# Patient Record
Sex: MALE | Race: OTHER
[De-identification: names, ages, dates, MRNs, and addresses within clinical notes are randomized per-mention and may not be internally consistent; named-entity substitution may affect disease eponyms.]

---

## 2020-02-26 PROBLEM — Z00.00 ENCOUNTER FOR PREVENTIVE HEALTH EXAMINATION: Status: ACTIVE | Noted: 2020-02-26

## 2020-02-27 ENCOUNTER — APPOINTMENT (OUTPATIENT)
Dept: NEUROLOGY | Facility: CLINIC | Age: 55
End: 2020-02-27
Payer: COMMERCIAL

## 2020-02-27 ENCOUNTER — LABORATORY RESULT (OUTPATIENT)
Age: 55
End: 2020-02-27

## 2020-02-27 VITALS
TEMPERATURE: 98 F | OXYGEN SATURATION: 99 % | HEART RATE: 80 BPM | SYSTOLIC BLOOD PRESSURE: 171 MMHG | BODY MASS INDEX: 25.01 KG/M2 | DIASTOLIC BLOOD PRESSURE: 98 MMHG | WEIGHT: 165 LBS | HEIGHT: 68 IN

## 2020-02-27 DIAGNOSIS — Z86.39 PERSONAL HISTORY OF OTHER ENDOCRINE, NUTRITIONAL AND METABOLIC DISEASE: ICD-10-CM

## 2020-02-27 DIAGNOSIS — Z86.79 PERSONAL HISTORY OF OTHER DISEASES OF THE CIRCULATORY SYSTEM: ICD-10-CM

## 2020-02-27 DIAGNOSIS — Z78.9 OTHER SPECIFIED HEALTH STATUS: ICD-10-CM

## 2020-02-27 DIAGNOSIS — Z82.49 FAMILY HISTORY OF ISCHEMIC HEART DISEASE AND OTHER DISEASES OF THE CIRCULATORY SYSTEM: ICD-10-CM

## 2020-02-27 PROCEDURE — 99204 OFFICE O/P NEW MOD 45 MIN: CPT

## 2020-02-27 RX ORDER — LISINOPRIL 20 MG/1
20 TABLET ORAL
Refills: 0 | Status: ACTIVE | COMMUNITY

## 2020-02-27 RX ORDER — SITAGLIPTIN 50 MG/1
50 TABLET, FILM COATED ORAL
Refills: 0 | Status: ACTIVE | COMMUNITY

## 2020-02-27 RX ORDER — METFORMIN HYDROCHLORIDE 1000 MG/1
1000 TABLET, COATED ORAL
Refills: 0 | Status: ACTIVE | COMMUNITY

## 2020-02-27 NOTE — PHYSICAL EXAM
[FreeTextEntry1] : Mental status: Awake, alert and oriented x3.  Recent and remote memory intact.  Naming, repetition and comprehension intact.  Attention/concentration intact.  No dysarthria, no aphasia.  Fund of knowledge appropriate.  \par Cranial nerves: Pupils equally round and reactive to light, visual fields full, no nystagmus, extraocular muscles intact, V1 through V3 intact bilaterally and symmetric, face symmetric, hearing intact to finger rub, palate elevation symmetric, tongue was midline.\par Motor:  MRC grading 5/5 b/l UE/LE.   strength 5/5 except left ankle dorsi flexion : 4/5   Normal tone and bulk.  No abnormal movements.  \par Sensation:Hyperesthesia in the feet. Reduced vibration in the toes to 5 sec and absent position. \par Coordination: No dysmetria on finger-to-nose and heel-to-shin.  No dysdiadokinesia.\par Reflexes: 2+ in bilateral UE/LE, downgoing toes bilaterally. (-) Helms.\par Gait: unsteady tandem and wide based . No ataxia.  Romberg positive \par \par

## 2020-02-27 NOTE — ASSESSMENT
[FreeTextEntry1] : KECIA MARTIN is a 54 year old M w HTN and DM who is here for paresthesia and numbness in the feet. Exam is significant for positive Romberg, reduced vibration and position in the toes and unsteady tandem. In addition he has mild weakness on left ankle dorsiflexion. I will perform EMG checking for both neuropathy and radiculopathy and will order further lab work up for neuropathy. Will also start him on gabapentin 100 mg TID \par

## 2020-02-27 NOTE — HISTORY OF PRESENT ILLNESS
[FreeTextEntry1] : KECIA MARTIN is a 54 year old M w history of DM and HTN is here to be evaluated for numbness and tingling sensation in the feet. He reports starting of the symptoms probably for few years but they became more significant for the past 6 months. He has pain and paresthesia in his feet. He has history of intermittent low back pain which got better with PT. He denies any weakness or numbness in the hands. He has history of diabetes for more than 10 years and claims better control recently. \par

## 2020-03-02 LAB
ALBUMIN MFR SERPL ELPH: 59.8 %
ALBUMIN SERPL-MCNC: 4.8 G/DL
ALBUMIN/GLOB SERPL: 1.5 RATIO
ALPHA1 GLOB MFR SERPL ELPH: 3.6 %
ALPHA1 GLOB SERPL ELPH-MCNC: 0.3 G/DL
ALPHA2 GLOB MFR SERPL ELPH: 7.5 %
ALPHA2 GLOB SERPL ELPH-MCNC: 0.6 G/DL
B-GLOBULIN MFR SERPL ELPH: 13 %
B-GLOBULIN SERPL ELPH-MCNC: 1.1 G/DL
CERULOPLASMIN SERPL-MCNC: 27 MG/DL
COPPER SERPL-MCNC: 124 UG/DL
DEPRECATED KAPPA LC FREE/LAMBDA SER: 1.27 RATIO
ENA SS-A AB SER IA-ACNC: <0.2 AL
ENA SS-B AB SER IA-ACNC: <0.2 AL
ESTIMATED AVERAGE GLUCOSE: 160 MG/DL
FOLATE SERPL-MCNC: 4.6 NG/ML
GAMMA GLOB FLD ELPH-MCNC: 1.3 G/DL
GAMMA GLOB MFR SERPL ELPH: 16.1 %
GLIADIN IGA SER QL: 7 UNITS
GLIADIN IGG SER QL: <5 UNITS
GLIADIN PEPTIDE IGA SER-ACNC: NEGATIVE
GLIADIN PEPTIDE IGG SER-ACNC: NEGATIVE
HBA1C MFR BLD HPLC: 7.2 %
HCYS SERPL-MCNC: 34.4 UMOL/L
IGA SER QL IEP: 464 MG/DL
IGG SER QL IEP: 1518 MG/DL
IGM SER QL IEP: 82 MG/DL
INTERPRETATION SERPL IEP-IMP: NORMAL
KAPPA LC CSF-MCNC: 3.11 MG/DL
KAPPA LC SERPL-MCNC: 3.96 MG/DL
M PROTEIN SPEC IFE-MCNC: NORMAL
PROT SERPL-MCNC: 8.1 G/DL
PROT SERPL-MCNC: 8.1 G/DL
TSH SERPL-ACNC: 3.5 UIU/ML
VIT B12 SERPL-MCNC: 539 PG/ML

## 2020-03-04 LAB
ACE BLD-CCNC: 11 U/L
IGA 24H UR QL IFE: NORMAL
VIT B2 SERPL-MCNC: 181 UG/L
VIT B6 SERPL-MCNC: 8.9 UG/L
ZINC SERPL-MCNC: 92 UG/DL

## 2020-03-19 LAB
ALBUPE: 27.5 %
ALPHA1UPE: 30.2 %
ALPHA2UPE: 13.9 %
BETAUPE: 13.5 %
CREAT 24H UR-MCNC: NORMAL G/24 H
CREATININE UR (MAYO): 78 MG/DL
GAMMAUPE: 14.9 %
IGA 24H UR QL IFE: NORMAL
KAPPA LC 24H UR QL: NORMAL
PROT PATTERN 24H UR ELPH-IMP: NORMAL
PROT UR-MCNC: 16 MG/DL
PROT UR-MCNC: 18 MG/DL
SPECIMEN VOL 24H UR: NORMAL ML
VIT B1 SERPL-MCNC: 101.9 NMOL/L

## 2020-03-23 ENCOUNTER — OUTPATIENT (OUTPATIENT)
Dept: OUTPATIENT SERVICES | Facility: HOSPITAL | Age: 55
LOS: 1 days | Discharge: HOME | End: 2020-03-23

## 2020-03-23 ENCOUNTER — APPOINTMENT (OUTPATIENT)
Dept: HEMATOLOGY ONCOLOGY | Facility: CLINIC | Age: 55
End: 2020-03-23
Payer: COMMERCIAL

## 2020-03-23 ENCOUNTER — LABORATORY RESULT (OUTPATIENT)
Age: 55
End: 2020-03-23

## 2020-03-23 VITALS
SYSTOLIC BLOOD PRESSURE: 184 MMHG | DIASTOLIC BLOOD PRESSURE: 110 MMHG | TEMPERATURE: 97.3 F | WEIGHT: 154 LBS | HEIGHT: 68 IN | HEART RATE: 95 BPM | BODY MASS INDEX: 23.34 KG/M2

## 2020-03-23 DIAGNOSIS — E53.8 DEFICIENCY OF OTHER SPECIFIED B GROUP VITAMINS: ICD-10-CM

## 2020-03-23 DIAGNOSIS — R76.8 OTHER SPECIFIED ABNORMAL IMMUNOLOGICAL FINDINGS IN SERUM: ICD-10-CM

## 2020-03-23 LAB
HCT VFR BLD CALC: 42.3 %
HGB BLD-MCNC: 14.6 G/DL
MCHC RBC-ENTMCNC: 31.3 PG
MCHC RBC-ENTMCNC: 34.5 G/DL
MCV RBC AUTO: 90.8 FL
PLATELET # BLD AUTO: 192 K/UL
PMV BLD: 9.2 FL
RBC # BLD: 4.66 M/UL
RBC # FLD: 12.2 %
WBC # FLD AUTO: 5.3 K/UL

## 2020-03-23 PROCEDURE — 99205 OFFICE O/P NEW HI 60 MIN: CPT

## 2020-03-24 PROBLEM — E53.8 LOW SERUM VITAMIN B12: Status: ACTIVE | Noted: 2020-03-23

## 2020-03-24 LAB
ALBUMIN SERPL ELPH-MCNC: 5.3 G/DL
ALP BLD-CCNC: 84 U/L
ALT SERPL-CCNC: 32 U/L
ANION GAP SERPL CALC-SCNC: 18 MMOL/L
AST SERPL-CCNC: 34 U/L
BILIRUB SERPL-MCNC: 0.9 MG/DL
BUN SERPL-MCNC: 13 MG/DL
CALCIUM SERPL-MCNC: 10.8 MG/DL
CHLORIDE SERPL-SCNC: 98 MMOL/L
CO2 SERPL-SCNC: 24 MMOL/L
CREAT SERPL-MCNC: 1.6 MG/DL
FERRITIN SERPL-MCNC: 37 NG/ML
FOLATE SERPL-MCNC: 3.3 NG/ML
GLUCOSE SERPL-MCNC: 125 MG/DL
HCYS SERPL-MCNC: 43.8 UMOL/L
PCA AB SER QL IF: NORMAL
POTASSIUM SERPL-SCNC: 4.9 MMOL/L
PROT SERPL-MCNC: 9 G/DL
SODIUM SERPL-SCNC: 140 MMOL/L
VIT B12 SERPL-MCNC: 468 PG/ML

## 2020-03-24 NOTE — HISTORY OF PRESENT ILLNESS
[de-identified] : 54 yom, PMH of long-standing DM II and HTN, established care with neurologist secondary to severe numbness and tingling in B/L feet.  Neurologist performed extensive workup, which demonstrated normal SPEP, normal UPEP, serum immunofixation without monoclonal band, normal IgA, IgM, and IgG, elevated kappa at 3.96, elevated lambda at 3.11, and normal ratio of 1.27.  B12 levels were drawn twice.  One level was 181 and one was 537.  Folate slightly low at 4.6 (normal 4.7).  Homocysteine elevated at 34.4.  Other than numbness and tingling in B/L feet, patient states that he feels well overall.  He eats a regular diet.  Although he does not often consume red meat, he regularly consumes fish and eggs.  Denies GI complaints.  Had colonoscopy one year ago, which was reportedly normal.  Has never had EGD.

## 2020-03-24 NOTE — PHYSICAL EXAM
[Normal] : affect appropriate [de-identified] : well-appearing [de-identified] : +B/L air entry [de-identified] : soft

## 2020-03-24 NOTE — ASSESSMENT
[FreeTextEntry1] : 54 yom with long-standing DM II and HTN, saw neurologist for numbness and tingling in B/L feet.  Neurologist performed extensive workup, which demonstrated elevated serum kappa and lambda with normal ratio, as well as elevated elevated homocysteine and low folate levels.\par Impression : likely diabetic neuropathy . \par \par 1. Elevated serum kappa and lambda levels with normal ratio-May be secondary to DM +/- CKD.  SPEP, UPEP, serum immunofixation, IgA, IgM, and IgG are all normal.  No evidence that patient has plasma cell dyscrasia.\par 2. Elevated homocysteine-May be secondary to vitamin deficiency (ex: B12, folate).  B12 levels   Folate level was borderline low at 4.6 (normal 4.7).  Will recheck homocysteine, B12, and folate levels today.  Will also check MMMA levels.  Rule out pernicious anemia with intrinsic factor, antiparietal antibodies, serum gastrin.  Other possible causes of elevated homocysteine include CKD and metformin use.  Will check CBC, CMP, ferritin, and MTHFR for completion.\par \par Will notify patient with results and determine optimal interval for follow up.  If folate deficiency confirmed, will start oral supplementation.  If vitamin B12 deficiency confirmed, will start B12 injections.\par \par Patient was seen and examined with Dr. Armenta, who agreed with the above plan of care.\par

## 2020-03-24 NOTE — REASON FOR VISIT
[Initial Consultation] : an initial consultation for [FreeTextEntry2] : elevated kappa, lambda, and homocysteine

## 2020-03-26 LAB
GASTRIN SERPL-MCNC: 38 PG/ML
IF BLOCK AB SER QL: NORMAL
METHYLMALONATE SERPL-SCNC: 315 NMOL/L

## 2020-03-27 DIAGNOSIS — R76.8 OTHER SPECIFIED ABNORMAL IMMUNOLOGICAL FINDINGS IN SERUM: ICD-10-CM

## 2020-03-27 DIAGNOSIS — E53.8 DEFICIENCY OF OTHER SPECIFIED B GROUP VITAMINS: ICD-10-CM

## 2020-05-29 ENCOUNTER — APPOINTMENT (OUTPATIENT)
Dept: NEUROLOGY | Facility: CLINIC | Age: 55
End: 2020-05-29

## 2020-07-01 ENCOUNTER — RX RENEWAL (OUTPATIENT)
Age: 55
End: 2020-07-01

## 2020-10-06 ENCOUNTER — RX RENEWAL (OUTPATIENT)
Age: 55
End: 2020-10-06

## 2021-02-08 ENCOUNTER — TRANSCRIPTION ENCOUNTER (OUTPATIENT)
Age: 56
End: 2021-02-08

## 2021-05-24 ENCOUNTER — APPOINTMENT (OUTPATIENT)
Dept: NEUROLOGY | Facility: CLINIC | Age: 56
End: 2021-05-24
Payer: COMMERCIAL

## 2021-05-24 VITALS
TEMPERATURE: 97.9 F | WEIGHT: 160 LBS | OXYGEN SATURATION: 98 % | SYSTOLIC BLOOD PRESSURE: 189 MMHG | HEIGHT: 67 IN | HEART RATE: 108 BPM | DIASTOLIC BLOOD PRESSURE: 109 MMHG | BODY MASS INDEX: 25.11 KG/M2

## 2021-05-24 PROCEDURE — 99213 OFFICE O/P EST LOW 20 MIN: CPT

## 2021-05-24 RX ORDER — SITAGLIPTIN 100 MG/1
100 TABLET, FILM COATED ORAL
Qty: 90 | Refills: 0 | Status: ACTIVE | COMMUNITY
Start: 2021-01-11

## 2021-05-24 RX ORDER — LANCETS 28 GAUGE
EACH MISCELLANEOUS
Qty: 300 | Refills: 0 | Status: ACTIVE | COMMUNITY
Start: 2020-03-09

## 2021-05-24 RX ORDER — BLOOD SUGAR DIAGNOSTIC
STRIP MISCELLANEOUS
Qty: 300 | Refills: 0 | Status: ACTIVE | COMMUNITY
Start: 2021-05-12

## 2021-05-24 RX ORDER — HYDROCORTISONE 25 MG/G
2.5 CREAM TOPICAL
Qty: 30 | Refills: 0 | Status: ACTIVE | COMMUNITY
Start: 2021-03-08

## 2021-05-24 RX ORDER — FLUTICASONE PROPIONATE AND SALMETEROL 50; 250 UG/1; UG/1
250-50 POWDER RESPIRATORY (INHALATION)
Qty: 60 | Refills: 0 | Status: ACTIVE | COMMUNITY
Start: 2021-04-15

## 2021-05-24 RX ORDER — LATANOPROST/PF 0.005 %
0.01 DROPS OPHTHALMIC (EYE)
Qty: 8 | Refills: 0 | Status: ACTIVE | COMMUNITY
Start: 2021-03-29

## 2021-05-24 NOTE — PHYSICAL EXAM
[FreeTextEntry1] : Mental status: Awake, alert and oriented x3. Recent and remote memory intact. Naming, repetition and comprehension intact. Attention/concentration intact. No dysarthria, no aphasia. Fund of knowledge appropriate. \par Cranial nerves: Pupils equally round and reactive to light, visual fields full, no nystagmus, extraocular muscles intact, V1 through V3 intact bilaterally and symmetric, face symmetric, hearing intact to finger rub, palate elevation symmetric, tongue was midline.\par Motor: MRC grading 5/5 b/l UE/LE.  strength 5/5\par Sensation:Hyperesthesia in the feet. Reduced vibration in the toes to 5 sec and absent position. \par Coordination: No dysmetria on finger-to-nose and heel-to-shin. No dysdiadokinesia.\par Reflexes: 2+ in bilateral UE 1+ in the knees and absent in the ankles.  downgoing toes bilaterally. (-) Helms.\par Gait: unsteady tandem and wide based. . Romberg positive \par

## 2021-05-24 NOTE — ASSESSMENT
[FreeTextEntry1] : 55 year old M w HTN and DM who is here for paresthesia and numbness in the feet. Exam is significant for positive Romberg, reduced vibration and position in the toes and unsteady tandem. Still has pain and burning sensation in dorsum of the feet. \par \par - Start Cymbalta 30 mg BID \par - Schedule for EMG test

## 2021-05-24 NOTE — HISTORY OF PRESENT ILLNESS
[FreeTextEntry1] : KECIA MARTIN is a 55 year old man is here as a follow up visit for neuropathy. He was seen in the office on 2/2020 and started on gabapentin. Scheduled for EMG but it was cancelled due to Covid. He was noted to have eevated serum kappa and lambda levels and followed with hematology. \par Today reports pain and burning sensation in the dorsum of the feet after few blocks of walking.

## 2021-05-24 NOTE — REVIEW OF SYSTEMS
[Numbness] : numbness [Tingling] : tingling [Abnormal Sensation] : an abnormal sensation [Hypersensitivity] : hypersensitivity [Negative] : Heme/Lymph

## 2021-07-25 ENCOUNTER — INPATIENT (INPATIENT)
Facility: HOSPITAL | Age: 56
LOS: 0 days | Discharge: HOME | End: 2021-07-26
Attending: SURGERY | Admitting: SURGERY
Payer: COMMERCIAL

## 2021-07-25 ENCOUNTER — TRANSCRIPTION ENCOUNTER (OUTPATIENT)
Age: 56
End: 2021-07-25

## 2021-07-25 VITALS
HEART RATE: 107 BPM | DIASTOLIC BLOOD PRESSURE: 105 MMHG | RESPIRATION RATE: 18 BRPM | TEMPERATURE: 98 F | WEIGHT: 164.91 LBS | SYSTOLIC BLOOD PRESSURE: 212 MMHG | OXYGEN SATURATION: 97 % | HEIGHT: 68 IN

## 2021-07-25 LAB
ALBUMIN SERPL ELPH-MCNC: 4.5 G/DL — SIGNIFICANT CHANGE UP (ref 3.5–5.2)
ALBUMIN SERPL ELPH-MCNC: 5 G/DL — SIGNIFICANT CHANGE UP (ref 3.5–5.2)
ALP SERPL-CCNC: 103 U/L — SIGNIFICANT CHANGE UP (ref 30–115)
ALP SERPL-CCNC: 96 U/L — SIGNIFICANT CHANGE UP (ref 30–115)
ALT FLD-CCNC: 59 U/L — HIGH (ref 0–41)
ALT FLD-CCNC: 69 U/L — HIGH (ref 0–41)
ANION GAP SERPL CALC-SCNC: 13 MMOL/L — SIGNIFICANT CHANGE UP (ref 7–14)
ANION GAP SERPL CALC-SCNC: 15 MMOL/L — HIGH (ref 7–14)
APPEARANCE UR: CLEAR — SIGNIFICANT CHANGE UP
APTT BLD: 28.6 SEC — SIGNIFICANT CHANGE UP (ref 27–39.2)
AST SERPL-CCNC: 54 U/L — HIGH (ref 0–41)
AST SERPL-CCNC: 78 U/L — HIGH (ref 0–41)
BASE EXCESS BLDV CALC-SCNC: 2 MMOL/L — SIGNIFICANT CHANGE UP (ref -2–2)
BASOPHILS # BLD AUTO: 0.03 K/UL — SIGNIFICANT CHANGE UP (ref 0–0.2)
BASOPHILS NFR BLD AUTO: 0.6 % — SIGNIFICANT CHANGE UP (ref 0–1)
BILIRUB SERPL-MCNC: 0.9 MG/DL — SIGNIFICANT CHANGE UP (ref 0.2–1.2)
BILIRUB SERPL-MCNC: 1 MG/DL — SIGNIFICANT CHANGE UP (ref 0.2–1.2)
BILIRUB UR-MCNC: NEGATIVE — SIGNIFICANT CHANGE UP
BUN SERPL-MCNC: 14 MG/DL — SIGNIFICANT CHANGE UP (ref 10–20)
BUN SERPL-MCNC: 16 MG/DL — SIGNIFICANT CHANGE UP (ref 10–20)
CALCIUM SERPL-MCNC: 10.4 MG/DL — HIGH (ref 8.5–10.1)
CALCIUM SERPL-MCNC: 9.7 MG/DL — SIGNIFICANT CHANGE UP (ref 8.5–10.1)
CHLORIDE SERPL-SCNC: 93 MMOL/L — LOW (ref 98–110)
CHLORIDE SERPL-SCNC: 95 MMOL/L — LOW (ref 98–110)
CO2 SERPL-SCNC: 23 MMOL/L — SIGNIFICANT CHANGE UP (ref 17–32)
CO2 SERPL-SCNC: 24 MMOL/L — SIGNIFICANT CHANGE UP (ref 17–32)
COLOR SPEC: COLORLESS — SIGNIFICANT CHANGE UP
CREAT SERPL-MCNC: 1.5 MG/DL — SIGNIFICANT CHANGE UP (ref 0.7–1.5)
CREAT SERPL-MCNC: 1.5 MG/DL — SIGNIFICANT CHANGE UP (ref 0.7–1.5)
DIFF PNL FLD: NEGATIVE — SIGNIFICANT CHANGE UP
EOSINOPHIL # BLD AUTO: 0.19 K/UL — SIGNIFICANT CHANGE UP (ref 0–0.7)
EOSINOPHIL NFR BLD AUTO: 3.8 % — SIGNIFICANT CHANGE UP (ref 0–8)
ETHANOL SERPL-MCNC: <10 MG/DL — SIGNIFICANT CHANGE UP
GAS PNL BLDV: SIGNIFICANT CHANGE UP
GLUCOSE SERPL-MCNC: 152 MG/DL — HIGH (ref 70–99)
GLUCOSE SERPL-MCNC: 255 MG/DL — HIGH (ref 70–99)
GLUCOSE UR QL: ABNORMAL
HCO3 BLDV-SCNC: 28 MMOL/L — SIGNIFICANT CHANGE UP (ref 22–29)
HCT VFR BLD CALC: 28.8 % — LOW (ref 42–52)
HGB BLD-MCNC: 10.1 G/DL — LOW (ref 14–18)
IMM GRANULOCYTES NFR BLD AUTO: 0.4 % — HIGH (ref 0.1–0.3)
INR BLD: 0.97 RATIO — SIGNIFICANT CHANGE UP (ref 0.65–1.3)
KETONES UR-MCNC: NEGATIVE — SIGNIFICANT CHANGE UP
LACTATE BLDV-MCNC: 1.6 MMOL/L — SIGNIFICANT CHANGE UP (ref 0.5–1.6)
LEUKOCYTE ESTERASE UR-ACNC: NEGATIVE — SIGNIFICANT CHANGE UP
LIDOCAIN IGE QN: 108 U/L — HIGH (ref 7–60)
LYMPHOCYTES # BLD AUTO: 0.62 K/UL — LOW (ref 1.2–3.4)
LYMPHOCYTES # BLD AUTO: 12.4 % — LOW (ref 20.5–51.1)
MCHC RBC-ENTMCNC: 32.1 PG — HIGH (ref 27–31)
MCHC RBC-ENTMCNC: 35.1 G/DL — SIGNIFICANT CHANGE UP (ref 32–37)
MCV RBC AUTO: 91.4 FL — SIGNIFICANT CHANGE UP (ref 80–94)
MONOCYTES # BLD AUTO: 0.42 K/UL — SIGNIFICANT CHANGE UP (ref 0.1–0.6)
MONOCYTES NFR BLD AUTO: 8.4 % — SIGNIFICANT CHANGE UP (ref 1.7–9.3)
NEUTROPHILS # BLD AUTO: 3.71 K/UL — SIGNIFICANT CHANGE UP (ref 1.4–6.5)
NEUTROPHILS NFR BLD AUTO: 74.4 % — SIGNIFICANT CHANGE UP (ref 42.2–75.2)
NITRITE UR-MCNC: NEGATIVE — SIGNIFICANT CHANGE UP
NRBC # BLD: 0 /100 WBCS — SIGNIFICANT CHANGE UP (ref 0–0)
PCO2 BLDV: 50 MMHG — SIGNIFICANT CHANGE UP (ref 41–51)
PH BLDV: 7.36 — SIGNIFICANT CHANGE UP (ref 7.26–7.43)
PH UR: 6.5 — SIGNIFICANT CHANGE UP (ref 5–8)
PLATELET # BLD AUTO: 194 K/UL — SIGNIFICANT CHANGE UP (ref 130–400)
PO2 BLDV: 30 MMHG — SIGNIFICANT CHANGE UP (ref 20–40)
POTASSIUM SERPL-MCNC: 4.6 MMOL/L — SIGNIFICANT CHANGE UP (ref 3.5–5)
POTASSIUM SERPL-MCNC: 6 MMOL/L — CRITICAL HIGH (ref 3.5–5)
POTASSIUM SERPL-SCNC: 4.6 MMOL/L — SIGNIFICANT CHANGE UP (ref 3.5–5)
POTASSIUM SERPL-SCNC: 6 MMOL/L — CRITICAL HIGH (ref 3.5–5)
PROT SERPL-MCNC: 7.1 G/DL — SIGNIFICANT CHANGE UP (ref 6–8)
PROT SERPL-MCNC: 8.1 G/DL — HIGH (ref 6–8)
PROT UR-MCNC: SIGNIFICANT CHANGE UP
PROTHROM AB SERPL-ACNC: 11.2 SEC — SIGNIFICANT CHANGE UP (ref 9.95–12.87)
RBC # BLD: 3.15 M/UL — LOW (ref 4.7–6.1)
RBC # FLD: 13.8 % — SIGNIFICANT CHANGE UP (ref 11.5–14.5)
SAO2 % BLDV: 50 % — SIGNIFICANT CHANGE UP
SARS-COV-2 RNA SPEC QL NAA+PROBE: SIGNIFICANT CHANGE UP
SODIUM SERPL-SCNC: 131 MMOL/L — LOW (ref 135–146)
SODIUM SERPL-SCNC: 132 MMOL/L — LOW (ref 135–146)
SP GR SPEC: 1.02 — SIGNIFICANT CHANGE UP (ref 1.01–1.03)
TROPONIN T SERPL-MCNC: <0.01 NG/ML — SIGNIFICANT CHANGE UP
UROBILINOGEN FLD QL: SIGNIFICANT CHANGE UP
WBC # BLD: 4.99 K/UL — SIGNIFICANT CHANGE UP (ref 4.8–10.8)
WBC # FLD AUTO: 4.99 K/UL — SIGNIFICANT CHANGE UP (ref 4.8–10.8)

## 2021-07-25 PROCEDURE — 72125 CT NECK SPINE W/O DYE: CPT | Mod: 26,MA

## 2021-07-25 PROCEDURE — 71275 CT ANGIOGRAPHY CHEST: CPT | Mod: 26,MA

## 2021-07-25 PROCEDURE — 70450 CT HEAD/BRAIN W/O DYE: CPT | Mod: 26,MA

## 2021-07-25 PROCEDURE — 71045 X-RAY EXAM CHEST 1 VIEW: CPT | Mod: 26

## 2021-07-25 PROCEDURE — 99223 1ST HOSP IP/OBS HIGH 75: CPT

## 2021-07-25 PROCEDURE — 74177 CT ABD & PELVIS W/CONTRAST: CPT | Mod: 26,MA

## 2021-07-25 PROCEDURE — 93010 ELECTROCARDIOGRAM REPORT: CPT

## 2021-07-25 PROCEDURE — 72170 X-RAY EXAM OF PELVIS: CPT | Mod: 26

## 2021-07-25 PROCEDURE — 99285 EMERGENCY DEPT VISIT HI MDM: CPT

## 2021-07-25 RX ORDER — HEPARIN SODIUM 5000 [USP'U]/ML
5000 INJECTION INTRAVENOUS; SUBCUTANEOUS EVERY 8 HOURS
Refills: 0 | Status: DISCONTINUED | OUTPATIENT
Start: 2021-07-25 | End: 2021-07-26

## 2021-07-25 RX ORDER — PANTOPRAZOLE SODIUM 20 MG/1
40 TABLET, DELAYED RELEASE ORAL
Refills: 0 | Status: DISCONTINUED | OUTPATIENT
Start: 2021-07-25 | End: 2021-07-26

## 2021-07-25 RX ORDER — SODIUM CHLORIDE 9 MG/ML
1000 INJECTION, SOLUTION INTRAVENOUS ONCE
Refills: 0 | Status: COMPLETED | OUTPATIENT
Start: 2021-07-25 | End: 2021-07-25

## 2021-07-25 RX ORDER — ACETAMINOPHEN 500 MG
650 TABLET ORAL EVERY 6 HOURS
Refills: 0 | Status: DISCONTINUED | OUTPATIENT
Start: 2021-07-25 | End: 2021-07-26

## 2021-07-25 RX ORDER — LISINOPRIL 2.5 MG/1
20 TABLET ORAL DAILY
Refills: 0 | Status: DISCONTINUED | OUTPATIENT
Start: 2021-07-25 | End: 2021-07-26

## 2021-07-25 RX ORDER — OXYCODONE HYDROCHLORIDE 5 MG/1
5 TABLET ORAL EVERY 6 HOURS
Refills: 0 | Status: DISCONTINUED | OUTPATIENT
Start: 2021-07-25 | End: 2021-07-26

## 2021-07-25 RX ORDER — MORPHINE SULFATE 50 MG/1
4 CAPSULE, EXTENDED RELEASE ORAL ONCE
Refills: 0 | Status: DISCONTINUED | OUTPATIENT
Start: 2021-07-25 | End: 2021-07-25

## 2021-07-25 RX ORDER — DULOXETINE HYDROCHLORIDE 30 MG/1
30 CAPSULE, DELAYED RELEASE ORAL DAILY
Refills: 0 | Status: DISCONTINUED | OUTPATIENT
Start: 2021-07-25 | End: 2021-07-26

## 2021-07-25 RX ADMIN — MORPHINE SULFATE 4 MILLIGRAM(S): 50 CAPSULE, EXTENDED RELEASE ORAL at 13:05

## 2021-07-25 RX ADMIN — Medication 1.25 MILLIGRAM(S): at 20:07

## 2021-07-25 RX ADMIN — Medication 650 MILLIGRAM(S): at 19:14

## 2021-07-25 RX ADMIN — HEPARIN SODIUM 5000 UNIT(S): 5000 INJECTION INTRAVENOUS; SUBCUTANEOUS at 21:40

## 2021-07-25 RX ADMIN — SODIUM CHLORIDE 1000 MILLILITER(S): 9 INJECTION, SOLUTION INTRAVENOUS at 13:06

## 2021-07-25 RX ADMIN — OXYCODONE HYDROCHLORIDE 5 MILLIGRAM(S): 5 TABLET ORAL at 17:48

## 2021-07-25 NOTE — H&P ADULT - ATTENDING COMMENTS
_ 56 year old male with a PMH/PSH of DM, HTN, neuropathy, bleeding hemorrhoids, glaucoma who was seen as a Trauma Consult s/p syncopal fall, +HT, +LOC, -AC with complaint of right anterior chest wall pain, no external signs of trauma. Trauma assessment in ED: ABCs intact , GCS 15 , AAOx3,  GONZALES.     Injuries identified:   - Tiny right apical pneumothorax  - Acute mildly displaced fracture of right fourth rib    PLAN:   - Admit  under trauma service  - 24h telemetry monitoring  - NC supplemental 02  - Orthostatic vitals  - Echo  - AM CXR  - Incentive spirometry, pulling 1250  - Pain control  - DVT/GI ppx

## 2021-07-25 NOTE — ED PROVIDER NOTE - ATTENDING CONTRIBUTION TO CARE
57 year old male, pmhx as documented presenting s/p episode of syncope. Per report patient had sudden LOC causing him to fall on his chest and face. Daughter witnessed event - no seizure activity, no incontinence or tongue biting. Patient has limited memory of the event and denied pre-syncopal symptoms. States currently he is having right rib pain described as achy, non-radiating, worse with inspiration, no palliative factors, moderate severity. Has been able to ambulate since the fall. Per daughter, patient has been having frequent falls over the past few months but has not yet sought medical attention.    Vital Signs: I have reviewed the initial vital signs.  Constitutional: NAD, well-nourished, appears stated age, no acute distress.  HEENT: Airway patent, moist MM, no erythema/swelling/deformity of oral structures. EOMI, PERRLA.  CV: regular rate, regular rhythm, well-perfused extremities, 2+ b/l DP and radial pulses equal.  Lungs: BCTA, no increased WOB.  ABD: NTND, no guarding or rebound, no pulsatile mass, no hernias.   MSK: Neck supple, nontender, nl ROM, no stepoff. (+) right rib tenderness. Back nontender in TLS spine or to b/l bony structures or flanks. Ext nontender, nl rom, no deformity.   INTEG: Skin warm, dry, no rash.  NEURO: A&Ox3, normal strength, nl sensation throughout, normal speech.   PSYCH: Calm, cooperative, normal affect and interaction.    Will pan scan given frequent falls, obtain EKG, labs, CXR, re-eval.

## 2021-07-25 NOTE — ED PROVIDER NOTE - PHYSICAL EXAMINATION
Vital Signs: I have reviewed the initial vital signs.  Constitutional: well-nourished, appears stated age, no acute distress.  HEENT: Airway patent, moist MM, no erythema/swelling/deformity of oral structures. EOMI, PERRLA.  +right infraorbital ecchymosis and tenderness  no obvious scalp trauma, no tongue lacerations  CV: regular rate, regular rhythm, well-perfused extremities, 2+ b/l DP and radial pulses equal.   Lungs: BCTA, no increased WOB.  ABD: NTND, no guarding or rebound, no pulsatile mass, no hernias, no flank pain.   MSK: +right mid chest and posterior thoracic tenderness, no obvious deformity/echymosis/lacteration to chest  Neck supple, nontender, nl ROM, no stepoff. Back nontender in TLS spine or to b/l bony structures. Ext nontender, nl rom, no deformity, strength/sensation U/LL wnl   INTEG: Skin warm, dry, no rash.  NEURO: A&Ox3, moving all extremities,- mild tremor to right hands/ b/l LL normal speech  PSYCH: Calm, cooperative, normal affect and interaction. Vital Signs: I have reviewed the initial vital signs.  Constitutional: well-nourished, appears stated age, no acute distress.  HEENT: Airway patent, moist MM, no erythema/swelling/deformity of oral structures. EOMI, PERRLA.  +right infraorbital ecchymosis and tenderness  no obvious scalp trauma, no tongue lacerations  CV: regular rate, regular rhythm, well-perfused extremities, 2+ b/l DP and radial pulses equal.   Lungs: BCTA, no increased WOB.  ABD: NTND, no guarding or rebound, no pulsatile mass, no hernias, no flank pain.   MSK: +right mid chest and posterior thoracic tenderness, no obvious deformity/echymosis/laceration to chest  Neck supple, nontender, nl ROM, no stepoff. Back nontender in TLS spine or to b/l bony structures. Ext nontender, nl rom, no deformity, strength/sensation U/LL wnl   INTEG: Skin warm, dry, no rash.  NEURO: A&Ox3, moving all extremities, +mild tremor to right hands/ b/l LL normal speech. CN2-12 grossly intact.  PSYCH: Calm, cooperative, normal affect and interaction.

## 2021-07-25 NOTE — H&P ADULT - ASSESSMENT
ASSESSMENT:  This is a 56 year old male with a PMH/PSH of DM, HTN, neuropathy, bleeding hemorrhoids, glaucoma who was seen as a Trauma Consult s/p syncopal fall, +HT, +LOC, -AC with complaint of right anterior chest wall pain, no external signs of trauma. Trauma assessment in ED: ABCs intact , GCS 15 , AAOx3,  GONZALES.     Injuries identified:   - Tiny right apical pneumothorax  - Acute mildly displaced fracture of right fourth rib    PLAN:   - Admit to  telemetry under trauma service  - 24h telemetry monitoring  - Orthostatic vitals  - Echo  - AM CXR  - Incentive spirometry, pulling 1250  - Pain control  - DVT/GI ppx    Disposition pending results of above labs and imaging  Above plan discussed with Trauma attending, Dr. Ware, patient, patient family, and ED team  --------------------------------------------------------------------------------------  07-25-21 @ 16:45 ASSESSMENT:  This is a 56 year old male with a PMH/PSH of DM, HTN, neuropathy, bleeding hemorrhoids, glaucoma who was seen as a Trauma Consult s/p syncopal fall, +HT, +LOC, -AC with complaint of right anterior chest wall pain, no external signs of trauma. Trauma assessment in ED: ABCs intact , GCS 15 , AAOx3,  GONZALES.     Injuries identified:   - Tiny right apical pneumothorax  - Acute mildly displaced fracture of right fourth rib    PLAN:   - Admit to 3C telemetry under trauma service  - 24h telemetry monitoring  - Orthostatic vitals  - Echo  - AM CXR  - Incentive spirometry, pulling 1250  - Pain control  - DVT/GI ppx    Disposition pending results of above labs and imaging  Above plan discussed with Trauma attending, Dr. Ware, patient, patient family, and ED team  --------------------------------------------------------------------------------------  07-25-21 @ 16:45    Senior Note  I have personally examined and evaluated the patient  I agree with the above plan and note, and I have edited where appropriate  In addition to the above, HTN mgmt, home lisinopril, adding enalapril, will f/u. If remains >200, SICU for cleviprex gtt  Surgical Attending aware and agrees with plan

## 2021-07-25 NOTE — ED PROVIDER NOTE - CLINICAL SUMMARY MEDICAL DECISION MAKING FREE TEXT BOX
Patient presented s/p witnessed episode of syncope, complaining of right rib pain. (+) hx frequent falls over the past few months. Otherwise afebrile, HD stable, neurovascularly intact. Obtained EKG which was non-ischemic. Labs remarkable for (+) hyperkalemia which was tx in ED. Otherwise labs were grossly unremarkable including no significant leukocytosis, anemia, signs of dehydration/VAMSI, transaminitis or other significant electrolyte abnormalities. Trop negative. Pan scan showed (+) right 4th rib fx with possible right apical PTX. Consulted trauma who evaluated patient in ED and recommended admission to their service for further management. Patient agreeable with plan. HD stable at time of admission.

## 2021-07-25 NOTE — CHART NOTE - NSCHARTNOTEFT_GEN_A_CORE
Patient had a BP of 211/112 that came down to 182/104. Patient was given 1.25mg Enalapril IV push @20:16 which brought blood pressure down to 171/96 @20.26. on repeat blood pressure 20 minutes later BP was 165/96. Patient is being transferred to [facility] for monitoring.

## 2021-07-25 NOTE — H&P ADULT - NSHPPHYSICALEXAM_GEN_ALL_CORE
Primary Survey:    A - airway intact  B - bilateral breath sounds and good chest rise  C - palpable pulses in all extremities  D - GCS 15 on arrival, GONZALES  Exposure obtained    Vital Signs Last 24 Hrs  T(C): 37.2 (25 Jul 2021 12:41), Max: 37.2 (25 Jul 2021 12:41)  T(F): 99 (25 Jul 2021 12:41), Max: 99 (25 Jul 2021 12:41)  HR: 102 (25 Jul 2021 12:41) (102 - 107)  BP: 176/91 (25 Jul 2021 12:41) (176/91 - 212/105)  RR: 18 (25 Jul 2021 12:41) (18 - 18)  SpO2: 97% (25 Jul 2021 12:41) (97% - 97%)    Secondary Survey:   General: NAD  HEENT: Normocephalic, atraumatic, EOMI, PEERLA. No scalp lacerations.   Neck: Soft, midline trachea. No c-spine tenderness.  Chest: Right anterior chest wall tenderness to palpation. No subcutaneous emphysema.  Cardiac: S1, S2, RRR.  Respiratory: Bilateral breath sounds, clear and equal bilaterally.  Abdomen: Soft, non-distended, non-tender, no rebound, no guarding.  Groin: Normal appearing, pelvis stable.  Ext:  Moving b/l upper and lower extremities. Palpable Radial b/l UE, b/l DP palpable in LE.   Back: No T/L/S spine tenderness, No palpable runoff/stepoff/deformity.  Rectal: No laura blood, YVROSE with good tone.    ACCESS / DEVICES:  [ X ] Peripheral IV  [ ] Central Venous Line	[ ] R	[ ] L	[ ] IJ	[ ] Fem	[ ] SC	Placed:   [ ] Arterial Line		[ ] R	[ ] L	[ ] Fem	[ ] Rad	[ ] Ax	Placed:   [ ] PICC:					[ ] Mediport  [ ] Urinary Catheter,  Date Placed:   [ ] Chest tube: [ ] Right, [ ] Left  [ ] HERMELINDA/Sanjeev Drains

## 2021-07-25 NOTE — ED PROVIDER NOTE - OBJECTIVE STATEMENT
58 y/o with pmh non-insulin dm, htn, neuropathy, chronic rectal bleeding 4-5 mos, not on ac pw syncope. Pt had witnessed syncopal fall and fell forward on to his face/chest on marble floor- denies prodromal sensation prior to fall. Pt immediately was able to get up and was oriented according to daughter. In the last 2 mos pt has had laura rectal bleeding- pcp dx hemorrhoids and pt using hemorrhoid cream. Pt has had 3-4 falls in last 2 mos- has never been fully worked up for falls.   Last week Hb 9.9, has never had a transfusion.   Stopped taking aspirin last week.     Currently admits to rt chest/mid back pain. Denies shortness of breath, numbness/tingling, urinary sxs, n/v/d. Pt is at mental status baseline as per daughter. Pt has had b/l U/L tremors that pt/daughter said has been increasing within last week    Accompanied by daughter (nurse at [facility])

## 2021-07-25 NOTE — ED PROVIDER NOTE - NS ED ROS FT
Constitutional: (-) fever  Eyes/ENT: (-) blurry vision, (-) epistaxis  Cardiovascular: (+) right sided chest pain, (+) syncope  Respiratory: (-) cough, (-) shortness of breath  Gastrointestinal: (-) vomiting, (-) diarrhea  Musculoskeletal: (-) neck pain, (-) back pain, (-) joint pain  Integumentary: (-) rash, (-) edema  Neurological: (-) headache, (-) altered mental status  Psychiatric: (-) hallucinations  Allergic/Immunologic: (-) pruritus

## 2021-07-25 NOTE — ED PROVIDER NOTE - CARE PLAN
Principal Discharge DX:	Fall  Secondary Diagnosis:	Closed fracture of one rib of right side, initial encounter  Secondary Diagnosis:	Pneumothorax on right   Principal Discharge DX:	Fall  Secondary Diagnosis:	Closed fracture of one rib of right side, initial encounter  Secondary Diagnosis:	Pneumothorax on right  Secondary Diagnosis:	Hyperkalemia
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
## 2021-07-25 NOTE — ED ADULT NURSE NOTE - OBJECTIVE STATEMENT
Pt presented ED C/C of falls at home. Pt fell yesterday and complaining of Rt shoulder and rib pain. No LOC. Pt has history of multiple falls x 2 months.

## 2021-07-25 NOTE — ED PROVIDER NOTE - PROGRESS NOTE DETAILS
SS: Spoke with Dr. Medel radiology- CT Chest showed RT 4th rib fx, likely displaced; small RT pneumothorax SS: Trauma consult placed KOLBY: Per trauma, admit to Dr. Ware

## 2021-07-25 NOTE — ED ADULT TRIAGE NOTE - CHIEF COMPLAINT QUOTE
pt came to ED for multiple falls. as per daughter, pt has been "passing out". most recent syncopal episode was last night, fell onto a concrete floor. pt c/o right rib pain and right back pain. denies A/C use

## 2021-07-25 NOTE — H&P ADULT - NSHPLABSRESULTS_GEN_ALL_CORE
Labs:  CAPILLARY BLOOD GLUCOSE  POCT Blood Glucose.: 193 mg/dL (2021 13:09)                        10.1   4.99  )-----------( 194      ( 2021 12:30 )             28.8       Auto Neutrophil %: 74.4 % (21 @ 12:30)  Auto Immature Granulocyte %: 0.4 % (21 @ 12:30)      131<L>  |  95<L>  |  16  ----------------------------<  255<H>  6.0<HH>   |  23  |  1.5    Calcium, Total Serum: 10.4 mg/dL (21 @ 12:30)    LFTs:           8.1  | 1.0  | 78       ------------------[103     ( 2021 12:30 )  5.0  | x    | 69          Lipase:108        Blood Gas Venous - Lactate: 1.6 mmoL/L (21 @ 15:23)    Coags:  11.20  ----< 0.97    ( 2021 12:30 )     28.6      CARDIAC MARKERS ( 2021 12:30 )  x     / <0.01 ng/mL / x     / x     / x        Alcohol, Blood: <10 mg/dL (21 @ 12:30)    Urinalysis Basic - ( 2021 14:08 )  Color: Colorless / Appearance: Clear / S.022 / pH: x  Gluc: x / Ketone: Negative  / Bili: Negative / Urobili: <2 mg/dL   Blood: x / Protein: Trace / Nitrite: Negative   Leuk Esterase: Negative / RBC: x / WBC x   Sq Epi: x / Non Sq Epi: x / Bacteria: x    Alcohol, Blood: <10 mg/dL (21 @ 12:30)    IMAGING:  < from: CT Head No Cont (21 @ 13:39) >    IMPRESSION:  CT head:  1.) No evidence of acute transcortical infarct, acute intracranial hemorrhage or mass effect.    CT cervical spine:  1.) No acute fracture or traumatic subluxation.  2.) Tiny right apical pneumothorax.  < end of copied text >    < from: CT Abdomen and Pelvis w/ IV Cont (21 @ 13:47) >  IMPRESSION:    1.) Tiny right pneumothorax.  2.) Acute mildly displaced fracture of the right fourth rib.  3.) No evidence of pulmonary embolism.  4.) No evidence of acute intra-abdominal traumatic injury.  5.) Chronic and incidental findings as above.  < end of copied text >    < from: Xray Pelvis AP only (21 @ 16:10) >  IMPRESSION:    No acute displaced fracture.  < end of copied text >

## 2021-07-26 ENCOUNTER — TRANSCRIPTION ENCOUNTER (OUTPATIENT)
Age: 56
End: 2021-07-26

## 2021-07-26 VITALS — OXYGEN SATURATION: 99 %

## 2021-07-26 LAB
ANION GAP SERPL CALC-SCNC: 14 MMOL/L — SIGNIFICANT CHANGE UP (ref 7–14)
BASOPHILS # BLD AUTO: 0.03 K/UL — SIGNIFICANT CHANGE UP (ref 0–0.2)
BASOPHILS NFR BLD AUTO: 1 % — SIGNIFICANT CHANGE UP (ref 0–1)
BUN SERPL-MCNC: 13 MG/DL — SIGNIFICANT CHANGE UP (ref 10–20)
CALCIUM SERPL-MCNC: 10.2 MG/DL — HIGH (ref 8.5–10.1)
CHLORIDE SERPL-SCNC: 98 MMOL/L — SIGNIFICANT CHANGE UP (ref 98–110)
CO2 SERPL-SCNC: 25 MMOL/L — SIGNIFICANT CHANGE UP (ref 17–32)
COVID-19 SPIKE DOMAIN AB INTERP: POSITIVE
COVID-19 SPIKE DOMAIN ANTIBODY RESULT: >250 U/ML — HIGH
CREAT SERPL-MCNC: 1.6 MG/DL — HIGH (ref 0.7–1.5)
EOSINOPHIL # BLD AUTO: 0.3 K/UL — SIGNIFICANT CHANGE UP (ref 0–0.7)
EOSINOPHIL NFR BLD AUTO: 9.9 % — HIGH (ref 0–8)
GLUCOSE BLDC GLUCOMTR-MCNC: 153 MG/DL — HIGH (ref 70–99)
GLUCOSE BLDC GLUCOMTR-MCNC: 180 MG/DL — HIGH (ref 70–99)
GLUCOSE BLDC GLUCOMTR-MCNC: 203 MG/DL — HIGH (ref 70–99)
GLUCOSE BLDC GLUCOMTR-MCNC: 221 MG/DL — HIGH (ref 70–99)
GLUCOSE SERPL-MCNC: 192 MG/DL — HIGH (ref 70–99)
HCT VFR BLD CALC: 30.4 % — LOW (ref 42–52)
HCV AB S/CO SERPL IA: 0.04 COI — SIGNIFICANT CHANGE UP
HCV AB SERPL-IMP: SIGNIFICANT CHANGE UP
HGB BLD-MCNC: 10 G/DL — LOW (ref 14–18)
IMM GRANULOCYTES NFR BLD AUTO: 0.3 % — SIGNIFICANT CHANGE UP (ref 0.1–0.3)
LYMPHOCYTES # BLD AUTO: 0.65 K/UL — LOW (ref 1.2–3.4)
LYMPHOCYTES # BLD AUTO: 21.5 % — SIGNIFICANT CHANGE UP (ref 20.5–51.1)
MAGNESIUM SERPL-MCNC: 1.4 MG/DL — LOW (ref 1.8–2.4)
MCHC RBC-ENTMCNC: 31 PG — SIGNIFICANT CHANGE UP (ref 27–31)
MCHC RBC-ENTMCNC: 32.9 G/DL — SIGNIFICANT CHANGE UP (ref 32–37)
MCV RBC AUTO: 94.1 FL — HIGH (ref 80–94)
MONOCYTES # BLD AUTO: 0.31 K/UL — SIGNIFICANT CHANGE UP (ref 0.1–0.6)
MONOCYTES NFR BLD AUTO: 10.2 % — HIGH (ref 1.7–9.3)
NEUTROPHILS # BLD AUTO: 1.73 K/UL — SIGNIFICANT CHANGE UP (ref 1.4–6.5)
NEUTROPHILS NFR BLD AUTO: 57.1 % — SIGNIFICANT CHANGE UP (ref 42.2–75.2)
NRBC # BLD: 0 /100 WBCS — SIGNIFICANT CHANGE UP (ref 0–0)
PHOSPHATE SERPL-MCNC: 4.4 MG/DL — SIGNIFICANT CHANGE UP (ref 2.1–4.9)
PLATELET # BLD AUTO: 154 K/UL — SIGNIFICANT CHANGE UP (ref 130–400)
POTASSIUM SERPL-MCNC: 5.1 MMOL/L — HIGH (ref 3.5–5)
POTASSIUM SERPL-SCNC: 5.1 MMOL/L — HIGH (ref 3.5–5)
RBC # BLD: 3.23 M/UL — LOW (ref 4.7–6.1)
RBC # FLD: 13.7 % — SIGNIFICANT CHANGE UP (ref 11.5–14.5)
SARS-COV-2 IGG+IGM SERPL QL IA: >250 U/ML — HIGH
SARS-COV-2 IGG+IGM SERPL QL IA: POSITIVE
SODIUM SERPL-SCNC: 137 MMOL/L — SIGNIFICANT CHANGE UP (ref 135–146)
WBC # BLD: 3.03 K/UL — LOW (ref 4.8–10.8)
WBC # FLD AUTO: 3.03 K/UL — LOW (ref 4.8–10.8)

## 2021-07-26 PROCEDURE — 71045 X-RAY EXAM CHEST 1 VIEW: CPT | Mod: 26

## 2021-07-26 PROCEDURE — 99222 1ST HOSP IP/OBS MODERATE 55: CPT

## 2021-07-26 PROCEDURE — 99232 SBSQ HOSP IP/OBS MODERATE 35: CPT

## 2021-07-26 PROCEDURE — 93306 TTE W/DOPPLER COMPLETE: CPT | Mod: 26

## 2021-07-26 RX ORDER — INSULIN LISPRO 100/ML
VIAL (ML) SUBCUTANEOUS
Refills: 0 | Status: DISCONTINUED | OUTPATIENT
Start: 2021-07-26 | End: 2021-07-26

## 2021-07-26 RX ORDER — DEXTROSE 50 % IN WATER 50 %
12.5 SYRINGE (ML) INTRAVENOUS ONCE
Refills: 0 | Status: DISCONTINUED | OUTPATIENT
Start: 2021-07-26 | End: 2021-07-26

## 2021-07-26 RX ORDER — GLUCAGON INJECTION, SOLUTION 0.5 MG/.1ML
1 INJECTION, SOLUTION SUBCUTANEOUS ONCE
Refills: 0 | Status: DISCONTINUED | OUTPATIENT
Start: 2021-07-26 | End: 2021-07-26

## 2021-07-26 RX ORDER — DEXTROSE 50 % IN WATER 50 %
25 SYRINGE (ML) INTRAVENOUS ONCE
Refills: 0 | Status: DISCONTINUED | OUTPATIENT
Start: 2021-07-26 | End: 2021-07-26

## 2021-07-26 RX ORDER — SODIUM CHLORIDE 9 MG/ML
1000 INJECTION, SOLUTION INTRAVENOUS
Refills: 0 | Status: DISCONTINUED | OUTPATIENT
Start: 2021-07-26 | End: 2021-07-26

## 2021-07-26 RX ORDER — DEXTROSE 50 % IN WATER 50 %
15 SYRINGE (ML) INTRAVENOUS ONCE
Refills: 0 | Status: DISCONTINUED | OUTPATIENT
Start: 2021-07-26 | End: 2021-07-26

## 2021-07-26 RX ORDER — ACETAMINOPHEN 500 MG
2 TABLET ORAL
Qty: 0 | Refills: 0 | DISCHARGE
Start: 2021-07-26

## 2021-07-26 RX ADMIN — Medication 650 MILLIGRAM(S): at 12:20

## 2021-07-26 RX ADMIN — Medication 650 MILLIGRAM(S): at 17:55

## 2021-07-26 RX ADMIN — PANTOPRAZOLE SODIUM 40 MILLIGRAM(S): 20 TABLET, DELAYED RELEASE ORAL at 05:57

## 2021-07-26 RX ADMIN — Medication 650 MILLIGRAM(S): at 12:50

## 2021-07-26 RX ADMIN — Medication 2: at 17:57

## 2021-07-26 RX ADMIN — Medication 650 MILLIGRAM(S): at 18:25

## 2021-07-26 RX ADMIN — Medication 650 MILLIGRAM(S): at 01:00

## 2021-07-26 RX ADMIN — DULOXETINE HYDROCHLORIDE 30 MILLIGRAM(S): 30 CAPSULE, DELAYED RELEASE ORAL at 12:19

## 2021-07-26 RX ADMIN — LISINOPRIL 20 MILLIGRAM(S): 2.5 TABLET ORAL at 05:57

## 2021-07-26 RX ADMIN — Medication 650 MILLIGRAM(S): at 06:27

## 2021-07-26 RX ADMIN — Medication 650 MILLIGRAM(S): at 05:57

## 2021-07-26 RX ADMIN — Medication 4: at 12:29

## 2021-07-26 RX ADMIN — Medication 650 MILLIGRAM(S): at 00:31

## 2021-07-26 NOTE — DISCHARGE NOTE PROVIDER - NSFOLLOWUPCLINICS_GEN_ALL_ED_FT
[facility]  Trauma Surgery  [address] [place] [address]  Phone: [phone]  Fax:

## 2021-07-26 NOTE — DISCHARGE NOTE PROVIDER - HOSPITAL COURSE
07/25/21This is a 56 year old male with a PMH/PSH of DM, HTN, neuropathy, bleeding hemorrhoids, glaucoma who was seen as a Trauma Consult s/p syncopal fall, +HT, +LOC, -AC. Patient states that he was walking on his marble floor when he fell forward, hitting his face and chest on the floor. Patient does not recall event, states that he only remembers waking up face down on the floor. Reports being able to ambulate following event. Per daughter at bedside, patient with a history of multiple recent falls, approximately 4-5 in the last 2 months with the most recent one being approximately 2 weeks prior. Denies any workup for this. Patient reports right anterior chest pain, without other complaints. Trauma assessment in ED: ABCs intact , GCS 15 , AAOx3.  Dx/Injuries: Trace apical pneumothorax, mildly displaced right 4th rib fx  Events of past 24 hours: Patient was hypertensive to 211/112, then went down to 182/104. At this time patient was given 1.25mg Enalapril IV push and BP was repeated to be 171/96. 30 mins after enalapril push, BP was 165/96. Patient was admitted to [facility] for monitoring.   Extensive workout was done.  CT Head and spine  w/o contrast:  IMPRESSION:  CT head:  No evidence of acute transcortical infarct, acute intracranial hemorrhage or mass effect.  CT cervical spine:  No acute fracture or traumatic subluxation.  Tiny right apical pneumothorax.  Ct abdomen and pelvis w/IV cont. :  IMPRESSION:  Tiny right pneumothorax.  Acute mildly displaced fracture of the right fourth rib.  No evidence of pulmonary embolism.  No evidenceof acute intra-abdominal traumatic injury.  Chronic and incidental findings as above.  EKG: Diagnosis Line Sinus tachycardia  Otherwise normal ECG  Echo: No acute abnormalities.  07/26/21 Neurology consult and plan:  #Syncope  -f/u Echo results  -Orthostatic vitals  Advised patient to drink fluids 2-3 liters daily if no significant hear failure to manage BP and orthostatic symptoms.  Symptom management by standing up slowly and holding on to something when going from sitting to standing. Advised patient to eat small meals and wear compression stockings.  He should f/u with PCP for optimal treatment of anemia (presumably from rectal bleeding due to hemorrhoids).  Consider adjusting BP meds to minimize orthostasis.  F/u with his neurologist outpatient for ongoing treatment of neuropathy.    -F/u with  Dr. Romero in outpatient

## 2021-07-26 NOTE — DISCHARGE NOTE NURSING/CASE MANAGEMENT/SOCIAL WORK - PATIENT PORTAL LINK FT
You can access the FollowMyHealth Patient Portal offered by [facility] by registering at the following website: [email or web address]. By joining [employer]’s FollowMyHealth portal, you will also be able to view your health information using other applications (apps) compatible with our system.

## 2021-07-26 NOTE — PROGRESS NOTE ADULT - ASSESSMENT
ASSESSMENT:  56y Male  w/ PMHx of DM2, Essential HTN, B/L LE Neuropathy, and bleeding hemorrhoids seen as Trauma Consult s/p syncope and mechanical fall with +HT, +LOC, -AC with no complaints. Pt has trace right apical pneumothorax and mildly displaced right 4th rib fracture. NO acute events overnight.     PLAN:   - 24hr tele monitoring   - NC supplemental O2.   - Orthostatics   - ECHO  - F/U AM Cxr  - IC (pulling 1250 in ED)  - Pain control   - DVT/ GI PPX    Lines/Tubes: PIV    TRAUMA SPECTRA: [id number]

## 2021-07-26 NOTE — DISCHARGE NOTE PROVIDER - CARE PROVIDER_API CALL
Eliceo Romero)  Neurology  [address] [place] [address]  Phone: [phone]  Fax: [phone]  Follow Up Time: 2 weeks
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
## 2021-07-26 NOTE — DISCHARGE NOTE PROVIDER - NSDCMRMEDTOKEN_GEN_ALL_CORE_FT
acetaminophen 325 mg oral tablet: 2 tab(s) orally every 6 hours  DULoxetine 30 mg oral delayed release capsule: 1 cap(s) orally 2 times a day  Januvia 100 mg oral tablet: 1 tab(s) orally once a day  lisinopril 20 mg oral tablet: 1 tab(s) orally once a day  metFORMIN 1000 mg oral tablet: 1 tab(s) orally 2 times a day

## 2021-07-26 NOTE — DISCHARGE NOTE PROVIDER - NSDCCPCAREPLAN_GEN_ALL_CORE_FT
PRINCIPAL DISCHARGE DIAGNOSIS  Diagnosis: Fall  Assessment and Plan of Treatment: PT should ambulate as tolerated. Use Tylenol for pain as needed. Follow up  Trauma clinic 2 weeks and neurology  in 2 weeks (call to set up appointment). Per neurology: drink fluids 2-3 liters daily.  Symptom management by standing up slowly and holding on to something when going from sitting to standing. Advised patient to eat small meals and wear compression stockings.  He should f/u with PCP for optimal treatment of anemia (presumably from rectal bleeding due to hemorrhoids) and optimal BP control  Consider adjusting BP meds to minimize orthostasis.      SECONDARY DISCHARGE DIAGNOSES  Diagnosis: Closed fracture of one rib of right side, initial encounter  Assessment and Plan of Treatment:     Diagnosis: Pneumothorax on right  Assessment and Plan of Treatment:

## 2021-07-26 NOTE — DISCHARGE NOTE PROVIDER - NSDCQMCOGNITION_NEU_ALL_CORE
Patient brought in by ambulance A/Ox3, sent from [facility], c/o SOB x 2 days.  Patient sat 90% RA, labored breathing, tachypneic. No difficulties

## 2021-07-26 NOTE — CONSULT NOTE ADULT - ASSESSMENT
IMPRESSION: Rehab of gait dysfunction / right rib fx    PRECAUTIONS: [   ] Cardiac  [   ] Respiratory  [   ] Seizures [   ] Contact Isolation  [   ] Droplet Isolation  [   ] Other    Weight Bearing Status:     RECOMMENDATION:    Out of Bed to Chair     DVT/Decubiti Prophylaxis    REHAB PLAN:     [   x ] Bedside P/T 3-5 times a week   [    ]   Bedside O/T  2-3 times a week             [    ] Speech Therapy               [    ]  No Rehab Therapy Indicated   Conditioning/ROM                                    ADL  Bed Mobility                                               Conditioning/ROM  Transfers                                                     Bed Mobility  Sitting /Standing Balance                         Transfers                                        Gait Training                                               Sitting/Standing Balance  Stair Training [   ]Applicable                    Home equipment Eval                                                                        Splinting  [   ] Only      GOALS:   ADL   [    ]   Independent                    Transfers  [x    ] Independent                          Ambulation  [ x   ] Independent     [    x ] With device                            [    ]  CG                                                         [    ]  CG                                                                  [    ] CG                            [    ] Min A                                                   [    ] Min A                                                              [    ] Min  A          DISCHARGE PLAN:   [    ]  Good candidate for Intensive Rehabilitation/Hospital based                                             Will tolerate 3hrs Intensive Rehab Daily                                       [     ]  Short Term Rehab in Skilled Nursing Facility                                       [ x    ]  Home with Outpatient or  services                                         [     ]  Possible Candidate for Intensive Hospital based Rehab

## 2021-07-26 NOTE — PROGRESS NOTE ADULT - SUBJECTIVE AND OBJECTIVE BOX
TRAUMA SURGERY PROGRESS NOTE    Patient: KECIA MARTIN , 56y (61)Male   MRN: [id number]  Location: [facility] [id number] 010 B  Visit: 21 Inpatient  Date: 21 @ 00:29    Hospital Day #: 2    Procedure/Dx/Injuries: Trace apical pneumothorax, mildly displaced right 4th rib fx    Events of past 24 hours: Patient was hypertensive to 211/112, then went down to 182/104. At this time patient was given 1.25mg Enalapril IV push and BP was repeated to be 171/96. 30 mins after enalapril push, BP was 165/96. Patient was admitted to [facility] for monitoring.     PAST MEDICAL & SURGICAL HISTORY:  Diabetes    Hypertension    No significant past surgical history        Vitals:   T(F): 98 (21 @ 22:51), Max: 99 (21 @ 12:41)  HR: 89 (21 @ 22:51)  BP: 167/93 (21 @ 22:51)  RR: 18 (21 @ 22:51)  SpO2: 100% (21 @ 20:39)      Diet, Regular      PHYSICAL EXAM:  General: NAD  HEENT: Normocephalic, atraumatic, EOMI, PEERLA. no scalp lacerations   Neck: Soft, midline trachea. no c-spine tenderness  Chest: No chest wall tenderness, no subcutaneous emphysema   Cardiac: S1, S2, RRR  Respiratory: Bilateral breath sounds, clear and equal bilaterally  Abdomen: Soft, non-distended, non-tender, no rebound, no guarding.  Ext:  Moving b/l upper and lower extremities. Palpable Radial b/l UE, b/l DP palpable in LE.       MEDICATIONS  (STANDING):  acetaminophen   Tablet .. 650 milliGRAM(s) Oral every 6 hours  DULoxetine 30 milliGRAM(s) Oral daily  heparin   Injectable 5000 Unit(s) SubCutaneous every 8 hours  lisinopril 20 milliGRAM(s) Oral daily  pantoprazole    Tablet 40 milliGRAM(s) Oral before breakfast    MEDICATIONS  (PRN):  oxyCODONE    IR 5 milliGRAM(s) Oral every 6 hours PRN Severe Pain (7 - 10)      DVT PROPHYLAXIS: SCDs, heparin   Injectable 5000 Unit(s) SubCutaneous every 8 hours    GI PROPHYLAXIS: pantoprazole    Tablet 40 milliGRAM(s) Oral before breakfast    ANTICOAGULATION:   ANTIBIOTICS:           LAB/STUDIES:  Labs:  CAPILLARY BLOOD GLUCOSE      POCT Blood Glucose.: 193 mg/dL (2021 13:09)                          10.1   4.99  )-----------( 194      ( 2021 12:30 )             28.8       Auto Neutrophil %: 74.4 % (21 @ 12:30)  Auto Immature Granulocyte %: 0.4 % (21 @ 12:30)        132<L>  |  93<L>  |  14  ----------------------------<  152<H>  4.6   |  24  |  1.5      Calcium, Total Serum: 9.7 mg/dL (21 @ 16:42)      LFTs:             7.1  | 0.9  | 54       ------------------[96      ( 2021 16:42 )  4.5  | x    | 59          Lipase:x      Amylase:x         Blood Gas Venous - Lactate: 1.6 mmoL/L (21 @ 15:23)      Coags:     11.20  ----< 0.97    ( 2021 12:30 )     28.6        CARDIAC MARKERS ( 2021 12:30 )  x     / <0.01 ng/mL / x     / x     / x            Alcohol, Blood: <10 mg/dL (21 @ 12:30)    Urinalysis Basic - ( 2021 14:08 )    Color: Colorless / Appearance: Clear / S.022 / pH: x  Gluc: x / Ketone: Negative  / Bili: Negative / Urobili: <2 mg/dL   Blood: x / Protein: Trace / Nitrite: Negative   Leuk Esterase: Negative / RBC: x / WBC x   Sq Epi: x / Non Sq Epi: x / Bacteria: x            Alcohol, Blood: <10 mg/dL (21 @ 12:30)      IMAGING: < from: Xray Pelvis AP only (21 @ 16:10) >  IMPRESSION:    No acute displaced fracture.    < end of copied text >    < from: CT Angio Chest PE Protocol w/ IV Cont (21 @ 13:47) >  IMPRESSION:    Tiny right pneumothorax.    Acute mildly displaced fracture of the right fourth rib.    No evidence of pulmonary embolism.    No evidenceof acute intra-abdominal traumatic injury.    Chronic and incidental findings as above.    < end of copied text >    < from: CT Cervical Spine No Cont (21 @ 13:40) >  MPRESSION:  CT head:  No evidence of acute transcortical infarct, acute intracranial hemorrhage or mass effect.    CT cervical spine:  No acute fracture or traumatic subluxation.  Tiny right apical pneumothorax.    < end of copied text >        ACCESS DEVICES:  [x ] Peripheral IV

## 2021-07-29 PROBLEM — I10 ESSENTIAL (PRIMARY) HYPERTENSION: Chronic | Status: ACTIVE | Noted: 2021-07-25

## 2021-07-29 PROBLEM — E11.9 TYPE 2 DIABETES MELLITUS WITHOUT COMPLICATIONS: Chronic | Status: ACTIVE | Noted: 2021-07-25

## 2021-08-01 DIAGNOSIS — R55 SYNCOPE AND COLLAPSE: ICD-10-CM

## 2021-08-01 DIAGNOSIS — E11.40 TYPE 2 DIABETES MELLITUS WITH DIABETIC NEUROPATHY, UNSPECIFIED: ICD-10-CM

## 2021-08-01 DIAGNOSIS — H40.9 UNSPECIFIED GLAUCOMA: ICD-10-CM

## 2021-08-01 DIAGNOSIS — R00.0 TACHYCARDIA, UNSPECIFIED: ICD-10-CM

## 2021-08-01 DIAGNOSIS — K64.9 UNSPECIFIED HEMORRHOIDS: ICD-10-CM

## 2021-08-01 DIAGNOSIS — D64.9 ANEMIA, UNSPECIFIED: ICD-10-CM

## 2021-08-01 DIAGNOSIS — Z79.84 LONG TERM (CURRENT) USE OF ORAL HYPOGLYCEMIC DRUGS: ICD-10-CM

## 2021-08-01 DIAGNOSIS — S22.31XA FRACTURE OF ONE RIB, RIGHT SIDE, INITIAL ENCOUNTER FOR CLOSED FRACTURE: ICD-10-CM

## 2021-08-01 DIAGNOSIS — S27.0XXA TRAUMATIC PNEUMOTHORAX, INITIAL ENCOUNTER: ICD-10-CM
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
## 2021-08-01 DIAGNOSIS — E87.5 HYPERKALEMIA: ICD-10-CM
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
## 2021-08-01 DIAGNOSIS — I10 ESSENTIAL (PRIMARY) HYPERTENSION: ICD-10-CM
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
## 2021-08-20 ENCOUNTER — NON-APPOINTMENT (OUTPATIENT)
Age: 56
End: 2021-08-20

## 2021-08-20 ENCOUNTER — APPOINTMENT (OUTPATIENT)
Dept: NEUROLOGY | Facility: CLINIC | Age: 56
End: 2021-08-20
Payer: COMMERCIAL

## 2021-08-20 VITALS
SYSTOLIC BLOOD PRESSURE: 144 MMHG | HEIGHT: 67 IN | HEART RATE: 111 BPM | OXYGEN SATURATION: 96 % | BODY MASS INDEX: 22.76 KG/M2 | TEMPERATURE: 97.8 F | WEIGHT: 145 LBS | DIASTOLIC BLOOD PRESSURE: 80 MMHG

## 2021-08-20 PROCEDURE — 99211 OFF/OP EST MAY X REQ PHY/QHP: CPT

## 2021-08-20 RX ORDER — GABAPENTIN 100 MG/1
100 CAPSULE ORAL 3 TIMES DAILY
Qty: 270 | Refills: 3 | Status: DISCONTINUED | COMMUNITY
Start: 2020-04-08 | End: 2021-08-20

## 2021-08-20 RX ORDER — DULOXETINE HYDROCHLORIDE 30 MG/1
30 CAPSULE, DELAYED RELEASE PELLETS ORAL
Qty: 60 | Refills: 3 | Status: DISCONTINUED | COMMUNITY
Start: 2021-05-24 | End: 2021-08-20

## 2021-08-20 RX ORDER — GABAPENTIN 100 MG/1
100 CAPSULE ORAL 3 TIMES DAILY
Qty: 90 | Refills: 5 | Status: DISCONTINUED | COMMUNITY
Start: 2020-02-27 | End: 2021-08-20

## 2021-08-20 NOTE — HISTORY OF PRESENT ILLNESS
[FreeTextEntry1] : KECIA MARTIN is a 56 year old man is being seen as a follow up visit for neuropathy. He reports numbness, burning sensation and paresthesia in the feet worse in the right side. He is now on gabapentin 100 mg TID and Cymbalta 30 mg BID but recently ran out if it. Stated that his blood sugar is better controlled. In addition reports occasional back pain with no sciatica.

## 2021-08-20 NOTE — ASSESSMENT
[FreeTextEntry1] : 56 year old M w HTN and DM who is here for paresthesia and numbness in the feet due to peripheral neuropathy 2/2 to diabetes. Cymbalta was not effective. \par \par - Increase gabapentin to 300 mg TID\par - Consider further increase in 2 months and to add Lyrical \par - Schedule for EMG\par - Follow up with PCP for B12 \par \par

## 2021-08-20 NOTE — PHYSICAL EXAM
[FreeTextEntry1] : Mental status: Awake, alert and oriented x3. Recent and remote memory intact. Naming, repetition and comprehension intact. Attention/concentration intact. No dysarthria, no aphasia. Fund of knowledge appropriate. \par Cranial nerves: Pupils equally round and reactive to light, visual fields full, no nystagmus, extraocular muscles intact, V1 through V3 intact bilaterally and symmetric, face symmetric, hearing intact to finger rub, palate elevation symmetric, tongue was midline.\par Motor: MRC grading 5/5 b/l UE/LE.  strength 5/5\par Sensation:Hyperesthesia in the feet. Reduced vibration in the toes to 5 sec and absent position. \par Coordination: No dysmetria on finger-to-nose and heel-to-shin. No dysdiadokinesia.\par Reflexes: 2+ in bilateral UE 1+ in the knees and absent in the ankles. downgoing toes bilaterally. (-) Helms.\par Gait: unsteady tandem and wide based.. Romberg positive

## 2021-09-01 ENCOUNTER — TRANSCRIPTION ENCOUNTER (OUTPATIENT)
Age: 56
End: 2021-09-01

## 2021-10-21 ENCOUNTER — APPOINTMENT (OUTPATIENT)
Dept: NEUROLOGY | Facility: CLINIC | Age: 56
End: 2021-10-21

## 2021-11-11 ENCOUNTER — APPOINTMENT (OUTPATIENT)
Dept: NEUROLOGY | Facility: CLINIC | Age: 56
End: 2021-11-11
Payer: COMMERCIAL

## 2021-11-11 PROCEDURE — 95886 MUSC TEST DONE W/N TEST COMP: CPT

## 2021-11-11 PROCEDURE — 95912 NRV CNDJ TEST 11-12 STUDIES: CPT

## 2021-11-11 RX ORDER — GABAPENTIN 300 MG/1
300 CAPSULE ORAL 3 TIMES DAILY
Qty: 90 | Refills: 5 | Status: DISCONTINUED | COMMUNITY
Start: 2021-08-20 | End: 2021-11-11

## 2021-11-15 ENCOUNTER — TRANSCRIPTION ENCOUNTER (OUTPATIENT)
Age: 56
End: 2021-11-15

## 2021-11-16 ENCOUNTER — APPOINTMENT (OUTPATIENT)
Age: 56
End: 2021-11-16

## 2021-11-16 ENCOUNTER — OUTPATIENT (OUTPATIENT)
Dept: INPATIENT UNIT | Facility: HOSPITAL | Age: 56
LOS: 1 days | Discharge: HOME | End: 2021-11-16

## 2021-11-16 VITALS
OXYGEN SATURATION: 100 % | DIASTOLIC BLOOD PRESSURE: 75 MMHG | HEART RATE: 98 BPM | SYSTOLIC BLOOD PRESSURE: 126 MMHG | TEMPERATURE: 98 F | RESPIRATION RATE: 18 BRPM

## 2021-11-16 VITALS
SYSTOLIC BLOOD PRESSURE: 104 MMHG | RESPIRATION RATE: 18 BRPM | DIASTOLIC BLOOD PRESSURE: 68 MMHG | OXYGEN SATURATION: 99 % | TEMPERATURE: 99 F | HEART RATE: 95 BPM

## 2021-11-16 DIAGNOSIS — U07.1 COVID-19: ICD-10-CM

## 2021-11-16 RX ORDER — SODIUM CHLORIDE 9 MG/ML
250 INJECTION INTRAMUSCULAR; INTRAVENOUS; SUBCUTANEOUS
Refills: 0 | Status: COMPLETED | OUTPATIENT
Start: 2021-11-16 | End: 2021-11-16

## 2021-11-16 RX ADMIN — SODIUM CHLORIDE 310 MILLILITER(S): 9 INJECTION INTRAMUSCULAR; INTRAVENOUS; SUBCUTANEOUS at 15:20

## 2021-11-16 NOTE — CHART NOTE - NSCHARTNOTEFT_GEN_A_CORE
Medicine Progress Note    Patient is a 56y old  Male who presents with a chief complaint of Covid+.  Pt offer no complaints at this time.      PHYSICAL EXAM:  Vital Signs Last 24 Hrs  T(C): 36.8 (16 Nov 2021 14:20), Max: 36.9 (16 Nov 2021 14:10)  T(F): 98.2 (16 Nov 2021 14:20), Max: 98.5 (16 Nov 2021 14:10)  HR: 97 (16 Nov 2021 14:20) (97 - 98)  BP: 102/67 (16 Nov 2021 14:20) (102/65 - 126/75)  BP(mean): --  RR: 18 (16 Nov 2021 14:20) (18 - 18)  SpO2: 99% (16 Nov 2021 14:20) (99% - 100%)    CONSTITUTIONAL: NAD, well-developed, well-groomed  ENMT: Moist oral mucosa, no pharyngeal injection or exudates; normal dentition  RESPIRATORY: Normal respiratory effort; lungs are clear to auscultation bilaterally  CARDIOVASCULAR: Regular rate and rhythm, normal S1 and S2, no murmur/rub/gallop; No lower extremity edema; Peripheral pulses are 2+ bilaterally  ABDOMEN: Nontender to palpation, normoactive bowel sounds, no rebound/guarding; No hepatosplenomegaly  PSYCH: A+O to person, place, and time; affect appropriate  NEUROLOGY: CN 2-12 are intact and symmetric; no gross sensory deficits   SKIN: No rashes; no palpable lesions    Pt s/p Ab infusion without complications.    -Tyl prn temp>100.4  -If worsening of condition present to ED

## 2021-11-17 ENCOUNTER — TRANSCRIPTION ENCOUNTER (OUTPATIENT)
Age: 56
End: 2021-11-17

## 2021-11-25 ENCOUNTER — TRANSCRIPTION ENCOUNTER (OUTPATIENT)
Age: 56
End: 2021-11-25

## 2022-02-16 ENCOUNTER — TRANSCRIPTION ENCOUNTER (OUTPATIENT)
Age: 57
End: 2022-02-16

## 2022-03-15 ENCOUNTER — INPATIENT (INPATIENT)
Facility: HOSPITAL | Age: 57
LOS: 9 days | Discharge: HOME HEALTH | End: 2022-03-25
Attending: INTERNAL MEDICINE | Admitting: INTERNAL MEDICINE
Payer: COMMERCIAL

## 2022-03-15 VITALS
SYSTOLIC BLOOD PRESSURE: 94 MMHG | OXYGEN SATURATION: 100 % | HEART RATE: 79 BPM | TEMPERATURE: 93 F | HEIGHT: 68 IN | DIASTOLIC BLOOD PRESSURE: 51 MMHG | RESPIRATION RATE: 18 BRPM | WEIGHT: 174.17 LBS

## 2022-03-15 DIAGNOSIS — G92.8 OTHER TOXIC ENCEPHALOPATHY: ICD-10-CM

## 2022-03-15 DIAGNOSIS — F10.231 ALCOHOL DEPENDENCE WITH WITHDRAWAL DELIRIUM: ICD-10-CM

## 2022-03-15 DIAGNOSIS — N20.0 CALCULUS OF KIDNEY: ICD-10-CM

## 2022-03-15 DIAGNOSIS — E87.6 HYPOKALEMIA: ICD-10-CM

## 2022-03-15 DIAGNOSIS — N18.4 CHRONIC KIDNEY DISEASE, STAGE 4 (SEVERE): ICD-10-CM

## 2022-03-15 DIAGNOSIS — E78.5 HYPERLIPIDEMIA, UNSPECIFIED: ICD-10-CM

## 2022-03-15 DIAGNOSIS — D61.818 OTHER PANCYTOPENIA: ICD-10-CM

## 2022-03-15 DIAGNOSIS — E11.42 TYPE 2 DIABETES MELLITUS WITH DIABETIC POLYNEUROPATHY: ICD-10-CM

## 2022-03-15 DIAGNOSIS — E87.5 HYPERKALEMIA: ICD-10-CM

## 2022-03-15 DIAGNOSIS — I12.9 HYPERTENSIVE CHRONIC KIDNEY DISEASE WITH STAGE 1 THROUGH STAGE 4 CHRONIC KIDNEY DISEASE, OR UNSPECIFIED CHRONIC KIDNEY DISEASE: ICD-10-CM

## 2022-03-15 DIAGNOSIS — E11.22 TYPE 2 DIABETES MELLITUS WITH DIABETIC CHRONIC KIDNEY DISEASE: ICD-10-CM

## 2022-03-15 DIAGNOSIS — E51.9 THIAMINE DEFICIENCY, UNSPECIFIED: ICD-10-CM

## 2022-03-15 DIAGNOSIS — R79.89 OTHER SPECIFIED ABNORMAL FINDINGS OF BLOOD CHEMISTRY: ICD-10-CM
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
## 2022-03-15 DIAGNOSIS — E87.1 HYPO-OSMOLALITY AND HYPONATREMIA: ICD-10-CM

## 2022-03-15 DIAGNOSIS — E11.65 TYPE 2 DIABETES MELLITUS WITH HYPERGLYCEMIA: ICD-10-CM

## 2022-03-15 DIAGNOSIS — D63.1 ANEMIA IN CHRONIC KIDNEY DISEASE: ICD-10-CM

## 2022-03-15 DIAGNOSIS — Z79.84 LONG TERM (CURRENT) USE OF ORAL HYPOGLYCEMIC DRUGS: ICD-10-CM

## 2022-03-15 DIAGNOSIS — N17.9 ACUTE KIDNEY FAILURE, UNSPECIFIED: ICD-10-CM

## 2022-03-15 DIAGNOSIS — H40.9 UNSPECIFIED GLAUCOMA: ICD-10-CM

## 2022-03-15 DIAGNOSIS — R68.0 HYPOTHERMIA, NOT ASSOCIATED WITH LOW ENVIRONMENTAL TEMPERATURE: ICD-10-CM

## 2022-03-15 DIAGNOSIS — E53.8 DEFICIENCY OF OTHER SPECIFIED B GROUP VITAMINS: ICD-10-CM

## 2022-03-15 DIAGNOSIS — F10.229 ALCOHOL DEPENDENCE WITH INTOXICATION, UNSPECIFIED: ICD-10-CM

## 2022-03-15 DIAGNOSIS — I95.9 HYPOTENSION, UNSPECIFIED: ICD-10-CM

## 2022-03-15 DIAGNOSIS — E11.10 TYPE 2 DIABETES MELLITUS WITH KETOACIDOSIS WITHOUT COMA: ICD-10-CM

## 2022-03-15 DIAGNOSIS — E83.42 HYPOMAGNESEMIA: ICD-10-CM

## 2022-03-15 DIAGNOSIS — E87.70 FLUID OVERLOAD, UNSPECIFIED: ICD-10-CM

## 2022-03-15 LAB
ABO RH CONFIRMATION: SIGNIFICANT CHANGE UP
ALBUMIN SERPL ELPH-MCNC: 3.6 G/DL — SIGNIFICANT CHANGE UP (ref 3.5–5.2)
ALBUMIN SERPL ELPH-MCNC: 4.4 G/DL — SIGNIFICANT CHANGE UP (ref 3.5–5.2)
ALP SERPL-CCNC: 124 U/L — HIGH (ref 30–115)
ALP SERPL-CCNC: 150 U/L — HIGH (ref 30–115)
ALT FLD-CCNC: 34 U/L — SIGNIFICANT CHANGE UP (ref 0–41)
ALT FLD-CCNC: 40 U/L — SIGNIFICANT CHANGE UP (ref 0–41)
ANION GAP SERPL CALC-SCNC: 19 MMOL/L — HIGH (ref 7–14)
ANION GAP SERPL CALC-SCNC: 23 MMOL/L — HIGH (ref 7–14)
ANION GAP SERPL CALC-SCNC: 27 MMOL/L — HIGH (ref 7–14)
APPEARANCE UR: CLEAR — SIGNIFICANT CHANGE UP
AST SERPL-CCNC: 49 U/L — HIGH (ref 0–41)
AST SERPL-CCNC: 68 U/L — HIGH (ref 0–41)
B-OH-BUTYR SERPL-SCNC: 2.6 MMOL/L — HIGH
BASE EXCESS BLDV CALC-SCNC: -22.1 MMOL/L — LOW (ref -2–3)
BASOPHILS # BLD AUTO: 0.01 K/UL — SIGNIFICANT CHANGE UP (ref 0–0.2)
BASOPHILS NFR BLD AUTO: 0.3 % — SIGNIFICANT CHANGE UP (ref 0–1)
BILIRUB SERPL-MCNC: 0.4 MG/DL — SIGNIFICANT CHANGE UP (ref 0.2–1.2)
BILIRUB SERPL-MCNC: 0.5 MG/DL — SIGNIFICANT CHANGE UP (ref 0.2–1.2)
BILIRUB UR-MCNC: NEGATIVE — SIGNIFICANT CHANGE UP
BLD GP AB SCN SERPL QL: SIGNIFICANT CHANGE UP
BUN SERPL-MCNC: 140 MG/DL — SIGNIFICANT CHANGE UP (ref 10–20)
BUN SERPL-MCNC: 145 MG/DL — CRITICAL HIGH (ref 10–20)
BUN SERPL-MCNC: 97 MG/DL — CRITICAL HIGH (ref 10–20)
BURR CELLS BLD QL SMEAR: PRESENT — SIGNIFICANT CHANGE UP
BURR CELLS BLD QL SMEAR: SLIGHT — SIGNIFICANT CHANGE UP
CA-I SERPL-SCNC: 1.23 MMOL/L — SIGNIFICANT CHANGE UP (ref 1.15–1.33)
CALCIUM SERPL-MCNC: 8.4 MG/DL — LOW (ref 8.5–10.1)
CALCIUM SERPL-MCNC: 8.5 MG/DL — SIGNIFICANT CHANGE UP (ref 8.5–10.1)
CALCIUM SERPL-MCNC: 8.8 MG/DL — SIGNIFICANT CHANGE UP (ref 8.5–10.1)
CHLORIDE SERPL-SCNC: 102 MMOL/L — SIGNIFICANT CHANGE UP (ref 98–110)
CHLORIDE SERPL-SCNC: 94 MMOL/L — LOW (ref 98–110)
CHLORIDE SERPL-SCNC: 98 MMOL/L — SIGNIFICANT CHANGE UP (ref 98–110)
CO2 SERPL-SCNC: 11 MMOL/L — LOW (ref 17–32)
CO2 SERPL-SCNC: 17 MMOL/L — SIGNIFICANT CHANGE UP (ref 17–32)
CO2 SERPL-SCNC: 6 MMOL/L — CRITICAL LOW (ref 17–32)
COLOR SPEC: YELLOW — SIGNIFICANT CHANGE UP
COMMENT - URINE: SIGNIFICANT CHANGE UP
CREAT SERPL-MCNC: 14.8 MG/DL — CRITICAL HIGH (ref 0.7–1.5)
CREAT SERPL-MCNC: 15.3 MG/DL — CRITICAL HIGH (ref 0.7–1.5)
CREAT SERPL-MCNC: 9.5 MG/DL — CRITICAL HIGH (ref 0.7–1.5)
DIFF PNL FLD: ABNORMAL
EGFR: 3 ML/MIN/1.73M2 — LOW
EGFR: 3 ML/MIN/1.73M2 — LOW
EGFR: 6 ML/MIN/1.73M2 — LOW
EOSINOPHIL # BLD AUTO: 0.01 K/UL — SIGNIFICANT CHANGE UP (ref 0–0.7)
EOSINOPHIL NFR BLD AUTO: 0.3 % — SIGNIFICANT CHANGE UP (ref 0–8)
EPI CELLS # UR: ABNORMAL /HPF
ETHANOL SERPL-MCNC: <10 MG/DL — SIGNIFICANT CHANGE UP
GAS PNL BLDV: 126 MMOL/L — LOW (ref 136–145)
GAS PNL BLDV: SIGNIFICANT CHANGE UP
GLUCOSE BLDC GLUCOMTR-MCNC: 204 MG/DL — HIGH (ref 70–99)
GLUCOSE BLDC GLUCOMTR-MCNC: 227 MG/DL — HIGH (ref 70–99)
GLUCOSE SERPL-MCNC: 190 MG/DL — HIGH (ref 70–99)
GLUCOSE SERPL-MCNC: 203 MG/DL — HIGH (ref 70–99)
GLUCOSE SERPL-MCNC: 226 MG/DL — HIGH (ref 70–99)
GLUCOSE UR QL: NEGATIVE MG/DL — SIGNIFICANT CHANGE UP
GRAN CASTS # UR COMP ASSIST: ABNORMAL /LPF
HCO3 BLDV-SCNC: 7 MMOL/L — CRITICAL LOW (ref 22–29)
HCT VFR BLD CALC: 18 % — LOW (ref 42–52)
HCT VFR BLD CALC: 18.2 % — LOW (ref 42–52)
HCT VFR BLD CALC: 23.5 % — LOW (ref 42–52)
HCT VFR BLDA CALC: 22 % — LOW (ref 39–51)
HGB BLD CALC-MCNC: 7.2 G/DL — LOW (ref 12.6–17.4)
HGB BLD-MCNC: 6.2 G/DL — CRITICAL LOW (ref 14–18)
HGB BLD-MCNC: 6.3 G/DL — CRITICAL LOW (ref 14–18)
HGB BLD-MCNC: 7.5 G/DL — LOW (ref 14–18)
HYPOCHROMIA BLD QL: SLIGHT — SIGNIFICANT CHANGE UP
IMM GRANULOCYTES NFR BLD AUTO: 1.1 % — HIGH (ref 0.1–0.3)
KETONES UR-MCNC: ABNORMAL
LACTATE BLDV-MCNC: 1.1 MMOL/L — SIGNIFICANT CHANGE UP (ref 0.5–2)
LACTATE SERPL-SCNC: 1.1 MMOL/L — SIGNIFICANT CHANGE UP (ref 0.7–2)
LEUKOCYTE ESTERASE UR-ACNC: NEGATIVE — SIGNIFICANT CHANGE UP
LYMPHOCYTES # BLD AUTO: 0.32 K/UL — LOW (ref 1.2–3.4)
LYMPHOCYTES # BLD AUTO: 8.5 % — LOW (ref 20.5–51.1)
MACROCYTES BLD QL: SLIGHT — SIGNIFICANT CHANGE UP
MAGNESIUM SERPL-MCNC: 2.4 MG/DL — SIGNIFICANT CHANGE UP (ref 1.8–2.4)
MCHC RBC-ENTMCNC: 31.9 G/DL — LOW (ref 32–37)
MCHC RBC-ENTMCNC: 31.9 PG — HIGH (ref 27–31)
MCHC RBC-ENTMCNC: 32 PG — HIGH (ref 27–31)
MCHC RBC-ENTMCNC: 32.1 PG — HIGH (ref 27–31)
MCHC RBC-ENTMCNC: 34.4 G/DL — SIGNIFICANT CHANGE UP (ref 32–37)
MCHC RBC-ENTMCNC: 34.6 G/DL — SIGNIFICANT CHANGE UP (ref 32–37)
MCV RBC AUTO: 100 FL — HIGH (ref 80–94)
MCV RBC AUTO: 92.8 FL — SIGNIFICANT CHANGE UP (ref 80–94)
MCV RBC AUTO: 92.9 FL — SIGNIFICANT CHANGE UP (ref 80–94)
MICROCYTES BLD QL: SLIGHT — SIGNIFICANT CHANGE UP
MONOCYTES # BLD AUTO: 0.22 K/UL — SIGNIFICANT CHANGE UP (ref 0.1–0.6)
MONOCYTES NFR BLD AUTO: 5.8 % — SIGNIFICANT CHANGE UP (ref 1.7–9.3)
NEUTROPHILS # BLD AUTO: 3.17 K/UL — SIGNIFICANT CHANGE UP (ref 1.4–6.5)
NEUTROPHILS NFR BLD AUTO: 84 % — HIGH (ref 42.2–75.2)
NITRITE UR-MCNC: NEGATIVE — SIGNIFICANT CHANGE UP
NRBC # BLD: 0 /100 WBCS — SIGNIFICANT CHANGE UP (ref 0–0)
NT-PROBNP SERPL-SCNC: 3398 PG/ML — HIGH (ref 0–300)
OVALOCYTES BLD QL SMEAR: SLIGHT — SIGNIFICANT CHANGE UP
PCO2 BLDV: 26 MMHG — LOW (ref 42–55)
PH BLDV: 7.03 — LOW (ref 7.32–7.43)
PH UR: 5.5 — SIGNIFICANT CHANGE UP (ref 5–8)
PLAT MORPH BLD: NORMAL — SIGNIFICANT CHANGE UP
PLATELET # BLD AUTO: 108 K/UL — LOW (ref 130–400)
PLATELET # BLD AUTO: 115 K/UL — LOW (ref 130–400)
PLATELET # BLD AUTO: 117 K/UL — LOW (ref 130–400)
PO2 BLDV: 56 MMHG — SIGNIFICANT CHANGE UP
POTASSIUM BLDV-SCNC: 8.1 MMOL/L — CRITICAL HIGH (ref 3.5–5.1)
POTASSIUM SERPL-MCNC: 4.2 MMOL/L — SIGNIFICANT CHANGE UP (ref 3.5–5)
POTASSIUM SERPL-MCNC: 6.1 MMOL/L — CRITICAL HIGH (ref 3.5–5)
POTASSIUM SERPL-MCNC: 7.7 MMOL/L — CRITICAL HIGH (ref 3.5–5)
POTASSIUM SERPL-SCNC: 4.2 MMOL/L — SIGNIFICANT CHANGE UP (ref 3.5–5)
POTASSIUM SERPL-SCNC: 6.1 MMOL/L — CRITICAL HIGH (ref 3.5–5)
POTASSIUM SERPL-SCNC: 7.7 MMOL/L — CRITICAL HIGH (ref 3.5–5)
PROT SERPL-MCNC: 6.4 G/DL — SIGNIFICANT CHANGE UP (ref 6–8)
PROT SERPL-MCNC: 7.3 G/DL — SIGNIFICANT CHANGE UP (ref 6–8)
PROT UR-MCNC: >=300 MG/DL
RBC # BLD: 1.94 M/UL — LOW (ref 4.7–6.1)
RBC # BLD: 1.96 M/UL — LOW (ref 4.7–6.1)
RBC # BLD: 2.35 M/UL — LOW (ref 4.7–6.1)
RBC # FLD: 12.7 % — SIGNIFICANT CHANGE UP (ref 11.5–14.5)
RBC # FLD: 12.7 % — SIGNIFICANT CHANGE UP (ref 11.5–14.5)
RBC # FLD: 13.2 % — SIGNIFICANT CHANGE UP (ref 11.5–14.5)
RBC BLD AUTO: NORMAL — SIGNIFICANT CHANGE UP
RBC CASTS # UR COMP ASSIST: ABNORMAL /HPF
SAO2 % BLDV: 86 % — SIGNIFICANT CHANGE UP
SARS-COV-2 RNA SPEC QL NAA+PROBE: SIGNIFICANT CHANGE UP
SODIUM SERPL-SCNC: 127 MMOL/L — LOW (ref 135–146)
SODIUM SERPL-SCNC: 132 MMOL/L — LOW (ref 135–146)
SODIUM SERPL-SCNC: 138 MMOL/L — SIGNIFICANT CHANGE UP (ref 135–146)
SP GR SPEC: >=1.03 (ref 1.01–1.03)
T3 SERPL-MCNC: 47 NG/DL — LOW (ref 80–200)
T4 AB SER-ACNC: 2.3 UG/DL — LOW (ref 4.6–12)
TROPONIN T SERPL-MCNC: 0.04 NG/ML — CRITICAL HIGH
TSH SERPL-MCNC: 3.51 UIU/ML — SIGNIFICANT CHANGE UP (ref 0.27–4.2)
UROBILINOGEN FLD QL: 0.2 MG/DL — SIGNIFICANT CHANGE UP
WBC # BLD: 3.71 K/UL — LOW (ref 4.8–10.8)
WBC # BLD: 3.77 K/UL — LOW (ref 4.8–10.8)
WBC # BLD: 3.87 K/UL — LOW (ref 4.8–10.8)
WBC # FLD AUTO: 3.71 K/UL — LOW (ref 4.8–10.8)
WBC # FLD AUTO: 3.77 K/UL — LOW (ref 4.8–10.8)
WBC # FLD AUTO: 3.87 K/UL — LOW (ref 4.8–10.8)

## 2022-03-15 PROCEDURE — 71045 X-RAY EXAM CHEST 1 VIEW: CPT | Mod: 26,77

## 2022-03-15 PROCEDURE — 70450 CT HEAD/BRAIN W/O DYE: CPT | Mod: 26,MA

## 2022-03-15 PROCEDURE — 99291 CRITICAL CARE FIRST HOUR: CPT | Mod: 25

## 2022-03-15 PROCEDURE — 76705 ECHO EXAM OF ABDOMEN: CPT | Mod: 26

## 2022-03-15 PROCEDURE — 36556 INSERT NON-TUNNEL CV CATH: CPT

## 2022-03-15 PROCEDURE — 99291 CRITICAL CARE FIRST HOUR: CPT

## 2022-03-15 PROCEDURE — 93308 TTE F-UP OR LMTD: CPT | Mod: 26

## 2022-03-15 PROCEDURE — 74176 CT ABD & PELVIS W/O CONTRAST: CPT | Mod: 26,MA

## 2022-03-15 PROCEDURE — 76775 US EXAM ABDO BACK WALL LIM: CPT | Mod: 26

## 2022-03-15 PROCEDURE — 93010 ELECTROCARDIOGRAM REPORT: CPT

## 2022-03-15 PROCEDURE — 71045 X-RAY EXAM CHEST 1 VIEW: CPT | Mod: 26

## 2022-03-15 PROCEDURE — 99223 1ST HOSP IP/OBS HIGH 75: CPT | Mod: 25

## 2022-03-15 RX ORDER — CHLORHEXIDINE GLUCONATE 213 G/1000ML
1 SOLUTION TOPICAL
Refills: 0 | Status: DISCONTINUED | OUTPATIENT
Start: 2022-03-15 | End: 2022-03-25

## 2022-03-15 RX ORDER — INSULIN LISPRO 100/ML
VIAL (ML) SUBCUTANEOUS AT BEDTIME
Refills: 0 | Status: DISCONTINUED | OUTPATIENT
Start: 2022-03-15 | End: 2022-03-25

## 2022-03-15 RX ORDER — SODIUM BICARBONATE 1 MEQ/ML
150 SYRINGE (ML) INTRAVENOUS ONCE
Refills: 0 | Status: COMPLETED | OUTPATIENT
Start: 2022-03-15 | End: 2022-03-15

## 2022-03-15 RX ORDER — SODIUM CHLORIDE 9 MG/ML
1000 INJECTION INTRAMUSCULAR; INTRAVENOUS; SUBCUTANEOUS ONCE
Refills: 0 | Status: DISCONTINUED | OUTPATIENT
Start: 2022-03-15 | End: 2022-03-15

## 2022-03-15 RX ORDER — DEXTROSE 50 % IN WATER 50 %
15 SYRINGE (ML) INTRAVENOUS ONCE
Refills: 0 | Status: DISCONTINUED | OUTPATIENT
Start: 2022-03-15 | End: 2022-03-25

## 2022-03-15 RX ORDER — SODIUM CHLORIDE 9 MG/ML
1000 INJECTION, SOLUTION INTRAVENOUS
Refills: 0 | Status: DISCONTINUED | OUTPATIENT
Start: 2022-03-15 | End: 2022-03-25

## 2022-03-15 RX ORDER — CEFEPIME 1 G/1
2000 INJECTION, POWDER, FOR SOLUTION INTRAMUSCULAR; INTRAVENOUS ONCE
Refills: 0 | Status: COMPLETED | OUTPATIENT
Start: 2022-03-15 | End: 2022-03-15

## 2022-03-15 RX ORDER — DEXTROSE 50 % IN WATER 50 %
12.5 SYRINGE (ML) INTRAVENOUS ONCE
Refills: 0 | Status: DISCONTINUED | OUTPATIENT
Start: 2022-03-15 | End: 2022-03-25

## 2022-03-15 RX ORDER — SODIUM CHLORIDE 9 MG/ML
500 INJECTION INTRAMUSCULAR; INTRAVENOUS; SUBCUTANEOUS ONCE
Refills: 0 | Status: COMPLETED | OUTPATIENT
Start: 2022-03-15 | End: 2022-03-15

## 2022-03-15 RX ORDER — DEXTROSE 50 % IN WATER 50 %
25 SYRINGE (ML) INTRAVENOUS ONCE
Refills: 0 | Status: DISCONTINUED | OUTPATIENT
Start: 2022-03-15 | End: 2022-03-25

## 2022-03-15 RX ORDER — ATORVASTATIN CALCIUM 80 MG/1
40 TABLET, FILM COATED ORAL AT BEDTIME
Refills: 0 | Status: DISCONTINUED | OUTPATIENT
Start: 2022-03-15 | End: 2022-03-25

## 2022-03-15 RX ORDER — THIAMINE MONONITRATE (VIT B1) 100 MG
100 TABLET ORAL DAILY
Refills: 0 | Status: DISCONTINUED | OUTPATIENT
Start: 2022-03-15 | End: 2022-03-25

## 2022-03-15 RX ORDER — INSULIN LISPRO 100/ML
VIAL (ML) SUBCUTANEOUS
Refills: 0 | Status: DISCONTINUED | OUTPATIENT
Start: 2022-03-15 | End: 2022-03-25

## 2022-03-15 RX ORDER — GLUCAGON INJECTION, SOLUTION 0.5 MG/.1ML
1 INJECTION, SOLUTION SUBCUTANEOUS ONCE
Refills: 0 | Status: DISCONTINUED | OUTPATIENT
Start: 2022-03-15 | End: 2022-03-25

## 2022-03-15 RX ORDER — ALBUTEROL 90 UG/1
2.5 AEROSOL, METERED ORAL ONCE
Refills: 0 | Status: COMPLETED | OUTPATIENT
Start: 2022-03-15 | End: 2022-03-15

## 2022-03-15 RX ORDER — DEXTROSE 50 % IN WATER 50 %
50 SYRINGE (ML) INTRAVENOUS ONCE
Refills: 0 | Status: COMPLETED | OUTPATIENT
Start: 2022-03-15 | End: 2022-03-15

## 2022-03-15 RX ORDER — VANCOMYCIN HCL 1 G
1000 VIAL (EA) INTRAVENOUS ONCE
Refills: 0 | Status: COMPLETED | OUTPATIENT
Start: 2022-03-15 | End: 2022-03-15

## 2022-03-15 RX ORDER — SODIUM BICARBONATE 1 MEQ/ML
0.28 SYRINGE (ML) INTRAVENOUS
Qty: 150 | Refills: 0 | Status: DISCONTINUED | OUTPATIENT
Start: 2022-03-15 | End: 2022-03-16

## 2022-03-15 RX ORDER — CALCIUM GLUCONATE 100 MG/ML
2 VIAL (ML) INTRAVENOUS ONCE
Refills: 0 | Status: COMPLETED | OUTPATIENT
Start: 2022-03-15 | End: 2022-03-15

## 2022-03-15 RX ORDER — HEPARIN SODIUM 5000 [USP'U]/ML
5000 INJECTION INTRAVENOUS; SUBCUTANEOUS EVERY 12 HOURS
Refills: 0 | Status: DISCONTINUED | OUTPATIENT
Start: 2022-03-15 | End: 2022-03-15

## 2022-03-15 RX ORDER — INSULIN HUMAN 100 [IU]/ML
10 INJECTION, SOLUTION SUBCUTANEOUS ONCE
Refills: 0 | Status: COMPLETED | OUTPATIENT
Start: 2022-03-15 | End: 2022-03-15

## 2022-03-15 RX ORDER — LISINOPRIL 2.5 MG/1
20 TABLET ORAL DAILY
Refills: 0 | Status: DISCONTINUED | OUTPATIENT
Start: 2022-03-15 | End: 2022-03-15

## 2022-03-15 RX ORDER — FOLIC ACID 0.8 MG
1 TABLET ORAL DAILY
Refills: 0 | Status: DISCONTINUED | OUTPATIENT
Start: 2022-03-15 | End: 2022-03-25

## 2022-03-15 RX ORDER — SODIUM CHLORIDE 9 MG/ML
1000 INJECTION INTRAMUSCULAR; INTRAVENOUS; SUBCUTANEOUS ONCE
Refills: 0 | Status: COMPLETED | OUTPATIENT
Start: 2022-03-15 | End: 2022-03-15

## 2022-03-15 RX ADMIN — SODIUM CHLORIDE 500 MILLILITER(S): 9 INJECTION INTRAMUSCULAR; INTRAVENOUS; SUBCUTANEOUS at 15:04

## 2022-03-15 RX ADMIN — Medication 150 MILLIEQUIVALENT(S): at 13:46

## 2022-03-15 RX ADMIN — Medication 200 GRAM(S): at 14:00

## 2022-03-15 RX ADMIN — HEPARIN SODIUM 5000 UNIT(S): 5000 INJECTION INTRAVENOUS; SUBCUTANEOUS at 17:13

## 2022-03-15 RX ADMIN — CEFEPIME 100 MILLIGRAM(S): 1 INJECTION, POWDER, FOR SOLUTION INTRAMUSCULAR; INTRAVENOUS at 14:55

## 2022-03-15 RX ADMIN — ALBUTEROL 2.5 MILLIGRAM(S): 90 AEROSOL, METERED ORAL at 14:16

## 2022-03-15 RX ADMIN — SODIUM CHLORIDE 1000 MILLILITER(S): 9 INJECTION INTRAMUSCULAR; INTRAVENOUS; SUBCUTANEOUS at 13:25

## 2022-03-15 RX ADMIN — SODIUM CHLORIDE 1000 MILLILITER(S): 9 INJECTION INTRAMUSCULAR; INTRAVENOUS; SUBCUTANEOUS at 12:21

## 2022-03-15 RX ADMIN — SODIUM CHLORIDE 500 MILLILITER(S): 9 INJECTION INTRAMUSCULAR; INTRAVENOUS; SUBCUTANEOUS at 14:44

## 2022-03-15 RX ADMIN — Medication 2 GRAM(S): at 15:04

## 2022-03-15 RX ADMIN — ATORVASTATIN CALCIUM 40 MILLIGRAM(S): 80 TABLET, FILM COATED ORAL at 21:45

## 2022-03-15 RX ADMIN — INSULIN HUMAN 10 UNIT(S): 100 INJECTION, SOLUTION SUBCUTANEOUS at 13:39

## 2022-03-15 RX ADMIN — Medication 250 MILLIGRAM(S): at 15:04

## 2022-03-15 RX ADMIN — SODIUM CHLORIDE 1000 MILLILITER(S): 9 INJECTION INTRAMUSCULAR; INTRAVENOUS; SUBCUTANEOUS at 12:50

## 2022-03-15 RX ADMIN — SODIUM CHLORIDE 1000 MILLILITER(S): 9 INJECTION INTRAMUSCULAR; INTRAVENOUS; SUBCUTANEOUS at 14:44

## 2022-03-15 RX ADMIN — Medication 4: at 17:24

## 2022-03-15 RX ADMIN — Medication 50 MILLILITER(S): at 13:39

## 2022-03-15 RX ADMIN — Medication 150 MEQ/KG/HR: at 14:52

## 2022-03-15 NOTE — ED ADULT NURSE NOTE - OBJECTIVE STATEMENT
Pt a&ox3, in ED s/p PMD visit, pt was sent to ED by PMD secondary to blurry vision, delayed speech and unable to walk, pt c/o neck pain x 2 weeks, abdomen noted to be distended, no N/V/D, no SOB, unlabored breathing, equal rise & fall, pt noted to be hypothermic and hypotensive. Denies headache, dizziness and lightheadedness.

## 2022-03-15 NOTE — ED PROVIDER NOTE - PHYSICAL EXAMINATION
Physical Exam    Vital Signs: I have reviewed the initial vital signs.  Constitutional: well-nourished, appears stated age, increased resp rate.   Eyes: Conjunctiva pink, Sclera clear, PERRLA, EOMI.  Cardiovascular: S1 and S2, regular rate, regular rhythm, well-perfused extremities, radial pulses equal and 2+, pedal pulses 2+ and equal   Respiratory: unlabored respiratory effort, clear to auscultation bilaterally no wheezing, rales and rhonchi  Gastrointestinal: soft, non-tender abdomen, no pulsatile mass, normal bowl sounds  Musculoskeletal: supple neck, no lower extremity edema, no midline tenderness  Integumentary: warm, dry, no rash  Neurologic: awake, alert, cranial nerves II-XII grossly intact, extremities’ motor and sensory functions grossly intact, no pronator drift, decreased fluency with speech.

## 2022-03-15 NOTE — CONSULT NOTE ADULT - ASSESSMENT
IMPRESSION:  toxic metabolic encephalopathy  acute on chronic renal failure  uremia  acidosis  hypothermia  anemia macrocytic  leukopenia  possible sepsis  hyperkalemia  nephrolithiasis  ETOH addiction  possible starvation ketosis    SUGGEST:  f/u CT head  neuro eval  ED spoke to renal wants to hold on RRT for now  IV fluids with HCO3  way to OSD   renal US  given Ca ++, glucose/ insulin in ED  FS Q1h  NPO  serial lytes  CXR in AM  full cultures  empiric abx  admit to ICU  check B12 folate levels  give folate /Thiamine   IMPRESSION:  toxic metabolic encephalopathy  acute on chronic renal failure  uremia  acidosis  hypothermia  anemia macrocytic  leukopenia  possible sepsis  hyperkalemia  nephrolithiasis  ETOH addiction  possible starvation ketosis    SUGGEST:  f/u CT head  neuro eval  ED spoke to renal will start RRT tonight  IV fluids with HCO3 untill RRT starts  Udoll placement  way to OSD  renal US  given Ca ++, glucose/ insulin in ED  FS Q1h  NPO  serial lytes  CXR in AM  full cultures  empiric abx  admit to ICU  check B12 folate levels  give folate /Thiamine

## 2022-03-15 NOTE — CONSULT NOTE ADULT - SUBJECTIVE AND OBJECTIVE BOX
NEPHROLOGY CONSULTATION NOTE    KECIA MARTIN  56y  Male  MRN-[id number]    CC:   Patient is a 56y old  Male who presents with a chief complaint of Acute Renal Failure (15 Mar 2022 16:24)      HPI:  55 yo M with PMH DM on Metformin and Januvia, HTN, HLD, Glaucoma, b/l LE peripheral neuropathy who presents in acute renal failure.  Pt has been feeling progressively weak for the past week.  Also reports increasing numbness to b/l UE, difficulty walking, cognitive slowing, nausea, and neck pain.  Pt was sent in by Ophthalmologist during office visit today.  Pt states he has not changed his metformin dose recently nor has he taken more than prescribed.  Pt denies fevers, chills, chest pain, SOB, abdominal pain, dysuria. (15 Mar 2022 16:24)      PAST MEDICAL & SURGICAL HISTORY:  Diabetes    Hypertension    HLD (hyperlipidemia)    Neuropathy    Glaucoma    No significant past surgical history      Allergies:  No Known Allergies    Home Medications Reviewed  Hospital Medications:   MEDICATIONS  (STANDING):  atorvastatin 40 milliGRAM(s) Oral at bedtime  chlorhexidine 4% Liquid 1 Application(s) Topical <User Schedule>  folic acid 1 milliGRAM(s) Oral daily  heparin   Injectable 5000 Unit(s) SubCutaneous every 12 hours  lisinopril 20 milliGRAM(s) Oral daily  sodium bicarbonate  Infusion 0.285 mEq/kG/Hr (150 mL/Hr) IV Continuous <Continuous>  thiamine 100 milliGRAM(s) Oral daily    MEDICATIONS  (PRN):    Home Medications:  acetaminophen 325 mg oral tablet: 2 tab(s) orally every 6 hours (26 Jul 2021 18:52)  DULoxetine 30 mg oral delayed release capsule: 1 cap(s) orally 2 times a day (25 Jul 2021 16:56)  Januvia 100 mg oral tablet: 1 tab(s) orally once a day (25 Jul 2021 16:56)  lisinopril 20 mg oral tablet: 1 tab(s) orally once a day (25 Jul 2021 16:56)  metFORMIN 1000 mg oral tablet: 1 tab(s) orally 2 times a day (25 Jul 2021 16:56)      SOCIAL HISTORY:  Social History:  Hx of alcohol use (15 Mar 2022 16:24)      FAMILY HISTORY:      REVIEW OF SYSTEMS:  All other review of systems is negative unless indicated above.    VITALS:  T(F): 92.8 (03-15-22 @ 15:22), Max: 94.2 (03-15-22 @ 14:29)  HR: 84 (03-15-22 @ 15:22)  BP: 117/63 (03-15-22 @ 15:22)  RR: 16 (03-15-22 @ 15:22)  SpO2: 99% (03-15-22 @ 15:22)    Height (cm): 172.7 (03-15 @ 15:22)  Weight (kg): 73.9 (03-15 @ 15:22)  BMI (kg/m2): 24.8 (03-15 @ 15:22)  BSA (m2): 1.87 (03-15 @ 15:22)  I&O's Detail    15 Mar 2022 07:01  -  15 Mar 2022 17:09  --------------------------------------------------------  IN:    Sodium Bicarbonate: 450 mL  Total IN: 450 mL    OUT:  Total OUT: 0 mL    Total NET: 450 mL            I&O's Summary    15 Mar 2022 07:01  -  15 Mar 2022 17:09  --------------------------------------------------------  IN: 450 mL / OUT: 0 mL / NET: 450 mL        PHYSICAL EXAM:  Gen: NAD  resp: CTAB  card: S1/S2  abd: soft  ext: no edema  vascular access:     LABS:  Daily Height in cm: 172.72 (15 Mar 2022 15:22)    Daily     Blood Gas Profile w/Lytes - Venous: Performed in Lab (03-15-22 @ 13:05)  Blood Gas Venous - Lactate: 1.10 mmol/L (03-15-22 @ 13:05)  Blood Gas Venous - Potassium: 8.1 mmol/L (03-15-22 @ 13:05)    03-15    127<L>  |  94<L>  |  145<HH>  ----------------------------<  190<H>  7.7<HH>   |  6<LL>  |  15.3<HH>    Ca    8.8      15 Mar 2022 12:11  Mg     2.4     03-15    TPro  7.3  /  Alb  4.4  /  TBili  0.4  /  DBili      /  AST  49<H>  /  ALT  34  /  AlkPhos  150<H>  03-15    eGFR if Non African American: 47 mL/min/1.73M2 (07-26-21 @ 06:05)  eGFR if African American: 54 mL/min/1.73M2 (07-26-21 @ 06:05)  eGFR if Non African American: 51 mL/min/1.73M2 (07-25-21 @ 16:42)  eGFR if : 59 mL/min/1.73M2 (07-25-21 @ 16:42)    Creatinine Trend:   Creatinine, Serum: 15.3 mg/dL (03-15-22 @ 12:11)  Creatinine, Serum: 1.6 mg/dL (07-26-21 @ 06:05)  Creatinine, Serum: 1.5 mg/dL (07-25-21 @ 16:42)  Creatinine, Serum: 1.5 mg/dL (07-25-21 @ 12:30)                            7.5    3.77  )-----------( 117      ( 15 Mar 2022 12:11 )             23.5     Mean Cell Volume: 100.0 fL (03-15-22 @ 12:11)    Urine Studies:  Urinalysis Basic - ( 15 Mar 2022 15:41 )    Color: Yellow / Appearance: Clear / SG: >=1.030 / pH:   Gluc:  / Ketone: Trace  / Bili: Negative / Urobili: 0.2 mg/dL   Blood:  / Protein: >=300 mg/dL / Nitrite: Negative   Leuk Esterase: Negative / RBC: 11-25 /HPF / WBC    Sq Epi:  / Non Sq Epi: Few /HPF / Bacteria:                 RADIOLOGY & ADDITIONAL STUDIES:        CT Abdomen and Pelvis No Cont:   ACC: [id number] EXAM:  CT ABDOMEN AND PELVIS                          PROCEDURE DATE:  03/15/2022          INTERPRETATION:  CLINICAL STATEMENT: Abdominal pain    TECHNIQUE: Contiguous axial CT images were obtained from the lower chest   to the pubic symphysis without intravenous contrast.  Oral contrast was   not administered.  Reformatted images in the coronal and sagittal planes   were acquired.    COMPARISON CT: July 25, 2021    OTHER STUDIES USED FOR CORRELATION: None.      FINDINGS:    LOWERCHEST: Coronary calcifications. Bibasilar atelectatic changes..    HEPATOBILIARY: Diffuse hepatic steatosis. No biliary ductal dilatation..    SPLEEN: Unremarkable.    PANCREAS: Pancreatic head calcifications..    ADRENAL GLANDS: Unremarkable.    KIDNEYS: Bilateral intrarenal calculi measuring up to 0.8 cm right lower   pole. No hydronephrosis bilaterally..    ABDOMINOPELVIC NODES: Unremarkable.    PELVIC ORGANS: Blanco catheter in urinary bladder..    PERITONEUM/MESENTERY/BOWEL: No bowel obstruction, pneumoperitoneum or   ascites. Normal caliber appendix.    BONES/SOFT TISSUES: Degenerative changes of the spine. No acute osseous   abnormality..    OTHER: Atherosclerosis abdominal aorta and branches.      IMPRESSION:    No acute intra-abdominal pathology.    Bilateral nephrolithiasis, as above.    --- End of Report ---            ELLIOT LANDAU MD; Attending Radiologist  This document has been electronically signed. Mar 15 2022  2:27PM (03-15-22 @ 14:13)                     NEPHROLOGY CONSULTATION NOTE    KECIA MARTIN  56y  Male  MRN-[id number]    CC:   Patient is a 56y old  Male who presents with a chief complaint of Acute Renal Failure (15 Mar 2022 16:24)      HPI:  55 yo M with PMH DM on Metformin and Januvia, HTN, HLD, Glaucoma, b/l LE peripheral neuropathy who presents in acute renal failure.  Pt has been feeling progressively weak for the past week.  Also reports increasing numbness to b/l UE, difficulty walking, cognitive slowing, nausea, and neck pain.  Pt was sent in by Ophthalmologist during office visit today.  Pt states he has not changed his metformin dose recently nor has he taken more than prescribed.  Pt denies fevers, chills, chest pain, SOB, abdominal pain, dysuria. (15 Mar 2022 16:24)      PAST MEDICAL & SURGICAL HISTORY:  Diabetes    Hypertension    HLD (hyperlipidemia)    Neuropathy    Glaucoma    No significant past surgical history      Allergies:  No Known Allergies    Home Medications Reviewed  Hospital Medications:   MEDICATIONS  (STANDING):  atorvastatin 40 milliGRAM(s) Oral at bedtime  chlorhexidine 4% Liquid 1 Application(s) Topical <User Schedule>  folic acid 1 milliGRAM(s) Oral daily  heparin   Injectable 5000 Unit(s) SubCutaneous every 12 hours  lisinopril 20 milliGRAM(s) Oral daily  sodium bicarbonate  Infusion 0.285 mEq/kG/Hr (150 mL/Hr) IV Continuous <Continuous>  thiamine 100 milliGRAM(s) Oral daily    MEDICATIONS  (PRN):    Home Medications:  acetaminophen 325 mg oral tablet: 2 tab(s) orally every 6 hours (26 Jul 2021 18:52)  DULoxetine 30 mg oral delayed release capsule: 1 cap(s) orally 2 times a day (25 Jul 2021 16:56)  Januvia 100 mg oral tablet: 1 tab(s) orally once a day (25 Jul 2021 16:56)  lisinopril 20 mg oral tablet: 1 tab(s) orally once a day (25 Jul 2021 16:56)  metFORMIN 1000 mg oral tablet: 1 tab(s) orally 2 times a day (25 Jul 2021 16:56)      SOCIAL HISTORY:  Social History:  Hx of alcohol use (15 Mar 2022 16:24)      FAMILY HISTORY:      REVIEW OF SYSTEMS:  +fatigue ~ 1wk  denies poor appetite  denies increased or decreased urination recently  denies dysuria, hesitancy  denies dark/bloody stool  +chest discomfort ~1wk  denies n/v/d  All other review of systems is negative unless indicated above.    VITALS:  T(F): 92.8 (03-15-22 @ 15:22), Max: 94.2 (03-15-22 @ 14:29)  HR: 84 (03-15-22 @ 15:22)  BP: 117/63 (03-15-22 @ 15:22)  RR: 16 (03-15-22 @ 15:22)  SpO2: 99% (03-15-22 @ 15:22)    Height (cm): 172.7 (03-15 @ 15:22)  Weight (kg): 73.9 (03-15 @ 15:22)  BMI (kg/m2): 24.8 (03-15 @ 15:22)  BSA (m2): 1.87 (03-15 @ 15:22)  I&O's Detail    15 Mar 2022 07:01  -  15 Mar 2022 17:09  --------------------------------------------------------  IN:    Sodium Bicarbonate: 450 mL  Total IN: 450 mL    OUT:  Total OUT: 0 mL    Total NET: 450 mL            I&O's Summary    15 Mar 2022 07:01  -  15 Mar 2022 17:09  --------------------------------------------------------  IN: 450 mL / OUT: 0 mL / NET: 450 mL        PHYSICAL EXAM:  Gen: lethargic, in NAD  resp: CTAB  card: S1/S2  abd: soft, NT, mildly distended  ext: no edema  vascular access: KARI santos    LABS:  Daily Height in cm: 172.72 (15 Mar 2022 15:22)      Blood Gas Profile - Venous (03.15.22 @ 13:05)    pH, Venous: 7.03    pCO2, Venous: 26 mmHg    pO2, Venous: 56 mmHg    HCO3, Venous: 7 mmol/L    Base Excess, Venous: -22.1 mmol/L    Oxygen Saturation, Venous: 86.0 %  Blood Gas Venous - Lactate: 1.10 mmol/L (03-15-22 @ 13:05)  Blood Gas Venous - Potassium: 8.1 mmol/L (03-15-22 @ 13:05)    03-15    127<L>  |  94<L>  |  145<HH>  ----------------------------<  190<H>  7.7<HH>   |  6<LL>  |  15.3<HH>    Ca    8.8      15 Mar 2022 12:11  Mg     2.4     03-15    TPro  7.3  /  Alb  4.4  /  TBili  0.4  /  DBili      /  AST  49<H>  /  ALT  34  /  AlkPhos  150<H>  03-15    Creatinine Trend:   Creatinine, Serum: 15.3 mg/dL (03-15-22 @ 12:11)  Creatinine, Serum: 1.6 mg/dL (07-26-21 @ 06:05)  Creatinine, Serum: 1.5 mg/dL (07-25-21 @ 16:42)  Creatinine, Serum: 1.5 mg/dL (07-25-21 @ 12:30)                            7.5    3.77  )-----------( 117      ( 15 Mar 2022 12:11 )             23.5     Mean Cell Volume: 100.0 fL (03-15-22 @ 12:11)    Urine Studies:  Urinalysis Basic - ( 15 Mar 2022 15:41 )    Color: Yellow / Appearance: Clear / SG: >=1.030 / pH:   Gluc:  / Ketone: Trace  / Bili: Negative / Urobili: 0.2 mg/dL   Blood:  / Protein: >=300 mg/dL / Nitrite: Negative   Leuk Esterase: Negative / RBC: 11-25 /HPF / WBC    Sq Epi:  / Non Sq Epi: Few /HPF / Bacteria:           RADIOLOGY & ADDITIONAL STUDIES:        CT Abdomen and Pelvis No Cont:   ACC: [id number] EXAM:  CT ABDOMEN AND PELVIS                          PROCEDURE DATE:  03/15/2022          INTERPRETATION:  CLINICAL STATEMENT: Abdominal pain    TECHNIQUE: Contiguous axial CT images were obtained from the lower chest   to the pubic symphysis without intravenous contrast.  Oral contrast was   not administered.  Reformatted images in the coronal and sagittal planes   were acquired.    COMPARISON CT: July 25, 2021    OTHER STUDIES USED FOR CORRELATION: None.      FINDINGS:    LOWERCHEST: Coronary calcifications. Bibasilar atelectatic changes..    HEPATOBILIARY: Diffuse hepatic steatosis. No biliary ductal dilatation..    SPLEEN: Unremarkable.    PANCREAS: Pancreatic head calcifications..    ADRENAL GLANDS: Unremarkable.    KIDNEYS: Bilateral intrarenal calculi measuring up to 0.8 cm right lower   pole. No hydronephrosis bilaterally..    ABDOMINOPELVIC NODES: Unremarkable.    PELVIC ORGANS: Blanco catheter in urinary bladder..    PERITONEUM/MESENTERY/BOWEL: No bowel obstruction, pneumoperitoneum or   ascites. Normal caliber appendix.    BONES/SOFT TISSUES: Degenerative changes of the spine. No acute osseous   abnormality..    OTHER: Atherosclerosis abdominal aorta and branches.      IMPRESSION:    No acute intra-abdominal pathology.    Bilateral nephrolithiasis, as above.    --- End of Report ---            ELLIOT LANDAU MD; Attending Radiologist  This document has been electronically signed. Mar 15 2022  2:27PM (03-15-22 @ 14:13)    < from: TTE Echo Complete w/o Contrast w/ Doppler (07.26.21 @ 12:45) >  Summary:   1. Normal global left ventricular systolic function.   2. LV Ejection Fraction by Trinidad's Method with a biplane EF of 67 %.   3. Normal left ventricular internal cavity size.   4. The mean global longitudinal peak strain by speckle tracking is -18.1% which is normal.   5. Normal left atrial size.   6. Normal right atrial size.   7. Mild mitral valve regurgitation.   8. Mild tricuspid regurgitation.   9. LA volume Index is 24.0 ml/m² ml/m2.  10. Peak transaortic gradient equals 7.8 mmHg, mean transaortic gradient equals 4.4 mmHg, the calculated aortic valve area equals 2.19 cm² by the continuity equationconsistent with mild aortic stenosis.    < end of copied text >

## 2022-03-15 NOTE — H&P ADULT - ATTENDING COMMENTS
57 yo male, pmh of htn, hld, dm, glaucoma, b/l le neuropathy, presents to ed for multiple complaints. Per pt and family, for over a week pt has had increased weakness, a/w neck pain, numbness to b/l upper extremities, slowed speech, and nausea. No specific pain or radiation. Pt was at ophthalmologist today and was sent in for weakness.     acute renal failure / severe metabolic acidosis / hyperkalemia / mild fluid overload / uremic - toxic encephalopathy     -  negligible urine output s/p 2L NS given in ED   - initiation of emergent HD - nephrology consult   - no hydronephrosis seen on CT abdomen   - hold metformin and lisinopril   - hospital sq insulin protocol   - DC IV fluids   - DVT prophylaxis

## 2022-03-15 NOTE — H&P ADULT - NSICDXPASTMEDICALHX_GEN_ALL_CORE_FT
PAST MEDICAL HISTORY:  Diabetes     Glaucoma     HLD (hyperlipidemia)     Hypertension     Neuropathy

## 2022-03-15 NOTE — H&P ADULT - NSHPLABSRESULTS_GEN_ALL_CORE
Labs:                  7.5    3.77  )-----------( 117      ( 15 Mar 2022 12:11 )             23.5   03-15    127<L>  |  94<L>  |  145<HH>  ----------------------------<  190<H>  7.7<HH>   |  6<LL>  |  15.3<HH>    Ca    8.8      15 Mar 2022 12:11  Mg     2.4     03-15    TPro  7.3  /  Alb  4.4  /  TBili  0.4  /  DBili  x   /  AST  49<H>  /  ALT  34  /  AlkPhos  150<H>  03-15        Radiology:    < from: CT Head No Cont (03.15.22 @ 14:13) >    ACC: [id number] EXAM:  CT BRAIN                          PROCEDURE DATE:  03/15/2022      IMPRESSION:    Mild atrophic changes.    --- End of Report---    < end of copied text >          < from: CT Abdomen and Pelvis No Cont (03.15.22 @ 14:13) >    ACC: [id number] EXAM:  CT ABDOMEN AND PELVIS                          PROCEDURE DATE:  03/15/2022          INTERPRETATION:  CLINICAL STATEMENT: Abdominal pain    TECHNIQUE: Contiguous axial CT images were obtained from the lower chest   to the pubic symphysis without intravenous contrast.  Oral contrast was   not administered.  Reformatted images in the coronal and sagittal planes   were acquired.    COMPARISON CT: July 25, 2021    OTHER STUDIES USED FOR CORRELATION: None.      FINDINGS:    LOWERCHEST: Coronary calcifications. Bibasilar atelectatic changes..    HEPATOBILIARY: Diffuse hepatic steatosis. No biliary ductal dilatation..    SPLEEN: Unremarkable.    PANCREAS: Pancreatic head calcifications..    ADRENAL GLANDS: Unremarkable.    KIDNEYS: Bilateral intrarenal calculi measuring up to 0.8 cm right lower   pole. No hydronephrosis bilaterally..    ABDOMINOPELVIC NODES: Unremarkable.    PELVIC ORGANS: Blanco catheter in urinary bladder..    PERITONEUM/MESENTERY/BOWEL: No bowel obstruction, pneumoperitoneum or   ascites. Normal caliber appendix.    BONES/SOFT TISSUES: Degenerative changes of the spine. No acute osseous   abnormality..    OTHER: Atherosclerosis abdominal aorta and branches.      IMPRESSION:    No acute intra-abdominal pathology.    Bilateral nephrolithiasis, as above.    --- End of Report ---    < end of copied text >

## 2022-03-15 NOTE — CONSULT NOTE ADULT - ASSESSMENT
# VAMSI on CKD 3 / last known creat 1.6 in 7/2021 (left VM at pt's PMD Dr. Aime Jones for more recent lab results)  # Severe hyperkalemia / d/t renal failure, ACE inhibitor  # Hyponatremia / d/t renal failure / DKA  # Acidemia / HAGMA / renal failure/DKA      Recommendations:  - cont iv hydration with bicarb drip at 150cc/hr  - repeat chemistry or K level on gas  - pt producing minimal urine (10cc / after 2L iv hydration) / CTAP reviewed, no hydronephrosis, bladder not distended, b/l intra-renal calculi  - d/w pt and daughters at bedside, gave consent for RRT / consent in chart  - udall placed by ICU team   - HD today, 2 hrs, opti 160, , 2K, 0 UF  - will assess tomorrow if pt needs HD  - please document strict i/o  - UA noted with proteinuria/hematuria, will need repeat UA and urine prot/creat ratio- can wait to obtain sample tomorrow if urine output hopefully increasing  - cont holding metformin, lisinopril (I d/c'd lisinopril from patient's orders)  - sepsis w/u, pan cultures  - serial EKGs/ trend troponin / cardio eval  - insulin per DKA protocol  - per family, pt has high etoh intake / cont thiamine, folate, CIWA protocol  - f/u renal/bladder ultrasound  - check iron stores  - check serum free light chain ratio, spep/faustino  - check phos level   # VAMSI on CKD 3 / last known creat 1.6 in 7/2021 (left VM at pt's PMD Dr. Aime Jones for more recent lab results)  # Severe hyperkalemia / d/t renal failure, ACE inhibitor  # Hyponatremia / d/t renal failure / DKA  # Acidemia / HAGMA / renal failure/DKA  # Normo/macrocytic anemia       Recommendations:  - cont iv hydration with bicarb drip at 150cc/hr  - repeat chemistry or K level on gas  - pt producing minimal urine (10cc / after 2L iv hydration) / CTAP reviewed, no hydronephrosis, bladder not distended, b/l intra-renal calculi  - d/w pt and daughters at bedside, gave consent for RRT / consent in chart  - udall placed by ICU team   - HD today, 2 hrs, opti 160, , 2K, 0 UF  - will assess tomorrow if pt needs HD  - please document strict i/o  - UA noted with proteinuria/hematuria, will need repeat UA and urine prot/creat ratio- can wait to obtain sample tomorrow if urine output hopefully increasing  - cont holding metformin, lisinopril (I d/c'd lisinopril from patient's orders)  - sepsis w/u, pan cultures  - serial EKGs/ trend troponin / cardio eval  - insulin per DKA protocol  - per family, pt has high etoh intake / cont thiamine, folate, CIWA protocol  - f/u renal/bladder ultrasound  - check iron stores / r/o GI bleed  - check serum free light chain ratio, spep/faustino  - check phos level

## 2022-03-15 NOTE — H&P ADULT - NSHPPHYSICALEXAM_GEN_ALL_CORE
CONSTITUTIONAL: in no acute distress, afebrile  SKIN: Warm, dry  HEAD: Normocephalic; atraumatic  EYES: No conjunctival injection. EOMI. PERRLA  ENT: No nasal discharge; oropharynx nonerythematous; airway clear  NECK: Supple; non tender, No JVD  CARD:  Regular rate and rhythm  RESP: CTAB; No wheezes, crackles, rales or rhonchi  ABD: Soft NTND; No guarding or rebound tenderness  EXT: Normal ROM.  No clubbing or cyanosis.  No edema, peripheral pulses 2+  NEURO: A&O x3, grossly unremarkable, no focal deficits  PSYCH: Cooperative, appropriate

## 2022-03-15 NOTE — ED PROVIDER NOTE - NS ED ROS FT
Constitutional: (-) fever, (-) chills  Eyes: (-) visual changes  ENT: (-) nasal congestions  Cardiovascular: (-) chest pain, (-) syncope  Respiratory: (-) cough, (-) shortness of breath, (-) dyspnea,   Gastrointestinal: (-) vomiting, (-) diarrhea, (-)nausea,  Musculoskeletal: (+) neck pain, (-) back pain, (-) joint pain,  Integumentary: (-) rash, (-) edema, (-) bruises  Neurological: (-) headache, (-) loc, (-) dizziness, (-) tingling, (+)numbness,  Peripheral Vascular: (-) leg swelling  :  (-)dysuria,  (-) hematuria  Allergic/Immunologic: (-) pruritus

## 2022-03-15 NOTE — ED PROVIDER NOTE - CARE PLAN
1 Principal Discharge DX:	Acute renal failure (ARF)  Secondary Diagnosis:	Hypotension  Secondary Diagnosis:	Hypothermia due to non-environmental cause  Secondary Diagnosis:	Hyperkalemia  Secondary Diagnosis:	Hyponatremia  Secondary Diagnosis:	Metabolic acidosis  Secondary Diagnosis:	Confusion

## 2022-03-15 NOTE — ED PROVIDER NOTE - OBJECTIVE STATEMENT
57 yo male, pmh of htn, hld, dm, glaucoma, b/l le neuropathy, presents to ed for multiple complaints. Per pt and family, for over a week pt has had increased weakness, a/w neck pain, numbness to b/l upper extremities, slowed speech, and nausea. No specific pain or radiation. Pt was at ophthalmologist today and was sent in for weakness. Denies fever, chills, cp, sob, abd pain, v/d.

## 2022-03-15 NOTE — H&P ADULT - HISTORY OF PRESENT ILLNESS
57 yo M with PMH DM on Metformin and Januvia, HTN, HLD, Glaucoma, b/l LE peripheral neuropathy who presents in acute renal failure.  Pt has been feeling progressively weak for the past week.  Also reports increasing numbness to b/l UE, difficulty walking, cognitive slowing, nausea, and neck pain.  Pt was sent in by Ophthalmologist during office visit today.  Pt states he has not changed his metformin dose recently nor has he taken more than prescribed.  Pt denies fevers, chills, chest pain, SOB, abdominal pain, dysuria.

## 2022-03-15 NOTE — ED ADULT NURSE NOTE - NSIMPLEMENTINTERV_GEN_ALL_ED
Implemented All Universal Safety Interventions:  [place] to call system. Call bell, personal items and telephone within reach. Instruct patient to call for assistance. Room bathroom lighting operational. Non-slip footwear when patient is off stretcher. Physically safe environment: no spills, clutter or unnecessary equipment. Stretcher in lowest position, wheels locked, appropriate side rails in place.

## 2022-03-15 NOTE — CONSULT NOTE ADULT - SUBJECTIVE AND OBJECTIVE BOX
HPI:57 yo male, pmh of htn, hld, dm, glaucoma, b/l le neuropathy,   presents to ed for multiple complaints. Per pt and family, for over a week pt has had increased weakness, a/w neck pain, numbness to b/l upper extremities, slowed speech, and nausea.   No specific pain or radiation.   Pt was at ophthalmologist today and was sent in for weakness.   Denies fever, chills, cp, sob, abd pain, v/d      I discussed the case  with the ED physician.     I reviewed the radiology tests and hospital record including the ED chart.    I reviewed the other consultants comments that are available in the chart.    CC/ HPI Patient is a 56y old  Male who presents with a chief complaint of     ^POSS STROKE      No pertinent past medical history    Diabetes    Hypertension    Acute renal failure (ARF)    No significant past surgical history    Hypotension    Hypothermia due to non-environmental cause    Hyperkalemia    Hyponatremia    Metabolic acidosis    Confusion    Elevated troponin    High anion gap metabolic acidosis    Pancytopenia    Hyperglycemia    SysAdmin_VisitLink        FAMILY HISTORY:      No Known Allergies      Soc:  see Hpi.    Home prescriptions  acetaminophen 325 mg oral tablet: 2 tab(s) orally every 6 hours  DULoxetine 30 mg oral delayed release capsule: 1 cap(s) orally 2 times a day  Januvia 100 mg oral tablet: 1 tab(s) orally once a day  lisinopril 20 mg oral tablet: 1 tab(s) orally once a day  metFORMIN 1000 mg oral tablet: 1 tab(s) orally 2 times a day      MEDICATIONS  (STANDING):  cefepime   IVPB 2000 milliGRAM(s) IV Intermittent once  sodium bicarbonate  Infusion 0.285 mEq/kG/Hr (150 mL/Hr) IV Continuous <Continuous>  vancomycin  IVPB. 1000 milliGRAM(s) IV Intermittent once    MEDICATIONS  (PRN):      sodium chloride 0.9% Bolus:   1000 milliLiter(s), IV Bolus, once, infuse over 60 Minute(s), Stop After 1 Doses  Provider's Contact #: [phone]  sodium chloride 0.9% Bolus:   1000 milliLiter(s), IV Bolus, once, infuse over 60 Minute(s), Stop After 1 Doses  Provider's Contact #: [phone]  sodium chloride 0.9% Bolus:   500 milliLiter(s), IV Bolus, once, infuse over 30 Minute(s), Stop After 1 Doses  Provider's Contact #: [phone]  sodium chloride 0.9% Bolus:   500 milliLiter(s), IV Bolus, once, infuse over 30 Minute(s), Stop After 1 Doses  Provider's Contact #: [phone]      T(C): 34.6 (03-15-22 @ 14:29), Max: 34.6 (03-15-22 @ 14:29)  HR: 80 (03-15-22 @ 14:29) (64 - 80)  BP: 113/60 (03-15-22 @ 14:29) (77/48 - 113/60)  RR: 18 (03-15-22 @ 14:29) (17 - 18)  SpO2: 99% (03-15-22 @ 14:29) (99% - 100%)  ICU Vital Signs Last 24 Hrs  T(C): 34.6 (15 Mar 2022 14:29), Max: 34.6 (15 Mar 2022 14:29)  T(F): 94.2 (15 Mar 2022 14:29), Max: 94.2 (15 Mar 2022 14:29)  HR: 80 (15 Mar 2022 14:29) (64 - 80)  BP: 113/60 (15 Mar 2022 14:29) (77/48 - 113/60)  RR: 18 (15 Mar 2022 14:29) (17 - 18)  SpO2: 99% (15 Mar 2022 14:29) (99% - 100%)      I&O's Summary      I&O's Detail      Drug Dosing Weight  Height (cm): 172.7 (15 Mar 2022 11:48)  Weight (kg): 79 (15 Mar 2022 11:48)  BMI (kg/m2): 26.5 (15 Mar 2022 11:48)  BSA (m2): 1.93 (15 Mar 2022 11:48)    LABS:                          7.5    3.77  )-----------( 117      ( 15 Mar 2022 12:11 )             23.5       03-15    127<L>  |  94<L>  |  145  ----------------------------<  190  7.7<HH>   |  6<LL>  |  15.3        Ca    8.8      15 Mar 2022 12:11  Mg     2.4     03-15    TPro  7.3  /  Alb  4.4  /  TBili  0.4  /  DBili  x   /  AST  49<H>  /  ALT  34  /  AlkPhos  150<H>  03-15      LIVER FUNCTIONS - ( 15 Mar 2022 12:11 )  Alb: 4.4 g/dL / Pro: 7.3 g/dL / ALK PHOS: 150 U/L / ALT: 34 U/L / AST: 49 U/L / GGT: x             CARDIAC MARKERS ( 15 Mar 2022 12:11 )  x     / 0.04 ng/mL / x     / x     / x                Serum Pro-Brain Natriuretic Peptide: 3398 pg/mL (03-15-22 @ 12:11)        COVID-19 PCR: NotDetec (15 Mar 2022 12:11)          cefepime   IVPB 2000 milliGRAM(s) IV Intermittent once  vancomycin  IVPB. 1000 milliGRAM(s) IV Intermittent once        RADIOLOGY:  I have personally reviewed all chest and other pertinent radiology films.        REVIEW OF SYSTEMS:   see Hpi.    PHYSICAL EXAM:       · ENMT:   Airway patent,   Nasal mucosa clear.  Mouth with normal mucosa.   No thrush    · EYES:   Clear bilaterally,   pupils equal,   round and reactive to light.    · CARDIAC:   Normal rate,   regular rhythm.    Heart sounds S1, S2.   no thrills or bruits on palpitation  normal  cardiac impulse  No murmurs, no rubs or gallops on auscultation  no edema        CAROTID:   normal systolic impulse  no bruits    · RESPIRATORY:   rales   normal chest expansion  tachypneic,  no retractions or use of accessory muscles  palpation of chest is normal with no fremitus  percussion of chest demonstrates no hyperresonance or dullness    · GASTROINTESTINAL:  Abdomen soft,   non-tender,   no guarding,   + BS  liver spleen not palpable      · MUSCULOSKELETAL:   range of motion is not limited,  no clubbing, cyanosis  no petechiae      · SKIN:   Skin normal color for race,   warm,   dry and intact.       · HEME LYMPH:   no splenomegaly.  No cervical  lymphadenopathy.  no inguinal lymphadenopathy

## 2022-03-15 NOTE — ED PROVIDER NOTE - SECONDARY DIAGNOSIS.
Hypotension Hypothermia due to non-environmental cause Hyperkalemia Confusion Elevated troponin High anion gap metabolic acidosis Pancytopenia Hyperglycemia Hyponatremia Metabolic acidosis

## 2022-03-15 NOTE — ED PROVIDER NOTE - CRITICAL CARE ATTENDING CONTRIBUTION TO CARE
56y male PMH HTN, DM with peripheral neuropathy BIBEMS to r/o stroke, noted to be speaking abnormally at ophtho's office, pt himself c/o "neck pain when I concentrate" (leaning over), per daughter has been weak and falling asleep throughout the day, has had some vomiting, no fever, on exam vital signs appreciated, appears weak with poor speech fluency though AAO x 3, dim sensation light touch to legs, subtle dysmetria on FNF on left, gait testing deferred, rest of neuro nonfocal, conj pale dry mm neck uspple cor rrr lungs cta abd +bs, snt/nd, no c/c/e calves nontender, labs and studies reviewed and d/w renal (possible emergent HD) and intensivist, will admit ICU

## 2022-03-15 NOTE — ED PROVIDER NOTE - PROGRESS NOTE DETAILS
I intend to use pocus for eval of hypotension. pt BP improving with IVF, labs appreciated, way with no u/o, d/w renal who recommends continued hydration and bicarb drip, HD only if persistently anuric; stable for CT

## 2022-03-15 NOTE — H&P ADULT - ASSESSMENT
57 yo M with PMH DM on Metformin and Januvia, HTN, HLD, Glaucoma, b/l LE peripheral neuropathy who presents in acute renal failure.    #acute on chronic renal failure  #toxic metabolic encephalopathy    - f/u CT head  - Emergency HD today, per renal Dr Lee      #DM  - hold Metformin  - insulin sliding scale    #HTN  - Lisinopril 20 mg qd    #HLD  - Atorvastatin 40 mg qd    #EtOH abuse  - thiamine  - folic acid    Code status: Full Code  Dispo: CCU   55 yo M with PMH DM on Metformin and Januvia, HTN, HLD, Glaucoma, b/l LE peripheral neuropathy who presents in acute renal failure.    #acute on chronic renal failure  #toxic metabolic encephalopathy  - IVF with HCO3  - f/u CT head  - Emergency HD today, per renal Dr Lee  - serial missy  - seamus  - renal US  - Neuro eval      #DM  - hold Metformin  - insulin sliding scale  - FS q1    #HTN  - Lisinopril 20 mg qd    #HLD  - Atorvastatin 40 mg qd    #EtOH abuse  - thiamine  - folic acid  - f/u Vit B12, folate    Code status: Full Code  DVT ppx: heparin  Dispo: CCU   55 yo M with PMH DM on Metformin and Januvia, HTN, HLD, Glaucoma, b/l LE peripheral neuropathy who presents in acute renal failure.    #acute on chronic renal failure  #toxic metabolic encephalopathy  - IVF with HCO3  - f/u CT head  - Emergency HD today, per renal Dr Lee  - serial lytes  - f/u ECHO  - way  - renal US  - Neuro eval      #DM  - hold Metformin  - insulin sliding scale  - FS q1    #HTN  - Lisinopril 20 mg qd    #HLD  - Atorvastatin 40 mg qd    #EtOH abuse  - thiamine  - folic acid  - f/u Vit B12, folate    Code status: Full Code  DVT ppx: heparin  Dispo: CCU   55 yo M with PMH DM on Metformin and Januvia, HTN, HLD, Glaucoma, b/l LE peripheral neuropathy who presents in acute renal failure.    #acute on chronic renal failure  #toxic metabolic encephalopathy  - DC  IVF with HCO3 once HD initiated  - Emergency HD today, per renal Dr Lee  - serial lytes  - f/u ECHO  - way      #DM  - hold Metformin  - insulin sliding scale  - FS q1    #HTN  - hold Lisinopril 20 mg qd    #HLD  - Atorvastatin 40 mg qd    #EtOH abuse  - thiamine  - folic acid  - f/u Vit B12, folate    Code status: Full Code  DVT ppx: heparin  Dispo: CCU

## 2022-03-15 NOTE — ED PROCEDURE NOTE - NS ED ATTENDING STATEMENT MOD
This was a shared visit with the LILI. I reviewed and verified the documentation and independently performed the documented:

## 2022-03-15 NOTE — H&P ADULT - NSHPREVIEWOFSYSTEMS_GEN_ALL_CORE
Review of Systems:  CONSTITUTIONAL: No fever, No diaphoresis, + generalized weakness  SKIN: No rash  HEMATOLOGIC: No abnormal bleeding or bruising  EYES: No eye pain, No blurred vision  ENT: No change in hearing, No sore throat, No neck pain, No rhinorrhea  RESPIRATORY: No shortness of breath, No cough  CARDIAC: No chest pain, No palpitations  GI: No abdominal pain, + nausea, No vomiting, No diarrhea, No constipation, No bright red blood per rectum or melena. No flank pain  : No dysuria, frequency, hematuria  MUSCULOSKELETAL: No joint paint, No swelling, No back pain  NEUROLOGIC: + numbness, No focal weakness, + headache, + dizziness  All other systems negative, unless specified in HPI

## 2022-03-16 LAB
A1C WITH ESTIMATED AVERAGE GLUCOSE RESULT: 6.1 % — HIGH (ref 4–5.6)
ALBUMIN SERPL ELPH-MCNC: 3.8 G/DL — SIGNIFICANT CHANGE UP (ref 3.5–5.2)
ALBUMIN SERPL ELPH-MCNC: 3.9 G/DL — SIGNIFICANT CHANGE UP (ref 3.5–5.2)
ALP SERPL-CCNC: 106 U/L — SIGNIFICANT CHANGE UP (ref 30–115)
ALP SERPL-CCNC: 118 U/L — HIGH (ref 30–115)
ALT FLD-CCNC: 32 U/L — SIGNIFICANT CHANGE UP (ref 0–41)
ALT FLD-CCNC: 37 U/L — SIGNIFICANT CHANGE UP (ref 0–41)
ANION GAP SERPL CALC-SCNC: 16 MMOL/L — HIGH (ref 7–14)
ANION GAP SERPL CALC-SCNC: 17 MMOL/L — HIGH (ref 7–14)
ANION GAP SERPL CALC-SCNC: 21 MMOL/L — HIGH (ref 7–14)
APTT BLD: 34.5 SEC — SIGNIFICANT CHANGE UP (ref 27–39.2)
AST SERPL-CCNC: 43 U/L — HIGH (ref 0–41)
AST SERPL-CCNC: 52 U/L — HIGH (ref 0–41)
BASE EXCESS BLDA CALC-SCNC: -1.9 MMOL/L — SIGNIFICANT CHANGE UP (ref -2–3)
BASOPHILS # BLD AUTO: 0.01 K/UL — SIGNIFICANT CHANGE UP (ref 0–0.2)
BASOPHILS # BLD AUTO: 0.01 K/UL — SIGNIFICANT CHANGE UP (ref 0–0.2)
BASOPHILS NFR BLD AUTO: 0.3 % — SIGNIFICANT CHANGE UP (ref 0–1)
BASOPHILS NFR BLD AUTO: 0.3 % — SIGNIFICANT CHANGE UP (ref 0–1)
BILIRUB DIRECT SERPL-MCNC: 0.3 MG/DL — SIGNIFICANT CHANGE UP (ref 0–0.3)
BILIRUB INDIRECT FLD-MCNC: 0.6 MG/DL — SIGNIFICANT CHANGE UP (ref 0.2–1.2)
BILIRUB SERPL-MCNC: 0.9 MG/DL — SIGNIFICANT CHANGE UP (ref 0.2–1.2)
BILIRUB SERPL-MCNC: 0.9 MG/DL — SIGNIFICANT CHANGE UP (ref 0.2–1.2)
BUN SERPL-MCNC: 54 MG/DL — HIGH (ref 10–20)
BUN SERPL-MCNC: 98 MG/DL — CRITICAL HIGH (ref 10–20)
BUN SERPL-MCNC: 98 MG/DL — CRITICAL HIGH (ref 10–20)
CALCIUM SERPL-MCNC: 8.3 MG/DL — LOW (ref 8.4–10.5)
CALCIUM SERPL-MCNC: 8.3 MG/DL — LOW (ref 8.5–10.1)
CALCIUM SERPL-MCNC: 8.4 MG/DL — LOW (ref 8.5–10.1)
CALCIUM SERPL-MCNC: 8.5 MG/DL — SIGNIFICANT CHANGE UP (ref 8.5–10.1)
CHLORIDE SERPL-SCNC: 102 MMOL/L — SIGNIFICANT CHANGE UP (ref 98–110)
CHLORIDE SERPL-SCNC: 102 MMOL/L — SIGNIFICANT CHANGE UP (ref 98–110)
CHLORIDE SERPL-SCNC: 99 MMOL/L — SIGNIFICANT CHANGE UP (ref 98–110)
CK MB CFR SERPL CALC: 7.7 NG/ML — HIGH (ref 0.6–6.3)
CO2 SERPL-SCNC: 19 MMOL/L — SIGNIFICANT CHANGE UP (ref 17–32)
CO2 SERPL-SCNC: 23 MMOL/L — SIGNIFICANT CHANGE UP (ref 17–32)
CO2 SERPL-SCNC: 24 MMOL/L — SIGNIFICANT CHANGE UP (ref 17–32)
CREAT SERPL-MCNC: 10 MG/DL — CRITICAL HIGH (ref 0.7–1.5)
CREAT SERPL-MCNC: 5.7 MG/DL — CRITICAL HIGH (ref 0.7–1.5)
CREAT SERPL-MCNC: 9.9 MG/DL — CRITICAL HIGH (ref 0.7–1.5)
D DIMER BLD IA.RAPID-MCNC: 606 NG/ML DDU — HIGH (ref 0–230)
EGFR: 11 ML/MIN/1.73M2 — LOW
EGFR: 6 ML/MIN/1.73M2 — LOW
EGFR: 6 ML/MIN/1.73M2 — LOW
EOSINOPHIL # BLD AUTO: 0.07 K/UL — SIGNIFICANT CHANGE UP (ref 0–0.7)
EOSINOPHIL # BLD AUTO: 0.1 K/UL — SIGNIFICANT CHANGE UP (ref 0–0.7)
EOSINOPHIL NFR BLD AUTO: 1.8 % — SIGNIFICANT CHANGE UP (ref 0–8)
EOSINOPHIL NFR BLD AUTO: 2.6 % — SIGNIFICANT CHANGE UP (ref 0–8)
ESTIMATED AVERAGE GLUCOSE: 128 MG/DL — HIGH (ref 68–114)
FIBRINOGEN PPP-MCNC: 488 MG/DL — SIGNIFICANT CHANGE UP (ref 204.4–570.6)
FOLATE SERPL-MCNC: 3.1 NG/ML — LOW
GLUCOSE BLDC GLUCOMTR-MCNC: 125 MG/DL — HIGH (ref 70–99)
GLUCOSE BLDC GLUCOMTR-MCNC: 134 MG/DL — HIGH (ref 70–99)
GLUCOSE BLDC GLUCOMTR-MCNC: 151 MG/DL — HIGH (ref 70–99)
GLUCOSE BLDC GLUCOMTR-MCNC: 155 MG/DL — HIGH (ref 70–99)
GLUCOSE SERPL-MCNC: 130 MG/DL — HIGH (ref 70–99)
GLUCOSE SERPL-MCNC: 134 MG/DL — HIGH (ref 70–99)
GLUCOSE SERPL-MCNC: 160 MG/DL — HIGH (ref 70–99)
HCO3 BLDA-SCNC: 22 MMOL/L — SIGNIFICANT CHANGE UP (ref 21–28)
HCT VFR BLD CALC: 25.8 % — LOW (ref 42–52)
HCT VFR BLD CALC: 26.1 % — LOW (ref 42–52)
HGB BLD-MCNC: 8.9 G/DL — LOW (ref 14–18)
HGB BLD-MCNC: 9.1 G/DL — LOW (ref 14–18)
HOROWITZ INDEX BLDA+IHG-RTO: 21 — SIGNIFICANT CHANGE UP
IMM GRANULOCYTES NFR BLD AUTO: 0.8 % — HIGH (ref 0.1–0.3)
IMM GRANULOCYTES NFR BLD AUTO: 0.8 % — HIGH (ref 0.1–0.3)
INR BLD: 1.05 RATIO — SIGNIFICANT CHANGE UP (ref 0.65–1.3)
LYMPHOCYTES # BLD AUTO: 0.37 K/UL — LOW (ref 1.2–3.4)
LYMPHOCYTES # BLD AUTO: 0.47 K/UL — LOW (ref 1.2–3.4)
LYMPHOCYTES # BLD AUTO: 12.3 % — LOW (ref 20.5–51.1)
LYMPHOCYTES # BLD AUTO: 9.5 % — LOW (ref 20.5–51.1)
MAGNESIUM SERPL-MCNC: 1.6 MG/DL — LOW (ref 1.8–2.4)
MCHC RBC-ENTMCNC: 31 PG — SIGNIFICANT CHANGE UP (ref 27–31)
MCHC RBC-ENTMCNC: 31.2 PG — HIGH (ref 27–31)
MCHC RBC-ENTMCNC: 34.5 G/DL — SIGNIFICANT CHANGE UP (ref 32–37)
MCHC RBC-ENTMCNC: 34.9 G/DL — SIGNIFICANT CHANGE UP (ref 32–37)
MCV RBC AUTO: 89.4 FL — SIGNIFICANT CHANGE UP (ref 80–94)
MCV RBC AUTO: 89.9 FL — SIGNIFICANT CHANGE UP (ref 80–94)
MONOCYTES # BLD AUTO: 0.47 K/UL — SIGNIFICANT CHANGE UP (ref 0.1–0.6)
MONOCYTES # BLD AUTO: 0.51 K/UL — SIGNIFICANT CHANGE UP (ref 0.1–0.6)
MONOCYTES NFR BLD AUTO: 12 % — HIGH (ref 1.7–9.3)
MONOCYTES NFR BLD AUTO: 13.4 % — HIGH (ref 1.7–9.3)
NEUTROPHILS # BLD AUTO: 2.7 K/UL — SIGNIFICANT CHANGE UP (ref 1.4–6.5)
NEUTROPHILS # BLD AUTO: 2.96 K/UL — SIGNIFICANT CHANGE UP (ref 1.4–6.5)
NEUTROPHILS NFR BLD AUTO: 70.6 % — SIGNIFICANT CHANGE UP (ref 42.2–75.2)
NEUTROPHILS NFR BLD AUTO: 75.6 % — HIGH (ref 42.2–75.2)
NRBC # BLD: 0 /100 WBCS — SIGNIFICANT CHANGE UP (ref 0–0)
NRBC # BLD: 0 /100 WBCS — SIGNIFICANT CHANGE UP (ref 0–0)
PCO2 BLDA: 35 MMHG — SIGNIFICANT CHANGE UP (ref 35–48)
PH BLDA: 7.41 — SIGNIFICANT CHANGE UP (ref 7.35–7.45)
PHOSPHATE SERPL-MCNC: 4.1 MG/DL — SIGNIFICANT CHANGE UP (ref 2.1–4.9)
PHOSPHATE SERPL-MCNC: 7.2 MG/DL — HIGH (ref 2.1–4.9)
PLATELET # BLD AUTO: 106 K/UL — LOW (ref 130–400)
PLATELET # BLD AUTO: 113 K/UL — LOW (ref 130–400)
PO2 BLDA: 70 MMHG — LOW (ref 83–108)
POTASSIUM SERPL-MCNC: 3.7 MMOL/L — SIGNIFICANT CHANGE UP (ref 3.5–5)
POTASSIUM SERPL-MCNC: 4.4 MMOL/L — SIGNIFICANT CHANGE UP (ref 3.5–5)
POTASSIUM SERPL-MCNC: 4.8 MMOL/L — SIGNIFICANT CHANGE UP (ref 3.5–5)
POTASSIUM SERPL-SCNC: 3.7 MMOL/L — SIGNIFICANT CHANGE UP (ref 3.5–5)
POTASSIUM SERPL-SCNC: 4.4 MMOL/L — SIGNIFICANT CHANGE UP (ref 3.5–5)
POTASSIUM SERPL-SCNC: 4.8 MMOL/L — SIGNIFICANT CHANGE UP (ref 3.5–5)
PROCALCITONIN SERPL-MCNC: 1.09 NG/ML — HIGH (ref 0.02–0.1)
PROT SERPL-MCNC: 6.2 G/DL — SIGNIFICANT CHANGE UP (ref 6–8)
PROT SERPL-MCNC: 6.4 G/DL — SIGNIFICANT CHANGE UP (ref 6–8.3)
PROT SERPL-MCNC: 6.4 G/DL — SIGNIFICANT CHANGE UP (ref 6–8.3)
PROT SERPL-MCNC: 6.5 G/DL — SIGNIFICANT CHANGE UP (ref 6–8)
PROTHROM AB SERPL-ACNC: 12.1 SEC — SIGNIFICANT CHANGE UP (ref 9.95–12.87)
PTH-INTACT FLD-MCNC: 311 PG/ML — HIGH (ref 15–65)
RBC # BLD: 2.87 M/UL — LOW (ref 4.7–6.1)
RBC # BLD: 2.92 M/UL — LOW (ref 4.7–6.1)
RBC # FLD: 13 % — SIGNIFICANT CHANGE UP (ref 11.5–14.5)
RBC # FLD: 14 % — SIGNIFICANT CHANGE UP (ref 11.5–14.5)
SAO2 % BLDA: 95.6 % — SIGNIFICANT CHANGE UP (ref 94–98)
SODIUM SERPL-SCNC: 140 MMOL/L — SIGNIFICANT CHANGE UP (ref 135–146)
SODIUM SERPL-SCNC: 141 MMOL/L — SIGNIFICANT CHANGE UP (ref 135–146)
SODIUM SERPL-SCNC: 142 MMOL/L — SIGNIFICANT CHANGE UP (ref 135–146)
TROPONIN T SERPL-MCNC: 0.08 NG/ML — CRITICAL HIGH
VIT B12 SERPL-MCNC: 497 PG/ML — SIGNIFICANT CHANGE UP (ref 232–1245)
WBC # BLD: 3.82 K/UL — LOW (ref 4.8–10.8)
WBC # BLD: 3.91 K/UL — LOW (ref 4.8–10.8)
WBC # FLD AUTO: 3.82 K/UL — LOW (ref 4.8–10.8)
WBC # FLD AUTO: 3.91 K/UL — LOW (ref 4.8–10.8)

## 2022-03-16 PROCEDURE — 99233 SBSQ HOSP IP/OBS HIGH 50: CPT

## 2022-03-16 PROCEDURE — 93306 TTE W/DOPPLER COMPLETE: CPT | Mod: 26

## 2022-03-16 PROCEDURE — 99291 CRITICAL CARE FIRST HOUR: CPT

## 2022-03-16 PROCEDURE — 71045 X-RAY EXAM CHEST 1 VIEW: CPT | Mod: 26

## 2022-03-16 PROCEDURE — 93010 ELECTROCARDIOGRAM REPORT: CPT

## 2022-03-16 RX ORDER — SODIUM CHLORIDE 0.65 %
1 AEROSOL, SPRAY (ML) NASAL THREE TIMES A DAY
Refills: 0 | Status: DISCONTINUED | OUTPATIENT
Start: 2022-03-16 | End: 2022-03-25

## 2022-03-16 RX ORDER — MAGNESIUM SULFATE 500 MG/ML
2 VIAL (ML) INJECTION ONCE
Refills: 0 | Status: COMPLETED | OUTPATIENT
Start: 2022-03-16 | End: 2022-03-16

## 2022-03-16 RX ORDER — DEXMEDETOMIDINE HYDROCHLORIDE IN 0.9% SODIUM CHLORIDE 4 UG/ML
0 INJECTION INTRAVENOUS
Qty: 400 | Refills: 0 | Status: DISCONTINUED | OUTPATIENT
Start: 2022-03-16 | End: 2022-03-17

## 2022-03-16 RX ORDER — SODIUM CHLORIDE 9 MG/ML
1000 INJECTION, SOLUTION INTRAVENOUS
Refills: 0 | Status: DISCONTINUED | OUTPATIENT
Start: 2022-03-16 | End: 2022-03-17

## 2022-03-16 RX ORDER — SODIUM BICARBONATE 1 MEQ/ML
0.08 SYRINGE (ML) INTRAVENOUS
Qty: 75 | Refills: 0 | Status: DISCONTINUED | OUTPATIENT
Start: 2022-03-16 | End: 2022-03-16

## 2022-03-16 RX ORDER — SODIUM BICARBONATE 1 MEQ/ML
650 SYRINGE (ML) INTRAVENOUS THREE TIMES A DAY
Refills: 0 | Status: DISCONTINUED | OUTPATIENT
Start: 2022-03-16 | End: 2022-03-17

## 2022-03-16 RX ORDER — ACETAMINOPHEN 500 MG
650 TABLET ORAL ONCE
Refills: 0 | Status: COMPLETED | OUTPATIENT
Start: 2022-03-16 | End: 2022-03-16

## 2022-03-16 RX ADMIN — Medication 25 GRAM(S): at 22:08

## 2022-03-16 RX ADMIN — Medication 650 MILLIGRAM(S): at 22:46

## 2022-03-16 RX ADMIN — ATORVASTATIN CALCIUM 40 MILLIGRAM(S): 80 TABLET, FILM COATED ORAL at 22:08

## 2022-03-16 RX ADMIN — SODIUM CHLORIDE 75 MILLILITER(S): 9 INJECTION, SOLUTION INTRAVENOUS at 10:25

## 2022-03-16 RX ADMIN — Medication 100 MILLIGRAM(S): at 11:03

## 2022-03-16 RX ADMIN — Medication 650 MILLIGRAM(S): at 22:08

## 2022-03-16 RX ADMIN — CHLORHEXIDINE GLUCONATE 1 APPLICATION(S): 213 SOLUTION TOPICAL at 05:35

## 2022-03-16 RX ADMIN — Medication 650 MILLIGRAM(S): at 11:04

## 2022-03-16 RX ADMIN — Medication 75 MEQ/KG/HR: at 06:44

## 2022-03-16 RX ADMIN — Medication 1 MILLIGRAM(S): at 11:04

## 2022-03-16 RX ADMIN — SODIUM CHLORIDE 75 MILLILITER(S): 9 INJECTION, SOLUTION INTRAVENOUS at 22:46

## 2022-03-16 NOTE — PROGRESS NOTE ADULT - ASSESSMENT
# VAMSI on CKD 3 / last known creat 1.65(45) K 5.3 Ca 10.3 on 10/23/21  creat 1.5 on 7/10/21/2021   - DM for 20 years, no known retiopathy  # Severe hyperkalemia / d/t renal failure, ACE inhibitor  # Hyponatremia / d/t renal failure / DKA  # Acidemia / HAGMA / renal failure/DKA  # Normo/macrocytic anemia     Recommendations:  - cont iv hydration with bicarb drip at 150cc/hr   CTAP reviewed, no hydronephrosis, bladder not distended, b/l intra-renal calculi  - d/w pt and daughters at bedside, gave consent for RRT / consent in chart  - etiology of VAMSI unclear - needs full w/u for vasculitis myeloma  LYNN DNA, c3c4 ANCA Anti GBM, SPEP UPEP SIF UIR  may need a kidney bx  - HD today #2, 2 hrs, opti 160, , 2K, 0 UF  - please document strict i/o  - UA noted with proteinuria/hematuria, will need repeat UA and urine prot/creat ratio- can wait to obtain sample tomorrow if urine output hopefully increasing  - off  metformin, lisinopril (I d/c'd lisinopril from patient's orders)  - sepsis w/u, pan cultures  - insulin per DKA protocol  - per family, pt has high etoh intake / cont thiamine, folate, CIWA protocol  - f/u renal/bladder ultrasound  - check iron stores / r/o GI bleed  - check phos level, iPTH

## 2022-03-16 NOTE — PROGRESS NOTE ADULT - SUBJECTIVE AND OBJECTIVE BOX
Patient is a 56y old  Male who presents with a chief complaint of Acute Renal Failure (15 Mar 2022 16:56)        Over Night Events: s/p HD overnight; remains stable. More anxious and tremulous this am        ROS:     All ROS are negative except HPI         PHYSICAL EXAM    ICU Vital Signs Last 24 Hrs  T(C): 36.6 (16 Mar 2022 07:00), Max: 37.4 (16 Mar 2022 03:00)  T(F): 97.9 (16 Mar 2022 07:00), Max: 99.3 (16 Mar 2022 03:00)  HR: 85 (16 Mar 2022 07:22) (64 - 97)  BP: 109/60 (16 Mar 2022 07:22) (77/48 - 149/90)  BP(mean): 78 (16 Mar 2022 07:22) (77 - 114)  ABP: --  ABP(mean): --  RR: 17 (16 Mar 2022 07:22) (11 - 28)  SpO2: 96% (16 Mar 2022 07:22) (95% - 100%)      CONSTITUTIONAL:  Mild distress.     ENT:   Airway patent,   Mouth with normal mucosa.   No thrush    EYES:   Pupils equal,   Round and reactive to light.    CARDIAC:   Normal rate,   Regular rhythm.    No edema      Vascular:  Normal systolic impulse  No Carotid bruits    RESPIRATORY:   No wheezing  Bilateral BS  Normal chest expansion  Not tachypneic,  No use of accessory muscles    GASTROINTESTINAL:  Abdomen soft,   Non-tender,   No guarding,   + BS    MUSCULOSKELETAL:   Range of motion is not limited,  No clubbing, cyanosis    NEUROLOGICAL:   Alert and awake  b/l UE Fine tremores    SKIN:   Skin normal color for race,   Warm and dry     03-15-22 @ 07:01  -  03-16-22 @ 07:00  --------------------------------------------------------  IN:    PRBCs (Packed Red Blood Cells): 640 mL    Sodium Bicarbonate: 75 mL    Sodium Bicarbonate: 1275 mL  Total IN: 1990 mL    OUT:    Indwelling Catheter - Urethral (mL): 570 mL  Total OUT: 570 mL    Total NET: 1420 mL      03-16-22 @ 07:01  -  03-16-22 @ 08:55  --------------------------------------------------------  IN:  Total IN: 0 mL    OUT:    Indwelling Catheter - Urethral (mL): 75 mL  Total OUT: 75 mL    Total NET: -75 mL          LABS:                            8.9    3.91  )-----------( 113      ( 16 Mar 2022 06:09 )             25.8                                               03-16    142  |  102  |  98<HH>  ----------------------------<  134<H>  4.4   |  19  |  10.0<HH>    16 Mar 2022 06:09    142    |  102    |  98<HH>  ----------------------------<  134<H>  4.4     |  19     |  10.0<HH>  15 Mar 2022 19:34    138    |  102    |  97<HH>  ----------------------------<  203<H>  4.2     |  17     |  9.5<HH>    Ca    8.4<L>      16 Mar 2022 06:09  Ca    8.4<L>      15 Mar 2022 19:34  Mg     2.4       15 Mar 2022 12:11    TPro  6.5    /  Alb  3.9    /  TBili  0.9    /  DBili  x      /  AST  52<H>  /  ALT  37     /  AlkPhos  118<H>  16 Mar 2022 06:09  TPro  6.4    /  Alb  3.6    /  TBili  0.5    /  DBili  x      /  AST  68<H>  /  ALT  40     /  AlkPhos  124<H>  15 Mar 2022 19:34    Ca    8.4<L>      16 Mar 2022 06:09  Mg     2.4     03-15    TPro  6.5  /  Alb  3.9  /  TBili  0.9  /  DBili  x   /  AST  52<H>  /  ALT  37  /  AlkPhos  118<H>  03-16                                             Urinalysis Basic - ( 15 Mar 2022 15:41 )    Color: Yellow / Appearance: Clear / SG: >=1.030 / pH: x  Gluc: x / Ketone: Trace  / Bili: Negative / Urobili: 0.2 mg/dL   Blood: x / Protein: >=300 mg/dL / Nitrite: Negative   Leuk Esterase: Negative / RBC: 11-25 /HPF / WBC x   Sq Epi: x / Non Sq Epi: Few /HPF / Bacteria: x        CARDIAC MARKERS ( 15 Mar 2022 12:11 )  x     / 0.04 ng/mL / x     / x     / x                                                LIVER FUNCTIONS - ( 16 Mar 2022 06:09 )  Alb: 3.9 g/dL / Pro: 6.5 g/dL / ALK PHOS: 118 U/L / ALT: 37 U/L / AST: 52 U/L / GGT: x                                                                                                                                       MEDICATIONS  (STANDING):  atorvastatin 40 milliGRAM(s) Oral at bedtime  chlorhexidine 4% Liquid 1 Application(s) Topical <User Schedule>  dextrose 40% Gel 15 Gram(s) Oral once  dextrose 5%. 1000 milliLiter(s) (50 mL/Hr) IV Continuous <Continuous>  dextrose 5%. 1000 milliLiter(s) (100 mL/Hr) IV Continuous <Continuous>  dextrose 50% Injectable 25 Gram(s) IV Push once  dextrose 50% Injectable 12.5 Gram(s) IV Push once  dextrose 50% Injectable 25 Gram(s) IV Push once  folic acid 1 milliGRAM(s) Oral daily  glucagon  Injectable 1 milliGRAM(s) IntraMuscular once  insulin lispro (ADMELOG) corrective regimen sliding scale   SubCutaneous three times a day before meals  insulin lispro (ADMELOG) corrective regimen sliding scale   SubCutaneous at bedtime  sodium bicarbonate  Infusion 0.076 mEq/kG/Hr (75 mL/Hr) IV Continuous <Continuous>  thiamine 100 milliGRAM(s) Oral daily    MEDICATIONS  (PRN):      New X-rays reviewed:    +chris santos                                                                                  < from: CT Head No Cont (03.15.22 @ 14:13) >    Mild atrophic changes.    < end of copied text >    < from: CT Abdomen and Pelvis No Cont (03.15.22 @ 14:13) >    No acute intra-abdominal pathology.    Bilateral nephrolithiasis, as above.    < end of copied text >

## 2022-03-16 NOTE — PROGRESS NOTE ADULT - ASSESSMENT
IMPRESSION:  AMS 2/2 toxic metabolic encephalopathy  acute on chronic renal failure s/p HD  Hypothermia- resolved  possible sepsis  H/O ETOH addiction  Possible AKA vs starvation ketosis    SUGGEST:      CNS: CIWA protocol. MV, Thiamine and folic acid. May use Precedex.  CTH reviewed. check UDS and SDS. Addiction medicine consult    HEENT: Oral care    PULMONARY:  HOB @ 45 degrees. Aspiration Precautions . Incentive spirometry    CARDIOVASCULAR: D5 + 0.45 NS @ 75. ECHO. Trend CE. CK level    GI: GI prophylaxis.  Feeding as tolerated    RENAL:  Follow up lytes q 12.  Correct as needed. f/u HD with nephrology.     INFECTIOUS DISEASE: Follow up cultures. Monitor off ABX. F/U Procalcitonin    HEMATOLOGICAL:  DVT prophylaxis- SCDs. Monitor cbc and coags. Send DIC panel. D-dimer    ENDOCRINE:  Follow up FS.  Insulin protocol if needed. send hba1c    MUSCULOSKELETAL: PT/REHAB , OOB to chair    Way: Yes  Lines: KARI Gautam  Code status: Full code  Disposition: CCU            f/u CT head  neuro eval  ED spoke to renal will start RRT tonight  IV fluids with HCO3 untill RRT starts  Udoll placement  way to OSD  renal US  given Ca ++, glucose/ insulin in ED  FS Q1h  NPO  serial lytes  CXR in AM  full cultures  empiric abx  admit to ICU  check B12 folate levels  give folate /Thiamine   IMPRESSION:  AMS 2/2 toxic metabolic encephalopathy  acute on chronic renal failure s/p HD  Hypothermia- resolved  possible sepsis  H/O ETOH addiction  Possible AKA vs starvation ketosis    SUGGEST:      CNS: CIWA protocol. MV, Thiamine and folic acid. May use Precedex.  CTH reviewed. check UDS and SDS. Addiction medicine consult    HEENT: Oral care    PULMONARY:  HOB @ 45 degrees. Aspiration Precautions . Incentive spirometry    CARDIOVASCULAR: D5 + 0.45 NS @ 75. ECHO. Trend CE. CK level    GI: GI prophylaxis.  Feeding as tolerated    RENAL:  Follow up lytes q 12.  Correct as needed. f/u HD with nephrology.     INFECTIOUS DISEASE: Follow up cultures. Monitor off ABX. F/U Procalcitonin    HEMATOLOGICAL:  DVT prophylaxis- SCDs. Monitor cbc and coags. Send DIC panel. D-dimer    ENDOCRINE:  Follow up FS.  Insulin protocol if needed. send hba1c    MUSCULOSKELETAL: PT/REHAB , OOB to chair    Francis: Yes  Lines: KARI Gautam  Code status: Full code  Disposition: CCU             IMPRESSION:  AMS 2/2 toxic metabolic encephalopathy  acute on chronic renal failure s/p HD  Hypothermia- resolved  possible sepsis  H/O ETOH addiction  Possible AKA vs starvation ketosis    SUGGEST:      CNS: CIWA protocol. MV, Thiamine and folic acid. May use Precedex.  CTH reviewed. check UDS and SDS. Addiction medicine consult    HEENT: Oral care    PULMONARY:  HOB @ 45 degrees. Aspiration Precautions . Incentive spirometry    CARDIOVASCULAR: D5 + 0.45 NS @ 75. ECHO. Trend CE. CK level    GI: GI prophylaxis.  Feeding as tolerated    RENAL:  Follow up lytes q 12.  Correct as needed. f/u repeat HD with nephrology. ABG reviewed, d/c bicarb drip    INFECTIOUS DISEASE: Follow up cultures. Monitor off ABX. F/U Procalcitonin    HEMATOLOGICAL:  DVT prophylaxis- SCDs. Monitor cbc and coags. Send DIC panel. D-dimer    ENDOCRINE:  Follow up FS.  Insulin protocol if needed. send hba1c    MUSCULOSKELETAL: PT/REHAB , OOB to chair    Francis: Yes  Lines: KARI Gautam  Code status: Full code  Disposition: CCU

## 2022-03-16 NOTE — PROGRESS NOTE ADULT - SUBJECTIVE AND OBJECTIVE BOX
Nephrology progress note    Patient is seen and examined, events over the last 24 h noted .  S/p HD x1 yesterday tolerated well  , on bicarb drip    Allergies:  No Known Allergies    Hospital Medications:   MEDICATIONS  (STANDING):  atorvastatin 40 milliGRAM(s) Oral at bedtime  chlorhexidine 4% Liquid 1 Application(s) Topical <User Schedule>  dexMEDEtomidine Infusion 0.002 MICROgram(s)/kG/Hr (0.03 mL/Hr) IV Continuous <Continuous>  dextrose 40% Gel 15 Gram(s) Oral once  dextrose 5% + sodium chloride 0.45%. 1000 milliLiter(s) (50 mL/Hr) IV Continuous <Continuous>  dextrose 5%. 1000 milliLiter(s) (100 mL/Hr) IV Continuous <Continuous>  dextrose 5%. 1000 milliLiter(s) (50 mL/Hr) IV Continuous <Continuous>  dextrose 50% Injectable 25 Gram(s) IV Push once  dextrose 50% Injectable 12.5 Gram(s) IV Push once  dextrose 50% Injectable 25 Gram(s) IV Push once  folic acid 1 milliGRAM(s) Oral daily  glucagon  Injectable 1 milliGRAM(s) IntraMuscular once  insulin lispro (ADMELOG) corrective regimen sliding scale   SubCutaneous three times a day before meals  insulin lispro (ADMELOG) corrective regimen sliding scale   SubCutaneous at bedtime  sodium bicarbonate  Infusion 0.076 mEq/kG/Hr (75 mL/Hr) IV Continuous <Continuous>  thiamine 100 milliGRAM(s) Oral daily        VITALS:  T(F): 97.9 (03-16-22 @ 07:00), Max: 99.3 (03-16-22 @ 03:00)  HR: 84 (03-16-22 @ 09:00)  BP: 112/64 (03-16-22 @ 09:00)  RR: 18 (03-16-22 @ 09:00)  SpO2: 96% (03-16-22 @ 07:22)  Wt(kg): --    03-15 @ 07:01  -  03-16 @ 07:00  --------------------------------------------------------  IN: 1990 mL / OUT: 570 mL / NET: 1420 mL    03-16 @ 07:01  -  03-16 @ 09:43  --------------------------------------------------------  IN: 0 mL / OUT: 175 mL / NET: -175 mL      Height (cm): 172.7 (03-15 @ 15:22)  Weight (kg): 73.9 (03-15 @ 15:22)  BMI (kg/m2): 24.8 (03-15 @ 15:22)  BSA (m2): 1.87 (03-15 @ 15:22)    PHYSICAL EXAM:  Constitutional: NAD  HEENT: anicteric sclera,   Neck: No JVD  Respiratory: CTA  Cardiovascular: S1, S2, RRR  Gastrointestinal: BS+, soft, NT/ND  Extremities:No peripheral edema  Neurological: Awake alert severe tremor  : Has way.   Skin: No rashes  Vascular Access: Rt Cana IJ    LABS:  03-16    142  |  102  |  98<HH>  ----------------------------<  134<H>  4.4   |  19  |  10.0<HH>    Ca    8.4<L>      16 Mar 2022 06:09  Mg     2.4     03-15    TPro  6.5  /  Alb  3.9  /  TBili  0.9  /  DBili      /  AST  52<H>  /  ALT  37  /  AlkPhos  118<H>  03-16                          8.9    3.91  )-----------( 113      ( 16 Mar 2022 06:09 )             25.8       Urine Studies:  Urinalysis Basic - ( 15 Mar 2022 15:41 )    Color: Yellow / Appearance: Clear / SG: >=1.030 / pH:   Gluc:  / Ketone: Trace  / Bili: Negative / Urobili: 0.2 mg/dL   Blood:  / Protein: >=300 mg/dL / Nitrite: Negative   Leuk Esterase: Negative / RBC: 11-25 /HPF / WBC    Sq Epi:  / Non Sq Epi: Few /HPF / Bacteria:         RADIOLOGY & ADDITIONAL STUDIES:  < from: CT Abdomen and Pelvis No Cont (03.15.22 @ 14:13) >  KIDNEYS: Bilateral intrarenal calculi measuring up to 0.8 cm right lower   pole. No hydronephrosis bilaterally..    ABDOMINOPELVIC NODES: Unremarkable.    PELVIC ORGANS: Way catheter in urinary bladder..    PERITONEUM/MESENTERY/BOWEL: No bowel obstruction, pneumoperitoneum or   ascites. Normal caliber appendix.    BONES/SOFT TISSUES: Degenerative changes of the spine. No acute osseous   abnormality..    OTHER: Atherosclerosis abdominal aorta and branches.      IMPRESSION:    No acute intra-abdominal pathology.    Bilateral nephrolithiasis, as above.    < end of copied text >  < from: Xray Chest 1 View- PORTABLE-Urgent (Xray Chest 1 View- PORTABLE-Urgent .) (03.15.22 @ 16:12) >  Impression:    Interval placement of right central venous catheter with tip overlying   SVC; no pneumothorax.    < end of copied text >

## 2022-03-16 NOTE — PROGRESS NOTE ADULT - SUBJECTIVE AND OBJECTIVE BOX
Patient is a 56y old  Male who presents with a chief complaint of Acute Renal Failure (16 Mar 2022 09:43)      T(F): 97.9 (03-16-22 @ 07:00), Max: 99.3 (03-16-22 @ 03:00)  HR: 84 (03-16-22 @ 09:00)  BP: 112/64 (03-16-22 @ 09:00)  RR: 18 (03-16-22 @ 09:00)  SpO2: 96% (03-16-22 @ 07:22) (95% - 100%)    PHYSICAL EXAM:  GENERAL: NAD  HEAD:  Atraumatic, Normocephalic  EYES: EOMI, PERRLA, conjunctiva and sclera clear  NERVOUS SYSTEM:  no focal deficits   CHEST/LUNG: Clear to percussion bilaterally; No rales, rhonchi, wheezing, or rubs  HEART: Regular rate and rhythm; No murmurs, rubs, or gallops  ABDOMEN: Soft, Nontender, Nondistended; Bowel sounds present  EXTREMITIES:  2+ Peripheral Pulses, No clubbing, cyanosis, or edema    LABS  03-16    142  |  102  |  98<HH>  ----------------------------<  134<H>  4.4   |  19  |  10.0<HH>    Ca    8.4<L>      16 Mar 2022 06:09  Mg     2.4     03-15    TPro  6.5  /  Alb  3.9  /  TBili  0.9  /  DBili  x   /  AST  52<H>  /  ALT  37  /  AlkPhos  118<H>  03-16                          8.9    3.91  )-----------( 113      ( 16 Mar 2022 06:09 )             25.8       CARDIAC ENZYMES    Troponin T, Serum: 0.04 ng/mL (03-15-22 @ 12:11)    RADIOLOGY  < from: Xray Chest 1 View- PORTABLE-Routine (Xray Chest 1 View- PORTABLE-Routine in AM.) (03.16.22 @ 06:09) >  Impression:    No radiographic evidence of acute cardiopulmonary disease.      < end of copied text >  < from: CT Abdomen and Pelvis No Cont (03.15.22 @ 14:13) >  KIDNEYS: Bilateral intrarenal calculi measuring up to 0.8 cm right lower   pole. No hydronephrosis bilaterally..    < end of copied text >    MEDICATIONS  (STANDING):  atorvastatin 40 milliGRAM(s) Oral at bedtime  chlorhexidine 4% Liquid 1 Application(s) Topical <User Schedule>  dexMEDEtomidine Infusion 0.002 MICROgram(s)/kG/Hr (0.03 mL/Hr) IV Continuous <Continuous>  folic acid 1 milliGRAM(s) Oral daily  insulin lispro (ADMELOG) corrective regimen sliding scale   SubCutaneous three times a day before meals  insulin lispro (ADMELOG) corrective regimen sliding scale   SubCutaneous at bedtime  sodium bicarbonate 650 milliGRAM(s) Oral three times a day  thiamine 100 milliGRAM(s) Oral daily    MEDICATIONS  (PRN):  LORazepam   Injectable 1 milliGRAM(s) IV Push every 4 hours PRN Agitation

## 2022-03-16 NOTE — PROGRESS NOTE ADULT - ASSESSMENT
55 yo M with PMH DM on Metformin and Januvia, HTN, HLD, Glaucoma, b/l LE peripheral neuropathy who presents in acute renal failure.    #acute on chronic renal failure  #toxic metabolic encephalopathy  - DC bicarb drip  - D5 1/2 NS @75  - repeat HD today   - serial lytes  - f/u ECHO  - way  - Trend Trop, CK level  - f/u DIC panel    #DM  - hold Metformin  - insulin sliding scale  - FS q1    #HTN  - hold Lisinopril 20 mg qd    #HLD  - Atorvastatin 40 mg qd    #EtOH abuse  - thiamine  - folic acid  - f/u Vit B12, folate  - CIWA Protocol  - May use precedex  - Addiction medicine consult    Code status: Full Code  DVT ppx: SCD  Dispo: CCU

## 2022-03-16 NOTE — PROGRESS NOTE ADULT - SUBJECTIVE AND OBJECTIVE BOX
24H events:    Patient is a 56y old Male who presents with a chief complaint of Acute Renal Failure (16 Mar 2022 11:27)    Primary diagnosis of Acute renal failure (ARF)    Today is hospital day 1d. NAOE, s/p dialysis, VSS, afebrile.  Slightly more anxious today, ciwa initiated    PAST MEDICAL & SURGICAL HISTORY  Diabetes    Hypertension    HLD (hyperlipidemia)    Neuropathy    Glaucoma    No significant past surgical history      SOCIAL HISTORY:  Negative for smoking/alcohol/drug use.     ALLERGIES:  No Known Allergies    MEDICATIONS:  STANDING MEDICATIONS  atorvastatin 40 milliGRAM(s) Oral at bedtime  chlorhexidine 4% Liquid 1 Application(s) Topical <User Schedule>  dexMEDEtomidine Infusion 0.002 MICROgram(s)/kG/Hr IV Continuous <Continuous>  dextrose 40% Gel 15 Gram(s) Oral once  dextrose 5% + sodium chloride 0.45%. 1000 milliLiter(s) IV Continuous <Continuous>  dextrose 5%. 1000 milliLiter(s) IV Continuous <Continuous>  dextrose 5%. 1000 milliLiter(s) IV Continuous <Continuous>  dextrose 50% Injectable 25 Gram(s) IV Push once  dextrose 50% Injectable 12.5 Gram(s) IV Push once  dextrose 50% Injectable 25 Gram(s) IV Push once  folic acid 1 milliGRAM(s) Oral daily  glucagon  Injectable 1 milliGRAM(s) IntraMuscular once  insulin lispro (ADMELOG) corrective regimen sliding scale   SubCutaneous three times a day before meals  insulin lispro (ADMELOG) corrective regimen sliding scale   SubCutaneous at bedtime  sodium bicarbonate 650 milliGRAM(s) Oral three times a day  thiamine 100 milliGRAM(s) Oral daily    PRN MEDICATIONS  LORazepam   Injectable 2 milliGRAM(s) IV Push every 1 hour PRN  sodium chloride 0.65% Nasal 1 Spray(s) Both Nostrils three times a day PRN    VITALS:   T(F): 97.8  HR: 82  BP: 156/78  RR: 16  SpO2: 95%    LABS:                        9.1    3.82  )-----------( 106      ( 16 Mar 2022 11:28 )             26.1     03-16    141  |  102  |  98<HH>  ----------------------------<  130<H>  4.8   |  23  |  9.9<HH>    Ca    8.5      16 Mar 2022 11:28  Phos  7.2     03-16  Mg     2.4     03-15    TPro  6.5  /  Alb  3.9  /  TBili  0.9  /  DBili  x   /  AST  52<H>  /  ALT  37  /  AlkPhos  118<H>  03-16    PT/INR - ( 16 Mar 2022 11:28 )   PT: 12.10 sec;   INR: 1.05 ratio         PTT - ( 16 Mar 2022 11:28 )  PTT:34.5 sec  Urinalysis Basic - ( 15 Mar 2022 15:41 )    Color: Yellow / Appearance: Clear / SG: >=1.030 / pH: x  Gluc: x / Ketone: Trace  / Bili: Negative / Urobili: 0.2 mg/dL   Blood: x / Protein: >=300 mg/dL / Nitrite: Negative   Leuk Esterase: Negative / RBC: 11-25 /HPF / WBC x   Sq Epi: x / Non Sq Epi: Few /HPF / Bacteria: x      ABG - ( 16 Mar 2022 09:35 )  pH, Arterial: 7.41  pH, Blood: x     /  pCO2: 35    /  pO2: 70    / HCO3: 22    / Base Excess: -1.9  /  SaO2: 95.6              Troponin T, Serum: 0.08 ng/mL *HH* (03-16-22 @ 11:28)  Lactate, Blood: 1.1 mmol/L (03-15-22 @ 19:10)      CARDIAC MARKERS ( 16 Mar 2022 11:28 )  x     / 0.08 ng/mL / x     / x     / 7.7 ng/mL  CARDIAC MARKERS ( 15 Mar 2022 12:11 )  x     / 0.04 ng/mL / x     / x     / x          RADIOLOGY:    PHYSICAL EXAM:  CONSTITUTIONAL: in no acute distress, afebrile  SKIN: Warm, dry  HEAD: Normocephalic; atraumatic  EYES: No conjunctival injection. EOMI. PERRLA  ENT: No nasal discharge; oropharynx nonerythematous; airway clear  NECK: Supple; non tender, No JVD  CARD:  Regular rate and rhythm  RESP: CTAB; No wheezes, crackles, rales or rhonchi  ABD: Soft NTND; No guarding or rebound tenderness  EXT: Normal ROM.  No clubbing or cyanosis.  No edema, peripheral pulses 2+  NEURO: A&O x3, grossly unremarkable, no focal deficits  PSYCH: Cooperative, appropriate

## 2022-03-16 NOTE — PROGRESS NOTE ADULT - ASSESSMENT
55 yo male, pmh of htn, hld, dm, glaucoma, b/l le neuropathy, presents to ed for multiple complaints. Per pt and family, for over a week pt has had increased weakness, a/w neck pain, numbness to b/l upper extremities, slowed speech, and nausea. No specific pain or radiation. Pt was at ophthalmologist today and was sent in for weakness.     acute renal failure / severe metabolic acidosis / hyperkalemia / mild fluid overload / uremic - toxic encephalopathy        -  HD again today   - work up as per  nephrology consult   - ativan prn for agitation - Precedex   - no hydronephrosis seen on CT abdomen   - hold metformin and lisinopril   - hospital sq insulin protocol   - DVT prophylaxis .     57 yo male, pmh of htn, hld, dm, glaucoma, b/l le neuropathy, presents to ed for multiple complaints. Per pt and family, for over a week pt has had increased weakness, a/w neck pain, numbness to b/l upper extremities, slowed speech, and nausea. No specific pain or radiation. Pt was at ophthalmologist today and was sent in for weakness.     acute renal failure / severe metabolic acidosis / hyperkalemia / mild fluid overload / uremic - toxic encephalopathy / anemia        -  HD again today   - work up as per  nephrology consult   - ativan prn for agitation - Precedex   - received 2 units PRBC overnight   - follow H/H q12h    - no hydronephrosis seen on CT abdomen   - hold metformin and lisinopril   - hospital sq insulin protocol   - DVT prophylaxis .

## 2022-03-17 LAB
ALBUMIN SERPL ELPH-MCNC: 3.6 G/DL — SIGNIFICANT CHANGE UP (ref 3.5–5.2)
ALP SERPL-CCNC: 106 U/L — SIGNIFICANT CHANGE UP (ref 30–115)
ALT FLD-CCNC: 29 U/L — SIGNIFICANT CHANGE UP (ref 0–41)
AMPHET UR-MCNC: NEGATIVE — SIGNIFICANT CHANGE UP
ANION GAP SERPL CALC-SCNC: 13 MMOL/L — SIGNIFICANT CHANGE UP (ref 7–14)
APPEARANCE UR: CLEAR — SIGNIFICANT CHANGE UP
AST SERPL-CCNC: 42 U/L — HIGH (ref 0–41)
BACTERIA # UR AUTO: ABNORMAL
BARBITURATES UR SCN-MCNC: NEGATIVE — SIGNIFICANT CHANGE UP
BASOPHILS # BLD AUTO: 0.01 K/UL — SIGNIFICANT CHANGE UP (ref 0–0.2)
BASOPHILS NFR BLD AUTO: 0.3 % — SIGNIFICANT CHANGE UP (ref 0–1)
BENZODIAZ UR-MCNC: NEGATIVE — SIGNIFICANT CHANGE UP
BILIRUB SERPL-MCNC: 0.8 MG/DL — SIGNIFICANT CHANGE UP (ref 0.2–1.2)
BILIRUB UR-MCNC: NEGATIVE — SIGNIFICANT CHANGE UP
BUN SERPL-MCNC: 54 MG/DL — HIGH (ref 10–20)
C3 SERPL-MCNC: 95 MG/DL — SIGNIFICANT CHANGE UP (ref 81–157)
C4 SERPL-MCNC: 30 MG/DL — SIGNIFICANT CHANGE UP (ref 13–39)
CALCIUM SERPL-MCNC: 8.4 MG/DL — LOW (ref 8.5–10.1)
CHLORIDE SERPL-SCNC: 100 MMOL/L — SIGNIFICANT CHANGE UP (ref 98–110)
CO2 SERPL-SCNC: 28 MMOL/L — SIGNIFICANT CHANGE UP (ref 17–32)
COCAINE METAB.OTHER UR-MCNC: NEGATIVE — SIGNIFICANT CHANGE UP
COLOR SPEC: YELLOW — SIGNIFICANT CHANGE UP
CREAT ?TM UR-MCNC: 55 MG/DL — SIGNIFICANT CHANGE UP
CREAT SERPL-MCNC: 6.2 MG/DL — CRITICAL HIGH (ref 0.7–1.5)
CREATININE, URINE RESULT: 72 MG/DL — SIGNIFICANT CHANGE UP
DIFF PNL FLD: ABNORMAL
DRUG SCREEN 1, URINE RESULT: SIGNIFICANT CHANGE UP
EGFR: 10 ML/MIN/1.73M2 — LOW
EOSINOPHIL # BLD AUTO: 0.11 K/UL — SIGNIFICANT CHANGE UP (ref 0–0.7)
EOSINOPHIL NFR BLD AUTO: 3.2 % — SIGNIFICANT CHANGE UP (ref 0–8)
EPI CELLS # UR: ABNORMAL /HPF
FENTANYL UR QL: NEGATIVE — SIGNIFICANT CHANGE UP
GLUCOSE BLDC GLUCOMTR-MCNC: 113 MG/DL — HIGH (ref 70–99)
GLUCOSE BLDC GLUCOMTR-MCNC: 120 MG/DL — HIGH (ref 70–99)
GLUCOSE BLDC GLUCOMTR-MCNC: 137 MG/DL — HIGH (ref 70–99)
GLUCOSE BLDC GLUCOMTR-MCNC: 151 MG/DL — HIGH (ref 70–99)
GLUCOSE SERPL-MCNC: 143 MG/DL — HIGH (ref 70–99)
GLUCOSE UR QL: NEGATIVE MG/DL — SIGNIFICANT CHANGE UP
HBV CORE AB SER-ACNC: SIGNIFICANT CHANGE UP
HBV CORE IGM SER-ACNC: SIGNIFICANT CHANGE UP
HBV SURFACE AB SER-ACNC: SIGNIFICANT CHANGE UP
HBV SURFACE AG SER-ACNC: SIGNIFICANT CHANGE UP
HCT VFR BLD CALC: 26.8 % — LOW (ref 42–52)
HGB BLD-MCNC: 9.2 G/DL — LOW (ref 14–18)
IMM GRANULOCYTES NFR BLD AUTO: 1.4 % — HIGH (ref 0.1–0.3)
KETONES UR-MCNC: NEGATIVE — SIGNIFICANT CHANGE UP
LEUKOCYTE ESTERASE UR-ACNC: ABNORMAL
LYMPHOCYTES # BLD AUTO: 0.6 K/UL — LOW (ref 1.2–3.4)
LYMPHOCYTES # BLD AUTO: 17.4 % — LOW (ref 20.5–51.1)
MAGNESIUM SERPL-MCNC: 1.9 MG/DL — SIGNIFICANT CHANGE UP (ref 1.8–2.4)
MCHC RBC-ENTMCNC: 31.6 PG — HIGH (ref 27–31)
MCHC RBC-ENTMCNC: 34.3 G/DL — SIGNIFICANT CHANGE UP (ref 32–37)
MCV RBC AUTO: 92.1 FL — SIGNIFICANT CHANGE UP (ref 80–94)
METHADONE UR-MCNC: NEGATIVE — SIGNIFICANT CHANGE UP
MONOCYTES # BLD AUTO: 0.42 K/UL — SIGNIFICANT CHANGE UP (ref 0.1–0.6)
MONOCYTES NFR BLD AUTO: 12.2 % — HIGH (ref 1.7–9.3)
NEUTROPHILS # BLD AUTO: 2.26 K/UL — SIGNIFICANT CHANGE UP (ref 1.4–6.5)
NEUTROPHILS NFR BLD AUTO: 65.5 % — SIGNIFICANT CHANGE UP (ref 42.2–75.2)
NITRITE UR-MCNC: NEGATIVE — SIGNIFICANT CHANGE UP
NRBC # BLD: 0 /100 WBCS — SIGNIFICANT CHANGE UP (ref 0–0)
OPIATES UR-MCNC: NEGATIVE — SIGNIFICANT CHANGE UP
OXYCODONE UR-MCNC: NEGATIVE — SIGNIFICANT CHANGE UP
PCP UR-MCNC: NEGATIVE — SIGNIFICANT CHANGE UP
PH UR: 6.5 — SIGNIFICANT CHANGE UP (ref 5–8)
PHOSPHATE SERPL-MCNC: 5.3 MG/DL — HIGH (ref 2.1–4.9)
PLATELET # BLD AUTO: 100 K/UL — LOW (ref 130–400)
POTASSIUM SERPL-MCNC: 3.7 MMOL/L — SIGNIFICANT CHANGE UP (ref 3.5–5)
POTASSIUM SERPL-SCNC: 3.7 MMOL/L — SIGNIFICANT CHANGE UP (ref 3.5–5)
PROPOXYPHENE QUALITATIVE URINE RESULT: NEGATIVE — SIGNIFICANT CHANGE UP
PROT ?TM UR-MCNC: 30 MG/DLG/24H — SIGNIFICANT CHANGE UP
PROT ?TM UR-MCNC: 85 MG/DL — HIGH (ref 0–12)
PROT ?TM UR-MCNC: 85 MG/DL — HIGH (ref 0–12)
PROT SERPL-MCNC: 6.4 G/DL — SIGNIFICANT CHANGE UP (ref 6–8)
PROT UR-MCNC: 30 MG/DL
PROT/CREAT UR-RTO: 0.5 RATIO — HIGH (ref 0–0.2)
RBC # BLD: 2.91 M/UL — LOW (ref 4.7–6.1)
RBC # FLD: 14.4 % — SIGNIFICANT CHANGE UP (ref 11.5–14.5)
RBC CASTS # UR COMP ASSIST: ABNORMAL /HPF
SODIUM SERPL-SCNC: 141 MMOL/L — SIGNIFICANT CHANGE UP (ref 135–146)
SP GR SPEC: 1.02 — SIGNIFICANT CHANGE UP (ref 1.01–1.03)
THC UR QL: NEGATIVE — SIGNIFICANT CHANGE UP
UROBILINOGEN FLD QL: 0.2 MG/DL — SIGNIFICANT CHANGE UP
WBC # BLD: 3.45 K/UL — LOW (ref 4.8–10.8)
WBC # FLD AUTO: 3.45 K/UL — LOW (ref 4.8–10.8)
WBC UR QL: SIGNIFICANT CHANGE UP /HPF

## 2022-03-17 PROCEDURE — 99233 SBSQ HOSP IP/OBS HIGH 50: CPT

## 2022-03-17 PROCEDURE — 71045 X-RAY EXAM CHEST 1 VIEW: CPT | Mod: 26

## 2022-03-17 RX ORDER — SODIUM CHLORIDE 9 MG/ML
1000 INJECTION, SOLUTION INTRAVENOUS
Refills: 0 | Status: DISCONTINUED | OUTPATIENT
Start: 2022-03-17 | End: 2022-03-17

## 2022-03-17 RX ORDER — AMLODIPINE BESYLATE 2.5 MG/1
5 TABLET ORAL DAILY
Refills: 0 | Status: DISCONTINUED | OUTPATIENT
Start: 2022-03-17 | End: 2022-03-18

## 2022-03-17 RX ORDER — PANTOPRAZOLE SODIUM 20 MG/1
40 TABLET, DELAYED RELEASE ORAL
Refills: 0 | Status: DISCONTINUED | OUTPATIENT
Start: 2022-03-17 | End: 2022-03-25

## 2022-03-17 RX ORDER — ACETAMINOPHEN 500 MG
650 TABLET ORAL EVERY 6 HOURS
Refills: 0 | Status: DISCONTINUED | OUTPATIENT
Start: 2022-03-17 | End: 2022-03-25

## 2022-03-17 RX ORDER — HEPARIN SODIUM 5000 [USP'U]/ML
5000 INJECTION INTRAVENOUS; SUBCUTANEOUS EVERY 12 HOURS
Refills: 0 | Status: DISCONTINUED | OUTPATIENT
Start: 2022-03-17 | End: 2022-03-24

## 2022-03-17 RX ADMIN — Medication 650 MILLIGRAM(S): at 00:50

## 2022-03-17 RX ADMIN — Medication 650 MILLIGRAM(S): at 22:54

## 2022-03-17 RX ADMIN — SODIUM CHLORIDE 75 MILLILITER(S): 9 INJECTION, SOLUTION INTRAVENOUS at 14:29

## 2022-03-17 RX ADMIN — Medication 1 MILLIGRAM(S): at 12:06

## 2022-03-17 RX ADMIN — AMLODIPINE BESYLATE 5 MILLIGRAM(S): 2.5 TABLET ORAL at 12:06

## 2022-03-17 RX ADMIN — Medication 650 MILLIGRAM(S): at 21:40

## 2022-03-17 RX ADMIN — Medication 100 MILLIGRAM(S): at 12:06

## 2022-03-17 RX ADMIN — PANTOPRAZOLE SODIUM 40 MILLIGRAM(S): 20 TABLET, DELAYED RELEASE ORAL at 07:49

## 2022-03-17 RX ADMIN — ATORVASTATIN CALCIUM 40 MILLIGRAM(S): 80 TABLET, FILM COATED ORAL at 21:40

## 2022-03-17 RX ADMIN — HEPARIN SODIUM 5000 UNIT(S): 5000 INJECTION INTRAVENOUS; SUBCUTANEOUS at 17:51

## 2022-03-17 RX ADMIN — CHLORHEXIDINE GLUCONATE 1 APPLICATION(S): 213 SOLUTION TOPICAL at 06:02

## 2022-03-17 RX ADMIN — Medication 650 MILLIGRAM(S): at 06:08

## 2022-03-17 RX ADMIN — Medication 2: at 07:49

## 2022-03-17 NOTE — PROGRESS NOTE ADULT - SUBJECTIVE AND OBJECTIVE BOX
24H events:    Patient is a 56y old Male who presents with a chief complaint of Acute Renal Failure (16 Mar 2022 16:06)    Primary diagnosis of Acute renal failure (ARF)       Today is hospital day 2d. NAOE, s/p dialysis x2, VSS, afebrile.    PAST MEDICAL & SURGICAL HISTORY  Diabetes    Hypertension    HLD (hyperlipidemia)    Neuropathy    Glaucoma    No significant past surgical history      SOCIAL HISTORY:  Negative for smoking/alcohol/drug use.     ALLERGIES:  No Known Allergies    MEDICATIONS:  STANDING MEDICATIONS  atorvastatin 40 milliGRAM(s) Oral at bedtime  chlorhexidine 4% Liquid 1 Application(s) Topical <User Schedule>  dexMEDEtomidine Infusion 0.002 MICROgram(s)/kG/Hr IV Continuous <Continuous>  dextrose 40% Gel 15 Gram(s) Oral once  dextrose 5% + sodium chloride 0.45%. 1000 milliLiter(s) IV Continuous <Continuous>  dextrose 5%. 1000 milliLiter(s) IV Continuous <Continuous>  dextrose 5%. 1000 milliLiter(s) IV Continuous <Continuous>  dextrose 50% Injectable 25 Gram(s) IV Push once  dextrose 50% Injectable 12.5 Gram(s) IV Push once  dextrose 50% Injectable 25 Gram(s) IV Push once  folic acid 1 milliGRAM(s) Oral daily  glucagon  Injectable 1 milliGRAM(s) IntraMuscular once  insulin lispro (ADMELOG) corrective regimen sliding scale   SubCutaneous three times a day before meals  insulin lispro (ADMELOG) corrective regimen sliding scale   SubCutaneous at bedtime  pantoprazole    Tablet 40 milliGRAM(s) Oral before breakfast  sodium bicarbonate 650 milliGRAM(s) Oral three times a day  thiamine 100 milliGRAM(s) Oral daily    PRN MEDICATIONS  LORazepam   Injectable 2 milliGRAM(s) IV Push every 1 hour PRN  sodium chloride 0.65% Nasal 1 Spray(s) Both Nostrils three times a day PRN    VITALS:   T(F): 97.5  HR: 75  BP: 160/81  RR: 17  SpO2: 97%    LABS:                        9.1    3.82  )-----------( 106      ( 16 Mar 2022 11:28 )             26.1     03-17    141  |  100  |  54<H>  ----------------------------<  143<H>  3.7   |  28  |  x     Ca    8.4<L>      17 Mar 2022 06:00  Phos  4.1     03-16  Mg     1.6     03-16    TPro  6.4  /  Alb  3.6  /  TBili  0.8  /  DBili  x   /  AST  42<H>  /  ALT  29  /  AlkPhos  106  03-17    PT/INR - ( 16 Mar 2022 11:28 )   PT: 12.10 sec;   INR: 1.05 ratio         PTT - ( 16 Mar 2022 11:28 )  PTT:34.5 sec  Urinalysis Basic - ( 15 Mar 2022 15:41 )    Color: Yellow / Appearance: Clear / SG: >=1.030 / pH: x  Gluc: x / Ketone: Trace  / Bili: Negative / Urobili: 0.2 mg/dL   Blood: x / Protein: >=300 mg/dL / Nitrite: Negative   Leuk Esterase: Negative / RBC: 11-25 /HPF / WBC x   Sq Epi: x / Non Sq Epi: Few /HPF / Bacteria: x      ABG - ( 16 Mar 2022 09:35 )  pH, Arterial: 7.41  pH, Blood: x     /  pCO2: 35    /  pO2: 70    / HCO3: 22    / Base Excess: -1.9  /  SaO2: 95.6              Troponin T, Serum: 0.08 ng/mL *HH* (03-16-22 @ 11:28)      Culture - Blood (collected 15 Mar 2022 12:55)  Source: .Blood Blood-Peripheral  Preliminary Report (16 Mar 2022 23:01):    No growth to date.    Culture - Blood (collected 15 Mar 2022 12:55)  Source: .Blood Blood-Peripheral  Preliminary Report (16 Mar 2022 23:01):    No growth to date.      CARDIAC MARKERS ( 16 Mar 2022 11:28 )  x     / 0.08 ng/mL / x     / x     / 7.7 ng/mL  CARDIAC MARKERS ( 15 Mar 2022 12:11 )  x     / 0.04 ng/mL / x     / x     / x          RADIOLOGY:    PHYSICAL EXAM:  CONSTITUTIONAL: in no acute distress, afebrile  SKIN: Warm, dry  HEAD: Normocephalic; atraumatic  EYES: No conjunctival injection. EOMI. PERRLA  ENT: No nasal discharge; oropharynx nonerythematous; airway clear  NECK: Supple; non tender, No JVD  CARD:  Regular rate and rhythm  RESP: CTAB; No wheezes, crackles, rales or rhonchi  ABD: Soft NTND; No guarding or rebound tenderness  EXT: Normal ROM.  No clubbing or cyanosis.  No edema, peripheral pulses 2+  NEURO: A&O x3, grossly unremarkable, no focal deficits  PSYCH: Cooperative, appropriate 24H events:    Patient is a 56y old Male who presents with a chief complaint of Acute Renal Failure (16 Mar 2022 16:06)    Primary diagnosis of Acute renal failure (ARF)       Today is hospital day 2d. NAOE, s/p dialysis x2, VSS, afebrile. No ativan needed overnight    PAST MEDICAL & SURGICAL HISTORY  Diabetes    Hypertension    HLD (hyperlipidemia)    Neuropathy    Glaucoma    No significant past surgical history      SOCIAL HISTORY:  Negative for smoking/alcohol/drug use.     ALLERGIES:  No Known Allergies    MEDICATIONS:  STANDING MEDICATIONS  atorvastatin 40 milliGRAM(s) Oral at bedtime  chlorhexidine 4% Liquid 1 Application(s) Topical <User Schedule>  dexMEDEtomidine Infusion 0.002 MICROgram(s)/kG/Hr IV Continuous <Continuous>  dextrose 40% Gel 15 Gram(s) Oral once  dextrose 5% + sodium chloride 0.45%. 1000 milliLiter(s) IV Continuous <Continuous>  dextrose 5%. 1000 milliLiter(s) IV Continuous <Continuous>  dextrose 5%. 1000 milliLiter(s) IV Continuous <Continuous>  dextrose 50% Injectable 25 Gram(s) IV Push once  dextrose 50% Injectable 12.5 Gram(s) IV Push once  dextrose 50% Injectable 25 Gram(s) IV Push once  folic acid 1 milliGRAM(s) Oral daily  glucagon  Injectable 1 milliGRAM(s) IntraMuscular once  insulin lispro (ADMELOG) corrective regimen sliding scale   SubCutaneous three times a day before meals  insulin lispro (ADMELOG) corrective regimen sliding scale   SubCutaneous at bedtime  pantoprazole    Tablet 40 milliGRAM(s) Oral before breakfast  sodium bicarbonate 650 milliGRAM(s) Oral three times a day  thiamine 100 milliGRAM(s) Oral daily    PRN MEDICATIONS  LORazepam   Injectable 2 milliGRAM(s) IV Push every 1 hour PRN  sodium chloride 0.65% Nasal 1 Spray(s) Both Nostrils three times a day PRN    VITALS:   T(F): 97.5  HR: 75  BP: 160/81  RR: 17  SpO2: 97%    LABS:                        9.1    3.82  )-----------( 106      ( 16 Mar 2022 11:28 )             26.1     03-17    141  |  100  |  54<H>  ----------------------------<  143<H>  3.7   |  28  |  x     Ca    8.4<L>      17 Mar 2022 06:00  Phos  4.1     03-16  Mg     1.6     03-16    TPro  6.4  /  Alb  3.6  /  TBili  0.8  /  DBili  x   /  AST  42<H>  /  ALT  29  /  AlkPhos  106  03-17    PT/INR - ( 16 Mar 2022 11:28 )   PT: 12.10 sec;   INR: 1.05 ratio         PTT - ( 16 Mar 2022 11:28 )  PTT:34.5 sec  Urinalysis Basic - ( 15 Mar 2022 15:41 )    Color: Yellow / Appearance: Clear / SG: >=1.030 / pH: x  Gluc: x / Ketone: Trace  / Bili: Negative / Urobili: 0.2 mg/dL   Blood: x / Protein: >=300 mg/dL / Nitrite: Negative   Leuk Esterase: Negative / RBC: 11-25 /HPF / WBC x   Sq Epi: x / Non Sq Epi: Few /HPF / Bacteria: x      ABG - ( 16 Mar 2022 09:35 )  pH, Arterial: 7.41  pH, Blood: x     /  pCO2: 35    /  pO2: 70    / HCO3: 22    / Base Excess: -1.9  /  SaO2: 95.6              Troponin T, Serum: 0.08 ng/mL *HH* (03-16-22 @ 11:28)      Culture - Blood (collected 15 Mar 2022 12:55)  Source: .Blood Blood-Peripheral  Preliminary Report (16 Mar 2022 23:01):    No growth to date.    Culture - Blood (collected 15 Mar 2022 12:55)  Source: .Blood Blood-Peripheral  Preliminary Report (16 Mar 2022 23:01):    No growth to date.      CARDIAC MARKERS ( 16 Mar 2022 11:28 )  x     / 0.08 ng/mL / x     / x     / 7.7 ng/mL  CARDIAC MARKERS ( 15 Mar 2022 12:11 )  x     / 0.04 ng/mL / x     / x     / x          RADIOLOGY:    PHYSICAL EXAM:  CONSTITUTIONAL: in no acute distress, afebrile  SKIN: Warm, dry  HEAD: Normocephalic; atraumatic  EYES: No conjunctival injection. EOMI. PERRLA  ENT: No nasal discharge; oropharynx nonerythematous; airway clear  NECK: Supple; non tender, No JVD  CARD:  Regular rate and rhythm  RESP: CTAB; No wheezes, crackles, rales or rhonchi  ABD: Soft NTND; No guarding or rebound tenderness  EXT: Normal ROM.  No clubbing or cyanosis.  No edema, peripheral pulses 2+  NEURO: A&O x3, grossly unremarkable, no focal deficits  PSYCH: Cooperative, appropriate

## 2022-03-17 NOTE — PROGRESS NOTE ADULT - SUBJECTIVE AND OBJECTIVE BOX
Patient is a 56y old  Male who presents with a chief complaint of Acute Renal Failure (17 Mar 2022 08:03)        Over Night Events: No acute events overnight; more awake        ROS:     All ROS are negative except HPI         PHYSICAL EXAM    ICU Vital Signs Last 24 Hrs  T(C): 36.4 (17 Mar 2022 07:10), Max: 38 (16 Mar 2022 22:10)  T(F): 97.5 (17 Mar 2022 07:10), Max: 100.4 (16 Mar 2022 22:10)  HR: 75 (17 Mar 2022 07:08) (75 - 92)  BP: 160/81 (17 Mar 2022 07:08) (122/69 - 165/80)  BP(mean): 114 (17 Mar 2022 07:08) (90 - 115)  ABP: --  ABP(mean): --  RR: 17 (17 Mar 2022 07:10) (12 - 25)  SpO2: 97% (17 Mar 2022 07:08) (89% - 97%)      CONSTITUTIONAL:  Well nourished.  NAD    ENT:   Airway patent,   Mouth with normal mucosa.   No thrush    EYES:   Pupils equal,   Round and reactive to light.    CARDIAC:   Normal rate,   Regular rhythm.    No edema      Vascular:  Normal systolic impulse  No Carotid bruits    RESPIRATORY:   No wheezing  Bilateral BS  Normal chest expansion  Not tachypneic,  No use of accessory muscles    GASTROINTESTINAL:  Abdomen soft,   Non-tender,   No guarding,   + BS    MUSCULOSKELETAL:   Range of motion is not limited,  No clubbing, cyanosis    NEUROLOGICAL:   Alert and awake  No motor  deficits.    SKIN:   Skin normal color for race,   Warm and dry and intact.   No evidence of rash.      03-16-22 @ 07:01  -  03-17-22 @ 07:00  --------------------------------------------------------  IN:    dextrose 5% + sodium chloride 0.45%: 1650 mL    Sodium Bicarbonate: 150 mL  Total IN: 1800 mL    OUT:    Indwelling Catheter - Urethral (mL): 1535 mL  Total OUT: 1535 mL    Total NET: 265 mL      03-17-22 @ 07:01  -  03-17-22 @ 09:38  --------------------------------------------------------  IN:  Total IN: 0 mL    OUT:    Indwelling Catheter - Urethral (mL): 75 mL  Total OUT: 75 mL    Total NET: -75 mL          LABS:                            9.2    3.45  )-----------( 100      ( 17 Mar 2022 06:00 )             26.8                                               03-17    141  |  100  |  54<H>  ----------------------------<  143<H>  3.7   |  28  |  6.2<HH>    17 Mar 2022 06:00    141    |  100    |  54<H>  ----------------------------<  143<H>  3.7     |  28     |  6.2<HH>  16 Mar 2022 15:07    140    |  99     |  54<H>  ----------------------------<  160<H>  3.7     |  24     |  5.7<HH>    Ca    8.4<L>      17 Mar 2022 06:00  Ca    8.3<L>      16 Mar 2022 15:07  Phos  4.1       16 Mar 2022 15:07  Phos  7.2<H>     16 Mar 2022 11:45  Mg     1.6<L>     16 Mar 2022 15:07  Mg     2.4       15 Mar 2022 12:11    TPro  6.4    /  Alb  3.6    /  TBili  0.8    /  DBili  x      /  AST  42<H>  /  ALT  29     /  AlkPhos  106    17 Mar 2022 06:00  TPro  6.2    /  Alb  3.8    /  TBili  0.9    /  DBili  0.3    /  AST  43<H>  /  ALT  32     /  AlkPhos  106    16 Mar 2022 15:07    Ca    8.4<L>      17 Mar 2022 06:00  Phos  4.1     03-16  Mg     1.6     03-16    TPro  6.4  /  Alb  3.6  /  TBili  0.8  /  DBili  x   /  AST  42<H>  /  ALT  29  /  AlkPhos  106  03-17      PT/INR - ( 16 Mar 2022 11:28 )   PT: 12.10 sec;   INR: 1.05 ratio         PTT - ( 16 Mar 2022 11:28 )  PTT:34.5 sec                                       Urinalysis Basic - ( 15 Mar 2022 15:41 )    Color: Yellow / Appearance: Clear / SG: >=1.030 / pH: x  Gluc: x / Ketone: Trace  / Bili: Negative / Urobili: 0.2 mg/dL   Blood: x / Protein: >=300 mg/dL / Nitrite: Negative   Leuk Esterase: Negative / RBC: 11-25 /HPF / WBC x   Sq Epi: x / Non Sq Epi: Few /HPF / Bacteria: x        CARDIAC MARKERS ( 16 Mar 2022 11:28 )  x     / 0.08 ng/mL / x     / x     / 7.7 ng/mL  CARDIAC MARKERS ( 15 Mar 2022 12:11 )  x     / 0.04 ng/mL / x     / x     / x                                                LIVER FUNCTIONS - ( 17 Mar 2022 06:00 )  Alb: 3.6 g/dL / Pro: 6.4 g/dL / ALK PHOS: 106 U/L / ALT: 29 U/L / AST: 42 U/L / GGT: x                                                  Culture - Blood (collected 15 Mar 2022 12:55)  Source: .Blood Blood-Peripheral  Preliminary Report (16 Mar 2022 23:01):    No growth to date.    Culture - Blood (collected 15 Mar 2022 12:55)  Source: .Blood Blood-Peripheral  Preliminary Report (16 Mar 2022 23:01):    No growth to date.                                                                                       ABG - ( 16 Mar 2022 09:35 )  pH, Arterial: 7.41  pH, Blood: x     /  pCO2: 35    /  pO2: 70    / HCO3: 22    / Base Excess: -1.9  /  SaO2: 95.6                MEDICATIONS  (STANDING):  amLODIPine   Tablet 5 milliGRAM(s) Oral daily  atorvastatin 40 milliGRAM(s) Oral at bedtime  chlorhexidine 4% Liquid 1 Application(s) Topical <User Schedule>  dexMEDEtomidine Infusion 0.002 MICROgram(s)/kG/Hr (0.03 mL/Hr) IV Continuous <Continuous>  dextrose 40% Gel 15 Gram(s) Oral once  dextrose 5%. 1000 milliLiter(s) (100 mL/Hr) IV Continuous <Continuous>  dextrose 5%. 1000 milliLiter(s) (50 mL/Hr) IV Continuous <Continuous>  dextrose 50% Injectable 25 Gram(s) IV Push once  dextrose 50% Injectable 12.5 Gram(s) IV Push once  dextrose 50% Injectable 25 Gram(s) IV Push once  folic acid 1 milliGRAM(s) Oral daily  glucagon  Injectable 1 milliGRAM(s) IntraMuscular once  insulin lispro (ADMELOG) corrective regimen sliding scale   SubCutaneous three times a day before meals  insulin lispro (ADMELOG) corrective regimen sliding scale   SubCutaneous at bedtime  pantoprazole    Tablet 40 milliGRAM(s) Oral before breakfast  sodium bicarbonate 650 milliGRAM(s) Oral three times a day  thiamine 100 milliGRAM(s) Oral daily    MEDICATIONS  (PRN):  LORazepam   Injectable 2 milliGRAM(s) IV Push every 1 hour PRN Symptom-triggered: each CIWA -Ar score 8 or GREATER  sodium chloride 0.65% Nasal 1 Spray(s) Both Nostrils three times a day PRN Nasal Congestion    < from: TTE Echo Complete w/o Contrast w/ Doppler (03.16.22 @ 08:36) >   1. Left ventricular ejection fraction, by visual estimation, is 55 to   60%.   2. Normal left atrial size.   3. Moderate mitral valve regurgitation.   4. Moderate tricuspid regurgitation.   5. Mild aortic regurgitation.   6. Estimated pulmonary artery systolic pressure is 57.0 mmHg assuming a   right atrial pressure of 3 mmHg, which is consistent with moderate   pulmonary hypertension.    < end of copied text >

## 2022-03-17 NOTE — PROGRESS NOTE ADULT - ASSESSMENT
55 yo male, pmh of htn, hld, dm, glaucoma, b/l le neuropathy, presents to ed for multiple complaints. Per pt and family, for over a week pt has had increased weakness, neck pain, numbness to b/l upper extremities, slowed speech, and nausea. No specific pain or radiation. Pt was at ophthalmologist on day of admission and was sent in for weakness.     found to have ; acute renal failure / severe metabolic acidosis / hyperkalemia / mild fluid overload / uremic - toxic encephalopathy / anemia    Acute renal failure on ckd iiia  -started on HD    -work up underway - defer to nephrology   -hold lisinopril and metformin  -avoid nephrotoxics    Metabolic encephalopathy  -improved    Alcoholism  -recommend cessation  -monitor for DTs  -s/p precedex  -thiamine and folate  -monitor for DTs  -ativan prn     Pancytopenia   -improving  -s/p 2 units prbc    Type ii dm with hyperglycemia and neuropathy  -sliding scale  -avoid metformin

## 2022-03-17 NOTE — PROGRESS NOTE ADULT - ASSESSMENT
# VAMSI on CKD 3 / last known creat 1.65(45) K 5.3 Ca 10.3 on 10/23/21  creat 1.5 on 7/10/21/2021   - DM for 20 years, no known retiopathy  # Severe hyperkalemia / d/t renal failure, ACE inhibitor  # Hyponatremia / d/t renal failure / DKA  # Acidemia / HAGMA / renal failure/DKA  # Normo/macrocytic anemia     Recommendations:  - cont iv hydration with 1/2 at 75 cc/hr   CTAP reviewed, no hydronephrosis, bladder not distended, b/l intra-renal calculi  -  renal/bladder ultrasound R/L 11/10 cm nonobstructive calculi, no hydro  - etiology of VAMSI unclear - needs full w/u for vasculitis myeloma  C3C4 normal  LYNN DNA, ANCA Anti GBM, SPEP UPEP SIF UIF - not avl yet  may need a kidney bx  - HD  #2 so far, creat rising post HD off IVF  - UA noted with proteinuria/hematuria, will need repeat UA and urine prot/creat ratio- can wait to obtain sample tomorrow if urine output hopefully increasing  - off  metformin, lisinopril (I d/c'd lisinopril from patient's orders)  - per family, pt has high etoh intake / cont thiamine, folate, CIWA protocol  - check iron stores / r/o GI bleed  - phos level 5.3 , iPTH 311

## 2022-03-17 NOTE — PROGRESS NOTE ADULT - ASSESSMENT
IMPRESSION:  AMS 2/2 toxic metabolic encephalopathy- resolved  acute on chronic renal failure s/p HD  Hypothermia- resolved  Possible sepsis  H/O ETOH addiction  Possible AKA vs starvation ketosis  Mod MR    SUGGEST:      CNS: CIWA protocol. MV, Thiamine and folic acid.  CTH reviewed. f/u UDS and SDS. detox consult    HEENT: Oral care    PULMONARY:  HOB @ 45 degrees. Aspiration Precautions . Incentive spirometry    CARDIOVASCULAR: D/C IVF . ECHO results reviewed. Trend CE.     GI: GI prophylaxis.  Feeding as tolerated    RENAL:  Follow up lytes q 12.  Correct as needed. f/u repeat HD with nephrology. d/c bicarb     INFECTIOUS DISEASE: Follow up cultures. Monitor off ABX.  repeat Procalcitonin    HEMATOLOGICAL:  DVT prophylaxis- HSQ. Monitor cbc and coags.  D-dimer    ENDOCRINE:  Follow up FS.  Insulin protocol if needed. send hba1c    MUSCULOSKELETAL: PT/REHAB , OOB to chair    Seamus: d/c seamus TOV  Lines: KARI Gautam  Code status: Full code  Disposition: telemonitoring in ccu

## 2022-03-17 NOTE — PROGRESS NOTE ADULT - SUBJECTIVE AND OBJECTIVE BOX
KECIA MARTIN  56y, Male  Allergy: No Known Allergies    Hospital Day: 2d    Patient seen and examined earlier today.     PMH/PSH:  PAST MEDICAL & SURGICAL HISTORY:  Diabetes    Hypertension    HLD (hyperlipidemia)    Neuropathy    Glaucoma    No significant past surgical history    LAST 24-Hr EVENTS:    VITALS:  T(F): 97.5 (03-17-22 @ 07:10), Max: 100.4 (03-16-22 @ 22:10)  HR: 75 (03-17-22 @ 07:08)  BP: 160/81 (03-17-22 @ 07:08) (122/69 - 165/80)  RR: 15 (03-17-22 @ 09:00)  SpO2: 97% (03-17-22 @ 07:08)    Oxygen Delivery Therapy:   Oxygen Method: Nasal Cannula   LPM: 2   Targeted SpO2 Range (%): 88-97   O2 Titration Guideline: Device O2 Percent Range (%): 24-44%, Device O2 Flow Rate Range (LPM): 1-6LPM, O2 Titration Guideline: Increase/Decrease by 1LPM or 4%. Notify provider for any increase in oxygen therapy or inability to achieve targeted SpO2. (03-17-22 @ 04:55)      TESTS & MEASUREMENTS:  Weight/BMI  73.9 (03-15-22 @ 15:22)  24.8 (03-15-22 @ 15:22)    03-15-22 @ 07:01  -  03-16-22 @ 07:00  --------------------------------------------------------  IN: 1990 mL / OUT: 570 mL / NET: 1420 mL    03-16-22 @ 07:01  -  03-17-22 @ 07:00  --------------------------------------------------------  IN: 1800 mL / OUT: 1535 mL / NET: 265 mL    03-17-22 @ 07:01  -  03-17-22 @ 11:06  --------------------------------------------------------  IN: 0 mL / OUT: 425 mL / NET: -425 mL                 9.2    3.45  )-----------( 100      ( 17 Mar 2022 06:00 )             26.8     PT/INR - ( 16 Mar 2022 11:28 )   PT: 12.10 sec;   INR: 1.05 ratio         PTT - ( 16 Mar 2022 11:28 )  PTT:34.5 sec  INR: 1.05 ratio (03-16-22 @ 11:28)    03-17    141  |  100  |  54<H>  ----------------------------<  143<H>  3.7   |  28  |  6.2<HH>    Ca    8.4<L>      17 Mar 2022 06:00  Phos  4.1     03-16  Mg     1.6     03-16    TPro  6.4  /  Alb  3.6  /  TBili  0.8  /  DBili  x   /  AST  42<H>  /  ALT  29  /  AlkPhos  106  03-17    LIVER FUNCTIONS - ( 17 Mar 2022 06:00 )  Alb: 3.6 g/dL / Pro: 6.4 g/dL / ALK PHOS: 106 U/L / ALT: 29 U/L / AST: 42 U/L / GGT: x           CARDIAC MARKERS ( 16 Mar 2022 11:28 )  x     / 0.08 ng/mL / x     / x     / 7.7 ng/mL  CARDIAC MARKERS ( 15 Mar 2022 12:11 )  x     / 0.04 ng/mL / x     / x     / x          Culture - Blood (collected 03-15-22 @ 12:55)  Source: .Blood Blood-Peripheral  Preliminary Report (03-16-22 @ 23:01):    No growth to date.    Culture - Blood (collected 03-15-22 @ 12:55)  Source: .Blood Blood-Peripheral  Preliminary Report (03-16-22 @ 23:01):    No growth to date.    Urinalysis Basic - ( 15 Mar 2022 15:41 )    Color: Yellow / Appearance: Clear / SG: >=1.030 / pH: x  Gluc: x / Ketone: Trace  / Bili: Negative / Urobili: 0.2 mg/dL   Blood: x / Protein: >=300 mg/dL / Nitrite: Negative   Leuk Esterase: Negative / RBC: 11-25 /HPF / WBC x   Sq Epi: x / Non Sq Epi: Few /HPF / Bacteria: x    Procalcitonin, Serum: 1.09 ng/mL (03-16-22 @ 06:09)    D-Dimer Assay, Quantitative: 606 ng/mL DDU (03-16-22 @ 11:28)      Serum Pro-Brain Natriuretic Peptide: 3398 pg/mL (03-15-22 @ 12:11)    COVID-19 PCR: NotDetec (03-15-22 @ 12:11)    A1C with Estimated Average Glucose Result: 6.1 % (03-16-22 @ 06:09)    Indwelling Urethral Catheter:     Connect To:  Straight Drainage/Gravity    Indication:  Urine Output Monitoring in Critically Ill (03-15-22 @ 13:07) (not performed)      RADIOLOGY, ECG, & ADDITIONAL TESTS:  12 Lead ECG:   Ventricular Rate 91 BPM    Atrial Rate 91 BPM    P-R Interval 152 ms    QRS Duration 76 ms    Q-T Interval 382 ms    QTC Calculation(Bazett) 469 ms    P Axis 63 degrees    R Axis 63 degrees    T Axis 63 degrees    Diagnosis Line Normal sinus rhythm  Normal ECG    Confirmed by DAVID MI MD ([id number]) on 3/16/2022 12:27:18 PM (03-16-22 @ 08:17)    CT Head No Cont:   ACC: [id number] EXAM:  CT BRAIN                          PROCEDURE DATE:  03/15/2022        INTERPRETATION:  Clinical History / Reason for exam: Bilateral arm   numbness, weakness and neck pain.    CT SCAN OF THE BRAIN WITHOUT CONTRAST    TECHNIQUE:    Multiple transaxial noncontrast CT images of the brain were obtained from   base to vertex. Sagittal and coronal reformatted images were obtained.    COMPARISON:    Noncontrast CT scan of the brain dated July 25, 2021.    FINDINGS:    The third and lateral ventricles are mildly enlarged as are the cortical   sulci consistent with a mild degree of cortical atrophy.  The fourth   ventricle is normal in size and position.    There is no shift of the midline structures, evidence of acute   intracranial hemorrhage, or depressed skull fracture.    Mucosal thickening in the left maxillary sinus.    Sclerosis of the left mastoid air cells consistent with chronic   inflammatory changes.    IMPRESSION:    Mild atrophic changes.    --- End of Report---      EUGENE SEGUNDO MD; Attending Radiologist  This document has been electronically signed. Mar 15 2022  2:23PM (03-15-22 @ 14:13)  CT Abdomen and Pelvis No Cont:   ACC: [id number] EXAM:  CT ABDOMEN AND PELVIS                          PROCEDURE DATE:  03/15/2022        INTERPRETATION:  CLINICAL STATEMENT: Abdominal pain    TECHNIQUE: Contiguous axial CT images were obtained from the lower chest   to the pubic symphysis without intravenous contrast.  Oral contrast was   not administered.  Reformatted images in the coronal and sagittal planes   were acquired.    COMPARISON CT: July 25, 2021    OTHER STUDIES USED FOR CORRELATION: None.      FINDINGS:    LOWERCHEST: Coronary calcifications. Bibasilar atelectatic changes..    HEPATOBILIARY: Diffuse hepatic steatosis. No biliary ductal dilatation..    SPLEEN: Unremarkable.    PANCREAS: Pancreatic head calcifications..    ADRENAL GLANDS: Unremarkable.    KIDNEYS: Bilateral intrarenal calculi measuring up to 0.8 cm right lower   pole. No hydronephrosis bilaterally..    ABDOMINOPELVIC NODES: Unremarkable.    PELVIC ORGANS: Blanco catheter in urinary bladder..    PERITONEUM/MESENTERY/BOWEL: No bowel obstruction, pneumoperitoneum or   ascites. Normal caliber appendix.    BONES/SOFT TISSUES: Degenerative changes of the spine. No acute osseous   abnormality..    OTHER: Atherosclerosis abdominal aorta and branches.      IMPRESSION:    No acute intra-abdominal pathology.    Bilateral nephrolithiasis, as above.    --- End of Report ---        ELLIOT LANDAU MD; Attending Radiologist  This document has been electronically signed. Mar 15 2022  2:27PM (03-15-22 @ 14:13)    RECENT DIAGNOSTIC ORDERS:  Xray Chest 1 View- PORTABLE-Routine: AM   Indication: sob  Transport: Portable,  w/ Monitor  Provider's Contact #: [phone] (03-17-22 @ 08:07)  Phosphorus Level, Serum: Routine (03-17-22 @ 00:01)  Magnesium, Serum: Routine (03-17-22 @ 00:01)  Basic Metabolic Panel: Routine (03-17-22 @ 00:01)  Diet, Consistent Carbohydrate Renal/No Snacks (03-16-22 @ 20:07)  Phosphorus Level, Serum: Repeat From: 16-Mar-2022 13:35 To: 17-Mar-2022 00:00, Every 1 day(s) (03-16-22 @ 13:36)  Magnesium, Serum: Repeat From: 16-Mar-2022 13:35 To: 17-Mar-2022 00:00, Every 1 day(s) (03-16-22 @ 13:36)  Basic Metabolic Panel: Repeat From: 16-Mar-2022 13:35 To: 17-Mar-2022 00:00, Every 1 day(s) (03-16-22 @ 13:36)      MEDICATIONS:  MEDICATIONS  (STANDING):  amLODIPine   Tablet 5 milliGRAM(s) Oral daily  atorvastatin 40 milliGRAM(s) Oral at bedtime  chlorhexidine 4% Liquid 1 Application(s) Topical <User Schedule>  dextrose 40% Gel 15 Gram(s) Oral once  dextrose 5%. 1000 milliLiter(s) (100 mL/Hr) IV Continuous <Continuous>  dextrose 5%. 1000 milliLiter(s) (50 mL/Hr) IV Continuous <Continuous>  dextrose 50% Injectable 25 Gram(s) IV Push once  dextrose 50% Injectable 12.5 Gram(s) IV Push once  dextrose 50% Injectable 25 Gram(s) IV Push once  folic acid 1 milliGRAM(s) Oral daily  glucagon  Injectable 1 milliGRAM(s) IntraMuscular once  insulin lispro (ADMELOG) corrective regimen sliding scale   SubCutaneous three times a day before meals  insulin lispro (ADMELOG) corrective regimen sliding scale   SubCutaneous at bedtime  pantoprazole    Tablet 40 milliGRAM(s) Oral before breakfast  thiamine 100 milliGRAM(s) Oral daily    MEDICATIONS  (PRN):  LORazepam   Injectable 2 milliGRAM(s) IV Push every 1 hour PRN Symptom-triggered: each CIWA -Ar score 8 or GREATER  sodium chloride 0.65% Nasal 1 Spray(s) Both Nostrils three times a day PRN Nasal Congestion      HOME MEDICATIONS:  acetaminophen 325 mg oral tablet (07-26)  DULoxetine 30 mg oral delayed release capsule (07-25)  Januvia 100 mg oral tablet (07-25)  lisinopril 20 mg oral tablet (07-25)  metFORMIN 1000 mg oral tablet (07-25)      PHYSICAL EXAM:  GENERAL: NAD  HEAD:  Atraumatic, Normocephalic  EYES:  conjunctiva and sclera clear  NERVOUS SYSTEM:  no focal deficits   CHEST/LUNG: Clear to percussion bilaterally;    HEART: Regular rate and rhythm   ABDOMEN: Soft, Nontender, Nondistended   EXTREMITIES:  no cyanosis, or edema

## 2022-03-17 NOTE — PROGRESS NOTE ADULT - SUBJECTIVE AND OBJECTIVE BOX
Nephrology progress note    Patient is seen and examined, events over the last 24 h noted .  Pt had HD yesterday  UO 1.5  Allergies:  No Known Allergies    Hospital Medications:   MEDICATIONS  (STANDING):  amLODIPine   Tablet 5 milliGRAM(s) Oral daily  atorvastatin 40 milliGRAM(s) Oral at bedtime  chlorhexidine 4% Liquid 1 Application(s) Topical <User Schedule>  dextrose 40% Gel 15 Gram(s) Oral once  dextrose 5%. 1000 milliLiter(s) (100 mL/Hr) IV Continuous <Continuous>  dextrose 5%. 1000 milliLiter(s) (50 mL/Hr) IV Continuous <Continuous>  dextrose 50% Injectable 25 Gram(s) IV Push once  dextrose 50% Injectable 12.5 Gram(s) IV Push once  dextrose 50% Injectable 25 Gram(s) IV Push once  folic acid 1 milliGRAM(s) Oral daily  glucagon  Injectable 1 milliGRAM(s) IntraMuscular once  insulin lispro (ADMELOG) corrective regimen sliding scale   SubCutaneous three times a day before meals  insulin lispro (ADMELOG) corrective regimen sliding scale   SubCutaneous at bedtime  pantoprazole    Tablet 40 milliGRAM(s) Oral before breakfast  thiamine 100 milliGRAM(s) Oral daily        VITALS:  T(F): 97.5 (03-17-22 @ 07:10), Max: 100.4 (03-16-22 @ 22:10)  HR: 76 (03-17-22 @ 11:10)  BP: 162/78 (03-17-22 @ 11:10)  RR: 18 (03-17-22 @ 11:10)  SpO2: 95% (03-17-22 @ 11:10)  Wt(kg): --    03-15 @ 07:01  -  03-16 @ 07:00  --------------------------------------------------------  IN: 1990 mL / OUT: 570 mL / NET: 1420 mL    03-16 @ 07:01  -  03-17 @ 07:00  --------------------------------------------------------  IN: 1800 mL / OUT: 1535 mL / NET: 265 mL    03-17 @ 07:01  -  03-17 @ 13:11  --------------------------------------------------------  IN: 0 mL / OUT: 425 mL / NET: -425 mL          PHYSICAL EXAM:  Constitutional: NAD  HEENT: anicteric sclera, oropharynx clear, MMM  Neck: No JVD  Respiratory: CTAB, no wheezes, rales or rhonchi  Cardiovascular: S1, S2, RRR  Gastrointestinal: BS+, soft, NT/ND  Extremities: No cyanosis or clubbing. No peripheral edema  Neurological: Awake alert minimal tremors  : No CVA tenderness. Has  way.   Skin: No rashes  Vascular Access:    LABS:  03-17    141  |  100  |  54<H>  ----------------------------<  143<H>  3.7   |  28  |  6.2<HH>  Creatinine Trend: 6.2<--, 5.7<--, 9.9<--, 10.0<--, 9.5<--, 14.8<--    Ca    8.4<L>      17 Mar 2022 06:00  Phos  5.3     03-17  Mg     1.9     03-17    TPro  6.4  /  Alb  3.6  /  TBili  0.8  /  DBili      /  AST  42<H>  /  ALT  29  /  AlkPhos  106  03-17                          9.2    3.45  )-----------( 100      ( 17 Mar 2022 06:00 )             26.8       Urine Studies:  Urinalysis Basic - ( 15 Mar 2022 15:41 )    Color: Yellow / Appearance: Clear / SG: >=1.030 / pH:   Gluc:  / Ketone: Trace  / Bili: Negative / Urobili: 0.2 mg/dL   Blood:  / Protein: >=300 mg/dL / Nitrite: Negative   Leuk Esterase: Negative / RBC: 11-25 /HPF / WBC    Sq Epi:  / Non Sq Epi: Few /HPF / Bacteria:         RADIOLOGY & ADDITIONAL STUDIES:  < from: Xray Chest 1 View- PORTABLE-Routine (Xray Chest 1 View- PORTABLE-Routine in AM.) (03.17.22 @ 06:32) >    Interstitial prominence bilaterally.    Right IJ line.    < end of copied text >  < from: US Renal (03.15.22 @ 17:33) >  Right kidney: 11.2 x 4.8 x 4.7 cm. 8 mm nonobstructing calculus within   the lower pole of the right kidney.    Left kidney:  10.1 x 6.8 x 4.7 cm. 5 mm x 4 mm lower pole nonobstructing   calculus.    IMPRESSION:    Nonobstructing lower pole calculi. Unchanged from recent CT scan.    < end of copied text >

## 2022-03-17 NOTE — PROGRESS NOTE ADULT - ASSESSMENT
55 yo M with PMH DM on Metformin and Januvia, HTN, HLD, Glaucoma, b/l LE peripheral neuropathy who presents in acute renal failure.    #acute on chronic renal failure  #toxic metabolic encephalopathy  - DC bicarb drip  - 1/2 NS @75  - HD  - serial lytes  - f/u ECHO  - way  - Trend Trop, CK level  - f/u DIC panel    #DM  - hold Metformin  - insulin sliding scale  - FS q1    #HTN  - hold Lisinopril 20 mg qd  - Amlodipine 5 mg qd    #HLD  - Atorvastatin 40 mg qd    #EtOH abuse  - thiamine  - folic acid  - f/u Vit B12, folate  - CIWA Protocol  - May use precedex  - Addiction medicine consult    Code status: Full Code  DVT ppx: subq heparin  Dispo: CCU Tele

## 2022-03-18 DIAGNOSIS — F10.10 ALCOHOL ABUSE, UNCOMPLICATED: ICD-10-CM

## 2022-03-18 LAB
ALBUMIN SERPL ELPH-MCNC: 3.6 G/DL — SIGNIFICANT CHANGE UP (ref 3.5–5.2)
ALBUMIN SERPL ELPH-MCNC: 3.9 G/DL — SIGNIFICANT CHANGE UP (ref 3.5–5.2)
ALP SERPL-CCNC: 107 U/L — SIGNIFICANT CHANGE UP (ref 30–115)
ALP SERPL-CCNC: 114 U/L — SIGNIFICANT CHANGE UP (ref 30–115)
ALT FLD-CCNC: 26 U/L — SIGNIFICANT CHANGE UP (ref 0–41)
ALT FLD-CCNC: 29 U/L — SIGNIFICANT CHANGE UP (ref 0–41)
ANA TITR SER: NEGATIVE — SIGNIFICANT CHANGE UP
ANION GAP SERPL CALC-SCNC: 13 MMOL/L — SIGNIFICANT CHANGE UP (ref 7–14)
ANION GAP SERPL CALC-SCNC: 14 MMOL/L — SIGNIFICANT CHANGE UP (ref 7–14)
AST SERPL-CCNC: 42 U/L — HIGH (ref 0–41)
AST SERPL-CCNC: 45 U/L — HIGH (ref 0–41)
AUTO DIFF PNL BLD: ABNORMAL
BASOPHILS # BLD AUTO: 0.02 K/UL — SIGNIFICANT CHANGE UP (ref 0–0.2)
BASOPHILS NFR BLD AUTO: 0.5 % — SIGNIFICANT CHANGE UP (ref 0–1)
BILIRUB SERPL-MCNC: 1.1 MG/DL — SIGNIFICANT CHANGE UP (ref 0.2–1.2)
BILIRUB SERPL-MCNC: 1.4 MG/DL — HIGH (ref 0.2–1.2)
BUN SERPL-MCNC: 29 MG/DL — HIGH (ref 10–20)
BUN SERPL-MCNC: 52 MG/DL — HIGH (ref 10–20)
C-ANCA SER-ACNC: NEGATIVE — SIGNIFICANT CHANGE UP
CALCIUM SERPL-MCNC: 8.8 MG/DL — SIGNIFICANT CHANGE UP (ref 8.5–10.1)
CALCIUM SERPL-MCNC: 8.9 MG/DL — SIGNIFICANT CHANGE UP (ref 8.5–10.1)
CHLORIDE SERPL-SCNC: 100 MMOL/L — SIGNIFICANT CHANGE UP (ref 98–110)
CHLORIDE SERPL-SCNC: 101 MMOL/L — SIGNIFICANT CHANGE UP (ref 98–110)
CO2 SERPL-SCNC: 27 MMOL/L — SIGNIFICANT CHANGE UP (ref 17–32)
CO2 SERPL-SCNC: 28 MMOL/L — SIGNIFICANT CHANGE UP (ref 17–32)
CREAT SERPL-MCNC: 3.2 MG/DL — HIGH (ref 0.7–1.5)
CREAT SERPL-MCNC: 5.2 MG/DL — CRITICAL HIGH (ref 0.7–1.5)
EGFR: 12 ML/MIN/1.73M2 — LOW
EGFR: 22 ML/MIN/1.73M2 — LOW
EOSINOPHIL # BLD AUTO: 0.17 K/UL — SIGNIFICANT CHANGE UP (ref 0–0.7)
EOSINOPHIL NFR BLD AUTO: 4 % — SIGNIFICANT CHANGE UP (ref 0–8)
GBM IGG SER-ACNC: 3 — SIGNIFICANT CHANGE UP (ref 0–20)
GLUCOSE BLDC GLUCOMTR-MCNC: 104 MG/DL — HIGH (ref 70–99)
GLUCOSE BLDC GLUCOMTR-MCNC: 106 MG/DL — HIGH (ref 70–99)
GLUCOSE BLDC GLUCOMTR-MCNC: 133 MG/DL — HIGH (ref 70–99)
GLUCOSE BLDC GLUCOMTR-MCNC: 147 MG/DL — HIGH (ref 70–99)
GLUCOSE SERPL-MCNC: 107 MG/DL — HIGH (ref 70–99)
GLUCOSE SERPL-MCNC: 117 MG/DL — HIGH (ref 70–99)
HCT VFR BLD CALC: 27.5 % — LOW (ref 42–52)
HGB BLD-MCNC: 9.2 G/DL — LOW (ref 14–18)
IMM GRANULOCYTES NFR BLD AUTO: 0.9 % — HIGH (ref 0.1–0.3)
LYMPHOCYTES # BLD AUTO: 0.53 K/UL — LOW (ref 1.2–3.4)
LYMPHOCYTES # BLD AUTO: 12.4 % — LOW (ref 20.5–51.1)
MCHC RBC-ENTMCNC: 31.5 PG — HIGH (ref 27–31)
MCHC RBC-ENTMCNC: 33.5 G/DL — SIGNIFICANT CHANGE UP (ref 32–37)
MCV RBC AUTO: 94.2 FL — HIGH (ref 80–94)
MONOCYTES # BLD AUTO: 0.48 K/UL — SIGNIFICANT CHANGE UP (ref 0.1–0.6)
MONOCYTES NFR BLD AUTO: 11.3 % — HIGH (ref 1.7–9.3)
NEUTROPHILS # BLD AUTO: 3.02 K/UL — SIGNIFICANT CHANGE UP (ref 1.4–6.5)
NEUTROPHILS NFR BLD AUTO: 70.9 % — SIGNIFICANT CHANGE UP (ref 42.2–75.2)
NRBC # BLD: 0 /100 WBCS — SIGNIFICANT CHANGE UP (ref 0–0)
P-ANCA SER-ACNC: NEGATIVE — SIGNIFICANT CHANGE UP
PLATELET # BLD AUTO: 85 K/UL — LOW (ref 130–400)
POTASSIUM SERPL-MCNC: 3.5 MMOL/L — SIGNIFICANT CHANGE UP (ref 3.5–5)
POTASSIUM SERPL-MCNC: 3.6 MMOL/L — SIGNIFICANT CHANGE UP (ref 3.5–5)
POTASSIUM SERPL-SCNC: 3.5 MMOL/L — SIGNIFICANT CHANGE UP (ref 3.5–5)
POTASSIUM SERPL-SCNC: 3.6 MMOL/L — SIGNIFICANT CHANGE UP (ref 3.5–5)
PROT SERPL-MCNC: 6.4 G/DL — SIGNIFICANT CHANGE UP (ref 6–8)
PROT SERPL-MCNC: 7 G/DL — SIGNIFICANT CHANGE UP (ref 6–8)
RBC # BLD: 2.92 M/UL — LOW (ref 4.7–6.1)
RBC # FLD: 14.2 % — SIGNIFICANT CHANGE UP (ref 11.5–14.5)
SODIUM SERPL-SCNC: 141 MMOL/L — SIGNIFICANT CHANGE UP (ref 135–146)
SODIUM SERPL-SCNC: 142 MMOL/L — SIGNIFICANT CHANGE UP (ref 135–146)
WBC # BLD: 4.26 K/UL — LOW (ref 4.8–10.8)
WBC # FLD AUTO: 4.26 K/UL — LOW (ref 4.8–10.8)

## 2022-03-18 PROCEDURE — 71045 X-RAY EXAM CHEST 1 VIEW: CPT | Mod: 26

## 2022-03-18 PROCEDURE — 99221 1ST HOSP IP/OBS SF/LOW 40: CPT

## 2022-03-18 PROCEDURE — 99233 SBSQ HOSP IP/OBS HIGH 50: CPT

## 2022-03-18 PROCEDURE — 99232 SBSQ HOSP IP/OBS MODERATE 35: CPT

## 2022-03-18 RX ORDER — AMLODIPINE BESYLATE 2.5 MG/1
10 TABLET ORAL DAILY
Refills: 0 | Status: DISCONTINUED | OUTPATIENT
Start: 2022-03-18 | End: 2022-03-21

## 2022-03-18 RX ORDER — NIFEDIPINE 30 MG
30 TABLET, EXTENDED RELEASE 24 HR ORAL DAILY
Refills: 0 | Status: DISCONTINUED | OUTPATIENT
Start: 2022-03-18 | End: 2022-03-18

## 2022-03-18 RX ADMIN — AMLODIPINE BESYLATE 5 MILLIGRAM(S): 2.5 TABLET ORAL at 05:27

## 2022-03-18 RX ADMIN — Medication 25 MILLIGRAM(S): at 21:26

## 2022-03-18 RX ADMIN — Medication 2 MILLIGRAM(S): at 19:46

## 2022-03-18 RX ADMIN — ATORVASTATIN CALCIUM 40 MILLIGRAM(S): 80 TABLET, FILM COATED ORAL at 21:26

## 2022-03-18 RX ADMIN — Medication 650 MILLIGRAM(S): at 16:45

## 2022-03-18 RX ADMIN — HEPARIN SODIUM 5000 UNIT(S): 5000 INJECTION INTRAVENOUS; SUBCUTANEOUS at 17:09

## 2022-03-18 RX ADMIN — Medication 25 MILLIGRAM(S): at 12:42

## 2022-03-18 RX ADMIN — HEPARIN SODIUM 5000 UNIT(S): 5000 INJECTION INTRAVENOUS; SUBCUTANEOUS at 05:29

## 2022-03-18 RX ADMIN — Medication 650 MILLIGRAM(S): at 15:55

## 2022-03-18 RX ADMIN — Medication 1 MILLIGRAM(S): at 11:24

## 2022-03-18 RX ADMIN — CHLORHEXIDINE GLUCONATE 1 APPLICATION(S): 213 SOLUTION TOPICAL at 05:29

## 2022-03-18 RX ADMIN — Medication 2 MILLIGRAM(S): at 15:10

## 2022-03-18 RX ADMIN — Medication 100 MILLIGRAM(S): at 11:24

## 2022-03-18 RX ADMIN — PANTOPRAZOLE SODIUM 40 MILLIGRAM(S): 20 TABLET, DELAYED RELEASE ORAL at 08:14

## 2022-03-18 RX ADMIN — Medication 1 SPRAY(S): at 14:39

## 2022-03-18 RX ADMIN — Medication 2 MILLIGRAM(S): at 09:48

## 2022-03-18 RX ADMIN — AMLODIPINE BESYLATE 10 MILLIGRAM(S): 2.5 TABLET ORAL at 11:28

## 2022-03-18 NOTE — PROGRESS NOTE ADULT - ASSESSMENT
# VAMSI on CKD 3 / last known creat 1.65 on 10/23/21  # DM for 20 years, no known retinopathy  # Severe hyperkalemia / d/t renal failure, ACE inhibitor  # Hyponatremia / d/t renal failure / DKA  # Acidemia / HAGMA / renal failure/DKA  # Normo/macrocytic anemia     Recommendations:  - d/c iv fluids if tolerating po intake  - febrile, repeat cultures / r/o aspiration pna d/t pts mental statsus  - CTAP reviewed, no hydronephrosis, bladder not distended, b/l intra-renal calculi  - renal/bladder ultrasound R/L 11/10 cm nonobstructive calculi, no hydro  - etiology of VAMSI unclear, needs serologic w/u;  C3, C4 wnl, LYNN neg;  f/u serum flc ratio/spep/faustino  - creat level down-trending today w/o HD, has very good urine output  - monitor renal function off HD, strict i/o / had HD today 2hrs, no UF  - repeated UA noted, urine pr/cr ratio 0.5g/g  - BP elevated- pt very agitated, etoh withdrawal- appreciate addiction medicine eval  - monitor hgb, f/u iron stores  - phos level 5.3 , iPTH 311

## 2022-03-18 NOTE — PROGRESS NOTE ADULT - SUBJECTIVE AND OBJECTIVE BOX
KECIA MARTIN  56y, Male  Allergy: No Known Allergies    Hospital Day: 3d    Patient seen and examined earlier today.     PMH/PSH:  PAST MEDICAL & SURGICAL HISTORY:  Diabetes    Hypertension    HLD (hyperlipidemia)    Neuropathy    Glaucoma    No significant past surgical history      LAST 24-Hr EVENTS:    VITALS:  T(F): 97.9 (22 @ 10:20), Max: 100.2 (22 @ 15:05)  HR: 99 (22 @ 10:20)  BP: 166/80 (22 @ 10:20) (166/80 - 194/89)  RR: 18 (22 @ 10:20)  SpO2: 93% (22 @ 10:20)    TESTS & MEASUREMENTS:  Weight/BMI  73.9 (03-15-22 @ 15:22)  24.8 (03-15-22 @ 15:22)    22 @ 07:01  -  22 @ 07:00  --------------------------------------------------------  IN: 1800 mL / OUT: 1535 mL / NET: 265 mL    22 @ 07:01  -  22 @ 07:00  --------------------------------------------------------  IN: 225 mL / OUT: 3600 mL / NET: -3375 mL    22 @ 07:01  -  22 @ 12:06  --------------------------------------------------------  IN: 0 mL / OUT: 700 mL / NET: -700 mL                            9.2    4.26  )-----------( 85       ( 18 Mar 2022 05:59 )             27.5       INR: 1.05 ratio (22 @ 11:28)        141  |  100  |  52<H>  ----------------------------<  107<H>  3.5   |  27  |  5.2<HH>    Ca    8.8      18 Mar 2022 05:59  Phos  5.3       Mg     1.9         TPro  6.4  /  Alb  3.6  /  TBili  1.1  /  DBili  x   /  AST  45<H>  /  ALT  29  /  AlkPhos  107      LIVER FUNCTIONS - ( 18 Mar 2022 05:59 )  Alb: 3.6 g/dL / Pro: 6.4 g/dL / ALK PHOS: 107 U/L / ALT: 29 U/L / AST: 45 U/L / GGT: x                 Culture - Blood (collected 03-15-22 @ 12:55)  Source: .Blood Blood-Peripheral  Preliminary Report (22 @ 23:01):    No growth to date.    Culture - Blood (collected 03-15-22 @ 12:55)  Source: .Blood Blood-Peripheral  Preliminary Report (22 @ 23:01):    No growth to date.      Urinalysis Basic - ( 17 Mar 2022 18:30 )    Color: Yellow / Appearance: Clear / S.020 / pH: x  Gluc: x / Ketone: Negative  / Bili: Negative / Urobili: 0.2 mg/dL   Blood: x / Protein: 30 mg/dL / Nitrite: Negative   Leuk Esterase: Small / RBC: 6-10 /HPF / WBC 3-5 /HPF   Sq Epi: x / Non Sq Epi: Few /HPF / Bacteria: Few      Procalcitonin, Serum: 1.09 ng/mL (22 @ 06:09)    D-Dimer Assay, Quantitative: 606 ng/mL DDU (22 @ 11:28)      Serum Pro-Brain Natriuretic Peptide: 3398 pg/mL (03-15-22 @ 12:11)    COVID-19 PCR: NotDetec (03-15-22 @ 12:11)        A1C with Estimated Average Glucose Result: 6.1 % (22 @ 06:09)      Indwelling Urethral Catheter:     Connect To:  Straight Drainage/Gravity    Indication:  Urine Output Monitoring in Critically Ill (03-15-22 @ 13:07) (not performed)      RADIOLOGY, ECG, & ADDITIONAL TESTS:  12 Lead ECG:   Ventricular Rate 91 BPM    Atrial Rate 91 BPM    P-R Interval 152 ms    QRS Duration 76 ms    Q-T Interval 382 ms    QTC Calculation(Bazett) 469 ms    P Axis 63 degrees    R Axis 63 degrees    T Axis 63 degrees    Diagnosis Line Normal sinus rhythm  Normal ECG    Confirmed by DAVID MI MD ([id number]) on 3/16/2022 12:27:18 PM (22 @ 08:17)    CT Head No Cont:   ACC: [id number] EXAM:  CT BRAIN                          PROCEDURE DATE:  03/15/2022          INTERPRETATION:  Clinical History / Reason for exam: Bilateral arm   numbness, weakness and neck pain.    CT SCAN OF THE BRAIN WITHOUT CONTRAST    TECHNIQUE:    Multiple transaxial noncontrast CT images of the brain were obtained from   base to vertex. Sagittal and coronal reformatted images were obtained.    COMPARISON:    Noncontrast CT scan of the brain dated 2021.    FINDINGS:    The third and lateral ventricles are mildly enlarged as are the cortical   sulci consistent with a mild degree of cortical atrophy.  The fourth   ventricle is normal in size and position.    There is no shift of the midline structures, evidence of acute   intracranial hemorrhage, or depressed skull fracture.    Mucosal thickening in the left maxillary sinus.    Sclerosis of the left mastoid air cells consistent with chronic   inflammatory changes.    IMPRESSION:    Mild atrophic changes.    --- End of Report---            EUGENE SEGUNDO MD; Attending Radiologist  This document has been electronically signed. Mar 15 2022  2:23PM (03-15-22 @ 14:13)  CT Abdomen and Pelvis No Cont:   ACC: [id number] EXAM:  CT ABDOMEN AND PELVIS                          PROCEDURE DATE:  03/15/2022          INTERPRETATION:  CLINICAL STATEMENT: Abdominal pain    TECHNIQUE: Contiguous axial CT images were obtained from the lower chest   to the pubic symphysis without intravenous contrast.  Oral contrast was   not administered.  Reformatted images in the coronal and sagittal planes   were acquired.    COMPARISON CT: 2021    OTHER STUDIES USED FOR CORRELATION: None.      FINDINGS:    LOWERCHEST: Coronary calcifications. Bibasilar atelectatic changes..    HEPATOBILIARY: Diffuse hepatic steatosis. No biliary ductal dilatation..    SPLEEN: Unremarkable.    PANCREAS: Pancreatic head calcifications..    ADRENAL GLANDS: Unremarkable.    KIDNEYS: Bilateral intrarenal calculi measuring up to 0.8 cm right lower   pole. No hydronephrosis bilaterally..    ABDOMINOPELVIC NODES: Unremarkable.    PELVIC ORGANS: Blanco catheter in urinary bladder..    PERITONEUM/MESENTERY/BOWEL: No bowel obstruction, pneumoperitoneum or   ascites. Normal caliber appendix.    BONES/SOFT TISSUES: Degenerative changes of the spine. No acute osseous   abnormality..    OTHER: Atherosclerosis abdominal aorta and branches.      IMPRESSION:    No acute intra-abdominal pathology.    Bilateral nephrolithiasis, as above.    --- End of Report ---            ELLIOT LANDAU MD; Attending Radiologist  This document has been electronically signed. Mar 15 2022  2:27PM (03-15-22 @ 14:13)    RECENT DIAGNOSTIC ORDERS:  Comprehensive Metabolic Panel: 19:00 (22 @ 10:26)  Xray Chest 1 View- PORTABLE-Routine: AM   Indication: sob  Transport: Portable,  w/ Monitor  Provider's Contact #: [phone] (22 @ 08:13)      MEDICATIONS:  MEDICATIONS  (STANDING):  amLODIPine   Tablet 10 milliGRAM(s) Oral daily  atorvastatin 40 milliGRAM(s) Oral at bedtime  chlordiazePOXIDE 25 milliGRAM(s) Oral three times a day  chlorhexidine 4% Liquid 1 Application(s) Topical <User Schedule>  dextrose 40% Gel 15 Gram(s) Oral once  dextrose 5%. 1000 milliLiter(s) (100 mL/Hr) IV Continuous <Continuous>  dextrose 5%. 1000 milliLiter(s) (50 mL/Hr) IV Continuous <Continuous>  dextrose 50% Injectable 25 Gram(s) IV Push once  dextrose 50% Injectable 12.5 Gram(s) IV Push once  dextrose 50% Injectable 25 Gram(s) IV Push once  folic acid 1 milliGRAM(s) Oral daily  glucagon  Injectable 1 milliGRAM(s) IntraMuscular once  heparin   Injectable 5000 Unit(s) SubCutaneous every 12 hours  insulin lispro (ADMELOG) corrective regimen sliding scale   SubCutaneous three times a day before meals  insulin lispro (ADMELOG) corrective regimen sliding scale   SubCutaneous at bedtime  LORazepam   Injectable 2 milliGRAM(s) IV Push every 4 hours  pantoprazole    Tablet 40 milliGRAM(s) Oral before breakfast  thiamine 100 milliGRAM(s) Oral daily    MEDICATIONS  (PRN):  acetaminophen     Tablet .. 650 milliGRAM(s) Oral every 6 hours PRN Temp greater or equal to 38C (100.4F), Mild Pain (1 - 3)  LORazepam   Injectable 1 milliGRAM(s) IV Push every 2 hours PRN Agitation  sodium chloride 0.65% Nasal 1 Spray(s) Both Nostrils three times a day PRN Nasal Congestion      HOME MEDICATIONS:  acetaminophen 325 mg oral tablet ()  DULoxetine 30 mg oral delayed release capsule ()  Januvia 100 mg oral tablet ()  lisinopril 20 mg oral tablet ()  metFORMIN 1000 mg oral tablet ()      PHYSICAL EXAM:  GENERAL: NAD  HEAD:  Atraumatic, Normocephalic  EYES:  conjunctiva and sclera clear  CHEST/LUNG: Clear to percussion bilaterally;    HEART: Regular rate and rhythm   ABDOMEN: Soft, Nontender, Nondistended   EXTREMITIES:  no cyanosis, or edema

## 2022-03-18 NOTE — PROGRESS NOTE ADULT - ASSESSMENT
IMPRESSION:  AMS 2/2 toxic metabolic encephalopathy- resolved  acute on chronic renal failure s/p HD  Hypothermia- resolved  Possible sepsis  H/O ETOH addiction  Possible AKA vs starvation ketosis  Mod MR    SUGGEST:      CNS: CIWA protocol. MV, Thiamine and folic acid.  may use precedex if needed. CTH reviewed. f/u addiction medicine recs    HEENT: Oral care    PULMONARY:  HOB @ 45 degrees. Aspiration Precautions . Incentive spirometry    CARDIOVASCULAR: ECHO results reviewed. avoid volume overload. BP control    GI: GI prophylaxis.  Feeding as tolerated    RENAL:  Follow up lytes q 12.  Correct as needed. f/u repeat HD with nephrology.     INFECTIOUS DISEASE: Follow up cultures. Monitor off ABX.  repeat Procalcitonin    HEMATOLOGICAL:  DVT prophylaxis- HSQ. Monitor cbc and coags    ENDOCRINE:  Follow up FS.  Insulin protocol if needed. send hba1c    MUSCULOSKELETAL: PT/REHAB , OOB to chair    Seamus: d/c seamus TOTY  Lines: KARI Gautam  Code status: Full code  Disposition: telemonitoring in ccu

## 2022-03-18 NOTE — CONSULT NOTE ADULT - PROBLEM SELECTOR RECOMMENDATION 9
After evaluation at this time continue on PRN  Ativan and hasn't received  Continue on thiamine and folic acid. Pt will be monitored and supportive care provided  CATCH team involved for aftercare and pt will follow up with aftercare

## 2022-03-18 NOTE — PROGRESS NOTE ADULT - SUBJECTIVE AND OBJECTIVE BOX
Nephrology Progress Note    KECIA MARTIN  MRN-[id number]  56y  Male    S:  Patient is seen and examined, events over the last 24h noted.    O:  Allergies:  No Known Allergies    Hospital Medications:   MEDICATIONS  (STANDING):  amLODIPine   Tablet 10 milliGRAM(s) Oral daily  atorvastatin 40 milliGRAM(s) Oral at bedtime  chlordiazePOXIDE 25 milliGRAM(s) Oral three times a day  chlorhexidine 4% Liquid 1 Application(s) Topical <User Schedule>  dextrose 40% Gel 15 Gram(s) Oral once  dextrose 5%. 1000 milliLiter(s) (100 mL/Hr) IV Continuous <Continuous>  dextrose 5%. 1000 milliLiter(s) (50 mL/Hr) IV Continuous <Continuous>  dextrose 50% Injectable 25 Gram(s) IV Push once  dextrose 50% Injectable 12.5 Gram(s) IV Push once  dextrose 50% Injectable 25 Gram(s) IV Push once  folic acid 1 milliGRAM(s) Oral daily  glucagon  Injectable 1 milliGRAM(s) IntraMuscular once  heparin   Injectable 5000 Unit(s) SubCutaneous every 12 hours  insulin lispro (ADMELOG) corrective regimen sliding scale   SubCutaneous three times a day before meals  insulin lispro (ADMELOG) corrective regimen sliding scale   SubCutaneous at bedtime  pantoprazole    Tablet 40 milliGRAM(s) Oral before breakfast  thiamine 100 milliGRAM(s) Oral daily    MEDICATIONS  (PRN):  acetaminophen     Tablet .. 650 milliGRAM(s) Oral every 6 hours PRN Temp greater or equal to 38C (100.4F), Mild Pain (1 - 3)  LORazepam   Injectable 2 milliGRAM(s) IV Push every 4 hours PRN Agitation  sodium chloride 0.65% Nasal 1 Spray(s) Both Nostrils three times a day PRN Nasal Congestion    Home Medications:  acetaminophen 325 mg oral tablet: 2 tab(s) orally every 6 hours (2021 18:52)  DULoxetine 30 mg oral delayed release capsule: 1 cap(s) orally 2 times a day (2021 16:56)  Januvia 100 mg oral tablet: 1 tab(s) orally once a day (2021 16:56)  lisinopril 20 mg oral tablet: 1 tab(s) orally once a day (2021 16:56)  metFORMIN 1000 mg oral tablet: 1 tab(s) orally 2 times a day (2021 16:56)      VITALS:  Daily     Daily Weight in k.3 (18 Mar 2022 07:10)  T(F): 101.2 (22 @ 16:00), Max: 101.2 (22 @ 15:30)  HR: 85 (22 @ 17:00)  BP: 172/86 (22 @ 17:00)  RR: 20 (22 @ 17:00)  SpO2: 94% (22 @ 15:40)  Wt(kg): --  I&O's Detail    17 Mar 2022 07:01  -  18 Mar 2022 07:00  --------------------------------------------------------  IN:    sodium chloride 0.45%: 225 mL  Total IN: 225 mL    OUT:    Indwelling Catheter - Urethral (mL): 3600 mL  Total OUT: 3600 mL    Total NET: -3375 mL      18 Mar 2022 07:01  -  18 Mar 2022 17:25  --------------------------------------------------------  IN:  Total IN: 0 mL    OUT:    Indwelling Catheter - Urethral (mL): 700 mL    Voided (mL): 715 mL  Total OUT: 1415 mL    Total NET: -1415 mL        I&O's Summary    17 Mar 2022 07:01  -  18 Mar 2022 07:00  --------------------------------------------------------  IN: 225 mL / OUT: 3600 mL / NET: -3375 mL    18 Mar 2022 07:01  -  18 Mar 2022 17:25  --------------------------------------------------------  IN: 0 mL / OUT: 1415 mL / NET: -1415 mL          PHYSICAL EXAM:  Gen: NAD  Resp: CTAB  Card: S1/S2  Abd: soft  Extremities:   Vascular access:       LABS:    Blood Gas Profile w/Lytes - Venous: Performed in Lab (03-15-22 @ 13:05)  Blood Gas Venous - Sodium: 126 mmol/L (03-15-22 @ 13:05)  Blood Gas Venous - Potassium: 8.1 mmol/L (03-15-22 @ 13:05)        141  |  100  |  52<H>  ----------------------------<  107<H>  3.5   |  27  |  5.2<HH>    Ca    8.8      18 Mar 2022 05:59  Phos  5.3       Mg     1.9         TPro  6.4  /  Alb  3.6  /  TBili  1.1  /  DBili      /  AST  45<H>  /  ALT  29  /  AlkPhos  107      eGFR if Non African American: 47 mL/min/1.73M2 (21 @ 06:05)  eGFR if African American: 54 mL/min/1.73M2 (21 @ 06:05)    Phosphorus Level, Serum: 5.3 mg/dL (22 @ 12:06)  Phosphorus Level, Serum: 4.1 mg/dL (22 @ 15:07)    Intact PTH: 311 pg/mL (22 @ 11:45)                          9.2    4.26  )-----------( 85       ( 18 Mar 2022 05:59 )             27.5     Mean Cell Volume: 94.2 fL (22 @ 05:59)        Urine Studies:  Urinalysis Basic - ( 17 Mar 2022 18:30 )    Color: Yellow / Appearance: Clear / S.020 / pH:   Gluc:  / Ketone: Negative  / Bili: Negative / Urobili: 0.2 mg/dL   Blood:  / Protein: 30 mg/dL / Nitrite: Negative   Leuk Esterase: Small / RBC: 6-10 /HPF / WBC 3-5 /HPF   Sq Epi:  / Non Sq Epi: Few /HPF / Bacteria: Few          Creatinine, Random Urine: 55 mg/dL ( @ 18:30)  Protein/Creatinine Ratio Calculation: 0.5 Ratio ( 18:30)    Creatinine trend:  Creatinine, Serum: 5.2 mg/dL (22 @ 05:59)  Creatinine, Serum: 6.2 mg/dL (22 @ 06:00)  Creatinine, Serum: 5.7 mg/dL (22 @ 15:07)  Creatinine, Serum: 9.9 mg/dL (22 @ 11:28)  Creatinine, Serum: 10.0 mg/dL (22 @ 06:09)  Creatinine, Serum: 9.5 mg/dL (03-15-22 @ 19:34)  Creatinine, Serum: 14.8 mg/dL (03-15-22 @ 19:10)  Creatinine, Serum: 15.3 mg/dL (03-15-22 @ 12:11)  Creatinine, Serum: 1.6 mg/dL (21 @ 06:05)    Glomerular Basement Membrane Ab IgG: 3 (22 @ 12:01)  Cytoplasmic (c-ANCA) Antibody: Negative (22 @ 11:45)  Perinuclear (p-ANCA) Antibody: Negative (22 @ 11:45)    US Renal:   ACC: [id number] EXAM:  US KIDNEY(S)                          PROCEDURE DATE:  03/15/2022          INTERPRETATION:  CLINICAL INFORMATION: Worsening renal function.    COMPARISON: March 15, 2022    TECHNIQUE: Sonography of the kidneys and bladder.    FINDINGS:    Right kidney: 11.2 x 4.8 x 4.7 cm. 8 mm nonobstructing calculus within   the lower pole of the right kidney.    Left kidney:  10.1 x 6.8 x 4.7 cm. 5 mm x 4 mm lower pole nonobstructing   calculus.    IMPRESSION:    Nonobstructing lower pole calculi. Unchanged from recent CT scan.        --- End of Report ---            ANABELLE ALLEN MD; Attending Interventional Radiologist  This document has been electronically signed. Mar 16 2022 10:22AM (03-15-22 @ 17:33)       Nephrology Progress Note    KECIA MARTIN  [id number]  56y  Male    S:  Patient is seen and examined, events over the last 24h noted.    O:  Allergies:  No Known Allergies    Hospital Medications:   MEDICATIONS  (STANDING):  amLODIPine   Tablet 10 milliGRAM(s) Oral daily  atorvastatin 40 milliGRAM(s) Oral at bedtime  chlordiazePOXIDE 25 milliGRAM(s) Oral three times a dayfolic acid 1 milliGRAM(s) Oral daily  heparin   Injectable 5000 Unit(s) SubCutaneous every 12 hours  insulin lispro (ADMELOG) corrective regimen sliding scale   SubCutaneous three times a day before meals  insulin lispro (ADMELOG) corrective regimen sliding scale   SubCutaneous at bedtime  pantoprazole    Tablet 40 milliGRAM(s) Oral before breakfast  thiamine 100 milliGRAM(s) Oral daily    MEDICATIONS  (PRN):  acetaminophen     Tablet .. 650 milliGRAM(s) Oral every 6 hours PRN Temp greater or equal to 38C (100.4F), Mild Pain (1 - 3)  LORazepam   Injectable 2 milliGRAM(s) IV Push every 4 hours PRN Agitation  sodium chloride 0.65% Nasal 1 Spray(s) Both Nostrils three times a day PRN Nasal Congestion    Home Medications:  acetaminophen 325 mg oral tablet: 2 tab(s) orally every 6 hours (2021 18:52)  DULoxetine 30 mg oral delayed release capsule: 1 cap(s) orally 2 times a day (2021 16:56)  Januvia 100 mg oral tablet: 1 tab(s) orally once a day (2021 16:56)  lisinopril 20 mg oral tablet: 1 tab(s) orally once a day (2021 16:56)  metFORMIN 1000 mg oral tablet: 1 tab(s) orally 2 times a day (2021 16:56)      VITALS:  Daily Weight in k.3 (18 Mar 2022 07:10)  T(F): 101.2 (22 @ 16:00), Max: 101.2 (22 @ 15:30)  HR: 85 (22 @ 17:00)  BP: 172/86 (22 @ 17:00)  RR: 20 (22 @ 17:00)  SpO2: 94% (03-18-22 @ 15:40)  Wt(kg): --  I&O's Detail    17 Mar 2022 07:01  -  18 Mar 2022 07:00  --------------------------------------------------------  IN:    sodium chloride 0.45%: 225 mL  Total IN: 225 mL    OUT:    Indwelling Catheter - Urethral (mL): 3600 mL  Total OUT: 3600 mL    Total NET: -3375 mL      18 Mar 2022 07:01  -  18 Mar 2022 17:25  --------------------------------------------------------  IN:  Total IN: 0 mL    OUT:    Indwelling Catheter - Urethral (mL): 700 mL    Voided (mL): 715 mL  Total OUT: 1415 mL    Total NET: -1415 mL        I&O's Summary    17 Mar 2022 07:01  -  18 Mar 2022 07:00  --------------------------------------------------------  IN: 225 mL / OUT: 3600 mL / NET: -3375 mL    18 Mar 2022 07:01  -  18 Mar 2022 17:25  --------------------------------------------------------  IN: 0 mL / OUT: 1415 mL / NET: -1415 mL          PHYSICAL EXAM:  Gen: NAD, intermittently agitated  Resp: CTAB  Card: S1/S2  Abd: soft  Extremities: no edema  Vascular access: KARI santos with dressing c/d/i      LABS:        141  |  100  |  52<H>  ----------------------------<  107<H>  3.5   |  27  |  5.2<HH>    Ca    8.8      18 Mar 2022 05:59  Phos  5.3       Mg     1.9         TPro  6.4  /  Alb  3.6  /  TBili  1.1  /  DBili      /  AST  45<H>  /  ALT  29  /  AlkPhos  107      Phosphorus Level, Serum: 5.3 mg/dL (22 @ 12:06)  Phosphorus Level, Serum: 4.1 mg/dL (22 @ 15:07)    Intact PTH: 311 pg/mL (22 @ 11:45)                          9.2    4.26  )-----------( 85       ( 18 Mar 2022 05:59 )             27.5     Mean Cell Volume: 94.2 fL (22 @ 05:59)        Urine Studies:  Urinalysis Basic - ( 17 Mar 2022 18:30 )    Color: Yellow / Appearance: Clear / S.020 / pH:   Gluc:  / Ketone: Negative  / Bili: Negative / Urobili: 0.2 mg/dL   Blood:  / Protein: 30 mg/dL / Nitrite: Negative   Leuk Esterase: Small / RBC: 6-10 /HPF / WBC 3-5 /HPF   Sq Epi:  / Non Sq Epi: Few /HPF / Bacteria: Few    Creatinine, Random Urine: 55 mg/dL ( @ 18:30)  Protein/Creatinine Ratio Calculation: 0.5 Ratio ( @ 18:30)    Creatinine trend:  Creatinine, Serum: 5.2 mg/dL (22 @ 05:59)  Creatinine, Serum: 6.2 mg/dL (22 @ 06:00)  Creatinine, Serum: 5.7 mg/dL (22 @ 15:07)  Creatinine, Serum: 9.9 mg/dL (22 @ 11:28)  Creatinine, Serum: 10.0 mg/dL (22 @ 06:09)    Glomerular Basement Membrane Ab IgG: 3 (22 @ 12:01)  Cytoplasmic (c-ANCA) Antibody: Negative (22 @ 11:45)  Perinuclear (p-ANCA) Antibody: Negative (22 @ 11:45)    Anti Nuclear Factor Titer: Negative (22 @ 11:45)

## 2022-03-18 NOTE — PHARMACOTHERAPY INTERVENTION NOTE - COMMENTS
Spoke with Dr. Owens, regarding to orders for amlodipine and nifedipine for hypertension. Both are calcium channel blockers are not typically given concomitantly as benefits together do not outweigh the risk of adverse reactions. Recommended a different class of medications to manage hypertension. Discussed with provider to continue with one of the calcium channel blockers, in this case amlodipine and add on a beta blocker if needed, as patient's renal function is impaired, ruling out ARBs and ACEi, and patient is slightly tachy, ruling out hydralazine.
Not on dvt ppx; rec to consider adding on. No contraindication noted
Spoke with Dr. Hamlin regarding lorazepam 4mg IVP every 4 hours standing. After patient was seen by addiction consulting team for alcoholism, was hallucinating and experiencing tremor-like behavior. Recommend reduce dose to 2mg IVP every 4 hours in addition to PRN order to cover for s/s of withdrawals. Will monitor patient for s/s of adverse reactions.

## 2022-03-18 NOTE — PROGRESS NOTE ADULT - ASSESSMENT
55 yo male, pmh of htn, hld, dm, glaucoma, b/l le neuropathy, presents to ed for multiple complaints. Per pt and family, for over a week pt has had increased weakness, neck pain, numbness to b/l upper extremities, slowed speech, and nausea. No specific pain or radiation. Pt was at ophthalmologist on day of admission and was sent in for weakness.     found to have ; acute renal failure / severe metabolic acidosis / hyperkalemia / mild fluid overload / uremic - toxic encephalopathy / anemia    Acute renal failure on ckd iiia  -started on HD    -work up underway - defer to nephrology   -hold lisinopril and metformin  -avoid nephrotoxics    Metabolic encephalopathy  -improved    Alcoholism  -now showing signs of DTs  -start librium 25 mg tid x 3 doses and reassess  -s/p precedex  -thiamine and folate  -ativan prn     Pancytopenia   -improving  -s/p 2 units prbc    Type ii dm with hyperglycemia and neuropathy  -sliding scale  -avoid metformin

## 2022-03-18 NOTE — CONSULT NOTE ADULT - SUBJECTIVE AND OBJECTIVE BOX
Pt interviewed, examined and EMR chart reviewed.  Pt admits to drinking 2 x per week 1/2 pint whiskey at a time for most of his adult life. Last Drink 2 days prior to admission  Hx of withdrawal   variable periods of sobriety in the past.  Has been in detox before _____yes,   ___X__No    SOCIAL HISTORY:    REVIEW OF SYSTEMS:    Constitutional: No fever, weight loss or fatigue  ENT:  No difficulty hearing, tinnitus, vertigo; No sinus or throat pain  Neck: No pain or stiffness  Respiratory: No cough, wheezing, chills or hemoptysis  Cardiovascular: No chest pain, palpitations, shortness of breath, dizziness or leg swelling  Gastrointestinal: No abdominal or epigastric pain. No nausea, vomiting or hematemesis; No diarrhea or constipation. No melena or hematochezia.  Neurological: No headaches, memory loss, loss of strength, numbness or tremors  Musculoskeletal: No joint pain or swelling; No muscle, back or extremity pain  Psychiatric: No depression, anxiety, mood swings or difficulty sleeping    MEDICATIONS  (STANDING):  amLODIPine   Tablet 5 milliGRAM(s) Oral daily  atorvastatin 40 milliGRAM(s) Oral at bedtime  chlorhexidine 4% Liquid 1 Application(s) Topical <User Schedule>  dextrose 40% Gel 15 Gram(s) Oral once  dextrose 5%. 1000 milliLiter(s) (100 mL/Hr) IV Continuous <Continuous>  dextrose 5%. 1000 milliLiter(s) (50 mL/Hr) IV Continuous <Continuous>  dextrose 50% Injectable 25 Gram(s) IV Push once  dextrose 50% Injectable 12.5 Gram(s) IV Push once  dextrose 50% Injectable 25 Gram(s) IV Push once  folic acid 1 milliGRAM(s) Oral daily  glucagon  Injectable 1 milliGRAM(s) IntraMuscular once  heparin   Injectable 5000 Unit(s) SubCutaneous every 12 hours  insulin lispro (ADMELOG) corrective regimen sliding scale   SubCutaneous three times a day before meals  insulin lispro (ADMELOG) corrective regimen sliding scale   SubCutaneous at bedtime  pantoprazole    Tablet 40 milliGRAM(s) Oral before breakfast  thiamine 100 milliGRAM(s) Oral daily    MEDICATIONS  (PRN):  acetaminophen     Tablet .. 650 milliGRAM(s) Oral every 6 hours PRN Temp greater or equal to 38C (100.4F), Mild Pain (1 - 3)  LORazepam   Injectable 2 milliGRAM(s) IV Push every 1 hour PRN Symptom-triggered: each CIWA -Ar score 8 or GREATER  sodium chloride 0.65% Nasal 1 Spray(s) Both Nostrils three times a day PRN Nasal Congestion      Vital Signs Last 24 Hrs  T(C): 36.4 (18 Mar 2022 07:10), Max: 37.9 (17 Mar 2022 15:05)  T(F): 97.5 (18 Mar 2022 07:10), Max: 100.2 (17 Mar 2022 15:05)  HR: 72 (18 Mar 2022 06:42) (72 - 94)  BP: 170/90 (18 Mar 2022 06:42) (162/78 - 194/89)  BP(mean): 123 (18 Mar 2022 06:42) (112 - 132)  RR: 22 (18 Mar 2022 07:10) (14 - 32)  SpO2: 93% (18 Mar 2022 06:42) (91% - 97%)    PHYSICAL EXAM:    Constitutional: NAD, well-groomed, well-developed  HEENT: PERRLA, EOMI, Normal Hearing, MMM  Neck: No LAD, No JVD  Back: Normal spine flexure, No CVA tenderness  Respiratory: CTAB/L  Cardiovascular: S1 and S2, RRR, no M/G/R  Gastrointestinal: BS+, soft, NT/ND  Extremities: No peripheral edema  Vascular: 2+ peripheral pulses  Neurological: A/O x 3, no focal deficits    LABS:                        9.2    4.26  )-----------( 85       ( 18 Mar 2022 05:59 )             27.5     03-18    141  |  100  |  52<H>  ----------------------------<  107<H>  3.5   |  27  |  5.2<HH>    Ca    8.8      18 Mar 2022 05:59  Phos  5.3     03-17  Mg     1.9     -17    TPro  6.4  /  Alb  3.6  /  TBili  1.1  /  DBili  x   /  AST  45<H>  /  ALT  29  /  AlkPhos  107  03-18    PT/INR - ( 16 Mar 2022 11:28 )   PT: 12.10 sec;   INR: 1.05 ratio         PTT - ( 16 Mar 2022 11:28 )  PTT:34.5 sec  Urinalysis Basic - ( 17 Mar 2022 18:30 )    Color: Yellow / Appearance: Clear / S.020 / pH: x  Gluc: x / Ketone: Negative  / Bili: Negative / Urobili: 0.2 mg/dL   Blood: x / Protein: 30 mg/dL / Nitrite: Negative   Leuk Esterase: Small / RBC: 6-10 /HPF / WBC 3-5 /HPF   Sq Epi: x / Non Sq Epi: Few /HPF / Bacteria: Few      Drug Screen Urine:  Alcohol Level  Alcohol, Blood: <10 mg/dL (03-15-22 @ 12:11)        RADIOLOGY & ADDITIONAL STUDIES:

## 2022-03-18 NOTE — PHARMACOTHERAPY INTERVENTION NOTE - INTERVENTION TYPE RECOOMEND
Therapy duplication
Dose Optimization/Non-renal Dose Adjustment
Therapy Recommended - Drug indicated but not ordered
Crescentic Advancement Flap Text: The defect edges were debeveled with a #15 scalpel blade.  Given the location of the defect and the proximity to free margins a crescentic advancement flap was deemed most appropriate.  Using a sterile surgical marker, the appropriate advancement flap was drawn incorporating the defect and placing the expected incisions within the relaxed skin tension lines where possible.    The area thus outlined was incised deep to adipose tissue with a #15 scalpel blade.  The skin margins were undermined to an appropriate distance in all directions utilizing iris scissors.

## 2022-03-18 NOTE — PROGRESS NOTE ADULT - ASSESSMENT
55 yo M with PMH DM on Metformin and Januvia, HTN, HLD, Glaucoma, b/l LE peripheral neuropathy who presents in acute renal failure.    #acute on chronic renal failure  #toxic metabolic encephalopathy  - DC bicarb drip  - dc 1/2 NS @75  - HD today?  - serial lytes  - f/u ECHO  - way  - Trend Trop, CK level  - f/u DIC panel    #DM  - hold Metformin  - insulin sliding scale  - FS q1    #HTN  - hold Lisinopril 20 mg qd  - Amlodipine 10 mg qd  - Procardia qd    #HLD  - Atorvastatin 40 mg qd    #EtOH abuse  - thiamine  - folic acid  - Vit B12, folate  - CIWA Protocol  - May use precedex  - Addiction medicine consult complete    Code status: Full Code  DVT ppx: subq heparin  Dispo: CCU Tele

## 2022-03-18 NOTE — PROGRESS NOTE ADULT - SUBJECTIVE AND OBJECTIVE BOX
24H events:    Patient is a 56y old Male who presents with a chief complaint of Acute Renal Failure (17 Mar 2022 13:11)    Primary diagnosis of Acute renal failure (ARF)       Today is hospital day 3d. NAOE, afebrile, no ativan given, more alert and awake    PAST MEDICAL & SURGICAL HISTORY  Diabetes    Hypertension    HLD (hyperlipidemia)    Neuropathy    Glaucoma    No significant past surgical history      SOCIAL HISTORY:  Negative for smoking/alcohol/drug use.     ALLERGIES:  No Known Allergies    MEDICATIONS:  STANDING MEDICATIONS  amLODIPine   Tablet 5 milliGRAM(s) Oral daily  atorvastatin 40 milliGRAM(s) Oral at bedtime  chlorhexidine 4% Liquid 1 Application(s) Topical <User Schedule>  dextrose 40% Gel 15 Gram(s) Oral once  dextrose 5%. 1000 milliLiter(s) IV Continuous <Continuous>  dextrose 5%. 1000 milliLiter(s) IV Continuous <Continuous>  dextrose 50% Injectable 25 Gram(s) IV Push once  dextrose 50% Injectable 12.5 Gram(s) IV Push once  dextrose 50% Injectable 25 Gram(s) IV Push once  folic acid 1 milliGRAM(s) Oral daily  glucagon  Injectable 1 milliGRAM(s) IntraMuscular once  heparin   Injectable 5000 Unit(s) SubCutaneous every 12 hours  insulin lispro (ADMELOG) corrective regimen sliding scale   SubCutaneous three times a day before meals  insulin lispro (ADMELOG) corrective regimen sliding scale   SubCutaneous at bedtime  pantoprazole    Tablet 40 milliGRAM(s) Oral before breakfast  thiamine 100 milliGRAM(s) Oral daily    PRN MEDICATIONS  acetaminophen     Tablet .. 650 milliGRAM(s) Oral every 6 hours PRN  LORazepam   Injectable 2 milliGRAM(s) IV Push every 1 hour PRN  sodium chloride 0.65% Nasal 1 Spray(s) Both Nostrils three times a day PRN    VITALS:   T(F): 97.5  HR: 72  BP: 170/90  RR: 22  SpO2: 93%    LABS:                        9.2    4.26  )-----------( 85       ( 18 Mar 2022 05:59 )             27.5     03-18    141  |  100  |  52<H>  ----------------------------<  107<H>  3.5   |  27  |  5.2<HH>    Ca    8.8      18 Mar 2022 05:59  Phos  5.3     03-17  Mg     1.9     03-17    TPro  6.4  /  Alb  3.6  /  TBili  1.1  /  DBili  x   /  AST  45<H>  /  ALT  29  /  AlkPhos  107  03-18    PT/INR - ( 16 Mar 2022 11:28 )   PT: 12.10 sec;   INR: 1.05 ratio         PTT - ( 16 Mar 2022 11:28 )  PTT:34.5 sec  Urinalysis Basic - ( 17 Mar 2022 18:30 )    Color: Yellow / Appearance: Clear / S.020 / pH: x  Gluc: x / Ketone: Negative  / Bili: Negative / Urobili: 0.2 mg/dL   Blood: x / Protein: 30 mg/dL / Nitrite: Negative   Leuk Esterase: Small / RBC: 6-10 /HPF / WBC 3-5 /HPF   Sq Epi: x / Non Sq Epi: Few /HPF / Bacteria: Few      ABG - ( 16 Mar 2022 09:35 )  pH, Arterial: 7.41  pH, Blood: x     /  pCO2: 35    /  pO2: 70    / HCO3: 22    / Base Excess: -1.9  /  SaO2: 95.6                  Culture - Blood (collected 15 Mar 2022 12:55)  Source: .Blood Blood-Peripheral  Preliminary Report (16 Mar 2022 23:01):    No growth to date.    Culture - Blood (collected 15 Mar 2022 12:55)  Source: .Blood Blood-Peripheral  Preliminary Report (16 Mar 2022 23:01):    No growth to date.      CARDIAC MARKERS ( 16 Mar 2022 11:28 )  x     / 0.08 ng/mL / x     / x     / 7.7 ng/mL      RADIOLOGY:    PHYSICAL EXAM:  CONSTITUTIONAL: in no acute distress, afebrile  SKIN: Warm, dry  HEAD: Normocephalic; atraumatic  EYES: No conjunctival injection. EOMI. PERRLA  ENT: No nasal discharge; oropharynx nonerythematous; airway clear  NECK: Supple; non tender, No JVD  CARD:  Regular rate and rhythm  RESP: CTAB; No wheezes, crackles, rales or rhonchi  ABD: Soft NTND; No guarding or rebound tenderness  EXT: Normal ROM.  No clubbing or cyanosis.  No edema, peripheral pulses 2+  NEURO: A&O x3, grossly unremarkable, no focal deficits  PSYCH: Cooperative, appropriate

## 2022-03-18 NOTE — PROGRESS NOTE ADULT - SUBJECTIVE AND OBJECTIVE BOX
Patient is a 56y old  Male who presents with a chief complaint of Acute Renal Failure (18 Mar 2022 09:06)        Over Night Events: No acute events overnight    ROS:     All ROS are negative except HPI       PHYSICAL EXAM    ICU Vital Signs Last 24 Hrs  T(C): 36.4 (18 Mar 2022 07:10), Max: 37.9 (17 Mar 2022 15:05)  T(F): 97.5 (18 Mar 2022 07:10), Max: 100.2 (17 Mar 2022 15:05)  HR: 72 (18 Mar 2022 06:42) (72 - 94)  BP: 170/90 (18 Mar 2022 06:42) (162/78 - 194/89)  BP(mean): 123 (18 Mar 2022 06:42) (112 - 132)  ABP: --  ABP(mean): --  RR: 22 (18 Mar 2022 07:10) (14 - 32)  SpO2: 93% (18 Mar 2022 06:42) (91% - 97%)      CONSTITUTIONAL:  NAD    ENT:   Airway patent,   Mouth with normal mucosa.   No thrush    EYES:   Pupils equal,   Round and reactive to light.    CARDIAC:   Normal rate,   Regular rhythm.    No edema      Vascular:  Normal systolic impulse  No Carotid bruits    RESPIRATORY:   No wheezing  Bilateral BS  Normal chest expansion  Not tachypneic,  No use of accessory muscles    GASTROINTESTINAL:  Abdomen soft,   Non-tender,   No guarding,   + BS    MUSCULOSKELETAL:   Range of motion is not limited,  No clubbing, cyanosis    NEUROLOGICAL:   awake but agitated      SKIN:   Skin normal color for race,   Warm and dry and intact.   No evidence of rash.          22 @ 07:01  -  22 @ 07:00  --------------------------------------------------------  IN:    sodium chloride 0.45%: 225 mL  Total IN: 225 mL    OUT:    Indwelling Catheter - Urethral (mL): 3600 mL  Total OUT: 3600 mL    Total NET: -3375 mL          LABS:                            9.2    4.26  )-----------( 85       ( 18 Mar 2022 05:59 )             27.5                                                   141  |  100  |  52<H>  ----------------------------<  107<H>  3.5   |  27  |  5.2<HH>    18 Mar 2022 05:59    141    |  100    |  52<H>  ----------------------------<  107<H>  3.5     |  27     |  5.2<HH>  17 Mar 2022 06:00    141    |  100    |  54<H>  ----------------------------<  143<H>  3.7     |  28     |  6.2<HH>    Ca    8.8        18 Mar 2022 05:59  Ca    8.4<L>      17 Mar 2022 06:00  Phos  5.3<H>     17 Mar 2022 12:06  Phos  4.1       16 Mar 2022 15:07  Mg     1.9       17 Mar 2022 12:06  Mg     1.6<L>     16 Mar 2022 15:07    TPro  6.4    /  Alb  3.6    /  TBili  1.1    /  DBili  x      /  AST  45<H>  /  ALT  29     /  AlkPhos  107    18 Mar 2022 05:59  TPro  6.4    /  Alb  3.6    /  TBili  0.8    /  DBili  x      /  AST  42<H>  /  ALT  29     /  AlkPhos  106    17 Mar 2022 06:00    Ca    8.8      18 Mar 2022 05:59  Phos  5.3     03-17  Mg     1.9     03-17    TPro  6.4  /  Alb  3.6  /  TBili  1.1  /  DBili  x   /  AST  45<H>  /  ALT  29  /  AlkPhos  107  03-18      PT/INR - ( 16 Mar 2022 11:28 )   PT: 12.10 sec;   INR: 1.05 ratio         PTT - ( 16 Mar 2022 11:28 )  PTT:34.5 sec                                       Urinalysis Basic - ( 17 Mar 2022 18:30 )    Color: Yellow / Appearance: Clear / S.020 / pH: x  Gluc: x / Ketone: Negative  / Bili: Negative / Urobili: 0.2 mg/dL   Blood: x / Protein: 30 mg/dL / Nitrite: Negative   Leuk Esterase: Small / RBC: 6-10 /HPF / WBC 3-5 /HPF   Sq Epi: x / Non Sq Epi: Few /HPF / Bacteria: Few        CARDIAC MARKERS ( 16 Mar 2022 11:28 )  x     / 0.08 ng/mL / x     / x     / 7.7 ng/mL                                            LIVER FUNCTIONS - ( 18 Mar 2022 05:59 )  Alb: 3.6 g/dL / Pro: 6.4 g/dL / ALK PHOS: 107 U/L / ALT: 29 U/L / AST: 45 U/L / GGT: x                                                  Culture - Blood (collected 15 Mar 2022 12:55)  Source: .Blood Blood-Peripheral  Preliminary Report (16 Mar 2022 23:01):    No growth to date.    Culture - Blood (collected 15 Mar 2022 12:55)  Source: .Blood Blood-Peripheral  Preliminary Report (16 Mar 2022 23:01):    No growth to date.                                                                                           MEDICATIONS  (STANDING):  amLODIPine   Tablet 10 milliGRAM(s) Oral daily  atorvastatin 40 milliGRAM(s) Oral at bedtime  chlorhexidine 4% Liquid 1 Application(s) Topical <User Schedule>  dextrose 40% Gel 15 Gram(s) Oral once  dextrose 5%. 1000 milliLiter(s) (100 mL/Hr) IV Continuous <Continuous>  dextrose 5%. 1000 milliLiter(s) (50 mL/Hr) IV Continuous <Continuous>  dextrose 50% Injectable 25 Gram(s) IV Push once  dextrose 50% Injectable 12.5 Gram(s) IV Push once  dextrose 50% Injectable 25 Gram(s) IV Push once  folic acid 1 milliGRAM(s) Oral daily  glucagon  Injectable 1 milliGRAM(s) IntraMuscular once  heparin   Injectable 5000 Unit(s) SubCutaneous every 12 hours  insulin lispro (ADMELOG) corrective regimen sliding scale   SubCutaneous three times a day before meals  insulin lispro (ADMELOG) corrective regimen sliding scale   SubCutaneous at bedtime  NIFEdipine XL 30 milliGRAM(s) Oral daily  pantoprazole    Tablet 40 milliGRAM(s) Oral before breakfast  thiamine 100 milliGRAM(s) Oral daily    MEDICATIONS  (PRN):  acetaminophen     Tablet .. 650 milliGRAM(s) Oral every 6 hours PRN Temp greater or equal to 38C (100.4F), Mild Pain (1 - 3)  LORazepam   Injectable 2 milliGRAM(s) IV Push every 1 hour PRN Symptom-triggered: each CIWA -Ar score 8 or GREATER  sodium chloride 0.65% Nasal 1 Spray(s) Both Nostrils three times a day PRN Nasal Congestion

## 2022-03-19 LAB
ALBUMIN SERPL ELPH-MCNC: 3.8 G/DL — SIGNIFICANT CHANGE UP (ref 3.5–5.2)
ALP SERPL-CCNC: 111 U/L — SIGNIFICANT CHANGE UP (ref 30–115)
ALT FLD-CCNC: 25 U/L — SIGNIFICANT CHANGE UP (ref 0–41)
ANION GAP SERPL CALC-SCNC: 14 MMOL/L — SIGNIFICANT CHANGE UP (ref 7–14)
AST SERPL-CCNC: 40 U/L — SIGNIFICANT CHANGE UP (ref 0–41)
BASOPHILS # BLD AUTO: 0.02 K/UL — SIGNIFICANT CHANGE UP (ref 0–0.2)
BASOPHILS NFR BLD AUTO: 0.4 % — SIGNIFICANT CHANGE UP (ref 0–1)
BILIRUB SERPL-MCNC: 1.5 MG/DL — HIGH (ref 0.2–1.2)
BUN SERPL-MCNC: 32 MG/DL — HIGH (ref 10–20)
CALCIUM SERPL-MCNC: 9.1 MG/DL — SIGNIFICANT CHANGE UP (ref 8.5–10.1)
CHLORIDE SERPL-SCNC: 100 MMOL/L — SIGNIFICANT CHANGE UP (ref 98–110)
CO2 SERPL-SCNC: 29 MMOL/L — SIGNIFICANT CHANGE UP (ref 17–32)
CREAT SERPL-MCNC: 3.5 MG/DL — HIGH (ref 0.7–1.5)
DRUG SCREEN, SERUM: SIGNIFICANT CHANGE UP
EGFR: 19 ML/MIN/1.73M2 — LOW
EOSINOPHIL # BLD AUTO: 0.17 K/UL — SIGNIFICANT CHANGE UP (ref 0–0.7)
EOSINOPHIL NFR BLD AUTO: 3.4 % — SIGNIFICANT CHANGE UP (ref 0–8)
GLUCOSE BLDC GLUCOMTR-MCNC: 104 MG/DL — HIGH (ref 70–99)
GLUCOSE BLDC GLUCOMTR-MCNC: 126 MG/DL — HIGH (ref 70–99)
GLUCOSE BLDC GLUCOMTR-MCNC: 138 MG/DL — HIGH (ref 70–99)
GLUCOSE SERPL-MCNC: 104 MG/DL — HIGH (ref 70–99)
HCT VFR BLD CALC: 31.4 % — LOW (ref 42–52)
HGB BLD-MCNC: 10.5 G/DL — LOW (ref 14–18)
IMM GRANULOCYTES NFR BLD AUTO: 0.6 % — HIGH (ref 0.1–0.3)
LYMPHOCYTES # BLD AUTO: 0.47 K/UL — LOW (ref 1.2–3.4)
LYMPHOCYTES # BLD AUTO: 9.4 % — LOW (ref 20.5–51.1)
MCHC RBC-ENTMCNC: 31.4 PG — HIGH (ref 27–31)
MCHC RBC-ENTMCNC: 33.4 G/DL — SIGNIFICANT CHANGE UP (ref 32–37)
MCV RBC AUTO: 94 FL — SIGNIFICANT CHANGE UP (ref 80–94)
MONOCYTES # BLD AUTO: 0.5 K/UL — SIGNIFICANT CHANGE UP (ref 0.1–0.6)
MONOCYTES NFR BLD AUTO: 10 % — HIGH (ref 1.7–9.3)
NEUTROPHILS # BLD AUTO: 3.81 K/UL — SIGNIFICANT CHANGE UP (ref 1.4–6.5)
NEUTROPHILS NFR BLD AUTO: 76.2 % — HIGH (ref 42.2–75.2)
NRBC # BLD: 0 /100 WBCS — SIGNIFICANT CHANGE UP (ref 0–0)
PLATELET # BLD AUTO: 85 K/UL — LOW (ref 130–400)
POTASSIUM SERPL-MCNC: 3.6 MMOL/L — SIGNIFICANT CHANGE UP (ref 3.5–5)
POTASSIUM SERPL-SCNC: 3.6 MMOL/L — SIGNIFICANT CHANGE UP (ref 3.5–5)
PROT SERPL-MCNC: 6.8 G/DL — SIGNIFICANT CHANGE UP (ref 6–8)
RBC # BLD: 3.34 M/UL — LOW (ref 4.7–6.1)
RBC # FLD: 13.4 % — SIGNIFICANT CHANGE UP (ref 11.5–14.5)
SODIUM SERPL-SCNC: 143 MMOL/L — SIGNIFICANT CHANGE UP (ref 135–146)
WBC # BLD: 5 K/UL — SIGNIFICANT CHANGE UP (ref 4.8–10.8)
WBC # FLD AUTO: 5 K/UL — SIGNIFICANT CHANGE UP (ref 4.8–10.8)

## 2022-03-19 PROCEDURE — 99291 CRITICAL CARE FIRST HOUR: CPT

## 2022-03-19 PROCEDURE — 99233 SBSQ HOSP IP/OBS HIGH 50: CPT

## 2022-03-19 PROCEDURE — 71045 X-RAY EXAM CHEST 1 VIEW: CPT | Mod: 26

## 2022-03-19 RX ORDER — DEXMEDETOMIDINE HYDROCHLORIDE IN 0.9% SODIUM CHLORIDE 4 UG/ML
0.2 INJECTION INTRAVENOUS
Qty: 400 | Refills: 0 | Status: DISCONTINUED | OUTPATIENT
Start: 2022-03-19 | End: 2022-03-22

## 2022-03-19 RX ADMIN — Medication 25 MILLIGRAM(S): at 05:34

## 2022-03-19 RX ADMIN — DEXMEDETOMIDINE HYDROCHLORIDE IN 0.9% SODIUM CHLORIDE 3.7 MICROGRAM(S)/KG/HR: 4 INJECTION INTRAVENOUS at 21:49

## 2022-03-19 RX ADMIN — AMLODIPINE BESYLATE 10 MILLIGRAM(S): 2.5 TABLET ORAL at 05:34

## 2022-03-19 RX ADMIN — Medication 2 MILLIGRAM(S): at 02:02

## 2022-03-19 RX ADMIN — HEPARIN SODIUM 5000 UNIT(S): 5000 INJECTION INTRAVENOUS; SUBCUTANEOUS at 05:39

## 2022-03-19 RX ADMIN — DEXMEDETOMIDINE HYDROCHLORIDE IN 0.9% SODIUM CHLORIDE 3.7 MICROGRAM(S)/KG/HR: 4 INJECTION INTRAVENOUS at 07:58

## 2022-03-19 RX ADMIN — Medication 2 MILLIGRAM(S): at 06:41

## 2022-03-19 RX ADMIN — CHLORHEXIDINE GLUCONATE 1 APPLICATION(S): 213 SOLUTION TOPICAL at 05:40

## 2022-03-19 RX ADMIN — DEXMEDETOMIDINE HYDROCHLORIDE IN 0.9% SODIUM CHLORIDE 3.7 MICROGRAM(S)/KG/HR: 4 INJECTION INTRAVENOUS at 18:08

## 2022-03-19 RX ADMIN — ATORVASTATIN CALCIUM 40 MILLIGRAM(S): 80 TABLET, FILM COATED ORAL at 21:50

## 2022-03-19 RX ADMIN — HEPARIN SODIUM 5000 UNIT(S): 5000 INJECTION INTRAVENOUS; SUBCUTANEOUS at 17:32

## 2022-03-19 RX ADMIN — PANTOPRAZOLE SODIUM 40 MILLIGRAM(S): 20 TABLET, DELAYED RELEASE ORAL at 05:39

## 2022-03-19 NOTE — PROGRESS NOTE ADULT - SUBJECTIVE AND OBJECTIVE BOX
Patient is a 56y old  Male who presents with a chief complaint of Acute Renal Failure (18 Mar 2022 16:53)        HPI:  55 yo M with PMH DM on Metformin and Januvia, HTN, HLD, Glaucoma, b/l LE peripheral neuropathy who presents in acute renal failure.  Pt has been feeling progressively weak for the past week.  Also reports increasing numbness to b/l UE, difficulty walking, cognitive slowing, nausea, and neck pain.  Pt was sent in by Ophthalmologist during office visit today.  Pt states he has not changed his metformin dose recently nor has he taken more than prescribed.  Pt denies fevers, chills, chest pain, SOB, abdominal pain, dysuria. (15 Mar 2022 16:24)      Pt evaluated on rounds.  I reviewed the radiology tests and hospital record.    I reviewed previous notes on this patient.    Interval Events: No overnight events.      REVIEW OF SYSTEMS:   see HPI      OBJECTIVE:  ICU Vital Signs Last 24 Hrs  T(C): 36.8 (19 Mar 2022 03:00), Max: 38.4 (18 Mar 2022 15:30)  T(F): 98.2 (19 Mar 2022 03:00), Max: 101.2 (18 Mar 2022 15:30)  HR: 91 (19 Mar 2022 04:00) (72 - 102)  BP: 184/89 (19 Mar 2022 04:00) (160/84 - 211/101)  BP(mean): 128 (19 Mar 2022 04:00) (115 - 135)  RR: 20 (19 Mar 2022 05:00) (14 - 31)  SpO2: 97% (19 Mar 2022 04:00) (93% - 100%)        17 @ 07:  -  -18 @ 07:00  --------------------------------------------------------  IN: 225 mL / OUT: 3600 mL / NET: -3375 mL     @ 07:01  -  -19 @ 06:18  --------------------------------------------------------  IN: 0 mL / OUT: 2700 mL / NET: -2700 mL      CAPILLARY BLOOD GLUCOSE      POCT Blood Glucose.: 104 mg/dL (19 Mar 2022 05:47)        PHYSICAL EXAM:       · ENMT:   Airway patent,   Nasal mucosa clear.  Mouth with normal mucosa.   No thrush    · EYES:   Clear bilaterally,   pupils equal,   round and reactive to light.    · CARDIAC:   Normal rate,   regular rhythm.    Heart sounds S1, S2.   No murmurs, no rubs or gallops on auscultation  no edema        CAROTID:   normal systolic impulse  no bruits    · RESPIRATORY:   rales  normal chest expansion  no retractions or use of accessory muscles  palpation of chest is normal with no fremitus  percussion of chest demonstrates no hyperresonance or dullness    · GASTROINTESTINAL:  Abdomen soft,   non-tender,   + BS  liver/spleen not palpable    · MUSCULOSKELETAL:   no clubbing, cyanosis      · SKIN:   Skin normal color for race,   warm, dry   No evidence of rash.        · HEME LYMPH:   no splenomegaly.  No cervical  lymphadenopathy.  no inguinal lymphadenopathy    HOSPITAL MEDICATIONS:  MEDICATIONS  (STANDING):  amLODIPine   Tablet 10 milliGRAM(s) Oral daily  atorvastatin 40 milliGRAM(s) Oral at bedtime  chlorhexidine 4% Liquid 1 Application(s) Topical <User Schedule>  dextrose 40% Gel 15 Gram(s) Oral once  dextrose 5%. 1000 milliLiter(s) (100 mL/Hr) IV Continuous <Continuous>  dextrose 5%. 1000 milliLiter(s) (50 mL/Hr) IV Continuous <Continuous>  dextrose 50% Injectable 25 Gram(s) IV Push once  dextrose 50% Injectable 12.5 Gram(s) IV Push once  dextrose 50% Injectable 25 Gram(s) IV Push once  folic acid 1 milliGRAM(s) Oral daily  glucagon  Injectable 1 milliGRAM(s) IntraMuscular once  heparin   Injectable 5000 Unit(s) SubCutaneous every 12 hours  insulin lispro (ADMELOG) corrective regimen sliding scale   SubCutaneous three times a day before meals  insulin lispro (ADMELOG) corrective regimen sliding scale   SubCutaneous at bedtime  pantoprazole    Tablet 40 milliGRAM(s) Oral before breakfast  thiamine 100 milliGRAM(s) Oral daily    MEDICATIONS  (PRN):  acetaminophen     Tablet .. 650 milliGRAM(s) Oral every 6 hours PRN Temp greater or equal to 38C (100.4F), Mild Pain (1 - 3)  LORazepam   Injectable 2 milliGRAM(s) IV Push every 4 hours PRN Agitation  sodium chloride 0.65% Nasal 1 Spray(s) Both Nostrils three times a day PRN Nasal Congestion    sodium chloride 0.45%.: Solution, 1000 milliLiter(s) infuse at 75 mL/Hr  Provider's Contact #: [phone]  dextrose 5% + sodium chloride 0.45%.: Solution, 1000 milliLiter(s) infuse at 75 mL/Hr  sodium chloride 0.9% Bolus:   1000 milliLiter(s), IV Bolus, once, infuse over 60 Minute(s), Stop After 1 Doses  Provider's Contact #: [phone]  sodium chloride 0.9% Bolus:   1000 milliLiter(s), IV Bolus, once, infuse over 60 Minute(s), Stop After 1 Doses  Provider's Contact #: [phone]  sodium chloride 0.9% Bolus:   500 milliLiter(s), IV Bolus, once, infuse over 30 Minute(s), Stop After 1 Doses  Provider's Contact #: [phone]  sodium chloride 0.9% Bolus:   500 milliLiter(s), IV Bolus, once, infuse over 30 Minute(s), Stop After 1 Doses  Provider's Contact #: [phone]      LABS:                        9.2    4.26  )-----------( 85       ( 18 Mar 2022 05:59 )             27.5     03-18    142  |  101  |  29<H>  ----------------------------<  117<H>  3.6   |  28  |  3.2<H>    Ca    8.9      18 Mar 2022 19:00  Phos  5.3     03-17  Mg     1.9     -17    TPro  7.0  /  Alb  3.9  /  TBili  1.4<H>  /  DBili  x   /  AST  42<H>  /  ALT  26  /  AlkPhos  114  -18      Urinalysis Basic - ( 17 Mar 2022 18:30 )    Color: Yellow / Appearance: Clear / S.020 / pH: x  Gluc: x / Ketone: Negative  / Bili: Negative / Urobili: 0.2 mg/dL   Blood: x / Protein: 30 mg/dL / Nitrite: Negative   Leuk Esterase: Small / RBC: 6-10 /HPF / WBC 3-5 /HPF   Sq Epi: x / Non Sq Epi: Few /HPF / Bacteria: Few        COVID-19 PCR: NotDetec (15 Mar 2022 12:11)    RADIOLOGY: Today I personally interpreted the latest CXR and other pertinent films.

## 2022-03-19 NOTE — PROGRESS NOTE ADULT - SUBJECTIVE AND OBJECTIVE BOX
[facility] FOLLOW UP NOTE  --------------------------------------------------------------------------------  Chief Complaint:    24 hour events/subjective:  Events over last 24hrs noted.  Received 3rd yesterday.  Urinating quite well.  Very agitated overnight into AM, now sedated        PAST HISTORY  --------------------------------------------------------------------------------  No significant changes to PMH, PSH, FHx, SHx, unless otherwise noted    ALLERGIES & MEDICATIONS  --------------------------------------------------------------------------------  Allergies    No Known Allergies    Intolerances      Standing Inpatient Medications  amLODIPine   Tablet 10 milliGRAM(s) Oral daily  atorvastatin 40 milliGRAM(s) Oral at bedtime  chlorhexidine 4% Liquid 1 Application(s) Topical <User Schedule>  dexMEDEtomidine Infusion 0.2 MICROgram(s)/kG/Hr IV Continuous <Continuous>  dextrose 40% Gel 15 Gram(s) Oral once  dextrose 5%. 1000 milliLiter(s) IV Continuous <Continuous>  dextrose 5%. 1000 milliLiter(s) IV Continuous <Continuous>  dextrose 50% Injectable 25 Gram(s) IV Push once  dextrose 50% Injectable 12.5 Gram(s) IV Push once  dextrose 50% Injectable 25 Gram(s) IV Push once  folic acid 1 milliGRAM(s) Oral daily  glucagon  Injectable 1 milliGRAM(s) IntraMuscular once  heparin   Injectable 5000 Unit(s) SubCutaneous every 12 hours  insulin lispro (ADMELOG) corrective regimen sliding scale   SubCutaneous three times a day before meals  insulin lispro (ADMELOG) corrective regimen sliding scale   SubCutaneous at bedtime  pantoprazole    Tablet 40 milliGRAM(s) Oral before breakfast  thiamine 100 milliGRAM(s) Oral daily    PRN Inpatient Medications  acetaminophen     Tablet .. 650 milliGRAM(s) Oral every 6 hours PRN  LORazepam   Injectable 2 milliGRAM(s) IV Push every 2 hours PRN  LORazepam   Injectable 4 milliGRAM(s) IV Push every 1 hour PRN  sodium chloride 0.65% Nasal 1 Spray(s) Both Nostrils three times a day PRN      REVIEW OF SYSTEMS  --------------------------------------------------------------------------------  Gen: No weight changes, fatigue, fevers/chills, weakness  Skin: No rashes  Head/Eyes/Ears/Mouth: No headache; Normal hearing; Normal vision w/o blurriness; No sinus pain/discomfort, sore throat  Respiratory: No dyspnea, cough, wheezing, hemoptysis  CV: No chest pain, PND, orthopnea  GI: No abdominal pain, diarrhea, constipation, nausea, vomiting, melena, hematochezia  : No increased frequency, dysuria, hematuria, nocturia  MSK: No joint pain/swelling; no back pain; no edema  Neuro: No dizziness/lightheadedness, weakness, seizures, numbness, tingling  Heme: No easy bruising or bleeding  Endo: No heat/cold intolerance  Psych: No significant nervousness, anxiety, stress, depression    All other systems were reviewed and are negative, except as noted.    VITALS/PHYSICAL EXAM  --------------------------------------------------------------------------------  T(C): 36.8 (03-19-22 @ 03:00), Max: 38.4 (03-18-22 @ 15:30)  HR: 72 (03-19-22 @ 10:54) (72 - 102)  BP: 159/84 (03-19-22 @ 10:54) (148/79 - 211/101)  RR: 25 (03-19-22 @ 10:54) (15 - 31)  SpO2: 92% (03-19-22 @ 10:54) (90% - 100%)  Wt(kg): --        03-18-22 @ 07:01  -  03-19-22 @ 07:00  --------------------------------------------------------  IN: 120 mL / OUT: 2700 mL / NET: -2580 mL    03-19-22 @ 07:01  -  03-19-22 @ 13:21  --------------------------------------------------------  IN: 138.5 mL / OUT: 0 mL / NET: 138.5 mL      Physical Exam:  	Gen: NAD  	UE: Warm,  no edema  	LE: Warm; no edema  	Neuro: No focal deficits  	Psych: Sedated  	Skin: Warm, without rashes  	Vascular access:    LABS/STUDIES  --------------------------------------------------------------------------------              10.5   5.00  >-----------<  85       [03-19-22 @ 06:01]              31.4     143  |  100  |  32  ----------------------------<  104      [03-19-22 @ 06:01]  3.6   |  29  |  3.5        Ca     9.1     [03-19-22 @ 06:01]    TPro  6.8  /  Alb  3.8  /  TBili  1.5  /  DBili  x   /  AST  40  /  ALT  25  /  AlkPhos  111  [03-19-22 @ 06:01]          Creatinine Trend:  SCr 3.5 [03-19 @ 06:01]  SCr 3.2 [03-18 @ 19:00]  SCr 5.2 [03-18 @ 05:59]  SCr 6.2 [03-17 @ 06:00]  SCr 5.7 [03-16 @ 15:07]    Urinalysis - [03-17-22 @ 18:30]      Color Yellow / Appearance Clear / SG 1.020 / pH 6.5      Gluc Negative / Ketone Negative  / Bili Negative / Urobili 0.2       Blood Large / Protein 30 / Leuk Est Small / Nitrite Negative      RBC 6-10 / WBC 3-5 / Hyaline  / Gran  / Sq Epi  / Non Sq Epi Few / Bacteria Few    Urine Creatinine 55      [03-17-22 @ 18:30]  Urine Protein 30      [03-17-22 @ 18:30]    PTH -- (Ca 8.3)      [03-16-22 @ 11:45]   311  TSH 3.51      [03-15-22 @ 12:11]    HBsAb Nonreact      [03-15-22 @ 19:10]  HBsAg Nonreact      [03-15-22 @ 19:10]  HBcAb Nonreact      [03-15-22 @ 19:10]    LYNN: titer Negative, pattern --      [03-16-22 @ 11:45]  C3 Complement 95      [03-16-22 @ 11:45]  C4 Complement 30      [03-16-22 @ 11:45]  ANCA: cANCA Negative, pANCA Negative, atypical ANCA Indeterminate Method interference due to LYNN Fluorescence      [03-16-22 @ 11:45]  anti-GBM 3      [03-16-22 @ 12:01]

## 2022-03-19 NOTE — PROGRESS NOTE ADULT - ASSESSMENT
IMPRESSION:  AMS 2/2 toxic metabolic encephalopathy- resolved  acute on chronic renal failure s/p HD  Hypothermia- resolved  Possible sepsis  H/O ETOH addiction  Possible AKA vs starvation ketosis  Mod MR    SUGGEST:      CNS: CIWA protocol.   MV, Thiamine and folic acid.   avoid precedex   f/u addiction medicine recs    HEENT: Oral care    PULMONARY:  HOB @ 45 degrees. Aspiration Precautions . Incentive spirometry    CARDIOVASCULAR: ECHO results reviewed.   avoid volume overload. BP control    GI: GI prophylaxis.  Feeding as tolerated    RENAL:  Follow up lytes q 12.  Correct as needed.    HD per nephrology.     INFECTIOUS DISEASE:   ID eval   Follow up cultures.    repeat Procalcitonin  restart abx if respikes temps    HEMATOLOGICAL:  DVT prophylaxis- HSQ. Monitor cbc and coags    ENDOCRINE:  Follow up FS.  Insulin protocol if needed. send hba1c    MUSCULOSKELETAL: PT/REHAB , OOB to chair    Seamus: d/c seamus TOV  Lines: KARI Gautam  Code status: Full code  Disposition: telemonitoring in ccu

## 2022-03-19 NOTE — PROGRESS NOTE ADULT - ASSESSMENT
57 yo male, pmh of htn, hld, dm, glaucoma, b/l le neuropathy, presents to ed for multiple complaints. Per pt and family, for over a week pt has had increased weakness, neck pain, numbness to b/l upper extremities, slowed speech, and nausea. No specific pain or radiation. Pt was at ophthalmologist on day of admission and was sent in for weakness.     found to have ; acute renal failure / severe metabolic acidosis / hyperkalemia / mild fluid overload / uremic - toxic encephalopathy / anemia    Acute renal failure on ckd iiia  -started on HD , had 3 sessions   -work up underway - defer to nephrology   -continue to hold lisinopril and metformin  -avoid nephrotoxics  -nephrology assessing dialysis needs on daily basis.     Metabolic encephalopathy  -now sedated on precedex drip     Alcoholism / delirum tremens  -requiring precedex drip   -on ativan prn  -thiamine and folate    Pancytopenia   -improving  -s/p 2 units prbc    Type ii dm with hyperglycemia and neuropathy  -sliding scale  -avoid metformin     Disp  on precedex drip, ativan prn

## 2022-03-19 NOTE — PROGRESS NOTE ADULT - SUBJECTIVE AND OBJECTIVE BOX
KECIA MARTIN  56y, Male  Allergy: No Known Allergies    Hospital Day: 4d    Patient seen and examined earlier today.     PMH/PSH:  PAST MEDICAL & SURGICAL HISTORY:  Diabetes    Hypertension    HLD (hyperlipidemia)    Neuropathy    Glaucoma    No significant past surgical history        LAST 24-Hr EVENTS:    VITALS:  T(F): 98.2 (22 @ 03:00), Max: 101.2 (22 @ 15:30)  HR: 70 (22 @ 12:27)  BP: 159/83 (22 @ 12:27) (148/79 - 211/101)  RR: 31 (22 @ 12:27)  SpO2: 90% (22 @ 12:27)      TESTS & MEASUREMENTS:  Weight/BMI  73.9 (03-15-22 @ 15:22)  24.8 (03-15-22 @ 15:22)    22 @ 07:01  -  22 @ 07:00  --------------------------------------------------------  IN: 225 mL / OUT: 3600 mL / NET: -3375 mL    22 @ 07:01  -  22 @ 07:00  --------------------------------------------------------  IN: 120 mL / OUT: 2700 mL / NET: -2580 mL    22 @ 07:01  -  22 @ 13:39  --------------------------------------------------------  IN: 138.5 mL / OUT: 0 mL / NET: 138.5 mL               10.5   5.00  )-----------( 85       ( 19 Mar 2022 06:01 )             31.4       INR: 1.05 ratio (22 @ 11:28)        143  |  100  |  32<H>  ----------------------------<  104<H>  3.6   |  29  |  3.5<H>    Ca    9.1      19 Mar 2022 06:01    TPro  6.8  /  Alb  3.8  /  TBili  1.5<H>  /  DBili  x   /  AST  40  /  ALT  25  /  AlkPhos  111      LIVER FUNCTIONS - ( 19 Mar 2022 06:01 )  Alb: 3.8 g/dL / Pro: 6.8 g/dL / ALK PHOS: 111 U/L / ALT: 25 U/L / AST: 40 U/L / GGT: x                 Culture - Blood (collected 03-15-22 @ 12:55)  Source: .Blood Blood-Peripheral  Preliminary Report (22 @ 23:01):    No growth to date.    Culture - Blood (collected 03-15-22 @ 12:55)  Source: .Blood Blood-Peripheral  Preliminary Report (22 @ 23:01):    No growth to date.      Urinalysis Basic - ( 17 Mar 2022 18:30 )    Color: Yellow / Appearance: Clear / S.020 / pH: x  Gluc: x / Ketone: Negative  / Bili: Negative / Urobili: 0.2 mg/dL   Blood: x / Protein: 30 mg/dL / Nitrite: Negative   Leuk Esterase: Small / RBC: 6-10 /HPF / WBC 3-5 /HPF   Sq Epi: x / Non Sq Epi: Few /HPF / Bacteria: Few      Procalcitonin, Serum: 1.09 ng/mL (22 @ 06:09)    D-Dimer Assay, Quantitative: 606 ng/mL DDU (22 @ 11:28)      Serum Pro-Brain Natriuretic Peptide: 3398 pg/mL (03-15-22 @ 12:11)    COVID-19 PCR: NotDetec (03-15-22 @ 12:11)        A1C with Estimated Average Glucose Result: 6.1 % (22 @ 06:09)      Indwelling Urethral Catheter:     Connect To:  Straight Drainage/Gravity    Indication:  Urine Output Monitoring in Critically Ill (03-15-22 @ 13:07) (not performed)      RADIOLOGY, ECG, & ADDITIONAL TESTS:  12 Lead ECG:   Ventricular Rate 91 BPM    Atrial Rate 91 BPM    P-R Interval 152 ms    QRS Duration 76 ms    Q-T Interval 382 ms    QTC Calculation(Bazett) 469 ms    P Axis 63 degrees    R Axis 63 degrees    T Axis 63 degrees    Diagnosis Line Normal sinus rhythm  Normal ECG    Confirmed by DAVID MI MD ([id number]) on 3/16/2022 12:27:18 PM (22 @ 08:17)    CT Head No Cont:   ACC: [id number] EXAM:  CT BRAIN                          PROCEDURE DATE:  03/15/2022          INTERPRETATION:  Clinical History / Reason for exam: Bilateral arm   numbness, weakness and neck pain.    CT SCAN OF THE BRAIN WITHOUT CONTRAST    TECHNIQUE:    Multiple transaxial noncontrast CT images of the brain were obtained from   base to vertex. Sagittal and coronal reformatted images were obtained.    COMPARISON:    Noncontrast CT scan of the brain dated 2021.    FINDINGS:    The third and lateral ventricles are mildly enlarged as are the cortical   sulci consistent with a mild degree of cortical atrophy.  The fourth   ventricle is normal in size and position.    There is no shift of the midline structures, evidence of acute   intracranial hemorrhage, or depressed skull fracture.    Mucosal thickening in the left maxillary sinus.    Sclerosis of the left mastoid air cells consistent with chronic   inflammatory changes.    IMPRESSION:    Mild atrophic changes.    --- End of Report---            EUGENE SEGUNDO MD; Attending Radiologist  This document has been electronically signed. Mar 15 2022  2:23PM (03-15-22 @ 14:13)  CT Abdomen and Pelvis No Cont:   ACC: [id number] EXAM:  CT ABDOMEN AND PELVIS                          PROCEDURE DATE:  03/15/2022          INTERPRETATION:  CLINICAL STATEMENT: Abdominal pain    TECHNIQUE: Contiguous axial CT images were obtained from the lower chest   to the pubic symphysis without intravenous contrast.  Oral contrast was   not administered.  Reformatted images in the coronal and sagittal planes   were acquired.    COMPARISON CT: 2021    OTHER STUDIES USED FOR CORRELATION: None.      FINDINGS:    LOWERCHEST: Coronary calcifications. Bibasilar atelectatic changes..    HEPATOBILIARY: Diffuse hepatic steatosis. No biliary ductal dilatation..    SPLEEN: Unremarkable.    PANCREAS: Pancreatic head calcifications..    ADRENAL GLANDS: Unremarkable.    KIDNEYS: Bilateral intrarenal calculi measuring up to 0.8 cm right lower   pole. No hydronephrosis bilaterally..    ABDOMINOPELVIC NODES: Unremarkable.    PELVIC ORGANS: Blanco catheter in urinary bladder..    PERITONEUM/MESENTERY/BOWEL: No bowel obstruction, pneumoperitoneum or   ascites. Normal caliber appendix.    BONES/SOFT TISSUES: Degenerative changes of the spine. No acute osseous   abnormality..    OTHER: Atherosclerosis abdominal aorta and branches.      IMPRESSION:    No acute intra-abdominal pathology.    Bilateral nephrolithiasis, as above.    --- End of Report ---            ELLIOT LANDAU MD; Attending Radiologist  This document has been electronically signed. Mar 15 2022  2:27PM (03-15-22 @ 14:13)    RECENT DIAGNOSTIC ORDERS:  Magnesium, Serum: AM Sched. Collection: 21-Mar-2022 04:30 (22 @ 13:20)  Magnesium, Serum: AM Sched. Collection: 20-Mar-2022 04:30 (22 @ 13:20)  Comprehensive Metabolic Panel: AM Sched. Collection: 21-Mar-2022 04:30 (22 @ 13:20)  Comprehensive Metabolic Panel: AM Sched. Collection: 20-Mar-2022 04:30 (22 @ 13:20)  Complete Blood Count + Automated Diff: AM Sched. Collection: 21-Mar-2022 04:30 (22 @ 13:20)  Complete Blood Count + Automated Diff: AM Sched. Collection: 20-Mar-2022 04:30 (22 @ 13:20)      MEDICATIONS:  MEDICATIONS  (STANDING):  amLODIPine   Tablet 10 milliGRAM(s) Oral daily  atorvastatin 40 milliGRAM(s) Oral at bedtime  chlorhexidine 4% Liquid 1 Application(s) Topical <User Schedule>  dexMEDEtomidine Infusion 0.2 MICROgram(s)/kG/Hr (3.7 mL/Hr) IV Continuous <Continuous>  dextrose 40% Gel 15 Gram(s) Oral once  dextrose 5%. 1000 milliLiter(s) (100 mL/Hr) IV Continuous <Continuous>  dextrose 5%. 1000 milliLiter(s) (50 mL/Hr) IV Continuous <Continuous>  dextrose 50% Injectable 25 Gram(s) IV Push once  dextrose 50% Injectable 12.5 Gram(s) IV Push once  dextrose 50% Injectable 25 Gram(s) IV Push once  folic acid 1 milliGRAM(s) Oral daily  glucagon  Injectable 1 milliGRAM(s) IntraMuscular once  heparin   Injectable 5000 Unit(s) SubCutaneous every 12 hours  insulin lispro (ADMELOG) corrective regimen sliding scale   SubCutaneous three times a day before meals  insulin lispro (ADMELOG) corrective regimen sliding scale   SubCutaneous at bedtime  pantoprazole    Tablet 40 milliGRAM(s) Oral before breakfast  thiamine 100 milliGRAM(s) Oral daily    MEDICATIONS  (PRN):  acetaminophen     Tablet .. 650 milliGRAM(s) Oral every 6 hours PRN Temp greater or equal to 38C (100.4F), Mild Pain (1 - 3)  LORazepam   Injectable 2 milliGRAM(s) IV Push every 2 hours PRN CIWA score between 8-15  LORazepam   Injectable 4 milliGRAM(s) IV Push every 1 hour PRN CIWA Score >15  sodium chloride 0.65% Nasal 1 Spray(s) Both Nostrils three times a day PRN Nasal Congestion      HOME MEDICATIONS:  acetaminophen 325 mg oral tablet ()  DULoxetine 30 mg oral delayed release capsule ()  Januvia 100 mg oral tablet ()  lisinopril 20 mg oral tablet ()  metFORMIN 1000 mg oral tablet ()      PHYSICAL EXAM:  GENERAL:pt is sleeping, on precedex drip   HEAD:  Atraumatic, Normocephalic  EYES:  conjunctiva and sclera clear  CHEST/LUNG: Clear to percussion bilaterally;  no labored breathing, no use  of accessory muscles  HEART: Regular rate and rhythm   ABDOMEN: Soft, Nontender, Nondistended   EXTREMITIES:  no cyanosis, or edema

## 2022-03-19 NOTE — PROGRESS NOTE ADULT - ASSESSMENT
1)  VAMSI on CKD of ? etiology, complicated by hyperkalemia, acidosis.  S/P HD x 3 via right IJ [place].  Pt now urinating well and therefore perhaps recovering    2)  Underlying Stage 3 CKD w/ known h/o severe HTN, DM    3)  Etoh abuse w/ withdrawal, now sedated w/ Presadex    Recommendations:    1)  No indications for HD today    2)  Continue to monitor U.O., renal function, Iytes    3)  No change in anti-HTN regimen for now

## 2022-03-20 LAB
ALBUMIN SERPL ELPH-MCNC: 3.8 G/DL — SIGNIFICANT CHANGE UP (ref 3.5–5.2)
ALP SERPL-CCNC: 111 U/L — SIGNIFICANT CHANGE UP (ref 30–115)
ALT FLD-CCNC: 22 U/L — SIGNIFICANT CHANGE UP (ref 0–41)
ANION GAP SERPL CALC-SCNC: 21 MMOL/L — HIGH (ref 7–14)
AST SERPL-CCNC: 33 U/L — SIGNIFICANT CHANGE UP (ref 0–41)
BASOPHILS # BLD AUTO: 0.03 K/UL — SIGNIFICANT CHANGE UP (ref 0–0.2)
BASOPHILS NFR BLD AUTO: 0.5 % — SIGNIFICANT CHANGE UP (ref 0–1)
BILIRUB SERPL-MCNC: 1.4 MG/DL — HIGH (ref 0.2–1.2)
BUN SERPL-MCNC: 43 MG/DL — HIGH (ref 10–20)
CALCIUM SERPL-MCNC: 9.3 MG/DL — SIGNIFICANT CHANGE UP (ref 8.5–10.1)
CHLORIDE SERPL-SCNC: 99 MMOL/L — SIGNIFICANT CHANGE UP (ref 98–110)
CO2 SERPL-SCNC: 25 MMOL/L — SIGNIFICANT CHANGE UP (ref 17–32)
CREAT SERPL-MCNC: 3.2 MG/DL — HIGH (ref 0.7–1.5)
CULTURE RESULTS: SIGNIFICANT CHANGE UP
CULTURE RESULTS: SIGNIFICANT CHANGE UP
EGFR: 22 ML/MIN/1.73M2 — LOW
EOSINOPHIL # BLD AUTO: 0.19 K/UL — SIGNIFICANT CHANGE UP (ref 0–0.7)
EOSINOPHIL NFR BLD AUTO: 3.2 % — SIGNIFICANT CHANGE UP (ref 0–8)
GLUCOSE BLDC GLUCOMTR-MCNC: 147 MG/DL — HIGH (ref 70–99)
GLUCOSE BLDC GLUCOMTR-MCNC: 156 MG/DL — HIGH (ref 70–99)
GLUCOSE BLDC GLUCOMTR-MCNC: 168 MG/DL — HIGH (ref 70–99)
GLUCOSE BLDC GLUCOMTR-MCNC: 171 MG/DL — HIGH (ref 70–99)
GLUCOSE SERPL-MCNC: 179 MG/DL — HIGH (ref 70–99)
HCT VFR BLD CALC: 33.1 % — LOW (ref 42–52)
HGB BLD-MCNC: 11.3 G/DL — LOW (ref 14–18)
IMM GRANULOCYTES NFR BLD AUTO: 0.3 % — SIGNIFICANT CHANGE UP (ref 0.1–0.3)
LYMPHOCYTES # BLD AUTO: 0.45 K/UL — LOW (ref 1.2–3.4)
LYMPHOCYTES # BLD AUTO: 7.5 % — LOW (ref 20.5–51.1)
MAGNESIUM SERPL-MCNC: 1.3 MG/DL — LOW (ref 1.8–2.4)
MCHC RBC-ENTMCNC: 31.2 PG — HIGH (ref 27–31)
MCHC RBC-ENTMCNC: 34.1 G/DL — SIGNIFICANT CHANGE UP (ref 32–37)
MCV RBC AUTO: 91.4 FL — SIGNIFICANT CHANGE UP (ref 80–94)
MONOCYTES # BLD AUTO: 0.48 K/UL — SIGNIFICANT CHANGE UP (ref 0.1–0.6)
MONOCYTES NFR BLD AUTO: 8 % — SIGNIFICANT CHANGE UP (ref 1.7–9.3)
NEUTROPHILS # BLD AUTO: 4.81 K/UL — SIGNIFICANT CHANGE UP (ref 1.4–6.5)
NEUTROPHILS NFR BLD AUTO: 80.5 % — HIGH (ref 42.2–75.2)
NRBC # BLD: 0 /100 WBCS — SIGNIFICANT CHANGE UP (ref 0–0)
PLATELET # BLD AUTO: 102 K/UL — LOW (ref 130–400)
POTASSIUM SERPL-MCNC: 3.5 MMOL/L — SIGNIFICANT CHANGE UP (ref 3.5–5)
POTASSIUM SERPL-SCNC: 3.5 MMOL/L — SIGNIFICANT CHANGE UP (ref 3.5–5)
PROT SERPL-MCNC: 7 G/DL — SIGNIFICANT CHANGE UP (ref 6–8)
RBC # BLD: 3.62 M/UL — LOW (ref 4.7–6.1)
RBC # FLD: 13.3 % — SIGNIFICANT CHANGE UP (ref 11.5–14.5)
SODIUM SERPL-SCNC: 145 MMOL/L — SIGNIFICANT CHANGE UP (ref 135–146)
SPECIMEN SOURCE: SIGNIFICANT CHANGE UP
SPECIMEN SOURCE: SIGNIFICANT CHANGE UP
WBC # BLD: 5.98 K/UL — SIGNIFICANT CHANGE UP (ref 4.8–10.8)
WBC # FLD AUTO: 5.98 K/UL — SIGNIFICANT CHANGE UP (ref 4.8–10.8)

## 2022-03-20 PROCEDURE — 99233 SBSQ HOSP IP/OBS HIGH 50: CPT

## 2022-03-20 RX ORDER — SODIUM CHLORIDE 9 MG/ML
1000 INJECTION, SOLUTION INTRAVENOUS
Refills: 0 | Status: DISCONTINUED | OUTPATIENT
Start: 2022-03-20 | End: 2022-03-20

## 2022-03-20 RX ORDER — MAGNESIUM SULFATE 500 MG/ML
2 VIAL (ML) INJECTION
Refills: 0 | Status: COMPLETED | OUTPATIENT
Start: 2022-03-20 | End: 2022-03-20

## 2022-03-20 RX ADMIN — Medication 25 GRAM(S): at 09:49

## 2022-03-20 RX ADMIN — DEXMEDETOMIDINE HYDROCHLORIDE IN 0.9% SODIUM CHLORIDE 3.7 MICROGRAM(S)/KG/HR: 4 INJECTION INTRAVENOUS at 04:19

## 2022-03-20 RX ADMIN — ATORVASTATIN CALCIUM 40 MILLIGRAM(S): 80 TABLET, FILM COATED ORAL at 21:59

## 2022-03-20 RX ADMIN — AMLODIPINE BESYLATE 10 MILLIGRAM(S): 2.5 TABLET ORAL at 05:54

## 2022-03-20 RX ADMIN — Medication 1 MILLIGRAM(S): at 11:15

## 2022-03-20 RX ADMIN — DEXMEDETOMIDINE HYDROCHLORIDE IN 0.9% SODIUM CHLORIDE 3.7 MICROGRAM(S)/KG/HR: 4 INJECTION INTRAVENOUS at 01:05

## 2022-03-20 RX ADMIN — Medication 25 GRAM(S): at 11:22

## 2022-03-20 RX ADMIN — CHLORHEXIDINE GLUCONATE 1 APPLICATION(S): 213 SOLUTION TOPICAL at 06:51

## 2022-03-20 RX ADMIN — PANTOPRAZOLE SODIUM 40 MILLIGRAM(S): 20 TABLET, DELAYED RELEASE ORAL at 06:54

## 2022-03-20 RX ADMIN — Medication 2: at 16:38

## 2022-03-20 RX ADMIN — Medication 100 MILLIGRAM(S): at 11:15

## 2022-03-20 RX ADMIN — HEPARIN SODIUM 5000 UNIT(S): 5000 INJECTION INTRAVENOUS; SUBCUTANEOUS at 05:54

## 2022-03-20 RX ADMIN — HEPARIN SODIUM 5000 UNIT(S): 5000 INJECTION INTRAVENOUS; SUBCUTANEOUS at 16:38

## 2022-03-20 RX ADMIN — DEXMEDETOMIDINE HYDROCHLORIDE IN 0.9% SODIUM CHLORIDE 3.7 MICROGRAM(S)/KG/HR: 4 INJECTION INTRAVENOUS at 20:12

## 2022-03-20 NOTE — PROGRESS NOTE ADULT - ASSESSMENT
1)  Severe VAMSI on CKD requirind HD x 3 sessions (last 3/18).  Screat actually slightly decreased compared to yesterday, non-oliguric.  Therefore, may be recovering renal function    2)  Severe HTN, w/ Amlodipine 10mg QD added 3/18    3)  Etoh abuse w/ withdrawal    4)  Hypomagnesemia    Recommendations:    1)  No indication for HD today    2)  Hopefully renal function will continue to improve and therefore pt will no longer need HD    3)  Amlodipine just added 2 days ago, so will continue to monitor BP response    4)  Agree w/ Mg++ repletion as ordered

## 2022-03-20 NOTE — PROGRESS NOTE ADULT - SUBJECTIVE AND OBJECTIVE BOX
Patient is comfortably sedated on Precedex, no complaints       T(F): 95.9 (03-20-22 @ 09:18), Max: 97 (03-19-22 @ 15:00)  HR: 53 (03-20-22 @ 09:18)  BP: 158/77 (03-20-22 @ 09:18)  RR: 15 (03-20-22 @ 09:18)  SpO2: 98% (03-20-22 @ 09:18) (90% - 98%)    PHYSICAL EXAM:  GENERAL: NAD  HEAD:  Atraumatic, Normocephalic  EYES: EOMI, PERRLA, conjunctiva and sclera clear  NERVOUS SYSTEM:   no focal deficits   CHEST/LUNG: Clear to percussion bilaterally; No rales, rhonchi, wheezing, or rubs  HEART: Regular rate and rhythm; No murmurs, rubs, or gallops  ABDOMEN: Soft, Nontender, Nondistended; Bowel sounds present  EXTREMITIES:  2+ Peripheral Pulses, No clubbing, cyanosis, or edema    LABS  03-20    145  |  99  |  43<H>  ----------------------------<  179<H>  3.5   |  25  |  3.2<H>    Ca    9.3      20 Mar 2022 06:17  Mg     1.3     03-20    TPro  7.0  /  Alb  3.8  /  TBili  1.4<H>  /  DBili  x   /  AST  33  /  ALT  22  /  AlkPhos  111  03-20                          11.3   5.98  )-----------( 102      ( 20 Mar 2022 06:17 )             33.1       Culture Results:   No growth to date. (03-15-22)  Culture Results:   No growth to date. (03-15-22)    RADIOLOGY  < from: Xray Chest 1 View- PORTABLE-Routine (Xray Chest 1 View- PORTABLE-Routine in AM.) (03.19.22 @ 06:46) >  Impression:    Low lung volume.    No acute infiltrates.      < end of copied text >    MEDICATIONS  (STANDING):  amLODIPine   Tablet 10 milliGRAM(s) Oral daily  atorvastatin 40 milliGRAM(s) Oral at bedtime  chlorhexidine 4% Liquid 1 Application(s) Topical <User Schedule>  dexMEDEtomidine Infusion 0.2 MICROgram(s)/kG/Hr (3.7 mL/Hr) IV Continuous <Continuous>  folic acid 1 milliGRAM(s) Oral daily  heparin   Injectable 5000 Unit(s) SubCutaneous every 12 hours  insulin lispro (ADMELOG) corrective regimen sliding scale   SubCutaneous three times a day before meals  insulin lispro (ADMELOG) corrective regimen sliding scale   SubCutaneous at bedtime  magnesium sulfate  IVPB 2 Gram(s) IV Intermittent every 2 hours  pantoprazole    Tablet 40 milliGRAM(s) Oral before breakfast  thiamine 100 milliGRAM(s) Oral daily    MEDICATIONS  (PRN):  acetaminophen     Tablet .. 650 milliGRAM(s) Oral every 6 hours PRN Temp greater or equal to 38C (100.4F), Mild Pain (1 - 3)  LORazepam   Injectable 2 milliGRAM(s) IV Push every 2 hours PRN CIWA score between 8-15  LORazepam   Injectable 4 milliGRAM(s) IV Push every 1 hour PRN CIWA Score >15  sodium chloride 0.65% Nasal 1 Spray(s) Both Nostrils three times a day PRN Nasal Congestion

## 2022-03-20 NOTE — PROGRESS NOTE ADULT - ASSESSMENT
IMPRESSION:  AMS 2/2 toxic metabolic encephalopathy- resolved  acute on chronic renal failure s/p HD  Hypothermia- resolved  Possible sepsis  H/O ETOH addiction  Possible AKA vs starvation ketosis  Mod MR    SUGGEST:      CNS: CIWA protocol.   MV, Thiamine and folic acid.   taper precedex  f/u addiction medicine recs    HEENT: Oral care    PULMONARY:  HOB @ 45 degrees. Aspiration Precautions . Incentive spirometry    CARDIOVASCULAR: ECHO results reviewed.   avoid volume overload.   BP control    GI: GI prophylaxis.  Feeding as tolerated    RENAL:  Follow up lytes q 12.  Correct as needed.   HD per nephrology.     INFECTIOUS DISEASE:   Follow up cultures.    serial Procalcitonin  restart abx if respikes temps    HEMATOLOGICAL:  DVT prophylaxis- HSQ. Monitor cbc and coags    ENDOCRINE:  Follow up FS.  Insulin protocol if needed. send hba1c    MUSCULOSKELETAL: PT/REHAB , OOB to chair    Seamus: d/c seamus TOTY  Lines: KARI Gautam  Code status: Full code  Disposition: telemonitoring in ccu

## 2022-03-20 NOTE — PROGRESS NOTE ADULT - SUBJECTIVE AND OBJECTIVE BOX
[facility] FOLLOW UP NOTE  --------------------------------------------------------------------------------  Chief Complaint:    24 hour events/subjective:  Events over last 24hrs noted.  Urinating well via way.  Pt w/ much-improved mental status, awake and alert, no agitation        PAST HISTORY  --------------------------------------------------------------------------------  No significant changes to PMH, PSH, FHx, SHx, unless otherwise noted    ALLERGIES & MEDICATIONS  --------------------------------------------------------------------------------  Allergies    No Known Allergies    Intolerances      Standing Inpatient Medications  amLODIPine   Tablet 10 milliGRAM(s) Oral daily  atorvastatin 40 milliGRAM(s) Oral at bedtime  chlorhexidine 4% Liquid 1 Application(s) Topical <User Schedule>  dexMEDEtomidine Infusion 0.2 MICROgram(s)/kG/Hr IV Continuous <Continuous>  dextrose 40% Gel 15 Gram(s) Oral once  dextrose 5%. 1000 milliLiter(s) IV Continuous <Continuous>  dextrose 5%. 1000 milliLiter(s) IV Continuous <Continuous>  dextrose 50% Injectable 25 Gram(s) IV Push once  dextrose 50% Injectable 12.5 Gram(s) IV Push once  dextrose 50% Injectable 25 Gram(s) IV Push once  folic acid 1 milliGRAM(s) Oral daily  glucagon  Injectable 1 milliGRAM(s) IntraMuscular once  heparin   Injectable 5000 Unit(s) SubCutaneous every 12 hours  insulin lispro (ADMELOG) corrective regimen sliding scale   SubCutaneous three times a day before meals  insulin lispro (ADMELOG) corrective regimen sliding scale   SubCutaneous at bedtime  pantoprazole    Tablet 40 milliGRAM(s) Oral before breakfast  thiamine 100 milliGRAM(s) Oral daily    PRN Inpatient Medications  acetaminophen     Tablet .. 650 milliGRAM(s) Oral every 6 hours PRN  LORazepam   Injectable 2 milliGRAM(s) IV Push every 2 hours PRN  LORazepam   Injectable 4 milliGRAM(s) IV Push every 1 hour PRN  sodium chloride 0.65% Nasal 1 Spray(s) Both Nostrils three times a day PRN      REVIEW OF SYSTEMS  --------------------------------------------------------------------------------  Gen: No weight changes, fatigue, fevers/chills, weakness  Skin: No rashes  Head/Eyes/Ears/Mouth: No headache; Normal hearing; Normal vision w/o blurriness; No sinus pain/discomfort, sore throat  Respiratory: No dyspnea, cough, wheezing, hemoptysis  CV: No chest pain, PND, orthopnea  GI: No abdominal pain, diarrhea, constipation, nausea, vomiting, melena, hematochezia  : No increased frequency, dysuria, hematuria, nocturia  MSK: No joint pain/swelling; no back pain; no edema  Neuro: No dizziness/lightheadedness, weakness, seizures, numbness, tingling  Heme: No easy bruising or bleeding  Endo: No heat/cold intolerance  Psych: No significant nervousness, anxiety, stress, depression    All other systems were reviewed and are negative, except as noted.    VITALS/PHYSICAL EXAM  --------------------------------------------------------------------------------  T(C): 35.9 (03-20-22 @ 13:00), Max: 36 (03-19-22 @ 23:00)  HR: 63 (03-20-22 @ 15:00) (53 - 83)  BP: 156/86 (03-20-22 @ 15:00) (156/86 - 188/93)  RR: 18 (03-20-22 @ 15:00) (15 - 30)  SpO2: 95% (03-20-22 @ 15:00) (92% - 98%)  Wt(kg): --        03-19-22 @ 07:01  -  03-20-22 @ 07:00  --------------------------------------------------------  IN: 664.8 mL / OUT: 2510 mL / NET: -1845.2 mL    03-20-22 @ 07:01  -  03-20-22 @ 16:30  --------------------------------------------------------  IN: 293.9 mL / OUT: 980 mL / NET: -686.1 mL      Physical Exam:  	Gen: NAD, well-appearing  	HEENT: PERRL, supple neck, clear oropharynx  	Pulm: CTA B/L  	CV: RRR, S1S2; no rub  	Back: No spinal or CVA tenderness; no sacral edema  	Abd: +BS, soft, nontender/nondistended  	: No suprapubic tenderness  	UE: Warm, FROM, no clubbing, intact strength; no edema; no asterixis  	LE: Warm, FROM, no clubbing, intact strength; no edema  	Neuro: No focal deficits, intact gait  	Psych: Normal affect and mood  	Skin: Warm, without rashes  	Vascular access:    LABS/STUDIES  --------------------------------------------------------------------------------              11.3   5.98  >-----------<  102      [03-20-22 @ 06:17]              33.1     145  |  99  |  43  ----------------------------<  179      [03-20-22 @ 06:17]  3.5   |  25  |  3.2        Ca     9.3     [03-20-22 @ 06:17]      Mg     1.3     [03-20-22 @ 06:17]    TPro  7.0  /  Alb  3.8  /  TBili  1.4  /  DBili  x   /  AST  33  /  ALT  22  /  AlkPhos  111  [03-20-22 @ 06:17]          Creatinine Trend:  SCr 3.2 [03-20 @ 06:17]  SCr 3.5 [03-19 @ 06:01]  SCr 3.2 [03-18 @ 19:00]  SCr 5.2 [03-18 @ 05:59]  SCr 6.2 [03-17 @ 06:00]    Urinalysis - [03-17-22 @ 18:30]      Color Yellow / Appearance Clear / SG 1.020 / pH 6.5      Gluc Negative / Ketone Negative  / Bili Negative / Urobili 0.2       Blood Large / Protein 30 / Leuk Est Small / Nitrite Negative      RBC 6-10 / WBC 3-5 / Hyaline  / Gran  / Sq Epi  / Non Sq Epi Few / Bacteria Few    Urine Creatinine 55      [03-17-22 @ 18:30]  Urine Protein 30      [03-17-22 @ 18:30]    PTH -- (Ca 8.3)      [03-16-22 @ 11:45]   311  TSH 3.51      [03-15-22 @ 12:11]    HBsAb Nonreact      [03-15-22 @ 19:10]  HBsAg Nonreact      [03-15-22 @ 19:10]  HBcAb Nonreact      [03-15-22 @ 19:10]    LYNN: titer Negative, pattern --      [03-16-22 @ 11:45]  C3 Complement 95      [03-16-22 @ 11:45]  C4 Complement 30      [03-16-22 @ 11:45]  ANCA: cANCA Negative, pANCA Negative, atypical ANCA Indeterminate Method interference due to LYNN Fluorescence      [03-16-22 @ 11:45]  anti-GBM 3      [03-16-22 @ 12:01]

## 2022-03-20 NOTE — PROGRESS NOTE ADULT - SUBJECTIVE AND OBJECTIVE BOX
Patient is a 56y old  Male who presents with a chief complaint of Acute Renal Failure (19 Mar 2022 13:39)        HPI:  57 yo M with PMH DM on Metformin and Januvia, HTN, HLD, Glaucoma, b/l LE peripheral neuropathy who presents in acute renal failure.  Pt has been feeling progressively weak for the past week.  Also reports increasing numbness to b/l UE, difficulty walking, cognitive slowing, nausea, and neck pain.  Pt was sent in by Ophthalmologist during office visit today.  Pt states he has not changed his metformin dose recently nor has he taken more than prescribed.  Pt denies fevers, chills, chest pain, SOB, abdominal pain, dysuria. (15 Mar 2022 16:24)      Pt evaluated on rounds.  I reviewed the radiology tests and hospital record.    I reviewed previous notes on this patient.    Interval Events: No overnight events.      REVIEW OF SYSTEMS:   see HPI      OBJECTIVE:  ICU Vital Signs Last 24 Hrs  T(C): 35.9 (20 Mar 2022 03:00), Max: 36.1 (19 Mar 2022 15:00)  T(F): 96.6 (20 Mar 2022 03:00), Max: 97 (19 Mar 2022 15:00)  HR: 54 (20 Mar 2022 05:00) (54 - 79)  BP: 188/93 (20 Mar 2022 05:00) (159/75 - 188/93)  BP(mean): 134 (20 Mar 2022 05:00) (108 - 134)  ABP: --  ABP(mean): --  RR: 30 (20 Mar 2022 05:00) (16 - 31)  SpO2: 95% (20 Mar 2022 05:00) (90% - 97%)        03-19 @ 07:01  -  03-20 @ 07:00  --------------------------------------------------------  IN: 664.8 mL / OUT: 2500 mL / NET: -1835.2 mL      CAPILLARY BLOOD GLUCOSE      POCT Blood Glucose.: 171 mg/dL (20 Mar 2022 06:41)        PHYSICAL EXAM:       · ENMT:   Airway patent,   Nasal mucosa clear.  Mouth with normal mucosa.   No thrush    · EYES:   Clear bilaterally,   pupils equal,   round and reactive to light.    · CARDIAC:   Normal rate,   regular rhythm.    Heart sounds S1, S2.   No murmurs, no rubs or gallops on auscultation  no edema        CAROTID:   normal systolic impulse  no bruits    · RESPIRATORY:   rales  normal chest expansion  no retractions or use of accessory muscles  palpation of chest is normal with no fremitus  percussion of chest demonstrates no hyperresonance or dullness    · GASTROINTESTINAL:  Abdomen soft,   non-tender,   + BS  liver/spleen not palpable    · MUSCULOSKELETAL:   no clubbing, cyanosis      · SKIN:   Skin normal color for race,   warm, dry   No evidence of rash.        · HEME LYMPH:   no splenomegaly.  No cervical  lymphadenopathy.  no inguinal lymphadenopathy    HOSPITAL MEDICATIONS:  MEDICATIONS  (STANDING):  amLODIPine   Tablet 10 milliGRAM(s) Oral daily  atorvastatin 40 milliGRAM(s) Oral at bedtime  chlorhexidine 4% Liquid 1 Application(s) Topical <User Schedule>  dexMEDEtomidine Infusion 0.2 MICROgram(s)/kG/Hr (3.7 mL/Hr) IV Continuous <Continuous>  dextrose 40% Gel 15 Gram(s) Oral once  dextrose 5%. 1000 milliLiter(s) (100 mL/Hr) IV Continuous <Continuous>  dextrose 5%. 1000 milliLiter(s) (50 mL/Hr) IV Continuous <Continuous>  dextrose 50% Injectable 25 Gram(s) IV Push once  dextrose 50% Injectable 12.5 Gram(s) IV Push once  dextrose 50% Injectable 25 Gram(s) IV Push once  folic acid 1 milliGRAM(s) Oral daily  glucagon  Injectable 1 milliGRAM(s) IntraMuscular once  heparin   Injectable 5000 Unit(s) SubCutaneous every 12 hours  insulin lispro (ADMELOG) corrective regimen sliding scale   SubCutaneous three times a day before meals  insulin lispro (ADMELOG) corrective regimen sliding scale   SubCutaneous at bedtime  pantoprazole    Tablet 40 milliGRAM(s) Oral before breakfast  thiamine 100 milliGRAM(s) Oral daily    MEDICATIONS  (PRN):  acetaminophen     Tablet .. 650 milliGRAM(s) Oral every 6 hours PRN Temp greater or equal to 38C (100.4F), Mild Pain (1 - 3)  LORazepam   Injectable 2 milliGRAM(s) IV Push every 2 hours PRN CIWA score between 8-15  LORazepam   Injectable 4 milliGRAM(s) IV Push every 1 hour PRN CIWA Score >15  sodium chloride 0.65% Nasal 1 Spray(s) Both Nostrils three times a day PRN Nasal Congestion    sodium chloride 0.45%.: Solution, 1000 milliLiter(s) infuse at 75 mL/Hr  Provider's Contact #: [phone]  dextrose 5% + sodium chloride 0.45%.: Solution, 1000 milliLiter(s) infuse at 75 mL/Hr  sodium chloride 0.9% Bolus:   1000 milliLiter(s), IV Bolus, once, infuse over 60 Minute(s), Stop After 1 Doses  Provider's Contact #: [phone]  sodium chloride 0.9% Bolus:   1000 milliLiter(s), IV Bolus, once, infuse over 60 Minute(s), Stop After 1 Doses  Provider's Contact #: [phone]  sodium chloride 0.9% Bolus:   500 milliLiter(s), IV Bolus, once, infuse over 30 Minute(s), Stop After 1 Doses  Provider's Contact #: [phone]  sodium chloride 0.9% Bolus:   500 milliLiter(s), IV Bolus, once, infuse over 30 Minute(s), Stop After 1 Doses  Provider's Contact #: [phone]      LABS:                        11.3   5.98  )-----------( 102      ( 20 Mar 2022 06:17 )             33.1     03-19    143  |  100  |  32<H>  ----------------------------<  104<H>  3.6   |  29  |  3.5<H>    Ca    9.1      19 Mar 2022 06:01    TPro  6.8  /  Alb  3.8  /  TBili  1.5<H>  /  DBili  x   /  AST  40  /  ALT  25  /  AlkPhos  111  03-19        COVID-19 PCR: NotDetec (15 Mar 2022 12:11)      RADIOLOGY: Today I personally interpreted the latest CXR and other pertinent films.

## 2022-03-20 NOTE — PROGRESS NOTE ADULT - ASSESSMENT
57 yo male, pmh of htn, hld, dm, glaucoma, b/l le neuropathy, presented  to ed for increased weakness, numbness to b/l upper extremities, slowed speech, and nausea.   found to have ;     acute renal failure / severe metabolic acidosis / hyperkalemia / mild fluid overload / uremic - toxic encephalopathy / anemia     - s/p initiation of HD   - supplement hypomagnesemia   - continue Lorazepam prn and IV Precedex for suspected alcohol withdrawal   - thiamine and folic acid for suspected thiamine and folate deficiency    - home meds   - DVT prophylaxis

## 2022-03-21 LAB
% ALBUMIN: 54.6 % — SIGNIFICANT CHANGE UP
% ALPHA 1: 5.4 % — SIGNIFICANT CHANGE UP
% ALPHA 2: 7 % — SIGNIFICANT CHANGE UP
% BETA: 13.6 % — SIGNIFICANT CHANGE UP
% GAMMA: 19.4 % — SIGNIFICANT CHANGE UP
ALBUMIN SERPL ELPH-MCNC: 3.5 G/DL — LOW (ref 3.6–5.5)
ALBUMIN SERPL ELPH-MCNC: 3.9 G/DL — SIGNIFICANT CHANGE UP (ref 3.5–5.2)
ALBUMIN/GLOB SERPL ELPH: 1.2 RATIO — SIGNIFICANT CHANGE UP
ALP SERPL-CCNC: 115 U/L — SIGNIFICANT CHANGE UP (ref 30–115)
ALPHA1 GLOB SERPL ELPH-MCNC: 0.3 G/DL — SIGNIFICANT CHANGE UP (ref 0.1–0.4)
ALPHA2 GLOB SERPL ELPH-MCNC: 0.4 G/DL — LOW (ref 0.5–1)
ALT FLD-CCNC: 19 U/L — SIGNIFICANT CHANGE UP (ref 0–41)
ANION GAP SERPL CALC-SCNC: 20 MMOL/L — HIGH (ref 7–14)
AST SERPL-CCNC: 27 U/L — SIGNIFICANT CHANGE UP (ref 0–41)
B-GLOBULIN SERPL ELPH-MCNC: 0.9 G/DL — SIGNIFICANT CHANGE UP (ref 0.5–1)
BASOPHILS # BLD AUTO: 0.02 K/UL — SIGNIFICANT CHANGE UP (ref 0–0.2)
BASOPHILS NFR BLD AUTO: 0.3 % — SIGNIFICANT CHANGE UP (ref 0–1)
BILIRUB SERPL-MCNC: 1.1 MG/DL — SIGNIFICANT CHANGE UP (ref 0.2–1.2)
BUN SERPL-MCNC: 41 MG/DL — HIGH (ref 10–20)
CALCIUM SERPL-MCNC: 9.1 MG/DL — SIGNIFICANT CHANGE UP (ref 8.5–10.1)
CHLORIDE SERPL-SCNC: 99 MMOL/L — SIGNIFICANT CHANGE UP (ref 98–110)
CO2 SERPL-SCNC: 25 MMOL/L — SIGNIFICANT CHANGE UP (ref 17–32)
CREAT SERPL-MCNC: 2.8 MG/DL — HIGH (ref 0.7–1.5)
EGFR: 25 ML/MIN/1.73M2 — LOW
EOSINOPHIL # BLD AUTO: 0.23 K/UL — SIGNIFICANT CHANGE UP (ref 0–0.7)
EOSINOPHIL NFR BLD AUTO: 3.8 % — SIGNIFICANT CHANGE UP (ref 0–8)
GAMMA GLOBULIN: 1.2 G/DL — SIGNIFICANT CHANGE UP (ref 0.6–1.6)
GLUCOSE BLDC GLUCOMTR-MCNC: 139 MG/DL — HIGH (ref 70–99)
GLUCOSE BLDC GLUCOMTR-MCNC: 147 MG/DL — HIGH (ref 70–99)
GLUCOSE BLDC GLUCOMTR-MCNC: 189 MG/DL — HIGH (ref 70–99)
GLUCOSE BLDC GLUCOMTR-MCNC: 215 MG/DL — HIGH (ref 70–99)
GLUCOSE SERPL-MCNC: 161 MG/DL — HIGH (ref 70–99)
HCT VFR BLD CALC: 32.2 % — LOW (ref 42–52)
HGB BLD-MCNC: 10.9 G/DL — LOW (ref 14–18)
IMM GRANULOCYTES NFR BLD AUTO: 0.5 % — HIGH (ref 0.1–0.3)
INTERPRETATION SERPL IFE-IMP: SIGNIFICANT CHANGE UP
LYMPHOCYTES # BLD AUTO: 0.58 K/UL — LOW (ref 1.2–3.4)
LYMPHOCYTES # BLD AUTO: 9.5 % — LOW (ref 20.5–51.1)
MAGNESIUM SERPL-MCNC: 2 MG/DL — SIGNIFICANT CHANGE UP (ref 1.8–2.4)
MCHC RBC-ENTMCNC: 30.9 PG — SIGNIFICANT CHANGE UP (ref 27–31)
MCHC RBC-ENTMCNC: 33.9 G/DL — SIGNIFICANT CHANGE UP (ref 32–37)
MCV RBC AUTO: 91.2 FL — SIGNIFICANT CHANGE UP (ref 80–94)
MONOCYTES # BLD AUTO: 0.45 K/UL — SIGNIFICANT CHANGE UP (ref 0.1–0.6)
MONOCYTES NFR BLD AUTO: 7.4 % — SIGNIFICANT CHANGE UP (ref 1.7–9.3)
NEUTROPHILS # BLD AUTO: 4.79 K/UL — SIGNIFICANT CHANGE UP (ref 1.4–6.5)
NEUTROPHILS NFR BLD AUTO: 78.5 % — HIGH (ref 42.2–75.2)
NRBC # BLD: 0 /100 WBCS — SIGNIFICANT CHANGE UP (ref 0–0)
PLATELET # BLD AUTO: 107 K/UL — LOW (ref 130–400)
POTASSIUM SERPL-MCNC: 3.1 MMOL/L — LOW (ref 3.5–5)
POTASSIUM SERPL-SCNC: 3.1 MMOL/L — LOW (ref 3.5–5)
PROCALCITONIN SERPL-MCNC: 0.69 NG/ML — HIGH (ref 0.02–0.1)
PROT PATTERN SERPL ELPH-IMP: SIGNIFICANT CHANGE UP
PROT SERPL-MCNC: 6.9 G/DL — SIGNIFICANT CHANGE UP (ref 6–8)
RBC # BLD: 3.53 M/UL — LOW (ref 4.7–6.1)
RBC # FLD: 13.1 % — SIGNIFICANT CHANGE UP (ref 11.5–14.5)
SODIUM SERPL-SCNC: 144 MMOL/L — SIGNIFICANT CHANGE UP (ref 135–146)
WBC # BLD: 6.1 K/UL — SIGNIFICANT CHANGE UP (ref 4.8–10.8)
WBC # FLD AUTO: 6.1 K/UL — SIGNIFICANT CHANGE UP (ref 4.8–10.8)

## 2022-03-21 PROCEDURE — 99233 SBSQ HOSP IP/OBS HIGH 50: CPT

## 2022-03-21 PROCEDURE — 99231 SBSQ HOSP IP/OBS SF/LOW 25: CPT

## 2022-03-21 RX ORDER — NIFEDIPINE 30 MG
60 TABLET, EXTENDED RELEASE 24 HR ORAL DAILY
Refills: 0 | Status: DISCONTINUED | OUTPATIENT
Start: 2022-03-21 | End: 2022-03-25

## 2022-03-21 RX ORDER — POTASSIUM CHLORIDE 20 MEQ
40 PACKET (EA) ORAL ONCE
Refills: 0 | Status: COMPLETED | OUTPATIENT
Start: 2022-03-21 | End: 2022-03-21

## 2022-03-21 RX ADMIN — Medication 4: at 11:30

## 2022-03-21 RX ADMIN — Medication 50 MILLIGRAM(S): at 13:43

## 2022-03-21 RX ADMIN — CHLORHEXIDINE GLUCONATE 1 APPLICATION(S): 213 SOLUTION TOPICAL at 06:09

## 2022-03-21 RX ADMIN — ATORVASTATIN CALCIUM 40 MILLIGRAM(S): 80 TABLET, FILM COATED ORAL at 21:07

## 2022-03-21 RX ADMIN — DEXMEDETOMIDINE HYDROCHLORIDE IN 0.9% SODIUM CHLORIDE 3.7 MICROGRAM(S)/KG/HR: 4 INJECTION INTRAVENOUS at 08:00

## 2022-03-21 RX ADMIN — Medication 50 MILLIGRAM(S): at 17:37

## 2022-03-21 RX ADMIN — PANTOPRAZOLE SODIUM 40 MILLIGRAM(S): 20 TABLET, DELAYED RELEASE ORAL at 06:09

## 2022-03-21 RX ADMIN — DEXMEDETOMIDINE HYDROCHLORIDE IN 0.9% SODIUM CHLORIDE 3.7 MICROGRAM(S)/KG/HR: 4 INJECTION INTRAVENOUS at 11:30

## 2022-03-21 RX ADMIN — Medication 1 MILLIGRAM(S): at 11:30

## 2022-03-21 RX ADMIN — Medication 50 MILLIGRAM(S): at 23:59

## 2022-03-21 RX ADMIN — DEXMEDETOMIDINE HYDROCHLORIDE IN 0.9% SODIUM CHLORIDE 3.7 MICROGRAM(S)/KG/HR: 4 INJECTION INTRAVENOUS at 04:02

## 2022-03-21 RX ADMIN — Medication 40 MILLIEQUIVALENT(S): at 12:09

## 2022-03-21 RX ADMIN — HEPARIN SODIUM 5000 UNIT(S): 5000 INJECTION INTRAVENOUS; SUBCUTANEOUS at 05:49

## 2022-03-21 RX ADMIN — HEPARIN SODIUM 5000 UNIT(S): 5000 INJECTION INTRAVENOUS; SUBCUTANEOUS at 17:37

## 2022-03-21 RX ADMIN — DEXMEDETOMIDINE HYDROCHLORIDE IN 0.9% SODIUM CHLORIDE 3.7 MICROGRAM(S)/KG/HR: 4 INJECTION INTRAVENOUS at 15:54

## 2022-03-21 RX ADMIN — DEXMEDETOMIDINE HYDROCHLORIDE IN 0.9% SODIUM CHLORIDE 3.7 MICROGRAM(S)/KG/HR: 4 INJECTION INTRAVENOUS at 21:07

## 2022-03-21 RX ADMIN — AMLODIPINE BESYLATE 10 MILLIGRAM(S): 2.5 TABLET ORAL at 05:49

## 2022-03-21 RX ADMIN — Medication 100 MILLIGRAM(S): at 11:29

## 2022-03-21 NOTE — PROGRESS NOTE ADULT - ASSESSMENT
55 yo M with PMH DM on Metformin and Januvia, HTN, HLD, Glaucoma, b/l LE peripheral neuropathy who presents in acute renal failure.    #acute on chronic renal failure  #toxic metabolic encephalopathy  - s/p bicarb drip  - f/u Nephro for HD  - serial lytes  - ECHO  - dc way  - Trend Trop, CK level  - DIC panel    #DM  - hold Metformin  - insulin sliding scale  - FS q1    #HTN  - hold Lisinopril 20 mg qd  - Amlodipine 10 mg qd    #HLD  - Atorvastatin 40 mg qd    #EtOH abuse  - thiamine  - folic acid  - Vit B12, folate  - CIWA Protocol  - Precedex  - Addiction medicine consult complete    Code status: Full Code  DVT ppx: subq heparin  Dispo: CCU Tele

## 2022-03-21 NOTE — PROGRESS NOTE ADULT - SUBJECTIVE AND OBJECTIVE BOX
Over Night Events: No acute events overnight. On precedex @ 1.4        ROS:  See HPI    PHYSICAL EXAM    ICU Vital Signs Last 24 Hrs  T(C): 36 (21 Mar 2022 07:10), Max: 36.3 (20 Mar 2022 15:05)  T(F): 96.8 (21 Mar 2022 07:10), Max: 97.3 (20 Mar 2022 15:05)  HR: 75 (21 Mar 2022 08:00) (53 - 83)  BP: 174/78 (21 Mar 2022 08:00) (145/79 - 175/88)  BP(mean): 103 (21 Mar 2022 08:00) (103 - 123)  ABP: --  ABP(mean): --  RR: 20 (21 Mar 2022 08:00) (Abdomen: Soft, Positive BS  Extremities: No clubbing   Skin: Warm  Neurological: Non focal       03-20-22 @ 07:01  -  03-21-22 @ 07:00  --------------------------------------------------------  IN:    Dexmedetomidine: 637.1 mL    IV PiggyBack: 100 mL  Total IN: 737.1 mL    OUT:    Indwelling Catheter - Urethral (mL): 980 mL    Voided (mL): 550 mL  Total OUT: 1530 mL    Total NET: -792.9 mL      03-21-22 @ 07:01  -  03-21-22 @ 08:33  --------------------------------------------------------  IN:    Dexmedetomidine: 55.4 mL  Total IN: 55.4 mL    OUT:  Total OUT: 0 mL    Total NET: 55.4 mL          LABS:                          10.9   6.10  )-----------( 107      ( 21 Mar 2022 06:40 )             32.2                                               03-21    144  |  99  |  41<H>  ----------------------------<  161<H>  3.1<L>   |  25  |  2.8<H>    21 Mar 2022 06:40    144    |  99     |  41<H>  ----------------------------<  161<H>  3.1<L>   |  25     |  2.8<H>  20 Mar 2022 06:17    145    |  99     |  43<H>  ----------------------------<  179<H>  3.5     |  25     |  3.2<H>    Ca    9.1        21 Mar 2022 06:40  Ca    9.3        20 Mar 2022 06:17  Mg     2.0       21 Mar 2022 06:40  Mg     1.3<L>     20 Mar 2022 06:17    TPro  6.9    /  Alb  3.9    /  TBili  1.1    /  DBili  x      /  AST  27     /  ALT  19     /  AlkPhos  115    21 Mar 2022 06:40  TPro  7.0    /  Alb  3.8    /  TBili  1.4<H>  /  DBili  x      /  AST  33     /  ALT  22     /  AlkPhos  111    20 Mar 2022 06:17    Ca    9.1      21 Mar 2022 06:40  Mg     2.0     03-21    TPro  6.9  /  Alb  3.9  /  TBili  1.1  /  DBili  x   /  AST  27  /  ALT  19  /  AlkPhos  115  03-21                                                                                           LIVER FUNCTIONS - ( 21 Mar 2022 06:40 )  Alb: 3.9 g/dL / Pro: 6.9 g/dL / ALK PHOS: 115 U/L / ALT: 19 U/L / AST: 27 U/L / GGT: x                                                                                                                                       MEDICATIONS  (STANDING):  amLODIPine   Tablet 10 milliGRAM(s) Oral daily  atorvastatin 40 milliGRAM(s) Oral at bedtime  chlorhexidine 4% Liquid 1 Application(s) Topical <User Schedule>  dexMEDEtomidine Infusion 0.2 MICROgram(s)/kG/Hr (3.7 mL/Hr) IV Continuous <Continuous>  dextrose 40% Gel 15 Gram(s) Oral once  dextrose 5%. 1000 milliLiter(s) (100 mL/Hr) IV Continuous <Continuous>  dextrose 5%. 1000 milliLiter(s) (50 mL/Hr) IV Continuous <Continuous>  dextrose 50% Injectable 25 Gram(s) IV Push once  dextrose 50% Injectable 12.5 Gram(s) IV Push once  dextrose 50% Injectable 25 Gram(s) IV Push once  folic acid 1 milliGRAM(s) Oral daily  glucagon  Injectable 1 milliGRAM(s) IntraMuscular once  heparin   Injectable 5000 Unit(s) SubCutaneous every 12 hours  insulin lispro (ADMELOG) corrective regimen sliding scale   SubCutaneous three times a day before meals  insulin lispro (ADMELOG) corrective regimen sliding scale   SubCutaneous at bedtime  pantoprazole    Tablet 40 milliGRAM(s) Oral before breakfast  thiamine 100 milliGRAM(s) Oral daily    MEDICATIONS  (PRN):  acetaminophen     Tablet .. 650 milliGRAM(s) Oral every 6 hours PRN Temp greater or equal to 38C (100.4F), Mild Pain (1 - 3)  LORazepam   Injectable 2 milliGRAM(s) IV Push every 2 hours PRN CIWA score between 8-15  LORazepam   Injectable 4 milliGRAM(s) IV Push every 1 hour PRN CIWA Score >15  sodium chloride 0.65% Nasal 1 Spray(s) Both Nostrils three times a day PRN Nasal Congestion

## 2022-03-21 NOTE — PROGRESS NOTE ADULT - SUBJECTIVE AND OBJECTIVE BOX
Patient is lightly sedated on Precedex       T(F): 96.8 (03-21-22 @ 07:10), Max: 97.3 (03-20-22 @ 15:05)  HR: 57 (03-21-22 @ 09:00)  BP: 171/88 (03-21-22 @ 09:00)  RR: 16 (03-21-22 @ 09:00)  SpO2: 100% (03-21-22 @ 09:00) (95% - 100%)    PHYSICAL EXAM:  GENERAL: NAD  HEAD:  Atraumatic, Normocephalic  EYES: EOMI, PERRLA, conjunctiva and sclera clear  NERVOUS SYSTEM:   no focal deficits   CHEST/LUNG: Clear to percussion bilaterally; No rales, rhonchi, wheezing, or rubs  HEART: Regular rate and rhythm; No murmurs, rubs, or gallops  ABDOMEN: Soft, Nontender, Nondistended; Bowel sounds present  EXTREMITIES:  2+ Peripheral Pulses, No clubbing, cyanosis, or edema    LABS  03-21    144  |  99  |  41<H>  ----------------------------<  161<H>  3.1<L>   |  25  |  2.8<H>    Ca    9.1      21 Mar 2022 06:40  Mg     2.0     03-21    TPro  6.9  /  Alb  3.9  /  TBili  1.1  /  DBili  x   /  AST  27  /  ALT  19  /  AlkPhos  115  03-21                          10.9   6.10  )-----------( 107      ( 21 Mar 2022 06:40 )             32.2     Culture Results:   No Growth Final (03-15-22)  Culture Results:   No Growth Final (03-15-22)    RADIOLOGY  < from: Xray Chest 1 View- PORTABLE-Routine (Xray Chest 1 View- PORTABLE-Routine in AM.) (03.19.22 @ 06:46) >  No acute infiltrates.    < end of copied text >    MEDICATIONS  (STANDING):  amLODIPine   Tablet 10 milliGRAM(s) Oral daily  atorvastatin 40 milliGRAM(s) Oral at bedtime  chlorhexidine 4% Liquid 1 Application(s) Topical <User Schedule>  dexMEDEtomidine Infusion 0.2 MICROgram(s)/kG/Hr (3.7 mL/Hr) IV Continuous <Continuous>  folic acid 1 milliGRAM(s) Oral daily  glucagon  Injectable 1 milliGRAM(s) IntraMuscular once  heparin   Injectable 5000 Unit(s) SubCutaneous every 12 hours  insulin lispro (ADMELOG) corrective regimen sliding scale   SubCutaneous three times a day before meals  insulin lispro (ADMELOG) corrective regimen sliding scale   SubCutaneous at bedtime  pantoprazole    Tablet 40 milliGRAM(s) Oral before breakfast  potassium chloride   Powder 40 milliEquivalent(s) Oral once  thiamine 100 milliGRAM(s) Oral daily    MEDICATIONS  (PRN):  acetaminophen     Tablet .. 650 milliGRAM(s) Oral every 6 hours PRN Temp greater or equal to 38C (100.4F), Mild Pain (1 - 3)  LORazepam   Injectable 2 milliGRAM(s) IV Push every 2 hours PRN CIWA score between 8-15  LORazepam   Injectable 4 milliGRAM(s) IV Push every 1 hour PRN CIWA Score >15  sodium chloride 0.65% Nasal 1 Spray(s) Both Nostrils three times a day PRN Nasal Congestion

## 2022-03-21 NOTE — PROGRESS NOTE ADULT - SUBJECTIVE AND OBJECTIVE BOX
Nephrology progress note    Patient is seen and examined, events over the last 24 h noted .  No complaints  Allergies:  No Known Allergies    Hospital Medications:   MEDICATIONS  (STANDING):  amLODIPine   Tablet 10 milliGRAM(s) Oral daily  atorvastatin 40 milliGRAM(s) Oral at bedtime  chlorhexidine 4% Liquid 1 Application(s) Topical <User Schedule>  dexMEDEtomidine Infusion 0.2 MICROgram(s)/kG/Hr (3.7 mL/Hr) IV Continuous <Continuous>  dextrose 40% Gel 15 Gram(s) Oral once  dextrose 5%. 1000 milliLiter(s) (100 mL/Hr) IV Continuous <Continuous>  dextrose 5%. 1000 milliLiter(s) (50 mL/Hr) IV Continuous <Continuous>  dextrose 50% Injectable 25 Gram(s) IV Push once  dextrose 50% Injectable 12.5 Gram(s) IV Push once  dextrose 50% Injectable 25 Gram(s) IV Push once  folic acid 1 milliGRAM(s) Oral daily  glucagon  Injectable 1 milliGRAM(s) IntraMuscular once  heparin   Injectable 5000 Unit(s) SubCutaneous every 12 hours  insulin lispro (ADMELOG) corrective regimen sliding scale   SubCutaneous three times a day before meals  insulin lispro (ADMELOG) corrective regimen sliding scale   SubCutaneous at bedtime  pantoprazole    Tablet 40 milliGRAM(s) Oral before breakfast  potassium chloride   Powder 40 milliEquivalent(s) Oral once  thiamine 100 milliGRAM(s) Oral daily        VITALS:  T(F): 96.8 (22 @ 07:10), Max: 97.3 (22 @ 15:05)  HR: 57 (22 @ 09:00)  BP: 171/88 (22 @ 09:00)  RR: 16 (22 @ 09:00)  SpO2: 100% (22 @ 09:00)  Wt(kg): --     @ 07:01  -   @ 07:00  --------------------------------------------------------  IN: 664.8 mL / OUT: 2510 mL / NET: -1845.2 mL     @ 07: @ 07:00  --------------------------------------------------------  IN: 737.1 mL / OUT: 1530 mL / NET: -792.9 mL     @ 07: @ 11:19  --------------------------------------------------------  IN: 235.4 mL / OUT: 0 mL / NET: 235.4 mL          PHYSICAL EXAM:  Constitutional: NAD  HEENT: anicteric sclera,  Neck: No JVD  Respiratory: CTA  Cardiovascular: S1, S2, RRR  Gastrointestinal: BS+, soft, NT/ND  Extremities:  No peripheral edema  Neurological: A/O x 3  : No CVA tenderness. Has  way.   Skin: No rashes  Vascular Access: [place] +    LABS:      144  |  99  |  41<H>  ----------------------------<  161<H>  3.1<L>   |  25  |  2.8<H>  Creatinine Trend: 2.8<--, 3.2<--, 3.5<--, 3.2<--, 5.2<--, 6.2<--    Ca    9.1      21 Mar 2022 06:40  Mg     2.0         TPro  6.9  /  Alb  3.9  /  TBili  1.1  /  DBili      /  AST  27  /  ALT  19  /  AlkPhos  115                            10.9   6.10  )-----------( 107      ( 21 Mar 2022 06:40 )             32.2       Urine Studies:  Urinalysis Basic - ( 17 Mar 2022 18:30 )    Color: Yellow / Appearance: Clear / S.020 / pH:   Gluc:  / Ketone: Negative  / Bili: Negative / Urobili: 0.2 mg/dL   Blood:  / Protein: 30 mg/dL / Nitrite: Negative   Leuk Esterase: Small / RBC: 6-10 /HPF / WBC 3-5 /HPF   Sq Epi:  / Non Sq Epi: Few /HPF / Bacteria: Few      Creatinine, Random Urine: 55 mg/dL ( @ 18:30)  Protein/Creatinine Ratio Calculation: 0.5 Ratio ( @ 18:30)    RADIOLOGY & ADDITIONAL STUDIES:  < from: Xray Chest 1 View- PORTABLE-Routine (Xray Chest 1 View- PORTABLE-Routine in AM.) (22 @ 06:46) >    Low lung volume.    No acute infiltrates.    < end of copied text >

## 2022-03-21 NOTE — PROGRESS NOTE ADULT - SUBJECTIVE AND OBJECTIVE BOX
24H events:    Patient is a 56y old Male who presents with a chief complaint of Acute Renal Failure (20 Mar 2022 16:30)    Primary diagnosis of Acute renal failure (ARF)       Today is hospital day 6d. VSS, afebrile, NAOE. No ativan needed, Remains on precedex    PAST MEDICAL & SURGICAL HISTORY  Diabetes    Hypertension    HLD (hyperlipidemia)    Neuropathy    Glaucoma    No significant past surgical history      SOCIAL HISTORY:  Negative for smoking/alcohol/drug use.     ALLERGIES:  No Known Allergies    MEDICATIONS:  STANDING MEDICATIONS  amLODIPine   Tablet 10 milliGRAM(s) Oral daily  atorvastatin 40 milliGRAM(s) Oral at bedtime  chlorhexidine 4% Liquid 1 Application(s) Topical <User Schedule>  dexMEDEtomidine Infusion 0.2 MICROgram(s)/kG/Hr IV Continuous <Continuous>  dextrose 40% Gel 15 Gram(s) Oral once  dextrose 5%. 1000 milliLiter(s) IV Continuous <Continuous>  dextrose 5%. 1000 milliLiter(s) IV Continuous <Continuous>  dextrose 50% Injectable 25 Gram(s) IV Push once  dextrose 50% Injectable 12.5 Gram(s) IV Push once  dextrose 50% Injectable 25 Gram(s) IV Push once  folic acid 1 milliGRAM(s) Oral daily  glucagon  Injectable 1 milliGRAM(s) IntraMuscular once  heparin   Injectable 5000 Unit(s) SubCutaneous every 12 hours  insulin lispro (ADMELOG) corrective regimen sliding scale   SubCutaneous three times a day before meals  insulin lispro (ADMELOG) corrective regimen sliding scale   SubCutaneous at bedtime  pantoprazole    Tablet 40 milliGRAM(s) Oral before breakfast  thiamine 100 milliGRAM(s) Oral daily    PRN MEDICATIONS  acetaminophen     Tablet .. 650 milliGRAM(s) Oral every 6 hours PRN  LORazepam   Injectable 2 milliGRAM(s) IV Push every 2 hours PRN  LORazepam   Injectable 4 milliGRAM(s) IV Push every 1 hour PRN  sodium chloride 0.65% Nasal 1 Spray(s) Both Nostrils three times a day PRN    VITALS:   T(F): 96.8  HR: 75  BP: 174/78  RR: 20  SpO2: 100%    LABS:                        10.9   6.10  )-----------( 107      ( 21 Mar 2022 06:40 )             32.2     03-21    144  |  99  |  41<H>  ----------------------------<  161<H>  3.1<L>   |  25  |  2.8<H>    Ca    9.1      21 Mar 2022 06:40  Mg     2.0     03-21    TPro  6.9  /  Alb  3.9  /  TBili  1.1  /  DBili  x   /  AST  27  /  ALT  19  /  AlkPhos  115  03-21                  RADIOLOGY:    PHYSICAL EXAM:  CONSTITUTIONAL: in no acute distress, afebrile  SKIN: Warm, dry  HEAD: Normocephalic; atraumatic  EYES: No conjunctival injection. EOMI. PERRLA  ENT: No nasal discharge; oropharynx nonerythematous; airway clear  NECK: Supple; non tender, No JVD  CARD:  Regular rate and rhythm  RESP: CTAB; No wheezes, crackles, rales or rhonchi  ABD: Soft NTND; No guarding or rebound tenderness  EXT: Normal ROM.  No clubbing or cyanosis.  No edema, peripheral pulses 2+  NEURO: A&O x3, grossly unremarkable, no focal deficits  PSYCH: Cooperative, appropriate

## 2022-03-21 NOTE — PROGRESS NOTE ADULT - ASSESSMENT
IMPRESSION:  AMS 2/2 toxic metabolic encephalopathy- resolved  acute on chronic renal failure s/p HD  Hypothermia- resolved  Possible sepsis s/p ABX therapy  H/O ETOH addiction  Possible AKA vs starvation ketosis  Mod MR    SUGGEST:      CNS: CIWA protocol.   MV, Thiamine and folic acid.   taper precedex  f/u addiction medicine recs    HEENT: Oral care    PULMONARY:  HOB @ 45 degrees. Aspiration Precautions . Incentive spirometry. CXR today    CARDIOVASCULAR: ECHO results reviewed.   avoid volume overload.   BP control    GI: GI prophylaxis.  Feeding as tolerated    RENAL:  Follow up lytes q 12.  Correct as needed.   HD per nephrology.     INFECTIOUS DISEASE:   Follow up cultures.   Repeat Procalcitonin  restart abx if respikes temps    HEMATOLOGICAL:  DVT prophylaxis- HSQ. Monitor cbc and coags    ENDOCRINE:  Follow up FS.  Insulin protocol if needed. send hba1c    MUSCULOSKELETAL: PT/REHAB , OOB to chair    Seamus: d/c seamus TOV  Lines: KARI Gautam  Code status: Full code  Disposition: telemonitoring in ccu             IMPRESSION:  AMS 2/2 toxic metabolic encephalopathy- resolved  acute on chronic renal failure s/p HD  Hypothermia- resolved  Possible sepsis s/p ABX therapy  H/O ETOH addiction  Possible AKA vs starvation ketosis  Mod MR    SUGGEST:      CNS: CIWA protocol.   MV, Thiamine and folic acid.   taper precedex  f/u addiction medicine recs    HEENT: Oral care    PULMONARY:  HOB @ 45 degrees. Aspiration Precautions . Incentive spirometry. CXR today    CARDIOVASCULAR: ECHO results reviewed.   avoid volume overload.   BP control    GI: GI prophylaxis.  Feeding as tolerated    RENAL:  Follow up lytes q 12.  Correct as needed.   HD per nephrology.     INFECTIOUS DISEASE:   Follow up cultures.   Repeat Procalcitonin  restart abx if respikes temps    HEMATOLOGICAL:  DVT prophylaxis- HSQ. Monitor cbc and coags    ENDOCRINE:  Follow up FS.  Insulin protocol if needed. send hba1c    MUSCULOSKELETAL: PT/REHAB , OOB to chair    Seamus: d/c seamus TOV  Lines: KARI Gautam  Code status: Full code

## 2022-03-21 NOTE — PROGRESS NOTE ADULT - SUBJECTIVE AND OBJECTIVE BOX
Pt interviewed, examined and EMR chart reviewed. Pt seen with daughter and wife at bedside.    Pt continues to state his minimal use of 2x weekly. Family states he drinks every day.    Pt is adamant and does not want help. Explained to patient thoroughly how his use history does not match his withdrawal.    Follow up of Alcohol Dependency. Pt is on ativan CIWA protocol and precedex drip . Pt has no complaints ad is FINE.    SOCIAL HISTORY:    REVIEW OF SYSTEMS:    Constitutional: No fever, weight loss or fatigue  ENT:  No difficulty hearing, tinnitus, vertigo; No sinus or throat pain  Neck: No pain or stiffness  Respiratory: No cough, wheezing, chills or hemoptysis  Cardiovascular: No chest pain, palpitations, shortness of breath, dizziness or leg swelling  Gastrointestinal: No abdominal or epigastric pain. No nausea, vomiting or hematemesis; No diarrhea or constipation. No melena or hematochezia.  Neurological: No headaches, memory loss, loss of strength, numbness or tremors  Musculoskeletal: No joint pain or swelling; No muscle, back or extremity pain      MEDICATIONS  (STANDING):  atorvastatin 40 milliGRAM(s) Oral at bedtime  chlordiazePOXIDE   Oral   chlordiazePOXIDE 50 milliGRAM(s) Oral every 6 hours  chlorhexidine 4% Liquid 1 Application(s) Topical <User Schedule>  dexMEDEtomidine Infusion 0.2 MICROgram(s)/kG/Hr (3.7 mL/Hr) IV Continuous <Continuous>  dextrose 40% Gel 15 Gram(s) Oral once  dextrose 5%. 1000 milliLiter(s) (50 mL/Hr) IV Continuous <Continuous>  dextrose 5%. 1000 milliLiter(s) (100 mL/Hr) IV Continuous <Continuous>  dextrose 50% Injectable 25 Gram(s) IV Push once  dextrose 50% Injectable 12.5 Gram(s) IV Push once  dextrose 50% Injectable 25 Gram(s) IV Push once  folic acid 1 milliGRAM(s) Oral daily  glucagon  Injectable 1 milliGRAM(s) IntraMuscular once  heparin   Injectable 5000 Unit(s) SubCutaneous every 12 hours  insulin lispro (ADMELOG) corrective regimen sliding scale   SubCutaneous three times a day before meals  insulin lispro (ADMELOG) corrective regimen sliding scale   SubCutaneous at bedtime  NIFEdipine XL 60 milliGRAM(s) Oral daily  pantoprazole    Tablet 40 milliGRAM(s) Oral before breakfast  thiamine 100 milliGRAM(s) Oral daily    MEDICATIONS  (PRN):  acetaminophen     Tablet .. 650 milliGRAM(s) Oral every 6 hours PRN Temp greater or equal to 38C (100.4F), Mild Pain (1 - 3)  chlordiazePOXIDE 50 milliGRAM(s) Oral every 4 hours PRN Symptom-triggered: each CIWA -Ar score 8 or GREATER  LORazepam   Injectable 2 milliGRAM(s) IV Push every 2 hours PRN CIWA score between 8-15  LORazepam   Injectable 4 milliGRAM(s) IV Push every 1 hour PRN CIWA Score >15  sodium chloride 0.65% Nasal 1 Spray(s) Both Nostrils three times a day PRN Nasal Congestion      Vital Signs Last 24 Hrs  T(C): 36.8 (21 Mar 2022 15:13), Max: 36.8 (21 Mar 2022 15:13)  T(F): 98.3 (21 Mar 2022 15:13), Max: 98.3 (21 Mar 2022 15:13)  HR: 64 (21 Mar 2022 15:13) (57 - 75)  BP: 167/87 (21 Mar 2022 15:13) (145/79 - 177/88)  BP(mean): 119 (21 Mar 2022 15:13) (103 - 126)  RR: 18 (21 Mar 2022 15:13) (13 - 30)  SpO2: 100% (21 Mar 2022 10:00) (97% - 100%)    PHYSICAL EXAM:    Constitutional: NAD, well-groomed, well-developed  HEENT: PERRLA, EOMI, Normal Hearing, MMM  Neck: No LAD, No JVD  Back: Normal spine flexure, No CVA tenderness  Respiratory: CTAB/L  Cardiovascular: S1 and S2, RRR, no M/G/R  Gastrointestinal: BS+, soft, NT/ND  Extremities: No peripheral edema  Vascular: 2+ peripheral pulses  Neurological: A/O x 3, no focal deficits    LABS:                        10.9   6.10  )-----------( 107      ( 21 Mar 2022 06:40 )             32.2     03-21    144  |  99  |  41<H>  ----------------------------<  161<H>  3.1<L>   |  25  |  2.8<H>    Ca    9.1      21 Mar 2022 06:40  Mg     2.0     03-21    TPro  6.9  /  Alb  3.9  /  TBili  1.1  /  DBili  x   /  AST  27  /  ALT  19  /  AlkPhos  115  03-21        Drug Screen Urine:  Alcohol Level        RADIOLOGY & ADDITIONAL STUDIES:

## 2022-03-21 NOTE — PROGRESS NOTE ADULT - ASSESSMENT
1)  Severe VAMSI on CKD requirind HD x 3 sessions (last 3/18).  Screat actually slightly decreased compared to yesterday, non-oliguric.  Therefore, may be recovering renal function  - UO 1.5 L  - no need for HD  creat down to 2.8  - d/c IJ UDALL< no further HD needed  2)  Severe HTN, d/c Amlodipine - start Nifedipine 60 mg qd    3)  Etoh abuse w/ withdrawal    4)  Hypomagnesemia and hypokalemia     Monitor closely electrolytes and replete  strict Is and Os

## 2022-03-21 NOTE — PROGRESS NOTE ADULT - PROBLEM SELECTOR PLAN 1
After evaluation at this time would continue on CIWA protocol. Would taper off precedex and monitor for need for protocol. Can use Librium or ativan taper. Continue with thiamine and folic acid and monitor labs. Pt is admant about not wanting aftercare. Pt will be monitored and supportive care provided  CATCH team involved for aftercare if patient or family need assistance. Contact information given to patient and daughter.

## 2022-03-21 NOTE — PROGRESS NOTE ADULT - ASSESSMENT
57 yo male, pmh of htn, hld, dm, glaucoma, b/l le neuropathy, presented  to ed for increased weakness, numbness to b/l upper extremities, slowed speech, and nausea.   found to have ;     acute renal failure - resolving / / alcohol withdrawal / anemia     - s/p initiation of HD   - supplement hypokalemia    - continue Lorazepam prn and IV Precedex for suspected alcohol withdrawal   - thiamine and folic acid for suspected thiamine and folate deficiency    - home meds   - may DC IJ HD catheter today as per nephrology    - DVT prophylaxis

## 2022-03-22 LAB
ALBUMIN SERPL ELPH-MCNC: 3.9 G/DL — SIGNIFICANT CHANGE UP (ref 3.5–5.2)
ALP SERPL-CCNC: 132 U/L — HIGH (ref 30–115)
ALT FLD-CCNC: 22 U/L — SIGNIFICANT CHANGE UP (ref 0–41)
ANION GAP SERPL CALC-SCNC: 17 MMOL/L — HIGH (ref 7–14)
AST SERPL-CCNC: 32 U/L — SIGNIFICANT CHANGE UP (ref 0–41)
BASOPHILS # BLD AUTO: 0.02 K/UL — SIGNIFICANT CHANGE UP (ref 0–0.2)
BASOPHILS NFR BLD AUTO: 0.4 % — SIGNIFICANT CHANGE UP (ref 0–1)
BILIRUB SERPL-MCNC: 1.1 MG/DL — SIGNIFICANT CHANGE UP (ref 0.2–1.2)
BUN SERPL-MCNC: 39 MG/DL — HIGH (ref 10–20)
CALCIUM SERPL-MCNC: 9.4 MG/DL — SIGNIFICANT CHANGE UP (ref 8.5–10.1)
CHLORIDE SERPL-SCNC: 100 MMOL/L — SIGNIFICANT CHANGE UP (ref 98–110)
CO2 SERPL-SCNC: 26 MMOL/L — SIGNIFICANT CHANGE UP (ref 17–32)
CREAT SERPL-MCNC: 2.6 MG/DL — HIGH (ref 0.7–1.5)
EGFR: 28 ML/MIN/1.73M2 — LOW
EOSINOPHIL # BLD AUTO: 0.24 K/UL — SIGNIFICANT CHANGE UP (ref 0–0.7)
EOSINOPHIL NFR BLD AUTO: 4.6 % — SIGNIFICANT CHANGE UP (ref 0–8)
GLUCOSE BLDC GLUCOMTR-MCNC: 130 MG/DL — HIGH (ref 70–99)
GLUCOSE BLDC GLUCOMTR-MCNC: 134 MG/DL — HIGH (ref 70–99)
GLUCOSE BLDC GLUCOMTR-MCNC: 152 MG/DL — HIGH (ref 70–99)
GLUCOSE BLDC GLUCOMTR-MCNC: 156 MG/DL — HIGH (ref 70–99)
GLUCOSE SERPL-MCNC: 158 MG/DL — HIGH (ref 70–99)
HCT VFR BLD CALC: 31.2 % — LOW (ref 42–52)
HGB BLD-MCNC: 10.7 G/DL — LOW (ref 14–18)
IMM GRANULOCYTES NFR BLD AUTO: 0.4 % — HIGH (ref 0.1–0.3)
LYMPHOCYTES # BLD AUTO: 0.75 K/UL — LOW (ref 1.2–3.4)
LYMPHOCYTES # BLD AUTO: 14.3 % — LOW (ref 20.5–51.1)
MAGNESIUM SERPL-MCNC: 1.6 MG/DL — LOW (ref 1.8–2.4)
MCHC RBC-ENTMCNC: 31.1 PG — HIGH (ref 27–31)
MCHC RBC-ENTMCNC: 34.3 G/DL — SIGNIFICANT CHANGE UP (ref 32–37)
MCV RBC AUTO: 90.7 FL — SIGNIFICANT CHANGE UP (ref 80–94)
MONOCYTES # BLD AUTO: 0.4 K/UL — SIGNIFICANT CHANGE UP (ref 0.1–0.6)
MONOCYTES NFR BLD AUTO: 7.6 % — SIGNIFICANT CHANGE UP (ref 1.7–9.3)
NEUTROPHILS # BLD AUTO: 3.83 K/UL — SIGNIFICANT CHANGE UP (ref 1.4–6.5)
NEUTROPHILS NFR BLD AUTO: 72.7 % — SIGNIFICANT CHANGE UP (ref 42.2–75.2)
NRBC # BLD: 0 /100 WBCS — SIGNIFICANT CHANGE UP (ref 0–0)
PLATELET # BLD AUTO: 132 K/UL — SIGNIFICANT CHANGE UP (ref 130–400)
POTASSIUM SERPL-MCNC: 3.3 MMOL/L — LOW (ref 3.5–5)
POTASSIUM SERPL-SCNC: 3.3 MMOL/L — LOW (ref 3.5–5)
PROT SERPL-MCNC: 6.9 G/DL — SIGNIFICANT CHANGE UP (ref 6–8)
RBC # BLD: 3.44 M/UL — LOW (ref 4.7–6.1)
RBC # FLD: 12.9 % — SIGNIFICANT CHANGE UP (ref 11.5–14.5)
SODIUM SERPL-SCNC: 143 MMOL/L — SIGNIFICANT CHANGE UP (ref 135–146)
WBC # BLD: 5.26 K/UL — SIGNIFICANT CHANGE UP (ref 4.8–10.8)
WBC # FLD AUTO: 5.26 K/UL — SIGNIFICANT CHANGE UP (ref 4.8–10.8)

## 2022-03-22 PROCEDURE — 71045 X-RAY EXAM CHEST 1 VIEW: CPT | Mod: 26

## 2022-03-22 PROCEDURE — 99233 SBSQ HOSP IP/OBS HIGH 50: CPT

## 2022-03-22 RX ORDER — MAGNESIUM SULFATE 500 MG/ML
2 VIAL (ML) INJECTION ONCE
Refills: 0 | Status: COMPLETED | OUTPATIENT
Start: 2022-03-22 | End: 2022-03-22

## 2022-03-22 RX ORDER — POTASSIUM CHLORIDE 20 MEQ
40 PACKET (EA) ORAL ONCE
Refills: 0 | Status: COMPLETED | OUTPATIENT
Start: 2022-03-22 | End: 2022-03-22

## 2022-03-22 RX ADMIN — Medication 50 MILLIGRAM(S): at 05:42

## 2022-03-22 RX ADMIN — Medication 60 MILLIGRAM(S): at 05:40

## 2022-03-22 RX ADMIN — CHLORHEXIDINE GLUCONATE 1 APPLICATION(S): 213 SOLUTION TOPICAL at 06:34

## 2022-03-22 RX ADMIN — Medication 2: at 07:51

## 2022-03-22 RX ADMIN — Medication 25 GRAM(S): at 09:14

## 2022-03-22 RX ADMIN — PANTOPRAZOLE SODIUM 40 MILLIGRAM(S): 20 TABLET, DELAYED RELEASE ORAL at 06:34

## 2022-03-22 RX ADMIN — Medication 50 MILLIGRAM(S): at 14:15

## 2022-03-22 RX ADMIN — DEXMEDETOMIDINE HYDROCHLORIDE IN 0.9% SODIUM CHLORIDE 3.7 MICROGRAM(S)/KG/HR: 4 INJECTION INTRAVENOUS at 01:10

## 2022-03-22 RX ADMIN — Medication 1 MILLIGRAM(S): at 11:28

## 2022-03-22 RX ADMIN — HEPARIN SODIUM 5000 UNIT(S): 5000 INJECTION INTRAVENOUS; SUBCUTANEOUS at 05:40

## 2022-03-22 RX ADMIN — Medication 40 MILLIEQUIVALENT(S): at 09:14

## 2022-03-22 RX ADMIN — Medication 100 MILLIGRAM(S): at 11:28

## 2022-03-22 RX ADMIN — HEPARIN SODIUM 5000 UNIT(S): 5000 INJECTION INTRAVENOUS; SUBCUTANEOUS at 17:07

## 2022-03-22 RX ADMIN — Medication 50 MILLIGRAM(S): at 22:01

## 2022-03-22 RX ADMIN — ATORVASTATIN CALCIUM 40 MILLIGRAM(S): 80 TABLET, FILM COATED ORAL at 22:01

## 2022-03-22 NOTE — PROGRESS NOTE ADULT - ASSESSMENT
57 yo male, pmh of htn, hld, dm, glaucoma, b/l le neuropathy, presented  to ed for increased weakness, numbness to b/l upper extremities, slowed speech, and nausea.   found to have ;     acute renal failure - resolving / / alcohol withdrawal / anemia     - s/p  HD   - supplement hypokalemia and hypomagnesemia   - continue benzo detox protocol   - thiamine and folic acid for suspected thiamine and folate deficiency    - home meds   - DVT prophylaxis

## 2022-03-22 NOTE — PROGRESS NOTE ADULT - SUBJECTIVE AND OBJECTIVE BOX
Patient is comfortable in bed      T(F): 98.7 (03-22-22 @ 07:10), Max: 98.7 (03-22-22 @ 07:10)  HR: 82 (03-22-22 @ 11:00)  BP: 123/60 (03-22-22 @ 11:00)  RR: 20  SpO2: 94% (03-22-22 @ 11:00) (94% - 100%)    PHYSICAL EXAM:  GENERAL: NAD  HEAD:  Atraumatic, Normocephalic  EYES: EOMI, PERRLA, conjunctiva and sclera clear  NERVOUS SYSTEM:   no focal deficits   CHEST/LUNG: Clear to percussion bilaterally; No rales, rhonchi, wheezing, or rubs  HEART: Regular rate and rhythm; No murmurs, rubs, or gallops  ABDOMEN: Soft, Nontender, Nondistended; Bowel sounds present  EXTREMITIES:  2+ Peripheral Pulses, No clubbing, cyanosis, or edema    LABS  03-22    143  |  100  |  39<H>  ----------------------------<  158<H>  3.3<L>   |  26  |  2.6<H>    Ca    9.4      22 Mar 2022 05:54  Mg     1.6     03-22    TPro  6.9  /  Alb  3.9  /  TBili  1.1  /  DBili  x   /  AST  32  /  ALT  22  /  AlkPhos  132<H>  03-22                          10.7   5.26  )-----------( 132      ( 22 Mar 2022 05:54 )             31.2     Culture Results:   No Growth Final (03-15-22)  Culture Results:   No Growth Final (03-15-22)    RADIOLOGY  < from: Xray Chest 1 View- PORTABLE-Routine (Xray Chest 1 View- PORTABLE-Routine in AM.) (03.22.22 @ 06:40) >    Impression:    No radiographic evidence of acute cardiopulmonary disease.      < end of copied text >    MEDICATIONS  (STANDING):  atorvastatin 40 milliGRAM(s) Oral at bedtime  chlordiazePOXIDE   Oral   chlordiazePOXIDE 50 milliGRAM(s) Oral every 8 hours  chlorhexidine 4% Liquid 1 Application(s) Topical <User Schedule>  folic acid 1 milliGRAM(s) Oral daily  heparin   Injectable 5000 Unit(s) SubCutaneous every 12 hours  insulin lispro (ADMELOG) corrective regimen sliding scale   SubCutaneous three times a day before meals  insulin lispro (ADMELOG) corrective regimen sliding scale   SubCutaneous at bedtime  NIFEdipine XL 60 milliGRAM(s) Oral daily  pantoprazole    Tablet 40 milliGRAM(s) Oral before breakfast  thiamine 100 milliGRAM(s) Oral daily    MEDICATIONS  (PRN):  acetaminophen     Tablet .. 650 milliGRAM(s) Oral every 6 hours PRN Temp greater or equal to 38C (100.4F), Mild Pain (1 - 3)  chlordiazePOXIDE 25 milliGRAM(s) Oral every 4 hours PRN Symptom-triggered: each CIWA -Ar score 8 or GREATER  sodium chloride 0.65% Nasal 1 Spray(s) Both Nostrils three times a day PRN Nasal Congestion

## 2022-03-22 NOTE — PROGRESS NOTE ADULT - SUBJECTIVE AND OBJECTIVE BOX
Nephrology Progress Note    KECIA MARTIN  MRN-[id number]  56y  Male    S:  Patient is seen and examined, events over the last 24h noted.    O:  Allergies:  No Known Allergies    Hospital Medications:   MEDICATIONS  (STANDING):  atorvastatin 40 milliGRAM(s) Oral at bedtime  chlordiazePOXIDE 50 milliGRAM(s) Oral every 8 hours  folic acid 1 milliGRAM(s) Oral daily  heparin   Injectable 5000 Unit(s) SubCutaneous every 12 hours  insulin lispro (ADMELOG) corrective regimen sliding scale   SubCutaneous three times a day before meals  insulin lispro (ADMELOG) corrective regimen sliding scale   SubCutaneous at bedtime  NIFEdipine XL 60 milliGRAM(s) Oral daily  pantoprazole    Tablet 40 milliGRAM(s) Oral before breakfast  thiamine 100 milliGRAM(s) Oral daily    MEDICATIONS  (PRN):  acetaminophen     Tablet .. 650 milliGRAM(s) Oral every 6 hours PRN Temp greater or equal to 38C (100.4F), Mild Pain (1 - 3)  chlordiazePOXIDE 25 milliGRAM(s) Oral every 4 hours PRN Symptom-triggered: each CIWA -Ar score 8 or GREATER  sodium chloride 0.65% Nasal 1 Spray(s) Both Nostrils three times a day PRN Nasal Congestion    Home Medications:  acetaminophen 325 mg oral tablet: 2 tab(s) orally every 6 hours (2021 18:52)  DULoxetine 30 mg oral delayed release capsule: 1 cap(s) orally 2 times a day (2021 16:56)  Januvia 100 mg oral tablet: 1 tab(s) orally once a day (2021 16:56)  lisinopril 20 mg oral tablet: 1 tab(s) orally once a day (2021 16:56)  metFORMIN 1000 mg oral tablet: 1 tab(s) orally 2 times a day (2021 16:56)      VITALS:  Daily Weight in k (22 Mar 2022 07:10)  T(F): 99.6 (22 @ 19:00), Max: 99.6 (22 @ 19:00)  HR: 98 (22 @ 20:00)  BP: 138/70 (22 @ 20:00)  RR: 17 (22 @ 20:00)  SpO2: 96% (22 @ 20:00)  Wt(kg): --  I&O's Detail    21 Mar 2022 07:  -  22 Mar 2022 07:00  --------------------------------------------------------  IN:    Dexmedetomidine: 453.9 mL    Oral Fluid: 880 mL  Total IN: 1333.9 mL    OUT:    Voided (mL): 1100 mL  Total OUT: 1100 mL    Total NET: 233.9 mL      22 Mar 2022 07:  -  22 Mar 2022 21:54  --------------------------------------------------------  IN:    Dexmedetomidine: 10 mL  Total IN: 10 mL    OUT:    Voided (mL): 450 mL  Total OUT: 450 mL    Total NET: -440 mL        I&O's Summary    21 Mar 2022 07:01  -  22 Mar 2022 07:00  --------------------------------------------------------  IN: 1333.9 mL / OUT: 1100 mL / NET: 233.9 mL    22 Mar 2022 07:  -  22 Mar 2022 21:54  --------------------------------------------------------  IN: 10 mL / OUT: 450 mL / NET: -440 mL          PHYSICAL EXAM:  Gen: NAD  Resp: ANGIE  Card: S1/S2  Abd: soft  Extremities: no edema      LABS:        143  |  100  |  39<H>  ----------------------------<  158<H>  3.3<L>   |  26  |  2.6<H>    Ca    9.4      22 Mar 2022 05:54  Mg     1.6         TPro  6.9  /  Alb  3.9  /  TBili  1.1  /  DBili      /  AST  32  /  ALT  22  /  AlkPhos  132<H>      eGFR if Non African American: 47 mL/min/1.73M2 (21 @ 06:05)  eGFR if African American: 54 mL/min/1.73M2 (21 @ 06:05)    Phosphorus Level, Serum: 5.3 mg/dL (22 @ 12:06)  Phosphorus Level, Serum: 4.1 mg/dL (22 @ 15:07)    Intact PTH: 311 pg/mL (22 @ 11:45)                          10.7   5.26  )-----------( 132      ( 22 Mar 2022 05:54 )             31.2     Mean Cell Volume: 90.7 fL (22 @ 05:54)        Urine Studies:  Urinalysis Basic - ( 17 Mar 2022 18:30 )    Color: Yellow / Appearance: Clear / S.020 / pH:   Gluc:  / Ketone: Negative  / Bili: Negative / Urobili: 0.2 mg/dL   Blood:  / Protein: 30 mg/dL / Nitrite: Negative   Leuk Esterase: Small / RBC: 6-10 /HPF / WBC 3-5 /HPF   Sq Epi:  / Non Sq Epi: Few /HPF / Bacteria: Few          Creatinine, Random Urine: 55 mg/dL ( @ 18:30)  Protein/Creatinine Ratio Calculation: 0.5 Ratio ( @ 18:30)    Creatinine trend:  Creatinine, Serum: 2.6 mg/dL (22 @ 05:54)  Creatinine, Serum: 2.8 mg/dL (22 @ 06:40)  Creatinine, Serum: 3.2 mg/dL (22 @ 06:17)  Creatinine, Serum: 3.5 mg/dL (22 @ 06:01)  Creatinine, Serum: 3.2 mg/dL (22 @ 19:00)  Creatinine, Serum: 5.2 mg/dL (22 @ 05:59)

## 2022-03-22 NOTE — PROGRESS NOTE ADULT - ASSESSMENT
# Severe VAMSI on CKD requirind HD x 3 sessions (last 3/18)  - non oliguric, creat down-trending  - no need for HD / udall removed  # HTN controlled  # Etoh abuse w/ withdrawal  # Hypomagnesemia and hypokalemia      Recommendations:  - cont strict i/o  - replete K to 4, Mg to 2  - encourage po hydration  - will eventually need to be on ACE-inhibitor or ARB  when creat stabilizes  - pt should have outpatient nephrology follow up.  He can schedule f/u at [address] [phone]

## 2022-03-22 NOTE — PROGRESS NOTE ADULT - ASSESSMENT
55 yo M with PMH DM on Metformin and Januvia, HTN, HLD, Glaucoma, b/l LE peripheral neuropathy who presents in acute renal failure.    #acute on chronic renal failure  #toxic metabolic encephalopathy  - s/p bicarb drip  - no more HD' s/o [place] removal 3/21  - serial lytes  - ECHO  - dc way  - Trend Trop, CK level  - DIC panel    #DM  - hold Metformin  - insulin sliding scale  - FS q1    #HTN  - hold Lisinopril 20 mg qd  - d/c Amlodipine 10 mg qd  - Nifedipine 60 mg qd    #HLD  - Atorvastatin 40 mg qd    #EtOH abuse  - thiamine  - folic acid  - Vit B12, folate  - CIWA Protocol  - wean Precedex  - Addiction medicine consult complete    Code status: Full Code  DVT ppx: subq heparin  Dispo: CCU Tele

## 2022-03-22 NOTE — PROGRESS NOTE ADULT - ASSESSMENT
IMPRESSION:  AMS 2/2 toxic metabolic encephalopathy- resolved  acute on chronic renal failure s/p HD  Hypothermia- resolved  Possible sepsis s/p ABX therapy  H/O ETOH addiction  Possible AKA vs starvation ketosis  Mod MR    SUGGEST:      CNS: CIWA protocol.   MV, Thiamine and folic acid.   taper off  precedex  f/u addiction medicine recs    HEENT: Oral care    PULMONARY:  HOB @ 45 degrees. Aspiration Precautions . Incentive spirometry.     CARDIOVASCULAR: ECHO results reviewed.   avoid volume overload.   BP control    GI: GI prophylaxis.  Feeding as tolerated    RENAL:  Follow up lytes q 12.  Correct as needed.   s/p HD [place] removed      INFECTIOUS DISEASE:   Follow up cultures.   Repeat Procalcitonin  restart abx if respikes temps    HEMATOLOGICAL:  DVT prophylaxis- HSQ. Monitor cbc and coags    ENDOCRINE:  Follow up FS.  Insulin protocol if needed. send hba1c    MUSCULOSKELETAL: PT/REHAB , OOB to chair      Code status: Full code

## 2022-03-22 NOTE — PROGRESS NOTE ADULT - SUBJECTIVE AND OBJECTIVE BOX
24H events:    Patient is a 56y old Male who presents with a chief complaint of Acute Renal Failure (21 Mar 2022 15:40)    Primary diagnosis of Acute renal failure (ARF)       Today is hospital day 7d. NAOE, afebrile, more alert, remains on precedex 0.5    PAST MEDICAL & SURGICAL HISTORY  Diabetes    Hypertension    HLD (hyperlipidemia)    Neuropathy    Glaucoma    No significant past surgical history      SOCIAL HISTORY:  Negative for smoking/alcohol/drug use.     ALLERGIES:  No Known Allergies    MEDICATIONS:  STANDING MEDICATIONS  atorvastatin 40 milliGRAM(s) Oral at bedtime  chlordiazePOXIDE   Oral   chlordiazePOXIDE 50 milliGRAM(s) Oral every 8 hours  chlorhexidine 4% Liquid 1 Application(s) Topical <User Schedule>  dexMEDEtomidine Infusion 0.2 MICROgram(s)/kG/Hr IV Continuous <Continuous>  dextrose 40% Gel 15 Gram(s) Oral once  dextrose 5%. 1000 milliLiter(s) IV Continuous <Continuous>  dextrose 5%. 1000 milliLiter(s) IV Continuous <Continuous>  dextrose 50% Injectable 25 Gram(s) IV Push once  dextrose 50% Injectable 12.5 Gram(s) IV Push once  dextrose 50% Injectable 25 Gram(s) IV Push once  folic acid 1 milliGRAM(s) Oral daily  glucagon  Injectable 1 milliGRAM(s) IntraMuscular once  heparin   Injectable 5000 Unit(s) SubCutaneous every 12 hours  insulin lispro (ADMELOG) corrective regimen sliding scale   SubCutaneous three times a day before meals  insulin lispro (ADMELOG) corrective regimen sliding scale   SubCutaneous at bedtime  NIFEdipine XL 60 milliGRAM(s) Oral daily  pantoprazole    Tablet 40 milliGRAM(s) Oral before breakfast  thiamine 100 milliGRAM(s) Oral daily    PRN MEDICATIONS  acetaminophen     Tablet .. 650 milliGRAM(s) Oral every 6 hours PRN  chlordiazePOXIDE 25 milliGRAM(s) Oral every 4 hours PRN  sodium chloride 0.65% Nasal 1 Spray(s) Both Nostrils three times a day PRN    VITALS:   T(F): 98.7  HR: 67  BP: 157/78  RR: 19  SpO2: 95%    LABS:                        10.7   5.26  )-----------( 132      ( 22 Mar 2022 05:54 )             31.2     03-22    143  |  100  |  39<H>  ----------------------------<  158<H>  3.3<L>   |  26  |  2.6<H>    Ca    9.4      22 Mar 2022 05:54  Mg     1.6     03-22    TPro  6.9  /  Alb  3.9  /  TBili  1.1  /  DBili  x   /  AST  32  /  ALT  22  /  AlkPhos  132<H>  03-22                  RADIOLOGY:    PHYSICAL EXAM:  CONSTITUTIONAL: in no acute distress, afebrile  SKIN: Warm, dry  HEAD: Normocephalic; atraumatic  EYES: No conjunctival injection. EOMI. PERRLA  ENT: No nasal discharge; oropharynx nonerythematous; airway clear  NECK: Supple; non tender, No JVD  CARD:  Regular rate and rhythm  RESP: CTAB; No wheezes, crackles, rales or rhonchi  ABD: Soft NTND; No guarding or rebound tenderness  EXT: Normal ROM.  No clubbing or cyanosis.  No edema, peripheral pulses 2+  NEURO: A&O x3, grossly unremarkable, no focal deficits; improved tremors  PSYCH: Cooperative   24H events:    Patient is a 56y old Male who presents with a chief complaint of Acute Renal Failure (21 Mar 2022 15:40)    Primary diagnosis of Acute renal failure (ARF)       Today is hospital day 7d. NAOE, afebrile, more alert, remains on precedex 0.5; s/p [place] removal 3/21    PAST MEDICAL & SURGICAL HISTORY  Diabetes    Hypertension    HLD (hyperlipidemia)    Neuropathy    Glaucoma    No significant past surgical history      SOCIAL HISTORY:  Negative for smoking/alcohol/drug use.     ALLERGIES:  No Known Allergies    MEDICATIONS:  STANDING MEDICATIONS  atorvastatin 40 milliGRAM(s) Oral at bedtime  chlordiazePOXIDE   Oral   chlordiazePOXIDE 50 milliGRAM(s) Oral every 8 hours  chlorhexidine 4% Liquid 1 Application(s) Topical <User Schedule>  dexMEDEtomidine Infusion 0.2 MICROgram(s)/kG/Hr IV Continuous <Continuous>  dextrose 40% Gel 15 Gram(s) Oral once  dextrose 5%. 1000 milliLiter(s) IV Continuous <Continuous>  dextrose 5%. 1000 milliLiter(s) IV Continuous <Continuous>  dextrose 50% Injectable 25 Gram(s) IV Push once  dextrose 50% Injectable 12.5 Gram(s) IV Push once  dextrose 50% Injectable 25 Gram(s) IV Push once  folic acid 1 milliGRAM(s) Oral daily  glucagon  Injectable 1 milliGRAM(s) IntraMuscular once  heparin   Injectable 5000 Unit(s) SubCutaneous every 12 hours  insulin lispro (ADMELOG) corrective regimen sliding scale   SubCutaneous three times a day before meals  insulin lispro (ADMELOG) corrective regimen sliding scale   SubCutaneous at bedtime  NIFEdipine XL 60 milliGRAM(s) Oral daily  pantoprazole    Tablet 40 milliGRAM(s) Oral before breakfast  thiamine 100 milliGRAM(s) Oral daily    PRN MEDICATIONS  acetaminophen     Tablet .. 650 milliGRAM(s) Oral every 6 hours PRN  chlordiazePOXIDE 25 milliGRAM(s) Oral every 4 hours PRN  sodium chloride 0.65% Nasal 1 Spray(s) Both Nostrils three times a day PRN    VITALS:   T(F): 98.7  HR: 67  BP: 157/78  RR: 19  SpO2: 95%    LABS:                        10.7   5.26  )-----------( 132      ( 22 Mar 2022 05:54 )             31.2     03-22    143  |  100  |  39<H>  ----------------------------<  158<H>  3.3<L>   |  26  |  2.6<H>    Ca    9.4      22 Mar 2022 05:54  Mg     1.6     03-22    TPro  6.9  /  Alb  3.9  /  TBili  1.1  /  DBili  x   /  AST  32  /  ALT  22  /  AlkPhos  132<H>  03-22                  RADIOLOGY:    PHYSICAL EXAM:  CONSTITUTIONAL: in no acute distress, afebrile  SKIN: Warm, dry  HEAD: Normocephalic; atraumatic  EYES: No conjunctival injection. EOMI. PERRLA  ENT: No nasal discharge; oropharynx nonerythematous; airway clear  NECK: Supple; non tender, No JVD  CARD:  Regular rate and rhythm  RESP: CTAB; No wheezes, crackles, rales or rhonchi  ABD: Soft NTND; No guarding or rebound tenderness  EXT: Normal ROM.  No clubbing or cyanosis.  No edema, peripheral pulses 2+  NEURO: A&O x3, grossly unremarkable, no focal deficits; improved tremors  PSYCH: Cooperative

## 2022-03-22 NOTE — PROGRESS NOTE ADULT - SUBJECTIVE AND OBJECTIVE BOX
Patient is a 56y old  Male who presents with a chief complaint of Acute Renal Failure (22 Mar 2022 08:05)      Over Night Events:  Patient seen and examined.   awake follow command   on precedex     ROS:  See HPI    PHYSICAL EXAM    ICU Vital Signs Last 24 Hrs  T(C): 37.1 (22 Mar 2022 07:10), Max: 37.1 (22 Mar 2022 07:10)  T(F): 98.7 (22 Mar 2022 07:10), Max: 98.7 (22 Mar 2022 07:10)  HR: 67 (22 Mar 2022 07:00) (57 - 75)  BP: 157/78 (22 Mar 2022 07:00) (129/75 - 189/64)  BP(mean): 111 (22 Mar 2022 07:00) (96 - 126)  ABP: --  ABP(mean): --  RR: 19 (22 Mar 2022 07:10) (14 - 23)  SpO2: 95% (22 Mar 2022 07:00) (94% - 100%)      General: AOx3  HEENT:     reuben           Lymph Nodes: NO cervical LN   Lungs: Bilateral BS  Cardiovascular: Regular   Abdomen: Soft, Positive BS  Extremities: No clubbing   Skin: warm   Neurological: no focal   Musculoskeletal: move all ext     I&O's Detail    21 Mar 2022 07:01  -  22 Mar 2022 07:00  --------------------------------------------------------  IN:    Dexmedetomidine: 453.9 mL    Oral Fluid: 880 mL  Total IN: 1333.9 mL    OUT:    Voided (mL): 1100 mL  Total OUT: 1100 mL    Total NET: 233.9 mL          LABS:                          10.7   5.26  )-----------( 132      ( 22 Mar 2022 05:54 )             31.2         22 Mar 2022 05:54    143    |  100    |  39     ----------------------------<  158    3.3     |  26     |  2.6      Ca    9.4        22 Mar 2022 05:54  Mg     1.6       22 Mar 2022 05:54    TPro  6.9    /  Alb  3.9    /  TBili  1.1    /  DBili  x      /  AST  32     /  ALT  22     /  AlkPhos  132    22 Mar 2022 05:54  Amylase x     lipase x                                                                                                                                                                                                                                         MEDICATIONS  (STANDING):  atorvastatin 40 milliGRAM(s) Oral at bedtime  chlordiazePOXIDE   Oral   chlordiazePOXIDE 50 milliGRAM(s) Oral every 8 hours  chlorhexidine 4% Liquid 1 Application(s) Topical <User Schedule>  dexMEDEtomidine Infusion 0.2 MICROgram(s)/kG/Hr (3.7 mL/Hr) IV Continuous <Continuous>  dextrose 40% Gel 15 Gram(s) Oral once  dextrose 5%. 1000 milliLiter(s) (100 mL/Hr) IV Continuous <Continuous>  dextrose 5%. 1000 milliLiter(s) (50 mL/Hr) IV Continuous <Continuous>  dextrose 50% Injectable 25 Gram(s) IV Push once  dextrose 50% Injectable 12.5 Gram(s) IV Push once  dextrose 50% Injectable 25 Gram(s) IV Push once  folic acid 1 milliGRAM(s) Oral daily  glucagon  Injectable 1 milliGRAM(s) IntraMuscular once  heparin   Injectable 5000 Unit(s) SubCutaneous every 12 hours  insulin lispro (ADMELOG) corrective regimen sliding scale   SubCutaneous three times a day before meals  insulin lispro (ADMELOG) corrective regimen sliding scale   SubCutaneous at bedtime  magnesium sulfate  IVPB 2 Gram(s) IV Intermittent once  NIFEdipine XL 60 milliGRAM(s) Oral daily  pantoprazole    Tablet 40 milliGRAM(s) Oral before breakfast  potassium chloride   Powder 40 milliEquivalent(s) Oral once  thiamine 100 milliGRAM(s) Oral daily    MEDICATIONS  (PRN):  acetaminophen     Tablet .. 650 milliGRAM(s) Oral every 6 hours PRN Temp greater or equal to 38C (100.4F), Mild Pain (1 - 3)  chlordiazePOXIDE 25 milliGRAM(s) Oral every 4 hours PRN Symptom-triggered: each CIWA -Ar score 8 or GREATER  sodium chloride 0.65% Nasal 1 Spray(s) Both Nostrils three times a day PRN Nasal Congestion          Xrays:  TLC:  OG:  ET tube:                                                                                    no infiltrate    ECHO:  [facility] ICU:

## 2022-03-22 NOTE — PHYSICAL THERAPY INITIAL EVALUATION ADULT - ADDITIONAL COMMENTS
pt lives with his wife in a private house with 1 platform step to enter and 10 steps up to the bedroom and bathroom area with 1 railing on the R

## 2022-03-22 NOTE — PHYSICAL THERAPY INITIAL EVALUATION ADULT - GENERAL OBSERVATIONS, REHAB EVAL
14;55-15;23 pt was seen for PT IE at bed side, pt is agreeable, chart thoroughly reviewed, IE completed, Rn aware. Pt received and left sitting in bed side chair, in no apparent distress, +telemonitoring, +BP cuff and +pulse ox, +call bell within reach, bed side table at reach, +hep lock, family at bed side

## 2022-03-22 NOTE — PHYSICAL THERAPY INITIAL EVALUATION ADULT - GAIT DEVIATIONS NOTED, PT EVAL
shuffling, absent trunk rotation, losing balance/decreased velocity of limb motion/decreased step length/decreased weight-shifting ability

## 2022-03-22 NOTE — PHYSICAL THERAPY INITIAL EVALUATION ADULT - PERTINENT HX OF CURRENT PROBLEM, REHAB EVAL
57 yo M with PMH DM on Metformin and Januvia, HTN, HLD, Glaucoma, b/l LE peripheral neuropathy who presents in acute renal failure., toxic metabolic encephalopathy

## 2022-03-23 LAB
% GAMMA, URINE: 10.3 % — SIGNIFICANT CHANGE UP
ALBUMIN 24H MFR UR ELPH: 14.5 % — SIGNIFICANT CHANGE UP
ALBUMIN SERPL ELPH-MCNC: 4 G/DL — SIGNIFICANT CHANGE UP (ref 3.5–5.2)
ALP SERPL-CCNC: 147 U/L — HIGH (ref 30–115)
ALPHA1 GLOB 24H MFR UR ELPH: 17.6 % — SIGNIFICANT CHANGE UP
ALPHA2 GLOB 24H MFR UR ELPH: 7.6 % — SIGNIFICANT CHANGE UP
ALT FLD-CCNC: 33 U/L — SIGNIFICANT CHANGE UP (ref 0–41)
ANION GAP SERPL CALC-SCNC: 17 MMOL/L — HIGH (ref 7–14)
AST SERPL-CCNC: 55 U/L — HIGH (ref 0–41)
B-GLOBULIN 24H MFR UR ELPH: 50 % — SIGNIFICANT CHANGE UP
BASOPHILS # BLD AUTO: 0.04 K/UL — SIGNIFICANT CHANGE UP (ref 0–0.2)
BASOPHILS NFR BLD AUTO: 0.8 % — SIGNIFICANT CHANGE UP (ref 0–1)
BILIRUB SERPL-MCNC: 1.1 MG/DL — SIGNIFICANT CHANGE UP (ref 0.2–1.2)
BUN SERPL-MCNC: 41 MG/DL — HIGH (ref 10–20)
CALCIUM SERPL-MCNC: 9.5 MG/DL — SIGNIFICANT CHANGE UP (ref 8.5–10.1)
CHLORIDE SERPL-SCNC: 100 MMOL/L — SIGNIFICANT CHANGE UP (ref 98–110)
CO2 SERPL-SCNC: 25 MMOL/L — SIGNIFICANT CHANGE UP (ref 17–32)
CREAT SERPL-MCNC: 2.6 MG/DL — HIGH (ref 0.7–1.5)
EGFR: 28 ML/MIN/1.73M2 — LOW
EOSINOPHIL # BLD AUTO: 0.31 K/UL — SIGNIFICANT CHANGE UP (ref 0–0.7)
EOSINOPHIL NFR BLD AUTO: 5.9 % — SIGNIFICANT CHANGE UP (ref 0–8)
GLUCOSE BLDC GLUCOMTR-MCNC: 128 MG/DL — HIGH (ref 70–99)
GLUCOSE BLDC GLUCOMTR-MCNC: 157 MG/DL — HIGH (ref 70–99)
GLUCOSE BLDC GLUCOMTR-MCNC: 173 MG/DL — HIGH (ref 70–99)
GLUCOSE BLDC GLUCOMTR-MCNC: 247 MG/DL — HIGH (ref 70–99)
GLUCOSE SERPL-MCNC: 131 MG/DL — HIGH (ref 70–99)
HCT VFR BLD CALC: 33.3 % — LOW (ref 42–52)
HGB BLD-MCNC: 11.1 G/DL — LOW (ref 14–18)
IMM GRANULOCYTES NFR BLD AUTO: 0.4 % — HIGH (ref 0.1–0.3)
INTERPRETATION 24H UR IFE-IMP: SIGNIFICANT CHANGE UP
INTERPRETATION 24H UR IFE-IMP: SIGNIFICANT CHANGE UP
LYMPHOCYTES # BLD AUTO: 0.94 K/UL — LOW (ref 1.2–3.4)
LYMPHOCYTES # BLD AUTO: 17.8 % — LOW (ref 20.5–51.1)
M PROTEIN 24H UR ELPH-MRATE: SIGNIFICANT CHANGE UP
MAGNESIUM SERPL-MCNC: 1.8 MG/DL — SIGNIFICANT CHANGE UP (ref 1.8–2.4)
MCHC RBC-ENTMCNC: 30.5 PG — SIGNIFICANT CHANGE UP (ref 27–31)
MCHC RBC-ENTMCNC: 33.3 G/DL — SIGNIFICANT CHANGE UP (ref 32–37)
MCV RBC AUTO: 91.5 FL — SIGNIFICANT CHANGE UP (ref 80–94)
MONOCYTES # BLD AUTO: 0.5 K/UL — SIGNIFICANT CHANGE UP (ref 0.1–0.6)
MONOCYTES NFR BLD AUTO: 9.5 % — HIGH (ref 1.7–9.3)
NEUTROPHILS # BLD AUTO: 3.46 K/UL — SIGNIFICANT CHANGE UP (ref 1.4–6.5)
NEUTROPHILS NFR BLD AUTO: 65.6 % — SIGNIFICANT CHANGE UP (ref 42.2–75.2)
NRBC # BLD: 0 /100 WBCS — SIGNIFICANT CHANGE UP (ref 0–0)
PLATELET # BLD AUTO: 171 K/UL — SIGNIFICANT CHANGE UP (ref 130–400)
POTASSIUM SERPL-MCNC: 3.6 MMOL/L — SIGNIFICANT CHANGE UP (ref 3.5–5)
POTASSIUM SERPL-SCNC: 3.6 MMOL/L — SIGNIFICANT CHANGE UP (ref 3.5–5)
PROT ?TM UR-MCNC: 85 MG/DL — HIGH (ref 0–12)
PROT PATTERN 24H UR ELPH-IMP: SIGNIFICANT CHANGE UP
PROT SERPL-MCNC: 7.5 G/DL — SIGNIFICANT CHANGE UP (ref 6–8)
RBC # BLD: 3.64 M/UL — LOW (ref 4.7–6.1)
RBC # FLD: 12.8 % — SIGNIFICANT CHANGE UP (ref 11.5–14.5)
SODIUM SERPL-SCNC: 142 MMOL/L — SIGNIFICANT CHANGE UP (ref 135–146)
TOTAL VOLUME - 24 HOUR: SIGNIFICANT CHANGE UP ML
URINE CREATININE CALCULATION: SIGNIFICANT CHANGE UP G/24 H (ref 1–2)
WBC # BLD: 5.27 K/UL — SIGNIFICANT CHANGE UP (ref 4.8–10.8)
WBC # FLD AUTO: 5.27 K/UL — SIGNIFICANT CHANGE UP (ref 4.8–10.8)

## 2022-03-23 PROCEDURE — 99232 SBSQ HOSP IP/OBS MODERATE 35: CPT

## 2022-03-23 PROCEDURE — 99233 SBSQ HOSP IP/OBS HIGH 50: CPT

## 2022-03-23 PROCEDURE — 71045 X-RAY EXAM CHEST 1 VIEW: CPT | Mod: 26

## 2022-03-23 RX ORDER — POLYETHYLENE GLYCOL 3350 17 G/17G
17 POWDER, FOR SOLUTION ORAL DAILY
Refills: 0 | Status: DISCONTINUED | OUTPATIENT
Start: 2022-03-23 | End: 2022-03-25

## 2022-03-23 RX ADMIN — HEPARIN SODIUM 5000 UNIT(S): 5000 INJECTION INTRAVENOUS; SUBCUTANEOUS at 17:14

## 2022-03-23 RX ADMIN — Medication 60 MILLIGRAM(S): at 05:53

## 2022-03-23 RX ADMIN — HEPARIN SODIUM 5000 UNIT(S): 5000 INJECTION INTRAVENOUS; SUBCUTANEOUS at 05:53

## 2022-03-23 RX ADMIN — Medication 100 MILLIGRAM(S): at 11:30

## 2022-03-23 RX ADMIN — Medication 4: at 11:42

## 2022-03-23 RX ADMIN — Medication 50 MILLIGRAM(S): at 05:53

## 2022-03-23 RX ADMIN — Medication 2: at 17:11

## 2022-03-23 RX ADMIN — ATORVASTATIN CALCIUM 40 MILLIGRAM(S): 80 TABLET, FILM COATED ORAL at 22:11

## 2022-03-23 RX ADMIN — PANTOPRAZOLE SODIUM 40 MILLIGRAM(S): 20 TABLET, DELAYED RELEASE ORAL at 06:00

## 2022-03-23 RX ADMIN — Medication 50 MILLIGRAM(S): at 17:11

## 2022-03-23 RX ADMIN — Medication 1 MILLIGRAM(S): at 11:29

## 2022-03-23 RX ADMIN — CHLORHEXIDINE GLUCONATE 1 APPLICATION(S): 213 SOLUTION TOPICAL at 06:01

## 2022-03-23 NOTE — PROGRESS NOTE ADULT - ASSESSMENT
IMPRESSION:  AMS 2/2 toxic metabolic encephalopathy- resolved  acute on chronic renal failure s/p HD  Hypothermia- resolved  Possible sepsis s/p ABX therapy  H/O ETOH addiction  Possible AKA vs starvation ketosis  Mod MR    SUGGEST:      CNS: CIWA protocol.   MV, Thiamine and folic acid.   f/u addiction medicine recs    HEENT: Oral care    PULMONARY:  HOB @ 45 degrees. Aspiration Precautions . Incentive spirometry.     CARDIOVASCULAR: ECHO results reviewed.   avoid volume overload.   BP control    GI: GI prophylaxis.  Feeding as tolerated    RENAL:  Follow up lytes q 12.  Correct as needed.   s/p HD [place] removed      INFECTIOUS DISEASE:   Follow up cultures.   restart abx if respikes temps    HEMATOLOGICAL:  DVT prophylaxis- HSQ. Monitor cbc and coags    ENDOCRINE:  Follow up FS.  Insulin protocol if needed. send hba1c    MUSCULOSKELETAL: PT/REHAB , OOB to chair      Code status: Full code

## 2022-03-23 NOTE — PROGRESS NOTE ADULT - ASSESSMENT
55 yo male, pmh of htn, hld, dm, glaucoma, b/l le neuropathy, presented  to ed for increased weakness, numbness to b/l upper extremities, slowed speech, and nausea.   found to have ;     acute renal failure - resolving / / alcohol withdrawal / anemia     - s/p  HD   - supplement hypokalemia and hypomagnesemia   - continue benzo detox protocol   - detox follow up   - thiamine and folic acid for suspected thiamine and folate deficiency    - home meds   - DVT prophylaxis   - may downgrade to floor

## 2022-03-23 NOTE — PROGRESS NOTE ADULT - ATTENDING COMMENTS
Attending Statement: I have personally performed a face to face diagnostic evaluation on this patient. The patient is suffering from:  AMS 2/2 toxic metabolic encephalopathy  acute on chronic renal failure s/p HD  Hypothermia  possible sepsis  I have reviewed the above note and agree with the history, exam and suggestions for care, except as I have noted in the text. I have amended the treatment plans as necessary.
Attending Statement: I have personally performed a face to face diagnostic evaluation on this patient. The patient is suffering from:  AMS 2/2 toxic metabolic encephalopathy  acute on chronic renal failure s/p HD  Hypothermia  possible sepsis  I have reviewed the above note and agree with the history, exam and suggestions for care, except as I have noted in the text. I have amended the treatment plans as necessary.
patient seen and examined agree above note   downgrade to floor   follow neurology
patient seen and examined agree above note   follow renal for HD   ativan protocol and taper precedex off   thiamine folate   oral feed   OOb to chair   monitor is and os
Attending Statement: I have personally performed a face to face diagnostic evaluation on this patient. The patient is suffering from:  AMS 2/2 toxic metabolic encephalopathy  acute on chronic renal failure s/p HD  Hypothermia  possible sepsis  I have reviewed the above note and agree with the history, exam and suggestions for care, except as I have noted in the text. I have amended the treatment plans as necessary.

## 2022-03-23 NOTE — PROGRESS NOTE ADULT - SUBJECTIVE AND OBJECTIVE BOX
Over Night Events: No acute events overnight        ROS:  See HPI    PHYSICAL EXAM    ICU Vital Signs Last 24 Hrs  T(C): 37.1 (23 Mar 2022 07:05), Max: 37.6 (22 Mar 2022 19:00)  T(F): 98.7 (23 Mar 2022 07:05), Max: 99.6 (22 Mar 2022 19:00)  HR: 100 (23 Mar 2022 07:00) (82 - 110)  BP: 159/82 (23 Mar 2022 07:00) (112/69 - 168/86)  BP(mean): 108 (23 Mar 2022 07:00) (85 - 118)  ABP: --  ABP(mean): --  RR: 25 (23 Mar 2022 07:05) (14 - 28)  SpO2: 98% (23 Mar 2022 07:00) (94% - 98%)      General: NAD  HEENT: JONATHAN             Lungs: Bilateral BS  Cardiovascular: Regular   Abdomen: Soft, Positive BS  Extremities: No clubbing   Skin: Warm  Neurological: Non focal       03-22-22 @ 07:01  -  03-23-22 @ 07:00  --------------------------------------------------------  IN:    Dexmedetomidine: 10 mL  Total IN: 10 mL    OUT:    Voided (mL): 1150 mL  Total OUT: 1150 mL    Total NET: -1140 mL      03-23-22 @ 07:01  -  03-23-22 @ 09:36  --------------------------------------------------------  IN:    Oral Fluid: 420 mL  Total IN: 420 mL    OUT:  Total OUT: 0 mL    Total NET: 420 mL          LABS:                          11.1   5.27  )-----------( 171      ( 23 Mar 2022 06:02 )             33.3                                               03-23    142  |  100  |  41<H>  ----------------------------<  131<H>  3.6   |  25  |  2.6<H>    Ca    9.5      23 Mar 2022 06:00  Mg     1.8     03-23    TPro  7.5  /  Alb  4.0  /  TBili  1.1  /  DBili  x   /  AST  55<H>  /  ALT  33  /  AlkPhos  147<H>  03-23                                                                                           LIVER FUNCTIONS - ( 23 Mar 2022 06:00 )  Alb: 4.0 g/dL / Pro: 7.5 g/dL / ALK PHOS: 147 U/L / ALT: 33 U/L / AST: 55 U/L / GGT: x                                                                                                                                       MEDICATIONS  (STANDING):  atorvastatin 40 milliGRAM(s) Oral at bedtime  chlordiazePOXIDE 50 milliGRAM(s) Oral every 12 hours  chlordiazePOXIDE   Oral   chlorhexidine 4% Liquid 1 Application(s) Topical <User Schedule>  dextrose 40% Gel 15 Gram(s) Oral once  dextrose 5%. 1000 milliLiter(s) (100 mL/Hr) IV Continuous <Continuous>  dextrose 5%. 1000 milliLiter(s) (50 mL/Hr) IV Continuous <Continuous>  dextrose 50% Injectable 25 Gram(s) IV Push once  dextrose 50% Injectable 12.5 Gram(s) IV Push once  dextrose 50% Injectable 25 Gram(s) IV Push once  folic acid 1 milliGRAM(s) Oral daily  glucagon  Injectable 1 milliGRAM(s) IntraMuscular once  heparin   Injectable 5000 Unit(s) SubCutaneous every 12 hours  insulin lispro (ADMELOG) corrective regimen sliding scale   SubCutaneous three times a day before meals  insulin lispro (ADMELOG) corrective regimen sliding scale   SubCutaneous at bedtime  NIFEdipine XL 60 milliGRAM(s) Oral daily  pantoprazole    Tablet 40 milliGRAM(s) Oral before breakfast  thiamine 100 milliGRAM(s) Oral daily    MEDICATIONS  (PRN):  acetaminophen     Tablet .. 650 milliGRAM(s) Oral every 6 hours PRN Temp greater or equal to 38C (100.4F), Mild Pain (1 - 3)  chlordiazePOXIDE 25 milliGRAM(s) Oral every 4 hours PRN Symptom-triggered: each CIWA -Ar score 8 or GREATER  sodium chloride 0.65% Nasal 1 Spray(s) Both Nostrils three times a day PRN Nasal Congestion    < from: TTE Echo Complete w/o Contrast w/ Doppler (03.16.22 @ 08:36) >   1. Left ventricular ejection fraction, by visual estimation, is 55 to   60%.   2. Normal left atrial size.   3. Moderate mitral valve regurgitation.   4. Moderate tricuspid regurgitation.   5. Mild aortic regurgitation.   6. Estimated pulmonary artery systolic pressure is 57.0 mmHg assuming a   right atrial pressure of 3 mmHg, which is consistent with moderate   pulmonary hypertension.      < end of copied text >   Over Night Events: No acute events overnight  off precedex       ROS:  See HPI    PHYSICAL EXAM    ICU Vital Signs Last 24 Hrs  T(C): 37.1 (23 Mar 2022 07:05), Max: 37.6 (22 Mar 2022 19:00)  T(F): 98.7 (23 Mar 2022 07:05), Max: 99.6 (22 Mar 2022 19:00)  HR: 100 (23 Mar 2022 07:00) (82 - 110)  BP: 159/82 (23 Mar 2022 07:00) (112/69 - 168/86)  BP(mean): 108 (23 Mar 2022 07:00) (85 - 118)  ABP: --  ABP(mean): --  RR: 25 (23 Mar 2022 07:05) (14 - 28)  SpO2: 98% (23 Mar 2022 07:00) (94% - 98%)      General: NAD  HEENT: JONATHAN             Lungs: Bilateral BS  Cardiovascular: Regular   Abdomen: Soft, Positive BS  Extremities: No clubbing   Skin: Warm  Neurological: Non focal       03-22-22 @ 07:01  -  03-23-22 @ 07:00  --------------------------------------------------------  IN:    Dexmedetomidine: 10 mL  Total IN: 10 mL    OUT:    Voided (mL): 1150 mL  Total OUT: 1150 mL    Total NET: -1140 mL      03-23-22 @ 07:01  -  03-23-22 @ 09:36  --------------------------------------------------------  IN:    Oral Fluid: 420 mL  Total IN: 420 mL    OUT:  Total OUT: 0 mL    Total NET: 420 mL          LABS:                          11.1   5.27  )-----------( 171      ( 23 Mar 2022 06:02 )             33.3                                               03-23    142  |  100  |  41<H>  ----------------------------<  131<H>  3.6   |  25  |  2.6<H>    Ca    9.5      23 Mar 2022 06:00  Mg     1.8     03-23    TPro  7.5  /  Alb  4.0  /  TBili  1.1  /  DBili  x   /  AST  55<H>  /  ALT  33  /  AlkPhos  147<H>  03-23                                                                                           LIVER FUNCTIONS - ( 23 Mar 2022 06:00 )  Alb: 4.0 g/dL / Pro: 7.5 g/dL / ALK PHOS: 147 U/L / ALT: 33 U/L / AST: 55 U/L / GGT: x                                                                                                                                       MEDICATIONS  (STANDING):  atorvastatin 40 milliGRAM(s) Oral at bedtime  chlordiazePOXIDE 50 milliGRAM(s) Oral every 12 hours  chlordiazePOXIDE   Oral   chlorhexidine 4% Liquid 1 Application(s) Topical <User Schedule>  dextrose 40% Gel 15 Gram(s) Oral once  dextrose 5%. 1000 milliLiter(s) (100 mL/Hr) IV Continuous <Continuous>  dextrose 5%. 1000 milliLiter(s) (50 mL/Hr) IV Continuous <Continuous>  dextrose 50% Injectable 25 Gram(s) IV Push once  dextrose 50% Injectable 12.5 Gram(s) IV Push once  dextrose 50% Injectable 25 Gram(s) IV Push once  folic acid 1 milliGRAM(s) Oral daily  glucagon  Injectable 1 milliGRAM(s) IntraMuscular once  heparin   Injectable 5000 Unit(s) SubCutaneous every 12 hours  insulin lispro (ADMELOG) corrective regimen sliding scale   SubCutaneous three times a day before meals  insulin lispro (ADMELOG) corrective regimen sliding scale   SubCutaneous at bedtime  NIFEdipine XL 60 milliGRAM(s) Oral daily  pantoprazole    Tablet 40 milliGRAM(s) Oral before breakfast  thiamine 100 milliGRAM(s) Oral daily    MEDICATIONS  (PRN):  acetaminophen     Tablet .. 650 milliGRAM(s) Oral every 6 hours PRN Temp greater or equal to 38C (100.4F), Mild Pain (1 - 3)  chlordiazePOXIDE 25 milliGRAM(s) Oral every 4 hours PRN Symptom-triggered: each CIWA -Ar score 8 or GREATER  sodium chloride 0.65% Nasal 1 Spray(s) Both Nostrils three times a day PRN Nasal Congestion    < from: TTE Echo Complete w/o Contrast w/ Doppler (03.16.22 @ 08:36) >   1. Left ventricular ejection fraction, by visual estimation, is 55 to   60%.   2. Normal left atrial size.   3. Moderate mitral valve regurgitation.   4. Moderate tricuspid regurgitation.   5. Mild aortic regurgitation.   6. Estimated pulmonary artery systolic pressure is 57.0 mmHg assuming a   right atrial pressure of 3 mmHg, which is consistent with moderate   pulmonary hypertension.      < end of copied text >

## 2022-03-23 NOTE — PROGRESS NOTE ADULT - SUBJECTIVE AND OBJECTIVE BOX
Patient is off Precedex, more awake today      T(F): 97.7 (03-23-22 @ 11:22), Max: 99.6 (03-22-22 @ 19:00)  HR: 108 (03-23-22 @ 11:08)  BP: 143/79 (03-23-22 @ 11:08)  RR: 20  SpO2: 97% (03-23-22 @ 11:08) (96% - 98%)    PHYSICAL EXAM:  GENERAL: NAD  HEAD:  Atraumatic, Normocephalic  EYES: EOMI, PERRLA, conjunctiva and sclera clear  NERVOUS SYSTEM:  no focal deficits   CHEST/LUNG: Clear to percussion bilaterally; No rales, rhonchi, wheezing, or rubs  HEART: Regular rate and rhythm; No murmurs, rubs, or gallops  ABDOMEN: Soft, Nontender, Nondistended; Bowel sounds present  EXTREMITIES:  2+ Peripheral Pulses, No clubbing, cyanosis, or edema    LABS  03-23    142  |  100  |  41<H>  ----------------------------<  131<H>  3.6   |  25  |  2.6<H>    Ca    9.5      23 Mar 2022 06:00  Mg     1.8     03-23    TPro  7.5  /  Alb  4.0  /  TBili  1.1  /  DBili  x   /  AST  55<H>  /  ALT  33  /  AlkPhos  147<H>  03-23                          11.1   5.27  )-----------( 171      ( 23 Mar 2022 06:02 )             33.3       Culture Results:   No Growth Final (03-15-22)  Culture Results:   No Growth Final (03-15-22)      MEDICATIONS  (STANDING):  atorvastatin 40 milliGRAM(s) Oral at bedtime  chlordiazePOXIDE   Oral   chlordiazePOXIDE 50 milliGRAM(s) Oral every 12 hours  chlorhexidine 4% Liquid 1 Application(s) Topical <User Schedule>  folic acid 1 milliGRAM(s) Oral daily  heparin   Injectable 5000 Unit(s) SubCutaneous every 12 hours  insulin lispro (ADMELOG) corrective regimen sliding scale   SubCutaneous three times a day before meals  insulin lispro (ADMELOG) corrective regimen sliding scale   SubCutaneous at bedtime  NIFEdipine XL 60 milliGRAM(s) Oral daily  pantoprazole    Tablet 40 milliGRAM(s) Oral before breakfast  thiamine 100 milliGRAM(s) Oral daily    MEDICATIONS  (PRN):  acetaminophen     Tablet .. 650 milliGRAM(s) Oral every 6 hours PRN Temp greater or equal to 38C (100.4F), Mild Pain (1 - 3)  chlordiazePOXIDE 25 milliGRAM(s) Oral every 4 hours PRN Symptom-triggered: each CIWA -Ar score 8 or GREATER  sodium chloride 0.65% Nasal 1 Spray(s) Both Nostrils three times a day PRN Nasal Congestion

## 2022-03-24 LAB
ALBUMIN SERPL ELPH-MCNC: 3.9 G/DL — SIGNIFICANT CHANGE UP (ref 3.5–5.2)
ALP SERPL-CCNC: 149 U/L — HIGH (ref 30–115)
ALT FLD-CCNC: 44 U/L — HIGH (ref 0–41)
ANION GAP SERPL CALC-SCNC: 16 MMOL/L — HIGH (ref 7–14)
AST SERPL-CCNC: 60 U/L — HIGH (ref 0–41)
BASOPHILS # BLD AUTO: 0.04 K/UL — SIGNIFICANT CHANGE UP (ref 0–0.2)
BASOPHILS NFR BLD AUTO: 0.8 % — SIGNIFICANT CHANGE UP (ref 0–1)
BILIRUB SERPL-MCNC: 0.8 MG/DL — SIGNIFICANT CHANGE UP (ref 0.2–1.2)
BUN SERPL-MCNC: 37 MG/DL — HIGH (ref 10–20)
CALCIUM SERPL-MCNC: 9.3 MG/DL — SIGNIFICANT CHANGE UP (ref 8.5–10.1)
CHLORIDE SERPL-SCNC: 102 MMOL/L — SIGNIFICANT CHANGE UP (ref 98–110)
CO2 SERPL-SCNC: 27 MMOL/L — SIGNIFICANT CHANGE UP (ref 17–32)
CREAT SERPL-MCNC: 2.6 MG/DL — HIGH (ref 0.7–1.5)
EGFR: 28 ML/MIN/1.73M2 — LOW
EOSINOPHIL # BLD AUTO: 0.4 K/UL — SIGNIFICANT CHANGE UP (ref 0–0.7)
EOSINOPHIL NFR BLD AUTO: 7.8 % — SIGNIFICANT CHANGE UP (ref 0–8)
GLUCOSE BLDC GLUCOMTR-MCNC: 143 MG/DL — HIGH (ref 70–99)
GLUCOSE BLDC GLUCOMTR-MCNC: 169 MG/DL — HIGH (ref 70–99)
GLUCOSE BLDC GLUCOMTR-MCNC: 200 MG/DL — HIGH (ref 70–99)
GLUCOSE BLDC GLUCOMTR-MCNC: 221 MG/DL — HIGH (ref 70–99)
GLUCOSE SERPL-MCNC: 179 MG/DL — HIGH (ref 70–99)
HCT VFR BLD CALC: 33.9 % — LOW (ref 42–52)
HGB BLD-MCNC: 11.1 G/DL — LOW (ref 14–18)
IMM GRANULOCYTES NFR BLD AUTO: 1 % — HIGH (ref 0.1–0.3)
LYMPHOCYTES # BLD AUTO: 0.83 K/UL — LOW (ref 1.2–3.4)
LYMPHOCYTES # BLD AUTO: 16.1 % — LOW (ref 20.5–51.1)
MAGNESIUM SERPL-MCNC: 1.6 MG/DL — LOW (ref 1.8–2.4)
MCHC RBC-ENTMCNC: 30.7 PG — SIGNIFICANT CHANGE UP (ref 27–31)
MCHC RBC-ENTMCNC: 32.7 G/DL — SIGNIFICANT CHANGE UP (ref 32–37)
MCV RBC AUTO: 93.6 FL — SIGNIFICANT CHANGE UP (ref 80–94)
MONOCYTES # BLD AUTO: 0.54 K/UL — SIGNIFICANT CHANGE UP (ref 0.1–0.6)
MONOCYTES NFR BLD AUTO: 10.5 % — HIGH (ref 1.7–9.3)
NEUTROPHILS # BLD AUTO: 3.3 K/UL — SIGNIFICANT CHANGE UP (ref 1.4–6.5)
NEUTROPHILS NFR BLD AUTO: 63.8 % — SIGNIFICANT CHANGE UP (ref 42.2–75.2)
NRBC # BLD: 0 /100 WBCS — SIGNIFICANT CHANGE UP (ref 0–0)
PLATELET # BLD AUTO: 208 K/UL — SIGNIFICANT CHANGE UP (ref 130–400)
POTASSIUM SERPL-MCNC: 3.6 MMOL/L — SIGNIFICANT CHANGE UP (ref 3.5–5)
POTASSIUM SERPL-SCNC: 3.6 MMOL/L — SIGNIFICANT CHANGE UP (ref 3.5–5)
PROT SERPL-MCNC: 7.2 G/DL — SIGNIFICANT CHANGE UP (ref 6–8)
RBC # BLD: 3.62 M/UL — LOW (ref 4.7–6.1)
RBC # FLD: 13 % — SIGNIFICANT CHANGE UP (ref 11.5–14.5)
SODIUM SERPL-SCNC: 145 MMOL/L — SIGNIFICANT CHANGE UP (ref 135–146)
WBC # BLD: 5.16 K/UL — SIGNIFICANT CHANGE UP (ref 4.8–10.8)
WBC # FLD AUTO: 5.16 K/UL — SIGNIFICANT CHANGE UP (ref 4.8–10.8)

## 2022-03-24 PROCEDURE — 71045 X-RAY EXAM CHEST 1 VIEW: CPT | Mod: 26

## 2022-03-24 PROCEDURE — 99232 SBSQ HOSP IP/OBS MODERATE 35: CPT

## 2022-03-24 PROCEDURE — 93010 ELECTROCARDIOGRAM REPORT: CPT

## 2022-03-24 RX ORDER — MAGNESIUM SULFATE 500 MG/ML
1 VIAL (ML) INJECTION ONCE
Refills: 0 | Status: COMPLETED | OUTPATIENT
Start: 2022-03-24 | End: 2022-03-24

## 2022-03-24 RX ORDER — NICOTINE POLACRILEX 2 MG
1 GUM BUCCAL DAILY
Refills: 0 | Status: DISCONTINUED | OUTPATIENT
Start: 2022-03-24 | End: 2022-03-25

## 2022-03-24 RX ORDER — HEPARIN SODIUM 5000 [USP'U]/ML
5000 INJECTION INTRAVENOUS; SUBCUTANEOUS EVERY 8 HOURS
Refills: 0 | Status: DISCONTINUED | OUTPATIENT
Start: 2022-03-24 | End: 2022-03-25

## 2022-03-24 RX ADMIN — Medication 2: at 17:37

## 2022-03-24 RX ADMIN — HEPARIN SODIUM 5000 UNIT(S): 5000 INJECTION INTRAVENOUS; SUBCUTANEOUS at 13:07

## 2022-03-24 RX ADMIN — PANTOPRAZOLE SODIUM 40 MILLIGRAM(S): 20 TABLET, DELAYED RELEASE ORAL at 06:29

## 2022-03-24 RX ADMIN — HEPARIN SODIUM 5000 UNIT(S): 5000 INJECTION INTRAVENOUS; SUBCUTANEOUS at 06:30

## 2022-03-24 RX ADMIN — Medication 60 MILLIGRAM(S): at 06:29

## 2022-03-24 RX ADMIN — Medication 4: at 13:08

## 2022-03-24 RX ADMIN — CHLORHEXIDINE GLUCONATE 1 APPLICATION(S): 213 SOLUTION TOPICAL at 06:27

## 2022-03-24 RX ADMIN — HEPARIN SODIUM 5000 UNIT(S): 5000 INJECTION INTRAVENOUS; SUBCUTANEOUS at 21:14

## 2022-03-24 RX ADMIN — Medication 50 MILLIGRAM(S): at 06:32

## 2022-03-24 RX ADMIN — Medication 1 MILLIGRAM(S): at 13:07

## 2022-03-24 RX ADMIN — POLYETHYLENE GLYCOL 3350 17 GRAM(S): 17 POWDER, FOR SOLUTION ORAL at 13:13

## 2022-03-24 RX ADMIN — Medication 100 MILLIGRAM(S): at 13:07

## 2022-03-24 RX ADMIN — Medication 2: at 08:36

## 2022-03-24 RX ADMIN — Medication 100 GRAM(S): at 21:14

## 2022-03-24 RX ADMIN — ATORVASTATIN CALCIUM 40 MILLIGRAM(S): 80 TABLET, FILM COATED ORAL at 21:14

## 2022-03-24 NOTE — PROGRESS NOTE ADULT - ASSESSMENT
# Severe VAMSI on CKD requirind HD x 3 sessions (last 3/18)  - non oliguric, creat down-trending  - no need for HD / udall removed  # HTN controlled  # Etoh abuse w/ withdrawal  # Hypomagnesemia and hypokalemia      Recommendations:  - cont strict i/o  - replete K to 4, Mg to 2  - encourage po hydration  - will eventually need to be on ACE-inhibitor or ARB  when creat stabilizes  - pt should have outpatient nephrology follow up.  He can schedule f/u at [address] [phone]    will sign off

## 2022-03-24 NOTE — PROGRESS NOTE ADULT - SUBJECTIVE AND OBJECTIVE BOX
SUBJECTIVE: Pt has no complaints. CIWA is 0 but VS demonstrated tachy of 100-119 bpm. Pt has difficulty following directions; unclear if due to chronic alcohol use or language barrier? Friend was beside. Pt demonstrated no weakness on neurological exam of UE and LE. CXR did show a rt basilar opacity but pt's lungs are clear, is on RA, afebrile, with no leukoctyosis. Switched librium to ativan given elevated LFTs.       T(F): 97.7 (03-23-22 @ 11:22), Max: 99.6 (03-22-22 @ 19:00)  HR: 108 (03-23-22 @ 11:08)  BP: 143/79 (03-23-22 @ 11:08)  RR: 20  SpO2: 97% (03-23-22 @ 11:08) (96% - 98%)    PHYSICAL EXAM:  GENERAL: NAD  HEAD:  Atraumatic, Normocephalic  EYES: EOMI, PERRLA, conjunctiva and sclera clear  NERVOUS SYSTEM:  no focal deficits   CHEST/LUNG: Clear to percussion bilaterally; No rales, rhonchi, wheezing, or rubs  HEART: Regular rate and rhythm; No murmurs, rubs, or gallops  ABDOMEN: Soft, Nontender, Nondistended; Bowel sounds present  EXTREMITIES:  2+ Peripheral Pulses, No clubbing, cyanosis, or edema; motor strength is 5/5 in b/l UE and LE    LABS  03-23    142  |  100  |  41<H>  ----------------------------<  131<H>  3.6   |  25  |  2.6<H>    Ca    9.5      23 Mar 2022 06:00  Mg     1.8     03-23    TPro  7.5  /  Alb  4.0  /  TBili  1.1  /  DBili  x   /  AST  55<H>  /  ALT  33  /  AlkPhos  147<H>  03-23                          11.1   5.27  )-----------( 171      ( 23 Mar 2022 06:02 )             33.3       Culture Results:   No Growth Final (03-15-22)  Culture Results:   No Growth Final (03-15-22)      MEDICATIONS  (STANDING):  atorvastatin 40 milliGRAM(s) Oral at bedtime  chlordiazePOXIDE   Oral   chlordiazePOXIDE 50 milliGRAM(s) Oral every 12 hours  chlorhexidine 4% Liquid 1 Application(s) Topical <User Schedule>  folic acid 1 milliGRAM(s) Oral daily  heparin   Injectable 5000 Unit(s) SubCutaneous every 12 hours  insulin lispro (ADMELOG) corrective regimen sliding scale   SubCutaneous three times a day before meals  insulin lispro (ADMELOG) corrective regimen sliding scale   SubCutaneous at bedtime  NIFEdipine XL 60 milliGRAM(s) Oral daily  pantoprazole    Tablet 40 milliGRAM(s) Oral before breakfast  thiamine 100 milliGRAM(s) Oral daily    MEDICATIONS  (PRN):  acetaminophen     Tablet .. 650 milliGRAM(s) Oral every 6 hours PRN Temp greater or equal to 38C (100.4F), Mild Pain (1 - 3)  chlordiazePOXIDE 25 milliGRAM(s) Oral every 4 hours PRN Symptom-triggered: each CIWA -Ar score 8 or GREATER  sodium chloride 0.65% Nasal 1 Spray(s) Both Nostrils three times a day PRN Nasal Congestion

## 2022-03-24 NOTE — PROGRESS NOTE ADULT - SUBJECTIVE AND OBJECTIVE BOX
Nephrology progress note    Patient is seen and examined, events over the last 24 h noted .  No complaints  Allergies:  No Known Allergies    Hospital Medications:   MEDICATIONS  (STANDING):  atorvastatin 40 milliGRAM(s) Oral at bedtime  chlorhexidine 4% Liquid 1 Application(s) Topical <User Schedule>  dextrose 40% Gel 15 Gram(s) Oral once  dextrose 5%. 1000 milliLiter(s) (50 mL/Hr) IV Continuous <Continuous>  dextrose 5%. 1000 milliLiter(s) (100 mL/Hr) IV Continuous <Continuous>  dextrose 50% Injectable 25 Gram(s) IV Push once  dextrose 50% Injectable 12.5 Gram(s) IV Push once  dextrose 50% Injectable 25 Gram(s) IV Push once  folic acid 1 milliGRAM(s) Oral daily  glucagon  Injectable 1 milliGRAM(s) IntraMuscular once  heparin   Injectable 5000 Unit(s) SubCutaneous every 8 hours  insulin lispro (ADMELOG) corrective regimen sliding scale   SubCutaneous three times a day before meals  insulin lispro (ADMELOG) corrective regimen sliding scale   SubCutaneous at bedtime  nicotine - 21 mG/24Hr(s) Patch 1 Patch Transdermal daily  NIFEdipine XL 60 milliGRAM(s) Oral daily  pantoprazole    Tablet 40 milliGRAM(s) Oral before breakfast  polyethylene glycol 3350 17 Gram(s) Oral daily  thiamine 100 milliGRAM(s) Oral daily        VITALS:  T(F): 96.8 (22 @ 05:40), Max: 97.7 (22 @ 11:22)  HR: 112 (22 @ 05:40)  BP: 168/100 (22 @ 05:40)  RR: 18 (22 @ 05:40)  SpO2: 98% (22 @ 23:28)  Wt(kg): --     @ 07:01  -   @ 07:00  --------------------------------------------------------  IN: 10 mL / OUT: 1150 mL / NET: -1140 mL     @ 07:01  -   @ 07:00  --------------------------------------------------------  IN: 600 mL / OUT: 400 mL / NET: 200 mL          PHYSICAL EXAM:  Constitutional: NAD  HEENT: anicteric sclera  Neck: No JVD  Respiratory: CTA  Cardiovascular: S1, S2, RRR  Gastrointestinal: BS+, soft, NT/ND  Extremities: No peripheral edema  Neurological: A/O x 3  : No CVA tenderness. No way.   Skin: No rashes  Vascular Access:    LABS:      142  |  100  |  41<H>  ----------------------------<  131<H>  3.6   |  25  |  2.6<H>  Creatinine Trend: 2.6<--, 2.6<--, 2.8<--, 3.2<--, 3.5<--, 3.2<--    Ca    9.5      23 Mar 2022 06:00  Mg     1.8         TPro  7.5  /  Alb  4.0  /  TBili  1.1  /  DBili      /  AST  55<H>  /  ALT  33  /  AlkPhos  147<H>                            11.1   5.27  )-----------( 171      ( 23 Mar 2022 06:02 )             33.3       Urine Studies:  Urinalysis Basic - ( 17 Mar 2022 18:30 )    Color: Yellow / Appearance: Clear / S.020 / pH:   Gluc:  / Ketone: Negative  / Bili: Negative / Urobili: 0.2 mg/dL   Blood:  / Protein: 30 mg/dL / Nitrite: Negative   Leuk Esterase: Small / RBC: 6-10 /HPF / WBC 3-5 /HPF   Sq Epi:  / Non Sq Epi: Few /HPF / Bacteria: Few      Creatinine, Random Urine: 55 mg/dL ( @ 18:30)  Protein/Creatinine Ratio Calculation: 0.5 Ratio ( @ 18:30)    RADIOLOGY & ADDITIONAL STUDIES:  < from: Xray Chest 1 View- PORTABLE-Routine (Xray Chest 1 View- PORTABLE-Routine in AM.) (22 @ 06:41) >    Low lung volumes limit assessment. Possible right basilar small   effusion/opacity, new since one day earlier.    < end of copied text >

## 2022-03-24 NOTE — CHART NOTE - NSCHARTNOTEFT_GEN_A_CORE
CCU Transfer Note    Transfer from: MICU  Transfer to:  (X) Medicine    (  ) Telemetry    (  ) RCU    (  ) Palliative    (  ) Stroke Unit    (  ) _______________  Floor      CCU COURSE:    57 yo M with PMH DM on Metformin and Januvia, HTN, HLD, Glaucoma, b/l LE peripheral neuropathy who presents in acute renal failure.  Pt has been feeling progressively weak for the past week.  Also reports increasing numbness to b/l UE, difficulty walking, cognitive slowing, nausea, and neck pain.  Pt was sent in by Ophthalmologist during office visit today.  Pt states he has not changed his metformin dose recently nor has he taken more than prescribed. Creatinine 15.  Pt admitted to CCU for acute renal failure.    In CCU, [place] R IJ was placed. Pt received 3 days of HD.  Creatinine has improved to 2.6.  R IJ [place] removed 3/21.  Pt is chronic alcoholic and began experiencing alcohol withdrawal on 3/18.  Pt has been on CIWA protocol and Precedex.  Precedex off 3/22.  Pt remains on Librium CIWA protocol.  Addiction medicine Detox following and CATCH team will f/u outpatient.  Pt complains of chronic hemorrhoid pain.  Potassium and Magnesium have been being repleted. Pt stable for downgrade       ASSESSMENT & PLAN:     IMPRESSION:  AMS 2/2 toxic metabolic encephalopathy- resolved  acute on chronic renal failure s/p HD  Hypothermia- resolved  Possible sepsis s/p ABX therapy  H/O ETOH addiction  Possible AKA vs starvation ketosis  Mod MR    SUGGEST:      CNS: CIWA protocol.   MV, Thiamine and folic acid.   f/u addiction medicine recs    HEENT: Oral care    PULMONARY:  HOB @ 45 degrees. Aspiration Precautions . Incentive spirometry.     CARDIOVASCULAR: ECHO results reviewed.   avoid volume overload.   BP control    GI: GI prophylaxis.  Feeding as tolerated    RENAL:  Follow up lytes q 12.  Correct as needed.   s/p HD Dallas removed      INFECTIOUS DISEASE:   Follow up cultures.   restart abx if respikes temps    HEMATOLOGICAL:  DVT prophylaxis- HSQ. Monitor cbc and coags    ENDOCRINE:  Follow up FS.  Insulin protocol if needed. send hba1c    MUSCULOSKELETAL: PT/REHAB , OOB to chair      Code status: Full code          For Follow-Up:    - Addiction Medicine recs  - Mg and K levels      Vital Signs Last 24 Hrs  T(C): 36.5 (23 Mar 2022 11:22), Max: 37.6 (22 Mar 2022 19:00)  T(F): 97.7 (23 Mar 2022 11:22), Max: 99.6 (22 Mar 2022 19:00)  HR: 108 (23 Mar 2022 11:08) (93 - 119)  BP: 143/79 (23 Mar 2022 11:08) (116/78 - 168/86)  BP(mean): 105 (23 Mar 2022 11:08) (91 - 118)  RR: 27 (23 Mar 2022 11:22) (14 - 33)  SpO2: 97% (23 Mar 2022 11:08) (96% - 98%)  I&O's Summary    22 Mar 2022 07:01  -  23 Mar 2022 07:00  --------------------------------------------------------  IN: 10 mL / OUT: 1150 mL / NET: -1140 mL    23 Mar 2022 07:01  -  23 Mar 2022 15:02  --------------------------------------------------------  IN: 600 mL / OUT: 150 mL / NET: 450 mL          MEDICATIONS  (STANDING):  atorvastatin 40 milliGRAM(s) Oral at bedtime  chlordiazePOXIDE   Oral   chlordiazePOXIDE 50 milliGRAM(s) Oral every 12 hours  chlorhexidine 4% Liquid 1 Application(s) Topical <User Schedule>  dextrose 40% Gel 15 Gram(s) Oral once  dextrose 5%. 1000 milliLiter(s) (50 mL/Hr) IV Continuous <Continuous>  dextrose 5%. 1000 milliLiter(s) (100 mL/Hr) IV Continuous <Continuous>  dextrose 50% Injectable 25 Gram(s) IV Push once  dextrose 50% Injectable 12.5 Gram(s) IV Push once  dextrose 50% Injectable 25 Gram(s) IV Push once  folic acid 1 milliGRAM(s) Oral daily  glucagon  Injectable 1 milliGRAM(s) IntraMuscular once  heparin   Injectable 5000 Unit(s) SubCutaneous every 12 hours  insulin lispro (ADMELOG) corrective regimen sliding scale   SubCutaneous three times a day before meals  insulin lispro (ADMELOG) corrective regimen sliding scale   SubCutaneous at bedtime  NIFEdipine XL 60 milliGRAM(s) Oral daily  pantoprazole    Tablet 40 milliGRAM(s) Oral before breakfast  thiamine 100 milliGRAM(s) Oral daily    MEDICATIONS  (PRN):  acetaminophen     Tablet .. 650 milliGRAM(s) Oral every 6 hours PRN Temp greater or equal to 38C (100.4F), Mild Pain (1 - 3)  chlordiazePOXIDE 25 milliGRAM(s) Oral every 4 hours PRN Symptom-triggered: each CIWA -Ar score 8 or GREATER  sodium chloride 0.65% Nasal 1 Spray(s) Both Nostrils three times a day PRN Nasal Congestion        LABS                                            11.1                  Neurophils% (auto):   65.6   (03-23 @ 06:02):    5.27 )-----------(171          Lymphocytes% (auto):  17.8                                          33.3                   Eosinphils% (auto):   5.9      Manual%: Neutrophils x    ; Lymphocytes x    ; Eosinophils x    ; Bands%: x    ; Blasts x                                    142    |  100    |  41                  Calcium: 9.5   / iCa: x      (03-23 @ 06:00)    ----------------------------<  131       Magnesium: 1.8                              3.6     |  25     |  2.6              Phosphorous: x        TPro  7.5    /  Alb  4.0    /  TBili  1.1    /  DBili  x      /  AST  55     /  ALT  33     /  AlkPhos  147    23 Mar 2022 06:00
[facility]  [facility] [id number]      Notified by Lab for patient  KECIA MARTIN  56y Male  with Hg drop of 6.2   Repeat stat CBC completed 6.3 non-hemolyzed.     T(C): 35.7 (03-15-22 @ 19:00), Max: 35.7 (03-15-22 @ 19:00)  HR: 95 (03-15-22 @ 20:00) (64 - 95)  BP: 136/83 (03-15-22 @ 20:00) (77/48 - 136/83)  RR: 21 (03-15-22 @ 20:00) (16 - 28)  SpO2: 99% (03-15-22 @ 20:00) (99% - 100%)    Plan:  - T&S stat  - consent for blood obtained  - transfuse 2 units of prbc  - case d/w Dr Porter ( believes drop is due to amount of volume from fluid but still need for transfusion)  - will hold bicarb drip while transfusing blood  - as per renal - Decrease Bicarb drip to 75cc/hr post transfusion  - monitor iron studies for ? anemia  - stool for occult blood  - possible dialysis in am will await renal    03-15-22 @ 22:47
Pts Mg+ was 1.6. Ordered Mg+ 1 gm IVPB stat and serum Mg+ in am to reassess level.

## 2022-03-24 NOTE — PROGRESS NOTE ADULT - REASON FOR ADMISSION
Acute Renal Failure

## 2022-03-24 NOTE — PROGRESS NOTE ADULT - ASSESSMENT
55 yo male, pmh of htn, hld, dm, glaucoma, b/l le neuropathy, presented  to ed for increased weakness, numbness to b/l upper extremities, slowed speech, and nausea.   found to have ;     acute renal failure - resolving / / alcohol withdrawal / anemia    - switched from librium to ativan given elevated LFTs  - replenish electrolytes  - CIWA 0 but tachycardic from 100-119 bpm   - detox follow up   - thiamine and folic acid for suspected thiamine and folate deficiency    - DVT prophylaxis  - anticipated for d/c tomorrow 3/25/22

## 2022-03-24 NOTE — PROGRESS NOTE ADULT - PROVIDER SPECIALTY LIST ADULT
CCU
Hospitalist
CCU
Nephrology
CCU
Hospitalist
Nephrology
Pulmonology
Hospitalist
Hospitalist
Nephrology
Pulmonology
Addiction Medicine

## 2022-03-25 ENCOUNTER — TRANSCRIPTION ENCOUNTER (OUTPATIENT)
Age: 57
End: 2022-03-25

## 2022-03-25 VITALS — TEMPERATURE: 99 F

## 2022-03-25 LAB
ALBUMIN SERPL ELPH-MCNC: 4 G/DL — SIGNIFICANT CHANGE UP (ref 3.5–5.2)
ALP SERPL-CCNC: 143 U/L — HIGH (ref 30–115)
ALT FLD-CCNC: 60 U/L — HIGH (ref 0–41)
ANION GAP SERPL CALC-SCNC: 17 MMOL/L — HIGH (ref 7–14)
AST SERPL-CCNC: 76 U/L — HIGH (ref 0–41)
BASOPHILS # BLD AUTO: 0.05 K/UL — SIGNIFICANT CHANGE UP (ref 0–0.2)
BASOPHILS NFR BLD AUTO: 0.9 % — SIGNIFICANT CHANGE UP (ref 0–1)
BILIRUB SERPL-MCNC: 0.9 MG/DL — SIGNIFICANT CHANGE UP (ref 0.2–1.2)
BUN SERPL-MCNC: 32 MG/DL — HIGH (ref 10–20)
CALCIUM SERPL-MCNC: 9.8 MG/DL — SIGNIFICANT CHANGE UP (ref 8.5–10.1)
CHLORIDE SERPL-SCNC: 104 MMOL/L — SIGNIFICANT CHANGE UP (ref 98–110)
CO2 SERPL-SCNC: 25 MMOL/L — SIGNIFICANT CHANGE UP (ref 17–32)
CREAT SERPL-MCNC: 2.5 MG/DL — HIGH (ref 0.7–1.5)
EGFR: 29 ML/MIN/1.73M2 — LOW
EOSINOPHIL # BLD AUTO: 0.37 K/UL — SIGNIFICANT CHANGE UP (ref 0–0.7)
EOSINOPHIL NFR BLD AUTO: 6.7 % — SIGNIFICANT CHANGE UP (ref 0–8)
GLUCOSE BLDC GLUCOMTR-MCNC: 140 MG/DL — HIGH (ref 70–99)
GLUCOSE BLDC GLUCOMTR-MCNC: 196 MG/DL — HIGH (ref 70–99)
GLUCOSE SERPL-MCNC: 136 MG/DL — HIGH (ref 70–99)
HCT VFR BLD CALC: 34.3 % — LOW (ref 42–52)
HGB BLD-MCNC: 11.3 G/DL — LOW (ref 14–18)
IMM GRANULOCYTES NFR BLD AUTO: 0.5 % — HIGH (ref 0.1–0.3)
LYMPHOCYTES # BLD AUTO: 0.93 K/UL — LOW (ref 1.2–3.4)
LYMPHOCYTES # BLD AUTO: 16.9 % — LOW (ref 20.5–51.1)
MAGNESIUM SERPL-MCNC: 1.7 MG/DL — LOW (ref 1.8–2.4)
MCHC RBC-ENTMCNC: 31 PG — SIGNIFICANT CHANGE UP (ref 27–31)
MCHC RBC-ENTMCNC: 32.9 G/DL — SIGNIFICANT CHANGE UP (ref 32–37)
MCV RBC AUTO: 94 FL — SIGNIFICANT CHANGE UP (ref 80–94)
MONOCYTES # BLD AUTO: 0.43 K/UL — SIGNIFICANT CHANGE UP (ref 0.1–0.6)
MONOCYTES NFR BLD AUTO: 7.8 % — SIGNIFICANT CHANGE UP (ref 1.7–9.3)
NEUTROPHILS # BLD AUTO: 3.69 K/UL — SIGNIFICANT CHANGE UP (ref 1.4–6.5)
NEUTROPHILS NFR BLD AUTO: 67.2 % — SIGNIFICANT CHANGE UP (ref 42.2–75.2)
NRBC # BLD: 0 /100 WBCS — SIGNIFICANT CHANGE UP (ref 0–0)
PLATELET # BLD AUTO: 233 K/UL — SIGNIFICANT CHANGE UP (ref 130–400)
POTASSIUM SERPL-MCNC: 3.8 MMOL/L — SIGNIFICANT CHANGE UP (ref 3.5–5)
POTASSIUM SERPL-SCNC: 3.8 MMOL/L — SIGNIFICANT CHANGE UP (ref 3.5–5)
PROT SERPL-MCNC: 7.4 G/DL — SIGNIFICANT CHANGE UP (ref 6–8)
RBC # BLD: 3.65 M/UL — LOW (ref 4.7–6.1)
RBC # FLD: 12.9 % — SIGNIFICANT CHANGE UP (ref 11.5–14.5)
SODIUM SERPL-SCNC: 146 MMOL/L — SIGNIFICANT CHANGE UP (ref 135–146)
WBC # BLD: 5.5 K/UL — SIGNIFICANT CHANGE UP (ref 4.8–10.8)
WBC # FLD AUTO: 5.5 K/UL — SIGNIFICANT CHANGE UP (ref 4.8–10.8)

## 2022-03-25 PROCEDURE — 99238 HOSP IP/OBS DSCHRG MGMT 30/<: CPT

## 2022-03-25 RX ORDER — PANTOPRAZOLE SODIUM 20 MG/1
1 TABLET, DELAYED RELEASE ORAL
Qty: 0 | Refills: 0 | DISCHARGE
Start: 2022-03-25

## 2022-03-25 RX ORDER — SITAGLIPTIN 50 MG/1
1 TABLET, FILM COATED ORAL
Qty: 0 | Refills: 0 | DISCHARGE

## 2022-03-25 RX ORDER — METFORMIN HYDROCHLORIDE 850 MG/1
1 TABLET ORAL
Qty: 0 | Refills: 0 | DISCHARGE

## 2022-03-25 RX ORDER — ACETAMINOPHEN 500 MG
2 TABLET ORAL
Qty: 0 | Refills: 0 | DISCHARGE
Start: 2022-03-25

## 2022-03-25 RX ORDER — MAGNESIUM SULFATE 500 MG/ML
1 VIAL (ML) INJECTION ONCE
Refills: 0 | Status: COMPLETED | OUTPATIENT
Start: 2022-03-25 | End: 2022-03-25

## 2022-03-25 RX ORDER — ATORVASTATIN CALCIUM 80 MG/1
1 TABLET, FILM COATED ORAL
Qty: 0 | Refills: 0 | DISCHARGE
Start: 2022-03-25

## 2022-03-25 RX ORDER — NIFEDIPINE 30 MG
1 TABLET, EXTENDED RELEASE 24 HR ORAL
Qty: 0 | Refills: 0 | DISCHARGE
Start: 2022-03-25

## 2022-03-25 RX ADMIN — Medication 100 GRAM(S): at 14:44

## 2022-03-25 RX ADMIN — Medication 60 MILLIGRAM(S): at 05:23

## 2022-03-25 RX ADMIN — Medication 1 MILLIGRAM(S): at 12:13

## 2022-03-25 RX ADMIN — HEPARIN SODIUM 5000 UNIT(S): 5000 INJECTION INTRAVENOUS; SUBCUTANEOUS at 16:01

## 2022-03-25 RX ADMIN — CHLORHEXIDINE GLUCONATE 1 APPLICATION(S): 213 SOLUTION TOPICAL at 05:19

## 2022-03-25 RX ADMIN — Medication 100 MILLIGRAM(S): at 12:13

## 2022-03-25 RX ADMIN — PANTOPRAZOLE SODIUM 40 MILLIGRAM(S): 20 TABLET, DELAYED RELEASE ORAL at 05:22

## 2022-03-25 RX ADMIN — Medication 2: at 12:12

## 2022-03-25 RX ADMIN — HEPARIN SODIUM 5000 UNIT(S): 5000 INJECTION INTRAVENOUS; SUBCUTANEOUS at 05:22

## 2022-03-25 NOTE — DISCHARGE NOTE PROVIDER - NSDCMRMEDTOKEN_GEN_ALL_CORE_FT
acetaminophen 325 mg oral tablet: 2 tab(s) orally every 6 hours, As needed, Temp greater or equal to 38C (100.4F), Mild Pain (1 - 3)  atorvastatin 40 mg oral tablet: 1 tab(s) orally once a day (at bedtime)  DULoxetine 30 mg oral delayed release capsule: 1 cap(s) orally 2 times a day  Januvia 100 mg oral tablet: 1 tab(s) orally once a day  lisinopril 20 mg oral tablet: 1 tab(s) orally once a day  metFORMIN 1000 mg oral tablet: 1 tab(s) orally 2 times a day  NIFEdipine 60 mg oral tablet, extended release: 1 tab(s) orally once a day  pantoprazole 40 mg oral delayed release tablet: 1 tab(s) orally once a day (before a meal)   acetaminophen 325 mg oral tablet: 2 tab(s) orally every 6 hours, As needed, Temp greater or equal to 38C (100.4F), Mild Pain (1 - 3)  atorvastatin 40 mg oral tablet: 1 tab(s) orally once a day (at bedtime)  DULoxetine 30 mg oral delayed release capsule: 1 cap(s) orally 2 times a day  glipiZIDE 2.5 mg oral tablet, extended release: 1 tab(s) orally once a day  lisinopril 20 mg oral tablet: 1 tab(s) orally once a day  NIFEdipine 60 mg oral tablet, extended release: 1 tab(s) orally once a day  pantoprazole 40 mg oral delayed release tablet: 1 tab(s) orally once a day (before a meal)

## 2022-03-25 NOTE — OCCUPATIONAL THERAPY INITIAL EVALUATION ADULT - PERTINENT HX OF CURRENT PROBLEM, REHAB EVAL
55 yo M with PMH DM on Metformin and Januvia, HTN, HLD, Glaucoma, b/l LE peripheral neuropathy who presents in acute renal failure.  Pt has been feeling progressively weak for the past week.  Also reports increasing numbness to b/l UE, difficulty walking, cognitive slowing, nausea, and neck pain.  Pt was sent in by Ophthalmologist during office visit

## 2022-03-25 NOTE — DISCHARGE NOTE PROVIDER - NSDCCPCAREPLAN_GEN_ALL_CORE_FT
PRINCIPAL DISCHARGE DIAGNOSIS  Diagnosis: Alcohol abuse  Assessment and Plan of Treatment: completed alcohol taper. Did want inpatient rehab. Recommended AA, psychologist, and outpatient detox. Home PT      SECONDARY DISCHARGE DIAGNOSES  Diagnosis: Hypotension  Assessment and Plan of Treatment:     Diagnosis: Hypothermia due to non-environmental cause  Assessment and Plan of Treatment:     Diagnosis: Hyperkalemia  Assessment and Plan of Treatment:     Diagnosis: Hyponatremia  Assessment and Plan of Treatment:     Diagnosis: Metabolic acidosis  Assessment and Plan of Treatment:     Diagnosis: Confusion  Assessment and Plan of Treatment:     Diagnosis: Elevated troponin  Assessment and Plan of Treatment:     Diagnosis: High anion gap metabolic acidosis  Assessment and Plan of Treatment:     Diagnosis: Pancytopenia  Assessment and Plan of Treatment:     Diagnosis: Hyperglycemia  Assessment and Plan of Treatment:      PRINCIPAL DISCHARGE DIAGNOSIS  Diagnosis: Alcohol abuse  Assessment and Plan of Treatment: completed alcohol taper. Did want inpatient rehab. Recommended AA, psychologist, and outpatient detox. Home PT      SECONDARY DISCHARGE DIAGNOSES  Diagnosis: Stage 4 chronic kidney disease  Assessment and Plan of Treatment: Stop metformin and januvia. Please glipizide on the lowest dose and f/u with PCP in 1 week to adjust dose. Check fingersticks in the meantime to assess glucose control

## 2022-03-25 NOTE — OCCUPATIONAL THERAPY INITIAL EVALUATION ADULT - LEVEL OF INDEPENDENCE: TUB, REHAB EVAL
Pt advised to sponge bathe at this time and to practice tub transfer with home care therapist and have family member present prior to attempting independently. Pt verbalized good understanding and agreement./minimum assist (75% patients effort)

## 2022-03-25 NOTE — DISCHARGE NOTE PROVIDER - NSDCHHASSISTILLNESS_GEN_ALL_CORE
Pt having AVF created next week. Pharmacist just checking pt was okay to remain on all current IS meds following surgery.    weakness

## 2022-03-25 NOTE — OCCUPATIONAL THERAPY INITIAL EVALUATION ADULT - LIVES WITH, PROFILE
wife and 2 daughters in a private house no steps to enter; 1 flight (~10 steps) to the bedroom and bathtub./children/spouse

## 2022-03-25 NOTE — DISCHARGE NOTE NURSING/CASE MANAGEMENT/SOCIAL WORK - PATIENT PORTAL LINK FT
You can access the FollowMyHealth Patient Portal offered by [facility] by registering at the following website: [email or web address]. By joining [facility]’s FollowMyHealth portal, you will also be able to view your health information using other applications (apps) compatible with our system.

## 2022-03-25 NOTE — DISCHARGE NOTE PROVIDER - HOSPITAL COURSE
Pt admitted for alcohol intoxication and completed alcohol withdrawal taper. Pt declined inpatient rehab. home physical therapy set up. Gave info on AA, psychologist, and outpatient rehab. Pt admitted for alcohol intoxication and completed alcohol withdrawal taper. Pt declined inpatient rehab. home physical therapy set up. Gave info on AA, psychologist, and outpatient rehab. Will stop metformin and januvia in setting of CKD so will start on glipizide. Will need to go to PCP to titrate dose.

## 2022-03-25 NOTE — OCCUPATIONAL THERAPY INITIAL EVALUATION ADULT - NS ASR BATHING EQUIP NEEDS
Electrodesiccation And Curettage Text: The wound bed was treated with electrodesiccation and curettage after the biopsy was performed. shower chair/grab bar

## 2022-03-25 NOTE — CDI QUERY NOTE - NSCDIOTHERTXTBX_GEN_ALL_CORE_HH
CLINICAL INDICATORS  3/15 ED Provider Note: Principal Discharge Dx Acute renal failure (ARF). Secondary Discharge Dx Hypotension. Secondary Discharge Dx Hypothermia due to non-environmental cause.. Secondary Discharge Dx Confusion …  Secondary Discharge Dx Pancytopenia … SEPSIS – was this patient treated for sepsis? No.    3/15 Consult Note Adult-Pulmonology Attending:  possible sepsis … empiric abx    3/16 Progress Note Adult-Pulmonology Fellow/Attending: Attending Statement: AMS 2/2 toxic metabolic encephalopathy. acute on chronic renal failure s/p HD. Hypothermia. possible sepsis    3/16 Progress Note Adult-Hospitalist Attending: acute renal failure / severe metabolic acidosis / hyperkalemia / mild fluid overload / uremic - toxic encephalopathy / anemia    3/16-3/23 Progress Note Adult-Pulmonology Fellow/Attending: AMS 2/2 toxic metabolic encephalopathy- resolved. acute on chronic renal failure s/p HD. Hypothermia- resolved. Possible sepsis s/p ABX therapy    3/24 Progress Note Adult-Hospitalist Attending: acute renal failure - resolving / / alcohol withdrawal / anemia    Laboratory findings: WBC 3/15-3.77, 3/16-3.91    Per MAR: Cefepime IVPB 2000 mg, Indication: hypotension. Administered 3/15                 Vancomycin IVPB 1000mg, Indication: hypotension. Administered 3/15    Nursing VS flowsheet: 3/15 Temp 93.4, 94.2, 92.8    Based on your professional judgement and the clinical indicators, please clarify if the sepsis documented can be further specified as:  • Sepsis was evaluated and confirmed and present on admission  • Sepsis was evaluated and ruled out   • Other (please specify):   • Clinically unable to determine    Thank you,  Laurie John RN [facility]  [phone]

## 2022-03-25 NOTE — OCCUPATIONAL THERAPY INITIAL EVALUATION ADULT - GENERAL OBSERVATIONS, REHAB EVAL
13:40-14:05 Chart reviewed, ok to treat by Occupational Therapist as confirmed by RN Sarah Pt received seated in recliner chair with 2 daughters in NAD. Pt in agreement with OT IE.

## 2022-03-25 NOTE — DISCHARGE NOTE NURSING/CASE MANAGEMENT/SOCIAL WORK - NSDCPEFALRISK_GEN_ALL_CORE
For information on Fall & Injury Prevention, visit: [email or web address] OR  [email or web address] OR  https://www.cdc.gov/steadi/patient.html

## 2022-03-25 NOTE — DISCHARGE NOTE PROVIDER - CARE PROVIDER_API CALL
Rodolfo Heredia)  Med  [facility]  [address] [place] [address]  Phone: [phone]  Fax: [phone]  Follow Up Time:

## 2022-05-25 ENCOUNTER — APPOINTMENT (OUTPATIENT)
Dept: NEUROLOGY | Facility: CLINIC | Age: 57
End: 2022-05-25
Payer: COMMERCIAL

## 2022-05-25 VITALS
BODY MASS INDEX: 22.76 KG/M2 | HEART RATE: 87 BPM | TEMPERATURE: 97 F | DIASTOLIC BLOOD PRESSURE: 80 MMHG | HEIGHT: 67 IN | WEIGHT: 145 LBS | SYSTOLIC BLOOD PRESSURE: 153 MMHG | OXYGEN SATURATION: 98 %

## 2022-05-25 DIAGNOSIS — I73.9 PERIPHERAL VASCULAR DISEASE, UNSPECIFIED: ICD-10-CM

## 2022-05-25 PROBLEM — E78.5 HYPERLIPIDEMIA, UNSPECIFIED: Chronic | Status: ACTIVE | Noted: 2022-03-15

## 2022-05-25 PROBLEM — G62.9 POLYNEUROPATHY, UNSPECIFIED: Chronic | Status: ACTIVE | Noted: 2022-03-15

## 2022-05-25 PROBLEM — H40.9 UNSPECIFIED GLAUCOMA: Chronic | Status: ACTIVE | Noted: 2022-03-15

## 2022-05-25 PROCEDURE — 99212 OFFICE O/P EST SF 10 MIN: CPT

## 2022-05-25 RX ORDER — FUROSEMIDE 20 MG/1
20 TABLET ORAL DAILY
Qty: 90 | Refills: 3 | Status: ACTIVE | COMMUNITY
Start: 2022-05-25 | End: 1900-01-01

## 2022-05-25 NOTE — PHYSICAL EXAM
[FreeTextEntry1] : Mental status: Awake, alert and oriented x3. Recent and remote memory intact. Naming, repetition and comprehension intact. Attention/concentration intact. No dysarthria, no aphasia. Fund of knowledge appropriate. \par Cranial nerves: Pupils equally round and reactive to light, visual fields full, no nystagmus, extraocular muscles intact, V1 through V3 intact bilaterally and symmetric, face symmetric, hearing intact to finger rub, palate elevation symmetric, tongue was midline.\par Motor: MRC grading 5/5 b/l UE/LE.  strength 5/5\par Sensation:Hyperesthesia in the feet. Reduced vibration in the toes to 5 sec and absent position. \par Coordination: No dysmetria on finger-to-nose and heel-to-shin. No dysdiadokinesia.\par Reflexes: 2+ in bilateral UE 1+ in the knees and absent in the ankles. downgoing toes bilaterally. (-) Helms.\par Gait: unsteady tandem and wide based.. Romberg positive. \par

## 2022-05-25 NOTE — ASSESSMENT
[FreeTextEntry1] : 56 year old M w HTN and DM is here as a follow up visit for peripheral neuropathy 2/2 to diabetes. Currently stable  on exam. No weakness. Sensory symptoms are more prominent but denies pain and paresthesia at night. \par \par - cw gabapentin 600 mg TID\par - Continue folic acid \par - Renew prescription for Lasix prescribed by vascular surgeon \par - RTC in 9 months

## 2022-05-25 NOTE — HISTORY OF PRESENT ILLNESS
[FreeTextEntry1] : KECIA MARTIN is a 56 year old man is here as a follow up visit for diabetic peripheral sensorimotor neuropathy confirmed with EMG in the last visit. He is currently on gabapentin 600 mg TID. Denies pain and paresthesia at night. Only reports paresthesia after long time walking. Denies falls or worsening weakness. He was seen by vascular surgeon and recommended to use compression sticking and Lasix PO daily for PVD and leg edema.

## 2022-11-10 ENCOUNTER — NON-APPOINTMENT (OUTPATIENT)
Age: 57
End: 2022-11-10

## 2022-11-11 ENCOUNTER — INPATIENT (INPATIENT)
Facility: HOSPITAL | Age: 57
LOS: 19 days | Discharge: HOME | End: 2022-12-01
Attending: STUDENT IN AN ORGANIZED HEALTH CARE EDUCATION/TRAINING PROGRAM | Admitting: STUDENT IN AN ORGANIZED HEALTH CARE EDUCATION/TRAINING PROGRAM
Payer: COMMERCIAL

## 2022-11-11 VITALS
DIASTOLIC BLOOD PRESSURE: 58 MMHG | SYSTOLIC BLOOD PRESSURE: 125 MMHG | TEMPERATURE: 97 F | HEART RATE: 70 BPM | WEIGHT: 149.91 LBS | OXYGEN SATURATION: 98 % | RESPIRATION RATE: 20 BRPM

## 2022-11-11 LAB
ALBUMIN SERPL ELPH-MCNC: 3.7 G/DL — SIGNIFICANT CHANGE UP (ref 3.5–5.2)
ALBUMIN SERPL ELPH-MCNC: 3.9 G/DL — SIGNIFICANT CHANGE UP (ref 3.5–5.2)
ALP SERPL-CCNC: 158 U/L — HIGH (ref 30–115)
ALP SERPL-CCNC: 177 U/L — HIGH (ref 30–115)
ALT FLD-CCNC: 41 U/L — SIGNIFICANT CHANGE UP (ref 0–41)
ALT FLD-CCNC: 45 U/L — HIGH (ref 0–41)
ANION GAP SERPL CALC-SCNC: 16 MMOL/L — HIGH (ref 7–14)
ANION GAP SERPL CALC-SCNC: 21 MMOL/L — HIGH (ref 7–14)
APPEARANCE UR: CLEAR — SIGNIFICANT CHANGE UP
AST SERPL-CCNC: 66 U/L — HIGH (ref 0–41)
AST SERPL-CCNC: 71 U/L — HIGH (ref 0–41)
B-OH-BUTYR SERPL-SCNC: <0.2 MMOL/L — SIGNIFICANT CHANGE UP
BACTERIA # UR AUTO: ABNORMAL
BASE EXCESS BLDV CALC-SCNC: -13.4 MMOL/L — LOW (ref -2–3)
BASOPHILS # BLD AUTO: 0.01 K/UL — SIGNIFICANT CHANGE UP (ref 0–0.2)
BASOPHILS NFR BLD AUTO: 0.2 % — SIGNIFICANT CHANGE UP (ref 0–1)
BILIRUB DIRECT SERPL-MCNC: 0.2 MG/DL — SIGNIFICANT CHANGE UP (ref 0–0.3)
BILIRUB INDIRECT FLD-MCNC: 0.2 MG/DL — SIGNIFICANT CHANGE UP (ref 0.2–1.2)
BILIRUB SERPL-MCNC: 0.4 MG/DL — SIGNIFICANT CHANGE UP (ref 0.2–1.2)
BILIRUB SERPL-MCNC: 0.4 MG/DL — SIGNIFICANT CHANGE UP (ref 0.2–1.2)
BILIRUB UR-MCNC: NEGATIVE — SIGNIFICANT CHANGE UP
BUN SERPL-MCNC: 105 MG/DL — CRITICAL HIGH (ref 10–20)
BUN SERPL-MCNC: 107 MG/DL — CRITICAL HIGH (ref 10–20)
CA-I SERPL-SCNC: 1.06 MMOL/L — LOW (ref 1.15–1.33)
CALCIUM SERPL-MCNC: 8 MG/DL — LOW (ref 8.4–10.5)
CALCIUM SERPL-MCNC: 8.3 MG/DL — LOW (ref 8.4–10.5)
CHLORIDE SERPL-SCNC: 85 MMOL/L — LOW (ref 98–110)
CHLORIDE SERPL-SCNC: 87 MMOL/L — LOW (ref 98–110)
CO2 SERPL-SCNC: 13 MMOL/L — LOW (ref 17–32)
CO2 SERPL-SCNC: 14 MMOL/L — LOW (ref 17–32)
COLOR SPEC: YELLOW — SIGNIFICANT CHANGE UP
CREAT SERPL-MCNC: 13 MG/DL — CRITICAL HIGH (ref 0.7–1.5)
CREAT SERPL-MCNC: 13.5 MG/DL — CRITICAL HIGH (ref 0.7–1.5)
DIFF PNL FLD: ABNORMAL
EGFR: 4 ML/MIN/1.73M2 — LOW
EGFR: 4 ML/MIN/1.73M2 — LOW
EOSINOPHIL # BLD AUTO: 0.12 K/UL — SIGNIFICANT CHANGE UP (ref 0–0.7)
EOSINOPHIL NFR BLD AUTO: 2.1 % — SIGNIFICANT CHANGE UP (ref 0–8)
EPI CELLS # UR: ABNORMAL /HPF
ETHANOL SERPL-MCNC: <10 MG/DL — SIGNIFICANT CHANGE UP
GAS PNL BLDV: 116 MMOL/L — CRITICAL LOW (ref 136–145)
GAS PNL BLDV: SIGNIFICANT CHANGE UP
GLUCOSE BLDC GLUCOMTR-MCNC: 136 MG/DL — HIGH (ref 70–99)
GLUCOSE BLDC GLUCOMTR-MCNC: 79 MG/DL — SIGNIFICANT CHANGE UP (ref 70–99)
GLUCOSE SERPL-MCNC: 208 MG/DL — HIGH (ref 70–99)
GLUCOSE SERPL-MCNC: 91 MG/DL — SIGNIFICANT CHANGE UP (ref 70–99)
GLUCOSE UR QL: NEGATIVE MG/DL — SIGNIFICANT CHANGE UP
GRAN CASTS # UR COMP ASSIST: ABNORMAL /LPF
HCO3 BLDV-SCNC: 14 MMOL/L — LOW (ref 22–29)
HCT VFR BLD CALC: 27.6 % — LOW (ref 42–52)
HCT VFR BLDA CALC: 36 % — LOW (ref 39–51)
HGB BLD CALC-MCNC: 11.9 G/DL — LOW (ref 12.6–17.4)
HGB BLD-MCNC: 9.6 G/DL — LOW (ref 14–18)
IMM GRANULOCYTES NFR BLD AUTO: 0.3 % — SIGNIFICANT CHANGE UP (ref 0.1–0.3)
KETONES UR-MCNC: NEGATIVE — SIGNIFICANT CHANGE UP
LACTATE BLDV-MCNC: 1.1 MMOL/L — SIGNIFICANT CHANGE UP (ref 0.5–2)
LEUKOCYTE ESTERASE UR-ACNC: NEGATIVE — SIGNIFICANT CHANGE UP
LYMPHOCYTES # BLD AUTO: 0.85 K/UL — LOW (ref 1.2–3.4)
LYMPHOCYTES # BLD AUTO: 14.8 % — LOW (ref 20.5–51.1)
MAGNESIUM SERPL-MCNC: 2.4 MG/DL — SIGNIFICANT CHANGE UP (ref 1.8–2.4)
MCHC RBC-ENTMCNC: 30.1 PG — SIGNIFICANT CHANGE UP (ref 27–31)
MCHC RBC-ENTMCNC: 34.8 G/DL — SIGNIFICANT CHANGE UP (ref 32–37)
MCV RBC AUTO: 86.5 FL — SIGNIFICANT CHANGE UP (ref 80–94)
MONOCYTES # BLD AUTO: 0.47 K/UL — SIGNIFICANT CHANGE UP (ref 0.1–0.6)
MONOCYTES NFR BLD AUTO: 8.2 % — SIGNIFICANT CHANGE UP (ref 1.7–9.3)
NEUTROPHILS # BLD AUTO: 4.29 K/UL — SIGNIFICANT CHANGE UP (ref 1.4–6.5)
NEUTROPHILS NFR BLD AUTO: 74.4 % — SIGNIFICANT CHANGE UP (ref 42.2–75.2)
NITRITE UR-MCNC: NEGATIVE — SIGNIFICANT CHANGE UP
NRBC # BLD: 0 /100 WBCS — SIGNIFICANT CHANGE UP (ref 0–0)
NT-PROBNP SERPL-SCNC: 2427 PG/ML — HIGH (ref 0–300)
PCO2 BLDV: 35 MMHG — LOW (ref 42–55)
PH BLDV: 7.2 — LOW (ref 7.32–7.43)
PH UR: 6 — SIGNIFICANT CHANGE UP (ref 5–8)
PHOSPHATE SERPL-MCNC: 6.7 MG/DL — HIGH (ref 2.1–4.9)
PLATELET # BLD AUTO: 147 K/UL — SIGNIFICANT CHANGE UP (ref 130–400)
PO2 BLDV: 39 MMHG — SIGNIFICANT CHANGE UP
POTASSIUM BLDV-SCNC: 7.2 MMOL/L — CRITICAL HIGH (ref 3.5–5.1)
POTASSIUM SERPL-MCNC: 5.6 MMOL/L — HIGH (ref 3.5–5)
POTASSIUM SERPL-MCNC: 7 MMOL/L — CRITICAL HIGH (ref 3.5–5)
POTASSIUM SERPL-SCNC: 5.6 MMOL/L — HIGH (ref 3.5–5)
POTASSIUM SERPL-SCNC: 7 MMOL/L — CRITICAL HIGH (ref 3.5–5)
PROT SERPL-MCNC: 6.4 G/DL — SIGNIFICANT CHANGE UP (ref 6–8)
PROT SERPL-MCNC: 7 G/DL — SIGNIFICANT CHANGE UP (ref 6–8)
PROT UR-MCNC: >=300 MG/DL
RBC # BLD: 3.19 M/UL — LOW (ref 4.7–6.1)
RBC # FLD: 12.5 % — SIGNIFICANT CHANGE UP (ref 11.5–14.5)
RBC CASTS # UR COMP ASSIST: ABNORMAL /HPF
SAO2 % BLDV: 69.2 % — SIGNIFICANT CHANGE UP
SARS-COV-2 RNA SPEC QL NAA+PROBE: SIGNIFICANT CHANGE UP
SODIUM SERPL-SCNC: 114 MMOL/L — CRITICAL LOW (ref 135–146)
SODIUM SERPL-SCNC: 122 MMOL/L — LOW (ref 135–146)
SP GR SPEC: >=1.03 (ref 1.01–1.03)
TROPONIN T SERPL-MCNC: 0.02 NG/ML — HIGH
UROBILINOGEN FLD QL: 0.2 MG/DL — SIGNIFICANT CHANGE UP
WBC # BLD: 5.76 K/UL — SIGNIFICANT CHANGE UP (ref 4.8–10.8)
WBC # FLD AUTO: 5.76 K/UL — SIGNIFICANT CHANGE UP (ref 4.8–10.8)
WBC UR QL: SIGNIFICANT CHANGE UP /HPF

## 2022-11-11 PROCEDURE — 93010 ELECTROCARDIOGRAM REPORT: CPT

## 2022-11-11 PROCEDURE — 71045 X-RAY EXAM CHEST 1 VIEW: CPT | Mod: 26

## 2022-11-11 PROCEDURE — 99291 CRITICAL CARE FIRST HOUR: CPT

## 2022-11-11 PROCEDURE — 76770 US EXAM ABDO BACK WALL COMP: CPT | Mod: 26

## 2022-11-11 RX ORDER — SODIUM CHLORIDE 9 MG/ML
1000 INJECTION, SOLUTION INTRAVENOUS
Refills: 0 | Status: DISCONTINUED | OUTPATIENT
Start: 2022-11-11 | End: 2022-11-12

## 2022-11-11 RX ORDER — SODIUM CHLORIDE 9 MG/ML
1000 INJECTION, SOLUTION INTRAVENOUS
Refills: 0 | Status: DISCONTINUED | OUTPATIENT
Start: 2022-11-11 | End: 2022-12-01

## 2022-11-11 RX ORDER — ONDANSETRON 8 MG/1
4 TABLET, FILM COATED ORAL
Refills: 0 | Status: DISCONTINUED | OUTPATIENT
Start: 2022-11-11 | End: 2022-11-19

## 2022-11-11 RX ORDER — SODIUM BICARBONATE 1 MEQ/ML
0.33 SYRINGE (ML) INTRAVENOUS
Qty: 150 | Refills: 0 | Status: DISCONTINUED | OUTPATIENT
Start: 2022-11-11 | End: 2022-11-12

## 2022-11-11 RX ORDER — INSULIN HUMAN 100 [IU]/ML
10 INJECTION, SOLUTION SUBCUTANEOUS ONCE
Refills: 0 | Status: COMPLETED | OUTPATIENT
Start: 2022-11-11 | End: 2022-11-11

## 2022-11-11 RX ORDER — HEPARIN SODIUM 5000 [USP'U]/ML
5000 INJECTION INTRAVENOUS; SUBCUTANEOUS EVERY 8 HOURS
Refills: 0 | Status: DISCONTINUED | OUTPATIENT
Start: 2022-11-11 | End: 2022-12-01

## 2022-11-11 RX ORDER — ALBUTEROL 90 UG/1
2.5 AEROSOL, METERED ORAL ONCE
Refills: 0 | Status: DISCONTINUED | OUTPATIENT
Start: 2022-11-11 | End: 2022-11-11

## 2022-11-11 RX ORDER — CALCIUM GLUCONATE 100 MG/ML
2 VIAL (ML) INTRAVENOUS ONCE
Refills: 0 | Status: COMPLETED | OUTPATIENT
Start: 2022-11-11 | End: 2022-11-11

## 2022-11-11 RX ORDER — DEXTROSE 50 % IN WATER 50 %
12.5 SYRINGE (ML) INTRAVENOUS ONCE
Refills: 0 | Status: DISCONTINUED | OUTPATIENT
Start: 2022-11-11 | End: 2022-12-01

## 2022-11-11 RX ORDER — LIDOCAINE HCL 20 MG/ML
20 VIAL (ML) INJECTION ONCE
Refills: 0 | Status: DISCONTINUED | OUTPATIENT
Start: 2022-11-11 | End: 2022-11-11

## 2022-11-11 RX ORDER — GLUCAGON INJECTION, SOLUTION 0.5 MG/.1ML
1 INJECTION, SOLUTION SUBCUTANEOUS ONCE
Refills: 0 | Status: DISCONTINUED | OUTPATIENT
Start: 2022-11-11 | End: 2022-12-01

## 2022-11-11 RX ORDER — DEXTROSE 50 % IN WATER 50 %
15 SYRINGE (ML) INTRAVENOUS ONCE
Refills: 0 | Status: DISCONTINUED | OUTPATIENT
Start: 2022-11-11 | End: 2022-12-01

## 2022-11-11 RX ORDER — INSULIN LISPRO 100/ML
VIAL (ML) SUBCUTANEOUS
Refills: 0 | Status: DISCONTINUED | OUTPATIENT
Start: 2022-11-11 | End: 2022-12-01

## 2022-11-11 RX ORDER — DEXTROSE 50 % IN WATER 50 %
25 SYRINGE (ML) INTRAVENOUS ONCE
Refills: 0 | Status: DISCONTINUED | OUTPATIENT
Start: 2022-11-11 | End: 2022-12-01

## 2022-11-11 RX ORDER — ALBUTEROL 90 UG/1
2.5 AEROSOL, METERED ORAL
Refills: 0 | Status: COMPLETED | OUTPATIENT
Start: 2022-11-11 | End: 2022-11-11

## 2022-11-11 RX ORDER — NIFEDIPINE 30 MG
60 TABLET, EXTENDED RELEASE 24 HR ORAL DAILY
Refills: 0 | Status: DISCONTINUED | OUTPATIENT
Start: 2022-11-11 | End: 2022-12-01

## 2022-11-11 RX ORDER — FOLIC ACID 0.8 MG
1 TABLET ORAL DAILY
Refills: 0 | Status: DISCONTINUED | OUTPATIENT
Start: 2022-11-11 | End: 2022-12-01

## 2022-11-11 RX ORDER — PANTOPRAZOLE SODIUM 20 MG/1
40 TABLET, DELAYED RELEASE ORAL
Refills: 0 | Status: DISCONTINUED | OUTPATIENT
Start: 2022-11-11 | End: 2022-11-12

## 2022-11-11 RX ORDER — CHLORHEXIDINE GLUCONATE 213 G/1000ML
1 SOLUTION TOPICAL
Refills: 0 | Status: DISCONTINUED | OUTPATIENT
Start: 2022-11-11 | End: 2022-12-01

## 2022-11-11 RX ORDER — DEXMEDETOMIDINE HYDROCHLORIDE IN 0.9% SODIUM CHLORIDE 4 UG/ML
0.2 INJECTION INTRAVENOUS
Qty: 400 | Refills: 0 | Status: DISCONTINUED | OUTPATIENT
Start: 2022-11-11 | End: 2022-11-17

## 2022-11-11 RX ORDER — DEXTROSE 50 % IN WATER 50 %
50 SYRINGE (ML) INTRAVENOUS ONCE
Refills: 0 | Status: COMPLETED | OUTPATIENT
Start: 2022-11-11 | End: 2022-11-11

## 2022-11-11 RX ORDER — THIAMINE MONONITRATE (VIT B1) 100 MG
100 TABLET ORAL ONCE
Refills: 0 | Status: DISCONTINUED | OUTPATIENT
Start: 2022-11-11 | End: 2022-11-14

## 2022-11-11 RX ADMIN — Medication 2 MILLIGRAM(S): at 22:10

## 2022-11-11 RX ADMIN — INSULIN HUMAN 10 UNIT(S): 100 INJECTION, SOLUTION SUBCUTANEOUS at 15:06

## 2022-11-11 RX ADMIN — Medication 150 MEQ/KG/HR: at 16:08

## 2022-11-11 RX ADMIN — Medication 2 MILLIGRAM(S): at 19:46

## 2022-11-11 RX ADMIN — ALBUTEROL 2.5 MILLIGRAM(S): 90 AEROSOL, METERED ORAL at 16:08

## 2022-11-11 RX ADMIN — HEPARIN SODIUM 5000 UNIT(S): 5000 INJECTION INTRAVENOUS; SUBCUTANEOUS at 22:00

## 2022-11-11 RX ADMIN — Medication 200 GRAM(S): at 15:13

## 2022-11-11 RX ADMIN — DEXMEDETOMIDINE HYDROCHLORIDE IN 0.9% SODIUM CHLORIDE 3.66 MICROGRAM(S)/KG/HR: 4 INJECTION INTRAVENOUS at 21:38

## 2022-11-11 RX ADMIN — SODIUM CHLORIDE 100 MILLILITER(S): 9 INJECTION, SOLUTION INTRAVENOUS at 21:39

## 2022-11-11 RX ADMIN — ALBUTEROL 2.5 MILLIGRAM(S): 90 AEROSOL, METERED ORAL at 17:10

## 2022-11-11 RX ADMIN — Medication 50 MILLILITER(S): at 15:04

## 2022-11-11 RX ADMIN — ALBUTEROL 2.5 MILLIGRAM(S): 90 AEROSOL, METERED ORAL at 15:09

## 2022-11-11 NOTE — ED PROVIDER NOTE - CLINICAL SUMMARY MEDICAL DECISION MAKING FREE TEXT BOX
57-year-old male presented today with generalized weakness and low blood sugar.  Patient is hemodynamically stable upon my evaluation.  Patient's labs were indicative of severe hyponatremia.  Patient also noted to have hyperkalemia and was treated appropriately.  Patient has renal failure nephrology was also consulted.  Patient at this time will be admitted to the ICU.    Attending Statement: I have personally provided the amount of critical care time documented below excluding time spent on separate procedures.     Critical Care Time Spent (min) Must be 30 or more minutes to qualify: 35.

## 2022-11-11 NOTE — ED PROVIDER NOTE - PROGRESS NOTE DETAILS
I was directly involved in the care of this patient. PA Fellow Sanchez note/plan reviewed and agreed.      s/w nephro start bicsammi orozco aware admit icu.

## 2022-11-11 NOTE — ED PROVIDER NOTE - NS ED ROS FT
As follows:   CONST: No fever, chills or bodyaches  EYES: No pain, redness, drainage or visual changes.  ENT: No ear pain or discharge, nasal discharge or congestion. No sore throat  CARD: No chest pain, palpitations  RESP: No SOB, cough, hemoptysis. No hx of asthma or COPD  GI: No abdominal pain, N/V/D  MS: Neck pain. No joint pain, back pain or extremity pain/injury  SKIN: No rashes  NEURO: Generalized weakness. No headache, dizziness, paresthesias or LOC

## 2022-11-11 NOTE — ED ADULT TRIAGE NOTE - GLASGOW COMA SCALE: BEST MOTOR RESPONSE, MLM
COMPLETE PHYSICAL EXAM      Patient: Aixa Ji   : 1982 MRN: [id number]    SUBJECTIVE:  Chief Complaint   Patient presents with   • Physical       A 40 year old female is present for a complete physical exam.     Patient has given consent to record this visit for documentation in their clinical record.      HISTORY OF PRESENT ILLNESS:  Annual physical exam, Vaccine counseling :  Will be due for Pap smear next year. Will do a pelvic exam today.   Up-to-date with mammograms.   Will be due for next tetanus shot in . Due for pneumonia vaccine and COVID-19 vaccine.      Chronic migraine without aura without status migrainosus, not intractable :  Currently, on Atenolol. Her headaches are better now.      Cigarette smoker , at risk for sleep apnea:  Current smoker.      Moderate mixed hyperlipidemia not requiring statin therapy:   Her previous blood reports reveal blood glucose 83 mg/dL, KFT were normal. WBC was slightly high. Other blood cells were normal. Cholesterol was borderline elevated. LDL was 133 mg/dL last year before that it was 151 mg/dL. Triglycerides were 71 mg/dL, HDL was 41 mg/dL.             PAST MEDICAL HISTORY:  Past Medical History:   Diagnosis Date   • Abnormal Pap smear of cervix     3/15 ASCUS HPV neg, Hx of abnormal pap    • Cervical dysplasia    • Hirsutism    • History of ITP    • Migraine    • PCOS (polycystic ovarian syndrome)    • Pineal gland cyst 2019   • Trigeminal neuralgia      MEDICATIONS:  Current Outpatient Medications   Medication Sig   • atenolol (TENORMIN) 100 MG tablet Take 1 tablet by mouth daily. STOP benicar.   • acetaminophen (TYLENOL) 325 MG tablet Take 650 mg by mouth as needed.      No current facility-administered medications for this visit.     ALLERGIES:  ALLERGIES:  No Known Allergies  PAST SURGICAL HISTORY:  Past Surgical History:   Procedure Laterality Date   • Brain surgery  2019    pineal gland surgery for migraine surgery   •  Colposcopy  06/05/2001   • Fl hysterosalpingogram  06/01/2015    Infertility - Check Tubal Patency    • Orbital fracture surgery  01/01/1996    Right - plate placed     FAMILY HISTORY:  Family History   Problem Relation Age of Onset   • Cancer Maternal Grandfather         leukemia   • High blood pressure Maternal Grandfather    • Diabetes Maternal Grandfather    • Arthritis Mother    • Asthma Mother    • Depression Mother    • Anxiety disorder Mother    • Diabetes Mother    • Myocardial Infarction Mother         @58   • Cancer Father         optical nerve   • Depression Father    • Stroke Father 62   • Arthritis Maternal Grandmother    • High blood pressure Maternal Grandmother    • Osteoporosis Maternal Grandmother    • Arthritis Paternal Grandmother    • High blood pressure Paternal Grandmother    • Osteoporosis Paternal Grandmother    • Heart disease Paternal Grandfather    • High blood pressure Paternal Grandfather    • High cholesterol Paternal Grandfather    • Arthritis Other    • Blood Disorder Other         Factor V   • Cancer Other         MAunt, arthritis     SOCIAL HISTORY:  Social History     Tobacco Use   Smoking Status Current Every Day Smoker   • Packs/day: 0.50   • Years: 14.00   • Pack years: 7.00   • Types: Cigarettes   Smokeless Tobacco Never Used   Tobacco Comment    refused to quit/info 7-10 a day per pt     Social History     Substance and Sexual Activity   Alcohol Use Yes   • Alcohol/week: 2.0 standard drinks   • Types: 2 Standard drinks or equivalent per week       Review of Systems  All other systems reviewed and negative.   Cardiovascular: As Per HPI.  Respiratory: As Per HPI.  Gastrointestinal: As Per HPI.  Endocrine: As Per HPI.  Neurological: As Per HPI.      OBJECTIVE:  Vitals:    07/29/22 1206   BP: 128/86   Pulse: (!) 59   Resp: 18   SpO2: 99%   Weight: 110.7 kg (244 lb)   Height: 5' 3\"   LMP: 06/30/2022     Body mass index is 43.22 kg/m².    Physical Exam  Constitutional: In no acute  distress. Well developed   Eyes: The conjunctiva exhibited no abnormalities. The sclera was normal   ENT: normal appearing outer ear, normal appearing nose. examination of the tympanic membrane showed normal landmarks, normal appearing external canal. nasal mucosa moist and pink, no nasal discharge. normal lips. oral mucosa pink and moist, no oral lesions.   Pulmonary: no respiratory distress, normal respiratory rate and effort and no accessory muscle use. breath sounds clear to auscultation bilaterally.   Cardiovascular: normal rate, no murmurs were heard, regular rhythm, normal S1 and normal S2. edema was not present in the lower extremities.   Abdomen: soft, nontender, nondistended, normal bowel sounds and no abdominal mass. no hepatomegaly and no splenomegaly. no umbilical hernia was discovered.   Skin: Skin moisture and turgor normal.  Psychiatric: Oriented to time, place, and person. The mood was normal. The affect was normal. The memory was unimpaired.   Extremity: No lumps, bumps, masses or arthritic nodules on fingers or toes  Chest: Normal chest appearance. No breast masses. No axillary lymphadenopathy.  Breasts: Negative findings: normal in size and symmetry, normal contour with no evidence of flattening or dimpling, nipples everted without rashes or discharge, palpation negative for masses or nodules or adenopathy.  Pelvic: No inguinal adenopathy. External genitalia normal female, Bartholin's, urethra and Winn's glands are normal. Vagina without lesion, cervix without lesion. Bimanual exam reveals the uterus to be normal size, mobile, nontender. Ovaries palpably normal. No obvious adnexal masses or tenderness.          DIAGNOSTIC STUDIES:  LAB RESULTS:  Lab Services on 07/29/2022   Component Date Value Ref Range Status   • Sodium 07/29/2022 139  135 - 145 mmol/L Final   • Potassium 07/29/2022 4.3  3.4 - 5.1 mmol/L Final   • Chloride 07/29/2022 106  97 - 110 mmol/L Final   • Carbon Dioxide 07/29/2022 25   21 - 32 mmol/L Final   • Anion Gap 07/29/2022 12  7 - 19 mmol/L Final   • Glucose 07/29/2022 78  70 - 99 mg/dL Final   • BUN 07/29/2022 14  6 - 20 mg/dL Final   • Creatinine 07/29/2022 0.73  0.51 - 0.95 mg/dL Final   • Glomerular Filtration Rate 07/29/2022 >90  >=60 Final   • BUN/ Creatinine Ratio 07/29/2022 19  7 - 25 Final   • Calcium 07/29/2022 9.2  8.4 - 10.2 mg/dL Final   • Bilirubin, Total 07/29/2022 0.4  0.2 - 1.0 mg/dL Final   • GOT/AST 07/29/2022 16  <=37 Units/L Final   • GPT/ALT 07/29/2022 27  <64 Units/L Final   • Alkaline Phosphatase 07/29/2022 49  45 - 117 Units/L Final   • Albumin 07/29/2022 3.9  3.6 - 5.1 g/dL Final   • Protein, Total 07/29/2022 7.1  6.4 - 8.2 g/dL Final   • Globulin 07/29/2022 3.2  2.0 - 4.0 g/dL Final   • A/G Ratio 07/29/2022 1.2  1.0 - 2.4 Final   • Cholesterol 07/29/2022 181  <=199 mg/dL Final   • Triglycerides 07/29/2022 90  <=149 mg/dL Final   • HDL 07/29/2022 41 (A) >=50 mg/dL Final   • LDL 07/29/2022 122  <=129 mg/dL Final   • Non-HDL Cholesterol 07/29/2022 140  mg/dL Final   • Cholesterol/ HDL Ratio 07/29/2022 4.4  <=4.4 Final   • WBC 07/29/2022 13.3 (A) 4.2 - 11.0 K/mcL Final   • RBC 07/29/2022 4.46  4.00 - 5.20 mil/mcL Final   • HGB 07/29/2022 13.4  12.0 - 15.5 g/dL Final   • HCT 07/29/2022 41.5  36.0 - 46.5 % Final   • MCV 07/29/2022 93.0  78.0 - 100.0 fl Final   • MCH 07/29/2022 30.0  26.0 - 34.0 pg Final   • MCHC 07/29/2022 32.3  32.0 - 36.5 g/dL Final   • RDW-CV 07/29/2022 13.1  11.0 - 15.0 % Final   • RDW-SD 07/29/2022 44.9  39.0 - 50.0 fL Final   • PLT 07/29/2022 334  140 - 450 K/mcL Final   • NRBC 07/29/2022 0  <=0 /100 WBC Final   • Neutrophil, Percent 07/29/2022 57  % Final   • Lymphocytes, Percent 07/29/2022 35  % Final   • Mono, Percent 07/29/2022 5  % Final   • Eosinophils, Percent 07/29/2022 2  % Final   • Basophils, Percent 07/29/2022 1  % Final   • Immature Granulocytes 07/29/2022 0  % Final   • Absolute Neutrophils 07/29/2022 7.6  1.8 - 7.7 K/mcL Final   •  Absolute Lymphocytes 07/29/2022 4.6  1.0 - 4.8 K/mcL Final   • Absolute Monocytes 07/29/2022 0.7  0.3 - 0.9 K/mcL Final   • Absolute Eosinophils  07/29/2022 0.2  0.0 - 0.5 K/mcL Final   • Absolute Basophils 07/29/2022 0.1  0.0 - 0.3 K/mcL Final   • Absolute Immmature Granulocytes 07/29/2022 0.0  0.0 - 0.2 K/mcL Final       ASSESSMENT AND PLAN:  This is a 40 year old female who presents with :  1. Annual physical exam    2. Chronic migraine without aura without status migrainosus, not intractable    3. Cigarette smoker    4. Moderate mixed hyperlipidemia not requiring statin therapy    5. Vaccine counseling    6. At risk for sleep apnea        Orders Placed This Encounter   • CBC with Automated Differential   • Comprehensive Metabolic Panel   • Lipid Panel With Reflex   • atenolol (TENORMIN) 100 MG tablet       Plan:  Annual physical exam,  Vaccine counseling :  Educated on Pap smear guidelines and HPV infection in detail.   Importance of pelvic exam discussed with the patient, to check uterus and ovaries for lumps, bumps and masses.  Risks of abnormal Pap smear and HPV explained. Recommended undergoing Pap smear after every 5 years.  Informed that Pap smear does not check for ovarian or uterine cancer.  Risks of valvular or vaginal cancer explained. Advised to monitor for lumps, bumps, masses, itching or spots in the vulva or vaginal area.  Will be due for Pap smear after 5 years if Pap smear is normal today.  Advised to take pneumonia vaccine( given once after 65 years of age) from pharmacy and also advised to speak to insurance about it.Patient refused.Counseling given on significance of vaccine.  Encouraged on receiving the COVID-19 vaccine in the fall. Information provided.   Brush and floss teeth twice daily.  Recommended vitamin D 2000 IU daily.  Advised to eat 5 servings of fruits and veggies. Drink 6 glasses of water. Sleep for 8 hours.  Recommended practicing safe sex practices.   Recommended 60 minutes  (M6) obeys commands seven days a week for losing weight and for maintaining weight exercise 30 minutes five days a week.     Chronic migraine without aura without status migrainosus, not intractable :  Recommended continuing Atenolol 100 mg. Refills provided.      Cigarette smoker :  Encouraged to quit smoking.      Moderate mixed hyperlipidemia not requiring statin therapy:   Ordered CBC with automated differential, comprehensive metabolic panel, lipid panel with reflex.   Educated on normal lipid levels to be maintained.  Informed LDL goal is less than 130 mg/dL and it is much better if it is less than 100 mg/dL.     Advised to avoid red meat, fat and fried food stuff.  Triglyceride's goal is less than 150 mg/dL.   Advised to avoid white bread, white pasta, white rice, beer, soda and potatoes because it will increase triglycerides level.  HDL goal is more than 40 mg/dL, and more than 60 mg/dL is excellent.   Advised to include more fruits, vegetables, white meat and fish in diet.  Cholesterol levels goal discussed in detail.      At risk for sleep apnea :  Recommended sleep apnea tests.  Mechanism of sleep apnea in detail.   Discussed CPAP appliance in detail. Continue current medicines.  Explained the guideline and criteria for recommendations for sleep study.  Educated on pathophysiology of sleep apnea.  Offered sleep apnea testing(as BMI is over 30 kg/sq m and has high blood pressure)-Patient refused-     Follow up as needed.       Return in about 1 year (around 7/29/2023) for CPE, labs next.    Medication list reconciled, vital signs, problem list, allergies, medical/surgical/family/social history reviewed from the EHR.    Refer to orders.  Medical compliance with plan discussed and risks of non-compliance reviewed.  Patient education completed on disease process, etiology & prognosis.  Proper usage and side effects of medications reviewed & discussed.  Patient understands and agrees with the plan.  Return to clinic as clinically  indicated as discussed with patient who verbalized understanding of the plan and is in agreement with the plan.    I,  Dr. Tim Power, have created a visit summary document based on the audio recording between Dr. Gloria Reilly MD and this patient for the physician to review, edit as needed, and authenticate.  Creation Date: 7/30/2022 Time: 6:12 AM

## 2022-11-11 NOTE — ED PROVIDER NOTE - CARE PLAN
Principal Discharge DX:	Acute renal failure  Secondary Diagnosis:	Acidosis, metabolic  Secondary Diagnosis:	Hyperkalemia  Secondary Diagnosis:	Hyponatremia   1

## 2022-11-11 NOTE — ED ADULT NURSE NOTE - NSIMPLEMENTINTERV_GEN_ALL_ED
Implemented All Universal Safety Interventions:  [place] to call system. Call bell, personal items and telephone within reach. Instruct patient to call for assistance. Room bathroom lighting operational. Non-slip footwear when patient is off stretcher. Physically safe environment: no spills, clutter or unnecessary equipment. Stretcher in lowest position, wheels locked, appropriate side rails in place.

## 2022-11-11 NOTE — H&P ADULT - ASSESSMENT
1.  Acute on chronic renal failure (creat-13.5) secondary to alcohol abuse w/HAGMA  2.  severe. Hyperkalemia  3.  hyponatremia  4.  Hx- HTN, DM, alcohol dependency      Adm to ICU  cont IV bicarb drip @ 150/h  repeat -BMP, Mg, PO4  IV benzo w/ CIWA protocol  treat hyperkalemia prn  urgent renal consult if K remains elevated  addiction consult

## 2022-11-11 NOTE — ED PROVIDER NOTE - PHYSICAL EXAMINATION
As Follows:  CONST: Well appearing in bed. NAD  EYES: PERRL, EOMI, Sclera and conjunctiva clear. Vision 20/20  ENT: No nasal discharge. Oropharynx missing some teeth but otherwise normal appearing, no erythema or exudates. Uvula midline.  NECK: Non-tender, no meningeal signs  CARD: Normal S1 S2; Normal rate and rhythm  RESP: Equal BS B/L, No wheezes, rhonchi or rales. No distress  GI: Soft, non-tender, non-distended.  MS: Normal ROM in all extremities. No midline spinal tenderness.  SKIN: Warm, dry, no acute rashes. Good turgor  NEURO: A&Ox3, No focal deficits. Strength 5/5 with lower extemity neuropathy. Steady gait

## 2022-11-11 NOTE — PATIENT PROFILE ADULT - FALL HARM RISK - HARM RISK INTERVENTIONS
Assistance with ambulation/Assistance OOB with selected safe patient handling equipment/Communicate Risk of Fall with Harm to all staff/Monitor for mental status changes/Monitor gait and stability/Reinforce activity limits and safety measures with patient and family/Review medications for side effects contributing to fall risk/Sit up slowly, dangle for a short time, stand at bedside before walking/Tailored Fall Risk Interventions/Toileting schedule using arm’s reach rule for commode and bathroom/Use of alarms - bed, chair and/or voice tab/Visual Cue: Yellow wristband and red socks/Bed in lowest position, wheels locked, appropriate side rails in place/Call bell, personal items and telephone in reach/Instruct patient to call for assistance before getting out of bed or chair/Non-slip footwear when patient is out of bed/[place] to call system/Physically safe environment - no spills, clutter or unnecessary equipment/Purposeful Proactive Rounding/Room/bathroom lighting operational, light cord in reach

## 2022-11-11 NOTE — H&P ADULT - NSHPLABSRESULTS_GEN_ALL_CORE
9.6    5.76  )-----------( 147      ( 11 Nov 2022 13:40 )             27.6       11-11    114<LL>  |  85<L>  |  107<HH>  ----------------------------<  208<H>  7.0<HH>   |  13<L>  |  13.5<HH>    Ca    8.0<L>      11 Nov 2022 13:40    TPro  7.0  /  Alb  3.9  /  TBili  0.4  /  DBili  x   /  AST  71<H>  /  ALT  45<H>  /  AlkPhos  177<H>  11-11          ABG - ( 11 Nov 2022 19:18 )  pH, Arterial: 7.30  pH, Blood: x     /  pCO2: 30    /  pO2: 101   / HCO3: 15    / Base Excess: -10.5 /  SaO2: 99.2                Urinalysis Basic - ( 11 Nov 2022 15:40 )    Color: Yellow / Appearance: Clear / SG: >=1.030 / pH: x  Gluc: x / Ketone: Negative  / Bili: Negative / Urobili: 0.2 mg/dL   Blood: x / Protein: >=300 mg/dL / Nitrite: Negative   Leuk Esterase: Negative / RBC: 6-10 /HPF / WBC 3-5 /HPF   Sq Epi: x / Non Sq Epi: Few /HPF / Bacteria: Moderate            Lactate Trend      CARDIAC MARKERS ( 11 Nov 2022 13:40 )  x     / 0.02 ng/mL / x     / x     / x            CAPILLARY BLOOD GLUCOSE      POCT Blood Glucose.: 79 mg/dL (11 Nov 2022 18:12)

## 2022-11-11 NOTE — H&P ADULT - HISTORY OF PRESENT ILLNESS
This is a 58 yo  male w/ PMH as noted below.  Pt comes to hospital w/3 week history progressive weakness.  Pt w. alcohol dependency last drink was this am.  In ER pt was found to have NA-114, creat-13.5, K-7.0 in alcohol withdrawal ( tremulous).  Pt was accepted to ICU by Intensivist.

## 2022-11-11 NOTE — H&P ADULT - NSICDXPASTMEDICALHX_GEN_ALL_CORE_FT
PAST MEDICAL HISTORY:  Chronic alcohol abuse     Chronic kidney disease, unspecified CKD stage requiring HD march 2022    Diabetes     Glaucoma     HLD (hyperlipidemia)     Hypertension     Neuropathy

## 2022-11-11 NOTE — ED ADULT TRIAGE NOTE - CHIEF COMPLAINT QUOTE
patient c/o neck pain and weakness x 3 weeks, patient is diabetic and blood sugars have been running low last night BGL was 30

## 2022-11-11 NOTE — ED ADULT TRIAGE NOTE - CCCP TRG CHIEF CMPLNT
[address] [place]     22  Cassidy Dickens : 1946 Sex: female  Age: 64 y.o. Chief Complaint   Patient presents with    Office Visit for Anticoagulation Management     3 month appt, INR    Hypertension       HPI   Patient presents today for follow-up visit on her medical problems. Also to have her INR checked today. Did come back high at 4. .3. It had been therapeutic for quite some time. Denies any change in diet or dose of medication. She is accompanied by her daughter. Reviewed last notes. Brings in blood pressure numbers today which look too high. Last visit she seemed to have a little bit better control on the 20 mg lisinopril and metoprolol. Follow I could not bump the metoprolol up any further given her pulse rate but I am going to bump her lisinopril up from 20 to 30 mg daily and have her monitor pressures closely. I also asked her to watch her salt intake very closely which just does not appear that she is doing. Last visit we stopped her pravastatin and started Lipitor. She is tolerating this. Labs have not been repeated yet on this change. Also added Singulair for allergy symptoms which she states really did not do much for her. She has tried all kind of over-the-counter medications now the Singulair and has used Flonase. I told her I would start some Astelin to see if this would help her. She is complaining of colored mucus drainage at this time also  She states she is going to be following with cardiology tomorrow. They are discussing transesophageal echo possible watchman procedure. Her depression has been stable and controlled on her SSRI. GERD has been stable and controlled on her PPI. She follows with GI, orthopedics and now cardiology       Review of Systems     Constitutional: Negative for activity change, appetite change, chills, diaphoresis, , fever and unexpected weight change.    HENT: Negative  Respiratory: Negative for cough,  and wheezing.   Positive for dyspnea on exertion-improved  Cardiovascular: Negative for palpitations and leg swelling.  She did have recent positive stress test and recent heart catheterization-see above.-recent PAF on anticoagulation  gastrointestinal: Negative for abdominal pain, blood in stool, constipation, diarrhea, nausea and vomiting.  Does have history of ulcerative colitis. -Quiescent at this point  Endocrine: Negative.    Genitourinary:  Negative for difficulty urinating, dysuria, frequency and hematuria.    No longer seeing urology  Musculoskeletal: Positive for arthralgias and back pain-see above.  Negative for myalgias.       Skin: Negative.    Allergic/Immunologic: Negative for environmental allergies and immunocompromised state. Neurological: Negative for, weakness, light-headedness, numbness and headaches. Hematological: Negative. psychiatric/Behavioral:        Depression has improved  on her SSRI             REST OF PERTINENT ROS GONE OVER AND WAS NEGATIVE.        PMH:  Health Maintenance:  Mammogram - (7/9/2018)  Mammogram Screening - (7/9/2018)  Influenza Vaccination - (9/20/2018)  Couseled on Home Safety - (2/26/2018)  Colonoscopy - (1/16/2018), 8/21-due 26  Colonoscopy Screening - (1/16/2018)  Bone Density Scan - (9/29/2016)  Pneumonia Vaccination - (9/22/2016)  Tetanus Immunization - (9/22/2016)  pt cannot recall if she had this vaccination, no record from pharmacy  Stress Test - 11/11,9/15, 7/21-negative, 4/22-positive  heart cath - 2/12-ok, 4/22-mild to moderate nonobstructive CAD  Colonoscopy - 2/14,8/14,1/18-due 21  EKG - 3/14, 11/17  has Gyn - Dr. Yuriy Sanchez  2D ECHO - 9/15  Hemmocult Cards - 10/16-neg x 3  EGD - 1/18, 10/19,-due 22, 8/21-due 24  Prevnar Vaccine - (11/3/2015) [facility]  Medical Problems:  Hypertension, Restless Leg Syndrome, irritable bladder, Depression  Ulcerative Colitis - follows with dr Rogelio Meyer  Anemia - follows with dr Shawn Hazel Polyps, over active bladder, vit D defic, chronic sinus congestion, Osteoarthritis, Diverticulosis, fx  right wrist, Pfo, Hyperlipidemia, Diastolic Dysfunction, Cholelithiasis, fracture right lateral malleolus,  Valvular Heart Disease, Lumbar DDD, Lumbar Spinal Stenosis, Lumbar herniated disc  Paroxysmal atrial fibrillation--anticoagulated with warfarin  CAD (mild to moderate nonobstructive) per heart cath-     Surgical Hx:  DEON - Non-cancerous  Left breast mass removal - x2-benign  Right carpal tunnel surgery, Rectocele repair, Left knee arthroscopy, Bilateral total hip arthroplasty,  Bladder suspension, Bunion surgery, Bilateral cataract surgery, Repair of torn meniscus left knee,  Posterior-lateral fusion L2-5    Right total knee arthroplasty-  Left total knee arthroplasty-   old records reviewed  FH:  Father:  Heart Disease -  age 80. Mother:  Non Insulin Dependent Diabetes -  age82  Congestive Heart Failure (CHF). Brother 1:  Obesity, Peripheral Vascular Disease (PVD). Maternal Grandmother:  Breast Cancer. SH:  Marital: Legal Status: . retired   Personal Habits: Cigarette Use: Does Not Smoke. Alcohol: Denies alcohol use                  Current Outpatient Medications:     doxycycline hyclate (VIBRA-TABS) 100 MG tablet, Take 1 tablet by mouth 2 times daily for 7 days, Disp: 14 tablet, Rfl: 0    azelastine (ASTELIN) 0.1 % nasal spray, 1 spray by Nasal route 2 times daily Use in each nostril as directed, Disp: 60 mL, Rfl: 1    lisinopril (PRINIVIL;ZESTRIL) 30 MG tablet, Take 1 tablet by mouth daily, Disp: 30 tablet, Rfl: 5    montelukast (SINGULAIR) 10 MG tablet, Take 1 tablet by mouth daily, Disp: 30 tablet, Rfl: 5    atorvastatin (LIPITOR) 40 MG tablet, Take 1 tablet by mouth daily, Disp: 90 tablet, Rfl: 1    torsemide (DEMADEX) 20 MG tablet, Take 0.5 tablets by mouth daily, Disp: 90 tablet, Rfl: 1    metoprolol succinate (TOPROL XL) 50 MG extended release tablet, Take 1 tablet by mouth daily, Disp: 90 tablet, Rfl: 3    escitalopram (LEXAPRO) 10 MG tablet, Take 1 tablet by mouth daily, Disp: 90 tablet, Rfl: 1    pravastatin (PRAVACHOL) 20 MG tablet, Take 1 tablet by mouth daily, Disp: 90 tablet, Rfl: 1    warfarin (COUMADIN) 5 MG tablet, Take 1 tablet by mouth daily, Disp: 90 tablet, Rfl: 1    warfarin (COUMADIN) 1 MG tablet, Take 1 tablet by mouth daily, Disp: 90 tablet, Rfl: 1    nitroGLYCERIN (NITROSTAT) 0.4 MG SL tablet, Place 1 tablet under the tongue every 5 minutes as needed for Chest pain up to max of 3 total doses.  If no relief after 1 dose, call 911., Disp: 25 tablet, Rfl: 0    Cyanocobalamin (B-12) 1000 MCG TABS, Take by mouth daily, Disp: , Rfl:     MELATONIN ER PO, Take by mouth nightly, Disp: , Rfl:     Propylene Glycol-Glycerin (SOOTHE OP), Apply to eye, Disp: , Rfl:     Cholecalciferol 50 MCG (2000 UT) CAPS, Take by mouth daily , Disp: , Rfl:     omeprazole (PRILOSEC) 20 MG delayed release capsule, Take 20 mg by mouth daily , Disp: , Rfl:     Biotin 5000 MCG TABS, Take by mouth daily , Disp: , Rfl:   Allergies   Allergen Reactions    Erythromycin Hives    Penicillin G     Azithromycin     Penicillins Hives    Propoxyphene        Past Medical History:   Diagnosis Date    Anemia     Cholelithiasis     Chronic congestion of paranasal sinus     Colon polyps     Depression     Diastolic dysfunction     Diverticulosis     History of blood transfusion     HTN (hypertension)     Hyperlipidemia 06/20/2019    Irritable bladder     Lumbar degenerative disc disease     Lumbar herniated disc     Lumbar spinal stenosis     Major depressive disorder with single episode, in remission ([facility] [address] 06/20/2019    Osteoarthritis     Overactive bladder     RLS (restless legs syndrome) 06/20/2019    Ulcerative colitis ([facility] [address]     Ulcerative colitis without complications ([facility] [address] 11/49/6791    Valvular heart disease     Vitamin D deficiency     Wrist fracture     Right     Past Surgical History:   Procedure Laterality Date    BLADDER SUSPENSION      BREAST LUMPECTOMY Left     x 2, negative    BUNIONECTOMY      CARDIAC CATHETERIZATION  2012    CARPAL TUNNEL RELEASE Right     CATARACT REMOVAL WITH IMPLANT Right     COLONOSCOPY  2014    HYSTERECTOMY, TOTAL ABDOMINAL      non-cancerous    JOINT REPLACEMENT Bilateral     hip    KNEE ARTHROSCOPY Left     RECTOCELE REPAIR       Family History   Problem Relation Age of Onset    Heart Disease Father          age 80    Stroke Father    [place] Diabetes Mother          age 80    Heart Disease Mother         CHF    Other Brother         PVD, obesity    Heart Disease Brother         CHF    Cancer Paternal Grandmother         breast     Social History     Socioeconomic History    Marital status:      Spouse name: Not on file    Number of children: Not on file    Years of education: Not on file    Highest education level: Not on file   Occupational History    Not on file   Tobacco Use    Smoking status: Never Smoker    Smokeless tobacco: Never Used   Vaping Use    Vaping Use: Never used   Substance and Sexual Activity    Alcohol use: Yes     Comment: occasionally    Drug use: No    Sexual activity: Not on file   Other Topics Concern    Not on file   Social History Narrative    Not on file     Social Determinants of Health     Financial Resource Strain: Low Risk     Difficulty of Paying Living Expenses: Not hard at all   Food Insecurity: No Food Insecurity    Worried About [facility] in the Last Year: Never true    [facility] in the Last Year: Never true   Transportation Needs:     Lack of Transportation (Medical): Not on file    Lack of Transportation (Non-Medical):  Not on file   Physical Activity: Insufficiently Active    Days of Exercise per Week: 3 days    Minutes of Exercise per Session: 30 min   Stress:     Feeling of Stress : Not on file   Social Connections:     Frequency of Communication with Friends and Family: Not on file    Frequency of Social Gatherings with Friends and Family: Not on file    Attends Orthodoxy Services: Not on file    Active Member of Clubs or Organizations: Not on file    Attends Club or Organization Meetings: Not on file    Marital Status: Not on file   Intimate Partner Violence:     Fear of Current or Ex-Partner: Not on file    Emotionally Abused: Not on file    Physically Abused: Not on file    Sexually Abused: Not on file   Housing Stability:     Unable to Pay for Housing in the Last Year: Not on file    Number of [place] in the Last Year: Not on file    Unstable Housing in the Last Year: Not on file       Vitals:    05/23/22 1610 05/23/22 2031 05/23/22 2043   BP: (!) 110/58  (!) 156/78   Pulse: (!) 48 56    Temp: 97.3 °F (36.3 °C)     TempSrc: Temporal     SpO2: 100%     Weight: 155 lb 9.6 oz (70.6 kg)     Height: 5' 5\" (1.651 m)         Physical Exam    Constitutional: She is oriented to person, place, and time. She appears well-developed and well-nourished. No distress.   HEENT: As visualized bilaterally looked okay. Sinuses nontender. Mouth no lesions. Throat erythematous with thick white mucus draining posterior pharynx  Neck: Normal range of motion. Neck supple. No JVD present. No thyromegaly present. Cardiovascular: Normal rate, regular rhythm, normal heart sounds and intact distal pulses. Exam reveals no gallop and no friction rub.   No murmur heard. Pulmonary/Chest: Effort normal and breath sounds normal. No respiratory distress. She has no wheezes. She has no rales. Abdominal: Soft. Bowel sounds are normal. She exhibits no distension and no mass. There is no tenderness. Musculoskeletal: Normal range of motion. She exhibits no edema.  Palpation to her spine/paraspinal region-unable to elicit pain at this time.  Neurological: She is alert and oriented to person, place, and time. She displays normal reflexes.  No sensory deficit. She exhibits normal muscle tone. Coordination normal.   Skin: Skin is warm and dry. No rash noted. No erythema. Psychiatric: She has a normal mood and affect. Her behavior is normal.   Nursing note and vitals reviewed              Assessment and Plan:  Silvestre Flores was seen today for office visit for anticoagulation management and hypertension. Diagnoses and all orders for this visit:    Primary hypertension  -     Basic Metabolic Panel; Future  -     Magnesium; Future  Does not appear to be well controlled at this time we will increase her lisinopril. Anticoagulated  -     POCT INR    PAF (paroxysmal atrial fibrillation) (HCC)  Stable and currently in sinus. She is anticoagulated. Hyperlipidemia, unspecified hyperlipidemia type  -     Lipid Panel; Future  Stable and controlled on statin medication. Coronary artery disease of native artery of native heart with stable angina pectoris ([facility] [address]  Stable and following with cardiology    Major depressive disorder with single episode, in remission ([facility] [address]  Stable and controlled on his current statin medication    Sinusitis  Doxycycline    Other orders  -     doxycycline hyclate (VIBRA-TABS) 100 MG tablet; Take 1 tablet by mouth 2 times daily for 7 days  -     azelastine (ASTELIN) 0.1 % nasal spray; 1 spray by Nasal route 2 times daily Use in each nostril as directed  -     Discontinue: lisinopril (PRINIVIL;ZESTRIL) 30 MG tablet; Take 1 tablet by mouth daily  -     lisinopril (PRINIVIL;ZESTRIL) 30 MG tablet; Take 1 tablet by mouth daily    Plan: We will have her check PT/INR in 10 days along with BMP, magnesium and lipids. The girls will call me with the results of the INR as I will not be there that day. I will make adjustments at that time. Also related to this sinus infection type process I am starting doxycycline for 7 days. Warned of potential side effects. Probiotic. Push fluids. I told her it could raise the INR even further.   I am going to hold her dose of Coumadin today and tomorrow. She states she likes to eat greens and I am going to have her increase her intake and adjust her Coumadin to that. We will check all of this in 10 days and make appropriate adjustments. Or any bleeding. Astelin nasal spray. Increase lisinopril from 20 to 30 mg daily monitor blood pressure very closely. Blood work to monitor disease progression and medication use. Prescription management performed after review of efficacy of the medication on her chronic medical conditions. Patient's. Notify us of problems in the interim. Return in about 2 weeks (around 6/6/2022) for 10 day, 3 month fu. Seen By:  Corie Singh MD      *Document was created using voice recognition software. Note was reviewed however may contain grammatical errors. multiple medical complaints

## 2022-11-11 NOTE — ED PROVIDER NOTE - OBJECTIVE STATEMENT
Pt is a 56 y/o male with PMHx of HTN, DM, and alcohol dependence/withdrawals presenting to ED from urgent care for generalized weakness, neck pain, and low blood sugar. Pt has head general weakness and pain for about 3 weeks and has not been taking his diabetes medications due to repeated low blood sugar readings at home (36 last night). Pt usually drinks alcohol daily, but states last time drinking was Saturday/Sunday. His neck pain is dull and nonradiating, only worsened when standing and physical moving such as getting himself dressed. He has to "take breaks" when exerting himself.     Pt was previously admitted earlier this year for VAMSI where he had dialysis done inpatient.   Nephrology

## 2022-11-11 NOTE — PATIENT PROFILE ADULT - FALL HARM RISK - FACTORS
CIWA protocol initiation less than 96 hours/IV and/or equipment tethered to patient/Medication side effects/Other medical problems/Weakness

## 2022-11-11 NOTE — H&P ADULT - NSHPPHYSICALEXAM_GEN_ALL_CORE
GENERAL:  56y/o   Male. Pt's tremulous but, resting comfortably.  HEAD:  Atraumatic, Normocephalic  EYES: EOMI, PERRLA, conjunctiva and sclera clear  NECK: Supple, No JVD, no cervical lymphadenopathy, non-tender  CHEST/LUNG: Clear to auscultation bilaterally; No wheeze, rhonchi, or rales  HEART: Regular rate and rhythm; S1&S2  ABDOMEN: Soft, Nontender, Nondistended x 4 quadrants; Bowel sounds present  EXTREMITIES:   Peripheral Pulses Present, No clubbing, no cyanosis, or no edema, no calf tenderness  PSYCH: AAOx3, cooperative, appropriate  NEUROLOGY: unable   SKIN: WNL

## 2022-11-12 LAB
A1C WITH ESTIMATED AVERAGE GLUCOSE RESULT: 6.7 % — HIGH (ref 4–5.6)
ALBUMIN SERPL ELPH-MCNC: 3.1 G/DL — LOW (ref 3.5–5.2)
ALBUMIN SERPL ELPH-MCNC: 3.6 G/DL — SIGNIFICANT CHANGE UP (ref 3.5–5.2)
ALBUMIN SERPL ELPH-MCNC: 3.6 G/DL — SIGNIFICANT CHANGE UP (ref 3.5–5.2)
ALP SERPL-CCNC: 122 U/L — HIGH (ref 30–115)
ALP SERPL-CCNC: 131 U/L — HIGH (ref 30–115)
ALP SERPL-CCNC: 137 U/L — HIGH (ref 30–115)
ALT FLD-CCNC: 38 U/L — SIGNIFICANT CHANGE UP (ref 0–41)
ALT FLD-CCNC: 50 U/L — HIGH (ref 0–41)
ALT FLD-CCNC: 56 U/L — HIGH (ref 0–41)
AMMONIA BLD-MCNC: 21 UMOL/L — SIGNIFICANT CHANGE UP (ref 11–55)
ANION GAP SERPL CALC-SCNC: 16 MMOL/L — HIGH (ref 7–14)
ANION GAP SERPL CALC-SCNC: 17 MMOL/L — HIGH (ref 7–14)
ANION GAP SERPL CALC-SCNC: 19 MMOL/L — HIGH (ref 7–14)
ANION GAP SERPL CALC-SCNC: 19 MMOL/L — HIGH (ref 7–14)
ANION GAP SERPL CALC-SCNC: 20 MMOL/L — HIGH (ref 7–14)
APTT BLD: 37.3 SEC — SIGNIFICANT CHANGE UP (ref 27–39.2)
AST SERPL-CCNC: 60 U/L — HIGH (ref 0–41)
AST SERPL-CCNC: 86 U/L — HIGH (ref 0–41)
AST SERPL-CCNC: 93 U/L — HIGH (ref 0–41)
BILIRUB DIRECT SERPL-MCNC: <0.2 MG/DL — SIGNIFICANT CHANGE UP (ref 0–0.3)
BILIRUB INDIRECT FLD-MCNC: >0.1 MG/DL — LOW (ref 0.2–1.2)
BILIRUB SERPL-MCNC: 0.3 MG/DL — SIGNIFICANT CHANGE UP (ref 0.2–1.2)
BILIRUB SERPL-MCNC: 0.4 MG/DL — SIGNIFICANT CHANGE UP (ref 0.2–1.2)
BILIRUB SERPL-MCNC: 0.4 MG/DL — SIGNIFICANT CHANGE UP (ref 0.2–1.2)
BUN SERPL-MCNC: 100 MG/DL — CRITICAL HIGH (ref 10–20)
BUN SERPL-MCNC: 102 MG/DL — CRITICAL HIGH (ref 10–20)
BUN SERPL-MCNC: 103 MG/DL — CRITICAL HIGH (ref 10–20)
CALCIUM SERPL-MCNC: 8.2 MG/DL — LOW (ref 8.4–10.5)
CALCIUM SERPL-MCNC: 8.5 MG/DL — SIGNIFICANT CHANGE UP (ref 8.4–10.5)
CALCIUM SERPL-MCNC: 8.7 MG/DL — SIGNIFICANT CHANGE UP (ref 8.4–10.5)
CALCIUM SERPL-MCNC: 8.7 MG/DL — SIGNIFICANT CHANGE UP (ref 8.4–10.5)
CALCIUM SERPL-MCNC: 8.9 MG/DL — SIGNIFICANT CHANGE UP (ref 8.4–10.5)
CHLORIDE SERPL-SCNC: 87 MMOL/L — LOW (ref 98–110)
CHLORIDE SERPL-SCNC: 89 MMOL/L — LOW (ref 98–110)
CHLORIDE SERPL-SCNC: 90 MMOL/L — LOW (ref 98–110)
CO2 SERPL-SCNC: 17 MMOL/L — SIGNIFICANT CHANGE UP (ref 17–32)
CO2 SERPL-SCNC: 17 MMOL/L — SIGNIFICANT CHANGE UP (ref 17–32)
CO2 SERPL-SCNC: 18 MMOL/L — SIGNIFICANT CHANGE UP (ref 17–32)
CO2 SERPL-SCNC: 19 MMOL/L — SIGNIFICANT CHANGE UP (ref 17–32)
CO2 SERPL-SCNC: 20 MMOL/L — SIGNIFICANT CHANGE UP (ref 17–32)
CREAT SERPL-MCNC: 12.5 MG/DL — CRITICAL HIGH (ref 0.7–1.5)
CREAT SERPL-MCNC: 12.7 MG/DL — CRITICAL HIGH (ref 0.7–1.5)
CREAT SERPL-MCNC: 12.7 MG/DL — CRITICAL HIGH (ref 0.7–1.5)
CREAT SERPL-MCNC: 13.2 MG/DL — CRITICAL HIGH (ref 0.7–1.5)
CREAT SERPL-MCNC: 13.3 MG/DL — CRITICAL HIGH (ref 0.7–1.5)
CULTURE RESULTS: SIGNIFICANT CHANGE UP
EGFR: 4 ML/MIN/1.73M2 — LOW
ESTIMATED AVERAGE GLUCOSE: 146 MG/DL — HIGH (ref 68–114)
GAS PNL BLDA: SIGNIFICANT CHANGE UP
GLUCOSE BLDC GLUCOMTR-MCNC: 126 MG/DL — HIGH (ref 70–99)
GLUCOSE BLDC GLUCOMTR-MCNC: 132 MG/DL — HIGH (ref 70–99)
GLUCOSE BLDC GLUCOMTR-MCNC: 172 MG/DL — HIGH (ref 70–99)
GLUCOSE BLDC GLUCOMTR-MCNC: 78 MG/DL — SIGNIFICANT CHANGE UP (ref 70–99)
GLUCOSE SERPL-MCNC: 114 MG/DL — HIGH (ref 70–99)
GLUCOSE SERPL-MCNC: 139 MG/DL — HIGH (ref 70–99)
GLUCOSE SERPL-MCNC: 153 MG/DL — HIGH (ref 70–99)
GLUCOSE SERPL-MCNC: 164 MG/DL — HIGH (ref 70–99)
GLUCOSE SERPL-MCNC: 70 MG/DL — SIGNIFICANT CHANGE UP (ref 70–99)
INR BLD: 0.91 RATIO — SIGNIFICANT CHANGE UP (ref 0.65–1.3)
MAGNESIUM SERPL-MCNC: 2.3 MG/DL — SIGNIFICANT CHANGE UP (ref 1.8–2.4)
PHOSPHATE SERPL-MCNC: 7.2 MG/DL — HIGH (ref 2.1–4.9)
POTASSIUM SERPL-MCNC: 4.6 MMOL/L — SIGNIFICANT CHANGE UP (ref 3.5–5)
POTASSIUM SERPL-MCNC: 4.9 MMOL/L — SIGNIFICANT CHANGE UP (ref 3.5–5)
POTASSIUM SERPL-MCNC: 5.1 MMOL/L — HIGH (ref 3.5–5)
POTASSIUM SERPL-MCNC: 5.4 MMOL/L — HIGH (ref 3.5–5)
POTASSIUM SERPL-MCNC: 5.5 MMOL/L — HIGH (ref 3.5–5)
POTASSIUM SERPL-SCNC: 4.6 MMOL/L — SIGNIFICANT CHANGE UP (ref 3.5–5)
POTASSIUM SERPL-SCNC: 4.9 MMOL/L — SIGNIFICANT CHANGE UP (ref 3.5–5)
POTASSIUM SERPL-SCNC: 5.1 MMOL/L — HIGH (ref 3.5–5)
POTASSIUM SERPL-SCNC: 5.4 MMOL/L — HIGH (ref 3.5–5)
POTASSIUM SERPL-SCNC: 5.5 MMOL/L — HIGH (ref 3.5–5)
PROT SERPL-MCNC: 5.4 G/DL — LOW (ref 6–8)
PROT SERPL-MCNC: 6.4 G/DL — SIGNIFICANT CHANGE UP (ref 6–8)
PROT SERPL-MCNC: 6.4 G/DL — SIGNIFICANT CHANGE UP (ref 6–8)
PROTHROM AB SERPL-ACNC: 10.4 SEC — SIGNIFICANT CHANGE UP (ref 9.95–12.87)
SODIUM SERPL-SCNC: 123 MMOL/L — LOW (ref 135–146)
SODIUM SERPL-SCNC: 125 MMOL/L — LOW (ref 135–146)
SODIUM SERPL-SCNC: 126 MMOL/L — LOW (ref 135–146)
SODIUM SERPL-SCNC: 127 MMOL/L — LOW (ref 135–146)
SODIUM SERPL-SCNC: 127 MMOL/L — LOW (ref 135–146)
SPECIMEN SOURCE: SIGNIFICANT CHANGE UP

## 2022-11-12 PROCEDURE — 93010 ELECTROCARDIOGRAM REPORT: CPT

## 2022-11-12 PROCEDURE — 71045 X-RAY EXAM CHEST 1 VIEW: CPT | Mod: 26

## 2022-11-12 PROCEDURE — 99291 CRITICAL CARE FIRST HOUR: CPT

## 2022-11-12 PROCEDURE — 99233 SBSQ HOSP IP/OBS HIGH 50: CPT

## 2022-11-12 RX ORDER — SODIUM CHLORIDE 9 MG/ML
1000 INJECTION, SOLUTION INTRAVENOUS
Refills: 0 | Status: DISCONTINUED | OUTPATIENT
Start: 2022-11-12 | End: 2022-11-20

## 2022-11-12 RX ORDER — SODIUM ZIRCONIUM CYCLOSILICATE 10 G/10G
10 POWDER, FOR SUSPENSION ORAL THREE TIMES A DAY
Refills: 0 | Status: DISCONTINUED | OUTPATIENT
Start: 2022-11-12 | End: 2022-11-12

## 2022-11-12 RX ORDER — SODIUM ZIRCONIUM CYCLOSILICATE 10 G/10G
10 POWDER, FOR SUSPENSION ORAL THREE TIMES A DAY
Refills: 0 | Status: COMPLETED | OUTPATIENT
Start: 2022-11-12 | End: 2022-11-14

## 2022-11-12 RX ORDER — THIAMINE MONONITRATE (VIT B1) 100 MG
100 TABLET ORAL ONCE
Refills: 0 | Status: COMPLETED | OUTPATIENT
Start: 2022-11-12 | End: 2022-11-12

## 2022-11-12 RX ORDER — INSULIN HUMAN 100 [IU]/ML
10 INJECTION, SOLUTION SUBCUTANEOUS ONCE
Refills: 0 | Status: COMPLETED | OUTPATIENT
Start: 2022-11-12 | End: 2022-11-12

## 2022-11-12 RX ORDER — DEXTROSE 50 % IN WATER 50 %
50 SYRINGE (ML) INTRAVENOUS ONCE
Refills: 0 | Status: COMPLETED | OUTPATIENT
Start: 2022-11-12 | End: 2022-11-12

## 2022-11-12 RX ORDER — PANTOPRAZOLE SODIUM 20 MG/1
40 TABLET, DELAYED RELEASE ORAL DAILY
Refills: 0 | Status: DISCONTINUED | OUTPATIENT
Start: 2022-11-12 | End: 2022-11-20

## 2022-11-12 RX ORDER — POTASSIUM CHLORIDE 20 MEQ
20 PACKET (EA) ORAL ONCE
Refills: 0 | Status: COMPLETED | OUTPATIENT
Start: 2022-11-12 | End: 2022-11-12

## 2022-11-12 RX ORDER — THIAMINE MONONITRATE (VIT B1) 100 MG
100 TABLET ORAL DAILY
Refills: 0 | Status: DISCONTINUED | OUTPATIENT
Start: 2022-11-12 | End: 2022-11-12

## 2022-11-12 RX ADMIN — ONDANSETRON 4 MILLIGRAM(S): 8 TABLET, FILM COATED ORAL at 05:09

## 2022-11-12 RX ADMIN — Medication 2 MILLIGRAM(S): at 15:27

## 2022-11-12 RX ADMIN — SODIUM ZIRCONIUM CYCLOSILICATE 10 GRAM(S): 10 POWDER, FOR SUSPENSION ORAL at 15:27

## 2022-11-12 RX ADMIN — HEPARIN SODIUM 5000 UNIT(S): 5000 INJECTION INTRAVENOUS; SUBCUTANEOUS at 05:09

## 2022-11-12 RX ADMIN — INSULIN HUMAN 10 UNIT(S): 100 INJECTION, SOLUTION SUBCUTANEOUS at 18:25

## 2022-11-12 RX ADMIN — SODIUM CHLORIDE 100 MILLILITER(S): 9 INJECTION, SOLUTION INTRAVENOUS at 07:55

## 2022-11-12 RX ADMIN — Medication 1.5 MILLIGRAM(S): at 22:18

## 2022-11-12 RX ADMIN — Medication 50 MILLILITER(S): at 18:25

## 2022-11-12 RX ADMIN — Medication 101 MILLIGRAM(S): at 16:21

## 2022-11-12 RX ADMIN — Medication 2 MILLIGRAM(S): at 06:22

## 2022-11-12 RX ADMIN — Medication 2 MILLIGRAM(S): at 11:12

## 2022-11-12 RX ADMIN — DEXMEDETOMIDINE HYDROCHLORIDE IN 0.9% SODIUM CHLORIDE 3.66 MICROGRAM(S)/KG/HR: 4 INJECTION INTRAVENOUS at 06:22

## 2022-11-12 RX ADMIN — CHLORHEXIDINE GLUCONATE 1 APPLICATION(S): 213 SOLUTION TOPICAL at 07:55

## 2022-11-12 RX ADMIN — SODIUM CHLORIDE 50 MILLILITER(S): 9 INJECTION, SOLUTION INTRAVENOUS at 22:49

## 2022-11-12 RX ADMIN — HEPARIN SODIUM 5000 UNIT(S): 5000 INJECTION INTRAVENOUS; SUBCUTANEOUS at 14:28

## 2022-11-12 RX ADMIN — HEPARIN SODIUM 5000 UNIT(S): 5000 INJECTION INTRAVENOUS; SUBCUTANEOUS at 22:17

## 2022-11-12 RX ADMIN — PANTOPRAZOLE SODIUM 40 MILLIGRAM(S): 20 TABLET, DELAYED RELEASE ORAL at 15:27

## 2022-11-12 RX ADMIN — SODIUM ZIRCONIUM CYCLOSILICATE 10 GRAM(S): 10 POWDER, FOR SUSPENSION ORAL at 22:18

## 2022-11-12 RX ADMIN — Medication 1.5 MILLIGRAM(S): at 18:25

## 2022-11-12 RX ADMIN — SODIUM CHLORIDE 75 MILLILITER(S): 9 INJECTION, SOLUTION INTRAVENOUS at 14:28

## 2022-11-12 RX ADMIN — ONDANSETRON 4 MILLIGRAM(S): 8 TABLET, FILM COATED ORAL at 18:24

## 2022-11-12 NOTE — CONSULT NOTE ADULT - ASSESSMENT
1)  Severe non-oliguric VAMSI on CKD.  Likely multifactorial including low BP, perhaps an element of volume depletion.  No obstruction on U/S.  No evidence of HUS w/ normal platelets.  Myeloma kidney always possible, but myeloma ruled out 8 months ago when in hospital    2)  Hyperkalemia due to above, improved    3)  Hyponatremia due to severe renal failure, alcohol abuse, possible volume depletion    Recommendations:    1)  No absolute indication for RRT at present.  Difficult to assess for uremic encephalopathy in pt w/ delirium from alcohol withdrawal plus sedation    2)  No change in medications from Renal standpoint    3)  Will observe pt over next 24 for 48hrs unless urgent need for HD arises.  If renal function fails to improve by then, will need RRT

## 2022-11-12 NOTE — PROGRESS NOTE ADULT - ASSESSMENT
#EtOH withdrawal  Cw precedex  ativan taper  CIWA monitoring  Thiamine, Folic acid  check ammonia level      #VAMSI on CKD/ hyperkalemia  - start lokelma  - renal eval    #Hyponatremia/ hyperphosphatemia/hyperkalemia  start D5W given rapid correction, monitor FS   monitor FS  nephro eval   monitor electrolytes and replete, avoid overcorrection of sodium  lokelma 10mg TID  will likely need ng tube    #probable STACEY  will need OP NSPG    #HO DM  monitor FS  insulin protocol if needed    DVT ppx   #toxic metabolic encephalopathy likely multifactorial, due to Etoh withdrawal, hyponatremia, uremia, ?hepatic ecephalopathy)  #EtOH withdrawal  Cw precedex  ativan taper  CIWA monitoring  Thiamine, Folic acid  check ammonia level      #VAMSI on CKD/ hyperkalemia  - start lokelma  - renal eval    #Hyponatremia/ hyperphosphatemia/hyperkalemia  start D5W given rapid correction, monitor FS   monitor FS  nephro eval   monitor electrolytes and replete, avoid overcorrection of sodium  lokelma 10mg TID  will likely need ng tube    #probable STACEY  will need OP NSPG    #HO DM  monitor FS  insulin protocol if needed    DVT ppx  The patient is critically ill with a high probability of imminent or life threatening deterioration.

## 2022-11-12 NOTE — CONSULT NOTE ADULT - SUBJECTIVE AND OBJECTIVE BOX
Patient is a 57y old  Male who presents with a chief complaint of acute on chronic renal failure w/ severe electrolyte imbalance (11 Nov 2022 19:24)      HPI:  This is a 58 yo [place] male w/ PMH as noted below.  Pt comes to hospital w/3 week history progressive weakness.  Pt w. alcohol dependency last drink was this am.  In ER pt was found to have NA-114, creat-13.5, K-7.0 in alcohol withdrawal ( tremulous).  Pt was accepted to ICU by Intensivist.  (11 Nov 2022 19:24)      PAST MEDICAL & SURGICAL HISTORY:  Diabetes      Hypertension      HLD (hyperlipidemia)      Neuropathy      Glaucoma      Chronic kidney disease, unspecified CKD stage  requiring HD march 2022      Chronic alcohol abuse      No significant past surgical history          SOCIAL HX:   Smoking                         ETOH                            Other    FAMILY HISTORY:  :  No known cardiovacular family hisotry     Review Of Systems:     All ROS are negative except per HPI       Allergies    No Known Allergies    Intolerances          PHYSICAL EXAM    ICU Vital Signs Last 24 Hrs  T(C): 35.3 (12 Nov 2022 03:03), Max: 36.3 (11 Nov 2022 12:55)  T(F): 95.6 (12 Nov 2022 03:03), Max: 97.3 (11 Nov 2022 12:55)  HR: 67 (12 Nov 2022 06:00) (62 - 82)  BP: 103/62 (12 Nov 2022 06:00) (73/52 - 130/69)  BP(mean): 77 (12 Nov 2022 06:00) (59 - 96)  ABP: --  ABP(mean): --  RR: 18 (12 Nov 2022 06:00) (14 - 30)  SpO2: 98% (12 Nov 2022 06:00) (95% - 100%)    O2 Parameters below as of 11 Nov 2022 20:06  Patient On (Oxygen Delivery Method): room air      Constitutional: no acute distress, snoring loudly, well nourished well developed  Neuro: moving all 4 limbs spontaneously, not following commands, difficult to arouse.  Sedated on precedex  HEENT: NCAT, anicteric  Neck: no visible lymphadenopathy or goiter  Pulm: no respiratory distress. clear to auscultation bilaterally  Cardiac: extremities appear pink and well-perfused.  regular rhythm and rate, no murmur detected  Abdomen: non-distended  Extremities: no peripheral edema            11-11-22 @ 07:01  -  11-12-22 @ 07:00  --------------------------------------------------------  IN:    Dexmedetomidine: 60 mL    dextrose 5% + sodium chloride 0.45% w/ Additives: 1000 mL    Sodium Bicarbonate: 450 mL  Total IN: 1510 mL    OUT:    Indwelling Catheter - Urethral (mL): 1145 mL  Total OUT: 1145 mL    Total NET: 365 mL          LABS:                          9.6    5.76  )-----------( 147      ( 11 Nov 2022 13:40 )             27.6                                               11-11    122<L>  |  87<L>  |  105<HH>  ----------------------------<  91  5.6<H>   |  14<L>  |  13.0<HH>    Ca    8.3<L>      11 Nov 2022 19:35  Phos  6.7     11-11  Mg     2.4     11-11    TPro  6.4  /  Alb  3.7  /  TBili  0.4  /  DBili  0.2  /  AST  66<H>  /  ALT  41  /  AlkPhos  158<H>  11-11                                             Urinalysis Basic - ( 11 Nov 2022 15:40 )    Color: Yellow / Appearance: Clear / SG: >=1.030 / pH: x  Gluc: x / Ketone: Negative  / Bili: Negative / Urobili: 0.2 mg/dL   Blood: x / Protein: >=300 mg/dL / Nitrite: Negative   Leuk Esterase: Negative / RBC: 6-10 /HPF / WBC 3-5 /HPF   Sq Epi: x / Non Sq Epi: Few /HPF / Bacteria: Moderate        CARDIAC MARKERS ( 11 Nov 2022 13:40 )  x     / 0.02 ng/mL / x     / x     / x                                                LIVER FUNCTIONS - ( 11 Nov 2022 19:35 )  Alb: 3.7 g/dL / Pro: 6.4 g/dL / ALK PHOS: 158 U/L / ALT: 41 U/L / AST: 66 U/L / GGT: x                                                                                                                                   ABG - ( 11 Nov 2022 19:18 )  pH, Arterial: 7.30  pH, Blood: x     /  pCO2: 30    /  pO2: 101   / HCO3: 15    / Base Excess: -10.5 /  SaO2: 99.2                X-Rays reviewed:                                                                                    ECHO    CXR interpreted by me:    MEDICATIONS  (STANDING):  chlorhexidine 2% Cloths 1 Application(s) Topical <User Schedule>  dexMEDEtomidine Infusion 0.2 MICROgram(s)/kG/Hr (3.66 mL/Hr) IV Continuous <Continuous>  dextrose 5% + sodium chloride 0.45% 1000 milliLiter(s) (100 mL/Hr) IV Continuous <Continuous>  dextrose 5%. 1000 milliLiter(s) (100 mL/Hr) IV Continuous <Continuous>  dextrose 5%. 1000 milliLiter(s) (50 mL/Hr) IV Continuous <Continuous>  dextrose 50% Injectable 25 Gram(s) IV Push once  dextrose 50% Injectable 12.5 Gram(s) IV Push once  dextrose 50% Injectable 25 Gram(s) IV Push once  folic acid 1 milliGRAM(s) Oral daily  glucagon  Injectable 1 milliGRAM(s) IntraMuscular once  heparin   Injectable 5000 Unit(s) SubCutaneous every 8 hours  insulin lispro (ADMELOG) corrective regimen sliding scale   SubCutaneous three times a day before meals  LORazepam   Injectable 2 milliGRAM(s) IV Push once  LORazepam   Injectable   IV Push   LORazepam   Injectable 2 milliGRAM(s) IV Push every 4 hours  LORazepam   Injectable 1.5 milliGRAM(s) IV Push every 4 hours  multivitamin 1 Tablet(s) Oral daily  NIFEdipine XL 60 milliGRAM(s) Oral daily  ondansetron Injectable 4 milliGRAM(s) IV Push two times a day  pantoprazole    Tablet 40 milliGRAM(s) Oral before breakfast  sodium bicarbonate  Infusion 0.331 mEq/kG/Hr (150 mL/Hr) IV Continuous <Continuous>  thiamine Injectable 100 milliGRAM(s) IntraMuscular once    MEDICATIONS  (PRN):  dextrose Oral Gel 15 Gram(s) Oral once PRN Blood Glucose LESS THAN 70 milliGRAM(s)/deciliter

## 2022-11-12 NOTE — CONSULT NOTE ADULT - SUBJECTIVE AND OBJECTIVE BOX
[facility]  INITIAL CONSULT NOTE  --------------------------------------------------------------------------------  HPI:  56 yo male w/ PMHx as below.  Hospitalized in March w/ severe VAMSI on CKD w/ hyperkalemia temporarily requiring HD.  Again admitted w/ severe VAMSI on CKD w/ creat 13, K+ 7.0.  Pt also withdrawing from alcohol, now on Ativan protocol.  Was hypothermic, now normal core body temp.  Good U.O.  Daughter states pt recently had severe "open sores" legs being treated by Derm.  She states he was eating, but developed severe hypoglycemia, and pt stopped oral hypoglycemics        PAST HISTORY  --------------------------------------------------------------------------------  PAST MEDICAL & SURGICAL HISTORY:  Diabetes      Hypertension      HLD (hyperlipidemia)      Neuropathy      Glaucoma      Chronic kidney disease, unspecified CKD stage  requiring HD march 2022      Chronic alcohol abuse      No significant past surgical history        FAMILY HISTORY:    PAST SOCIAL HISTORY:    ALLERGIES & MEDICATIONS  --------------------------------------------------------------------------------  Allergies    No Known Allergies    Intolerances      Standing Inpatient Medications  chlorhexidine 2% Cloths 1 Application(s) Topical <User Schedule>  dexMEDEtomidine Infusion 0.2 MICROgram(s)/kG/Hr IV Continuous <Continuous>  dextrose 5%. 1000 milliLiter(s) IV Continuous <Continuous>  dextrose 5%. 1000 milliLiter(s) IV Continuous <Continuous>  dextrose 5%. 1000 milliLiter(s) IV Continuous <Continuous>  dextrose 50% Injectable 25 Gram(s) IV Push once  dextrose 50% Injectable 12.5 Gram(s) IV Push once  dextrose 50% Injectable 25 Gram(s) IV Push once  folic acid 1 milliGRAM(s) Oral daily  glucagon  Injectable 1 milliGRAM(s) IntraMuscular once  heparin   Injectable 5000 Unit(s) SubCutaneous every 8 hours  insulin lispro (ADMELOG) corrective regimen sliding scale   SubCutaneous three times a day before meals  LORazepam   Injectable 2 milliGRAM(s) IV Push once  LORazepam   Injectable   IV Push   LORazepam   Injectable 1.5 milliGRAM(s) IV Push every 4 hours  multivitamin 1 Tablet(s) Oral daily  NIFEdipine XL 60 milliGRAM(s) Oral daily  ondansetron Injectable 4 milliGRAM(s) IV Push two times a day  pantoprazole  Injectable 40 milliGRAM(s) IV Push daily  sodium zirconium cyclosilicate 10 Gram(s) Oral three times a day  thiamine Injectable 100 milliGRAM(s) IntraMuscular once    PRN Inpatient Medications  dextrose Oral Gel 15 Gram(s) Oral once PRN      REVIEW OF SYSTEMS  --------------------------------------------------------------------------------  Gen: No weight changes, fatigue, fevers/chills, weakness  Skin: No rashes  Head/Eyes/Ears/Mouth: No headache; Normal hearing; Normal vision w/o blurriness; No sinus pain/discomfort, sore throat  Respiratory: No dyspnea, cough, wheezing, hemoptysis  CV: No chest pain, PND, orthopnea  GI: No abdominal pain, diarrhea, constipation, nausea, vomiting, melena, hematochezia  : No increased frequency, dysuria, hematuria, nocturia  MSK: No joint pain/swelling; no back pain; no edema  Neuro: No dizziness/lightheadedness, weakness, seizures, numbness, tingling  Heme: No easy bruising or bleeding  Endo: No heat/cold intolerance  Psych: No significant nervousness, anxiety, stress, depression    All other systems were reviewed and are negative, except as noted.    VITALS/PHYSICAL EXAM  --------------------------------------------------------------------------------  T(C): 38.2 (11-12-22 @ 15:10), Max: 38.2 (11-12-22 @ 15:10)  HR: 101 (11-12-22 @ 16:00) (61 - 102)  BP: 135/96 (11-12-22 @ 16:00) (73/52 - 163/74)  RR: 22 (11-12-22 @ 16:00) (14 - 30)  SpO2: 100% (11-12-22 @ 16:00) (94% - 100%)  Wt(kg): --  Height (cm): 172.7 (11-11-22 @ 18:30)  Weight (kg): 73.1 (11-11-22 @ 18:30)  BMI (kg/m2): 24.5 (11-11-22 @ 18:30)  BSA (m2): 1.86 (11-11-22 @ 18:30)      11-11-22 @ 07:01  -  11-12-22 @ 07:00  --------------------------------------------------------  IN: 1510 mL / OUT: 1145 mL / NET: 365 mL    11-12-22 @ 07:01  -  11-12-22 @ 17:23  --------------------------------------------------------  IN: 419 mL / OUT: 950 mL / NET: -531 mL      Physical Exam:  	Gen: swollen face, delirious  	Pulm: CTA B/L  	CV: RRR, S1S2; no rub  	UE: Warm, full ROM  	LE: Warm; no edema  	Neuro:  obtunded, delirious  	Vascular access:    LABS/STUDIES  --------------------------------------------------------------------------------              9.6    5.76  >-----------<  147      [11-11-22 @ 13:40]              27.6     127  |  90  |  103  ----------------------------<  139      [11-12-22 @ 11:38]  5.4   |  18  |  12.5        Ca     8.7     [11-12-22 @ 11:38]      Mg     2.3     [11-12-22 @ 05:52]      Phos  7.2     [11-12-22 @ 05:52]    TPro  6.4  /  Alb  3.6  /  TBili  0.4  /  DBili  x   /  AST  86  /  ALT  50  /  AlkPhos  137  [11-12-22 @ 11:38]    PT/INR: PT 10.40, INR 0.91       [11-12-22 @ 11:38]  PTT: 37.3       [11-12-22 @ 11:38]    Troponin 0.02      [11-11-22 @ 13:40]    Creatinine Trend:  SCr 12.5 [11-12 @ 11:38]  SCr 13.2 [11-12 @ 05:52]  SCr 13.0 [11-11 @ 19:35]  SCr 13.5 [11-11 @ 13:40]    Urinalysis - [11-11-22 @ 15:40]      Color Yellow / Appearance Clear / SG >=1.030 / pH 6.0      Gluc Negative / Ketone Negative  / Bili Negative / Urobili 0.2       Blood Moderate / Protein >=300 / Leuk Est Negative / Nitrite Negative      RBC 6-10 / WBC 3-5 / Hyaline  / Gran 1-2 / Sq Epi  / Non Sq Epi Few / Bacteria Moderate      PTH -- (Ca 8.3)      [03-16-22 @ 11:45]   311  TSH 3.51      [03-15-22 @ 12:11]    HBsAb Nonreact      [03-15-22 @ 19:10]  HBsAg Nonreact      [03-15-22 @ 19:10]  HBcAb Nonreact      [03-15-22 @ 19:10]    LYNN: titer Negative, pattern --      [03-16-22 @ 11:45]  C3 Complement 95      [03-16-22 @ 11:45]  C4 Complement 30      [03-16-22 @ 11:45]  ANCA: cANCA Negative, pANCA Negative, atypical ANCA Indeterminate Method interference due to LYNN Fluorescence      [03-16-22 @ 11:45]  anti-GBM 3      [03-16-22 @ 12:01]  Immunofixation Serum:   No Monoclonal Band Identified    Reference Range: None Detected      [03-16-22 @ 11:45]  SPEP Interpretation: Normal Electrophoresis Pattern      [03-16-22 @ 11:45]  Immunofixation Urine: Reference Range: None Detected      [03-16-22 @ 19:30]  UPEP Interpretation: Mild Selective (Glomerular) Proteinuria      [03-16-22 @ 19:30]

## 2022-11-12 NOTE — PROGRESS NOTE ADULT - SUBJECTIVE AND OBJECTIVE BOX
Patient is a 57y old  Male who presents with a chief complaint of acute on chronic renal failure w/ severe electrolyte imbalance (12 Nov 2022 07:25)        Over Night Events:        ROS:     All ROS are negative except HPI         PHYSICAL EXAM    ICU Vital Signs Last 24 Hrs  T(C): 36 (12 Nov 2022 11:02), Max: 36 (12 Nov 2022 11:02)  T(F): 96.8 (12 Nov 2022 11:02), Max: 96.8 (12 Nov 2022 11:02)  HR: 84 (12 Nov 2022 11:00) (61 - 84)  BP: 133/94 (12 Nov 2022 11:00) (73/52 - 133/94)  BP(mean): 108 (12 Nov 2022 11:00) (59 - 108)  ABP: --  ABP(mean): --  RR: 20 (12 Nov 2022 11:02) (14 - 30)  SpO2: 100% (12 Nov 2022 11:00) (94% - 100%)    O2 Parameters below as of 12 Nov 2022 08:00  Patient On (Oxygen Delivery Method): mask, Venturi    O2 Concentration (%): 50        CONSTITUTIONAL:  ill appearing  In NAD    ENT:   Airway patent,   Mouth with normal mucosa.   No thrush  on venti mask 50%      EYES:   Pupils equal,   Round and reactive to light.    CARDIAC:   Normal rate,   Regular rhythm.    No edema    RESPIRATORY:   No wheezing  Bilateral BS  Normal chest expansion  Not tachypneic,  No use of accessory muscles    GASTROINTESTINAL:  Abdomen soft,   Non-tender,   No guarding,     MUSCULOSKELETAL:   Range of motion is not limited,  No clubbing, cyanosis    NEUROLOGICAL:   confused  not following commands    SKIN:   LE dermatitis      11-11-22 @ 07:01  -  11-12-22 @ 07:00  --------------------------------------------------------  IN:    Dexmedetomidine: 60 mL    dextrose 5% + sodium chloride 0.45% w/ Additives: 1000 mL    Sodium Bicarbonate: 450 mL  Total IN: 1510 mL    OUT:    Indwelling Catheter - Urethral (mL): 1145 mL  Total OUT: 1145 mL    Total NET: 365 mL      11-12-22 @ 07:01  -  11-12-22 @ 13:19  --------------------------------------------------------  IN:    Dexmedetomidine: 13 mL  Total IN: 13 mL    OUT:    Indwelling Catheter - Urethral (mL): 375 mL  Total OUT: 375 mL    Total NET: -362 mL          LABS:                            9.6    5.76  )-----------( 147      ( 11 Nov 2022 13:40 )             27.6                                               11-12    127<L>  |  90<L>  |  103<HH>  ----------------------------<  139<H>  5.4<H>   |  18  |  12.5<HH>    Ca    8.7      12 Nov 2022 11:38  Phos  7.2     11-12  Mg     2.3     11-12    TPro  6.4  /  Alb  3.6  /  TBili  0.4  /  DBili  x   /  AST  86<H>  /  ALT  50<H>  /  AlkPhos  137<H>  11-12      PT/INR - ( 12 Nov 2022 11:38 )   PT: 10.40 sec;   INR: 0.91 ratio         PTT - ( 12 Nov 2022 11:38 )  PTT:37.3 sec                                       Urinalysis Basic - ( 11 Nov 2022 15:40 )    Color: Yellow / Appearance: Clear / SG: >=1.030 / pH: x  Gluc: x / Ketone: Negative  / Bili: Negative / Urobili: 0.2 mg/dL   Blood: x / Protein: >=300 mg/dL / Nitrite: Negative   Leuk Esterase: Negative / RBC: 6-10 /HPF / WBC 3-5 /HPF   Sq Epi: x / Non Sq Epi: Few /HPF / Bacteria: Moderate        CARDIAC MARKERS ( 11 Nov 2022 13:40 )  x     / 0.02 ng/mL / x     / x     / x                                                LIVER FUNCTIONS - ( 12 Nov 2022 11:38 )  Alb: 3.6 g/dL / Pro: 6.4 g/dL / ALK PHOS: 137 U/L / ALT: 50 U/L / AST: 86 U/L / GGT: x                                                                                                                                   ABG - ( 11 Nov 2022 19:18 )  pH, Arterial: 7.30  pH, Blood: x     /  pCO2: 30    /  pO2: 101   / HCO3: 15    / Base Excess: -10.5 /  SaO2: 99.2                MEDICATIONS  (STANDING):  chlorhexidine 2% Cloths 1 Application(s) Topical <User Schedule>  dexMEDEtomidine Infusion 0.2 MICROgram(s)/kG/Hr (3.66 mL/Hr) IV Continuous <Continuous>  dextrose 5%. 1000 milliLiter(s) (100 mL/Hr) IV Continuous <Continuous>  dextrose 5%. 1000 milliLiter(s) (50 mL/Hr) IV Continuous <Continuous>  dextrose 50% Injectable 25 Gram(s) IV Push once  dextrose 50% Injectable 12.5 Gram(s) IV Push once  dextrose 50% Injectable 25 Gram(s) IV Push once  folic acid 1 milliGRAM(s) Oral daily  glucagon  Injectable 1 milliGRAM(s) IntraMuscular once  heparin   Injectable 5000 Unit(s) SubCutaneous every 8 hours  insulin lispro (ADMELOG) corrective regimen sliding scale   SubCutaneous three times a day before meals  LORazepam   Injectable 2 milliGRAM(s) IV Push once  LORazepam   Injectable   IV Push   LORazepam   Injectable 2 milliGRAM(s) IV Push every 4 hours  LORazepam   Injectable 1.5 milliGRAM(s) IV Push every 4 hours  multivitamin 1 Tablet(s) Oral daily  NIFEdipine XL 60 milliGRAM(s) Oral daily  ondansetron Injectable 4 milliGRAM(s) IV Push two times a day  pantoprazole    Tablet 40 milliGRAM(s) Oral before breakfast  thiamine Injectable 100 milliGRAM(s) IntraMuscular once    MEDICATIONS  (PRN):  dextrose Oral Gel 15 Gram(s) Oral once PRN Blood Glucose LESS THAN 70 milliGRAM(s)/deciliter      New X-rays reviewed:                                                                                  ECHO    CXR interpreted by me:       Patient is a 57y old  Male who presents with a chief complaint of acute on chronic renal failure w/ severe electrolyte imbalance (12 Nov 2022 07:25)        Over Night Events:  aggitated overnight  confused      ROS:     All ROS are negative except HPI         PHYSICAL EXAM    ICU Vital Signs Last 24 Hrs  T(C): 36 (12 Nov 2022 11:02), Max: 36 (12 Nov 2022 11:02)  T(F): 96.8 (12 Nov 2022 11:02), Max: 96.8 (12 Nov 2022 11:02)  HR: 84 (12 Nov 2022 11:00) (61 - 84)  BP: 133/94 (12 Nov 2022 11:00) (73/52 - 133/94)  BP(mean): 108 (12 Nov 2022 11:00) (59 - 108)  ABP: --  ABP(mean): --  RR: 20 (12 Nov 2022 11:02) (14 - 30)  SpO2: 100% (12 Nov 2022 11:00) (94% - 100%)    O2 Parameters below as of 12 Nov 2022 08:00  Patient On (Oxygen Delivery Method): mask, Venturi    O2 Concentration (%): 50        CONSTITUTIONAL:  ill appearing  In NAD    ENT:   Airway patent,   Mouth with normal mucosa.   No thrush  on venti mask 50%      EYES:   Pupils equal,   Round and reactive to light.    CARDIAC:   Normal rate,   Regular rhythm.    No edema    RESPIRATORY:   No wheezing  Bilateral BS  Normal chest expansion  Not tachypneic,  No use of accessory muscles    GASTROINTESTINAL:  Abdomen soft,   Non-tender,   No guarding,     MUSCULOSKELETAL:   Range of motion is not limited,  No clubbing, cyanosis    NEUROLOGICAL:   confused  not following commands    SKIN:   LE dermatitis      11-11-22 @ 07:01  -  11-12-22 @ 07:00  --------------------------------------------------------  IN:    Dexmedetomidine: 60 mL    dextrose 5% + sodium chloride 0.45% w/ Additives: 1000 mL    Sodium Bicarbonate: 450 mL  Total IN: 1510 mL    OUT:    Indwelling Catheter - Urethral (mL): 1145 mL  Total OUT: 1145 mL    Total NET: 365 mL      11-12-22 @ 07:01 - 11-12-22 @ 13:19  --------------------------------------------------------  IN:    Dexmedetomidine: 13 mL  Total IN: 13 mL    OUT:    Indwelling Catheter - Urethral (mL): 375 mL  Total OUT: 375 mL    Total NET: -362 mL          LABS:                            9.6    5.76  )-----------( 147      ( 11 Nov 2022 13:40 )             27.6                                               11-12    127<L>  |  90<L>  |  103<HH>  ----------------------------<  139<H>  5.4<H>   |  18  |  12.5<HH>    Ca    8.7      12 Nov 2022 11:38  Phos  7.2     11-12  Mg     2.3     11-12    TPro  6.4  /  Alb  3.6  /  TBili  0.4  /  DBili  x   /  AST  86<H>  /  ALT  50<H>  /  AlkPhos  137<H>  11-12      PT/INR - ( 12 Nov 2022 11:38 )   PT: 10.40 sec;   INR: 0.91 ratio         PTT - ( 12 Nov 2022 11:38 )  PTT:37.3 sec                                       Urinalysis Basic - ( 11 Nov 2022 15:40 )    Color: Yellow / Appearance: Clear / SG: >=1.030 / pH: x  Gluc: x / Ketone: Negative  / Bili: Negative / Urobili: 0.2 mg/dL   Blood: x / Protein: >=300 mg/dL / Nitrite: Negative   Leuk Esterase: Negative / RBC: 6-10 /HPF / WBC 3-5 /HPF   Sq Epi: x / Non Sq Epi: Few /HPF / Bacteria: Moderate        CARDIAC MARKERS ( 11 Nov 2022 13:40 )  x     / 0.02 ng/mL / x     / x     / x                                                LIVER FUNCTIONS - ( 12 Nov 2022 11:38 )  Alb: 3.6 g/dL / Pro: 6.4 g/dL / ALK PHOS: 137 U/L / ALT: 50 U/L / AST: 86 U/L / GGT: x                                                                                                                                   ABG - ( 11 Nov 2022 19:18 )  pH, Arterial: 7.30  pH, Blood: x     /  pCO2: 30    /  pO2: 101   / HCO3: 15    / Base Excess: -10.5 /  SaO2: 99.2                MEDICATIONS  (STANDING):  chlorhexidine 2% Cloths 1 Application(s) Topical <User Schedule>  dexMEDEtomidine Infusion 0.2 MICROgram(s)/kG/Hr (3.66 mL/Hr) IV Continuous <Continuous>  dextrose 5%. 1000 milliLiter(s) (100 mL/Hr) IV Continuous <Continuous>  dextrose 5%. 1000 milliLiter(s) (50 mL/Hr) IV Continuous <Continuous>  dextrose 50% Injectable 25 Gram(s) IV Push once  dextrose 50% Injectable 12.5 Gram(s) IV Push once  dextrose 50% Injectable 25 Gram(s) IV Push once  folic acid 1 milliGRAM(s) Oral daily  glucagon  Injectable 1 milliGRAM(s) IntraMuscular once  heparin   Injectable 5000 Unit(s) SubCutaneous every 8 hours  insulin lispro (ADMELOG) corrective regimen sliding scale   SubCutaneous three times a day before meals  LORazepam   Injectable 2 milliGRAM(s) IV Push once  LORazepam   Injectable   IV Push   LORazepam   Injectable 2 milliGRAM(s) IV Push every 4 hours  LORazepam   Injectable 1.5 milliGRAM(s) IV Push every 4 hours  multivitamin 1 Tablet(s) Oral daily  NIFEdipine XL 60 milliGRAM(s) Oral daily  ondansetron Injectable 4 milliGRAM(s) IV Push two times a day  pantoprazole    Tablet 40 milliGRAM(s) Oral before breakfast  thiamine Injectable 100 milliGRAM(s) IntraMuscular once    MEDICATIONS  (PRN):  dextrose Oral Gel 15 Gram(s) Oral once PRN Blood Glucose LESS THAN 70 milliGRAM(s)/deciliter      New X-rays reviewed:                                                                                  ECHO    CXR interpreted by me:

## 2022-11-12 NOTE — CONSULT NOTE ADULT - ASSESSMENT
KECIA MARTIN is a 57y man with a medical history significant for CKD and EtOH abuse who presented initially with progressive weakness, and is now in the critical care unit for severe metabolic derangements and acute on chronic renal failure.     Impression  Acute on Chronic Renal Failure  Uremia  Hyperkalemia  Severe Hyponatremia  Diabetes Mellitus  Hypertension      Plan:      CNS:  Start standing Ativan taper w/ PRN CIWA-driven boluses  Precedex gtt to control agitation.  Antipsychotics PRN  Multivitamin, Folate, and Thiamine daily    HEENT:  Oral care    CARDIOVASCULAR  Hold antihypertensives for now, borderline BP  Resume as tolerated    PULMONARY  HOB @ 45 degrees, aspiration precautions  Tolerating room air well    GASTROINTESTINAL  Feeds: NPO for now due to AMS  BM: regimen PRN    GENITOURINARY/RENAL  Monitor I&O: strict  Check chem q4-6 hours for the next 24 hours  Goal Na correction <9 per 24 hours, already at target  stop NaHCO3 gtt now to prevent overcorrection  Patient is high risk for ODS due to EtOH use, hypokalemia, severe hypoNa  Aggressively replete K, Mg  Nephrology consult, patient likely to require HD    INFECTIOUS  no acute concerns. Monitor fever, WBC.    HEMATOLOGIC  DVT ppx: heparin SQ    ENDOCRINE  Follow up FS.  Insulin protocol as needed.  BG goal 140-180    MSK/DERM  PT/OT when cooperative      CODE STATUS: FULL  DISPO: remain in ICU

## 2022-11-13 LAB
ANION GAP SERPL CALC-SCNC: 18 MMOL/L — HIGH (ref 7–14)
ANION GAP SERPL CALC-SCNC: 19 MMOL/L — HIGH (ref 7–14)
ANION GAP SERPL CALC-SCNC: 20 MMOL/L — HIGH (ref 7–14)
BUN SERPL-MCNC: 93 MG/DL — CRITICAL HIGH (ref 10–20)
BUN SERPL-MCNC: 94 MG/DL — CRITICAL HIGH (ref 10–20)
BUN SERPL-MCNC: 97 MG/DL — CRITICAL HIGH (ref 10–20)
CALCIUM SERPL-MCNC: 8.5 MG/DL — SIGNIFICANT CHANGE UP (ref 8.4–10.5)
CALCIUM SERPL-MCNC: 8.7 MG/DL — SIGNIFICANT CHANGE UP (ref 8.4–10.5)
CALCIUM SERPL-MCNC: 8.8 MG/DL — SIGNIFICANT CHANGE UP (ref 8.4–10.5)
CHLORIDE SERPL-SCNC: 86 MMOL/L — LOW (ref 98–110)
CHLORIDE SERPL-SCNC: 89 MMOL/L — LOW (ref 98–110)
CHLORIDE SERPL-SCNC: 89 MMOL/L — LOW (ref 98–110)
CO2 SERPL-SCNC: 17 MMOL/L — SIGNIFICANT CHANGE UP (ref 17–32)
CO2 SERPL-SCNC: 19 MMOL/L — SIGNIFICANT CHANGE UP (ref 17–32)
CO2 SERPL-SCNC: 19 MMOL/L — SIGNIFICANT CHANGE UP (ref 17–32)
CREAT SERPL-MCNC: 13 MG/DL — CRITICAL HIGH (ref 0.7–1.5)
CREAT SERPL-MCNC: 13 MG/DL — CRITICAL HIGH (ref 0.7–1.5)
CREAT SERPL-MCNC: 13.2 MG/DL — CRITICAL HIGH (ref 0.7–1.5)
EGFR: 4 ML/MIN/1.73M2 — LOW
GLUCOSE BLDC GLUCOMTR-MCNC: 123 MG/DL — HIGH (ref 70–99)
GLUCOSE BLDC GLUCOMTR-MCNC: 134 MG/DL — HIGH (ref 70–99)
GLUCOSE BLDC GLUCOMTR-MCNC: 142 MG/DL — HIGH (ref 70–99)
GLUCOSE BLDC GLUCOMTR-MCNC: 182 MG/DL — HIGH (ref 70–99)
GLUCOSE SERPL-MCNC: 100 MG/DL — HIGH (ref 70–99)
GLUCOSE SERPL-MCNC: 136 MG/DL — HIGH (ref 70–99)
GLUCOSE SERPL-MCNC: 158 MG/DL — HIGH (ref 70–99)
HCT VFR BLD CALC: 25.9 % — LOW (ref 42–52)
HGB BLD-MCNC: 8.9 G/DL — LOW (ref 14–18)
MCHC RBC-ENTMCNC: 30.1 PG — SIGNIFICANT CHANGE UP (ref 27–31)
MCHC RBC-ENTMCNC: 34.4 G/DL — SIGNIFICANT CHANGE UP (ref 32–37)
MCV RBC AUTO: 87.5 FL — SIGNIFICANT CHANGE UP (ref 80–94)
NRBC # BLD: 0 /100 WBCS — SIGNIFICANT CHANGE UP (ref 0–0)
PLATELET # BLD AUTO: 112 K/UL — LOW (ref 130–400)
POTASSIUM SERPL-MCNC: 4.6 MMOL/L — SIGNIFICANT CHANGE UP (ref 3.5–5)
POTASSIUM SERPL-MCNC: 4.6 MMOL/L — SIGNIFICANT CHANGE UP (ref 3.5–5)
POTASSIUM SERPL-MCNC: 4.7 MMOL/L — SIGNIFICANT CHANGE UP (ref 3.5–5)
POTASSIUM SERPL-SCNC: 4.6 MMOL/L — SIGNIFICANT CHANGE UP (ref 3.5–5)
POTASSIUM SERPL-SCNC: 4.6 MMOL/L — SIGNIFICANT CHANGE UP (ref 3.5–5)
POTASSIUM SERPL-SCNC: 4.7 MMOL/L — SIGNIFICANT CHANGE UP (ref 3.5–5)
RBC # BLD: 2.96 M/UL — LOW (ref 4.7–6.1)
RBC # FLD: 12.8 % — SIGNIFICANT CHANGE UP (ref 11.5–14.5)
SODIUM SERPL-SCNC: 124 MMOL/L — LOW (ref 135–146)
SODIUM SERPL-SCNC: 126 MMOL/L — LOW (ref 135–146)
SODIUM SERPL-SCNC: 126 MMOL/L — LOW (ref 135–146)
WBC # BLD: 3.75 K/UL — LOW (ref 4.8–10.8)
WBC # FLD AUTO: 3.75 K/UL — LOW (ref 4.8–10.8)

## 2022-11-13 PROCEDURE — 99233 SBSQ HOSP IP/OBS HIGH 50: CPT

## 2022-11-13 PROCEDURE — 99291 CRITICAL CARE FIRST HOUR: CPT

## 2022-11-13 RX ADMIN — Medication 1 MILLIGRAM(S): at 17:41

## 2022-11-13 RX ADMIN — Medication 1 MILLIGRAM(S): at 21:52

## 2022-11-13 RX ADMIN — SODIUM ZIRCONIUM CYCLOSILICATE 10 GRAM(S): 10 POWDER, FOR SUSPENSION ORAL at 21:51

## 2022-11-13 RX ADMIN — Medication 1.5 MILLIGRAM(S): at 10:38

## 2022-11-13 RX ADMIN — SODIUM ZIRCONIUM CYCLOSILICATE 10 GRAM(S): 10 POWDER, FOR SUSPENSION ORAL at 14:23

## 2022-11-13 RX ADMIN — HEPARIN SODIUM 5000 UNIT(S): 5000 INJECTION INTRAVENOUS; SUBCUTANEOUS at 21:52

## 2022-11-13 RX ADMIN — CHLORHEXIDINE GLUCONATE 1 APPLICATION(S): 213 SOLUTION TOPICAL at 07:30

## 2022-11-13 RX ADMIN — ONDANSETRON 4 MILLIGRAM(S): 8 TABLET, FILM COATED ORAL at 06:30

## 2022-11-13 RX ADMIN — HEPARIN SODIUM 5000 UNIT(S): 5000 INJECTION INTRAVENOUS; SUBCUTANEOUS at 14:15

## 2022-11-13 RX ADMIN — PANTOPRAZOLE SODIUM 40 MILLIGRAM(S): 20 TABLET, DELAYED RELEASE ORAL at 11:26

## 2022-11-13 RX ADMIN — DEXMEDETOMIDINE HYDROCHLORIDE IN 0.9% SODIUM CHLORIDE 3.66 MICROGRAM(S)/KG/HR: 4 INJECTION INTRAVENOUS at 19:57

## 2022-11-13 RX ADMIN — Medication 1.5 MILLIGRAM(S): at 06:30

## 2022-11-13 RX ADMIN — SODIUM ZIRCONIUM CYCLOSILICATE 10 GRAM(S): 10 POWDER, FOR SUSPENSION ORAL at 06:34

## 2022-11-13 RX ADMIN — Medication 1 MILLIGRAM(S): at 11:25

## 2022-11-13 RX ADMIN — Medication 1 TABLET(S): at 11:24

## 2022-11-13 RX ADMIN — Medication 1.5 MILLIGRAM(S): at 14:18

## 2022-11-13 RX ADMIN — HEPARIN SODIUM 5000 UNIT(S): 5000 INJECTION INTRAVENOUS; SUBCUTANEOUS at 06:30

## 2022-11-13 RX ADMIN — ONDANSETRON 4 MILLIGRAM(S): 8 TABLET, FILM COATED ORAL at 17:40

## 2022-11-13 NOTE — PROGRESS NOTE ADULT - ASSESSMENT
KECIA MARTIN is a 57y man with a medical history significant for CKD and EtOH abuse who presented initially with progressive weakness, and is now in the critical care unit for severe metabolic derangements and acute on chronic renal failure.     Impression  Acute on Chronic Renal Failure  Uremia  Hyperkalemia  Severe Hyponatremia  Diabetes Mellitus  Hypertension      Plan:      CNS:  Start standing Ativan taper w/ PRN CIWA-driven boluses  Precedex gtt to control agitation.  Antipsychotics PRN  Multivitamin, Folate, and Thiamine daily    HEENT:  Oral care    CARDIOVASCULAR  Hold antihypertensives for now, borderline BP  Resume as tolerated    PULMONARY  HOB @ 45 degrees, aspiration precautions  Tolerating room air well    GASTROINTESTINAL  Feeds: NPO for now due to AMS  BM: regimen PRN    GENITOURINARY/RENAL  Monitor I&O: strict  Check chem q6-8 hours for the next 24 hours  Goal Na correction <9 per 24 hours, overcorrected slightly yesterday  NaHCO3 gtt at low rate  Patient is high risk for ODS due to EtOH use, hypokalemia, severe hypoNa  Lokelma as needed  Nephrology consult, patient likely to require HD    INFECTIOUS  no acute concerns. Monitor fever, WBC.    HEMATOLOGIC  DVT ppx: heparin SQ    ENDOCRINE  Follow up FS.  Insulin protocol as needed.  BG goal 140-180    MSK/DERM  PT/OT when cooperative      CODE STATUS: FULL  DISPO: remain in ICU

## 2022-11-13 NOTE — PROGRESS NOTE ADULT - ASSESSMENT
1)  Severe non-oliguric VAMSI on CKD.  Renal function is stable and U.O. excellent    2)  Hyponatremia much improved    3)  Mild HAGMA, w/ elevated AG likely due to renal failure    4)  Etoh withdrawal sedated on Ativan protocol    Recommendations:    1)  Pt continues today to not have any indication for RRT    2)  No change in treatment from Renal standpoint    3)  If no improvement in renal function in next 24-48hrs will start RRT

## 2022-11-13 NOTE — PROGRESS NOTE ADULT - SUBJECTIVE AND OBJECTIVE BOX
[facility] FOLLOW UP NOTE  --------------------------------------------------------------------------------  Chief Complaint:    24 hour events/subjective:  Events over last 24hrs noted.  U.O. remains excellent.  Remains sedated on Precedex, Ativan (for alcohol withdrawal).  Still intermittently hypothermic        PAST HISTORY  --------------------------------------------------------------------------------  No significant changes to PMH, PSH, FHx, SHx, unless otherwise noted    ALLERGIES & MEDICATIONS  --------------------------------------------------------------------------------  Allergies    No Known Allergies    Intolerances      Standing Inpatient Medications  chlorhexidine 2% Cloths 1 Application(s) Topical <User Schedule>  dexMEDEtomidine Infusion 0.2 MICROgram(s)/kG/Hr IV Continuous <Continuous>  dextrose 5%. 1000 milliLiter(s) IV Continuous <Continuous>  dextrose 5%. 1000 milliLiter(s) IV Continuous <Continuous>  dextrose 5%. 1000 milliLiter(s) IV Continuous <Continuous>  dextrose 50% Injectable 25 Gram(s) IV Push once  dextrose 50% Injectable 12.5 Gram(s) IV Push once  dextrose 50% Injectable 25 Gram(s) IV Push once  folic acid 1 milliGRAM(s) Oral daily  glucagon  Injectable 1 milliGRAM(s) IntraMuscular once  heparin   Injectable 5000 Unit(s) SubCutaneous every 8 hours  insulin lispro (ADMELOG) corrective regimen sliding scale   SubCutaneous three times a day before meals  LORazepam   Injectable 2 milliGRAM(s) IV Push once  LORazepam   Injectable   IV Push   LORazepam   Injectable 1 milliGRAM(s) IV Push every 4 hours  LORazepam   Injectable 1.5 milliGRAM(s) IV Push every 4 hours  multivitamin 1 Tablet(s) Oral daily  NIFEdipine XL 60 milliGRAM(s) Oral daily  ondansetron Injectable 4 milliGRAM(s) IV Push two times a day  pantoprazole  Injectable 40 milliGRAM(s) IV Push daily  sodium zirconium cyclosilicate 10 Gram(s) Oral three times a day  thiamine Injectable 100 milliGRAM(s) IntraMuscular once    PRN Inpatient Medications  dextrose Oral Gel 15 Gram(s) Oral once PRN      REVIEW OF SYSTEMS  --------------------------------------------------------------------------------  Gen: No weight changes, fatigue, fevers/chills, weakness  Skin: No rashes  Head/Eyes/Ears/Mouth: No headache; Normal hearing; Normal vision w/o blurriness; No sinus pain/discomfort, sore throat  Respiratory: No dyspnea, cough, wheezing, hemoptysis  CV: No chest pain, PND, orthopnea  GI: No abdominal pain, diarrhea, constipation, nausea, vomiting, melena, hematochezia  : No increased frequency, dysuria, hematuria, nocturia  MSK: No joint pain/swelling; no back pain; no edema  Neuro: No dizziness/lightheadedness, weakness, seizures, numbness, tingling  Heme: No easy bruising or bleeding  Endo: No heat/cold intolerance  Psych: No significant nervousness, anxiety, stress, depression    All other systems were reviewed and are negative, except as noted.    VITALS/PHYSICAL EXAM  --------------------------------------------------------------------------------  T(C): 35.9 (11-13-22 @ 11:01), Max: 38.2 (11-12-22 @ 15:10)  HR: 78 (11-13-22 @ 11:00) (67 - 101)  BP: 94/54 (11-13-22 @ 11:00) (80/51 - 164/79)  RR: 12 (11-13-22 @ 11:01) (10 - 29)  SpO2: 95% (11-13-22 @ 11:00) (90% - 100%)  Wt(kg): --  Height (cm): 172.7 (11-11-22 @ 18:30)  Weight (kg): 73.1 (11-11-22 @ 18:30)  BMI (kg/m2): 24.5 (11-11-22 @ 18:30)  BSA (m2): 1.86 (11-11-22 @ 18:30)      11-12-22 @ 07:01  -  11-13-22 @ 07:00  --------------------------------------------------------  IN: 1727 mL / OUT: 2350 mL / NET: -623 mL    11-13-22 @ 07:01  -  11-13-22 @ 13:33  --------------------------------------------------------  IN: 174 mL / OUT: 600 mL / NET: -426 mL      Physical Exam:  	Gen: appears comfortable  	Pulm: CTA B/L  	CV: RRR, S1S2; no rub  	Abd: +BS, soft, nontender/nondistended  	UE: Warm, no edema  	LE: Warm; no edema  	Neuro: sedated  	Skin: Warm, without rashes  	Vascular access:    LABS/STUDIES  --------------------------------------------------------------------------------              8.9    3.75  >-----------<  112      [11-13-22 @ 05:37]              25.9     126  |  89  |  97  ----------------------------<  100      [11-13-22 @ 05:37]  4.6   |  17  |  13.2        Ca     8.5     [11-13-22 @ 05:37]      Mg     2.2     [11-13-22 @ 05:37]      Phos  8.8     [11-13-22 @ 05:37]    TPro  6.4  /  Alb  3.6  /  TBili  0.4  /  DBili  x   /  AST  93  /  ALT  56  /  AlkPhos  131  [11-12-22 @ 15:11]    PT/INR: PT 10.40, INR 0.91       [11-12-22 @ 11:38]  PTT: 37.3       [11-12-22 @ 11:38]    Troponin 0.02      [11-11-22 @ 13:40]    Creatinine Trend:  SCr 13.2 [11-13 @ 05:37]  SCr 12.7 [11-12 @ 21:39]  SCr 12.7 [11-12 @ 19:18]  SCr 13.3 [11-12 @ 15:11]  SCr 12.5 [11-12 @ 11:38]    Urinalysis - [11-11-22 @ 15:40]      Color Yellow / Appearance Clear / SG >=1.030 / pH 6.0      Gluc Negative / Ketone Negative  / Bili Negative / Urobili 0.2       Blood Moderate / Protein >=300 / Leuk Est Negative / Nitrite Negative      RBC 6-10 / WBC 3-5 / Hyaline  / Gran 1-2 / Sq Epi  / Non Sq Epi Few / Bacteria Moderate      PTH -- (Ca 8.3)      [03-16-22 @ 11:45]   311  TSH 3.51      [03-15-22 @ 12:11]

## 2022-11-13 NOTE — PROGRESS NOTE ADULT - SUBJECTIVE AND OBJECTIVE BOX
Patient is a 57y old  Male who presents with a chief complaint of acute on chronic renal failure w/ severe electrolyte imbalance (12 Nov 2022 17:22)        Over Night Events:    still severely agitated requiring precedex to keep him calm    ROS:     Unable to assess ROS: patient altered/sedated.        PHYSICAL EXAM    ICU Vital Signs Last 24 Hrs  T(C): 35.5 (13 Nov 2022 04:00), Max: 38.2 (12 Nov 2022 15:10)  T(F): 95.9 (13 Nov 2022 04:00), Max: 100.8 (12 Nov 2022 15:10)  HR: 96 (13 Nov 2022 07:00) (61 - 102)  BP: 142/72 (13 Nov 2022 07:00) (80/51 - 164/79)  BP(mean): 96 (13 Nov 2022 07:00) (61 - 110)  ABP: --  ABP(mean): --  RR: 24 (13 Nov 2022 07:00) (10 - 30)  SpO2: 98% (13 Nov 2022 07:00) (90% - 100%)    O2 Parameters below as of 13 Nov 2022 07:00  Patient On (Oxygen Delivery Method): mask, Venturi    O2 Concentration (%): 40        Constitutional: no acute distress, well nourished well developed  Neuro: moving all 4 limbs spontaneously, localizes to pain and responds to stimuli.  sedated.  HEENT: NCAT, anicteric  Neck: no visible lymphadenopathy or goiter  Pulm: no respiratory distress. clear to auscultation bilaterally  Cardiac: extremities appear pink and well-perfused.  regular rhythm and rate, no murmur detected  Abdomen: non-distended  Extremities: no peripheral edema      11-12-22 @ 07:01  -  11-13-22 @ 07:00  --------------------------------------------------------  IN:    Dexmedetomidine: 87 mL    dextrose 5%: 1150 mL    IV PiggyBack: 250 mL    Oral Fluid: 240 mL  Total IN: 1727 mL    OUT:    Indwelling Catheter - Urethral (mL): 2350 mL  Total OUT: 2350 mL    Total NET: -623 mL          LABS:                            9.6    5.76  )-----------( 147      ( 11 Nov 2022 13:40 )             27.6                                               11-12    125<L>  |  89<L>  |  103<HH>  ----------------------------<  70  5.1<H>   |  20  |  12.7<HH>    Ca    8.7      12 Nov 2022 21:39  Phos  7.2     11-12  Mg     2.3     11-12    TPro  6.4  /  Alb  3.6  /  TBili  0.4  /  DBili  x   /  AST  93<H>  /  ALT  56<H>  /  AlkPhos  131<H>  11-12      PT/INR - ( 12 Nov 2022 11:38 )   PT: 10.40 sec;   INR: 0.91 ratio         PTT - ( 12 Nov 2022 11:38 )  PTT:37.3 sec                                       Urinalysis Basic - ( 11 Nov 2022 15:40 )    Color: Yellow / Appearance: Clear / SG: >=1.030 / pH: x  Gluc: x / Ketone: Negative  / Bili: Negative / Urobili: 0.2 mg/dL   Blood: x / Protein: >=300 mg/dL / Nitrite: Negative   Leuk Esterase: Negative / RBC: 6-10 /HPF / WBC 3-5 /HPF   Sq Epi: x / Non Sq Epi: Few /HPF / Bacteria: Moderate        CARDIAC MARKERS ( 11 Nov 2022 13:40 )  x     / 0.02 ng/mL / x     / x     / x                                                LIVER FUNCTIONS - ( 12 Nov 2022 15:11 )  Alb: 3.6 g/dL / Pro: 6.4 g/dL / ALK PHOS: 131 U/L / ALT: 56 U/L / AST: 93 U/L / GGT: x                                                  Culture - Urine (collected 11 Nov 2022 15:40)  Source: Clean Catch Clean Catch (Midstream)  Final Report (12 Nov 2022 19:12):    <10,000 CFU/mL Normal Urogenital Evelin                                                                                       ABG - ( 12 Nov 2022 15:23 )  pH, Arterial: 7.37  pH, Blood: x     /  pCO2: 34    /  pO2: 77    / HCO3: 20    / Base Excess: -4.9  /  SaO2: 97.2                MEDICATIONS  (STANDING):  chlorhexidine 2% Cloths 1 Application(s) Topical <User Schedule>  dexMEDEtomidine Infusion 0.2 MICROgram(s)/kG/Hr (3.66 mL/Hr) IV Continuous <Continuous>  dextrose 5%. 1000 milliLiter(s) (100 mL/Hr) IV Continuous <Continuous>  dextrose 5%. 1000 milliLiter(s) (50 mL/Hr) IV Continuous <Continuous>  dextrose 5%. 1000 milliLiter(s) (50 mL/Hr) IV Continuous <Continuous>  dextrose 50% Injectable 25 Gram(s) IV Push once  dextrose 50% Injectable 12.5 Gram(s) IV Push once  dextrose 50% Injectable 25 Gram(s) IV Push once  folic acid 1 milliGRAM(s) Oral daily  glucagon  Injectable 1 milliGRAM(s) IntraMuscular once  heparin   Injectable 5000 Unit(s) SubCutaneous every 8 hours  insulin lispro (ADMELOG) corrective regimen sliding scale   SubCutaneous three times a day before meals  LORazepam   Injectable 2 milliGRAM(s) IV Push once  LORazepam   Injectable   IV Push   LORazepam   Injectable 1.5 milliGRAM(s) IV Push every 4 hours  LORazepam   Injectable 1 milliGRAM(s) IV Push every 4 hours  multivitamin 1 Tablet(s) Oral daily  NIFEdipine XL 60 milliGRAM(s) Oral daily  ondansetron Injectable 4 milliGRAM(s) IV Push two times a day  pantoprazole  Injectable 40 milliGRAM(s) IV Push daily  sodium zirconium cyclosilicate 10 Gram(s) Oral three times a day  thiamine Injectable 100 milliGRAM(s) IntraMuscular once    MEDICATIONS  (PRN):  dextrose Oral Gel 15 Gram(s) Oral once PRN Blood Glucose LESS THAN 70 milliGRAM(s)/deciliter      New X-rays reviewed:       ECHO reviewed    CXR interpreted by me:

## 2022-11-13 NOTE — PROGRESS NOTE ADULT - ASSESSMENT
58 y/o male with PMHx of HTN, DM, CKD4  and alcohol dependence/withdrawals presenting to ED 2 days ago with general weakness and pain for about 3 weeks and has not been taking his diabetes medications, Pt usually drinks alcohol daily, but states last time drinking was Saturday/Sunday -> admitted to unit for:    VAMSI on CKD4 and alcohol withdrawal     - continue ativan protocol   - thiamine and folate   - Preceex drip   - nephrology following - may need RRT   - DVT prophylaxis

## 2022-11-14 DIAGNOSIS — F10.239 ALCOHOL DEPENDENCE WITH WITHDRAWAL, UNSPECIFIED: ICD-10-CM

## 2022-11-14 LAB
ALBUMIN SERPL ELPH-MCNC: 3.1 G/DL — LOW (ref 3.5–5.2)
ALP SERPL-CCNC: 103 U/L — SIGNIFICANT CHANGE UP (ref 30–115)
ALT FLD-CCNC: 30 U/L — SIGNIFICANT CHANGE UP (ref 0–41)
ANION GAP SERPL CALC-SCNC: 18 MMOL/L — HIGH (ref 7–14)
AST SERPL-CCNC: 32 U/L — SIGNIFICANT CHANGE UP (ref 0–41)
BILIRUB SERPL-MCNC: 0.4 MG/DL — SIGNIFICANT CHANGE UP (ref 0.2–1.2)
BUN SERPL-MCNC: 92 MG/DL — CRITICAL HIGH (ref 10–20)
CALCIUM SERPL-MCNC: 8.7 MG/DL — SIGNIFICANT CHANGE UP (ref 8.4–10.5)
CHLORIDE SERPL-SCNC: 88 MMOL/L — LOW (ref 98–110)
CO2 SERPL-SCNC: 21 MMOL/L — SIGNIFICANT CHANGE UP (ref 17–32)
CREAT SERPL-MCNC: 12.9 MG/DL — CRITICAL HIGH (ref 0.7–1.5)
EGFR: 4 ML/MIN/1.73M2 — LOW
GLUCOSE BLDC GLUCOMTR-MCNC: 112 MG/DL — HIGH (ref 70–99)
GLUCOSE BLDC GLUCOMTR-MCNC: 127 MG/DL — HIGH (ref 70–99)
GLUCOSE BLDC GLUCOMTR-MCNC: 131 MG/DL — HIGH (ref 70–99)
GLUCOSE BLDC GLUCOMTR-MCNC: 182 MG/DL — HIGH (ref 70–99)
GLUCOSE SERPL-MCNC: 161 MG/DL — HIGH (ref 70–99)
HCT VFR BLD CALC: 27.1 % — LOW (ref 42–52)
HGB BLD-MCNC: 9.3 G/DL — LOW (ref 14–18)
MAGNESIUM SERPL-MCNC: 2.2 MG/DL — SIGNIFICANT CHANGE UP (ref 1.8–2.4)
MCHC RBC-ENTMCNC: 29.4 PG — SIGNIFICANT CHANGE UP (ref 27–31)
MCHC RBC-ENTMCNC: 34.3 G/DL — SIGNIFICANT CHANGE UP (ref 32–37)
MCV RBC AUTO: 85.8 FL — SIGNIFICANT CHANGE UP (ref 80–94)
NRBC # BLD: 0 /100 WBCS — SIGNIFICANT CHANGE UP (ref 0–0)
PLATELET # BLD AUTO: 122 K/UL — LOW (ref 130–400)
POTASSIUM SERPL-MCNC: 4.3 MMOL/L — SIGNIFICANT CHANGE UP (ref 3.5–5)
POTASSIUM SERPL-SCNC: 4.3 MMOL/L — SIGNIFICANT CHANGE UP (ref 3.5–5)
PROT SERPL-MCNC: 5.7 G/DL — LOW (ref 6–8)
RBC # BLD: 3.16 M/UL — LOW (ref 4.7–6.1)
RBC # FLD: 12.7 % — SIGNIFICANT CHANGE UP (ref 11.5–14.5)
SODIUM SERPL-SCNC: 127 MMOL/L — LOW (ref 135–146)
WBC # BLD: 3.55 K/UL — LOW (ref 4.8–10.8)
WBC # FLD AUTO: 3.55 K/UL — LOW (ref 4.8–10.8)

## 2022-11-14 PROCEDURE — 99221 1ST HOSP IP/OBS SF/LOW 40: CPT

## 2022-11-14 PROCEDURE — 99291 CRITICAL CARE FIRST HOUR: CPT

## 2022-11-14 PROCEDURE — 99233 SBSQ HOSP IP/OBS HIGH 50: CPT

## 2022-11-14 PROCEDURE — 71045 X-RAY EXAM CHEST 1 VIEW: CPT | Mod: 26

## 2022-11-14 RX ORDER — SODIUM CHLORIDE 9 MG/ML
1000 INJECTION, SOLUTION INTRAVENOUS
Refills: 0 | Status: DISCONTINUED | OUTPATIENT
Start: 2022-11-14 | End: 2022-11-14

## 2022-11-14 RX ORDER — SODIUM CHLORIDE 9 MG/ML
1000 INJECTION INTRAMUSCULAR; INTRAVENOUS; SUBCUTANEOUS
Refills: 0 | Status: DISCONTINUED | OUTPATIENT
Start: 2022-11-14 | End: 2022-11-18

## 2022-11-14 RX ORDER — THIAMINE MONONITRATE (VIT B1) 100 MG
100 TABLET ORAL DAILY
Refills: 0 | Status: DISCONTINUED | OUTPATIENT
Start: 2022-11-14 | End: 2022-11-20

## 2022-11-14 RX ADMIN — HEPARIN SODIUM 5000 UNIT(S): 5000 INJECTION INTRAVENOUS; SUBCUTANEOUS at 14:25

## 2022-11-14 RX ADMIN — ONDANSETRON 4 MILLIGRAM(S): 8 TABLET, FILM COATED ORAL at 18:15

## 2022-11-14 RX ADMIN — Medication 100 MILLIGRAM(S): at 14:25

## 2022-11-14 RX ADMIN — CHLORHEXIDINE GLUCONATE 1 APPLICATION(S): 213 SOLUTION TOPICAL at 07:48

## 2022-11-14 RX ADMIN — PANTOPRAZOLE SODIUM 40 MILLIGRAM(S): 20 TABLET, DELAYED RELEASE ORAL at 11:49

## 2022-11-14 RX ADMIN — Medication 1 MILLIGRAM(S): at 02:09

## 2022-11-14 RX ADMIN — Medication 1 MILLIGRAM(S): at 06:10

## 2022-11-14 RX ADMIN — SODIUM CHLORIDE 100 MILLILITER(S): 9 INJECTION INTRAMUSCULAR; INTRAVENOUS; SUBCUTANEOUS at 19:18

## 2022-11-14 RX ADMIN — ONDANSETRON 4 MILLIGRAM(S): 8 TABLET, FILM COATED ORAL at 05:13

## 2022-11-14 RX ADMIN — Medication 0.5 MILLIGRAM(S): at 22:20

## 2022-11-14 RX ADMIN — SODIUM ZIRCONIUM CYCLOSILICATE 10 GRAM(S): 10 POWDER, FOR SUSPENSION ORAL at 05:13

## 2022-11-14 RX ADMIN — HEPARIN SODIUM 5000 UNIT(S): 5000 INJECTION INTRAVENOUS; SUBCUTANEOUS at 05:13

## 2022-11-14 RX ADMIN — Medication 1 TABLET(S): at 11:49

## 2022-11-14 RX ADMIN — SODIUM CHLORIDE 50 MILLILITER(S): 9 INJECTION, SOLUTION INTRAVENOUS at 05:12

## 2022-11-14 RX ADMIN — Medication 1 MILLIGRAM(S): at 11:49

## 2022-11-14 RX ADMIN — HEPARIN SODIUM 5000 UNIT(S): 5000 INJECTION INTRAVENOUS; SUBCUTANEOUS at 22:18

## 2022-11-14 RX ADMIN — SODIUM CHLORIDE 100 MILLILITER(S): 9 INJECTION INTRAMUSCULAR; INTRAVENOUS; SUBCUTANEOUS at 11:50

## 2022-11-14 NOTE — CONSULT NOTE ADULT - PROBLEM SELECTOR RECOMMENDATION 9
After evaluation at this time would recommend to continue on Ativan protocol and taper off precedex drip as tolerated.  Will assess when pt is more alert and able to answer questions. Pt should be monitored for continued electrolyte abnormality and corrected as per critical care team. Consider neurology evaluation.  Pt should be on Thiamine and Folic acid. Pt will be monitored and supportive care provided.    Abdominal ultrasound AFP every 6 months for HCC screening  -EGD outpatient for esophageal varices screening   -Follow up with Private GI or our [facility] located at [address] [place] [address]. Phone Number: [phone]  -Alcohol counseling provided   CATCH team involved for aftercare and pt will follow up with aftercare

## 2022-11-14 NOTE — CONSULT NOTE ADULT - SUBJECTIVE AND OBJECTIVE BOX
Was in to see patient last 3 days and unable to assess secondary to sedation.     Pt EMR chart reviewed.  Pt sedated on Precedex IV  and Ativan protocol for alcohol withdrawal.    Pt was seen in March on consultation and minimized use as per family. Pt was using vodka 2-3 x per week at that time.      REVIEW OF SYSTEMS:        MEDICATIONS  (STANDING):  chlorhexidine 2% Cloths 1 Application(s) Topical <User Schedule>  dexMEDEtomidine Infusion 0.2 MICROgram(s)/kG/Hr (3.66 mL/Hr) IV Continuous <Continuous>  dextrose 5%. 1000 milliLiter(s) (50 mL/Hr) IV Continuous <Continuous>  dextrose 5%. 1000 milliLiter(s) (100 mL/Hr) IV Continuous <Continuous>  dextrose 5%. 1000 milliLiter(s) (50 mL/Hr) IV Continuous <Continuous>  dextrose 50% Injectable 25 Gram(s) IV Push once  dextrose 50% Injectable 12.5 Gram(s) IV Push once  dextrose 50% Injectable 25 Gram(s) IV Push once  folic acid 1 milliGRAM(s) Oral daily  glucagon  Injectable 1 milliGRAM(s) IntraMuscular once  heparin   Injectable 5000 Unit(s) SubCutaneous every 8 hours  insulin lispro (ADMELOG) corrective regimen sliding scale   SubCutaneous three times a day before meals  LORazepam   Injectable 2 milliGRAM(s) IV Push once  LORazepam   Injectable   IV Push   LORazepam   Injectable 1 milliGRAM(s) IV Push every 4 hours  LORazepam   Injectable 0.5 milliGRAM(s) IV Push every 4 hours  multivitamin 1 Tablet(s) Oral daily  NIFEdipine XL 60 milliGRAM(s) Oral daily  ondansetron Injectable 4 milliGRAM(s) IV Push two times a day  pantoprazole  Injectable 40 milliGRAM(s) IV Push daily  thiamine Injectable 100 milliGRAM(s) IntraMuscular once    MEDICATIONS  (PRN):  dextrose Oral Gel 15 Gram(s) Oral once PRN Blood Glucose LESS THAN 70 milliGRAM(s)/deciliter      Vital Signs Last 24 Hrs  T(C): 36.1 (14 Nov 2022 07:01), Max: 36.1 (14 Nov 2022 07:00)  T(F): 97 (14 Nov 2022 07:01), Max: 97 (14 Nov 2022 07:00)  HR: 91 (14 Nov 2022 10:00) (69 - 101)  BP: 114/73 (14 Nov 2022 10:00) (87/53 - 145/88)  BP(mean): 90 (14 Nov 2022 10:00) (64 - 109)  RR: 20 (14 Nov 2022 10:00) (10 - 29)  SpO2: 100% (14 Nov 2022 10:00) (95% - 100%)    Parameters below as of 14 Nov 2022 07:00  Patient On (Oxygen Delivery Method): mask, Venturi        PHYSICAL EXAM:        Neck: No LAD, No JVD  Respiratory: clear on o2 face mask  Cardiovascular: S1 and S2, RRR, no M/G/R  Gastrointestinal: BS+, soft, NT/ND  Extremities: No peripheral edema      LABS:                        9.3    3.55  )-----------( 122      ( 14 Nov 2022 06:44 )             27.1     11-14    127<L>  |  88<L>  |  92<HH>  ----------------------------<  161<H>  4.3   |  21  |  12.9<HH>    Ca    8.7      14 Nov 2022 06:44  Phos  8.8     11-13  Mg     2.2     11-14    TPro  5.7<L>  /  Alb  3.1<L>  /  TBili  0.4  /  DBili  x   /  AST  32  /  ALT  30  /  AlkPhos  103  11-14    PT/INR - ( 12 Nov 2022 11:38 )   PT: 10.40 sec;   INR: 0.91 ratio         PTT - ( 12 Nov 2022 11:38 )  PTT:37.3 sec    Drug Screen Urine:  Alcohol Level  Alcohol, Blood: <10 mg/dL (11-11-22 @ 19:35)        RADIOLOGY & ADDITIONAL STUDIES:

## 2022-11-14 NOTE — PROGRESS NOTE ADULT - ASSESSMENT
KECIA MARTIN is a 57y man with a medical history significant for CKD and EtOH abuse who presented initially with progressive weakness, and is now in the critical care unit for severe metabolic derangements and acute on chronic renal failure.     Impression  Acute on Chronic Renal Failure  Uremia  Hyperkalemia  Severe Hyponatremia  Diabetes Mellitus  Hypertension      Plan:      CNS:  standing Ativan taper w/ PRN CIWA-driven boluses  Precedex gtt to control agitation.  Antipsychotics PRN  Multivitamin, Folate, and Thiamine daily    HEENT:  Oral care    CARDIOVASCULAR  Hold antihypertensives for now, borderline BP  Resume as tolerated  keep is = os     PULMONARY  HOB @ 45 degrees, aspiration precautions  Tolerating room air well    GASTROINTESTINAL NG feed   Feeds  BM: regimen PRN    GENITOURINARY/RENAL  Monitor I&O: strict  Check chem q6-8 hours for the next 24 hours  Goal Na correction <9 per 24 hours, overcorrected slightly yesterday  NaHCO3 gtt at low rate  Patient is high risk for ODS due to EtOH use, hypokalemia, severe hypoNa  Lokelma as needed  Nephrology  follow up ,follow bun, cr     INFECTIOUS  no acute concerns. Monitor fever, WBC.    HEMATOLOGIC  DVT ppx: heparin SQ    ENDOCRINE  Follow up FS.  Insulin protocol as needed.  BG goal 140-180    MSK/DERM  PT/OT when cooperative      CODE STATUS: FULL  DISPO: remain in ICU KECIA MARTIN is a 57y man with a medical history significant for CKD and EtOH abuse who presented initially with progressive weakness, and is now in the critical care unit for severe metabolic derangements and acute on chronic renal failure.     Impression  Acute on Chronic Renal Failure  Uremia  Hyperkalemia  Severe Hyponatremia  Diabetes Mellitus  Hypertension      Plan:      CNS:  standing Ativan taper w/ PRN CIWA-driven boluses  Precedex gtt to control agitation.  Antipsychotics PRN  Multivitamin, Folate, and Thiamine daily    HEENT:  Oral care    CARDIOVASCULAR  Hold antihypertensives for now, borderline BP  Resume as tolerated  keep is = os     PULMONARY  HOB @ 45 degrees, aspiration precautions  Tolerating room air well  cxr  GASTROINTESTINAL NG feed   Feeds  BM: regimen PRN    GENITOURINARY/RENAL  Monitor I&O: strict  Check chem q6-8 hours for the next 24 hours  Goal Na correction <9 per 24 hours, overcorrected slightly yesterday  Patient is high risk for ODS due to EtOH use, hypokalemia, severe hypoNa  Lokelma as needed  Nephrology  follow up ,follow bun, cr     INFECTIOUS  no acute concerns. Monitor fever, WBC.    HEMATOLOGIC  DVT ppx: heparin SQ    ENDOCRINE  Follow up FS.  Insulin protocol as needed.  BG goal 140-180    MSK/DERM  PT/OT when cooperative      CODE STATUS: FULL  DISPO: remain in ICU

## 2022-11-14 NOTE — PROGRESS NOTE ADULT - SUBJECTIVE AND OBJECTIVE BOX
Patient is a 57y old  Male who presents with a chief complaint of acute on chronic renal failure w/ severe electrolyte imbalance (14 Nov 2022 07:57)      Over Night Events:  Patient seen and examined.   no fever, no pressors     ROS:  See HPI    PHYSICAL EXAM    ICU Vital Signs Last 24 Hrs  T(C): 36.1 (14 Nov 2022 07:01), Max: 36.1 (14 Nov 2022 07:00)  T(F): 97 (14 Nov 2022 07:01), Max: 97 (14 Nov 2022 07:00)  HR: 75 (14 Nov 2022 07:00) (69 - 101)  BP: 96/58 (14 Nov 2022 07:00) (87/53 - 145/88)  BP(mean): 72 (14 Nov 2022 07:00) (64 - 109)  ABP: --  ABP(mean): --  RR: 22 (14 Nov 2022 07:01) (10 - 29)  SpO2: 97% (14 Nov 2022 07:00) (95% - 100%)    O2 Parameters below as of 14 Nov 2022 07:00  Patient On (Oxygen Delivery Method): mask, Venturi            General:  HEENT:        pale         Lymph Nodes: NO cervical LN   Lungs: Bilateral BS  Cardiovascular: Regular   Abdomen: Soft, ascitis ??  Extremities: No clubbing   Skin: warm   Neurological:   Musculoskeletal: move all ext     I&O's Detail    13 Nov 2022 07:01  -  14 Nov 2022 07:00  --------------------------------------------------------  IN:    Dexmedetomidine: 188 mL    dextrose 5%: 1200 mL    Oral Fluid: 120 mL  Total IN: 1508 mL    OUT:    Indwelling Catheter - Urethral (mL): 3155 mL  Total OUT: 3155 mL    Total NET: -1647 mL      14 Nov 2022 07:01  -  14 Nov 2022 08:11  --------------------------------------------------------  IN:    Dexmedetomidine: 16 mL    dextrose 5%: 100 mL  Total IN: 116 mL    OUT:    Indwelling Catheter - Urethral (mL): 100 mL  Total OUT: 100 mL    Total NET: 16 mL          LABS:                          9.3    3.55  )-----------( 122      ( 14 Nov 2022 06:44 )             27.1         14 Nov 2022 06:44    127    |  88     |  x      ----------------------------<  161    4.3     |  21     |  x        Ca    8.7        14 Nov 2022 06:44  Phos  8.8       13 Nov 2022 05:37  Mg     2.2       14 Nov 2022 06:44    TPro  5.7    /  Alb  3.1    /  TBili  0.4    /  DBili  x      /  AST  32     /  ALT  30     /  AlkPhos  103    14 Nov 2022 06:44  Amylase x     lipase x                                                 PT/INR - ( 12 Nov 2022 11:38 )   PT: 10.40 sec;   INR: 0.91 ratio         PTT - ( 12 Nov 2022 11:38 )  PTT:37.3 sec                                                                                                   Culture - Urine (collected 11 Nov 2022 15:40)  Source: Clean Catch Clean Catch (Midstream)  Final Report (12 Nov 2022 19:12):    <10,000 CFU/mL Normal Urogenital Evelin                                                                                       ABG - ( 12 Nov 2022 15:23 )  pH, Arterial: 7.37  pH, Blood: x     /  pCO2: 34    /  pO2: 77    / HCO3: 20    / Base Excess: -4.9  /  SaO2: 97.2                MEDICATIONS  (STANDING):  chlorhexidine 2% Cloths 1 Application(s) Topical <User Schedule>  dexMEDEtomidine Infusion 0.2 MICROgram(s)/kG/Hr (3.66 mL/Hr) IV Continuous <Continuous>  dextrose 5%. 1000 milliLiter(s) (50 mL/Hr) IV Continuous <Continuous>  dextrose 5%. 1000 milliLiter(s) (100 mL/Hr) IV Continuous <Continuous>  dextrose 5%. 1000 milliLiter(s) (50 mL/Hr) IV Continuous <Continuous>  dextrose 50% Injectable 25 Gram(s) IV Push once  dextrose 50% Injectable 12.5 Gram(s) IV Push once  dextrose 50% Injectable 25 Gram(s) IV Push once  folic acid 1 milliGRAM(s) Oral daily  glucagon  Injectable 1 milliGRAM(s) IntraMuscular once  heparin   Injectable 5000 Unit(s) SubCutaneous every 8 hours  insulin lispro (ADMELOG) corrective regimen sliding scale   SubCutaneous three times a day before meals  LORazepam   Injectable 2 milliGRAM(s) IV Push once  LORazepam   Injectable   IV Push   LORazepam   Injectable 1 milliGRAM(s) IV Push every 4 hours  LORazepam   Injectable 0.5 milliGRAM(s) IV Push every 4 hours  multivitamin 1 Tablet(s) Oral daily  NIFEdipine XL 60 milliGRAM(s) Oral daily  ondansetron Injectable 4 milliGRAM(s) IV Push two times a day  pantoprazole  Injectable 40 milliGRAM(s) IV Push daily  thiamine Injectable 100 milliGRAM(s) IntraMuscular once    MEDICATIONS  (PRN):  dextrose Oral Gel 15 Gram(s) Oral once PRN Blood Glucose LESS THAN 70 milliGRAM(s)/deciliter          Xrays:  TLC:  OG:  ET tube:                                                                                       ECHO:  [facility] ICU:

## 2022-11-14 NOTE — DIETITIAN INITIAL EVALUATION ADULT - ADD RECOMMEND
consider ?phos binders with feeds   recommend thiamine (current active order only ordered 1x) + continue folic acid, MVI without minerals daily  if plan for RRT would consider vitamin b6 100mg x3-5d, 25mg/daily after, vitamin b1 100mg/daily, MVI daily, vitamin B and C complex daily    patient at HIGH nutrition risk will f/u within 2-4d  Amargo Couture   via TEAMS
Negative

## 2022-11-14 NOTE — DIETITIAN INITIAL EVALUATION ADULT - ENTERAL
EN via NGT  Nepro formula (hyperphosphatemia) initiate rate @120mL Q6hr for 4feeds  if tolerated can advance to goal @240mL Q6hr ([id number]/daily)-- 960mL formula, 1728kcal, 78g pro, 700mL free water

## 2022-11-14 NOTE — PROGRESS NOTE ADULT - SUBJECTIVE AND OBJECTIVE BOX
Nephrology progress note    Patient is seen and examined, events over the last 24 h noted .    Allergies:  No Known Allergies    Hospital Medications:   MEDICATIONS  (STANDING):  chlorhexidine 2% Cloths 1 Application(s) Topical <User Schedule>  dexMEDEtomidine Infusion 0.2 MICROgram(s)/kG/Hr (3.66 mL/Hr) IV Continuous <Continuous>  dextrose 5%. 1000 milliLiter(s) (50 mL/Hr) IV Continuous <Continuous>  dextrose 5%. 1000 milliLiter(s) (100 mL/Hr) IV Continuous <Continuous>  dextrose 5%. 1000 milliLiter(s) (50 mL/Hr) IV Continuous <Continuous>  dextrose 50% Injectable 25 Gram(s) IV Push once  dextrose 50% Injectable 12.5 Gram(s) IV Push once  dextrose 50% Injectable 25 Gram(s) IV Push once  folic acid 1 milliGRAM(s) Oral daily  glucagon  Injectable 1 milliGRAM(s) IntraMuscular once  heparin   Injectable 5000 Unit(s) SubCutaneous every 8 hours  insulin lispro (ADMELOG) corrective regimen sliding scale   SubCutaneous three times a day before meals  LORazepam   Injectable 2 milliGRAM(s) IV Push once  LORazepam   Injectable   IV Push   LORazepam   Injectable 1 milliGRAM(s) IV Push every 4 hours  LORazepam   Injectable 0.5 milliGRAM(s) IV Push every 4 hours  multivitamin 1 Tablet(s) Oral daily  NIFEdipine XL 60 milliGRAM(s) Oral daily  ondansetron Injectable 4 milliGRAM(s) IV Push two times a day  pantoprazole  Injectable 40 milliGRAM(s) IV Push daily  sodium chloride 0.9%. 1000 milliLiter(s) (100 mL/Hr) IV Continuous <Continuous>  thiamine Injectable 100 milliGRAM(s) IntraMuscular once        VITALS:  T(F): 99 (11-14-22 @ 11:00), Max: 99 (11-14-22 @ 11:00)  HR: 93 (11-14-22 @ 11:01)  BP: 137/67 (11-14-22 @ 11:01)  RR: 18 (11-14-22 @ 11:01)  SpO2: 100% (11-14-22 @ 11:01)  Wt(kg): --    11-12 @ 07:01  -  11-13 @ 07:00  --------------------------------------------------------  IN: 1727 mL / OUT: 2350 mL / NET: -623 mL    11-13 @ 07:01  -  11-14 @ 07:00  --------------------------------------------------------  IN: 1508 mL / OUT: 3155 mL / NET: -1647 mL    11-14 @ 07:01  -  11-14 @ 12:36  --------------------------------------------------------  IN: 208 mL / OUT: 700 mL / NET: -492 mL          PHYSICAL EXAM:  Constitutional: NAD  Neck: No JVD  Respiratory: CTA  Cardiovascular: S1, S2, RRR  Gastrointestinal: BS+, soft, NT/ND  Extremities: No peripheral edema  Neurological: Sedated  : Has way.   Skin: No rashes  Vascular Access:    LABS:  11-14    127<L>  |  88<L>  |  92<HH>  ----------------------------<  161<H>  4.3   |  21  |  12.9<HH>  SODIUM TREND:  Sodium 127 [11-14 @ 06:44]  Sodium 124 [11-13 @ 21:38]  Sodium 126 [11-13 @ 15:07]  Sodium 126 [11-13 @ 05:37]  Sodium 125 [11-12 @ 21:39]  Sodium 126 [11-12 @ 19:18]  Sodium 127 [11-12 @ 15:11]  Sodium 127 [11-12 @ 11:38]  Sodium 123 [11-12 @ 05:52]  Sodium 122 [11-11 @ 19:35]    Ca    8.7      14 Nov 2022 06:44  Phos  8.8     11-13  Mg     2.2     11-14    TPro  5.7<L>  /  Alb  3.1<L>  /  TBili  0.4  /  DBili      /  AST  32  /  ALT  30  /  AlkPhos  103  11-14                          9.3    3.55  )-----------( 122      ( 14 Nov 2022 06:44 )             27.1       Urine Studies:  Urinalysis Basic - ( 11 Nov 2022 15:40 )    Color: Yellow / Appearance: Clear / SG: >=1.030 / pH:   Gluc:  / Ketone: Negative  / Bili: Negative / Urobili: 0.2 mg/dL   Blood:  / Protein: >=300 mg/dL / Nitrite: Negative   Leuk Esterase: Negative / RBC: 6-10 /HPF / WBC 3-5 /HPF   Sq Epi:  / Non Sq Epi: Few /HPF / Bacteria: Moderate        RADIOLOGY & ADDITIONAL STUDIES:  < from: Xray Chest 1 View- PORTABLE-Urgent (Xray Chest 1 View- PORTABLE-Urgent .) (11.12.22 @ 15:29) >    Impression:    No radiographic evidence of acute cardiopulmonary disease.    < end of copied text >  < from: US Kidney and Bladder (11.11.22 @ 19:42) >  Right kidney: 12 cm. No hydronephrosis. 1.2 cm lower pole calculus.    Left kidney:  11 cm. No hydronephrosis. 4 mm interpolar region calculus.      IMPRESSION:    No hydronephrosis bilaterally. Bilateral nonobstructing calculi measuring   1.2 cm on the right.    < end of copied text >

## 2022-11-14 NOTE — DIETITIAN INITIAL EVALUATION ADULT - OTHER INFO
56 y/o male with PMHx of HTN, DM, CKD4  and alcohol dependence/withdrawals presenting to ED 2 days ago with general weakness and pain for about 3 weeks and has not been taking his diabetes medications, Pt usually drinks alcohol daily, but states last time drinking was Saturday/Sunday -> admitted to unit for:  VAMSI on CKD4 and alcohol withdrawal   - nephrology following - may need RRT

## 2022-11-14 NOTE — DIETITIAN INITIAL EVALUATION ADULT - PERTINENT LABORATORY DATA
11-14    127<L>  |  88<L>  |  92<HH>  ----------------------------<  161<H>  4.3   |  21  |  12.9<HH>    Ca    8.7      14 Nov 2022 06:44  Phos  8.8     11-13  Mg     2.2     11-14    TPro  5.7<L>  /  Alb  3.1<L>  /  TBili  0.4  /  DBili  x   /  AST  32  /  ALT  30  /  AlkPhos  103  11-14  POCT Blood Glucose.: 131 mg/dL (11-14-22 @ 12:21)  A1C with Estimated Average Glucose Result: 6.7 % (11-12-22 @ 05:52)  A1C with Estimated Average Glucose Result: 6.1 % (03-16-22 @ 06:09)

## 2022-11-14 NOTE — PROGRESS NOTE ADULT - ASSESSMENT
57y man with a medical history significant for CKD and EtOH abuse who presented initially with progressive weakness, and is now in the critical care unit for severe metabolic derangements and acute on chronic renal failure.     Impression  Acute on Chronic Renal Failure  Uremia  Hyperkalemia  Severe Hyponatremia  Diabetes Mellitus  Hypertension      Plan:      CNS:  Start standing Ativan taper w/ PRN CIWA-driven boluses  Precedex gtt to control agitation.  Antipsychotics PRN  Multivitamin, Folate, and Thiamine daily    HEENT:  Oral care    CARDIOVASCULAR  Hold antihypertensives for now, borderline BP  Resume as tolerated    PULMONARY  HOB @ 45 degrees, aspiration precautions  Tolerating room air well    GASTROINTESTINAL  Feeds: NPO for now due to AMS  BM: regimen PRN    GENITOURINARY/RENAL  Monitor I&O: strict  Check chem q6-8 hours for the next 24 hours  Goal Na correction <9 per 24 hours, overcorrected slightly yesterday  NaHCO3 gtt at low rate  Patient is high risk for ODS due to EtOH use, hypokalemia, severe hypoNa  Lokelma as needed  Nephrology consult, patient likely to require HD    INFECTIOUS  no acute concerns. Monitor fever, WBC.    HEMATOLOGIC  DVT ppx: heparin SQ    ENDOCRINE  Follow up FS.  Insulin protocol as needed.  BG goal 140-180    MSK/DERM  PT/OT when cooperative      CODE STATUS: FULL  DISPO: remain in ICU

## 2022-11-14 NOTE — PROGRESS NOTE ADULT - ASSESSMENT
1)  Severe non-oliguric VAMSI on CKD.  Renal function is stable and U.O. excellent  Kidney sono no hydro, CXR clear  2)  Hyponatremia much improved    3)  Mild HAGMA, w/ elevated AG likely due to renal failure    4)  Etoh withdrawal sedated on Ativan protocol    Recommendations:    1)  Pt continues today to not have any indication for RRT    2)  Start  cc/hr to achieve positive fluid balance for the next 24 hrs    3)  If no improvement in renal function in next 24-48hrs will start RRT

## 2022-11-14 NOTE — PROGRESS NOTE ADULT - SUBJECTIVE AND OBJECTIVE BOX
Patient seen and evaluated this am, sedated on ativan and Precedex drip    T(F): 97 (11-14-22 @ 07:01), Max: 97 (11-14-22 @ 07:00)  HR: 75 (11-14-22 @ 07:00)  BP: 106/68 (11-14-22 @ 09:00)  RR: 22 (11-14-22 @ 07:01)  SpO2: 97% (11-14-22 @ 07:00) (95% - 100%)    PHYSICAL EXAM:  GENERAL: NAD  HEAD:  Atraumatic, Normocephalic  EYES: EOMI, PERRLA, conjunctiva and sclera clear  NERVOUS SYSTEM:  no focal deficits   CHEST/LUNG: Clear to percussion bilaterally; No rales, rhonchi, wheezing, or rubs  HEART: Regular rate and rhythm; No murmurs, rubs, or gallops  ABDOMEN: Soft, Nontender, Nondistended; Bowel sounds present  EXTREMITIES:  2+ Peripheral Pulses, No clubbing, cyanosis, or edema    LABS  11-14    127<L>  |  88<L>  |  92<HH>  ----------------------------<  161<H>  4.3   |  21  |  12.9<HH>    Ca    8.7      14 Nov 2022 06:44  Phos  8.8     11-13  Mg     2.2     11-14    TPro  5.7<L>  /  Alb  3.1<L>  /  TBili  0.4  /  DBili  x   /  AST  32  /  ALT  30  /  AlkPhos  103  11-14                          9.3    3.55  )-----------( 122      ( 14 Nov 2022 06:44 )             27.1     PT/INR - ( 12 Nov 2022 11:38 )   PT: 10.40 sec;   INR: 0.91 ratio         PTT - ( 12 Nov 2022 11:38 )  PTT:37.3 sec    CARDIAC ENZYMES      Troponin T, Serum: 0.02 ng/mL (11-11-22 @ 13:40)      Culture Results:   <10,000 CFU/mL Normal Urogenital Evelin (11-11-22)    RADIOLOGY  < from: Xray Chest 1 View- PORTABLE-Urgent (Xray Chest 1 View- PORTABLE-Urgent .) (11.12.22 @ 15:29) >    Impression:    No radiographic evidence of acute cardiopulmonary disease.        < end of copied text >    MEDICATIONS  (STANDING):  chlorhexidine 2% Cloths 1 Application(s) Topical <User Schedule>  dexMEDEtomidine Infusion 0.2 MICROgram(s)/kG/Hr (3.66 mL/Hr) IV Continuous <Continuous>  folic acid 1 milliGRAM(s) Oral daily  glucagon  Injectable 1 milliGRAM(s) IntraMuscular once  heparin   Injectable 5000 Unit(s) SubCutaneous every 8 hours  insulin lispro (ADMELOG) corrective regimen sliding scale   SubCutaneous three times a day before meals  LORazepam   Injectable 2 milliGRAM(s) IV Push once  LORazepam   Injectable   IV Push   LORazepam   Injectable 1 milliGRAM(s) IV Push every 4 hours  LORazepam   Injectable 0.5 milliGRAM(s) IV Push every 4 hours  multivitamin 1 Tablet(s) Oral daily  NIFEdipine XL 60 milliGRAM(s) Oral daily  ondansetron Injectable 4 milliGRAM(s) IV Push two times a day  pantoprazole  Injectable 40 milliGRAM(s) IV Push daily  thiamine Injectable 100 milliGRAM(s) IntraMuscular once    MEDICATIONS  (PRN):  dextrose Oral Gel 15 Gram(s) Oral once PRN Blood Glucose LESS THAN 70 milliGRAM(s)/deciliter

## 2022-11-14 NOTE — PROGRESS NOTE ADULT - SUBJECTIVE AND OBJECTIVE BOX
Patient is a 57y old  Male who presents with a chief complaint of acute on chronic renal failure w/ severe electrolyte imbalance (13 Nov 2022 13:32)      INTERVAL HPI/OVERNIGHT EVENTS:   No overnight events   Afebrile, hemodynamically stable     ICU Vital Signs Last 24 Hrs  T(C): 36.1 (14 Nov 2022 07:01), Max: 36.1 (14 Nov 2022 07:00)  T(F): 97 (14 Nov 2022 07:01), Max: 97 (14 Nov 2022 07:00)  HR: 75 (14 Nov 2022 07:00) (69 - 101)  BP: 96/58 (14 Nov 2022 07:00) (87/53 - 145/88)  BP(mean): 72 (14 Nov 2022 07:00) (64 - 109)  ABP: --  ABP(mean): --  RR: 22 (14 Nov 2022 07:01) (10 - 29)  SpO2: 97% (14 Nov 2022 07:00) (95% - 100%)    O2 Parameters below as of 14 Nov 2022 07:00  Patient On (Oxygen Delivery Method): mask, Venturi          I&O's Summary    13 Nov 2022 07:01  -  14 Nov 2022 07:00  --------------------------------------------------------  IN: 1508 mL / OUT: 3155 mL / NET: -1647 mL    14 Nov 2022 07:01  -  14 Nov 2022 07:57  --------------------------------------------------------  IN: 0 mL / OUT: 100 mL / NET: -100 mL          LABS:                        9.3    3.55  )-----------( 122      ( 14 Nov 2022 06:44 )             27.1     11-14    127<L>  |  88<L>  |  x   ----------------------------<  161<H>  4.3   |  21  |  x     Ca    8.7      14 Nov 2022 06:44  Phos  8.8     11-13  Mg     2.2     11-14    TPro  5.7<L>  /  Alb  3.1<L>  /  TBili  0.4  /  DBili  x   /  AST  32  /  ALT  30  /  AlkPhos  103  11-14    PT/INR - ( 12 Nov 2022 11:38 )   PT: 10.40 sec;   INR: 0.91 ratio         PTT - ( 12 Nov 2022 11:38 )  PTT:37.3 sec    CAPILLARY BLOOD GLUCOSE      POCT Blood Glucose.: 182 mg/dL (14 Nov 2022 07:41)  POCT Blood Glucose.: 182 mg/dL (13 Nov 2022 23:12)  POCT Blood Glucose.: 142 mg/dL (13 Nov 2022 18:03)  POCT Blood Glucose.: 134 mg/dL (13 Nov 2022 12:03)  POCT Blood Glucose.: 123 mg/dL (13 Nov 2022 08:05)    ABG - ( 12 Nov 2022 15:23 )  pH, Arterial: 7.37  pH, Blood: x     /  pCO2: 34    /  pO2: 77    / HCO3: 20    / Base Excess: -4.9  /  SaO2: 97.2                RADIOLOGY & ADDITIONAL TESTS:    Consultant(s) Notes Reviewed:  [x ] YES  [ ] NO    MEDICATIONS  (STANDING):  chlorhexidine 2% Cloths 1 Application(s) Topical <User Schedule>  dexMEDEtomidine Infusion 0.2 MICROgram(s)/kG/Hr (3.66 mL/Hr) IV Continuous <Continuous>  dextrose 5%. 1000 milliLiter(s) (50 mL/Hr) IV Continuous <Continuous>  dextrose 5%. 1000 milliLiter(s) (100 mL/Hr) IV Continuous <Continuous>  dextrose 5%. 1000 milliLiter(s) (50 mL/Hr) IV Continuous <Continuous>  dextrose 50% Injectable 25 Gram(s) IV Push once  dextrose 50% Injectable 12.5 Gram(s) IV Push once  dextrose 50% Injectable 25 Gram(s) IV Push once  folic acid 1 milliGRAM(s) Oral daily  glucagon  Injectable 1 milliGRAM(s) IntraMuscular once  heparin   Injectable 5000 Unit(s) SubCutaneous every 8 hours  insulin lispro (ADMELOG) corrective regimen sliding scale   SubCutaneous three times a day before meals  LORazepam   Injectable 2 milliGRAM(s) IV Push once  LORazepam   Injectable   IV Push   LORazepam   Injectable 1 milliGRAM(s) IV Push every 4 hours  LORazepam   Injectable 0.5 milliGRAM(s) IV Push every 4 hours  multivitamin 1 Tablet(s) Oral daily  NIFEdipine XL 60 milliGRAM(s) Oral daily  ondansetron Injectable 4 milliGRAM(s) IV Push two times a day  pantoprazole  Injectable 40 milliGRAM(s) IV Push daily  thiamine Injectable 100 milliGRAM(s) IntraMuscular once    MEDICATIONS  (PRN):  dextrose Oral Gel 15 Gram(s) Oral once PRN Blood Glucose LESS THAN 70 milliGRAM(s)/deciliter    PHYSICAL EXAM:  GENERAL: NAD  HEAD:  Atraumatic, Normocephalic  EYES: EOMI, PERRLA, conjunctiva and sclera clear  NERVOUS SYSTEM:  no focal motor deficits   CHEST/LUNG: Clear to percussion bilaterally; No rales, rhonchi, wheezing, or rubs  HEART: Regular rate and rhythm; No murmurs, rubs, or gallops  ABDOMEN: Soft, Nontender, Nondistended; Bowel sounds present  EXTREMITIES:  2+ Peripheral Pulses, No clubbing, cyanosis, or edema    Care Discussed with Consultants/Other Providers [ x] YES  [ ] NO

## 2022-11-14 NOTE — DIETITIAN INITIAL EVALUATION ADULT - PERTINENT MEDS FT
MEDICATIONS  (STANDING):  chlorhexidine 2% Cloths 1 Application(s) Topical <User Schedule>  dexMEDEtomidine Infusion 0.2 MICROgram(s)/kG/Hr (3.66 mL/Hr) IV Continuous <Continuous>  dextrose 5%. 1000 milliLiter(s) (50 mL/Hr) IV Continuous <Continuous>  dextrose 5%. 1000 milliLiter(s) (100 mL/Hr) IV Continuous <Continuous>  dextrose 5%. 1000 milliLiter(s) (50 mL/Hr) IV Continuous <Continuous>  dextrose 50% Injectable 25 Gram(s) IV Push once  dextrose 50% Injectable 12.5 Gram(s) IV Push once  dextrose 50% Injectable 25 Gram(s) IV Push once  folic acid 1 milliGRAM(s) Oral daily  glucagon  Injectable 1 milliGRAM(s) IntraMuscular once  heparin   Injectable 5000 Unit(s) SubCutaneous every 8 hours  insulin lispro (ADMELOG) corrective regimen sliding scale   SubCutaneous three times a day before meals  LORazepam   Injectable 2 milliGRAM(s) IV Push once  LORazepam   Injectable   IV Push   LORazepam   Injectable 1 milliGRAM(s) IV Push every 4 hours  LORazepam   Injectable 0.5 milliGRAM(s) IV Push every 4 hours  multivitamin 1 Tablet(s) Oral daily  NIFEdipine XL 60 milliGRAM(s) Oral daily  ondansetron Injectable 4 milliGRAM(s) IV Push two times a day  pantoprazole  Injectable 40 milliGRAM(s) IV Push daily  sodium chloride 0.9%. 1000 milliLiter(s) (100 mL/Hr) IV Continuous <Continuous>  thiamine Injectable 100 milliGRAM(s) IntraMuscular once    MEDICATIONS  (PRN):  dextrose Oral Gel 15 Gram(s) Oral once PRN Blood Glucose LESS THAN 70 milliGRAM(s)/deciliter

## 2022-11-14 NOTE — DIETITIAN INITIAL EVALUATION ADULT - CONTINUE CURRENT NUTRITION CARE PLAN
NPO, would consult SLP for evaluation considering lethargy/ pt would benefit from being followed by speech pathology/yes

## 2022-11-14 NOTE — DIETITIAN INITIAL EVALUATION ADULT - OTHER CALCULATIONS
using ABW 73.1kg with consideration for age, wt, BMI  ENERGY: 1514-1817kcal/day (MSJ x1.0-1.2)  PROTEIN: 73-80g/day (1.0-1.1g/kg)  FLUIDS: per team

## 2022-11-15 LAB
ALBUMIN SERPL ELPH-MCNC: 3.1 G/DL — LOW (ref 3.5–5.2)
ALP SERPL-CCNC: 98 U/L — SIGNIFICANT CHANGE UP (ref 30–115)
ALT FLD-CCNC: 24 U/L — SIGNIFICANT CHANGE UP (ref 0–41)
AMMONIA BLD-MCNC: 12 UMOL/L — SIGNIFICANT CHANGE UP (ref 11–55)
ANION GAP SERPL CALC-SCNC: 20 MMOL/L — HIGH (ref 7–14)
AST SERPL-CCNC: 26 U/L — SIGNIFICANT CHANGE UP (ref 0–41)
BILIRUB SERPL-MCNC: 0.4 MG/DL — SIGNIFICANT CHANGE UP (ref 0.2–1.2)
BUN SERPL-MCNC: 90 MG/DL — CRITICAL HIGH (ref 10–20)
CALCIUM SERPL-MCNC: 8.8 MG/DL — SIGNIFICANT CHANGE UP (ref 8.4–10.5)
CHLORIDE SERPL-SCNC: 95 MMOL/L — LOW (ref 98–110)
CO2 SERPL-SCNC: 22 MMOL/L — SIGNIFICANT CHANGE UP (ref 17–32)
CREAT SERPL-MCNC: 11.9 MG/DL — CRITICAL HIGH (ref 0.7–1.5)
EGFR: 4 ML/MIN/1.73M2 — LOW
GLUCOSE BLDC GLUCOMTR-MCNC: 110 MG/DL — HIGH (ref 70–99)
GLUCOSE BLDC GLUCOMTR-MCNC: 117 MG/DL — HIGH (ref 70–99)
GLUCOSE BLDC GLUCOMTR-MCNC: 137 MG/DL — HIGH (ref 70–99)
GLUCOSE BLDC GLUCOMTR-MCNC: 85 MG/DL — SIGNIFICANT CHANGE UP (ref 70–99)
GLUCOSE SERPL-MCNC: 94 MG/DL — SIGNIFICANT CHANGE UP (ref 70–99)
HCT VFR BLD CALC: 26.7 % — LOW (ref 42–52)
HGB BLD-MCNC: 9 G/DL — LOW (ref 14–18)
MAGNESIUM SERPL-MCNC: 2.1 MG/DL — SIGNIFICANT CHANGE UP (ref 1.8–2.4)
MCHC RBC-ENTMCNC: 29.8 PG — SIGNIFICANT CHANGE UP (ref 27–31)
MCHC RBC-ENTMCNC: 33.7 G/DL — SIGNIFICANT CHANGE UP (ref 32–37)
MCV RBC AUTO: 88.4 FL — SIGNIFICANT CHANGE UP (ref 80–94)
NRBC # BLD: 0 /100 WBCS — SIGNIFICANT CHANGE UP (ref 0–0)
PHOSPHATE SERPL-MCNC: 9.8 MG/DL — HIGH (ref 2.1–4.9)
PLATELET # BLD AUTO: 122 K/UL — LOW (ref 130–400)
POTASSIUM SERPL-MCNC: 3.9 MMOL/L — SIGNIFICANT CHANGE UP (ref 3.5–5)
POTASSIUM SERPL-SCNC: 3.9 MMOL/L — SIGNIFICANT CHANGE UP (ref 3.5–5)
PROT SERPL-MCNC: 5.7 G/DL — LOW (ref 6–8)
RBC # BLD: 3.02 M/UL — LOW (ref 4.7–6.1)
RBC # FLD: 12.9 % — SIGNIFICANT CHANGE UP (ref 11.5–14.5)
SODIUM SERPL-SCNC: 137 MMOL/L — SIGNIFICANT CHANGE UP (ref 135–146)
WBC # BLD: 3.37 K/UL — LOW (ref 4.8–10.8)
WBC # FLD AUTO: 3.37 K/UL — LOW (ref 4.8–10.8)

## 2022-11-15 PROCEDURE — 70450 CT HEAD/BRAIN W/O DYE: CPT | Mod: 26

## 2022-11-15 PROCEDURE — 71045 X-RAY EXAM CHEST 1 VIEW: CPT | Mod: 26

## 2022-11-15 PROCEDURE — 99231 SBSQ HOSP IP/OBS SF/LOW 25: CPT

## 2022-11-15 PROCEDURE — 99291 CRITICAL CARE FIRST HOUR: CPT

## 2022-11-15 PROCEDURE — 99233 SBSQ HOSP IP/OBS HIGH 50: CPT

## 2022-11-15 RX ADMIN — PANTOPRAZOLE SODIUM 40 MILLIGRAM(S): 20 TABLET, DELAYED RELEASE ORAL at 12:38

## 2022-11-15 RX ADMIN — DEXMEDETOMIDINE HYDROCHLORIDE IN 0.9% SODIUM CHLORIDE 3.66 MICROGRAM(S)/KG/HR: 4 INJECTION INTRAVENOUS at 12:42

## 2022-11-15 RX ADMIN — HEPARIN SODIUM 5000 UNIT(S): 5000 INJECTION INTRAVENOUS; SUBCUTANEOUS at 21:48

## 2022-11-15 RX ADMIN — DEXMEDETOMIDINE HYDROCHLORIDE IN 0.9% SODIUM CHLORIDE 3.66 MICROGRAM(S)/KG/HR: 4 INJECTION INTRAVENOUS at 19:49

## 2022-11-15 RX ADMIN — ONDANSETRON 4 MILLIGRAM(S): 8 TABLET, FILM COATED ORAL at 17:44

## 2022-11-15 RX ADMIN — Medication 0.5 MILLIGRAM(S): at 06:11

## 2022-11-15 RX ADMIN — HEPARIN SODIUM 5000 UNIT(S): 5000 INJECTION INTRAVENOUS; SUBCUTANEOUS at 14:36

## 2022-11-15 RX ADMIN — SODIUM CHLORIDE 100 MILLILITER(S): 9 INJECTION INTRAMUSCULAR; INTRAVENOUS; SUBCUTANEOUS at 12:42

## 2022-11-15 RX ADMIN — HEPARIN SODIUM 5000 UNIT(S): 5000 INJECTION INTRAVENOUS; SUBCUTANEOUS at 05:59

## 2022-11-15 RX ADMIN — ONDANSETRON 4 MILLIGRAM(S): 8 TABLET, FILM COATED ORAL at 06:00

## 2022-11-15 RX ADMIN — CHLORHEXIDINE GLUCONATE 1 APPLICATION(S): 213 SOLUTION TOPICAL at 06:01

## 2022-11-15 RX ADMIN — Medication 100 MILLIGRAM(S): at 12:41

## 2022-11-15 RX ADMIN — Medication 0.5 MILLIGRAM(S): at 02:05

## 2022-11-15 RX ADMIN — Medication 1 MILLIGRAM(S): at 12:38

## 2022-11-15 RX ADMIN — Medication 0.5 MILLIGRAM(S): at 10:44

## 2022-11-15 RX ADMIN — Medication 0.5 MILLIGRAM(S): at 14:44

## 2022-11-15 RX ADMIN — SODIUM CHLORIDE 100 MILLILITER(S): 9 INJECTION INTRAMUSCULAR; INTRAVENOUS; SUBCUTANEOUS at 19:49

## 2022-11-15 RX ADMIN — Medication 1 TABLET(S): at 12:38

## 2022-11-15 NOTE — PROGRESS NOTE ADULT - ASSESSMENT
KECIA MARTIN is a 57y man with a medical history significant for CKD and EtOH abuse who presented initially with progressive weakness, and is now in the critical care unit for severe metabolic derangements and acute on chronic renal failure.     Impression  metabolic encephalopathy / toxic secondary to alcohol withdrawal and uremia   Acute on Chronic Renal Failure  Uremia  Hyperkalemia  Severe Hyponatremia  Diabetes Mellitus  Hypertension      Plan:    CNS:   do ct brain   EEG  standing Ativan taper w/ PRN CIWA-driven boluses  Precedex gtt to control agitation.  Antipsychotics PRN  Multivitamin, Folate, and Thiamine daily    HEENT:  Oral care    CARDIOVASCULAR    avoid low BP   anitra map > 65  keep is = os     PULMONARY  HOB @ 45 degrees, aspiration precautions      GASTROINTESTINAL NG feed   Feeds  check LFT ammonia level     BM: regimen PRN    GENITOURINARY/RENAL:    Monitor I&O: strict    Nephrology  follow up ,follow bun, cr     INFECTIOUS  no acute concerns. Monitor fever, WBC.    HEMATOLOGIC  DVT ppx: heparin SQ    ENDOCRINE  Follow up FS.  Insulin protocol as needed.  BG goal 140-180    MSK/DERM  PT/OT when cooperative      CODE STATUS: FULL  DISPO: remain in ICU

## 2022-11-15 NOTE — PROGRESS NOTE ADULT - SUBJECTIVE AND OBJECTIVE BOX
Patient is a 57y old  Male who presents with a chief complaint of acute on chronic renal failure w/ severe electrolyte imbalance (14 Nov 2022 12:35)      Over Night Events:  Patient seen and examined.   still lethargic on precedex     ROS:  See HPI    PHYSICAL EXAM    ICU Vital Signs Last 24 Hrs  T(C): 35.6 (15 Nov 2022 07:01), Max: 37.6 (14 Nov 2022 20:00)  T(F): 96.1 (15 Nov 2022 07:01), Max: 99.7 (14 Nov 2022 20:00)  HR: 89 (15 Nov 2022 06:27) (80 - 105)  BP: 144/79 (15 Nov 2022 06:27) (98/63 - 172/89)  BP(mean): 104 (15 Nov 2022 06:27) (73 - 128)  ABP: --  ABP(mean): --  RR: 16 (15 Nov 2022 07:01) (10 - 34)  SpO2: 100% (15 Nov 2022 06:27) (91% - 100%)    O2 Parameters below as of 15 Nov 2022 06:27  Patient On (Oxygen Delivery Method): mask, Venturi    O2 Concentration (%): 40        General: lethargic more awake when precedex lowered but become agitated combative   HEENT:     reuben           Lymph Nodes: NO cervical LN   Lungs: Bilateral BS  Cardiovascular: Regular   Abdomen: Soft, Positive BS  Extremities: No clubbing   Skin: warm   Neurological: no focal   Musculoskeletal: move all ext     I&O's Detail    14 Nov 2022 07:01  -  15 Nov 2022 07:00  --------------------------------------------------------  IN:    Dexmedetomidine: 31 mL    dextrose 5%: 200 mL    Enteral Tube Flush: 300 mL    Nepro with Carb Steady: 360 mL    sodium chloride 0.9%: 1900 mL  Total IN: 2791 mL    OUT:    Indwelling Catheter - Urethral (mL): 2795 mL  Total OUT: 2795 mL    Total NET: -4 mL      15 Nov 2022 07:01  -  15 Nov 2022 08:13  --------------------------------------------------------  IN:  Total IN: 0 mL    OUT:    Indwelling Catheter - Urethral (mL): 200 mL  Total OUT: 200 mL    Total NET: -200 mL          LABS:                          9.0    3.37  )-----------( 122      ( 15 Nov 2022 05:51 )             26.7         15 Nov 2022 05:51    137    |  95     |  90     ----------------------------<  94     3.9     |  22     |  11.9     Ca    8.8        15 Nov 2022 05:51  Phos  9.8       15 Nov 2022 05:51  Mg     2.1       15 Nov 2022 05:51    TPro  5.7    /  Alb  3.1    /  TBili  0.4    /  DBili  x      /  AST  26     /  ALT  24     /  AlkPhos  98     15 Nov 2022 05:51  Amylase x     lipase x                                                                                                                                                                                                                                         MEDICATIONS  (STANDING):  chlorhexidine 2% Cloths 1 Application(s) Topical <User Schedule>  dexMEDEtomidine Infusion 0.2 MICROgram(s)/kG/Hr (3.66 mL/Hr) IV Continuous <Continuous>  dextrose 5%. 1000 milliLiter(s) (100 mL/Hr) IV Continuous <Continuous>  dextrose 5%. 1000 milliLiter(s) (50 mL/Hr) IV Continuous <Continuous>  dextrose 5%. 1000 milliLiter(s) (50 mL/Hr) IV Continuous <Continuous>  dextrose 50% Injectable 25 Gram(s) IV Push once  dextrose 50% Injectable 12.5 Gram(s) IV Push once  dextrose 50% Injectable 25 Gram(s) IV Push once  folic acid 1 milliGRAM(s) Oral daily  glucagon  Injectable 1 milliGRAM(s) IntraMuscular once  heparin   Injectable 5000 Unit(s) SubCutaneous every 8 hours  insulin lispro (ADMELOG) corrective regimen sliding scale   SubCutaneous three times a day before meals  LORazepam   Injectable 2 milliGRAM(s) IV Push once  LORazepam   Injectable   IV Push   LORazepam   Injectable 0.5 milliGRAM(s) IV Push every 4 hours  multivitamin 1 Tablet(s) Oral daily  NIFEdipine XL 60 milliGRAM(s) Oral daily  ondansetron Injectable 4 milliGRAM(s) IV Push two times a day  pantoprazole  Injectable 40 milliGRAM(s) IV Push daily  sodium chloride 0.9%. 1000 milliLiter(s) (100 mL/Hr) IV Continuous <Continuous>  thiamine Injectable 100 milliGRAM(s) IV Push daily    MEDICATIONS  (PRN):  dextrose Oral Gel 15 Gram(s) Oral once PRN Blood Glucose LESS THAN 70 milliGRAM(s)/deciliter          Xrays:  TLC:  OG:  ET tube:                                                                                    increase marking b/l    ECHO:  [facility] ICU:

## 2022-11-15 NOTE — PROGRESS NOTE ADULT - SUBJECTIVE AND OBJECTIVE BOX
Pt interviewed, examined and EMR chart reviewed.    Follow up of Alcohol Dependency. Pt is on ativan protocol and precedex drip. Pt still sedated.  Pt wife at bedside and states that her  began drinking the day he left from his last admission in April 2022. She states he tries ti hide his drinking and sneaks but is drunk everyday.    SOCIAL HISTORY:    REVIEW OF SYSTEMS:          MEDICATIONS  (STANDING):  chlorhexidine 2% Cloths 1 Application(s) Topical <User Schedule>  dexMEDEtomidine Infusion 0.2 MICROgram(s)/kG/Hr (3.66 mL/Hr) IV Continuous <Continuous>  dextrose 5%. 1000 milliLiter(s) (100 mL/Hr) IV Continuous <Continuous>  dextrose 5%. 1000 milliLiter(s) (50 mL/Hr) IV Continuous <Continuous>  dextrose 5%. 1000 milliLiter(s) (50 mL/Hr) IV Continuous <Continuous>  dextrose 50% Injectable 25 Gram(s) IV Push once  dextrose 50% Injectable 12.5 Gram(s) IV Push once  dextrose 50% Injectable 25 Gram(s) IV Push once  folic acid 1 milliGRAM(s) Oral daily  glucagon  Injectable 1 milliGRAM(s) IntraMuscular once  heparin   Injectable 5000 Unit(s) SubCutaneous every 8 hours  insulin lispro (ADMELOG) corrective regimen sliding scale   SubCutaneous three times a day before meals  LORazepam   Injectable 2 milliGRAM(s) IV Push once  LORazepam   Injectable   IV Push   LORazepam   Injectable 0.5 milliGRAM(s) IV Push every 4 hours  multivitamin 1 Tablet(s) Oral daily  NIFEdipine XL 60 milliGRAM(s) Oral daily  ondansetron Injectable 4 milliGRAM(s) IV Push two times a day  pantoprazole  Injectable 40 milliGRAM(s) IV Push daily  sodium chloride 0.9%. 1000 milliLiter(s) (100 mL/Hr) IV Continuous <Continuous>  thiamine Injectable 100 milliGRAM(s) IV Push daily    MEDICATIONS  (PRN):  dextrose Oral Gel 15 Gram(s) Oral once PRN Blood Glucose LESS THAN 70 milliGRAM(s)/deciliter      Vital Signs Last 24 Hrs  T(C): 35.6 (15 Nov 2022 07:01), Max: 37.6 (14 Nov 2022 20:00)  T(F): 96.1 (15 Nov 2022 07:01), Max: 99.7 (14 Nov 2022 20:00)  HR: 97 (15 Nov 2022 09:00) (80 - 105)  BP: 103/70 (15 Nov 2022 09:00) (98/63 - 172/89)  BP(mean): 83 (15 Nov 2022 09:00) (73 - 128)  RR: 15 (15 Nov 2022 09:00) (10 - 34)  SpO2: 100% (15 Nov 2022 09:00) (91% - 100%)    Parameters below as of 15 Nov 2022 07:18      O2 Concentration (%): 40    PHYSICAL EXAM:      LABS:                        9.0    3.37  )-----------( 122      ( 15 Nov 2022 05:51 )             26.7     11-15    137  |  95<L>  |  90<HH>  ----------------------------<  94  3.9   |  22  |  11.9<HH>    Ca    8.8      15 Nov 2022 05:51  Phos  9.8     11-15  Mg     2.1     11-15    TPro  5.7<L>  /  Alb  3.1<L>  /  TBili  0.4  /  DBili  x   /  AST  26  /  ALT  24  /  AlkPhos  98  11-15        Drug Screen Urine:  Alcohol Level        RADIOLOGY & ADDITIONAL STUDIES:

## 2022-11-15 NOTE — PROGRESS NOTE ADULT - SUBJECTIVE AND OBJECTIVE BOX
Patient is a 57y old  Male who presents with a chief complaint of acute on chronic renal failure w/ severe electrolyte imbalance (15 Nov 2022 08:13)      INTERVAL HPI/OVERNIGHT EVENTS:   No overnight events   Afebrile, hemodynamically stable     ICU Vital Signs Last 24 Hrs  T(C): 35.6 (15 Nov 2022 07:01), Max: 37.6 (14 Nov 2022 20:00)  T(F): 96.1 (15 Nov 2022 07:01), Max: 99.7 (14 Nov 2022 20:00)  HR: 97 (15 Nov 2022 09:00) (80 - 105)  BP: 103/70 (15 Nov 2022 09:00) (98/63 - 172/89)  BP(mean): 83 (15 Nov 2022 09:00) (73 - 128)  ABP: --  ABP(mean): --  RR: 15 (15 Nov 2022 09:00) (10 - 34)  SpO2: 100% (15 Nov 2022 09:00) (91% - 100%)    O2 Parameters below as of 15 Nov 2022 07:18      O2 Concentration (%): 40      I&O's Summary    14 Nov 2022 07:01  -  15 Nov 2022 07:00  --------------------------------------------------------  IN: 2791 mL / OUT: 2795 mL / NET: -4 mL    15 Nov 2022 07:01  -  15 Nov 2022 10:16  --------------------------------------------------------  IN: 0 mL / OUT: 250 mL / NET: -250 mL          LABS:                        9.0    3.37  )-----------( 122      ( 15 Nov 2022 05:51 )             26.7     11-15    137  |  95<L>  |  90<HH>  ----------------------------<  94  3.9   |  22  |  11.9<HH>    Ca    8.8      15 Nov 2022 05:51  Phos  9.8     11-15  Mg     2.1     11-15    TPro  5.7<L>  /  Alb  3.1<L>  /  TBili  0.4  /  DBili  x   /  AST  26  /  ALT  24  /  AlkPhos  98  11-15        CAPILLARY BLOOD GLUCOSE      POCT Blood Glucose.: 85 mg/dL (15 Nov 2022 05:56)  POCT Blood Glucose.: 127 mg/dL (14 Nov 2022 23:10)  POCT Blood Glucose.: 112 mg/dL (14 Nov 2022 16:41)  POCT Blood Glucose.: 131 mg/dL (14 Nov 2022 12:21)        RADIOLOGY & ADDITIONAL TESTS:    Consultant(s) Notes Reviewed:  [x ] YES  [ ] NO    MEDICATIONS  (STANDING):  chlorhexidine 2% Cloths 1 Application(s) Topical <User Schedule>  dexMEDEtomidine Infusion 0.2 MICROgram(s)/kG/Hr (3.66 mL/Hr) IV Continuous <Continuous>  dextrose 5%. 1000 milliLiter(s) (100 mL/Hr) IV Continuous <Continuous>  dextrose 5%. 1000 milliLiter(s) (50 mL/Hr) IV Continuous <Continuous>  dextrose 5%. 1000 milliLiter(s) (50 mL/Hr) IV Continuous <Continuous>  dextrose 50% Injectable 25 Gram(s) IV Push once  dextrose 50% Injectable 12.5 Gram(s) IV Push once  dextrose 50% Injectable 25 Gram(s) IV Push once  folic acid 1 milliGRAM(s) Oral daily  glucagon  Injectable 1 milliGRAM(s) IntraMuscular once  heparin   Injectable 5000 Unit(s) SubCutaneous every 8 hours  insulin lispro (ADMELOG) corrective regimen sliding scale   SubCutaneous three times a day before meals  LORazepam   Injectable 2 milliGRAM(s) IV Push once  LORazepam   Injectable   IV Push   LORazepam   Injectable 0.5 milliGRAM(s) IV Push every 4 hours  multivitamin 1 Tablet(s) Oral daily  NIFEdipine XL 60 milliGRAM(s) Oral daily  ondansetron Injectable 4 milliGRAM(s) IV Push two times a day  pantoprazole  Injectable 40 milliGRAM(s) IV Push daily  sodium chloride 0.9%. 1000 milliLiter(s) (100 mL/Hr) IV Continuous <Continuous>  thiamine Injectable 100 milliGRAM(s) IV Push daily    MEDICATIONS  (PRN):  dextrose Oral Gel 15 Gram(s) Oral once PRN Blood Glucose LESS THAN 70 milliGRAM(s)/deciliter      PHYSICAL EXAM:  GENERAL: chronically ill appearing, lethargic; appropriate understanding and speech w/ agitation  HEAD:  Atraumatic, Normocephalic  EYES: EOMI, PERRLA, conjunctiva and sclera clear  NERVOUS SYSTEM:  no focal motor deficits   CHEST/LUNG: Clear to percussion bilaterally; No rales, rhonchi, wheezing, or rubs  HEART: Regular rate and rhythm; No murmurs, rubs, or gallops  ABDOMEN: Soft, Nontender, Nondistended; Bowel sounds present  EXTREMITIES:  2+ Peripheral Pulses, No clubbing, cyanosis, or edema    Care Discussed with Consultants/Other Providers [ x] YES  [ ] NO

## 2022-11-15 NOTE — PROGRESS NOTE ADULT - ASSESSMENT
1)  Severe non-oliguric VAMSI on CKD  Good urine output  Kidney sono no hydro, CXR clear  Creatinine level is down-trending    2)  Hyponatremia improved    3)  Mild HAGMA, w/ elevated AG likely due to renal failure    4)  Etoh withdrawal sedated on Ativan protocol    Recommendations:    1)  serum creat level is downtrending    2)  no acute indication to initiate HD at this point    3)  cont iv hydration to maintain even fluid balance

## 2022-11-15 NOTE — PROGRESS NOTE ADULT - SUBJECTIVE AND OBJECTIVE BOX
Nephrology Progress Note    KECIA MARTIN  [id number]  57y  Male    S:  Patient is seen and examined, events over the last 24h noted.    O:  Allergies:  No Known Allergies    Hospital Medications:   MEDICATIONS  (STANDING):  chlorhexidine 2% Cloths 1 Application(s) Topical <User Schedule>  dexMEDEtomidine Infusion 0.2 MICROgram(s)/kG/Hr (3.66 mL/Hr) IV Continuous <Continuous>  folic acid 1 milliGRAM(s) Oral daily  heparin   Injectable 5000 Unit(s) SubCutaneous every 8 hours  insulin lispro (ADMELOG) corrective regimen sliding scale   SubCutaneous three times a day before meals  LORazepam   Injectable   IV Push   multivitamin 1 Tablet(s) Oral daily  NIFEdipine XL 60 milliGRAM(s) Oral daily  ondansetron Injectable 4 milliGRAM(s) IV Push two times a day  pantoprazole  Injectable 40 milliGRAM(s) IV Push daily  sodium chloride 0.9%. 1000 milliLiter(s) (100 mL/Hr) IV Continuous <Continuous>  thiamine Injectable 100 milliGRAM(s) IV Push daily    MEDICATIONS  (PRN):  dextrose Oral Gel 15 Gram(s) Oral once PRN Blood Glucose LESS THAN 70 milliGRAM(s)/deciliter    Home Medications:  acetaminophen 325 mg oral tablet: 2 tab(s) orally every 6 hours, As needed, Temp greater or equal to 38C (100.4F), Mild Pain (1 - 3) (25 Mar 2022 11:36)  atorvastatin 40 mg oral tablet: 1 tab(s) orally once a day (at bedtime) (25 Mar 2022 11:36)  DULoxetine 30 mg oral delayed release capsule: 1 cap(s) orally 2 times a day (25 Jul 2021 16:56)  lisinopril 20 mg oral tablet: 1 tab(s) orally once a day (25 Jul 2021 16:56)  NIFEdipine 60 mg oral tablet, extended release: 1 tab(s) orally once a day (25 Mar 2022 11:36)  pantoprazole 40 mg oral delayed release tablet: 1 tab(s) orally once a day (before a meal) (25 Mar 2022 11:36)      VITALS:  T(F): 95.4 (11-15-22 @ 11:00), Max: 99.7 (11-14-22 @ 20:00)  HR: 98 (11-15-22 @ 13:00)  BP: 116/82 (11-15-22 @ 13:00)  RR: 26 (11-15-22 @ 13:00)  SpO2: 100% (11-15-22 @ 13:00)  Wt(kg): --  I&O's Detail    14 Nov 2022 07:01  -  15 Nov 2022 07:00  --------------------------------------------------------  IN:    Dexmedetomidine: 31 mL    dextrose 5%: 200 mL    Enteral Tube Flush: 300 mL    Nepro with Carb Steady: 360 mL    sodium chloride 0.9%: 1900 mL  Total IN: 2791 mL    OUT:    Indwelling Catheter - Urethral (mL): 2795 mL  Total OUT: 2795 mL    Total NET: -4 mL      15 Nov 2022 07:01  -  15 Nov 2022 15:08  --------------------------------------------------------  IN:  Total IN: 0 mL    OUT:    Indwelling Catheter - Urethral (mL): 700 mL  Total OUT: 700 mL    Total NET: -700 mL        I&O's Summary    14 Nov 2022 07:01  -  15 Nov 2022 07:00  --------------------------------------------------------  IN: 2791 mL / OUT: 2795 mL / NET: -4 mL    15 Nov 2022 07:01  -  15 Nov 2022 15:08  --------------------------------------------------------  IN: 0 mL / OUT: 700 mL / NET: -700 mL          PHYSICAL EXAM:  Gen: NAD, lethargic, +NGT  Resp: b/l breath sounds  Card: S1/S2  Abd: soft  Extremities: no edema, scaling LEs      LABS:    11-15    137  |  95<L>  |  90<HH>  ----------------------------<  94  3.9   |  22  |  11.9<HH>    Ca    8.8      15 Nov 2022 05:51  Phos  9.8     11-15  Mg     2.1     11-15    TPro  5.7<L>  /  Alb  3.1<L>  /  TBili  0.4  /  DBili      /  AST  26  /  ALT  24  /  AlkPhos  98  11-15    Phosphorus Level, Serum: 9.8 mg/dL (11-15-22 @ 05:51)  Phosphorus Level, Serum: 8.8 mg/dL (11-13-22 @ 05:37)                          9.0    3.37  )-----------( 122      ( 15 Nov 2022 05:51 )             26.7     Mean Cell Volume: 88.4 fL (11-15-22 @ 05:51)        Urine Studies:  Urinalysis Basic - ( 11 Nov 2022 15:40 )    Color: Yellow / Appearance: Clear / SG: >=1.030 / pH:   Gluc:  / Ketone: Negative  / Bili: Negative / Urobili: 0.2 mg/dL   Blood:  / Protein: >=300 mg/dL / Nitrite: Negative   Leuk Esterase: Negative / RBC: 6-10 /HPF / WBC 3-5 /HPF   Sq Epi:  / Non Sq Epi: Few /HPF / Bacteria: Moderate          Culture Results:   <10,000 CFU/mL Normal Urogenital Evelin (11-11 @ 15:40)  Creatinine, Random Urine: 55 mg/dL (03-17 @ 18:30)    Creatinine trend:  Creatinine, Serum: 11.9 mg/dL (11-15-22 @ 05:51)  Creatinine, Serum: 12.9 mg/dL (11-14-22 @ 06:44)  Creatinine, Serum: 13.2 mg/dL (11-13-22 @ 05:37)  Creatinine, Serum: 12.7 mg/dL (11-12-22 @ 21:39)    US Kidney and Bladder:   ACC: [id number] EXAM:  US KIDNEYS AND BLADDER                          PROCEDURE DATE:  11/11/2022          INTERPRETATION:  CLINICAL INFORMATION: Hydronephrosis    COMPARISON: 3/15/2022    TECHNIQUE: Sonography of the kidneys.    FINDINGS:    Right kidney: 12 cm. No hydronephrosis. 1.2 cm lower pole calculus.    Left kidney:  11 cm. No hydronephrosis. 4 mm interpolar region calculus.      IMPRESSION:    No hydronephrosis bilaterally. Bilateral nonobstructing calculi measuring   1.2 cm on the right.    --- End of Report ---            NURYS BROWN MD; Attending Radiologist  This document has been electronically signed. Nov 11 2022  8:04PM (11-11-22 @ 19:42)

## 2022-11-16 LAB
ALBUMIN SERPL ELPH-MCNC: 3.1 G/DL — LOW (ref 3.5–5.2)
ALP SERPL-CCNC: 113 U/L — SIGNIFICANT CHANGE UP (ref 30–115)
ALT FLD-CCNC: 21 U/L — SIGNIFICANT CHANGE UP (ref 0–41)
ANION GAP SERPL CALC-SCNC: 20 MMOL/L — HIGH (ref 7–14)
AST SERPL-CCNC: 23 U/L — SIGNIFICANT CHANGE UP (ref 0–41)
BILIRUB SERPL-MCNC: 0.4 MG/DL — SIGNIFICANT CHANGE UP (ref 0.2–1.2)
BUN SERPL-MCNC: 82 MG/DL — CRITICAL HIGH (ref 10–20)
CALCIUM SERPL-MCNC: 8.9 MG/DL — SIGNIFICANT CHANGE UP (ref 8.4–10.5)
CHLORIDE SERPL-SCNC: 100 MMOL/L — SIGNIFICANT CHANGE UP (ref 98–110)
CO2 SERPL-SCNC: 20 MMOL/L — SIGNIFICANT CHANGE UP (ref 17–32)
CREAT SERPL-MCNC: 10.9 MG/DL — CRITICAL HIGH (ref 0.7–1.5)
EGFR: 5 ML/MIN/1.73M2 — LOW
GLUCOSE BLDC GLUCOMTR-MCNC: 152 MG/DL — HIGH (ref 70–99)
GLUCOSE BLDC GLUCOMTR-MCNC: 157 MG/DL — HIGH (ref 70–99)
GLUCOSE BLDC GLUCOMTR-MCNC: 171 MG/DL — HIGH (ref 70–99)
GLUCOSE BLDC GLUCOMTR-MCNC: 186 MG/DL — HIGH (ref 70–99)
GLUCOSE SERPL-MCNC: 160 MG/DL — HIGH (ref 70–99)
HCT VFR BLD CALC: 27.2 % — LOW (ref 42–52)
HGB BLD-MCNC: 9.3 G/DL — LOW (ref 14–18)
MAGNESIUM SERPL-MCNC: 1.9 MG/DL — SIGNIFICANT CHANGE UP (ref 1.8–2.4)
MCHC RBC-ENTMCNC: 30.1 PG — SIGNIFICANT CHANGE UP (ref 27–31)
MCHC RBC-ENTMCNC: 34.2 G/DL — SIGNIFICANT CHANGE UP (ref 32–37)
MCV RBC AUTO: 88 FL — SIGNIFICANT CHANGE UP (ref 80–94)
NRBC # BLD: 0 /100 WBCS — SIGNIFICANT CHANGE UP (ref 0–0)
PHOSPHATE SERPL-MCNC: 7.4 MG/DL — HIGH (ref 2.1–4.9)
PLATELET # BLD AUTO: 165 K/UL — SIGNIFICANT CHANGE UP (ref 130–400)
POTASSIUM SERPL-MCNC: 3.5 MMOL/L — SIGNIFICANT CHANGE UP (ref 3.5–5)
POTASSIUM SERPL-SCNC: 3.5 MMOL/L — SIGNIFICANT CHANGE UP (ref 3.5–5)
PROT SERPL-MCNC: 5.9 G/DL — LOW (ref 6–8)
RBC # BLD: 3.09 M/UL — LOW (ref 4.7–6.1)
RBC # FLD: 12.8 % — SIGNIFICANT CHANGE UP (ref 11.5–14.5)
SODIUM SERPL-SCNC: 140 MMOL/L — SIGNIFICANT CHANGE UP (ref 135–146)
WBC # BLD: 4.31 K/UL — LOW (ref 4.8–10.8)
WBC # FLD AUTO: 4.31 K/UL — LOW (ref 4.8–10.8)

## 2022-11-16 PROCEDURE — 71045 X-RAY EXAM CHEST 1 VIEW: CPT | Mod: 26

## 2022-11-16 PROCEDURE — 99291 CRITICAL CARE FIRST HOUR: CPT

## 2022-11-16 PROCEDURE — 99233 SBSQ HOSP IP/OBS HIGH 50: CPT

## 2022-11-16 RX ORDER — HYDRALAZINE HCL 50 MG
25 TABLET ORAL THREE TIMES A DAY
Refills: 0 | Status: DISCONTINUED | OUTPATIENT
Start: 2022-11-16 | End: 2022-12-01

## 2022-11-16 RX ADMIN — ONDANSETRON 4 MILLIGRAM(S): 8 TABLET, FILM COATED ORAL at 17:06

## 2022-11-16 RX ADMIN — Medication 2: at 06:19

## 2022-11-16 RX ADMIN — HEPARIN SODIUM 5000 UNIT(S): 5000 INJECTION INTRAVENOUS; SUBCUTANEOUS at 15:07

## 2022-11-16 RX ADMIN — Medication 0.5 MILLIGRAM(S): at 17:07

## 2022-11-16 RX ADMIN — DEXMEDETOMIDINE HYDROCHLORIDE IN 0.9% SODIUM CHLORIDE 3.66 MICROGRAM(S)/KG/HR: 4 INJECTION INTRAVENOUS at 17:20

## 2022-11-16 RX ADMIN — PANTOPRAZOLE SODIUM 40 MILLIGRAM(S): 20 TABLET, DELAYED RELEASE ORAL at 11:50

## 2022-11-16 RX ADMIN — HEPARIN SODIUM 5000 UNIT(S): 5000 INJECTION INTRAVENOUS; SUBCUTANEOUS at 22:01

## 2022-11-16 RX ADMIN — Medication 100 MILLIGRAM(S): at 11:49

## 2022-11-16 RX ADMIN — DEXMEDETOMIDINE HYDROCHLORIDE IN 0.9% SODIUM CHLORIDE 3.66 MICROGRAM(S)/KG/HR: 4 INJECTION INTRAVENOUS at 08:33

## 2022-11-16 RX ADMIN — Medication 1 TABLET(S): at 11:49

## 2022-11-16 RX ADMIN — CHLORHEXIDINE GLUCONATE 1 APPLICATION(S): 213 SOLUTION TOPICAL at 06:19

## 2022-11-16 RX ADMIN — DEXMEDETOMIDINE HYDROCHLORIDE IN 0.9% SODIUM CHLORIDE 3.66 MICROGRAM(S)/KG/HR: 4 INJECTION INTRAVENOUS at 20:00

## 2022-11-16 RX ADMIN — Medication 0.5 MILLIGRAM(S): at 06:17

## 2022-11-16 RX ADMIN — Medication 2: at 17:05

## 2022-11-16 RX ADMIN — HEPARIN SODIUM 5000 UNIT(S): 5000 INJECTION INTRAVENOUS; SUBCUTANEOUS at 06:18

## 2022-11-16 RX ADMIN — Medication 1 MILLIGRAM(S): at 11:49

## 2022-11-16 RX ADMIN — Medication 2: at 11:51

## 2022-11-16 RX ADMIN — Medication 25 MILLIGRAM(S): at 22:01

## 2022-11-16 RX ADMIN — ONDANSETRON 4 MILLIGRAM(S): 8 TABLET, FILM COATED ORAL at 06:20

## 2022-11-16 NOTE — PROGRESS NOTE ADULT - ASSESSMENT
KECIA MARTIN is a 57y man with a medical history significant for CKD and EtOH abuse who presented initially with progressive weakness, and is now in the critical care unit for severe metabolic derangements and acute on chronic renal failure.     Impression  metabolic encephalopathy / toxic secondary to alcohol withdrawal and uremia   Acute on Chronic Renal Failure  Uremia  Hyperkalemia  Severe Hyponatremia  Diabetes Mellitus  Hypertension      Plan:      CNS:  EEG follow result    Ativan taper w/ PRN CIWA-driven boluses  Precedex gtt to control agitation.  Antipsychotics PRN  Multivitamin, Folate, and Thiamine daily    HEENT:  Oral care    CARDIOVASCULAR    avoid low BP   anitra map > 65  keep is = os     PULMONARY  HOB @ 45 degrees, aspiration precautions      GASTROINTESTINAL NG feed   Feeds  check LFT ammonia level     BM: regimen PRN    GENITOURINARY/RENAL:    Monitor I&O: strict    Nephrology  follow up ,follow bun, cr     INFECTIOUS  no acute concerns. Monitor fever, WBC.    HEMATOLOGIC  DVT ppx: heparin SQ    ENDOCRINE  Follow up FS.  Insulin protocol as needed.  BG goal 140-180    MSK/DERM  PT/OT when cooperative      CODE STATUS: FULL  DISPO: remain in ICU

## 2022-11-16 NOTE — PROGRESS NOTE ADULT - ASSESSMENT
1)  Severe VAMSI on CKD.  Renal function improving nicely over last few days, and never required RRT    2)  HTN adequately controlled    3)  Alcohol intoxication in pt w/ alcohol abuse syndrome, remains on Lorazepam for DT treatment/prevention    Recommendations:    1)  Continues to not require HD    2)  No change in treatment from Renal function    3)  Will follow renal function, electrolytes daily

## 2022-11-16 NOTE — PROGRESS NOTE ADULT - SUBJECTIVE AND OBJECTIVE BOX
Patient is a 57y old  Male who presents with a chief complaint of acute on chronic renal failure w/ severe electrolyte imbalance (15 Nov 2022 15:07)      Over Night Events:  Patient seen and examined.   still lethargic on precedex     ROS:  See HPI    PHYSICAL EXAM    ICU Vital Signs Last 24 Hrs  T(C): 37.5 (16 Nov 2022 07:20), Max: 37.6 (16 Nov 2022 06:00)  T(F): 99.5 (16 Nov 2022 07:20), Max: 99.7 (16 Nov 2022 06:00)  HR: 106 (16 Nov 2022 06:00) (79 - 110)  BP: 174/97 (16 Nov 2022 05:00) (93/53 - 174/97)  BP(mean): 128 (16 Nov 2022 05:00) (68 - 128)  ABP: --  ABP(mean): --  RR: 28 (16 Nov 2022 06:00) (15 - 35)  SpO2: 96% (16 Nov 2022 06:00) (94% - 100%)    O2 Parameters below as of 16 Nov 2022 06:00  Patient On (Oxygen Delivery Method): room air            General:lethargic   HEENT:  pale               Lymph Nodes: NO cervical LN   Lungs: Bilateral BS  Cardiovascular: Regular   Abdomen: Soft, Positive BS  Extremities: No clubbing   Skin: warm   Neurological: no focal deficit   Musculoskeletal: move all ext     I&O's Detail    15 Nov 2022 07:01  -  16 Nov 2022 07:00  --------------------------------------------------------  IN:    Dexmedetomidine: 64 mL    Enteral Tube Flush: 100 mL    Nepro with Carb Steady: 240 mL    sodium chloride 0.9%: 2500 mL  Total IN: 2904 mL    OUT:    Indwelling Catheter - Urethral (mL): 2560 mL  Total OUT: 2560 mL    Total NET: 344 mL          LABS:                          9.3    4.31  )-----------( 165      ( 16 Nov 2022 05:24 )             27.2         16 Nov 2022 05:24    140    |  100    |  82     ----------------------------<  160    3.5     |  20     |  10.9     Ca    8.9        16 Nov 2022 05:24  Phos  7.4       16 Nov 2022 05:24  Mg     1.9       16 Nov 2022 05:24    TPro  5.9    /  Alb  3.1    /  TBili  0.4    /  DBili  x      /  AST  23     /  ALT  21     /  AlkPhos  113    16 Nov 2022 05:24  Amylase x     lipase x                                                                                                                                                                                                                                         MEDICATIONS  (STANDING):  chlorhexidine 2% Cloths 1 Application(s) Topical <User Schedule>  dexMEDEtomidine Infusion 0.2 MICROgram(s)/kG/Hr (3.66 mL/Hr) IV Continuous <Continuous>  dextrose 5%. 1000 milliLiter(s) (50 mL/Hr) IV Continuous <Continuous>  dextrose 5%. 1000 milliLiter(s) (100 mL/Hr) IV Continuous <Continuous>  dextrose 5%. 1000 milliLiter(s) (50 mL/Hr) IV Continuous <Continuous>  dextrose 50% Injectable 25 Gram(s) IV Push once  dextrose 50% Injectable 12.5 Gram(s) IV Push once  dextrose 50% Injectable 25 Gram(s) IV Push once  folic acid 1 milliGRAM(s) Oral daily  glucagon  Injectable 1 milliGRAM(s) IntraMuscular once  heparin   Injectable 5000 Unit(s) SubCutaneous every 8 hours  insulin lispro (ADMELOG) corrective regimen sliding scale   SubCutaneous three times a day before meals  LORazepam   Injectable 2 milliGRAM(s) IV Push once  LORazepam   Injectable   IV Push   LORazepam   Injectable 0.5 milliGRAM(s) IV Push every 12 hours  multivitamin 1 Tablet(s) Oral daily  NIFEdipine XL 60 milliGRAM(s) Oral daily  ondansetron Injectable 4 milliGRAM(s) IV Push two times a day  pantoprazole  Injectable 40 milliGRAM(s) IV Push daily  sodium chloride 0.9%. 1000 milliLiter(s) (100 mL/Hr) IV Continuous <Continuous>  thiamine Injectable 100 milliGRAM(s) IV Push daily    MEDICATIONS  (PRN):  dextrose Oral Gel 15 Gram(s) Oral once PRN Blood Glucose LESS THAN 70 milliGRAM(s)/deciliter        ct no acute pathology   Xrays:  TLC:  OG:  ET tube:                                                                                    mild congestion    ECHO:  [facility] ICU:

## 2022-11-16 NOTE — PROGRESS NOTE ADULT - SUBJECTIVE AND OBJECTIVE BOX
SUBJECTIVE:    Patient is a 57y old Male who presents with a chief complaint of acute on chronic renal failure w/ severe electrolyte imbalance (16 Nov 2022 13:01)    Currently admitted to medicine with the primary diagnosis of Acute renal failure       Today is hospital day 5d. This morning he is resting comfortably in bed and reports no new issues or overnight events.     ROS:     unable to obtain patient lethargic on precedex gtt  appears comfortable and not in distress       PAST MEDICAL & SURGICAL HISTORY  Diabetes    Hypertension    HLD (hyperlipidemia)    Neuropathy    Glaucoma    Chronic kidney disease, unspecified CKD stage  requiring HD march 2022    Chronic alcohol abuse    No significant past surgical history      SOCIAL HISTORY:    ALLERGIES:  No Known Allergies    MEDICATIONS:  STANDING MEDICATIONS  chlorhexidine 2% Cloths 1 Application(s) Topical <User Schedule>  dexMEDEtomidine Infusion 0.2 MICROgram(s)/kG/Hr IV Continuous <Continuous>  dextrose 5%. 1000 milliLiter(s) IV Continuous <Continuous>  dextrose 5%. 1000 milliLiter(s) IV Continuous <Continuous>  dextrose 5%. 1000 milliLiter(s) IV Continuous <Continuous>  dextrose 50% Injectable 25 Gram(s) IV Push once  dextrose 50% Injectable 12.5 Gram(s) IV Push once  dextrose 50% Injectable 25 Gram(s) IV Push once  folic acid 1 milliGRAM(s) Oral daily  glucagon  Injectable 1 milliGRAM(s) IntraMuscular once  heparin   Injectable 5000 Unit(s) SubCutaneous every 8 hours  insulin lispro (ADMELOG) corrective regimen sliding scale   SubCutaneous three times a day before meals  LORazepam   Injectable 2 milliGRAM(s) IV Push once  LORazepam   Injectable   IV Push   LORazepam   Injectable 0.5 milliGRAM(s) IV Push every 12 hours  multivitamin 1 Tablet(s) Oral daily  NIFEdipine XL 60 milliGRAM(s) Oral daily  ondansetron Injectable 4 milliGRAM(s) IV Push two times a day  pantoprazole  Injectable 40 milliGRAM(s) IV Push daily  sodium chloride 0.9%. 1000 milliLiter(s) IV Continuous <Continuous>  thiamine Injectable 100 milliGRAM(s) IV Push daily    PRN MEDICATIONS  dextrose Oral Gel 15 Gram(s) Oral once PRN    VITALS:   T(F): 98.8  HR: 79  BP: 157/74  RR: 13  SpO2: 96%    LABS:  Negative for smoking/alcohol/drug use.                         9.3    4.31  )-----------( 165      ( 16 Nov 2022 05:24 )             27.2     11-16    140  |  100  |  82<HH>  ----------------------------<  160<H>  3.5   |  20  |  10.9<HH>    Ca    8.9      16 Nov 2022 05:24  Phos  7.4     11-16  Mg     1.9     11-16    TPro  5.9<L>  /  Alb  3.1<L>  /  TBili  0.4  /  DBili  x   /  AST  23  /  ALT  21  /  AlkPhos  113  11-16                  RADIOLOGY:    PHYSICAL EXAM:  GEN: No acute distress  HEENT: normocephalic, atraumatic, aniceteric  LUNGS: Clear to auscultation bilaterally, no rales/wheezing/ rhonchi  HEART: S1/S2 present. RRR, no murmurs  ABD: Soft, non-tender, non-distended. Bowel sounds present  EXT: no edema   NEURO: somnolent       ASSESSMENT AND PLAN:    # Metabolic encephalopathy / toxic secondary to alcohol withdrawal and uremia   # VAMSI on CKD3 (non oliguric)  # Uremia (non oliguric)   # Hyperkalemia  # Severe Hyponatremia  # H/O Diabetes Mellitus  # H/O Hypertension    - CTH neg/ ammonia level noted unremarkable   - Pending EEG   - Nephrology following, no need for RRT at this time    - monitor and correct electrolytes  - on precedex gtt and ativan  - thiamine/folate  - addiction following  - dvt ppx  - on ngt feeds

## 2022-11-16 NOTE — PROGRESS NOTE ADULT - SUBJECTIVE AND OBJECTIVE BOX
[facility] FOLLOW UP NOTE  --------------------------------------------------------------------------------  Chief Complaint:    24 hour events/subjective:  Events over last 24hrs noted.  Pt still sedated on Lorazepam.  U.O. remain excellent.  No fever or hemodynamic instability        PAST HISTORY  --------------------------------------------------------------------------------  No significant changes to PMH, PSH, FHx, SHx, unless otherwise noted    ALLERGIES & MEDICATIONS  --------------------------------------------------------------------------------  Allergies    No Known Allergies    Intolerances      Standing Inpatient Medications  chlorhexidine 2% Cloths 1 Application(s) Topical <User Schedule>  dexMEDEtomidine Infusion 0.2 MICROgram(s)/kG/Hr IV Continuous <Continuous>  dextrose 5%. 1000 milliLiter(s) IV Continuous <Continuous>  dextrose 5%. 1000 milliLiter(s) IV Continuous <Continuous>  dextrose 5%. 1000 milliLiter(s) IV Continuous <Continuous>  dextrose 50% Injectable 25 Gram(s) IV Push once  dextrose 50% Injectable 12.5 Gram(s) IV Push once  dextrose 50% Injectable 25 Gram(s) IV Push once  folic acid 1 milliGRAM(s) Oral daily  glucagon  Injectable 1 milliGRAM(s) IntraMuscular once  heparin   Injectable 5000 Unit(s) SubCutaneous every 8 hours  insulin lispro (ADMELOG) corrective regimen sliding scale   SubCutaneous three times a day before meals  LORazepam   Injectable 2 milliGRAM(s) IV Push once  LORazepam   Injectable   IV Push   LORazepam   Injectable 0.5 milliGRAM(s) IV Push every 12 hours  multivitamin 1 Tablet(s) Oral daily  NIFEdipine XL 60 milliGRAM(s) Oral daily  ondansetron Injectable 4 milliGRAM(s) IV Push two times a day  pantoprazole  Injectable 40 milliGRAM(s) IV Push daily  sodium chloride 0.9%. 1000 milliLiter(s) IV Continuous <Continuous>  thiamine Injectable 100 milliGRAM(s) IV Push daily    PRN Inpatient Medications  dextrose Oral Gel 15 Gram(s) Oral once PRN      REVIEW OF SYSTEMS  --------------------------------------------------------------------------------  Gen: No weight changes, fatigue, fevers/chills, weakness  Skin: No rashes  Head/Eyes/Ears/Mouth: No headache; Normal hearing; Normal vision w/o blurriness; No sinus pain/discomfort, sore throat  Respiratory: No dyspnea, cough, wheezing, hemoptysis  CV: No chest pain, PND, orthopnea  GI: No abdominal pain, diarrhea, constipation, nausea, vomiting, melena, hematochezia  : No increased frequency, dysuria, hematuria, nocturia  MSK: No joint pain/swelling; no back pain; no edema  Neuro: No dizziness/lightheadedness, weakness, seizures, numbness, tingling  Heme: No easy bruising or bleeding  Endo: No heat/cold intolerance  Psych: No significant nervousness, anxiety, stress, depression    All other systems were reviewed and are negative, except as noted.    VITALS/PHYSICAL EXAM  --------------------------------------------------------------------------------  T(C): 37.1 (11-16-22 @ 11:00), Max: 37.6 (11-16-22 @ 06:00)  HR: 94 (11-16-22 @ 09:00) (94 - 110)  BP: 141/79 (11-16-22 @ 09:21) (124/57 - 174/97)  RR: 18 (11-16-22 @ 11:00) (14 - 35)  SpO2: 94% (11-16-22 @ 09:00) (94% - 100%)  Wt(kg): --        11-15-22 @ 07:01  -  11-16-22 @ 07:00  --------------------------------------------------------  IN: 2904 mL / OUT: 2560 mL / NET: 344 mL    11-16-22 @ 07:01  -  11-16-22 @ 13:01  --------------------------------------------------------  IN: 0 mL / OUT: 350 mL / NET: -350 mL      Physical Exam:  	Gen: unresponsive  	Pulm: CTA B/L laterally  	CV: RRR, S1S2; no rub  	UE: Warm, FROM, no edema  	LE: Warm, no edema  	Psych: sedated  	Skin: Warm, without rashes  	Vascular access:    LABS/STUDIES  --------------------------------------------------------------------------------              9.3    4.31  >-----------<  165      [11-16-22 @ 05:24]              27.2     140  |  100  |  82  ----------------------------<  160      [11-16-22 @ 05:24]  3.5   |  20  |  10.9        Ca     8.9     [11-16-22 @ 05:24]      Mg     1.9     [11-16-22 @ 05:24]      Phos  7.4     [11-16-22 @ 05:24]    TPro  5.9  /  Alb  3.1  /  TBili  0.4  /  DBili  x   /  AST  23  /  ALT  21  /  AlkPhos  113  [11-16-22 @ 05:24]          Creatinine Trend:  SCr 10.9 [11-16 @ 05:24]  SCr 11.9 [11-15 @ 05:51]  SCr 12.9 [11-14 @ 06:44]  SCr 13.0 [11-13 @ 21:38]  SCr 13.0 [11-13 @ 15:07]    Urinalysis - [11-11-22 @ 15:40]      Color Yellow / Appearance Clear / SG >=1.030 / pH 6.0      Gluc Negative / Ketone Negative  / Bili Negative / Urobili 0.2       Blood Moderate / Protein >=300 / Leuk Est Negative / Nitrite Negative      RBC 6-10 / WBC 3-5 / Hyaline  / Gran 1-2 / Sq Epi  / Non Sq Epi Few / Bacteria Moderate      PTH -- (Ca 8.3)      [03-16-22 @ 11:45]   311  TSH 3.51      [03-15-22 @ 12:11]

## 2022-11-17 LAB
ALBUMIN SERPL ELPH-MCNC: 3.1 G/DL — LOW (ref 3.5–5.2)
ALP SERPL-CCNC: 127 U/L — HIGH (ref 30–115)
ALT FLD-CCNC: 18 U/L — SIGNIFICANT CHANGE UP (ref 0–41)
ANION GAP SERPL CALC-SCNC: 15 MMOL/L — HIGH (ref 7–14)
AST SERPL-CCNC: 23 U/L — SIGNIFICANT CHANGE UP (ref 0–41)
BILIRUB SERPL-MCNC: 0.4 MG/DL — SIGNIFICANT CHANGE UP (ref 0.2–1.2)
BUN SERPL-MCNC: 68 MG/DL — CRITICAL HIGH (ref 10–20)
CALCIUM SERPL-MCNC: 8.9 MG/DL — SIGNIFICANT CHANGE UP (ref 8.4–10.5)
CHLORIDE SERPL-SCNC: 107 MMOL/L — SIGNIFICANT CHANGE UP (ref 98–110)
CO2 SERPL-SCNC: 24 MMOL/L — SIGNIFICANT CHANGE UP (ref 17–32)
CREAT SERPL-MCNC: 8.7 MG/DL — CRITICAL HIGH (ref 0.7–1.5)
EGFR: 6 ML/MIN/1.73M2 — LOW
GLUCOSE BLDC GLUCOMTR-MCNC: 151 MG/DL — HIGH (ref 70–99)
GLUCOSE BLDC GLUCOMTR-MCNC: 203 MG/DL — HIGH (ref 70–99)
GLUCOSE BLDC GLUCOMTR-MCNC: 204 MG/DL — HIGH (ref 70–99)
GLUCOSE BLDC GLUCOMTR-MCNC: 215 MG/DL — HIGH (ref 70–99)
GLUCOSE SERPL-MCNC: 232 MG/DL — HIGH (ref 70–99)
HCT VFR BLD CALC: 28.2 % — LOW (ref 42–52)
HGB BLD-MCNC: 9.5 G/DL — LOW (ref 14–18)
MAGNESIUM SERPL-MCNC: 1.5 MG/DL — LOW (ref 1.8–2.4)
MCHC RBC-ENTMCNC: 29.8 PG — SIGNIFICANT CHANGE UP (ref 27–31)
MCHC RBC-ENTMCNC: 33.7 G/DL — SIGNIFICANT CHANGE UP (ref 32–37)
MCV RBC AUTO: 88.4 FL — SIGNIFICANT CHANGE UP (ref 80–94)
NRBC # BLD: 0 /100 WBCS — SIGNIFICANT CHANGE UP (ref 0–0)
PHOSPHATE SERPL-MCNC: 4.5 MG/DL — SIGNIFICANT CHANGE UP (ref 2.1–4.9)
PLATELET # BLD AUTO: 170 K/UL — SIGNIFICANT CHANGE UP (ref 130–400)
POTASSIUM SERPL-MCNC: 3.4 MMOL/L — LOW (ref 3.5–5)
POTASSIUM SERPL-SCNC: 3.4 MMOL/L — LOW (ref 3.5–5)
PROT SERPL-MCNC: 6.2 G/DL — SIGNIFICANT CHANGE UP (ref 6–8)
RBC # BLD: 3.19 M/UL — LOW (ref 4.7–6.1)
RBC # FLD: 12.8 % — SIGNIFICANT CHANGE UP (ref 11.5–14.5)
SODIUM SERPL-SCNC: 146 MMOL/L — SIGNIFICANT CHANGE UP (ref 135–146)
WBC # BLD: 4.56 K/UL — LOW (ref 4.8–10.8)
WBC # FLD AUTO: 4.56 K/UL — LOW (ref 4.8–10.8)

## 2022-11-17 PROCEDURE — 71045 X-RAY EXAM CHEST 1 VIEW: CPT | Mod: 26,77

## 2022-11-17 PROCEDURE — 99222 1ST HOSP IP/OBS MODERATE 55: CPT

## 2022-11-17 PROCEDURE — 99233 SBSQ HOSP IP/OBS HIGH 50: CPT

## 2022-11-17 PROCEDURE — 99291 CRITICAL CARE FIRST HOUR: CPT

## 2022-11-17 PROCEDURE — 71045 X-RAY EXAM CHEST 1 VIEW: CPT | Mod: 26

## 2022-11-17 RX ORDER — LABETALOL HCL 100 MG
100 TABLET ORAL
Refills: 0 | Status: DISCONTINUED | OUTPATIENT
Start: 2022-11-17 | End: 2022-12-01

## 2022-11-17 RX ORDER — MAGNESIUM SULFATE 500 MG/ML
2 VIAL (ML) INJECTION ONCE
Refills: 0 | Status: COMPLETED | OUTPATIENT
Start: 2022-11-17 | End: 2022-11-17

## 2022-11-17 RX ORDER — METOPROLOL TARTRATE 50 MG
5 TABLET ORAL ONCE
Refills: 0 | Status: COMPLETED | OUTPATIENT
Start: 2022-11-17 | End: 2022-11-17

## 2022-11-17 RX ORDER — LABETALOL HCL 100 MG
10 TABLET ORAL ONCE
Refills: 0 | Status: COMPLETED | OUTPATIENT
Start: 2022-11-17 | End: 2022-11-18

## 2022-11-17 RX ORDER — POTASSIUM CHLORIDE 20 MEQ
20 PACKET (EA) ORAL
Refills: 0 | Status: COMPLETED | OUTPATIENT
Start: 2022-11-17 | End: 2022-11-17

## 2022-11-17 RX ADMIN — Medication 4: at 13:34

## 2022-11-17 RX ADMIN — Medication 4: at 08:27

## 2022-11-17 RX ADMIN — ONDANSETRON 4 MILLIGRAM(S): 8 TABLET, FILM COATED ORAL at 17:42

## 2022-11-17 RX ADMIN — Medication 5 MILLIGRAM(S): at 17:41

## 2022-11-17 RX ADMIN — Medication 50 MILLIEQUIVALENT(S): at 15:43

## 2022-11-17 RX ADMIN — Medication 2: at 17:43

## 2022-11-17 RX ADMIN — Medication 1 MILLIGRAM(S): at 18:51

## 2022-11-17 RX ADMIN — Medication 0.5 MILLIGRAM(S): at 17:42

## 2022-11-17 RX ADMIN — DEXMEDETOMIDINE HYDROCHLORIDE IN 0.9% SODIUM CHLORIDE 3.66 MICROGRAM(S)/KG/HR: 4 INJECTION INTRAVENOUS at 02:08

## 2022-11-17 RX ADMIN — Medication 2 MILLIGRAM(S): at 19:44

## 2022-11-17 RX ADMIN — Medication 1 MILLIGRAM(S): at 13:52

## 2022-11-17 RX ADMIN — Medication 0.5 MILLIGRAM(S): at 04:19

## 2022-11-17 RX ADMIN — DEXMEDETOMIDINE HYDROCHLORIDE IN 0.9% SODIUM CHLORIDE 3.66 MICROGRAM(S)/KG/HR: 4 INJECTION INTRAVENOUS at 08:27

## 2022-11-17 RX ADMIN — ONDANSETRON 4 MILLIGRAM(S): 8 TABLET, FILM COATED ORAL at 05:49

## 2022-11-17 RX ADMIN — Medication 25 GRAM(S): at 13:36

## 2022-11-17 RX ADMIN — Medication 25 GRAM(S): at 08:30

## 2022-11-17 RX ADMIN — Medication 100 MILLIGRAM(S): at 13:51

## 2022-11-17 RX ADMIN — SODIUM CHLORIDE 100 MILLILITER(S): 9 INJECTION INTRAMUSCULAR; INTRAVENOUS; SUBCUTANEOUS at 02:08

## 2022-11-17 RX ADMIN — Medication 100 MILLIGRAM(S): at 09:42

## 2022-11-17 RX ADMIN — PANTOPRAZOLE SODIUM 40 MILLIGRAM(S): 20 TABLET, DELAYED RELEASE ORAL at 13:52

## 2022-11-17 RX ADMIN — CHLORHEXIDINE GLUCONATE 1 APPLICATION(S): 213 SOLUTION TOPICAL at 05:48

## 2022-11-17 RX ADMIN — Medication 100 MILLIGRAM(S): at 19:56

## 2022-11-17 RX ADMIN — HEPARIN SODIUM 5000 UNIT(S): 5000 INJECTION INTRAVENOUS; SUBCUTANEOUS at 13:51

## 2022-11-17 RX ADMIN — Medication 25 MILLIGRAM(S): at 21:34

## 2022-11-17 RX ADMIN — Medication 50 MILLIEQUIVALENT(S): at 13:49

## 2022-11-17 RX ADMIN — Medication 50 MILLIEQUIVALENT(S): at 10:55

## 2022-11-17 RX ADMIN — Medication 25 MILLIGRAM(S): at 04:25

## 2022-11-17 RX ADMIN — Medication 25 MILLIGRAM(S): at 13:52

## 2022-11-17 RX ADMIN — SODIUM CHLORIDE 100 MILLILITER(S): 9 INJECTION INTRAMUSCULAR; INTRAVENOUS; SUBCUTANEOUS at 19:56

## 2022-11-17 RX ADMIN — Medication 1 TABLET(S): at 13:52

## 2022-11-17 RX ADMIN — HEPARIN SODIUM 5000 UNIT(S): 5000 INJECTION INTRAVENOUS; SUBCUTANEOUS at 21:34

## 2022-11-17 RX ADMIN — HEPARIN SODIUM 5000 UNIT(S): 5000 INJECTION INTRAVENOUS; SUBCUTANEOUS at 05:49

## 2022-11-17 RX ADMIN — SODIUM CHLORIDE 100 MILLILITER(S): 9 INJECTION INTRAMUSCULAR; INTRAVENOUS; SUBCUTANEOUS at 08:27

## 2022-11-17 NOTE — PROGRESS NOTE ADULT - SUBJECTIVE AND OBJECTIVE BOX
Patient is a 57y old  Male who presents with a chief complaint of acute on chronic renal failure w/ severe electrolyte imbalance (17 Nov 2022 07:42)      Over Night Events:  Patient seen and examined.   combative when precedex lowered     ROS:  See HPI    PHYSICAL EXAM    ICU Vital Signs Last 24 Hrs  T(C): 36.7 (17 Nov 2022 07:01), Max: 37.7 (16 Nov 2022 23:00)  T(F): 98.1 (17 Nov 2022 07:01), Max: 99.9 (16 Nov 2022 23:00)  HR: 89 (17 Nov 2022 06:45) (79 - 99)  BP: 183/90 (17 Nov 2022 06:45) (133/65 - 199/107)  BP(mean): 124 (17 Nov 2022 06:45) (93 - 142)  ABP: --  ABP(mean): --  RR: 24 (17 Nov 2022 07:01) (13 - 39)  SpO2: 97% (17 Nov 2022 06:45) (90% - 99%)    O2 Parameters below as of 17 Nov 2022 04:00  Patient On (Oxygen Delivery Method): room air            General: lethargic   HEENT:      Ng tube           Lymph Nodes: NO cervical LN   Lungs: Bilateral BS  Cardiovascular: Regular   Abdomen: Soft, Positive BS  Extremities: No clubbing   Skin: warm   Neurological: no focal   Musculoskeletal: move all ext     I&O's Detail    16 Nov 2022 07:01  -  17 Nov 2022 07:00  --------------------------------------------------------  IN:    Dexmedetomidine: 186 mL    Enteral Tube Flush: 150 mL    Nepro with Carb Steady: 480 mL    sodium chloride 0.9%: 2300 mL  Total IN: 3116 mL    OUT:    Indwelling Catheter - Urethral (mL): 3160 mL  Total OUT: 3160 mL    Total NET: -44 mL      17 Nov 2022 07:01  -  17 Nov 2022 08:02  --------------------------------------------------------  IN:  Total IN: 0 mL    OUT:    Indwelling Catheter - Urethral (mL): 50 mL  Total OUT: 50 mL    Total NET: -50 mL          LABS:                          9.5    4.56  )-----------( 170      ( 17 Nov 2022 05:23 )             28.2         17 Nov 2022 05:23    146    |  107    |  68     ----------------------------<  232    3.4     |  24     |  8.7      Ca    8.9        17 Nov 2022 05:23  Phos  4.5       17 Nov 2022 05:23  Mg     1.5       17 Nov 2022 05:23    TPro  6.2    /  Alb  3.1    /  TBili  0.4    /  DBili  x      /  AST  23     /  ALT  18     /  AlkPhos  127    17 Nov 2022 05:23  Amylase x     lipase x                                                                                                                                                                                                                                         MEDICATIONS  (STANDING):  chlorhexidine 2% Cloths 1 Application(s) Topical <User Schedule>  dexMEDEtomidine Infusion 0.2 MICROgram(s)/kG/Hr (3.66 mL/Hr) IV Continuous <Continuous>  dextrose 5%. 1000 milliLiter(s) (50 mL/Hr) IV Continuous <Continuous>  dextrose 5%. 1000 milliLiter(s) (50 mL/Hr) IV Continuous <Continuous>  dextrose 5%. 1000 milliLiter(s) (100 mL/Hr) IV Continuous <Continuous>  dextrose 50% Injectable 12.5 Gram(s) IV Push once  dextrose 50% Injectable 25 Gram(s) IV Push once  dextrose 50% Injectable 25 Gram(s) IV Push once  folic acid 1 milliGRAM(s) Oral daily  glucagon  Injectable 1 milliGRAM(s) IntraMuscular once  heparin   Injectable 5000 Unit(s) SubCutaneous every 8 hours  hydrALAZINE 25 milliGRAM(s) Oral three times a day  insulin lispro (ADMELOG) corrective regimen sliding scale   SubCutaneous three times a day before meals  labetalol 100 milliGRAM(s) Oral two times a day  LORazepam   Injectable 2 milliGRAM(s) IV Push once  LORazepam   Injectable   IV Push   LORazepam   Injectable 0.5 milliGRAM(s) IV Push every 12 hours  magnesium sulfate  IVPB 2 Gram(s) IV Intermittent once  magnesium sulfate  IVPB 2 Gram(s) IV Intermittent once  multivitamin 1 Tablet(s) Oral daily  NIFEdipine XL 60 milliGRAM(s) Oral daily  ondansetron Injectable 4 milliGRAM(s) IV Push two times a day  pantoprazole  Injectable 40 milliGRAM(s) IV Push daily  potassium chloride  20 mEq/100 mL IVPB 20 milliEquivalent(s) IV Intermittent every 2 hours  sodium chloride 0.9%. 1000 milliLiter(s) (100 mL/Hr) IV Continuous <Continuous>  thiamine Injectable 100 milliGRAM(s) IV Push daily    MEDICATIONS  (PRN):  dextrose Oral Gel 15 Gram(s) Oral once PRN Blood Glucose LESS THAN 70 milliGRAM(s)/deciliter          Xrays:  TLC:  OG:  ET tube:                                                                                    mild b/l congestion    ECHO:  [facility] ICU:

## 2022-11-17 NOTE — PROGRESS NOTE ADULT - SUBJECTIVE AND OBJECTIVE BOX
SUBJECTIVE:    Patient is a 57y old Male who presents with a chief complaint of acute on chronic renal failure w/ severe electrolyte imbalance (17 Nov 2022 11:36)    Currently admitted to medicine with the primary diagnosis of Acute renal failure       Today is hospital day 6d. This morning he is resting comfortably in bed and reports no new issues or overnight events.     ROS:   CONSTITUTIONAL: No weakness, fevers or chills   EYES/ENT: No visual changes; No vertigo or throat pain   NECK: No pain or stiffness   RESPIRATORY: No cough, wheezing, hemoptysis; No shortness of breath   CARDIOVASCULAR: No chest pain or palpitations   GASTROINTESTINAL: No abdominal or epigastric pain. No nausea, vomiting, or hematemesis; No diarrhea or constipation. No melena or hematochezia.  GENITOURINARY: No dysuria, frequency or hematuria  NEUROLOGICAL: No numbness or weakness        PAST MEDICAL & SURGICAL HISTORY  Diabetes    Hypertension    HLD (hyperlipidemia)    Neuropathy    Glaucoma    Chronic kidney disease, unspecified CKD stage  requiring HD march 2022    Chronic alcohol abuse    No significant past surgical history      ALLERGIES:  No Known Allergies    MEDICATIONS:  STANDING MEDICATIONS  chlorhexidine 2% Cloths 1 Application(s) Topical <User Schedule>  dexMEDEtomidine Infusion 0.2 MICROgram(s)/kG/Hr IV Continuous <Continuous>  dextrose 5%. 1000 milliLiter(s) IV Continuous <Continuous>  dextrose 5%. 1000 milliLiter(s) IV Continuous <Continuous>  dextrose 5%. 1000 milliLiter(s) IV Continuous <Continuous>  dextrose 50% Injectable 25 Gram(s) IV Push once  dextrose 50% Injectable 12.5 Gram(s) IV Push once  dextrose 50% Injectable 25 Gram(s) IV Push once  folic acid 1 milliGRAM(s) Oral daily  glucagon  Injectable 1 milliGRAM(s) IntraMuscular once  heparin   Injectable 5000 Unit(s) SubCutaneous every 8 hours  hydrALAZINE 25 milliGRAM(s) Oral three times a day  insulin lispro (ADMELOG) corrective regimen sliding scale   SubCutaneous three times a day before meals  labetalol 100 milliGRAM(s) Oral two times a day  LORazepam   Injectable 2 milliGRAM(s) IV Push once  LORazepam   Injectable   IV Push   LORazepam   Injectable 0.5 milliGRAM(s) IV Push every 12 hours  magnesium sulfate  IVPB 2 Gram(s) IV Intermittent once  multivitamin 1 Tablet(s) Oral daily  NIFEdipine XL 60 milliGRAM(s) Oral daily  ondansetron Injectable 4 milliGRAM(s) IV Push two times a day  pantoprazole  Injectable 40 milliGRAM(s) IV Push daily  potassium chloride  20 mEq/100 mL IVPB 20 milliEquivalent(s) IV Intermittent every 2 hours  sodium chloride 0.9%. 1000 milliLiter(s) IV Continuous <Continuous>  thiamine Injectable 100 milliGRAM(s) IV Push daily    PRN MEDICATIONS  dextrose Oral Gel 15 Gram(s) Oral once PRN    VITALS:   T(F): 97.5  HR: 82  BP: 182/88  RR: 22  SpO2: 96%    LABS:                          9.5    4.56  )-----------( 170      ( 17 Nov 2022 05:23 )             28.2     11-17    146  |  107  |  68<HH>  ----------------------------<  232<H>  3.4<L>   |  24  |  8.7<HH>    Ca    8.9      17 Nov 2022 05:23  Phos  4.5     11-17  Mg     1.5     11-17    TPro  6.2  /  Alb  3.1<L>  /  TBili  0.4  /  DBili  x   /  AST  23  /  ALT  18  /  AlkPhos  127<H>  11-17      RADIOLOGY:    PHYSICAL EXAM:  GEN: No acute distress  HEENT: normocephalic, atraumatic, aniceteric  LUNGS: Clear to auscultation bilaterally, no rales/wheezing/ rhonchi  HEART: S1/S2 present. RRR, no murmurs  ABD: Soft, non-tender, non-distended. Bowel sounds present  EXT: no edema   NEURO: sedated , follows commands       ASSESSMENT AND PLAN:      # Metabolic encephalopathy / toxic secondary to alcohol withdrawal and uremia   # Suspected thiamine and folate deficiency  # Hypomagnasemia   # VAMSI on CKD3 (non oliguric), improving  # Uremia (non oliguric)   # Hyperkalemia  # Severe Hyponatremia  # H/O Diabetes Mellitus  # H/O Hypertension    - CTH neg/ ammonia level noted unremarkable / EEG negative  - mentating well off precedex gtt , c/w ativan   - neurology eval  - Nephrology following, no need for RRT at this time    - monitor and correct electrolytes  - on precedex gtt and ativan  - thiamine/folate  - addiction following  - tov today, obtain bladder scan   - dvt ppx  - on ngt feeds

## 2022-11-17 NOTE — PROGRESS NOTE ADULT - SUBJECTIVE AND OBJECTIVE BOX
Nephrology Progress Note    KECIA MARTIN  [id number]  57y  Male    S:  Patient is seen and examined, events over the last 24h noted.    O:  Allergies:  No Known Allergies    Hospital Medications:   MEDICATIONS  (STANDING):  chlorhexidine 2% Cloths 1 Application(s) Topical <User Schedule>  dexMEDEtomidine Infusion 0.2 MICROgram(s)/kG/Hr (3.66 mL/Hr) IV Continuous <Continuous>  dextrose 5%. 1000 milliLiter(s) (50 mL/Hr) IV Continuous <Continuous>  dextrose 5%. 1000 milliLiter(s) (100 mL/Hr) IV Continuous <Continuous>  dextrose 5%. 1000 milliLiter(s) (50 mL/Hr) IV Continuous <Continuous>  dextrose 50% Injectable 25 Gram(s) IV Push once  dextrose 50% Injectable 12.5 Gram(s) IV Push once  dextrose 50% Injectable 25 Gram(s) IV Push once  folic acid 1 milliGRAM(s) Oral daily  glucagon  Injectable 1 milliGRAM(s) IntraMuscular once  heparin   Injectable 5000 Unit(s) SubCutaneous every 8 hours  hydrALAZINE 25 milliGRAM(s) Oral three times a day  insulin lispro (ADMELOG) corrective regimen sliding scale   SubCutaneous three times a day before meals  labetalol 100 milliGRAM(s) Oral two times a day  LORazepam   Injectable 2 milliGRAM(s) IV Push once  LORazepam   Injectable   IV Push   LORazepam   Injectable 0.5 milliGRAM(s) IV Push every 12 hours  magnesium sulfate  IVPB 2 Gram(s) IV Intermittent once  multivitamin 1 Tablet(s) Oral daily  NIFEdipine XL 60 milliGRAM(s) Oral daily  ondansetron Injectable 4 milliGRAM(s) IV Push two times a day  pantoprazole  Injectable 40 milliGRAM(s) IV Push daily  potassium chloride  20 mEq/100 mL IVPB 20 milliEquivalent(s) IV Intermittent every 2 hours  sodium chloride 0.9%. 1000 milliLiter(s) (100 mL/Hr) IV Continuous <Continuous>  thiamine Injectable 100 milliGRAM(s) IV Push daily    MEDICATIONS  (PRN):  dextrose Oral Gel 15 Gram(s) Oral once PRN Blood Glucose LESS THAN 70 milliGRAM(s)/deciliter    Home Medications:  acetaminophen 325 mg oral tablet: 2 tab(s) orally every 6 hours, As needed, Temp greater or equal to 38C (100.4F), Mild Pain (1 - 3) (25 Mar 2022 11:36)  atorvastatin 40 mg oral tablet: 1 tab(s) orally once a day (at bedtime) (25 Mar 2022 11:36)  DULoxetine 30 mg oral delayed release capsule: 1 cap(s) orally 2 times a day (2021 16:56)  lisinopril 20 mg oral tablet: 1 tab(s) orally once a day (2021 16:56)  NIFEdipine 60 mg oral tablet, extended release: 1 tab(s) orally once a day (25 Mar 2022 11:36)  pantoprazole 40 mg oral delayed release tablet: 1 tab(s) orally once a day (before a meal) (25 Mar 2022 11:36)      VITALS:  Daily     Daily Weight in k.3 (2022 07:01)  T(F): 98.1 (22 @ 07:01), Max: 99.9 (22 @ 23:00)  HR: 89 (22 @ 06:45)  BP: 183/90 (22 @ 06:45)  RR: 24 (22 @ 07:01)  SpO2: 97% (22 @ 06:45)  Wt(kg): --  I&O's Detail    2022 07:01  -  2022 07:00  --------------------------------------------------------  IN:    Dexmedetomidine: 186 mL    Enteral Tube Flush: 150 mL    Nepro with Carb Steady: 480 mL    sodium chloride 0.9%: 2300 mL  Total IN: 3116 mL    OUT:    Indwelling Catheter - Urethral (mL): 3160 mL  Total OUT: 3160 mL    Total NET: -44 mL      2022 07:01  -  2022 09:41  --------------------------------------------------------  IN:  Total IN: 0 mL    OUT:    Indwelling Catheter - Urethral (mL): 400 mL  Total OUT: 400 mL    Total NET: -400 mL        I&O's Summary    2022 07:01  -  2022 07:00  --------------------------------------------------------  IN: 3116 mL / OUT: 3160 mL / NET: -44 mL    2022 07:01  -  2022 09:41  --------------------------------------------------------  IN: 0 mL / OUT: 400 mL / NET: -400 mL          PHYSICAL EXAM:  Gen: NAD  Resp: CTAB  Card: S1/S2  Abd: soft  Extremities:   Vascular access:       LABS:    Blood Gas Arterial - Sodium: 122 mmol/L (22 @ 15:23)  Blood Gas Arterial - Calcium, Ionized: 1.19 mmol/L (22 @ 15:23)  Blood Gas Arterial - Sodium: 118 mmol/L (22 @ 19:18)        146  |  107  |  68<HH>  ----------------------------<  232<H>  3.4<L>   |  24  |  8.7<HH>    Ca    8.9      2022 05:23  Phos  4.5       Mg     1.5         TPro  6.2  /  Alb  3.1<L>  /  TBili  0.4  /  DBili      /  AST  23  /  ALT  18  /  AlkPhos  127<H>      eGFR: 6 mL/min/1.73m2 (22 @ 05:23)  eGFR: 5 mL/min/1.73m2 (22 @ 05:24)    Phosphorus Level, Serum: 4.5 mg/dL (22 @ 05:23)  Phosphorus Level, Serum: 7.4 mg/dL (22 @ 05:24)    Intact PTH: 311 pg/mL (22 @ 11:45)                          9.5    4.56  )-----------( 170      ( 2022 05:23 )             28.2     Mean Cell Volume: 88.4 fL (22 @ 05:23)        Urine Studies:  Urinalysis Basic - ( 2022 15:40 )    Color: Yellow / Appearance: Clear / SG: >=1.030 / pH:   Gluc:  / Ketone: Negative  / Bili: Negative / Urobili: 0.2 mg/dL   Blood:  / Protein: >=300 mg/dL / Nitrite: Negative   Leuk Esterase: Negative / RBC: 6-10 /HPF / WBC 3-5 /HPF   Sq Epi:  / Non Sq Epi: Few /HPF / Bacteria: Moderate          Culture Results:   <10,000 CFU/mL Normal Urogenital Evelin ( @ 15:40)  Creatinine, Random Urine: 55 mg/dL ( @ 18:30)    Creatinine trend:  Creatinine, Serum: 8.7 mg/dL (22 @ 05:23)  Creatinine, Serum: 10.9 mg/dL (22 @ 05:24)  Creatinine, Serum: 11.9 mg/dL (11-15-22 @ 05:51)  Creatinine, Serum: 12.9 mg/dL (22 @ 06:44)  Creatinine, Serum: 13.0 mg/dL (22 @ 21:38)  Creatinine, Serum: 13.0 mg/dL (22 @ 15:07)  Creatinine, Serum: 13.2 mg/dL (22 @ 05:37)  Creatinine, Serum: 12.7 mg/dL (22 @ 21:39)  Creatinine, Serum: 12.7 mg/dL (22 @ 19:18)  Creatinine, Serum: 13.3 mg/dL (22 @ 15:11)  Creatinine, Serum: 12.5 mg/dL (22 @ 11:38)  Creatinine, Serum: 13.2 mg/dL (22 @ 05:52)  Creatinine, Serum: 13.0 mg/dL (22 @ 19:35)  Creatinine, Serum: 13.5 mg/dL (22 @ 13:40)  Creatinine, Serum: 2.5 mg/dL (22 @ 06:20)  Creatinine, Serum: 2.6 mg/dL (22 @ 05:52)  Creatinine, Serum: 2.6 mg/dL (22 @ 06:00)  Creatinine, Serum: 2.6 mg/dL (22 @ 05:54)  Creatinine, Serum: 2.8 mg/dL (22 @ 06:40)  Creatinine, Serum: 3.2 mg/dL (22 @ 06:17)    Immunofixation, Urine: Reference Range: None Detected (22 @ 19:30)  Urine Electrophoresis Interpretation: Mild Selective (Glomerular) Proteinuria (22 @ 19:30)  Glomerular Basement Membrane Ab IgG: 3 (22 @ 12:01)    US Renal:   ACC: [id number] EXAM:  US KIDNEY(S)                          PROCEDURE DATE:  03/15/2022          INTERPRETATION:  CLINICAL INFORMATION: Worsening renal function.    COMPARISON: March 15, 2022    TECHNIQUE: Sonography of the kidneys and bladder.    FINDINGS:    Right kidney: 11.2 x 4.8 x 4.7 cm. 8 mm nonobstructing calculus within   the lower pole of the right kidney.    Left kidney:  10.1 x 6.8 x 4.7 cm. 5 mm x 4 mm lower pole nonobstructing   calculus.    IMPRESSION:    Nonobstructing lower pole calculi. Unchanged from recent CT scan.        --- End of Report ---            ANABELLE ALLEN MD; Attending Interventional Radiologist  This document has been electronically signed. Mar 16 2022 10:22AM (03-15-22 @ 17:33)    US Kidney and Bladder:   ACC: [id number] EXAM:  US KIDNEYS AND BLADDER                          PROCEDURE DATE:  2022          INTERPRETATION:  CLINICAL INFORMATION: Hydronephrosis    COMPARISON: 3/15/2022    TECHNIQUE: Sonography of the kidneys.    FINDINGS:    Right kidney: 12 cm. No hydronephrosis. 1.2 cm lower pole calculus.    Left kidney:  11 cm. No hydronephrosis. 4 mm interpolar region calculus.      IMPRESSION:    No hydronephrosis bilaterally. Bilateral nonobstructing calculi measuring   1.2 cm on the right.    --- End of Report ---            NURYS BROWN MD; Attending Radiologist  This document has been electronically signed. 2022  8:04PM (22 @ 19:42)     Nephrology Progress Note    KECIA MARTIN  MRN-[id number]  57y  Male    S:  Patient is seen and examined, events over the last 24h noted.    O:  Allergies:  No Known Allergies    Hospital Medications:   MEDICATIONS  (STANDING):  chlorhexidine 2% Cloths 1 Application(s) Topical <User Schedule>  dexMEDEtomidine Infusion 0.2 MICROgram(s)/kG/Hr (3.66 mL/Hr) IV Continuous <Continuous>  folic acid 1 milliGRAM(s) Oral daily  heparin   Injectable 5000 Unit(s) SubCutaneous every 8 hours  hydrALAZINE 25 milliGRAM(s) Oral three times a day  insulin lispro (ADMELOG) corrective regimen sliding scale   SubCutaneous three times a day before meals  labetalol 100 milliGRAM(s) Oral two times a day  LORazepam   Injectable 2 milliGRAM(s) IV Push once  LORazepam   Injectable   IV Push   LORazepam   Injectable 0.5 milliGRAM(s) IV Push every 12 hours  magnesium sulfate  IVPB 2 Gram(s) IV Intermittent once  multivitamin 1 Tablet(s) Oral daily  NIFEdipine XL 60 milliGRAM(s) Oral daily  ondansetron Injectable 4 milliGRAM(s) IV Push two times a day  pantoprazole  Injectable 40 milliGRAM(s) IV Push daily  potassium chloride  20 mEq/100 mL IVPB 20 milliEquivalent(s) IV Intermittent every 2 hours  sodium chloride 0.9%. 1000 milliLiter(s) (100 mL/Hr) IV Continuous <Continuous>  thiamine Injectable 100 milliGRAM(s) IV Push daily    MEDICATIONS  (PRN):  dextrose Oral Gel 15 Gram(s) Oral once PRN Blood Glucose LESS THAN 70 milliGRAM(s)/deciliter    Home Medications:  acetaminophen 325 mg oral tablet: 2 tab(s) orally every 6 hours, As needed, Temp greater or equal to 38C (100.4F), Mild Pain (1 - 3) (25 Mar 2022 11:36)  atorvastatin 40 mg oral tablet: 1 tab(s) orally once a day (at bedtime) (25 Mar 2022 11:36)  DULoxetine 30 mg oral delayed release capsule: 1 cap(s) orally 2 times a day (2021 16:56)  lisinopril 20 mg oral tablet: 1 tab(s) orally once a day (2021 16:56)  NIFEdipine 60 mg oral tablet, extended release: 1 tab(s) orally once a day (25 Mar 2022 11:36)  pantoprazole 40 mg oral delayed release tablet: 1 tab(s) orally once a day (before a meal) (25 Mar 2022 11:36)      VITALS:  Daily Weight in k.3 (2022 07:01)  T(F): 98.1 (22 @ 07:01), Max: 99.9 (22 @ 23:00)  HR: 89 (22 @ 06:45)  BP: 183/90 (22 @ 06:45)  RR: 24 (22 @ 07:01)  SpO2: 97% (22 @ 06:45)  Wt(kg): --  I&O's Detail    2022 07:01  -  2022 07:00  --------------------------------------------------------  IN:    Dexmedetomidine: 186 mL    Enteral Tube Flush: 150 mL    Nepro with Carb Steady: 480 mL    sodium chloride 0.9%: 2300 mL  Total IN: 3116 mL    OUT:    Indwelling Catheter - Urethral (mL): 3160 mL  Total OUT: 3160 mL    Total NET: -44 mL      2022 07:01  -  2022 09:41  --------------------------------------------------------  IN:  Total IN: 0 mL    OUT:    Indwelling Catheter - Urethral (mL): 400 mL  Total OUT: 400 mL    Total NET: -400 mL        I&O's Summary    2022 07:01  -  2022 07:00  --------------------------------------------------------  IN: 3116 mL / OUT: 3160 mL / NET: -44 mL    2022 07:01  -  2022 09:41  --------------------------------------------------------  IN: 0 mL / OUT: 400 mL / NET: -400 mL          PHYSICAL EXAM:  Gen: NAD  Resp: CTAB  Card: S1/S2  Abd: soft  Extremities: no edema, LEs dry/scaly  Blanco    LABS:        146  |  107  |  68<HH>  ----------------------------<  232<H>  3.4<L>   |  24  |  8.7<HH>    Ca    8.9      2022 05:23  Phos  4.5       Mg     1.5         TPro  6.2  /  Alb  3.1<L>  /  TBili  0.4  /  DBili      /  AST  23  /  ALT  18  /  AlkPhos  127<H>      Phosphorus Level, Serum: 4.5 mg/dL (22 @ 05:23)  Phosphorus Level, Serum: 7.4 mg/dL (22 @ 05:24)    Intact PTH: 311 pg/mL (22 @ 11:45)                          9.5    4.56  )-----------( 170      ( 2022 05:23 )             28.2     Mean Cell Volume: 88.4 fL (22 @ 05:23)        Urine Studies:  Urinalysis Basic - ( 2022 15:40 )    Color: Yellow / Appearance: Clear / SG: >=1.030 / pH:   Gluc:  / Ketone: Negative  / Bili: Negative / Urobili: 0.2 mg/dL   Blood:  / Protein: >=300 mg/dL / Nitrite: Negative   Leuk Esterase: Negative / RBC: 6-10 /HPF / WBC 3-5 /HPF   Sq Epi:  / Non Sq Epi: Few /HPF / Bacteria: Moderate      Creatinine trend:  Creatinine, Serum: 8.7 mg/dL (22 @ 05:23)  Creatinine, Serum: 10.9 mg/dL (22 @ 05:24)  Creatinine, Serum: 11.9 mg/dL (11-15-22 @ 05:51)  Creatinine, Serum: 12.9 mg/dL (22 @ 06:44)  Creatinine, Serum: 13.0 mg/dL (22 @ 21:38)  Creatinine, Serum: 13.0 mg/dL (22 @ 15:07)  Creatinine, Serum: 2.5 mg/dL (22 @ 06:20)

## 2022-11-17 NOTE — PROGRESS NOTE ADULT - ASSESSMENT
1)  Severe VAMSI on CKD, ATN in nature  - renal function improving nicely over last few days  - very good urine output, likely have post ATN diuresis now    2)  HTN uncontrolled now, seems to have component of agitation    3)  Alcohol intoxication in pt w/ alcohol abuse syndrome, remains on Lorazepam for DT treatment/prevention    Recommendations:    1)  Change IV fluids to 1/2NS at 60cc/hr    2)  Labetalol was added for better BP control, monitor    3)  Will follow renal function, electrolytes daily

## 2022-11-17 NOTE — PROGRESS NOTE ADULT - ASSESSMENT
KECIA MARTIN is a 57y man with a medical history significant for CKD and EtOH abuse who presented initially with progressive weakness, and is now in the critical care unit for severe metabolic derangements and acute on chronic renal failure.     Impression  metabolic encephalopathy / toxic secondary to alcohol withdrawal and uremia   Acute on Chronic Renal Failure  Uremia  Hyperkalemia  Severe Hyponatremia  Diabetes Mellitus  Hypertension      Plan:    CNS:   do ct brain   EEG  standing Ativan taper w/ PRN CIWA-driven boluses  Precedex gtt to control agitation.  Antipsychotics PRN  Multivitamin, Folate, and Thiamine daily    HEENT:  Oral care    CARDIOVASCULAR    avoid low BP   anitra map > 65  keep is = os     PULMONARY  HOB @ 45 degrees, aspiration precautions      GASTROINTESTINAL NG feed   Feeds  - ammonia lvl unremarkable     BM: regimen PRN    GENITOURINARY/RENAL:    Monitor I&O: strict    Nephrology  follow up ,follow bun, cr   - follow lytes daily  - keep K 4-5; Mg 2; replete as needed    INFECTIOUS  no acute concerns. Monitor fever, WBC.    HEMATOLOGIC  DVT ppx: heparin SQ    ENDOCRINE  Follow up FS.  Insulin protocol as needed.  BG goal 140-180    MSK/DERM  PT/OT when cooperative      CODE STATUS: FULL  DISPO: remain in ICU KECIA MARTIN is a 57y man with a medical history significant for CKD and EtOH abuse who presented initially with progressive weakness, and is now in the critical care unit for severe metabolic derangements and acute on chronic renal failure.     Impression  metabolic encephalopathy / toxic secondary to alcohol withdrawal and uremia   Acute on Chronic Renal Failure stage 4/5  Uremia  Hyperkalemia  Severe Hyponatremia  Diabetes Mellitusgrf  Hypertension  hypomagnesemia  suspected thiamine and folate deficiency      Plan:    CNS:   do ct brain   EEG  standing Ativan taper w/ PRN CIWA-driven boluses  Precedex gtt to control agitation.  Antipsychotics PRN  Multivitamin, Folate, and Thiamine daily    HEENT:  Oral care    CARDIOVASCULAR    avoid low BP   anitra map > 65  keep is = os     PULMONARY  HOB @ 45 degrees, aspiration precautions      GASTROINTESTINAL NG feed   Feeds  - ammonia lvl unremarkable     BM: regimen PRN    GENITOURINARY/RENAL:    Monitor I&O: strict    Nephrology  follow up ,follow bun, cr   - follow lytes daily  - keep K 4-5; Mg 2; replete as needed    INFECTIOUS  no acute concerns. Monitor fever, WBC.    HEMATOLOGIC  DVT ppx: heparin SQ    ENDOCRINE  Follow up FS.  Insulin protocol as needed.  BG goal 140-180    MSK/DERM  PT/OT when cooperative      CODE STATUS: FULL  DISPO: remain in ICU

## 2022-11-17 NOTE — CONSULT NOTE ADULT - ASSESSMENT
ASSESSMENT: Pt is a 57M w/ PMH HTN, DMT2, CKD and ETOH abuse admitted for metabolic encephalopathy 2/2 ETOH WD and uremia. Neurology was consulted for AMS. Pt was on a precedex gtt at time of exam, which per RN had been lowered from this AM. Pt was AOx3 at time of exam and following all commands.         PLAN:   # AMS - now improved, likely 2/2 precedex gtt and metabolic encephalopathy  - Continue to wean precedex when possible  - No further neurological workup, recall PRN      INCOMPLETE NOTE - PT TO BE SEEN BY ATTENDING AND RECOMMENDATIONS TO BE UPDATED.  ASSESSMENT: Pt is a 57M w/ PMH HTN, DMT2, CKD and ETOH abuse admitted for metabolic encephalopathy 2/2 ETOH WD and uremia. Neurology was consulted for AMS. Pt was on a precedex gtt at time of exam, which per RN had been lowered from this AM. Pt was AOx3 at time of exam and following all commands.         PLAN:   # AMS - now improved, likely 2/2 precedex gtt and metabolic encephalopathy  - Continue to wean precedex when possible  - No further neurological workup, recall PRN

## 2022-11-17 NOTE — CHART NOTE - NSCHARTNOTEFT_GEN_A_CORE
Registered Dietitian Follow-Up     Patient Profile Reviewed                           Yes [X]   No []     Nutrition History Previously Obtained        Yes []  No []       Pertinent  Information:  pt is 57 year old male with hx of chronic ETOH abuse, CKD, DM, HTN, presents with progressive weakness, found to be in alcohol withdrawal, chronic renal failure with severe electrolyte imbalances, no need for RRT at this time as per nephrology.      Diet, NPO with Tube Feed:   Tube Feeding Modality: Nasogastric  Nepro with Carb Steady  Total Volume for 24 Hours (mL): 960  Bolus  Total Volume of Bolus (mL):  240  Total # of Feeds: 4  Tube Feed Frequency: Every 6 hours   Tube Feed Start Time: 00:00  Bolus Feed Rate (mL per Hour): 240   Bolus Feed Duration (in Hours): 1  Bolus Feed Instructions:  initiate rate day one @120mL Q6hr for 4feeds, increase if tolerated to goal 240mL Q6hr  Free Water Flush  Free Water Flush Instructions:  50mL pre/post feeds (11-14-22 @ 13:11) [Active]           Anthropometrics:  - Ht. 172.7 cm  - Wt. 73.1 kgs 11/11 vs 69.3 kgs today   - %wt change  - BMI   - IBW      Pertinent Lab Data:  11-17    146  |  107  |  68<HH>  ----------------------------<  232<H>  3.4<L>   |  24  |  8.7<HH>    Ca    8.9      17 Nov 2022 05:23  Phos  4.5     11-17  Mg     1.5     11-17    TPro  6.2  /  Alb  3.1<L>  /  TBili  0.4  /  DBili  x   /  AST  23  /  ALT  18  /  AlkPhos  127<H>  11-17                            9.5    4.56  )-----------( 170      ( 17 Nov 2022 05:23 )             28.2        Pertinent Meds:  MEDICATIONS  (STANDING):  chlorhexidine 2% Cloths 1 Application(s) Topical <User Schedule>  dextrose 5%. 1000 milliLiter(s) (50 mL/Hr) IV Continuous <Continuous>  dextrose 5%. 1000 milliLiter(s) (100 mL/Hr) IV Continuous <Continuous>  dextrose 5%. 1000 milliLiter(s) (50 mL/Hr) IV Continuous <Continuous>  dextrose 50% Injectable 25 Gram(s) IV Push once  dextrose 50% Injectable 12.5 Gram(s) IV Push once  dextrose 50% Injectable 25 Gram(s) IV Push once  folic acid 1 milliGRAM(s) Oral daily  glucagon  Injectable 1 milliGRAM(s) IntraMuscular once  heparin   Injectable 5000 Unit(s) SubCutaneous every 8 hours  hydrALAZINE 25 milliGRAM(s) Oral three times a day  insulin lispro (ADMELOG) corrective regimen sliding scale   SubCutaneous three times a day before meals  labetalol 100 milliGRAM(s) Oral two times a day  LORazepam   Injectable   IV Push   LORazepam   Injectable 0.5 milliGRAM(s) IV Push every 12 hours  multivitamin 1 Tablet(s) Oral daily  NIFEdipine XL 60 milliGRAM(s) Oral daily  ondansetron Injectable 4 milliGRAM(s) IV Push two times a day  pantoprazole  Injectable 40 milliGRAM(s) IV Push daily  sodium chloride 0.9%. 1000 milliLiter(s) (100 mL/Hr) IV Continuous <Continuous>  thiamine Injectable 100 milliGRAM(s) IV Push daily    MEDICATIONS  (PRN):  dextrose Oral Gel 15 Gram(s) Oral once PRN Blood Glucose LESS THAN 70 milliGRAM(s)/deciliter  LORazepam   Injectable 1 milliGRAM(s) IV Push at bedtime PRN Agitation       Physical Findings:  - Appearance: pt sleeping during RD visit    - GI function: +BS, BM x 1 noted today   - Tubes: NGT   - Oral/Mouth cavity:  - Skin: no pressure injuries noted      Nutrition Requirements  Weight Used: 69.3 kgs      Estimated Energy Needs:  9043-5225 kcals (25-30 kcal/kg/BW)  83-97g protein (1.2-1.4g/kg/BW)  1;1 kcal for estimated fluid needs           Nutrient Intake: present EN regimen provides: 1700 kcals, 91g protein, 960+400 ml water, meeting >90% of nutrient needs        [] Previous Nutrition Diagnosis:            [] Ongoing          [X] Resolved       Nutrition Intervention: maintain on present EN regimen     Goal/Expected Outcome: pt to continue to tolerate EN and meet >85% of estimated nutrient needs within 4 days      Indicator/Monitoring: RD to monitor tolerance to EN, labs/meds, NFPF and f/u as needed within 4 days  high risk

## 2022-11-17 NOTE — PROGRESS NOTE ADULT - ASSESSMENT
KECIA MARTIN is a 57y man with a medical history significant for CKD and EtOH abuse who presented initially with progressive weakness, and is now in the critical care unit for severe metabolic derangements and acute on chronic renal failure.     Impression  metabolic encephalopathy / toxic secondary to alcohol withdrawal and uremia   Acute on Chronic Renal Failure  Uremia  Hyperkalemia  Severe Hyponatremia  Diabetes Mellitus  Hypertension      Plan:      CNS:  EEG follow result    Ativan taper w/ PRN CIWA-driven boluses  Precedex gtt to control agitation.  Antipsychotics PRN  Multivitamin, Folate, and Thiamine daily  neurology consult   HEENT:  Oral care    CARDIOVASCULAR    avoid low BP   anitra map > 65  keep is = os     PULMONARY  HOB @ 45 degrees, aspiration precautions      GASTROINTESTINAL NG feed   Feeds  check LFT ammonia level     BM: regimen PRN    GENITOURINARY/RENAL:    Monitor I&O: strict    Nephrology  follow up ,follow bun, cr  improving     INFECTIOUS  no acute concerns. Monitor fever, WBC.  replace K   HEMATOLOGIC  DVT ppx: heparin SQ    ENDOCRINE  Follow up FS.  Insulin protocol as needed.  BG goal 140-180    MSK/DERM  PT/OT when cooperative      CODE STATUS: FULL  DISPO: remain in ICU  d/c way   monitor with bladder scan

## 2022-11-17 NOTE — PROGRESS NOTE ADULT - SUBJECTIVE AND OBJECTIVE BOX
Patient is a 57y old  Male who presents with a chief complaint of acute on chronic renal failure w/ severe electrolyte imbalance (16 Nov 2022 14:18)      INTERVAL HPI/OVERNIGHT EVENTS:   No overnight events   Afebrile, hemodynamically stable     ICU Vital Signs Last 24 Hrs  T(C): 36.7 (17 Nov 2022 07:01), Max: 37.7 (16 Nov 2022 23:00)  T(F): 98.1 (17 Nov 2022 07:01), Max: 99.9 (16 Nov 2022 23:00)  HR: 89 (17 Nov 2022 06:45) (79 - 99)  BP: 183/90 (17 Nov 2022 06:45) (133/65 - 199/107)  BP(mean): 124 (17 Nov 2022 06:45) (93 - 142)  ABP: --  ABP(mean): --  RR: 24 (17 Nov 2022 07:01) (13 - 39)  SpO2: 97% (17 Nov 2022 06:45) (90% - 99%)    O2 Parameters below as of 17 Nov 2022 04:00  Patient On (Oxygen Delivery Method): room air          I&O's Summary    16 Nov 2022 07:01  -  17 Nov 2022 07:00  --------------------------------------------------------  IN: 3116 mL / OUT: 3160 mL / NET: -44 mL    17 Nov 2022 07:01  -  17 Nov 2022 07:42  --------------------------------------------------------  IN: 0 mL / OUT: 50 mL / NET: -50 mL          LABS:                        9.5    4.56  )-----------( 170      ( 17 Nov 2022 05:23 )             28.2     11-17    146  |  107  |  68<HH>  ----------------------------<  232<H>  3.4<L>   |  24  |  8.7<HH>    Ca    8.9      17 Nov 2022 05:23  Phos  4.5     11-17  Mg     1.5     11-17    TPro  6.2  /  Alb  3.1<L>  /  TBili  0.4  /  DBili  x   /  AST  23  /  ALT  18  /  AlkPhos  127<H>  11-17        CAPILLARY BLOOD GLUCOSE      POCT Blood Glucose.: 186 mg/dL (16 Nov 2022 23:56)  POCT Blood Glucose.: 171 mg/dL (16 Nov 2022 16:45)  POCT Blood Glucose.: 157 mg/dL (16 Nov 2022 11:21)        RADIOLOGY & ADDITIONAL TESTS:    Consultant(s) Notes Reviewed:  [x ] YES  [ ] NO    MEDICATIONS  (STANDING):  chlorhexidine 2% Cloths 1 Application(s) Topical <User Schedule>  dexMEDEtomidine Infusion 0.2 MICROgram(s)/kG/Hr (3.66 mL/Hr) IV Continuous <Continuous>  dextrose 5%. 1000 milliLiter(s) (50 mL/Hr) IV Continuous <Continuous>  dextrose 5%. 1000 milliLiter(s) (50 mL/Hr) IV Continuous <Continuous>  dextrose 5%. 1000 milliLiter(s) (100 mL/Hr) IV Continuous <Continuous>  dextrose 50% Injectable 12.5 Gram(s) IV Push once  dextrose 50% Injectable 25 Gram(s) IV Push once  dextrose 50% Injectable 25 Gram(s) IV Push once  folic acid 1 milliGRAM(s) Oral daily  glucagon  Injectable 1 milliGRAM(s) IntraMuscular once  heparin   Injectable 5000 Unit(s) SubCutaneous every 8 hours  hydrALAZINE 25 milliGRAM(s) Oral three times a day  insulin lispro (ADMELOG) corrective regimen sliding scale   SubCutaneous three times a day before meals  labetalol 100 milliGRAM(s) Oral two times a day  LORazepam   Injectable 2 milliGRAM(s) IV Push once  LORazepam   Injectable   IV Push   LORazepam   Injectable 0.5 milliGRAM(s) IV Push every 12 hours  magnesium sulfate  IVPB 2 Gram(s) IV Intermittent once  magnesium sulfate  IVPB 2 Gram(s) IV Intermittent once  multivitamin 1 Tablet(s) Oral daily  NIFEdipine XL 60 milliGRAM(s) Oral daily  ondansetron Injectable 4 milliGRAM(s) IV Push two times a day  pantoprazole  Injectable 40 milliGRAM(s) IV Push daily  potassium chloride  20 mEq/100 mL IVPB 20 milliEquivalent(s) IV Intermittent every 2 hours  sodium chloride 0.9%. 1000 milliLiter(s) (100 mL/Hr) IV Continuous <Continuous>  thiamine Injectable 100 milliGRAM(s) IV Push daily    MEDICATIONS  (PRN):  dextrose Oral Gel 15 Gram(s) Oral once PRN Blood Glucose LESS THAN 70 milliGRAM(s)/deciliter      PHYSICAL EXAM:  GENERAL: sedated on precedex   HEAD:  Atraumatic, Normocephalic  EYES: EOMI, PERRLA, conjunctiva and sclera clear  NECK: Supple, No JVD, Normal thyroid, no enlarged nodes  NERVOUS SYSTEM:  Alert & Awake.   CHEST/LUNG: B/L good air entry; No rales, rhonchi, or wheezing  HEART: regular on monitor  ABDOMEN: Soft, Nontender, Nondistended; Bowel sounds present  EXTREMITIES:  2+ Peripheral Pulses, No clubbing, cyanosis, or edema  LYMPH: No lymphadenopathy noted  SKIN: No rashes or lesions    Care Discussed with Consultants/Other Providers [ x] YES  [ ] NO

## 2022-11-17 NOTE — CONSULT NOTE ADULT - NS ATTEND AMEND GEN_ALL_CORE FT
Patient seen and examined and agree with above except as noted.  Patients history, notes, labs, imaging, vitals and meds reviewed personally.  Patient lethargic however able to give name, , age, Location, Month and year.  Identified daughter and wife  Likely toxic/metabolic encephalopathy; uremia and gabapentin use for severe neuropathy    Plan as above

## 2022-11-17 NOTE — CONSULT NOTE ADULT - SUBJECTIVE AND OBJECTIVE BOX
Neurology Consult  Pt is a 57M w/ PMH HTN, DMT2, CKD and ETOH abuse admitted for metabolic encephalopathy 2/2 ETOH WD and uremia. Neurology was consulted for AMS. Pt was on a precedex gtt at time of exam, which per RN had been lowered from this AM. Pt was AOx3 at time of exam and following all commands. Denies HA, dizziness, fever/chills, cough, rhinorrhea, vision changes, SOB, chest pain, NVD, abdominal pain, change in urination and change in BM.       HPI:  This is a 56 yo [place] male w/ PMH as noted below.  Pt comes to hospital w/3 week history progressive weakness.  Pt w. alcohol dependency last drink was this am.  In ER pt was found to have NA-114, creat-13.5, K-7.0 in alcohol withdrawal ( tremulous).  Pt was accepted to ICU by Intensivist.  (11 Nov 2022 19:24)      PAST MEDICAL & SURGICAL HISTORY:  Diabetes  Hypertension  HLD (hyperlipidemia)  Neuropathy  Glaucoma  Chronic kidney disease, unspecified CKD stage -requiring HD march 2022  Chronic alcohol abuse  No significant past surgical history    Allergies  No Known Allergies      MEDICATIONS  (STANDING):  chlorhexidine 2% Cloths 1 Application(s) Topical <User Schedule>  dexMEDEtomidine Infusion 0.2 MICROgram(s)/kG/Hr (3.66 mL/Hr) IV Continuous <Continuous>  dextrose 5%. 1000 milliLiter(s) (50 mL/Hr) IV Continuous <Continuous>  dextrose 5%. 1000 milliLiter(s) (100 mL/Hr) IV Continuous <Continuous>  dextrose 5%. 1000 milliLiter(s) (50 mL/Hr) IV Continuous <Continuous>  dextrose 50% Injectable 25 Gram(s) IV Push once  dextrose 50% Injectable 12.5 Gram(s) IV Push once  dextrose 50% Injectable 25 Gram(s) IV Push once  folic acid 1 milliGRAM(s) Oral daily  glucagon  Injectable 1 milliGRAM(s) IntraMuscular once  heparin   Injectable 5000 Unit(s) SubCutaneous every 8 hours  hydrALAZINE 25 milliGRAM(s) Oral three times a day  insulin lispro (ADMELOG) corrective regimen sliding scale   SubCutaneous three times a day before meals  labetalol 100 milliGRAM(s) Oral two times a day  LORazepam   Injectable 2 milliGRAM(s) IV Push once  LORazepam   Injectable   IV Push   LORazepam   Injectable 0.5 milliGRAM(s) IV Push every 12 hours  magnesium sulfate  IVPB 2 Gram(s) IV Intermittent once  multivitamin 1 Tablet(s) Oral daily  NIFEdipine XL 60 milliGRAM(s) Oral daily  ondansetron Injectable 4 milliGRAM(s) IV Push two times a day  pantoprazole  Injectable 40 milliGRAM(s) IV Push daily  potassium chloride  20 mEq/100 mL IVPB 20 milliEquivalent(s) IV Intermittent every 2 hours  sodium chloride 0.9%. 1000 milliLiter(s) (100 mL/Hr) IV Continuous <Continuous>  thiamine Injectable 100 milliGRAM(s) IV Push daily    MEDICATIONS  (PRN):  dextrose Oral Gel 15 Gram(s) Oral once PRN Blood Glucose LESS THAN 70 milliGRAM(s)/deciliter        Vital Signs Last 24 Hrs  T(C): 36.4 (17 Nov 2022 11:00), Max: 37.7 (16 Nov 2022 23:00)  T(F): 97.5 (17 Nov 2022 11:00), Max: 99.9 (16 Nov 2022 23:00)  HR: 82 (17 Nov 2022 11:00) (79 - 99)  BP: 182/88 (17 Nov 2022 11:00) (147/72 - 199/107)  BP(mean): 127 (17 Nov 2022 11:00) (103 - 142)  RR: 22 (17 Nov 2022 11:00) (13 - 39)  SpO2: 96% (17 Nov 2022 11:00) (93% - 99%)    Parameters below as of 17 Nov 2022 04:00  Patient On (Oxygen Delivery Method): room air        Examination:  General:  Appearance is consistent with chronologic age.    Cognitive/Language:  The patient is oriented to person, place, time and date.  Recent and remote memory intact.  Fund of knowledge is intact and normal.  Language with normal repetition, comprehension and naming.  Nondysarthric.    Face:  Facial sensation normal V1 - 3, no facial asymmetry.    Motor examination:   Normal tone, bulk and range of motion.  Freely moving all extremities  Sensory examination:   Intact to light touch and pinprick in all extremities.      Labs:                         9.5    4.56  )-----------( 170      ( 17 Nov 2022 05:23 )             28.2       11-17    146  |  107  |  68<HH>  ----------------------------<  232<H>  3.4<L>   |  24  |  8.7<HH>    Ca    8.9      17 Nov 2022 05:23  Phos  4.5     11-17  Mg     1.5     11-17    TPro  6.2  /  Alb  3.1<L>  /  TBili  0.4  /  DBili  x   /  AST  23  /  ALT  18  /  AlkPhos  127<H>  11-17    LIVER FUNCTIONS - ( 17 Nov 2022 05:23 )  Alb: 3.1 g/dL / Pro: 6.2 g/dL / ALK PHOS: 127 U/L / ALT: 18 U/L / AST: 23 U/L / GGT: x               < from: CT Head No Cont (11.15.22 @ 12:16) >  IMPRESSION:  No acute intracranial pathology. No evidence of midline shift, mass   effect or intracranial hemorrhage.    < end of copied text >

## 2022-11-18 LAB
ALBUMIN SERPL ELPH-MCNC: 3.4 G/DL — LOW (ref 3.5–5.2)
ALP SERPL-CCNC: 108 U/L — SIGNIFICANT CHANGE UP (ref 30–115)
ALT FLD-CCNC: 19 U/L — SIGNIFICANT CHANGE UP (ref 0–41)
ANION GAP SERPL CALC-SCNC: 17 MMOL/L — HIGH (ref 7–14)
AST SERPL-CCNC: 28 U/L — SIGNIFICANT CHANGE UP (ref 0–41)
BILIRUB SERPL-MCNC: 0.5 MG/DL — SIGNIFICANT CHANGE UP (ref 0.2–1.2)
BUN SERPL-MCNC: 58 MG/DL — HIGH (ref 10–20)
CALCIUM SERPL-MCNC: 9.3 MG/DL — SIGNIFICANT CHANGE UP (ref 8.4–10.5)
CHLORIDE SERPL-SCNC: 108 MMOL/L — SIGNIFICANT CHANGE UP (ref 98–110)
CO2 SERPL-SCNC: 23 MMOL/L — SIGNIFICANT CHANGE UP (ref 17–32)
CREAT SERPL-MCNC: 7.5 MG/DL — CRITICAL HIGH (ref 0.7–1.5)
EGFR: 8 ML/MIN/1.73M2 — LOW
GLUCOSE BLDC GLUCOMTR-MCNC: 172 MG/DL — HIGH (ref 70–99)
GLUCOSE BLDC GLUCOMTR-MCNC: 208 MG/DL — HIGH (ref 70–99)
GLUCOSE BLDC GLUCOMTR-MCNC: 283 MG/DL — HIGH (ref 70–99)
GLUCOSE SERPL-MCNC: 152 MG/DL — HIGH (ref 70–99)
HCT VFR BLD CALC: 31.6 % — LOW (ref 42–52)
HGB BLD-MCNC: 10.4 G/DL — LOW (ref 14–18)
MAGNESIUM SERPL-MCNC: 2.2 MG/DL — SIGNIFICANT CHANGE UP (ref 1.8–2.4)
MCHC RBC-ENTMCNC: 29.6 PG — SIGNIFICANT CHANGE UP (ref 27–31)
MCHC RBC-ENTMCNC: 32.9 G/DL — SIGNIFICANT CHANGE UP (ref 32–37)
MCV RBC AUTO: 90 FL — SIGNIFICANT CHANGE UP (ref 80–94)
NRBC # BLD: 0 /100 WBCS — SIGNIFICANT CHANGE UP (ref 0–0)
PHOSPHATE SERPL-MCNC: 3.5 MG/DL — SIGNIFICANT CHANGE UP (ref 2.1–4.9)
PLATELET # BLD AUTO: 200 K/UL — SIGNIFICANT CHANGE UP (ref 130–400)
POTASSIUM SERPL-MCNC: 3.9 MMOL/L — SIGNIFICANT CHANGE UP (ref 3.5–5)
POTASSIUM SERPL-SCNC: 3.9 MMOL/L — SIGNIFICANT CHANGE UP (ref 3.5–5)
PROT SERPL-MCNC: 6.6 G/DL — SIGNIFICANT CHANGE UP (ref 6–8)
RBC # BLD: 3.51 M/UL — LOW (ref 4.7–6.1)
RBC # FLD: 13.2 % — SIGNIFICANT CHANGE UP (ref 11.5–14.5)
SODIUM SERPL-SCNC: 148 MMOL/L — HIGH (ref 135–146)
WBC # BLD: 5.45 K/UL — SIGNIFICANT CHANGE UP (ref 4.8–10.8)
WBC # FLD AUTO: 5.45 K/UL — SIGNIFICANT CHANGE UP (ref 4.8–10.8)

## 2022-11-18 PROCEDURE — 71045 X-RAY EXAM CHEST 1 VIEW: CPT | Mod: 26

## 2022-11-18 PROCEDURE — 99233 SBSQ HOSP IP/OBS HIGH 50: CPT

## 2022-11-18 RX ORDER — SODIUM CHLORIDE 9 MG/ML
1000 INJECTION, SOLUTION INTRAVENOUS
Refills: 0 | Status: DISCONTINUED | OUTPATIENT
Start: 2022-11-18 | End: 2022-11-18

## 2022-11-18 RX ORDER — SENNA PLUS 8.6 MG/1
2 TABLET ORAL AT BEDTIME
Refills: 0 | Status: DISCONTINUED | OUTPATIENT
Start: 2022-11-18 | End: 2022-12-01

## 2022-11-18 RX ORDER — HYDRALAZINE HCL 50 MG
10 TABLET ORAL ONCE
Refills: 0 | Status: COMPLETED | OUTPATIENT
Start: 2022-11-18 | End: 2022-11-18

## 2022-11-18 RX ORDER — DEXMEDETOMIDINE HYDROCHLORIDE IN 0.9% SODIUM CHLORIDE 4 UG/ML
0.5 INJECTION INTRAVENOUS
Qty: 400 | Refills: 0 | Status: DISCONTINUED | OUTPATIENT
Start: 2022-11-18 | End: 2022-12-01

## 2022-11-18 RX ORDER — SODIUM CHLORIDE 9 MG/ML
1000 INJECTION, SOLUTION INTRAVENOUS
Refills: 0 | Status: DISCONTINUED | OUTPATIENT
Start: 2022-11-18 | End: 2022-11-21

## 2022-11-18 RX ADMIN — Medication 100 MILLIGRAM(S): at 17:11

## 2022-11-18 RX ADMIN — Medication 2: at 07:56

## 2022-11-18 RX ADMIN — Medication 25 MILLIGRAM(S): at 06:28

## 2022-11-18 RX ADMIN — DEXMEDETOMIDINE HYDROCHLORIDE IN 0.9% SODIUM CHLORIDE 9.14 MICROGRAM(S)/KG/HR: 4 INJECTION INTRAVENOUS at 15:57

## 2022-11-18 RX ADMIN — HEPARIN SODIUM 5000 UNIT(S): 5000 INJECTION INTRAVENOUS; SUBCUTANEOUS at 13:19

## 2022-11-18 RX ADMIN — Medication 1 MILLIGRAM(S): at 22:26

## 2022-11-18 RX ADMIN — DEXMEDETOMIDINE HYDROCHLORIDE IN 0.9% SODIUM CHLORIDE 9.14 MICROGRAM(S)/KG/HR: 4 INJECTION INTRAVENOUS at 20:00

## 2022-11-18 RX ADMIN — CHLORHEXIDINE GLUCONATE 1 APPLICATION(S): 213 SOLUTION TOPICAL at 06:28

## 2022-11-18 RX ADMIN — Medication 100 MILLIGRAM(S): at 17:12

## 2022-11-18 RX ADMIN — DEXMEDETOMIDINE HYDROCHLORIDE IN 0.9% SODIUM CHLORIDE 9.14 MICROGRAM(S)/KG/HR: 4 INJECTION INTRAVENOUS at 03:38

## 2022-11-18 RX ADMIN — Medication 10 MILLIGRAM(S): at 05:05

## 2022-11-18 RX ADMIN — Medication 1 TABLET(S): at 12:44

## 2022-11-18 RX ADMIN — ONDANSETRON 4 MILLIGRAM(S): 8 TABLET, FILM COATED ORAL at 17:12

## 2022-11-18 RX ADMIN — PANTOPRAZOLE SODIUM 40 MILLIGRAM(S): 20 TABLET, DELAYED RELEASE ORAL at 12:44

## 2022-11-18 RX ADMIN — Medication 6: at 12:43

## 2022-11-18 RX ADMIN — HEPARIN SODIUM 5000 UNIT(S): 5000 INJECTION INTRAVENOUS; SUBCUTANEOUS at 21:08

## 2022-11-18 RX ADMIN — HEPARIN SODIUM 5000 UNIT(S): 5000 INJECTION INTRAVENOUS; SUBCUTANEOUS at 06:28

## 2022-11-18 RX ADMIN — Medication 10 MILLIGRAM(S): at 23:08

## 2022-11-18 RX ADMIN — ONDANSETRON 4 MILLIGRAM(S): 8 TABLET, FILM COATED ORAL at 06:30

## 2022-11-18 RX ADMIN — SODIUM CHLORIDE 75 MILLILITER(S): 9 INJECTION, SOLUTION INTRAVENOUS at 17:10

## 2022-11-18 RX ADMIN — Medication 1 MILLIGRAM(S): at 12:44

## 2022-11-18 RX ADMIN — Medication 10 MILLIGRAM(S): at 00:07

## 2022-11-18 RX ADMIN — Medication 100 MILLIGRAM(S): at 06:28

## 2022-11-18 RX ADMIN — SODIUM CHLORIDE 75 MILLILITER(S): 9 INJECTION, SOLUTION INTRAVENOUS at 13:21

## 2022-11-18 RX ADMIN — Medication 25 MILLIGRAM(S): at 21:08

## 2022-11-18 RX ADMIN — Medication 25 MILLIGRAM(S): at 13:19

## 2022-11-18 RX ADMIN — Medication 2 MILLIGRAM(S): at 00:35

## 2022-11-18 RX ADMIN — Medication 4: at 17:10

## 2022-11-18 NOTE — PROGRESS NOTE ADULT - ASSESSMENT
#  Severe VAMSI on CKD, ATN in nature  - renal function improving nicely over last few days  - very good urine output, likely have post ATN diuresis now  #  HTN uncontrolled now, seems to have component of agitation  #  Alcohol intoxication in pt w/ alcohol abuse syndrome, remains on Lorazepam for DT treatment/prevention    Recommendations:  - change IV fluids to 1/2NS at 75cc/hr  - encourage po hydration if tolerating or via NGT   - BP was controlled in AM, elevated this PM may be due to agitation, monitor   - will follow

## 2022-11-18 NOTE — PROGRESS NOTE ADULT - SUBJECTIVE AND OBJECTIVE BOX
SUBJECTIVE:    Patient is a 57y old Male who presents with a chief complaint of acute on chronic renal failure w/ severe electrolyte imbalance (18 Nov 2022 08:10)    Currently admitted to medicine with the primary diagnosis of Acute renal failure       Today is hospital day 7d. This morning he is resting comfortably in bed and reports no new issues or overnight events.     ROS:   CONSTITUTIONAL: No weakness, fevers or chills   EYES/ENT: No visual changes; No vertigo or throat pain   NECK: No pain or stiffness   RESPIRATORY: No cough, wheezing, hemoptysis; No shortness of breath   CARDIOVASCULAR: No chest pain or palpitations   GASTROINTESTINAL: No abdominal or epigastric pain. No nausea, vomiting, or hematemesis; No diarrhea or constipation. No melena or hematochezia.  GENITOURINARY: No dysuria, frequency or hematuria  NEUROLOGICAL: No numbness or weakness        PAST MEDICAL & SURGICAL HISTORY  Diabetes    Hypertension    HLD (hyperlipidemia)    Neuropathy    Glaucoma    Chronic kidney disease, unspecified CKD stage  requiring HD march 2022    Chronic alcohol abuse    No significant past surgical history      SOCIAL HISTORY:    ALLERGIES:  No Known Allergies    MEDICATIONS:  STANDING MEDICATIONS  chlorhexidine 2% Cloths 1 Application(s) Topical <User Schedule>  dexMEDEtomidine Infusion 0.5 MICROgram(s)/kG/Hr IV Continuous <Continuous>  dextrose 5%. 1000 milliLiter(s) IV Continuous <Continuous>  dextrose 5%. 1000 milliLiter(s) IV Continuous <Continuous>  dextrose 5%. 1000 milliLiter(s) IV Continuous <Continuous>  dextrose 50% Injectable 25 Gram(s) IV Push once  dextrose 50% Injectable 12.5 Gram(s) IV Push once  dextrose 50% Injectable 25 Gram(s) IV Push once  folic acid 1 milliGRAM(s) Oral daily  glucagon  Injectable 1 milliGRAM(s) IntraMuscular once  heparin   Injectable 5000 Unit(s) SubCutaneous every 8 hours  hydrALAZINE 25 milliGRAM(s) Oral three times a day  insulin lispro (ADMELOG) corrective regimen sliding scale   SubCutaneous three times a day before meals  labetalol 100 milliGRAM(s) Oral two times a day  multivitamin 1 Tablet(s) Oral daily  NIFEdipine XL 60 milliGRAM(s) Oral daily  ondansetron Injectable 4 milliGRAM(s) IV Push two times a day  pantoprazole  Injectable 40 milliGRAM(s) IV Push daily  thiamine Injectable 100 milliGRAM(s) IV Push daily    PRN MEDICATIONS  dextrose Oral Gel 15 Gram(s) Oral once PRN  LORazepam   Injectable 1 milliGRAM(s) IV Push at bedtime PRN    VITALS:   T(F): 97  HR: 96  BP: 180/97  RR: 20  SpO2: 97%    LABS:                          10.4   5.45  )-----------( 200      ( 18 Nov 2022 05:23 )             31.6     11-18    148<H>  |  108  |  58<H>  ----------------------------<  152<H>  3.9   |  23  |  7.5<HH>    Ca    9.3      18 Nov 2022 05:23  Phos  3.5     11-18  Mg     2.2     11-18    TPro  6.6  /  Alb  3.4<L>  /  TBili  0.5  /  DBili  x   /  AST  28  /  ALT  19  /  AlkPhos  108  11-18    RADIOLOGY:  < from: Xray Chest 1 View- PORTABLE-Routine (Xray Chest 1 View- PORTABLE-Routine in AM.) (11.18.22 @ 05:27) >    Impression:    No radiographic evidence of acute cardiopulmonary disease.    < end of copied text >    PHYSICAL EXAM:  GEN: No acute distress  HEENT: normocephalic, atraumatic, aniceteric  LUNGS: Clear to auscultation bilaterally, no rales/wheezing/ rhonchi  HEART: S1/S2 present. RRR, no murmurs  ABD: Soft, non-tender, non-distended. Bowel sounds present  EXT: no edema   NEURO: AAOX2, normal affect      ASSESSMENT AND PLAN:      # Metabolic encephalopathy / toxic secondary to alcohol withdrawal and uremia   # Suspected thiamine and folate deficiency  # Hypomagnasemia   # VAMSI on CKD3 (non oliguric), improving  # Uremia (non oliguric)   # Hyperkalemia  # Severe Hyponatremia  # H/O Diabetes Mellitus  # H/O Hypertension    - CTH neg/ ammonia level noted unremarkable / EEG negative  - wean precedex gtt , c/w ativan   - neurology eval appreciated  - Nephrology following, no need for RRT at this time    - check bladder scan , passed TOV  - monitor and correct electrolytes  - on precedex gtt and ativan  - thiamine/folate  - addiction following  - dvt ppx  - on ngt feeds

## 2022-11-18 NOTE — PROGRESS NOTE ADULT - SUBJECTIVE AND OBJECTIVE BOX
Patient is a 57y old  Male who presents with a chief complaint of acute on chronic renal failure w/ severe electrolyte imbalance (17 Nov 2022 12:12)      Over Night Events:  Patient seen and examined.   still combative when sedation lower     ROS:  See HPI    PHYSICAL EXAM    ICU Vital Signs Last 24 Hrs  T(C): 36.1 (18 Nov 2022 04:02), Max: 37.7 (17 Nov 2022 19:05)  T(F): 97 (18 Nov 2022 04:02), Max: 99.9 (17 Nov 2022 19:05)  HR: 80 (18 Nov 2022 07:01) (80 - 111)  BP: 126/71 (18 Nov 2022 07:01) (126/71 - 201/98)  BP(mean): 92 (18 Nov 2022 07:01) (92 - 141)  ABP: --  ABP(mean): --  RR: 16 (18 Nov 2022 07:01) (14 - 39)  SpO2: 97% (18 Nov 2022 07:01) (95% - 98%)    O2 Parameters below as of 17 Nov 2022 19:00  Patient On (Oxygen Delivery Method): room air            General:agitation   HEENT:    reuben            Lymph Nodes: NO cervical LN   Lungs: Bilateral BS  Cardiovascular: Regular   Abdomen: Soft, Positive BS  Extremities: No clubbing   Skin: warm   Neurological: no focal   Musculoskeletal: move all ext     I&O's Detail    17 Nov 2022 07:01  -  18 Nov 2022 07:00  --------------------------------------------------------  IN:    Dexmedetomidine: 35 mL    Dexmedetomidine: 43 mL    Enteral Tube Flush: 100 mL    IV PiggyBack: 450 mL    Nepro with Carb Steady: 480 mL    sodium chloride 0.9%: 2300 mL  Total IN: 3408 mL    OUT:    Incontinent per Condom Catheter (mL): 1585 mL    Indwelling Catheter - Urethral (mL): 875 mL    Post-Void Residual per Intermittent Catheterization (mL): 350 mL  Total OUT: 2810 mL    Total NET: 598 mL      18 Nov 2022 07:01  -  18 Nov 2022 08:11  --------------------------------------------------------  IN:    Enteral Tube Flush: 50 mL    Nepro with Carb Steady: 240 mL  Total IN: 290 mL    OUT:    Incontinent per Condom Catheter (mL): 10 mL  Total OUT: 10 mL    Total NET: 280 mL          LABS:                          10.4   5.45  )-----------( 200      ( 18 Nov 2022 05:23 )             31.6         18 Nov 2022 05:23    148    |  108    |  58     ----------------------------<  152    3.9     |  23     |  7.5      Ca    9.3        18 Nov 2022 05:23  Phos  3.5       18 Nov 2022 05:23  Mg     2.2       18 Nov 2022 05:23    TPro  6.6    /  Alb  3.4    /  TBili  0.5    /  DBili  x      /  AST  28     /  ALT  19     /  AlkPhos  108    18 Nov 2022 05:23  Amylase x     lipase x                                                                                                                                                                                                                                         MEDICATIONS  (STANDING):  chlorhexidine 2% Cloths 1 Application(s) Topical <User Schedule>  dexMEDEtomidine Infusion 0.5 MICROgram(s)/kG/Hr (9.14 mL/Hr) IV Continuous <Continuous>  dextrose 5%. 1000 milliLiter(s) (100 mL/Hr) IV Continuous <Continuous>  dextrose 5%. 1000 milliLiter(s) (50 mL/Hr) IV Continuous <Continuous>  dextrose 5%. 1000 milliLiter(s) (50 mL/Hr) IV Continuous <Continuous>  dextrose 50% Injectable 25 Gram(s) IV Push once  dextrose 50% Injectable 12.5 Gram(s) IV Push once  dextrose 50% Injectable 25 Gram(s) IV Push once  folic acid 1 milliGRAM(s) Oral daily  glucagon  Injectable 1 milliGRAM(s) IntraMuscular once  heparin   Injectable 5000 Unit(s) SubCutaneous every 8 hours  hydrALAZINE 25 milliGRAM(s) Oral three times a day  insulin lispro (ADMELOG) corrective regimen sliding scale   SubCutaneous three times a day before meals  labetalol 100 milliGRAM(s) Oral two times a day  lactated ringers. 1000 milliLiter(s) (100 mL/Hr) IV Continuous <Continuous>  multivitamin 1 Tablet(s) Oral daily  NIFEdipine XL 60 milliGRAM(s) Oral daily  ondansetron Injectable 4 milliGRAM(s) IV Push two times a day  pantoprazole  Injectable 40 milliGRAM(s) IV Push daily  thiamine Injectable 100 milliGRAM(s) IV Push daily    MEDICATIONS  (PRN):  dextrose Oral Gel 15 Gram(s) Oral once PRN Blood Glucose LESS THAN 70 milliGRAM(s)/deciliter  LORazepam   Injectable 1 milliGRAM(s) IV Push at bedtime PRN Agitation          Xrays:  TLC:  OG:  ET tube:                                                                                    decrease b/l opacity    ECHO:  [facility] ICU:

## 2022-11-18 NOTE — PROGRESS NOTE ADULT - ASSESSMENT
KECIA MARTIN is a 57y man with a medical history significant for CKD and EtOH abuse who presented initially with progressive weakness, and is now in the critical care unit for severe metabolic derangements and acute on chronic renal failure.     Impression  metabolic encephalopathy / toxic secondary to alcohol withdrawal and uremia   Acute on Chronic Renal Failure stage 4/5  Uremia  Hyperkalemia  Severe Hyponatremia  Diabetes Mellitusgrf  Hypertension  hypomagnesemia  suspected thiamine and folate deficiency      Plan:    CNS:   ct 11/15 neg  EEG - slowing - no change per neuro  standing Ativan taper w/ PRN CIWA-driven boluses  Precedex gtt to control agitation.   Multivitamin, Folate, and Thiamine daily    HEENT:  Oral care    CARDIOVASCULAR    avoid low BP   anitra map > 65  keep is = os     PULMONARY  HOB @ 45 degrees, aspiration precautions    GASTROINTESTINAL NG feed   Feeds  passed S&S - start renal diet   - ammonia lvl unremarkable     BM:   - senna    GENITOURINARY/RENAL:  condom cath -passed trial of void; keep for now     Monitor I&O: strict    Nephrology  follow up ,follow bun, cr   - follow lytes daily  - keep K 4-5; Mg 2; replete as needed    INFECTIOUS  no acute concerns. Monitor fever, WBC.    HEMATOLOGIC  DVT ppx: heparin SQ    ENDOCRINE  Follow up FS.  Insulin protocol as needed.  BG goal 140-180    MSK/DERM  PT/OT     CODE STATUS: FULL  DISPO: remain in ICU KECIA MARTIN is a 57y man with a medical history significant for CKD and EtOH abuse who presented initially with progressive weakness, and is now in the critical care unit for severe metabolic derangements and acute on chronic renal failure.     Impression  metabolic encephalopathy / toxic secondary to alcohol withdrawal and uremia   Acute on Chronic Renal Failure stage 4/5  Uremia  Hyperkalemia  Severe Hyponatremia  Diabetes Mellitusgrf  Hypertension  hypomagnesemia  suspected thiamine and folate deficiency      Plan:    CNS:   ct 11/15 neg  EEG - slowing - no change per neuro  standing Ativan taper w/ PRN CIWA-driven boluses  Precedex gtt to control agitation.   Multivitamin, Folate, and Thiamine daily    HEENT:  Oral care    CARDIOVASCULAR    avoid low BP   anitra map > 65  keep is = os     PULMONARY  HOB @ 45 degrees, aspiration precautions    GASTROINTESTINAL NG feed   Feeds  passed S&S - start renal diet   - ammonia lvl unremarkable     BM:   - senna  - passed s&s for thin liquid, puree, or soft/bite sized    GENITOURINARY/RENAL:  condom cath -passed trial of void; keep for now     Monitor I&O: strict    Nephrology  follow up ,follow bun, cr   - follow lytes daily  - keep K 4-5; Mg 2; replete as needed    INFECTIOUS  no acute concerns. Monitor fever, WBC.    HEMATOLOGIC  DVT ppx: heparin SQ    ENDOCRINE  Follow up FS.  Insulin protocol as needed.  BG goal 140-180    MSK/DERM  PT/OT     CODE STATUS: FULL  DISPO: remain in ICU

## 2022-11-18 NOTE — PROGRESS NOTE ADULT - SUBJECTIVE AND OBJECTIVE BOX
Patient is a 57y old  Male who presents with a chief complaint of acute on chronic renal failure w/ severe electrolyte imbalance (18 Nov 2022 12:05)      INTERVAL HPI/OVERNIGHT EVENTS:   No overnight events   Afebrile, hemodynamically stable     ICU Vital Signs Last 24 Hrs  T(C): 36.1 (18 Nov 2022 04:02), Max: 37.7 (17 Nov 2022 19:05)  T(F): 97 (18 Nov 2022 04:02), Max: 99.9 (17 Nov 2022 19:05)  HR: 100 (18 Nov 2022 13:01) (80 - 111)  BP: 190/90 (18 Nov 2022 13:01) (119/62 - 201/98)  BP(mean): 129 (18 Nov 2022 13:01) (81 - 141)  ABP: --  ABP(mean): --  RR: 23 (18 Nov 2022 13:01) (14 - 39)  SpO2: 98% (18 Nov 2022 13:01) (96% - 98%)    O2 Parameters below as of 17 Nov 2022 19:00  Patient On (Oxygen Delivery Method): room air          I&O's Summary    17 Nov 2022 07:01  -  18 Nov 2022 07:00  --------------------------------------------------------  IN: 3408 mL / OUT: 2810 mL / NET: 598 mL    18 Nov 2022 07:01  -  18 Nov 2022 13:44  --------------------------------------------------------  IN: 290 mL / OUT: 460 mL / NET: -170 mL          LABS:                        10.4   5.45  )-----------( 200      ( 18 Nov 2022 05:23 )             31.6     11-18    148<H>  |  108  |  58<H>  ----------------------------<  152<H>  3.9   |  23  |  7.5<HH>    Ca    9.3      18 Nov 2022 05:23  Phos  3.5     11-18  Mg     2.2     11-18    TPro  6.6  /  Alb  3.4<L>  /  TBili  0.5  /  DBili  x   /  AST  28  /  ALT  19  /  AlkPhos  108  11-18        CAPILLARY BLOOD GLUCOSE      POCT Blood Glucose.: 283 mg/dL (18 Nov 2022 12:39)  POCT Blood Glucose.: 172 mg/dL (18 Nov 2022 07:32)  POCT Blood Glucose.: 203 mg/dL (17 Nov 2022 22:36)  POCT Blood Glucose.: 151 mg/dL (17 Nov 2022 17:38)        RADIOLOGY & ADDITIONAL TESTS:    Consultant(s) Notes Reviewed:  [x ] YES  [ ] NO    MEDICATIONS  (STANDING):  chlorhexidine 2% Cloths 1 Application(s) Topical <User Schedule>  dexMEDEtomidine Infusion 0.5 MICROgram(s)/kG/Hr (9.14 mL/Hr) IV Continuous <Continuous>  dextrose 5% + sodium chloride 0.45%. 1000 milliLiter(s) (75 mL/Hr) IV Continuous <Continuous>  dextrose 5%. 1000 milliLiter(s) (50 mL/Hr) IV Continuous <Continuous>  dextrose 5%. 1000 milliLiter(s) (100 mL/Hr) IV Continuous <Continuous>  dextrose 5%. 1000 milliLiter(s) (50 mL/Hr) IV Continuous <Continuous>  dextrose 50% Injectable 25 Gram(s) IV Push once  dextrose 50% Injectable 12.5 Gram(s) IV Push once  dextrose 50% Injectable 25 Gram(s) IV Push once  folic acid 1 milliGRAM(s) Oral daily  glucagon  Injectable 1 milliGRAM(s) IntraMuscular once  heparin   Injectable 5000 Unit(s) SubCutaneous every 8 hours  hydrALAZINE 25 milliGRAM(s) Oral three times a day  insulin lispro (ADMELOG) corrective regimen sliding scale   SubCutaneous three times a day before meals  labetalol 100 milliGRAM(s) Oral two times a day  multivitamin 1 Tablet(s) Oral daily  NIFEdipine XL 60 milliGRAM(s) Oral daily  ondansetron Injectable 4 milliGRAM(s) IV Push two times a day  pantoprazole  Injectable 40 milliGRAM(s) IV Push daily  thiamine Injectable 100 milliGRAM(s) IV Push daily    MEDICATIONS  (PRN):  dextrose Oral Gel 15 Gram(s) Oral once PRN Blood Glucose LESS THAN 70 milliGRAM(s)/deciliter  LORazepam   Injectable 1 milliGRAM(s) IV Push at bedtime PRN Agitation      PHYSICAL EXAM:  GENERAL: NAD, a&ox4 on low dose precedex  HEAD:  Atraumatic, Normocephalic, NGT in place  EYES: EOMI, PERRLA, conjunctiva and sclera clear  NECK: Supple, No JVD, Normal thyroid, no enlarged nodes  NERVOUS SYSTEM:  Alert & Awake.   CHEST/LUNG: B/L good air entry; No rales, rhonchi, or wheezing  HEART: S1S2 normal, no S3, Regular rate and rhythm; No murmurs  ABDOMEN: Soft, Nontender, Nondistended; Bowel sounds present  EXTREMITIES:  2+ Peripheral Pulses, No clubbing, cyanosis, or edema  LYMPH: No lymphadenopathy noted  SKIN: dry w/ chronic changes;dry skin BLE, no edema or erythema    Care Discussed with Consultants/Other Providers [ x] YES  [ ] NO

## 2022-11-18 NOTE — PROGRESS NOTE ADULT - SUBJECTIVE AND OBJECTIVE BOX
Nephrology Progress Note    KECIA MARTIN  MRN-[id number]  57y  Male    S:  Patient is seen and examined, events over the last 24h noted.    O:  Allergies:  No Known Allergies    Hospital Medications:   MEDICATIONS  (STANDING):  chlorhexidine 2% Cloths 1 Application(s) Topical <User Schedule>  dexMEDEtomidine Infusion 0.5 MICROgram(s)/kG/Hr (9.14 mL/Hr) IV Continuous <Continuous>  dextrose 5%. 1000 milliLiter(s) (50 mL/Hr) IV Continuous <Continuous>  dextrose 5%. 1000 milliLiter(s) (100 mL/Hr) IV Continuous <Continuous>  dextrose 5%. 1000 milliLiter(s) (50 mL/Hr) IV Continuous <Continuous>  dextrose 50% Injectable 25 Gram(s) IV Push once  dextrose 50% Injectable 12.5 Gram(s) IV Push once  dextrose 50% Injectable 25 Gram(s) IV Push once  folic acid 1 milliGRAM(s) Oral daily  glucagon  Injectable 1 milliGRAM(s) IntraMuscular once  heparin   Injectable 5000 Unit(s) SubCutaneous every 8 hours  hydrALAZINE 25 milliGRAM(s) Oral three times a day  insulin lispro (ADMELOG) corrective regimen sliding scale   SubCutaneous three times a day before meals  labetalol 100 milliGRAM(s) Oral two times a day  lactated ringers. 1000 milliLiter(s) (75 mL/Hr) IV Continuous <Continuous>  multivitamin 1 Tablet(s) Oral daily  NIFEdipine XL 60 milliGRAM(s) Oral daily  ondansetron Injectable 4 milliGRAM(s) IV Push two times a day  pantoprazole  Injectable 40 milliGRAM(s) IV Push daily  senna 2 Tablet(s) Oral at bedtime  thiamine Injectable 100 milliGRAM(s) IV Push daily    MEDICATIONS  (PRN):  dextrose Oral Gel 15 Gram(s) Oral once PRN Blood Glucose LESS THAN 70 milliGRAM(s)/deciliter  LORazepam   Injectable 1 milliGRAM(s) IV Push at bedtime PRN Agitation    Home Medications:  acetaminophen 325 mg oral tablet: 2 tab(s) orally every 6 hours, As needed, Temp greater or equal to 38C (100.4F), Mild Pain (1 - 3) (25 Mar 2022 11:36)  atorvastatin 40 mg oral tablet: 1 tab(s) orally once a day (at bedtime) (25 Mar 2022 11:36)  DULoxetine 30 mg oral delayed release capsule: 1 cap(s) orally 2 times a day (25 Jul 2021 16:56)  lisinopril 20 mg oral tablet: 1 tab(s) orally once a day (25 Jul 2021 16:56)  NIFEdipine 60 mg oral tablet, extended release: 1 tab(s) orally once a day (25 Mar 2022 11:36)  pantoprazole 40 mg oral delayed release tablet: 1 tab(s) orally once a day (before a meal) (25 Mar 2022 11:36)      VITALS:  T(F): 98.3 (11-18-22 @ 15:15), Max: 99.9 (11-17-22 @ 19:05)  HR: 108 (11-18-22 @ 17:10)  BP: 191/78 (11-18-22 @ 17:10)  RR: 31 (11-18-22 @ 17:10)  SpO2: 96% (11-18-22 @ 17:10)  Wt(kg): --  I&O's Detail    17 Nov 2022 07:01  -  18 Nov 2022 07:00  --------------------------------------------------------  IN:    Dexmedetomidine: 35 mL    Dexmedetomidine: 43 mL    Enteral Tube Flush: 100 mL    IV PiggyBack: 450 mL    Nepro with Carb Steady: 480 mL    sodium chloride 0.9%: 2300 mL  Total IN: 3408 mL    OUT:    Incontinent per Condom Catheter (mL): 1585 mL    Indwelling Catheter - Urethral (mL): 875 mL    Post-Void Residual per Intermittent Catheterization (mL): 350 mL  Total OUT: 2810 mL    Total NET: 598 mL      18 Nov 2022 07:01  -  18 Nov 2022 18:12  --------------------------------------------------------  IN:    Dexmedetomidine: 55 mL    Enteral Tube Flush: 50 mL    Nepro with Carb Steady: 240 mL  Total IN: 345 mL    OUT:    Incontinent per Condom Catheter (mL): 1440 mL  Total OUT: 1440 mL    Total NET: -1095 mL        I&O's Summary    17 Nov 2022 07:01  -  18 Nov 2022 07:00  --------------------------------------------------------  IN: 3408 mL / OUT: 2810 mL / NET: 598 mL    18 Nov 2022 07:01  -  18 Nov 2022 18:12  --------------------------------------------------------  IN: 345 mL / OUT: 1440 mL / NET: -1095 mL          PHYSICAL EXAM:  Gen: NAD  Resp: b/l breath sounds  Card: S1/S2  Abd: soft  Extremities: no edema, scaly      LABS:    11-18    148<H>  |  108  |  58<H>  ----------------------------<  152<H>  3.9   |  23  |  7.5<HH>    Ca    9.3      18 Nov 2022 05:23  Phos  3.5     11-18  Mg     2.2     11-18    TPro  6.6  /  Alb  3.4<L>  /  TBili  0.5  /  DBili      /  AST  28  /  ALT  19  /  AlkPhos  108  11-18    Phosphorus Level, Serum: 3.5 mg/dL (11-18-22 @ 05:23)  Phosphorus Level, Serum: 4.5 mg/dL (11-17-22 @ 05:23)    Intact PTH: 311 pg/mL (03-16-22 @ 11:45)                          10.4   5.45  )-----------( 200      ( 18 Nov 2022 05:23 )             31.6     Mean Cell Volume: 90.0 fL (11-18-22 @ 05:23)      Creatinine trend:  Creatinine, Serum: 7.5 mg/dL (11-18-22 @ 05:23)  Creatinine, Serum: 8.7 mg/dL (11-17-22 @ 05:23)  Creatinine, Serum: 10.9 mg/dL (11-16-22 @ 05:24)  Creatinine, Serum: 11.9 mg/dL (11-15-22 @ 05:51)  Creatinine, Serum: 12.9 mg/dL (11-14-22 @ 06:44)  Creatinine, Serum: 13.0 mg/dL (11-13-22 @ 21:38)

## 2022-11-18 NOTE — PROGRESS NOTE ADULT - ASSESSMENT
KECIA MARTIN is a 57y man with a medical history significant for CKD and EtOH abuse who presented initially with progressive weakness, and is now in the critical care unit for severe metabolic derangements and acute on chronic renal failure.     Impression  metabolic encephalopathy / toxic secondary to alcohol withdrawal and uremia   Acute on Chronic Renal Failure  Uremia  Hyperkalemia  Severe Hyponatremia resolved   Diabetes Mellitus  Hypertension  hypernatremia now     Plan:      CNS:  EEG  result reviewed    Ativan taper w/ PRN CIWA-driven boluses  Precedex gtt to control agitation.  Antipsychotics PRN  Multivitamin, Folate, and Thiamine daily  neurology consult appreciated   HEENT:  Oral care    CARDIOVASCULAR    avoid low BP   anitra map > 65  keep is = os   on 1/2 ns   PULMONARY  HOB @ 45 degrees, aspiration precautions      GASTROINTESTINAL NG feed   Feeds   start free water 250cc Q4 hrs via NG  BM: regimen PRN    GENITOURINARY/RENAL:    Monitor I&O: strict    Nephrology  follow up ,follow bun, cr  improving     INFECTIOUS  no acute concerns. Monitor fever, WBC.  replace K   HEMATOLOGIC  DVT ppx: heparin SQ    ENDOCRINE  Follow up FS.  Insulin protocol as needed.  BG goal 140-180    MSK/DERM  PT/OT when cooperative      CODE STATUS: FULL  DISPO: remain in ICU  monitor with bladder scan

## 2022-11-18 NOTE — PHYSICAL THERAPY INITIAL EVALUATION ADULT - SPECIFY REASON(S)
Attempted to see patient for bedside PT, pt's BP is 194/93, discussed with MELVI Anderson covering for Shannon OGDEN, to hold PT at this time. Will continue to follow up as appropriate.

## 2022-11-19 LAB
ALBUMIN SERPL ELPH-MCNC: 3.6 G/DL — SIGNIFICANT CHANGE UP (ref 3.5–5.2)
ALP SERPL-CCNC: 92 U/L — SIGNIFICANT CHANGE UP (ref 30–115)
ALT FLD-CCNC: 20 U/L — SIGNIFICANT CHANGE UP (ref 0–41)
ANION GAP SERPL CALC-SCNC: 17 MMOL/L — HIGH (ref 7–14)
AST SERPL-CCNC: 32 U/L — SIGNIFICANT CHANGE UP (ref 0–41)
BILIRUB SERPL-MCNC: 0.4 MG/DL — SIGNIFICANT CHANGE UP (ref 0.2–1.2)
BUN SERPL-MCNC: 51 MG/DL — HIGH (ref 10–20)
CALCIUM SERPL-MCNC: 9.4 MG/DL — SIGNIFICANT CHANGE UP (ref 8.4–10.5)
CHLORIDE SERPL-SCNC: 110 MMOL/L — SIGNIFICANT CHANGE UP (ref 98–110)
CO2 SERPL-SCNC: 23 MMOL/L — SIGNIFICANT CHANGE UP (ref 17–32)
CREAT SERPL-MCNC: 6.3 MG/DL — CRITICAL HIGH (ref 0.7–1.5)
EGFR: 10 ML/MIN/1.73M2 — LOW
GLUCOSE BLDC GLUCOMTR-MCNC: 192 MG/DL — HIGH (ref 70–99)
GLUCOSE BLDC GLUCOMTR-MCNC: 194 MG/DL — HIGH (ref 70–99)
GLUCOSE BLDC GLUCOMTR-MCNC: 207 MG/DL — HIGH (ref 70–99)
GLUCOSE BLDC GLUCOMTR-MCNC: 263 MG/DL — HIGH (ref 70–99)
GLUCOSE SERPL-MCNC: 188 MG/DL — HIGH (ref 70–99)
HCT VFR BLD CALC: 27.8 % — LOW (ref 42–52)
HGB BLD-MCNC: 8.9 G/DL — LOW (ref 14–18)
MAGNESIUM SERPL-MCNC: 2 MG/DL — SIGNIFICANT CHANGE UP (ref 1.8–2.4)
MCHC RBC-ENTMCNC: 29.2 PG — SIGNIFICANT CHANGE UP (ref 27–31)
MCHC RBC-ENTMCNC: 32 G/DL — SIGNIFICANT CHANGE UP (ref 32–37)
MCV RBC AUTO: 91.1 FL — SIGNIFICANT CHANGE UP (ref 80–94)
NRBC # BLD: 0 /100 WBCS — SIGNIFICANT CHANGE UP (ref 0–0)
PHOSPHATE SERPL-MCNC: 4 MG/DL — SIGNIFICANT CHANGE UP (ref 2.1–4.9)
PLATELET # BLD AUTO: 214 K/UL — SIGNIFICANT CHANGE UP (ref 130–400)
POTASSIUM SERPL-MCNC: 4.2 MMOL/L — SIGNIFICANT CHANGE UP (ref 3.5–5)
POTASSIUM SERPL-SCNC: 4.2 MMOL/L — SIGNIFICANT CHANGE UP (ref 3.5–5)
PROT SERPL-MCNC: 6.7 G/DL — SIGNIFICANT CHANGE UP (ref 6–8)
RBC # BLD: 3.05 M/UL — LOW (ref 4.7–6.1)
RBC # FLD: 13.6 % — SIGNIFICANT CHANGE UP (ref 11.5–14.5)
SODIUM SERPL-SCNC: 150 MMOL/L — HIGH (ref 135–146)
WBC # BLD: 7.15 K/UL — SIGNIFICANT CHANGE UP (ref 4.8–10.8)
WBC # FLD AUTO: 7.15 K/UL — SIGNIFICANT CHANGE UP (ref 4.8–10.8)

## 2022-11-19 PROCEDURE — 99232 SBSQ HOSP IP/OBS MODERATE 35: CPT

## 2022-11-19 PROCEDURE — 99233 SBSQ HOSP IP/OBS HIGH 50: CPT

## 2022-11-19 PROCEDURE — 71045 X-RAY EXAM CHEST 1 VIEW: CPT | Mod: 26

## 2022-11-19 RX ORDER — ACETAMINOPHEN 500 MG
650 TABLET ORAL EVERY 6 HOURS
Refills: 0 | Status: DISCONTINUED | OUTPATIENT
Start: 2022-11-19 | End: 2022-12-01

## 2022-11-19 RX ADMIN — Medication 1 MILLIGRAM(S): at 11:41

## 2022-11-19 RX ADMIN — Medication 650 MILLIGRAM(S): at 20:15

## 2022-11-19 RX ADMIN — SODIUM CHLORIDE 75 MILLILITER(S): 9 INJECTION, SOLUTION INTRAVENOUS at 05:30

## 2022-11-19 RX ADMIN — HEPARIN SODIUM 5000 UNIT(S): 5000 INJECTION INTRAVENOUS; SUBCUTANEOUS at 13:18

## 2022-11-19 RX ADMIN — HEPARIN SODIUM 5000 UNIT(S): 5000 INJECTION INTRAVENOUS; SUBCUTANEOUS at 22:01

## 2022-11-19 RX ADMIN — PANTOPRAZOLE SODIUM 40 MILLIGRAM(S): 20 TABLET, DELAYED RELEASE ORAL at 11:41

## 2022-11-19 RX ADMIN — SODIUM CHLORIDE 75 MILLILITER(S): 9 INJECTION, SOLUTION INTRAVENOUS at 20:16

## 2022-11-19 RX ADMIN — DEXMEDETOMIDINE HYDROCHLORIDE IN 0.9% SODIUM CHLORIDE 9.14 MICROGRAM(S)/KG/HR: 4 INJECTION INTRAVENOUS at 20:16

## 2022-11-19 RX ADMIN — Medication 100 MILLIGRAM(S): at 05:32

## 2022-11-19 RX ADMIN — SENNA PLUS 2 TABLET(S): 8.6 TABLET ORAL at 22:01

## 2022-11-19 RX ADMIN — Medication 25 MILLIGRAM(S): at 05:31

## 2022-11-19 RX ADMIN — Medication 25 MILLIGRAM(S): at 13:18

## 2022-11-19 RX ADMIN — Medication 1 TABLET(S): at 11:41

## 2022-11-19 RX ADMIN — Medication 650 MILLIGRAM(S): at 21:15

## 2022-11-19 RX ADMIN — DEXMEDETOMIDINE HYDROCHLORIDE IN 0.9% SODIUM CHLORIDE 9.14 MICROGRAM(S)/KG/HR: 4 INJECTION INTRAVENOUS at 10:09

## 2022-11-19 RX ADMIN — Medication 100 MILLIGRAM(S): at 12:23

## 2022-11-19 RX ADMIN — ONDANSETRON 4 MILLIGRAM(S): 8 TABLET, FILM COATED ORAL at 05:33

## 2022-11-19 RX ADMIN — Medication 6: at 11:40

## 2022-11-19 RX ADMIN — Medication 2 MILLIGRAM(S): at 00:15

## 2022-11-19 RX ADMIN — Medication 25 MILLIGRAM(S): at 22:01

## 2022-11-19 RX ADMIN — HEPARIN SODIUM 5000 UNIT(S): 5000 INJECTION INTRAVENOUS; SUBCUTANEOUS at 05:31

## 2022-11-19 RX ADMIN — CHLORHEXIDINE GLUCONATE 1 APPLICATION(S): 213 SOLUTION TOPICAL at 05:32

## 2022-11-19 RX ADMIN — SODIUM CHLORIDE 75 MILLILITER(S): 9 INJECTION, SOLUTION INTRAVENOUS at 17:57

## 2022-11-19 RX ADMIN — Medication 100 MILLIGRAM(S): at 17:03

## 2022-11-19 RX ADMIN — Medication 4: at 16:45

## 2022-11-19 NOTE — PHYSICAL THERAPY INITIAL EVALUATION ADULT - ACTIVE RANGE OF MOTION EXAMINATION, REHAB EVAL
Both upper extremities/Both lower extremities joints within functional limits and painfree AAROM with high arch noted on (B) ankles

## 2022-11-19 NOTE — PROGRESS NOTE ADULT - SUBJECTIVE AND OBJECTIVE BOX
SUBJECTIVE:    Patient is a 57y old Male who presents with a chief complaint of acute on chronic renal failure w/ severe electrolyte imbalance (19 Nov 2022 06:24)    Currently admitted to medicine with the primary diagnosis of Acute renal failure       Today is hospital day 8d. This morning he is resting comfortably in bed and reports no new issues or overnight events.     PAST MEDICAL & SURGICAL HISTORY  Diabetes    Hypertension    HLD (hyperlipidemia)    Neuropathy    Glaucoma    Chronic kidney disease, unspecified CKD stage  requiring HD march 2022    Chronic alcohol abuse    No significant past surgical history      SOCIAL HISTORY:  Negative for smoking/alcohol/drug use.     ALLERGIES:  No Known Allergies    MEDICATIONS:  STANDING MEDICATIONS  chlorhexidine 2% Cloths 1 Application(s) Topical <User Schedule>  dexMEDEtomidine Infusion 0.5 MICROgram(s)/kG/Hr IV Continuous <Continuous>  dextrose 5%. 1000 milliLiter(s) IV Continuous <Continuous>  dextrose 5%. 1000 milliLiter(s) IV Continuous <Continuous>  dextrose 5%. 1000 milliLiter(s) IV Continuous <Continuous>  dextrose 50% Injectable 25 Gram(s) IV Push once  dextrose 50% Injectable 12.5 Gram(s) IV Push once  dextrose 50% Injectable 25 Gram(s) IV Push once  folic acid 1 milliGRAM(s) Oral daily  glucagon  Injectable 1 milliGRAM(s) IntraMuscular once  heparin   Injectable 5000 Unit(s) SubCutaneous every 8 hours  hydrALAZINE 25 milliGRAM(s) Oral three times a day  insulin lispro (ADMELOG) corrective regimen sliding scale   SubCutaneous three times a day before meals  labetalol 100 milliGRAM(s) Oral two times a day  lactated ringers. 1000 milliLiter(s) IV Continuous <Continuous>  multivitamin 1 Tablet(s) Oral daily  NIFEdipine XL 60 milliGRAM(s) Oral daily  ondansetron Injectable 4 milliGRAM(s) IV Push two times a day  pantoprazole  Injectable 40 milliGRAM(s) IV Push daily  senna 2 Tablet(s) Oral at bedtime  thiamine Injectable 100 milliGRAM(s) IV Push daily    PRN MEDICATIONS  dextrose Oral Gel 15 Gram(s) Oral once PRN  LORazepam   Injectable 1 milliGRAM(s) IV Push at bedtime PRN    VITALS:   T(F): 99.2  HR: 98  BP: 162/94  RR: 22  SpO2: 95%    PHYSICAL EXAM:  GEN: No acute distress  LUNGS: Clear to auscultation bilaterally   HEART: S1/S2 present.   ABD: Soft, non-tender, non-distended. Bowel sounds present         LABS:                        8.9    7.15  )-----------( 214      ( 19 Nov 2022 05:45 )             27.8     11-19    150<H>  |  110  |  51<H>  ----------------------------<  188<H>  4.2   |  23  |  x     Ca    9.4      19 Nov 2022 05:45  Phos  4.0     11-19  Mg     2.0     11-19    TPro  6.7  /  Alb  3.6  /  TBili  0.4  /  DBili  x   /  AST  32  /  ALT  20  /  AlkPhos  92  11-19                      RADIOLOGY:

## 2022-11-19 NOTE — PHYSICAL THERAPY INITIAL EVALUATION ADULT - GAIT DEVIATIONS NOTED, PT EVAL
trunk, hips, and knees flexed; dec KARTHIK incomplete foot clearance,/decreased yumiko/decreased step length/decreased weight-shifting ability

## 2022-11-19 NOTE — PHYSICAL THERAPY INITIAL EVALUATION ADULT - ADDITIONAL COMMENTS
Per patient, they live in private home with no steps outside, 7 steps inside with (R) rail going up; was using a rolling walker for long distances only

## 2022-11-19 NOTE — PROGRESS NOTE ADULT - SUBJECTIVE AND OBJECTIVE BOX
Nephrology progress note    Patient is seen and examined, events over the last 24 h noted .    Allergies:  No Known Allergies    Hospital Medications:   MEDICATIONS  (STANDING):  chlorhexidine 2% Cloths 1 Application(s) Topical <User Schedule>  dexMEDEtomidine Infusion 0.5 MICROgram(s)/kG/Hr (9.14 mL/Hr) IV Continuous <Continuous>  dextrose 5%. 1000 milliLiter(s) (50 mL/Hr) IV Continuous <Continuous>  dextrose 5%. 1000 milliLiter(s) (100 mL/Hr) IV Continuous <Continuous>  dextrose 5%. 1000 milliLiter(s) (50 mL/Hr) IV Continuous <Continuous>  dextrose 50% Injectable 25 Gram(s) IV Push once  dextrose 50% Injectable 12.5 Gram(s) IV Push once  dextrose 50% Injectable 25 Gram(s) IV Push once  folic acid 1 milliGRAM(s) Oral daily  glucagon  Injectable 1 milliGRAM(s) IntraMuscular once  heparin   Injectable 5000 Unit(s) SubCutaneous every 8 hours  hydrALAZINE 25 milliGRAM(s) Oral three times a day  insulin lispro (ADMELOG) corrective regimen sliding scale   SubCutaneous three times a day before meals  labetalol 100 milliGRAM(s) Oral two times a day  lactated ringers. 1000 milliLiter(s) (75 mL/Hr) IV Continuous <Continuous>  multivitamin 1 Tablet(s) Oral daily  NIFEdipine XL 60 milliGRAM(s) Oral daily  ondansetron Injectable 4 milliGRAM(s) IV Push two times a day  pantoprazole  Injectable 40 milliGRAM(s) IV Push daily  senna 2 Tablet(s) Oral at bedtime  thiamine Injectable 100 milliGRAM(s) IV Push daily        VITALS:  T(F): 99.2 (11-19-22 @ 07:11), Max: 100.6 (11-18-22 @ 19:00)  HR: 98 (11-19-22 @ 06:00)  BP: 162/94 (11-19-22 @ 06:00)  RR: 25 (11-19-22 @ 09:00)  SpO2: 95% (11-19-22 @ 06:00)  Wt(kg): --    11-17 @ 07:01  -  11-18 @ 07:00  --------------------------------------------------------  IN: 3408 mL / OUT: 2810 mL / NET: 598 mL    11-18 @ 07:01  -  11-19 @ 07:00  --------------------------------------------------------  IN: 1317 mL / OUT: 2640 mL / NET: -1323 mL          PHYSICAL EXAM:  Constitutional: NAD  HEENT: anicteric sclera,  Neck: No JVD  Respiratory: CTA  Cardiovascular: S1, S2, RRR  Gastrointestinal: BS+, soft, NT/ND  Extremities: No peripheral edema  Neurological: A/O x 3  : + way.   Skin: No rashes  Vascular Access:    LABS:  11-19    150<H>  |  110  |  51<H>  ----------------------------<  188<H>  4.2   |  23  |  6.3<HH>  Creatinine Trend: 6.3<--, 7.5<--, 8.7<--, 10.9<--, 11.9<--, 12.9<--    Ca    9.4      19 Nov 2022 05:45  Phos  4.0     11-19  Mg     2.0     11-19    TPro  6.7  /  Alb  3.6  /  TBili  0.4  /  DBili      /  AST  32  /  ALT  20  /  AlkPhos  92  11-19                          8.9    7.15  )-----------( 214      ( 19 Nov 2022 05:45 )             27.8       Urine Studies:      RADIOLOGY & ADDITIONAL STUDIES:

## 2022-11-19 NOTE — PHYSICAL THERAPY INITIAL EVALUATION ADULT - PERTINENT HX OF CURRENT PROBLEM, REHAB EVAL
56 y/o male admitted with diagnoses of Acute renal failure, metabolic hyperkalemia, hyponatremia; with progressive weakness, alcohol dependency, and severe electrolyte imbalance from home

## 2022-11-19 NOTE — PROGRESS NOTE ADULT - ASSESSMENT
#  Severe VAMSI on CKD, ATN in nature  - renal function improving nicely over last few days  - very good urine output, likely have post ATN diuresis now  #  HTN uncontrolled now, seems to have component of agitation  #  Alcohol intoxication in pt w/ alcohol abuse syndrome, remains on Lorazepam for DT treatment/prevention  #Hypernatremia - needs more free water  Recommendations:  - change IV fluids to 1/2NS at 75cc/hr. encourage po free water intake Na 150  - encourage po hydration if tolerating or via NGT   - BP was controlled in AM, elevated this PM may be due to agitation, monitor   - will follow

## 2022-11-19 NOTE — PROGRESS NOTE ADULT - ASSESSMENT
# Metabolic encephalopathy / toxic secondary to alcohol withdrawal and uremia   # Suspected thiamine and folate deficiency  # Hypomagnasemia   # VAMSI on CKD3 (non oliguric), improving  # Uremia (non oliguric)   # Hyperkalemia  # Severe Hyponatremia improved now hypernatremia?  # H/O Diabetes Mellitus  # H/O Hypertension    - CTH neg/ ammonia level noted unremarkable / EEG negative  - wean precedex gtt , c/w ativan   - neurology eval appreciated  - Nephrology following, no need for RRT at this time    - check bladder scan , passed TOV  - monitor and correct electrolytes  - on precedex gtt and ativan  - thiamine/folate  - addiction following  - dvt ppx  - on ngt feeds   - Monitor BMP    Brock Woods MD  Attending Hospitalist

## 2022-11-19 NOTE — PROGRESS NOTE ADULT - SUBJECTIVE AND OBJECTIVE BOX
Patient is a 57y old  Male who presents with a chief complaint of acute on chronic renal failure w/ severe electrolyte imbalance (18 Nov 2022 18:12)      Over Night Events:  Patient seen and examined.   more awake but still lehtargic     ROS:  See HPI    PHYSICAL EXAM    ICU Vital Signs Last 24 Hrs  T(C): 37.9 (19 Nov 2022 04:30), Max: 38.1 (18 Nov 2022 19:00)  T(F): 100.3 (19 Nov 2022 04:30), Max: 100.6 (18 Nov 2022 19:00)  HR: 103 (19 Nov 2022 05:00) (80 - 127)  BP: 148/79 (19 Nov 2022 05:00) (119/62 - 205/98)  BP(mean): 106 (19 Nov 2022 05:00) (81 - 146)  ABP: --  ABP(mean): --  RR: 22 (19 Nov 2022 05:00) (16 - 41)  SpO2: 95% (19 Nov 2022 05:00) (95% - 98%)    O2 Parameters below as of 19 Nov 2022 04:30  Patient On (Oxygen Delivery Method): nasal IMV            General: lethargic   HEENT:     reuben           Lymph Nodes: NO cervical LN   Lungs: Bilateral BS  Cardiovascular: Regular   Abdomen: Soft, Positive BS  Extremities: No clubbing   Skin: warm   Neurological: mo focal   Musculoskeletal: move all ext     I&O's Detail    17 Nov 2022 07:01  -  18 Nov 2022 07:00  --------------------------------------------------------  IN:    Dexmedetomidine: 35 mL    Dexmedetomidine: 43 mL    Enteral Tube Flush: 100 mL    IV PiggyBack: 450 mL    Nepro with Carb Steady: 480 mL    sodium chloride 0.9%: 2300 mL  Total IN: 3408 mL    OUT:    Incontinent per Condom Catheter (mL): 1585 mL    Indwelling Catheter - Urethral (mL): 875 mL    Post-Void Residual per Intermittent Catheterization (mL): 350 mL  Total OUT: 2810 mL    Total NET: 598 mL      18 Nov 2022 07:01  -  19 Nov 2022 06:24  --------------------------------------------------------  IN:    Dexmedetomidine: 114 mL    Enteral Tube Flush: 50 mL    Lactated Ringers: 750 mL    Nepro with Carb Steady: 240 mL  Total IN: 1154 mL    OUT:    Incontinent per Condom Catheter (mL): 1440 mL  Total OUT: 1440 mL    Total NET: -286 mL          LABS:                          10.4   5.45  )-----------( 200      ( 18 Nov 2022 05:23 )             31.6         18 Nov 2022 05:23    148    |  108    |  58     ----------------------------<  152    3.9     |  23     |  7.5      Ca    9.3        18 Nov 2022 05:23  Phos  3.5       18 Nov 2022 05:23  Mg     2.2       18 Nov 2022 05:23                                                                                                                                                                                                                                       MEDICATIONS  (STANDING):  chlorhexidine 2% Cloths 1 Application(s) Topical <User Schedule>  dexMEDEtomidine Infusion 0.5 MICROgram(s)/kG/Hr (9.14 mL/Hr) IV Continuous <Continuous>  dextrose 5%. 1000 milliLiter(s) (50 mL/Hr) IV Continuous <Continuous>  dextrose 5%. 1000 milliLiter(s) (50 mL/Hr) IV Continuous <Continuous>  dextrose 5%. 1000 milliLiter(s) (100 mL/Hr) IV Continuous <Continuous>  dextrose 50% Injectable 25 Gram(s) IV Push once  dextrose 50% Injectable 12.5 Gram(s) IV Push once  dextrose 50% Injectable 25 Gram(s) IV Push once  folic acid 1 milliGRAM(s) Oral daily  glucagon  Injectable 1 milliGRAM(s) IntraMuscular once  heparin   Injectable 5000 Unit(s) SubCutaneous every 8 hours  hydrALAZINE 25 milliGRAM(s) Oral three times a day  insulin lispro (ADMELOG) corrective regimen sliding scale   SubCutaneous three times a day before meals  labetalol 100 milliGRAM(s) Oral two times a day  lactated ringers. 1000 milliLiter(s) (75 mL/Hr) IV Continuous <Continuous>  multivitamin 1 Tablet(s) Oral daily  NIFEdipine XL 60 milliGRAM(s) Oral daily  ondansetron Injectable 4 milliGRAM(s) IV Push two times a day  pantoprazole  Injectable 40 milliGRAM(s) IV Push daily  senna 2 Tablet(s) Oral at bedtime  thiamine Injectable 100 milliGRAM(s) IV Push daily    MEDICATIONS  (PRN):  dextrose Oral Gel 15 Gram(s) Oral once PRN Blood Glucose LESS THAN 70 milliGRAM(s)/deciliter  LORazepam   Injectable 1 milliGRAM(s) IV Push at bedtime PRN Agitation          Xrays:  TLC:  OG:  ET tube:                                                                                    stable    ECHO:  CAM ICU:

## 2022-11-19 NOTE — PHYSICAL THERAPY INITIAL EVALUATION ADULT - GENERAL OBSERVATIONS, REHAB EVAL
08:30-09:00 Chart reviewed. Pt encountered semireclined in bed, may be seen by Physical Therapist as confirmed with Nurse. Patient denied pain at rest and ready to get up now; +tele; +NGT; +IV LUE; +condom catheter

## 2022-11-20 LAB
ALBUMIN SERPL ELPH-MCNC: 3.5 G/DL — SIGNIFICANT CHANGE UP (ref 3.5–5.2)
ALP SERPL-CCNC: 82 U/L — SIGNIFICANT CHANGE UP (ref 30–115)
ALT FLD-CCNC: 18 U/L — SIGNIFICANT CHANGE UP (ref 0–41)
ANION GAP SERPL CALC-SCNC: 17 MMOL/L — HIGH (ref 7–14)
AST SERPL-CCNC: 31 U/L — SIGNIFICANT CHANGE UP (ref 0–41)
BILIRUB SERPL-MCNC: 0.5 MG/DL — SIGNIFICANT CHANGE UP (ref 0.2–1.2)
BUN SERPL-MCNC: 46 MG/DL — HIGH (ref 10–20)
CALCIUM SERPL-MCNC: 9.3 MG/DL — SIGNIFICANT CHANGE UP (ref 8.4–10.5)
CHLORIDE SERPL-SCNC: 103 MMOL/L — SIGNIFICANT CHANGE UP (ref 98–110)
CO2 SERPL-SCNC: 24 MMOL/L — SIGNIFICANT CHANGE UP (ref 17–32)
CREAT SERPL-MCNC: 5.4 MG/DL — CRITICAL HIGH (ref 0.7–1.5)
EGFR: 11 ML/MIN/1.73M2 — LOW
GLUCOSE BLDC GLUCOMTR-MCNC: 149 MG/DL — HIGH (ref 70–99)
GLUCOSE BLDC GLUCOMTR-MCNC: 176 MG/DL — HIGH (ref 70–99)
GLUCOSE BLDC GLUCOMTR-MCNC: 185 MG/DL — HIGH (ref 70–99)
GLUCOSE BLDC GLUCOMTR-MCNC: 214 MG/DL — HIGH (ref 70–99)
GLUCOSE BLDC GLUCOMTR-MCNC: 215 MG/DL — HIGH (ref 70–99)
GLUCOSE SERPL-MCNC: 162 MG/DL — HIGH (ref 70–99)
HCT VFR BLD CALC: 28.3 % — LOW (ref 42–52)
HGB BLD-MCNC: 9 G/DL — LOW (ref 14–18)
MAGNESIUM SERPL-MCNC: 1.6 MG/DL — LOW (ref 1.8–2.4)
MCHC RBC-ENTMCNC: 29.4 PG — SIGNIFICANT CHANGE UP (ref 27–31)
MCHC RBC-ENTMCNC: 31.8 G/DL — LOW (ref 32–37)
MCV RBC AUTO: 92.5 FL — SIGNIFICANT CHANGE UP (ref 80–94)
NRBC # BLD: 0 /100 WBCS — SIGNIFICANT CHANGE UP (ref 0–0)
PHOSPHATE SERPL-MCNC: 4.1 MG/DL — SIGNIFICANT CHANGE UP (ref 2.1–4.9)
PLATELET # BLD AUTO: 189 K/UL — SIGNIFICANT CHANGE UP (ref 130–400)
POTASSIUM SERPL-MCNC: 3.8 MMOL/L — SIGNIFICANT CHANGE UP (ref 3.5–5)
POTASSIUM SERPL-SCNC: 3.8 MMOL/L — SIGNIFICANT CHANGE UP (ref 3.5–5)
PROT SERPL-MCNC: 6.5 G/DL — SIGNIFICANT CHANGE UP (ref 6–8)
RBC # BLD: 3.06 M/UL — LOW (ref 4.7–6.1)
RBC # FLD: 13.7 % — SIGNIFICANT CHANGE UP (ref 11.5–14.5)
SODIUM SERPL-SCNC: 144 MMOL/L — SIGNIFICANT CHANGE UP (ref 135–146)
WBC # BLD: 8.89 K/UL — SIGNIFICANT CHANGE UP (ref 4.8–10.8)
WBC # FLD AUTO: 8.89 K/UL — SIGNIFICANT CHANGE UP (ref 4.8–10.8)

## 2022-11-20 PROCEDURE — 99233 SBSQ HOSP IP/OBS HIGH 50: CPT

## 2022-11-20 PROCEDURE — 99232 SBSQ HOSP IP/OBS MODERATE 35: CPT

## 2022-11-20 PROCEDURE — 71045 X-RAY EXAM CHEST 1 VIEW: CPT | Mod: 26

## 2022-11-20 RX ORDER — THIAMINE MONONITRATE (VIT B1) 100 MG
100 TABLET ORAL DAILY
Refills: 0 | Status: DISCONTINUED | OUTPATIENT
Start: 2022-11-20 | End: 2022-12-01

## 2022-11-20 RX ORDER — MAGNESIUM SULFATE 500 MG/ML
2 VIAL (ML) INJECTION ONCE
Refills: 0 | Status: COMPLETED | OUTPATIENT
Start: 2022-11-20 | End: 2022-11-20

## 2022-11-20 RX ORDER — PANTOPRAZOLE SODIUM 20 MG/1
40 TABLET, DELAYED RELEASE ORAL
Refills: 0 | Status: DISCONTINUED | OUTPATIENT
Start: 2022-11-20 | End: 2022-12-01

## 2022-11-20 RX ADMIN — Medication 25 MILLIGRAM(S): at 05:40

## 2022-11-20 RX ADMIN — Medication 100 MILLIGRAM(S): at 05:40

## 2022-11-20 RX ADMIN — PANTOPRAZOLE SODIUM 40 MILLIGRAM(S): 20 TABLET, DELAYED RELEASE ORAL at 12:14

## 2022-11-20 RX ADMIN — Medication 100 MILLIGRAM(S): at 17:48

## 2022-11-20 RX ADMIN — Medication 650 MILLIGRAM(S): at 03:15

## 2022-11-20 RX ADMIN — HEPARIN SODIUM 5000 UNIT(S): 5000 INJECTION INTRAVENOUS; SUBCUTANEOUS at 23:39

## 2022-11-20 RX ADMIN — Medication 1 MILLIGRAM(S): at 12:03

## 2022-11-20 RX ADMIN — Medication 1 TABLET(S): at 12:03

## 2022-11-20 RX ADMIN — Medication 4: at 12:00

## 2022-11-20 RX ADMIN — DEXMEDETOMIDINE HYDROCHLORIDE IN 0.9% SODIUM CHLORIDE 9.14 MICROGRAM(S)/KG/HR: 4 INJECTION INTRAVENOUS at 12:00

## 2022-11-20 RX ADMIN — SODIUM CHLORIDE 75 MILLILITER(S): 9 INJECTION, SOLUTION INTRAVENOUS at 05:40

## 2022-11-20 RX ADMIN — Medication 2: at 08:00

## 2022-11-20 RX ADMIN — CHLORHEXIDINE GLUCONATE 1 APPLICATION(S): 213 SOLUTION TOPICAL at 05:41

## 2022-11-20 RX ADMIN — SODIUM CHLORIDE 75 MILLILITER(S): 9 INJECTION, SOLUTION INTRAVENOUS at 18:07

## 2022-11-20 RX ADMIN — SENNA PLUS 2 TABLET(S): 8.6 TABLET ORAL at 23:39

## 2022-11-20 RX ADMIN — Medication 12.5 GRAM(S): at 13:03

## 2022-11-20 RX ADMIN — Medication 650 MILLIGRAM(S): at 02:15

## 2022-11-20 RX ADMIN — HEPARIN SODIUM 5000 UNIT(S): 5000 INJECTION INTRAVENOUS; SUBCUTANEOUS at 14:23

## 2022-11-20 RX ADMIN — Medication 60 MILLIGRAM(S): at 02:15

## 2022-11-20 RX ADMIN — Medication 100 MILLIGRAM(S): at 12:15

## 2022-11-20 RX ADMIN — HEPARIN SODIUM 5000 UNIT(S): 5000 INJECTION INTRAVENOUS; SUBCUTANEOUS at 05:40

## 2022-11-20 RX ADMIN — Medication 2: at 17:46

## 2022-11-20 RX ADMIN — Medication 25 MILLIGRAM(S): at 23:39

## 2022-11-20 NOTE — PROGRESS NOTE ADULT - SUBJECTIVE AND OBJECTIVE BOX
Patient is a 57y old  Male who presents with a chief complaint of acute on chronic renal failure w/ severe electrolyte imbalance (19 Nov 2022 09:52)      Over Night Events:  Patient seen and examined.   today look better more awake   follow command     ROS:  See HPI    PHYSICAL EXAM    ICU Vital Signs Last 24 Hrs  T(C): 37.6 (20 Nov 2022 03:30), Max: 37.9 (19 Nov 2022 19:00)  T(F): 99.6 (20 Nov 2022 03:30), Max: 100.3 (19 Nov 2022 19:00)  HR: 104 (20 Nov 2022 06:00) (87 - 116)  BP: 140/80 (20 Nov 2022 06:00) (132/74 - 202/102)  BP(mean): 105 (20 Nov 2022 06:00) (97 - 144)  ABP: --  ABP(mean): --  RR: 20 (20 Nov 2022 06:00) (14 - 28)  SpO2: 94% (20 Nov 2022 06:00) (94% - 97%)    O2 Parameters below as of 20 Nov 2022 06:00  Patient On (Oxygen Delivery Method): room air            General:awake   HEENT:       reuben         Lymph Nodes: NO cervical LN   Lungs: Bilateral BS  Cardiovascular: Regular   Abdomen: Soft, Positive BS  Extremities: No clubbing   Skin: warm   Neurological: follow comman d  Musculoskeletal: move all ext     I&O's Detail    18 Nov 2022 07:01  -  19 Nov 2022 07:00  --------------------------------------------------------  IN:    Dexmedetomidine: 127 mL    Enteral Tube Flush: 50 mL    Lactated Ringers: 900 mL    Nepro with Carb Steady: 240 mL  Total IN: 1317 mL    OUT:    Incontinent per Condom Catheter (mL): 1440 mL    Voided (mL): 1200 mL  Total OUT: 2640 mL    Total NET: -1323 mL      19 Nov 2022 07:01  -  20 Nov 2022 06:33  --------------------------------------------------------  IN:    Dexmedetomidine: 98 mL    Lactated Ringers: 1725 mL    Oral Fluid: 540 mL  Total IN: 2363 mL    OUT:    Incontinent per Condom Catheter (mL): 700 mL  Total OUT: 700 mL    Total NET: 1663 mL          LABS:                          8.9    7.15  )-----------( 214      ( 19 Nov 2022 05:45 )             27.8         19 Nov 2022 05:45    150    |  110    |  51     ----------------------------<  188    4.2     |  23     |  6.3      Ca    9.4        19 Nov 2022 05:45  Phos  4.0       19 Nov 2022 05:45  Mg     2.0       19 Nov 2022 05:45                                                                                                                                                                                                                                       MEDICATIONS  (STANDING):  chlorhexidine 2% Cloths 1 Application(s) Topical <User Schedule>  dexMEDEtomidine Infusion 0.5 MICROgram(s)/kG/Hr (9.14 mL/Hr) IV Continuous <Continuous>  dextrose 5%. 1000 milliLiter(s) (50 mL/Hr) IV Continuous <Continuous>  dextrose 5%. 1000 milliLiter(s) (50 mL/Hr) IV Continuous <Continuous>  dextrose 5%. 1000 milliLiter(s) (100 mL/Hr) IV Continuous <Continuous>  dextrose 50% Injectable 25 Gram(s) IV Push once  dextrose 50% Injectable 12.5 Gram(s) IV Push once  dextrose 50% Injectable 25 Gram(s) IV Push once  folic acid 1 milliGRAM(s) Oral daily  glucagon  Injectable 1 milliGRAM(s) IntraMuscular once  heparin   Injectable 5000 Unit(s) SubCutaneous every 8 hours  hydrALAZINE 25 milliGRAM(s) Oral three times a day  insulin lispro (ADMELOG) corrective regimen sliding scale   SubCutaneous three times a day before meals  labetalol 100 milliGRAM(s) Oral two times a day  lactated ringers. 1000 milliLiter(s) (75 mL/Hr) IV Continuous <Continuous>  multivitamin 1 Tablet(s) Oral daily  NIFEdipine XL 60 milliGRAM(s) Oral daily  pantoprazole  Injectable 40 milliGRAM(s) IV Push daily  senna 2 Tablet(s) Oral at bedtime  thiamine Injectable 100 milliGRAM(s) IV Push daily    MEDICATIONS  (PRN):  acetaminophen     Tablet .. 650 milliGRAM(s) Oral every 6 hours PRN Temp greater or equal to 38C (100.4F), Mild Pain (1 - 3)  dextrose Oral Gel 15 Gram(s) Oral once PRN Blood Glucose LESS THAN 70 milliGRAM(s)/deciliter  LORazepam   Injectable 1 milliGRAM(s) IV Push at bedtime PRN Agitation          Xrays:  TLC:  OG:  ET tube:                                                                                       ECHO:  CAM ICU:

## 2022-11-20 NOTE — PROGRESS NOTE ADULT - SUBJECTIVE AND OBJECTIVE BOX
Nephrology progress note    Patient was seen and examined, events over the last 24 h noted .    Allergies:  No Known Allergies    Hospital Medications:   MEDICATIONS  (STANDING):  chlorhexidine 2% Cloths 1 Application(s) Topical <User Schedule>  dexMEDEtomidine Infusion 0.5 MICROgram(s)/kG/Hr (9.14 mL/Hr) IV Continuous <Continuous>  dextrose 5%. 1000 milliLiter(s) (50 mL/Hr) IV Continuous <Continuous>  dextrose 5%. 1000 milliLiter(s) (100 mL/Hr) IV Continuous <Continuous>  dextrose 50% Injectable 25 Gram(s) IV Push once  dextrose 50% Injectable 12.5 Gram(s) IV Push once  dextrose 50% Injectable 25 Gram(s) IV Push once  folic acid 1 milliGRAM(s) Oral daily  glucagon  Injectable 1 milliGRAM(s) IntraMuscular once  heparin   Injectable 5000 Unit(s) SubCutaneous every 8 hours  hydrALAZINE 25 milliGRAM(s) Oral three times a day  insulin lispro (ADMELOG) corrective regimen sliding scale   SubCutaneous three times a day before meals  labetalol 100 milliGRAM(s) Oral two times a day  lactated ringers. 1000 milliLiter(s) (75 mL/Hr) IV Continuous <Continuous>  multivitamin 1 Tablet(s) Oral daily  NIFEdipine XL 60 milliGRAM(s) Oral daily  pantoprazole    Tablet 40 milliGRAM(s) Oral before breakfast  senna 2 Tablet(s) Oral at bedtime  thiamine 100 milliGRAM(s) Oral daily        VITALS:  T(F): 98.9 (11-20-22 @ 15:10), Max: 100.3 (11-19-22 @ 19:00)  HR: 94 (11-20-22 @ 14:13)  BP: 126/84 (11-20-22 @ 14:13)  RR: 23 (11-20-22 @ 17:04)  SpO2: 94% (11-20-22 @ 14:13)  Wt(kg): --    11-18 @ 07:01  -  11-19 @ 07:00  --------------------------------------------------------  IN: 1317 mL / OUT: 2640 mL / NET: -1323 mL    11-19 @ 07:01  -  11-20 @ 07:00  --------------------------------------------------------  IN: 2363 mL / OUT: 1700 mL / NET: 663 mL    11-20 @ 07:01  -  11-20 @ 18:30  --------------------------------------------------------  IN: 603 mL / OUT: 300 mL / NET: 303 mL          PHYSICAL EXAM:  Constitutional: NAD  HEENT: anicteric sclera, oropharynx clear, MMM  Neck: No JVD  Respiratory: CTAB, no wheezes, rales or rhonchi  Cardiovascular: S1, S2, RRR  Gastrointestinal: BS+, soft, NT/ND  Extremities: No cyanosis or clubbing. No peripheral edema  :  No way.   Skin: No rashes    LABS:  11-20    144  |  103  |  46<H>  ----------------------------<  162<H>  3.8   |  24  |  5.4<HH>    Ca    9.3      20 Nov 2022 05:42  Phos  4.1     11-20  Mg     1.6     11-20    TPro  6.5  /  Alb  3.5  /  TBili  0.5  /  DBili      /  AST  31  /  ALT  18  /  AlkPhos  82  11-20                          9.0    8.89  )-----------( 189      ( 20 Nov 2022 05:42 )             28.3       Urine Studies:      RADIOLOGY & ADDITIONAL STUDIES:

## 2022-11-20 NOTE — PROGRESS NOTE ADULT - SUBJECTIVE AND OBJECTIVE BOX
SUBJECTIVE:    Patient is a 57y old Male who presents with a chief complaint of acute on chronic renal failure w/ severe electrolyte imbalance (20 Nov 2022 06:33)    Currently admitted to medicine with the primary diagnosis of Acute renal failure       Today is hospital day 9d. This morning he is resting comfortably in bed and reports no new issues or overnight events.     PAST MEDICAL & SURGICAL HISTORY  Diabetes    Hypertension    HLD (hyperlipidemia)    Neuropathy    Glaucoma    Chronic kidney disease, unspecified CKD stage  requiring HD march 2022    Chronic alcohol abuse    No significant past surgical history      SOCIAL HISTORY:  Negative for smoking/alcohol/drug use.     ALLERGIES:  No Known Allergies    MEDICATIONS:  STANDING MEDICATIONS  chlorhexidine 2% Cloths 1 Application(s) Topical <User Schedule>  dexMEDEtomidine Infusion 0.5 MICROgram(s)/kG/Hr IV Continuous <Continuous>  dextrose 5%. 1000 milliLiter(s) IV Continuous <Continuous>  dextrose 5%. 1000 milliLiter(s) IV Continuous <Continuous>  dextrose 5%. 1000 milliLiter(s) IV Continuous <Continuous>  dextrose 50% Injectable 25 Gram(s) IV Push once  dextrose 50% Injectable 12.5 Gram(s) IV Push once  dextrose 50% Injectable 25 Gram(s) IV Push once  folic acid 1 milliGRAM(s) Oral daily  glucagon  Injectable 1 milliGRAM(s) IntraMuscular once  heparin   Injectable 5000 Unit(s) SubCutaneous every 8 hours  hydrALAZINE 25 milliGRAM(s) Oral three times a day  insulin lispro (ADMELOG) corrective regimen sliding scale   SubCutaneous three times a day before meals  labetalol 100 milliGRAM(s) Oral two times a day  lactated ringers. 1000 milliLiter(s) IV Continuous <Continuous>  multivitamin 1 Tablet(s) Oral daily  NIFEdipine XL 60 milliGRAM(s) Oral daily  pantoprazole  Injectable 40 milliGRAM(s) IV Push daily  senna 2 Tablet(s) Oral at bedtime  thiamine Injectable 100 milliGRAM(s) IV Push daily    PRN MEDICATIONS  acetaminophen     Tablet .. 650 milliGRAM(s) Oral every 6 hours PRN  dextrose Oral Gel 15 Gram(s) Oral once PRN  LORazepam   Injectable 1 milliGRAM(s) IV Push at bedtime PRN    VITALS:   T(F): 99.1  HR: 104  BP: 140/80  RR: 20  SpO2: 94%    PHYSICAL EXAM:  GEN: No acute distress  LUNGS: Clear to auscultation bilaterally   HEART: S1/S2 present. RRR.   ABD: Soft, non-tender, non-distended. Bowel sounds present        LABS:                        9.0    8.89  )-----------( 189      ( 20 Nov 2022 05:42 )             28.3     11-20    144  |  103  |  46<H>  ----------------------------<  162<H>  3.8   |  24  |  5.4<HH>    Ca    9.3      20 Nov 2022 05:42  Phos  4.1     11-20  Mg     1.6     11-20    TPro  6.5  /  Alb  3.5  /  TBili  0.5  /  DBili  x   /  AST  31  /  ALT  18  /  AlkPhos  82  11-20                      RADIOLOGY:

## 2022-11-20 NOTE — SWALLOW BEDSIDE ASSESSMENT ADULT - SWALLOW EVAL: ORAL MUSCULATURE
generalized weakness/anomalies present
anomalies present/unable to assess due to poor participation/comprehension

## 2022-11-20 NOTE — SWALLOW BEDSIDE ASSESSMENT ADULT - NS SPL SWALLOW CLINIC TRIAL FT
mild oral impairment + toleration observed without overt symptoms of penetration/aspiration
mild oral impairment + toleration observed without overt symptoms of penetration/aspiration

## 2022-11-20 NOTE — PROGRESS NOTE ADULT - ASSESSMENT
#  Severe VAMSI on CKD, ATN in nature  - renal function improving nicely over last few days  - very good urine output,  #  HTN uncontrolled now, seems to have component of agitation  #  Alcohol intoxication in pt w/ alcohol abuse syndrome was  on Lorazepam for DT treatment/prevention  #Hypernatremia - improved  Recommendations:    - encourage po hydration if tolerating or via NGT   - BP ontrolled  monitor   - will follow

## 2022-11-20 NOTE — PROGRESS NOTE ADULT - ASSESSMENT
KECIA MARTIN is a 57y man with a medical history significant for CKD and EtOH abuse who presented initially with progressive weakness, and is now in the critical care unit for severe metabolic derangements and acute on chronic renal failure.     Impression  metabolic encephalopathy / toxic secondary to alcohol withdrawal and uremia   Acute on Chronic Renal Failure  Uremia  Hyperkalemia  Severe Hyponatremia resolved   Diabetes Mellitus  Hypertension  hypernatremia now     Plan:      CNS:  EEG  result reviewed    Ativan taper w/ PRN CIWA-driven boluses  taper Precedex off   Antipsychotics PRN  Multivitamin, Folate, and Thiamine daily  neurology consult appreciated   HEENT:  Oral care    CARDIOVASCULAR    avoid low BP   anitra map > 65  keep is = os   if mornign NA more then 148 switch fluid to D5w  PULMONARY  HOB @ 45 degrees, aspiration precautions  do cxr     GASTROINTESTINAL NG feed   Feeds    free water 250cc Q4 hrs via NG  BM: regimen PRN    GENITOURINARY/RENAL:    Monitor I&O: strict    Nephrology  follow up ,follow bun, cr  improving   if morning NA still above 148 start D5w  INFECTIOUS  no acute concerns. Monitor fever, WBC.  replace K   HEMATOLOGIC  DVT ppx: heparin SQ    ENDOCRINE  Follow up FS.  Insulin protocol as needed.  BG goal 140-180    MSK/DERM  PT/OT when cooperative      CODE STATUS: FULL  DISPO: remain in ICU  monitor with bladder scan

## 2022-11-20 NOTE — SWALLOW BEDSIDE ASSESSMENT ADULT - SWALLOW EVAL: DIAGNOSIS
+toleration least restrictive diet without overt s/s aspiration. Mild oral dysphagia likely in s/o generalized weakness/lethargy

## 2022-11-20 NOTE — SWALLOW BEDSIDE ASSESSMENT ADULT - SWALLOW EVAL: RECOMMENDED FEEDING/EATING TECHNIQUES
check mouth frequently for oral residue/pocketing/maintain upright posture during/after eating for 30 mins/oral hygiene/position upright (90 degrees)/small sips/bites
check mouth frequently for oral residue/pocketing/maintain upright posture during/after eating for 30 mins/oral hygiene/position upright (90 degrees)/small sips/bites

## 2022-11-20 NOTE — SWALLOW BEDSIDE ASSESSMENT ADULT - SLP PERTINENT HISTORY OF CURRENT PROBLEM
Metabolic encephalopathy / toxic secondary to alcohol withdrawal and uremia, Suspected thiamine and folate deficiency, Hypomagnasemia, VAMSI on CKD3 (non oliguric), improving, Uremia (non oliguric), Hyperkalemia, Severe Hyponatremia, H/O Diabetes Mellitus, H/O Hypertension; patient
Metabolic encephalopathy / toxic secondary to alcohol withdrawal and uremia, Suspected thiamine and folate deficiency, Hypomagnasemia, VAMSI on CKD3 (non oliguric), improving, Uremia (non oliguric), Hyperkalemia, Severe Hyponatremia, H/O Diabetes Mellitus, H/O Hypertension; patient

## 2022-11-20 NOTE — SWALLOW BEDSIDE ASSESSMENT ADULT - SLP GENERAL OBSERVATIONS
in bed, B/L wrist restraints, NGT in place, incoherent at times ox2 (self, place)
Pt upright in chair, alert and cooperative

## 2022-11-20 NOTE — SWALLOW BEDSIDE ASSESSMENT ADULT - ORAL PREPARATORY PHASE
Reduced oral grading/Decreased mastication ability
Reduced oral grading/Decreased mastication ability

## 2022-11-20 NOTE — SWALLOW BEDSIDE ASSESSMENT ADULT - ORAL PHASE
Decreased anterior-posterior movement of the bolus/Delayed oral transit time/Stasis in anterior sulcus/Lingual stasis
[Negative] : Constitutional
[Shortness Of Breath] : no shortness of breath
[Skin Lesions] : no skin lesions
[Skin Wound] : no skin wound
Delayed oral transit time/Lingual stasis

## 2022-11-20 NOTE — SWALLOW BEDSIDE ASSESSMENT ADULT - NS ASR SWALLOW FINDINGS DISCUS
MD at [phone] unreachable, RN Lianet/Physician/Nursing/Patient
MD Harris, RN Lissa/Physician/Nursing/Patient

## 2022-11-20 NOTE — SWALLOW BEDSIDE ASSESSMENT ADULT - COMMENTS
Per RN Lissa pt remains on low dosage of precedex
Patient has been on sedative medications due to agitation patient currently on low dose precedex per RN.

## 2022-11-21 LAB
ALBUMIN SERPL ELPH-MCNC: 3.4 G/DL — LOW (ref 3.5–5.2)
ALP SERPL-CCNC: 79 U/L — SIGNIFICANT CHANGE UP (ref 30–115)
ALT FLD-CCNC: 17 U/L — SIGNIFICANT CHANGE UP (ref 0–41)
ANION GAP SERPL CALC-SCNC: 17 MMOL/L — HIGH (ref 7–14)
AST SERPL-CCNC: 25 U/L — SIGNIFICANT CHANGE UP (ref 0–41)
BILIRUB SERPL-MCNC: 0.5 MG/DL — SIGNIFICANT CHANGE UP (ref 0.2–1.2)
BUN SERPL-MCNC: 41 MG/DL — HIGH (ref 10–20)
CALCIUM SERPL-MCNC: 9.2 MG/DL — SIGNIFICANT CHANGE UP (ref 8.4–10.5)
CHLORIDE SERPL-SCNC: 100 MMOL/L — SIGNIFICANT CHANGE UP (ref 98–110)
CO2 SERPL-SCNC: 25 MMOL/L — SIGNIFICANT CHANGE UP (ref 17–32)
CREAT SERPL-MCNC: 4.9 MG/DL — CRITICAL HIGH (ref 0.7–1.5)
EGFR: 13 ML/MIN/1.73M2 — LOW
GLUCOSE BLDC GLUCOMTR-MCNC: 163 MG/DL — HIGH (ref 70–99)
GLUCOSE BLDC GLUCOMTR-MCNC: 164 MG/DL — HIGH (ref 70–99)
GLUCOSE BLDC GLUCOMTR-MCNC: 206 MG/DL — HIGH (ref 70–99)
GLUCOSE BLDC GLUCOMTR-MCNC: 234 MG/DL — HIGH (ref 70–99)
GLUCOSE SERPL-MCNC: 162 MG/DL — HIGH (ref 70–99)
HCT VFR BLD CALC: 26.7 % — LOW (ref 42–52)
HGB BLD-MCNC: 8.6 G/DL — LOW (ref 14–18)
MAGNESIUM SERPL-MCNC: 1.9 MG/DL — SIGNIFICANT CHANGE UP (ref 1.8–2.4)
MCHC RBC-ENTMCNC: 29.5 PG — SIGNIFICANT CHANGE UP (ref 27–31)
MCHC RBC-ENTMCNC: 32.2 G/DL — SIGNIFICANT CHANGE UP (ref 32–37)
MCV RBC AUTO: 91.4 FL — SIGNIFICANT CHANGE UP (ref 80–94)
NRBC # BLD: 0 /100 WBCS — SIGNIFICANT CHANGE UP (ref 0–0)
PHOSPHATE SERPL-MCNC: 3.7 MG/DL — SIGNIFICANT CHANGE UP (ref 2.1–4.9)
PLATELET # BLD AUTO: 189 K/UL — SIGNIFICANT CHANGE UP (ref 130–400)
POTASSIUM SERPL-MCNC: 3.4 MMOL/L — LOW (ref 3.5–5)
POTASSIUM SERPL-SCNC: 3.4 MMOL/L — LOW (ref 3.5–5)
PROT SERPL-MCNC: 6.6 G/DL — SIGNIFICANT CHANGE UP (ref 6–8)
RBC # BLD: 2.92 M/UL — LOW (ref 4.7–6.1)
RBC # FLD: 13.3 % — SIGNIFICANT CHANGE UP (ref 11.5–14.5)
SODIUM SERPL-SCNC: 142 MMOL/L — SIGNIFICANT CHANGE UP (ref 135–146)
WBC # BLD: 9.28 K/UL — SIGNIFICANT CHANGE UP (ref 4.8–10.8)
WBC # FLD AUTO: 9.28 K/UL — SIGNIFICANT CHANGE UP (ref 4.8–10.8)

## 2022-11-21 PROCEDURE — 99233 SBSQ HOSP IP/OBS HIGH 50: CPT

## 2022-11-21 RX ORDER — DIAZEPAM 5 MG
5 TABLET ORAL ONCE
Refills: 0 | Status: DISCONTINUED | OUTPATIENT
Start: 2022-11-21 | End: 2022-11-21

## 2022-11-21 RX ORDER — POTASSIUM CHLORIDE 20 MEQ
20 PACKET (EA) ORAL ONCE
Refills: 0 | Status: COMPLETED | OUTPATIENT
Start: 2022-11-21 | End: 2022-11-21

## 2022-11-21 RX ADMIN — Medication 2: at 08:17

## 2022-11-21 RX ADMIN — Medication 100 MILLIGRAM(S): at 17:19

## 2022-11-21 RX ADMIN — Medication 650 MILLIGRAM(S): at 12:23

## 2022-11-21 RX ADMIN — Medication 25 MILLIGRAM(S): at 21:12

## 2022-11-21 RX ADMIN — Medication 2 MILLIGRAM(S): at 17:19

## 2022-11-21 RX ADMIN — Medication 60 MILLIGRAM(S): at 05:33

## 2022-11-21 RX ADMIN — Medication 4: at 12:25

## 2022-11-21 RX ADMIN — HEPARIN SODIUM 5000 UNIT(S): 5000 INJECTION INTRAVENOUS; SUBCUTANEOUS at 13:54

## 2022-11-21 RX ADMIN — Medication 2 MILLIGRAM(S): at 21:48

## 2022-11-21 RX ADMIN — HEPARIN SODIUM 5000 UNIT(S): 5000 INJECTION INTRAVENOUS; SUBCUTANEOUS at 05:32

## 2022-11-21 RX ADMIN — Medication 2 MILLIGRAM(S): at 20:40

## 2022-11-21 RX ADMIN — SENNA PLUS 2 TABLET(S): 8.6 TABLET ORAL at 21:12

## 2022-11-21 RX ADMIN — Medication 100 MILLIGRAM(S): at 05:33

## 2022-11-21 RX ADMIN — Medication 100 MILLIGRAM(S): at 12:24

## 2022-11-21 RX ADMIN — HEPARIN SODIUM 5000 UNIT(S): 5000 INJECTION INTRAVENOUS; SUBCUTANEOUS at 21:12

## 2022-11-21 RX ADMIN — CHLORHEXIDINE GLUCONATE 1 APPLICATION(S): 213 SOLUTION TOPICAL at 05:32

## 2022-11-21 RX ADMIN — Medication 1 MILLIGRAM(S): at 12:24

## 2022-11-21 RX ADMIN — Medication 25 MILLIGRAM(S): at 05:33

## 2022-11-21 RX ADMIN — PANTOPRAZOLE SODIUM 40 MILLIGRAM(S): 20 TABLET, DELAYED RELEASE ORAL at 05:35

## 2022-11-21 RX ADMIN — Medication 1 MILLIGRAM(S): at 08:42

## 2022-11-21 RX ADMIN — Medication 5 MILLIGRAM(S): at 23:32

## 2022-11-21 RX ADMIN — Medication 650 MILLIGRAM(S): at 23:37

## 2022-11-21 RX ADMIN — SODIUM CHLORIDE 75 MILLILITER(S): 9 INJECTION, SOLUTION INTRAVENOUS at 05:39

## 2022-11-21 RX ADMIN — Medication 20 MILLIEQUIVALENT(S): at 20:40

## 2022-11-21 RX ADMIN — Medication 650 MILLIGRAM(S): at 10:51

## 2022-11-21 RX ADMIN — Medication 2: at 17:19

## 2022-11-21 RX ADMIN — Medication 50 MILLIGRAM(S): at 23:31

## 2022-11-21 RX ADMIN — Medication 1 TABLET(S): at 12:25

## 2022-11-21 NOTE — PROGRESS NOTE ADULT - SUBJECTIVE AND OBJECTIVE BOX
INTERVAL HPI/OVERNIGHT EVENTS:    SUBJECTIVE: Patient seen and examined at bedside. Intubated, sedated, ROS cannot be obtained.       VITAL SIGNS:  ICU Vital Signs Last 24 Hrs  T(C): 37.3 (21 Nov 2022 11:00), Max: 37.9 (21 Nov 2022 07:10)  T(F): 99.2 (21 Nov 2022 11:00), Max: 100.2 (21 Nov 2022 07:10)  HR: 114 (21 Nov 2022 05:00) (94 - 126)  BP: 169/87 (21 Nov 2022 05:00) (126/84 - 169/87)  BP(mean): 114 (21 Nov 2022 05:00) (99 - 114)  ABP: --  ABP(mean): --  RR: 21 (21 Nov 2022 11:00) (18 - 32)  SpO2: 94% (21 Nov 2022 05:00) (93% - 96%)    O2 Parameters below as of 21 Nov 2022 03:01  Patient On (Oxygen Delivery Method): room air            Plateau pressure:   P/F ratio:     11-20 @ 07:01  -  11-21 @ 07:00  --------------------------------------------------------  IN: 2828 mL / OUT: 1800 mL / NET: 1028 mL    11-21 @ 07:01  -  11-21 @ 14:11  --------------------------------------------------------  IN: 895 mL / OUT: 600 mL / NET: 295 mL      CAPILLARY BLOOD GLUCOSE      POCT Blood Glucose.: 234 mg/dL (21 Nov 2022 11:42)    ECG:    PHYSICAL EXAMINATION:  General: Comfortable, no acute distress, cooperative with exam.  HEENT: PERRLA, EOMI, moist mucous membranes.  Respiratory: CTAB, normal respiratory effort, no coughing, wheezes, crackles, or rales.  CV: RRR, S1S2, no murmurs, rubs or gallops. No JVD. Distal pulses intact.  Abdominal: Soft, nontender, nondistended, no rebound or guarding, normal bowel sounds.  Neurology: AOx3, no focal neuro defects, GONZALES x 4.  Extremities: No pitting edema, + Peripheral pulses.  Incisions:   Tubes:    MEDICATIONS:  MEDICATIONS  (STANDING):  chlorhexidine 2% Cloths 1 Application(s) Topical <User Schedule>  dexMEDEtomidine Infusion 0.5 MICROgram(s)/kG/Hr (9.14 mL/Hr) IV Continuous <Continuous>  dextrose 5%. 1000 milliLiter(s) (100 mL/Hr) IV Continuous <Continuous>  dextrose 5%. 1000 milliLiter(s) (50 mL/Hr) IV Continuous <Continuous>  dextrose 50% Injectable 25 Gram(s) IV Push once  dextrose 50% Injectable 12.5 Gram(s) IV Push once  dextrose 50% Injectable 25 Gram(s) IV Push once  folic acid 1 milliGRAM(s) Oral daily  glucagon  Injectable 1 milliGRAM(s) IntraMuscular once  heparin   Injectable 5000 Unit(s) SubCutaneous every 8 hours  hydrALAZINE 25 milliGRAM(s) Oral three times a day  insulin lispro (ADMELOG) corrective regimen sliding scale   SubCutaneous three times a day before meals  labetalol 100 milliGRAM(s) Oral two times a day  multivitamin 1 Tablet(s) Oral daily  NIFEdipine XL 60 milliGRAM(s) Oral daily  pantoprazole    Tablet 40 milliGRAM(s) Oral before breakfast  senna 2 Tablet(s) Oral at bedtime  thiamine 100 milliGRAM(s) Oral daily    MEDICATIONS  (PRN):  acetaminophen     Tablet .. 650 milliGRAM(s) Oral every 6 hours PRN Temp greater or equal to 38C (100.4F), Mild Pain (1 - 3)  dextrose Oral Gel 15 Gram(s) Oral once PRN Blood Glucose LESS THAN 70 milliGRAM(s)/deciliter  LORazepam     Tablet 2 milliGRAM(s) Oral every 4 hours PRN Symptom-triggered 2 point increase in CIWA-Ar  LORazepam   Injectable 2 milliGRAM(s) IV Push every 4 hours PRN Symptom-triggered: each CIWA -Ar score 8 or GREATER      ALLERGIES:  Allergies    No Known Allergies    Intolerances        LABS:                        8.6    9.28  )-----------( 189      ( 21 Nov 2022 05:22 )             26.7     11-21    142  |  100  |  41<H>  ----------------------------<  162<H>  3.4<L>   |  25  |  4.9<HH>    Ca    9.2      21 Nov 2022 05:22  Phos  3.7     11-21  Mg     1.9     11-21    TPro  6.6  /  Alb  3.4<L>  /  TBili  0.5  /  DBili  x   /  AST  25  /  ALT  17  /  AlkPhos  79  11-21          RADIOLOGY & ADDITIONAL TESTS: Reviewed.

## 2022-11-21 NOTE — PROVIDER CONTACT NOTE (OTHER) - BACKGROUND
Pt ETOH withdrawal, on Precedex and being tapered. (Dose 0.2 mcg)
Patient admitted for ETOH withdrawal. On precedex, titrating as ordered. Ativan 1mg IV prn given around 10PM.
pt received Ativan 2 mg IV one hour ago

## 2022-11-21 NOTE — CHART NOTE - NSCHARTNOTEFT_GEN_A_CORE
Registered Dietitian Follow-Up     Patient Profile Reviewed                           Yes [X   No []     Nutrition History Previously Obtained        Yes []  No []       Pertinent  Information: pt is 57 year old male with hx of chronic ETOH abuse, CKD, DM, HTN, presents with progressive weakness, found to be in alcohol withdrawal, chronic renal failure with severe electrolyte imbalances, no need for RRT at this time as per nephrology. s/p SLP eval on 11/20 pt started on CHO consistent renal easy to chew tolerating well     Diet, Consistent Carbohydrate Renal/No Snacks:   Easy to Chew (EASYTOCHEW) (11-20-22 @ 12:00) [Active]       Anthropometrics:  - Ht. 172.7 cm  - Wt. 73.1 kgs upon admission vs 67.7 kg today   - %wt change  - BMI   - IBW      Pertinent Lab Data:  11-21    142  |  100  |  41<H>  ----------------------------<  162<H>  3.4<L>   |  25  |  4.9<HH>    Ca    9.2      21 Nov 2022 05:22  Phos  3.7     11-21  Mg     1.9     11-21    TPro  6.6  /  Alb  3.4<L>  /  TBili  0.5  /  DBili  x   /  AST  25  /  ALT  17  /  AlkPhos  79  11-21       Pertinent Meds:   MEDICATIONS  (STANDING):  chlorhexidine 2% Cloths 1 Application(s) Topical <User Schedule>  dexMEDEtomidine Infusion 0.5 MICROgram(s)/kG/Hr (9.14 mL/Hr) IV Continuous <Continuous>  dextrose 5%. 1000 milliLiter(s) (100 mL/Hr) IV Continuous <Continuous>  dextrose 5%. 1000 milliLiter(s) (50 mL/Hr) IV Continuous <Continuous>  dextrose 50% Injectable 25 Gram(s) IV Push once  dextrose 50% Injectable 12.5 Gram(s) IV Push once  dextrose 50% Injectable 25 Gram(s) IV Push once  folic acid 1 milliGRAM(s) Oral daily  glucagon  Injectable 1 milliGRAM(s) IntraMuscular once  heparin   Injectable 5000 Unit(s) SubCutaneous every 8 hours  hydrALAZINE 25 milliGRAM(s) Oral three times a day  insulin lispro (ADMELOG) corrective regimen sliding scale   SubCutaneous three times a day before meals  labetalol 100 milliGRAM(s) Oral two times a day  multivitamin 1 Tablet(s) Oral daily  NIFEdipine XL 60 milliGRAM(s) Oral daily  pantoprazole    Tablet 40 milliGRAM(s) Oral before breakfast  senna 2 Tablet(s) Oral at bedtime  thiamine 100 milliGRAM(s) Oral daily    MEDICATIONS  (PRN):  acetaminophen     Tablet .. 650 milliGRAM(s) Oral every 6 hours PRN Temp greater or equal to 38C (100.4F), Mild Pain (1 - 3)  dextrose Oral Gel 15 Gram(s) Oral once PRN Blood Glucose LESS THAN 70 milliGRAM(s)/deciliter  LORazepam     Tablet 2 milliGRAM(s) Oral every 4 hours PRN Symptom-triggered 2 point increase in CIWA-Ar  LORazepam   Injectable 2 milliGRAM(s) IV Push every 4 hours PRN Symptom-triggered: each CIWA -Ar score 8 or GREATER    Physical Findings:  - Appearance: alert, orientated, sitting up in bed  - GI function: +BS   - Tubes: n/a   - Oral/Mouth cavity:  - Skin: no pressure injuries noted      Nutrition Requirements  Weight Used: 67.7 kgs      Estimated Energy Needs:  0612-3935 kcals (25-30 kcal)  81-95g protein (1.2-1.4g/kg/BW)  1;1 kcal for estimated fluid needs            Nutrient Intake: >75% of meals consumed         [] Previous Nutrition Diagnosis:            [] Ongoing          [X] Resolved    Nutrition Intervention: maintain on above diet      Goal/Expected Outcome: pt to continue to tolerate po diet and meet >75% of estimated nutrient needs     Indicator/Monitoring: RD to monitor tolerance to po diet, labs/meds, NFPF and f/u as needed within 4-6 days  moderate risk

## 2022-11-21 NOTE — PROGRESS NOTE ADULT - ASSESSMENT
58 y/o male with PMHx of HTN, DM, CKD4  and alcohol dependence/withdrawals presenting to ED 2 days ago with general weakness and pain for about 3 weeks and has not been taking his diabetes medications, Pt usually drinks alcohol daily, but states last time drinking was Saturday/Sunday -> admitted to unit for:    VAMIS on CKD4 and alcohol withdrawal     - Precedex dc'd   - PT   - thiamine and folate   - nephrology following    - continue home meds   - DVT prophylaxis

## 2022-11-21 NOTE — PROGRESS NOTE ADULT - ASSESSMENT
KECIA MARTIN is a 57y man with a medical history significant for CKD and EtOH abuse who presented initially with progressive weakness, and is now in the critical care unit for severe metabolic derangements and acute on chronic renal failure.     Impression  metabolic encephalopathy / toxic secondary to alcohol withdrawal and uremia   ct 11/15 neg  EEG - slowing - no change per neuro  standing Ativan taper w/ PRN CIWA-driven boluses  Precedex gtt to control agitation.       Acute on Chronic Renal Failure stage 4/5  passed S&S - start renal diet   - ammonia lvl unremarkable   ATN in nature  - renal function improving nicely over last few days  - very good urine output,    Uremia - improved    Hyperkalemia - improved     Severe Hyponatremia - improved   if morning NA more then 148 start D5w    Diabetes Mellitus    Hypertension  uncontrolled now, seems to have component of agitation  avoid low BP   anitra map > 65    hypomagnesemia    suspected thiamine and folate deficiency  Multivitamin, Folate, and Thiamine daily  CARDIOVASCULAR  keep is = os     CODE STATUS: FULL  DISPO: SDU

## 2022-11-21 NOTE — PROGRESS NOTE ADULT - ASSESSMENT
KECIA MARTIN is a 57y man with a medical history significant for CKD and EtOH abuse who presented initially with progressive weakness, and is now in the critical care unit for severe metabolic derangements and acute on chronic renal failure.     Impression  metabolic encephalopathy / toxic secondary to alcohol withdrawal and uremia -improving  Acute on Chronic Renal Failure  Uremia  Hyperkalemia  Severe Hyponatremia resolved   Diabetes Mellitus  Hypertension  hypernatremia now     Plan:      CNS:     Ativan taper w/ PRN CIWA-driven boluses  Multivitamin, Folate, and Thiamine daily  neurology consult appreciated   HEENT:  Oral care    CARDIOVASCULAR  DC IVF  anitra map > 65  keep is = os   if mornign NA more then 148 switch fluid to D5w  PULMONARY  HOB @ 45 degrees, aspiration precautions    GASTROINTESTINAL NG feed   Feeds    free water 250cc Q4 hrs via NG    BM: regimen PRN    GENITOURINARY/RENAL:    Monitor I&O: strict    Nephrology  follow up ,follow bun, cr  improving   if morning NA still above 148 start D5w  INFECTIOUS  no acute concerns. Monitor fever, WBC.  replace K   HEMATOLOGIC  DVT ppx: heparin SQ    ENDOCRINE  Follow up FS.  Insulin protocol as needed.  BG goal 140-180    MSK/DERM  PT/OT when cooperative      CODE STATUS: FULL  DISPO: SDU     Impression  metabolic encephalopathy / toxic secondary to alcohol withdrawal and uremia -  Acute on Chronic Renal Failure  Uremia  Hyperkalemia  Severe Hyponatremia resolved   Diabetes Mellitus  Hypertension  hypernatremia now     Plan:      CNS:     Ativan taper w/ PRN CIWA-driven boluses  Multivitamin, Folate, and Thiamine daily  neurology consult appreciated     HEENT:  Oral care    CARDIOVASCULAR  DC IVF  anitra map > 65  keep is = os   if morning NA more then 148 switch fluid to D5w  PULMONARY  HOB @ 45 degrees, aspiration precautions    GASTROINTESTINAL NG feed   Feeds    free water 250cc Q4 hrs via NG    BM: regimen PRN    GENITOURINARY/RENAL:    Monitor I&O: strict    Nephrology  follow up ,follow bun, cr  improving   if morning NA still above 148 start D5w    INFECTIOUS  no acute concerns. Monitor fever, WBC.  replace K     HEMATOLOGIC  DVT ppx: heparin SQ    ENDOCRINE  Follow up FS.  Insulin protocol as needed.  BG goal 140-180    MSK/DERM  PT/OT when cooperative      CODE STATUS: FULL  DISPO: SDU

## 2022-11-21 NOTE — PROGRESS NOTE ADULT - SUBJECTIVE AND OBJECTIVE BOX
Patient seen and evaluated this am, awake and calm, no agitation       T(F): 99.6 (11-21-22 @ 19:05), Max: 100.2 (11-21-22 @ 07:10)  HR: 111 (11-21-22 @ 20:45)  BP: 166/82 (11-21-22 @ 20:45)  RR: 18  SpO2: 96% (11-21-22 @ 20:45) (93% - 100%)    PHYSICAL EXAM:  GENERAL: NAD  HEAD:  Atraumatic, Normocephalic  EYES: EOMI, PERRLA, conjunctiva and sclera clear  NERVOUS SYSTEM:  Alert & Oriented X3, no focal deficits   CHEST/LUNG:  bilateral rhonchi  HEART: Regular rate and rhythm; No murmurs, rubs, or gallops  ABDOMEN: Soft, Nontender, Nondistended; Bowel sounds present  EXTREMITIES:  2+ Peripheral Pulses, No clubbing, cyanosis, or edema    LABS  11-21    142  |  100  |  41<H>  ----------------------------<  162<H>  3.4<L>   |  25  |  4.9<HH>    Ca    9.2      21 Nov 2022 05:22  Phos  3.7     11-21  Mg     1.9     11-21    TPro  6.6  /  Alb  3.4<L>  /  TBili  0.5  /  DBili  x   /  AST  25  /  ALT  17  /  AlkPhos  79  11-21                          8.6    9.28  )-----------( 189      ( 21 Nov 2022 05:22 )             26.7       Culture Results:   <10,000 CFU/mL Normal Urogenital Evelin (11-11-22)    RADIOLOGY  < from: Xray Chest 1 View- PORTABLE-Routine (Xray Chest 1 View- PORTABLE-Routine in AM.) (11.20.22 @ 07:16) >  Impression:    No radiographic evidence of acute cardiopulmonary disease.      < end of copied text >    MEDICATIONS  (STANDING):  chlordiazePOXIDE   Oral   chlordiazePOXIDE 50 milliGRAM(s) Oral every 4 hours  chlorhexidine 2% Cloths 1 Application(s) Topical <User Schedule>  folic acid 1 milliGRAM(s) Oral daily  heparin   Injectable 5000 Unit(s) SubCutaneous every 8 hours  hydrALAZINE 25 milliGRAM(s) Oral three times a day  insulin lispro (ADMELOG) corrective regimen sliding scale   SubCutaneous three times a day before meals  labetalol 100 milliGRAM(s) Oral two times a day  multivitamin 1 Tablet(s) Oral daily  NIFEdipine XL 60 milliGRAM(s) Oral daily  pantoprazole    Tablet 40 milliGRAM(s) Oral before breakfast  senna 2 Tablet(s) Oral at bedtime  thiamine 100 milliGRAM(s) Oral daily    MEDICATIONS  (PRN):  acetaminophen     Tablet .. 650 milliGRAM(s) Oral every 6 hours PRN Temp greater or equal to 38C (100.4F), Mild Pain (1 - 3)  dextrose Oral Gel 15 Gram(s) Oral once PRN Blood Glucose LESS THAN 70 milliGRAM(s)/deciliter  LORazepam     Tablet 2 milliGRAM(s) Oral every 4 hours PRN Symptom-triggered 2 point increase in CIWA-Ar  LORazepam   Injectable 2 milliGRAM(s) IV Push every 4 hours PRN Symptom-triggered: each CIWA -Ar score 8 or GREATER

## 2022-11-21 NOTE — PROVIDER CONTACT NOTE (OTHER) - ASSESSMENT
Patient intermittently agitated, calm at this time.
Pt in bed speaking with family at bedside, appears to be calm and sleeping intermittently. Labetalol given in AM.
agitated, confused

## 2022-11-21 NOTE — PROGRESS NOTE ADULT - ASSESSMENT
#  Severe VAMSI on CKD, ATN in nature  - renal function improving nicely over last few days  - very good urine output,  #  HTN uncontrolled now, seems to have component of agitation  #  Alcohol intoxication in pt w/ alcohol abuse syndrome was  on Lorazepam for DT treatment/prevention  #Hypernatremia - improved  Recommendations:    - encourage po hydration - may d/c IVF if po intake is good  - BP controlled  monitor   - will follow

## 2022-11-21 NOTE — PROGRESS NOTE ADULT - SUBJECTIVE AND OBJECTIVE BOX
Patient is a 57y old  Male who presents with a chief complaint of acute on chronic renal failure w/ severe electrolyte imbalance (20 Nov 2022 09:55)        Over Night Events:  still with etoh withdrawal      ROS:     All ROS are negative except HPI         PHYSICAL EXAM    ICU Vital Signs Last 24 Hrs  T(C): 37.9 (21 Nov 2022 07:10), Max: 37.9 (21 Nov 2022 07:10)  T(F): 100.2 (21 Nov 2022 07:10), Max: 100.2 (21 Nov 2022 07:10)  HR: 114 (21 Nov 2022 05:00) (94 - 126)  BP: 169/87 (21 Nov 2022 05:00) (120/65 - 169/87)  BP(mean): 114 (21 Nov 2022 05:00) (87 - 114)  ABP: --  ABP(mean): --  RR: 20 (21 Nov 2022 07:10) (18 - 32)  SpO2: 94% (21 Nov 2022 05:00) (93% - 96%)    O2 Parameters below as of 21 Nov 2022 03:01  Patient On (Oxygen Delivery Method): room air            CONSTITUTIONAL:  Well nourished.    In NAD    ENT:   Airway patent,   Mouth with normal mucosa.   No thrush      EYES:   Pupils equal,   Round and reactive to light.    CARDIAC:   Normal rate,   Regular rhythm.    No edema    RESPIRATORY:   No wheezing  Bilateral BS  Normal chest expansion  Not tachypneic,  No use of accessory muscles    GASTROINTESTINAL:  Abdomen soft,   Non-tender,   No guarding,     MUSCULOSKELETAL:   Range of motion is not limited,  No clubbing, cyanosis    NEUROLOGICAL:   Alert and oriented   + tremor    SKIN:   Skin normal color for race,   Warm and dry and intact.   No evidence of rash.    PSYCHIATRIC:   No apparent risk to self or others.      11-20-22 @ 07:01  -  11-21-22 @ 07:00  --------------------------------------------------------  IN:    Dexmedetomidine: 78 mL    IV PiggyBack: 50 mL    Lactated Ringers: 1800 mL    Oral Fluid: 900 mL  Total IN: 2828 mL    OUT:    Incontinent per Condom Catheter (mL): 1800 mL  Total OUT: 1800 mL    Total NET: 1028 mL          LABS:                            8.6    9.28  )-----------( 189      ( 21 Nov 2022 05:22 )             26.7                                               11-21    142  |  100  |  41<H>  ----------------------------<  162<H>  3.4<L>   |  25  |  4.9<HH>    Ca    9.2      21 Nov 2022 05:22  Phos  3.7     11-21  Mg     1.9     11-21    TPro  6.6  /  Alb  3.4<L>  /  TBili  0.5  /  DBili  x   /  AST  25  /  ALT  17  /  AlkPhos  79  11-21                                                                                           LIVER FUNCTIONS - ( 21 Nov 2022 05:22 )  Alb: 3.4 g/dL / Pro: 6.6 g/dL / ALK PHOS: 79 U/L / ALT: 17 U/L / AST: 25 U/L / GGT: x                                                                                                                                       MEDICATIONS  (STANDING):  chlorhexidine 2% Cloths 1 Application(s) Topical <User Schedule>  dexMEDEtomidine Infusion 0.5 MICROgram(s)/kG/Hr (9.14 mL/Hr) IV Continuous <Continuous>  dextrose 5%. 1000 milliLiter(s) (100 mL/Hr) IV Continuous <Continuous>  dextrose 5%. 1000 milliLiter(s) (50 mL/Hr) IV Continuous <Continuous>  dextrose 50% Injectable 25 Gram(s) IV Push once  dextrose 50% Injectable 12.5 Gram(s) IV Push once  dextrose 50% Injectable 25 Gram(s) IV Push once  folic acid 1 milliGRAM(s) Oral daily  glucagon  Injectable 1 milliGRAM(s) IntraMuscular once  heparin   Injectable 5000 Unit(s) SubCutaneous every 8 hours  hydrALAZINE 25 milliGRAM(s) Oral three times a day  insulin lispro (ADMELOG) corrective regimen sliding scale   SubCutaneous three times a day before meals  labetalol 100 milliGRAM(s) Oral two times a day  lactated ringers. 1000 milliLiter(s) (75 mL/Hr) IV Continuous <Continuous>  multivitamin 1 Tablet(s) Oral daily  NIFEdipine XL 60 milliGRAM(s) Oral daily  pantoprazole    Tablet 40 milliGRAM(s) Oral before breakfast  senna 2 Tablet(s) Oral at bedtime  thiamine 100 milliGRAM(s) Oral daily    MEDICATIONS  (PRN):  acetaminophen     Tablet .. 650 milliGRAM(s) Oral every 6 hours PRN Temp greater or equal to 38C (100.4F), Mild Pain (1 - 3)  dextrose Oral Gel 15 Gram(s) Oral once PRN Blood Glucose LESS THAN 70 milliGRAM(s)/deciliter  LORazepam   Injectable 1 milliGRAM(s) IV Push every 6 hours PRN Agitation  LORazepam   Injectable 1 milliGRAM(s) IV Push at bedtime PRN Agitation      New X-rays reviewed:                                                                                  ECHO    CXR interpreted by me:

## 2022-11-21 NOTE — PROGRESS NOTE ADULT - SUBJECTIVE AND OBJECTIVE BOX
Nephrology progress note    Patient is seen and examined, events over the last 24 h noted .    Allergies:  No Known Allergies    Hospital Medications:   MEDICATIONS  (STANDING):  chlorhexidine 2% Cloths 1 Application(s) Topical <User Schedule>  dexMEDEtomidine Infusion 0.5 MICROgram(s)/kG/Hr (9.14 mL/Hr) IV Continuous <Continuous>  dextrose 5%. 1000 milliLiter(s) (100 mL/Hr) IV Continuous <Continuous>  dextrose 5%. 1000 milliLiter(s) (50 mL/Hr) IV Continuous <Continuous>  dextrose 50% Injectable 25 Gram(s) IV Push once  dextrose 50% Injectable 12.5 Gram(s) IV Push once  dextrose 50% Injectable 25 Gram(s) IV Push once  folic acid 1 milliGRAM(s) Oral daily  glucagon  Injectable 1 milliGRAM(s) IntraMuscular once  heparin   Injectable 5000 Unit(s) SubCutaneous every 8 hours  hydrALAZINE 25 milliGRAM(s) Oral three times a day  insulin lispro (ADMELOG) corrective regimen sliding scale   SubCutaneous three times a day before meals  labetalol 100 milliGRAM(s) Oral two times a day  lactated ringers. 1000 milliLiter(s) (75 mL/Hr) IV Continuous <Continuous>  multivitamin 1 Tablet(s) Oral daily  NIFEdipine XL 60 milliGRAM(s) Oral daily  pantoprazole    Tablet 40 milliGRAM(s) Oral before breakfast  senna 2 Tablet(s) Oral at bedtime  thiamine 100 milliGRAM(s) Oral daily        VITALS:  T(F): 100.2 (11-21-22 @ 07:10), Max: 100.2 (11-21-22 @ 07:10)  HR: 114 (11-21-22 @ 05:00)  BP: 169/87 (11-21-22 @ 05:00)  RR: 20 (11-21-22 @ 07:10)  SpO2: 94% (11-21-22 @ 05:00)  Wt(kg): --    11-19 @ 07:01  -  11-20 @ 07:00  --------------------------------------------------------  IN: 2363 mL / OUT: 1700 mL / NET: 663 mL    11-20 @ 07:01  -  11-21 @ 07:00  --------------------------------------------------------  IN: 2828 mL / OUT: 1800 mL / NET: 1028 mL    11-21 @ 07:01  -  11-21 @ 11:42  --------------------------------------------------------  IN: 300 mL / OUT: 600 mL / NET: -300 mL          PHYSICAL EXAM:  Constitutional: NAD  HEENT: anicteric sclera  Neck: No JVD  Respiratory: CTA  Cardiovascular: S1, S2, RRR  Gastrointestinal: BS+, soft, NT/ND  Extremities: No peripheral edema  Neurological: A/O x 3  : + way.   Skin: No rashes  Vascular Access:    LABS:  11-21    142  |  100  |  41<H>  ----------------------------<  162<H>  3.4<L>   |  25  |  4.9<HH>  Creatinine Trend: 4.9<--, 5.4<--, 6.3<--, 7.5<--, 8.7<--, 10.9<--  Ca    9.2      21 Nov 2022 05:22  Phos  3.7     11-21  Mg     1.9     11-21    TPro  6.6  /  Alb  3.4<L>  /  TBili  0.5  /  DBili      /  AST  25  /  ALT  17  /  AlkPhos  79  11-21                          8.6    9.28  )-----------( 189      ( 21 Nov 2022 05:22 )             26.7       Urine Studies:      RADIOLOGY & ADDITIONAL STUDIES:  < from: Xray Chest 1 View- PORTABLE-Routine (Xray Chest 1 View- PORTABLE-Routine in AM.) (11.20.22 @ 07:16) >    Impression:    No radiographic evidence of acute cardiopulmonary disease.    < end of copied text >

## 2022-11-22 LAB
ALBUMIN SERPL ELPH-MCNC: 3.6 G/DL — SIGNIFICANT CHANGE UP (ref 3.5–5.2)
ALP SERPL-CCNC: 84 U/L — SIGNIFICANT CHANGE UP (ref 30–115)
ALT FLD-CCNC: 16 U/L — SIGNIFICANT CHANGE UP (ref 0–41)
ANION GAP SERPL CALC-SCNC: 16 MMOL/L — HIGH (ref 7–14)
APPEARANCE UR: CLEAR — SIGNIFICANT CHANGE UP
AST SERPL-CCNC: 24 U/L — SIGNIFICANT CHANGE UP (ref 0–41)
BILIRUB SERPL-MCNC: 0.7 MG/DL — SIGNIFICANT CHANGE UP (ref 0.2–1.2)
BILIRUB UR-MCNC: NEGATIVE — SIGNIFICANT CHANGE UP
BUN SERPL-MCNC: 38 MG/DL — HIGH (ref 10–20)
CALCIUM SERPL-MCNC: 9.3 MG/DL — SIGNIFICANT CHANGE UP (ref 8.4–10.5)
CHLORIDE SERPL-SCNC: 102 MMOL/L — SIGNIFICANT CHANGE UP (ref 98–110)
CO2 SERPL-SCNC: 25 MMOL/L — SIGNIFICANT CHANGE UP (ref 17–32)
COLOR SPEC: YELLOW — SIGNIFICANT CHANGE UP
CREAT SERPL-MCNC: 4.7 MG/DL — CRITICAL HIGH (ref 0.7–1.5)
DIFF PNL FLD: ABNORMAL
EGFR: 14 ML/MIN/1.73M2 — LOW
EPI CELLS # UR: ABNORMAL /HPF
GLUCOSE BLDC GLUCOMTR-MCNC: 110 MG/DL — HIGH (ref 70–99)
GLUCOSE BLDC GLUCOMTR-MCNC: 171 MG/DL — HIGH (ref 70–99)
GLUCOSE BLDC GLUCOMTR-MCNC: 171 MG/DL — HIGH (ref 70–99)
GLUCOSE BLDC GLUCOMTR-MCNC: 199 MG/DL — HIGH (ref 70–99)
GLUCOSE SERPL-MCNC: 175 MG/DL — HIGH (ref 70–99)
GLUCOSE UR QL: 100 MG/DL
HCT VFR BLD CALC: 26.7 % — LOW (ref 42–52)
HGB BLD-MCNC: 8.4 G/DL — LOW (ref 14–18)
KETONES UR-MCNC: 15
LEUKOCYTE ESTERASE UR-ACNC: NEGATIVE — SIGNIFICANT CHANGE UP
MAGNESIUM SERPL-MCNC: 1.8 MG/DL — SIGNIFICANT CHANGE UP (ref 1.8–2.4)
MCHC RBC-ENTMCNC: 29.1 PG — SIGNIFICANT CHANGE UP (ref 27–31)
MCHC RBC-ENTMCNC: 31.5 G/DL — LOW (ref 32–37)
MCV RBC AUTO: 92.4 FL — SIGNIFICANT CHANGE UP (ref 80–94)
NITRITE UR-MCNC: NEGATIVE — SIGNIFICANT CHANGE UP
NRBC # BLD: 0 /100 WBCS — SIGNIFICANT CHANGE UP (ref 0–0)
PH UR: 7 — SIGNIFICANT CHANGE UP (ref 5–8)
PHOSPHATE SERPL-MCNC: 3.8 MG/DL — SIGNIFICANT CHANGE UP (ref 2.1–4.9)
PLATELET # BLD AUTO: 194 K/UL — SIGNIFICANT CHANGE UP (ref 130–400)
POTASSIUM SERPL-MCNC: 3.6 MMOL/L — SIGNIFICANT CHANGE UP (ref 3.5–5)
POTASSIUM SERPL-SCNC: 3.6 MMOL/L — SIGNIFICANT CHANGE UP (ref 3.5–5)
PROT SERPL-MCNC: 7 G/DL — SIGNIFICANT CHANGE UP (ref 6–8)
PROT UR-MCNC: 100 MG/DL
RBC # BLD: 2.89 M/UL — LOW (ref 4.7–6.1)
RBC # FLD: 13.5 % — SIGNIFICANT CHANGE UP (ref 11.5–14.5)
RBC CASTS # UR COMP ASSIST: >50 /HPF
SODIUM SERPL-SCNC: 143 MMOL/L — SIGNIFICANT CHANGE UP (ref 135–146)
SP GR SPEC: 1.02 — SIGNIFICANT CHANGE UP (ref 1.01–1.03)
UROBILINOGEN FLD QL: 0.2 MG/DL — SIGNIFICANT CHANGE UP
WBC # BLD: 9.83 K/UL — SIGNIFICANT CHANGE UP (ref 4.8–10.8)
WBC # FLD AUTO: 9.83 K/UL — SIGNIFICANT CHANGE UP (ref 4.8–10.8)
WBC UR QL: ABNORMAL /HPF

## 2022-11-22 PROCEDURE — 99231 SBSQ HOSP IP/OBS SF/LOW 25: CPT

## 2022-11-22 PROCEDURE — 71045 X-RAY EXAM CHEST 1 VIEW: CPT | Mod: 26

## 2022-11-22 PROCEDURE — 99233 SBSQ HOSP IP/OBS HIGH 50: CPT

## 2022-11-22 RX ADMIN — Medication 50 MILLIGRAM(S): at 05:55

## 2022-11-22 RX ADMIN — Medication 650 MILLIGRAM(S): at 09:13

## 2022-11-22 RX ADMIN — Medication 2: at 12:25

## 2022-11-22 RX ADMIN — HEPARIN SODIUM 5000 UNIT(S): 5000 INJECTION INTRAVENOUS; SUBCUTANEOUS at 21:36

## 2022-11-22 RX ADMIN — PANTOPRAZOLE SODIUM 40 MILLIGRAM(S): 20 TABLET, DELAYED RELEASE ORAL at 05:55

## 2022-11-22 RX ADMIN — SENNA PLUS 2 TABLET(S): 8.6 TABLET ORAL at 21:36

## 2022-11-22 RX ADMIN — Medication 50 MILLIGRAM(S): at 02:14

## 2022-11-22 RX ADMIN — Medication 2: at 16:41

## 2022-11-22 RX ADMIN — Medication 100 MILLIGRAM(S): at 16:41

## 2022-11-22 RX ADMIN — Medication 650 MILLIGRAM(S): at 00:09

## 2022-11-22 RX ADMIN — Medication 1 MILLIGRAM(S): at 12:24

## 2022-11-22 RX ADMIN — Medication 60 MILLIGRAM(S): at 05:55

## 2022-11-22 RX ADMIN — HEPARIN SODIUM 5000 UNIT(S): 5000 INJECTION INTRAVENOUS; SUBCUTANEOUS at 13:03

## 2022-11-22 RX ADMIN — Medication 25 MILLIGRAM(S): at 21:36

## 2022-11-22 RX ADMIN — CHLORHEXIDINE GLUCONATE 1 APPLICATION(S): 213 SOLUTION TOPICAL at 05:56

## 2022-11-22 RX ADMIN — Medication 1 TABLET(S): at 12:24

## 2022-11-22 RX ADMIN — Medication 650 MILLIGRAM(S): at 08:43

## 2022-11-22 RX ADMIN — HEPARIN SODIUM 5000 UNIT(S): 5000 INJECTION INTRAVENOUS; SUBCUTANEOUS at 05:56

## 2022-11-22 RX ADMIN — Medication 650 MILLIGRAM(S): at 15:51

## 2022-11-22 RX ADMIN — Medication 100 MILLIGRAM(S): at 13:02

## 2022-11-22 RX ADMIN — Medication 650 MILLIGRAM(S): at 16:21

## 2022-11-22 RX ADMIN — Medication 25 MILLIGRAM(S): at 05:55

## 2022-11-22 RX ADMIN — Medication 100 MILLIGRAM(S): at 05:55

## 2022-11-22 RX ADMIN — Medication 2: at 08:19

## 2022-11-22 NOTE — PROVIDER CONTACT NOTE (OTHER) - REASON
Agitation, blood pressure
HTN
agitated, confused
high BP
Increased BP
elevated temp
agitation, CIWA high

## 2022-11-22 NOTE — PROVIDER CONTACT NOTE (OTHER) - DATE AND TIME:
20-Nov-2022 02:07
21-Nov-2022 23:30
21-Nov-2022 23:50
17-Nov-2022 23:34
19-Nov-2022 00:08
17-Nov-2022 12:16
21-Nov-2022 21:30

## 2022-11-22 NOTE — PROGRESS NOTE ADULT - SUBJECTIVE AND OBJECTIVE BOX
Patient is a 57y old  Male who presents with a chief complaint of acute on chronic renal failure w/ severe electrolyte imbalance (2022 23:37)        Over Night Events:        ROS:     All ROS are negative except HPI         PHYSICAL EXAM    ICU Vital Signs Last 24 Hrs  T(C): 37.8 (2022 07:10), Max: 38.7 (2022 23:35)  T(F): 100 (2022 07:10), Max: 101.6 (2022 23:35)  HR: 110 (2022 05:00) (98 - 124)  BP: 160/78 (2022 05:00) (121/69 - 166/82)  BP(mean): 112 (2022 05:00) (90 - 116)  ABP: --  ABP(mean): --  RR: 32 (2022 07:10) (15 - 33)  SpO2: 100% (2022 05:00) (96% - 100%)    O2 Parameters below as of 2022 05:00  Patient On (Oxygen Delivery Method): room air            CONSTITUTIONAL:  Well nourished.    In NAD    ENT:   Airway patent,   Mouth with normal mucosa.   No thrush      EYES:   Pupils equal,   Round and reactive to light.    CARDIAC:   Normal rate,   Regular rhythm.    No edema    RESPIRATORY:   No wheezing  Bilateral BS  Normal chest expansion  Not tachypneic,  No use of accessory muscles    GASTROINTESTINAL:  Abdomen soft,   Non-tender,   No guarding,     MUSCULOSKELETAL:   Range of motion is not limited,  No clubbing, cyanosis    NEUROLOGICAL:   Alert and oriented   No motor  deficits.    SKIN:   Skin normal color for race,   Warm and dry and intact.   No evidence of rash.    PSYCHIATRIC:   No apparent risk to self or others.      22 @ 07:01  -  22 @ 07:00  --------------------------------------------------------  IN:    Lactated Ringers: 375 mL    Oral Fluid: 760 mL  Total IN: 1135 mL    OUT:    Dexmedetomidine: 0 mL    Voided (mL): 2125 mL  Total OUT: 2125 mL    Total NET: -990 mL          LABS:                            8.4    9.83  )-----------( 194      ( 2022 05:45 )             26.7                                                   143  |  102  |  38<H>  ----------------------------<  175<H>  3.6   |  25  |  4.7<HH>    Ca    9.3      2022 05:45  Phos  3.8       Mg     1.8         TPro  7.0  /  Alb  3.6  /  TBili  0.7  /  DBili  x   /  AST  24  /  ALT  16  /  AlkPhos  84                                               Urinalysis Basic - ( 2022 01:30 )    Color: Yellow / Appearance: Clear / S.020 / pH: x  Gluc: x / Ketone: 15  / Bili: Negative / Urobili: 0.2 mg/dL   Blood: x / Protein: 100 mg/dL / Nitrite: Negative   Leuk Esterase: Negative / RBC: >50 /HPF / WBC 6-10 /HPF   Sq Epi: x / Non Sq Epi: Few /HPF / Bacteria: x                                                  LIVER FUNCTIONS - ( 2022 05:45 )  Alb: 3.6 g/dL / Pro: 7.0 g/dL / ALK PHOS: 84 U/L / ALT: 16 U/L / AST: 24 U/L / GGT: x                                                                                                                                       MEDICATIONS  (STANDING):  chlordiazePOXIDE   Oral   chlordiazePOXIDE 50 milliGRAM(s) Oral every 4 hours  chlordiazePOXIDE 25 milliGRAM(s) Oral every 4 hours  chlorhexidine 2% Cloths 1 Application(s) Topical <User Schedule>  dexMEDEtomidine Infusion 0.5 MICROgram(s)/kG/Hr (9.14 mL/Hr) IV Continuous <Continuous>  dextrose 5%. 1000 milliLiter(s) (50 mL/Hr) IV Continuous <Continuous>  dextrose 5%. 1000 milliLiter(s) (100 mL/Hr) IV Continuous <Continuous>  dextrose 50% Injectable 25 Gram(s) IV Push once  dextrose 50% Injectable 12.5 Gram(s) IV Push once  dextrose 50% Injectable 25 Gram(s) IV Push once  folic acid 1 milliGRAM(s) Oral daily  glucagon  Injectable 1 milliGRAM(s) IntraMuscular once  heparin   Injectable 5000 Unit(s) SubCutaneous every 8 hours  hydrALAZINE 25 milliGRAM(s) Oral three times a day  insulin lispro (ADMELOG) corrective regimen sliding scale   SubCutaneous three times a day before meals  labetalol 100 milliGRAM(s) Oral two times a day  multivitamin 1 Tablet(s) Oral daily  NIFEdipine XL 60 milliGRAM(s) Oral daily  pantoprazole    Tablet 40 milliGRAM(s) Oral before breakfast  senna 2 Tablet(s) Oral at bedtime  thiamine 100 milliGRAM(s) Oral daily    MEDICATIONS  (PRN):  acetaminophen     Tablet .. 650 milliGRAM(s) Oral every 6 hours PRN Temp greater or equal to 38C (100.4F), Mild Pain (1 - 3)  dextrose Oral Gel 15 Gram(s) Oral once PRN Blood Glucose LESS THAN 70 milliGRAM(s)/deciliter  LORazepam     Tablet 2 milliGRAM(s) Oral every 4 hours PRN Symptom-triggered 2 point increase in CIWA-Ar  LORazepam   Injectable 2 milliGRAM(s) IV Push every 4 hours PRN Symptom-triggered: each CIWA -Ar score 8 or GREATER      New X-rays reviewed:                                                                                  ECHO    CXR interpreted by me:       Patient is a 57y old  Male who presents with a chief complaint of acute on chronic renal failure w/ severe electrolyte imbalance (2022 23:37)        Over Night Events:  febrile overnight  aggitated overnight    ROS:     All ROS are negative except HPI         PHYSICAL EXAM    ICU Vital Signs Last 24 Hrs  T(C): 37.8 (2022 07:10), Max: 38.7 (2022 23:35)  T(F): 100 (2022 07:10), Max: 101.6 (2022 23:35)  HR: 110 (2022 05:00) (98 - 124)  BP: 160/78 (2022 05:00) (121/69 - 166/82)  BP(mean): 112 (2022 05:00) (90 - 116)  ABP: --  ABP(mean): --  RR: 32 (2022 07:10) (15 - 33)  SpO2: 100% (2022 05:00) (96% - 100%)    O2 Parameters below as of 2022 05:00  Patient On (Oxygen Delivery Method): room air            CONSTITUTIONAL:  Well nourished.    In NAD    ENT:   Airway patent,   Mouth with normal mucosa.   No thrush      EYES:   Pupils equal,   Round and reactive to light.    CARDIAC:   Normal rate,   Regular rhythm.    No edema    RESPIRATORY:   No wheezing  Bilateral BS  Normal chest expansion  Not tachypneic,  No use of accessory muscles    GASTROINTESTINAL:  Abdomen soft,   Non-tender,   No guarding,     MUSCULOSKELETAL:   Range of motion is not limited,  No clubbing, cyanosis    NEUROLOGICAL:   Alert and oriented   No motor  deficits.    SKIN:   Skin normal color for race,   Warm and dry and intact.   No evidence of rash.    PSYCHIATRIC:   No apparent risk to self or others.      22 @ 07:01  -  22 @ 07:00  --------------------------------------------------------  IN:    Lactated Ringers: 375 mL    Oral Fluid: 760 mL  Total IN: 1135 mL    OUT:    Dexmedetomidine: 0 mL    Voided (mL): 2125 mL  Total OUT: 2125 mL    Total NET: -990 mL          LABS:                            8.4    9.83  )-----------( 194      ( 2022 05:45 )             26.7                                                   143  |  102  |  38<H>  ----------------------------<  175<H>  3.6   |  25  |  4.7<HH>    Ca    9.3      2022 05:45  Phos  3.8       Mg     1.8         TPro  7.0  /  Alb  3.6  /  TBili  0.7  /  DBili  x   /  AST  24  /  ALT  16  /  AlkPhos  84                                               Urinalysis Basic - ( 2022 01:30 )    Color: Yellow / Appearance: Clear / S.020 / pH: x  Gluc: x / Ketone: 15  / Bili: Negative / Urobili: 0.2 mg/dL   Blood: x / Protein: 100 mg/dL / Nitrite: Negative   Leuk Esterase: Negative / RBC: >50 /HPF / WBC 6-10 /HPF   Sq Epi: x / Non Sq Epi: Few /HPF / Bacteria: x                                                  LIVER FUNCTIONS - ( 2022 05:45 )  Alb: 3.6 g/dL / Pro: 7.0 g/dL / ALK PHOS: 84 U/L / ALT: 16 U/L / AST: 24 U/L / GGT: x                                                                                                                                       MEDICATIONS  (STANDING):  chlordiazePOXIDE   Oral   chlordiazePOXIDE 50 milliGRAM(s) Oral every 4 hours  chlordiazePOXIDE 25 milliGRAM(s) Oral every 4 hours  chlorhexidine 2% Cloths 1 Application(s) Topical <User Schedule>  dexMEDEtomidine Infusion 0.5 MICROgram(s)/kG/Hr (9.14 mL/Hr) IV Continuous <Continuous>  dextrose 5%. 1000 milliLiter(s) (50 mL/Hr) IV Continuous <Continuous>  dextrose 5%. 1000 milliLiter(s) (100 mL/Hr) IV Continuous <Continuous>  dextrose 50% Injectable 25 Gram(s) IV Push once  dextrose 50% Injectable 12.5 Gram(s) IV Push once  dextrose 50% Injectable 25 Gram(s) IV Push once  folic acid 1 milliGRAM(s) Oral daily  glucagon  Injectable 1 milliGRAM(s) IntraMuscular once  heparin   Injectable 5000 Unit(s) SubCutaneous every 8 hours  hydrALAZINE 25 milliGRAM(s) Oral three times a day  insulin lispro (ADMELOG) corrective regimen sliding scale   SubCutaneous three times a day before meals  labetalol 100 milliGRAM(s) Oral two times a day  multivitamin 1 Tablet(s) Oral daily  NIFEdipine XL 60 milliGRAM(s) Oral daily  pantoprazole    Tablet 40 milliGRAM(s) Oral before breakfast  senna 2 Tablet(s) Oral at bedtime  thiamine 100 milliGRAM(s) Oral daily    MEDICATIONS  (PRN):  acetaminophen     Tablet .. 650 milliGRAM(s) Oral every 6 hours PRN Temp greater or equal to 38C (100.4F), Mild Pain (1 - 3)  dextrose Oral Gel 15 Gram(s) Oral once PRN Blood Glucose LESS THAN 70 milliGRAM(s)/deciliter  LORazepam     Tablet 2 milliGRAM(s) Oral every 4 hours PRN Symptom-triggered 2 point increase in CIWA-Ar  LORazepam   Injectable 2 milliGRAM(s) IV Push every 4 hours PRN Symptom-triggered: each CIWA -Ar score 8 or GREATER      New X-rays reviewed:                                                                                  ECHO    CXR interpreted by me:

## 2022-11-22 NOTE — PROGRESS NOTE ADULT - ASSESSMENT
56 y/o male with PMHx of HTN, DM, CKD4  and alcohol dependence/withdrawals presenting to ED 2 days ago with general weakness and pain for about 3 weeks and has not been taking his diabetes medications, Pt usually drinks alcohol daily, but states last time drinking was Saturday/Sunday -> admitted to unit for:    VAMSI on CKD4 and alcohol withdrawal / fever     - DC standing librium   - librium prn   - PT   - check procal and pan cultures   - follow repeat CXR in am   - thiamine and folate   - nephrology following    - continue home meds   - DVT prophylaxis

## 2022-11-22 NOTE — PROVIDER CONTACT NOTE (OTHER) - ACTION/TREATMENT ORDERED:
Ativan 2 mg IVP STAT x1
As per Dr. Gaming, give 1400 Hydralazine now.
Pan cultures ordered, chest x-ray
AURORA Padilla made aware. Will give ativan 2mg IVP now
AURORA Padilla made aware. Will order labetalol 10mg IVP
Give 6am Procardia now. No new orders
Valium 5 mg IVP x 1 dose now, and taper dose of librium.

## 2022-11-22 NOTE — PROGRESS NOTE ADULT - PROBLEM SELECTOR PLAN 1
After evaluation at this time will d/c standing protocol and continue on CIWA protocol. RN made aware.  Pt should be on Thiamine and Folic acid. Pt will be monitored and supportive care provided.    Abdominal ultrasound AFP every 6 months for HCC screening  -EGD outpatient for esophageal varices screening   -Follow up with Private [facility] or our [facility] located at [address] [place] [address]. Phone Number: [phone]  -Alcohol counseling provided   CATCH team involved for aftercare and pt will follow up with raquel. Pt currently refusing aftercare    CaSe discussed with Dr Sykes in agreement.
After evaluation at this time would recommend to continue on Ativan protocol and taper off precedex drip as tolerated.  Will assess when pt is more alert and able to answer questions. Pt should be monitored for continued electrolyte abnormality and corrected as per critical care team. Consider neurology evaluation.  Pt should be on Thiamine and Folic acid. Pt will be monitored and supportive care provided.    Wife confirmed history at bedside.    Abdominal ultrasound AFP every 6 months for HCC screening  -EGD outpatient for esophageal varices screening   -Follow up with Private GI or our [facility] located at [address] [place] [address]. Phone Number: [phone]  -Alcohol counseling provided   CATCH team involved for aftercare and pt will follow up with aftercare.

## 2022-11-22 NOTE — PROGRESS NOTE ADULT - ATTENDING COMMENTS
Attending Statement: I have personally performed a face to face diagnostic evaluation on this patient. The patient is suffering from:  metabolic encephalopathy / toxic secondary to alcohol withdrawal and uremia -  Acute on Chronic Renal Failure  Uremia  Hyperkalemia  Severe Hyponatremia resolved   Diabetes Mellitus  Hypertension  I have made amendments to the documentation where necessary. I have personally seen and examined this patient.  I have fully participated in the care of this patient.  I have reviewed all pertinent clinical information, including history, physical exam, plan and note.
pt seen earlier today - disucssed with cc team and wife at bedside    #ETOH dependence and withdrawal  - on ativan ciwa, precedex - has refuzed detox/rehab in past   #thiamine and folate deficiency on repletion   #metabolic encephalopathy - CTH neg, EEG pending , ammonia level neg  #VAMSI  on CKD III - scr slowly downtrending- making good urine - no need for emergent RRT
Attending Statement: I have personally performed a face to face diagnostic evaluation on this patient. The patient is suffering from:  metabolic encephalopathy / toxic secondary to alcohol withdrawal and uremia -  Acute on Chronic Renal Failure-improving  Uremia-improving  Hyperkalemia-resolved  Severe Hyponatremia resolved   Diabetes Mellitus  Hypertension  hypernatremia  I have made amendments to the documentation where necessary. I have personally seen and examined this patient.  I have fully participated in the care of this patient.  I have reviewed all pertinent clinical information, including history, physical exam, plan and note.

## 2022-11-22 NOTE — PROGRESS NOTE ADULT - SUBJECTIVE AND OBJECTIVE BOX
Pt interviewed, examined and EMR chart reviewed.    Follow up of Alcohol Depenency. Pt is on librium protocol. Pt is doing better. Pt is able to give name and . Pt has no complaints and is mildly sedated.     SOCIAL HISTORY:    REVIEW OF SYSTEMS:        MEDICATIONS  (STANDING):  chlorhexidine 2% Cloths 1 Application(s) Topical <User Schedule>  dexMEDEtomidine Infusion 0.5 MICROgram(s)/kG/Hr (9.14 mL/Hr) IV Continuous <Continuous>  dextrose 5%. 1000 milliLiter(s) (100 mL/Hr) IV Continuous <Continuous>  dextrose 5%. 1000 milliLiter(s) (50 mL/Hr) IV Continuous <Continuous>  dextrose 50% Injectable 25 Gram(s) IV Push once  dextrose 50% Injectable 12.5 Gram(s) IV Push once  dextrose 50% Injectable 25 Gram(s) IV Push once  folic acid 1 milliGRAM(s) Oral daily  glucagon  Injectable 1 milliGRAM(s) IntraMuscular once  heparin   Injectable 5000 Unit(s) SubCutaneous every 8 hours  hydrALAZINE 25 milliGRAM(s) Oral three times a day  insulin lispro (ADMELOG) corrective regimen sliding scale   SubCutaneous three times a day before meals  labetalol 100 milliGRAM(s) Oral two times a day  multivitamin 1 Tablet(s) Oral daily  NIFEdipine XL 60 milliGRAM(s) Oral daily  pantoprazole    Tablet 40 milliGRAM(s) Oral before breakfast  senna 2 Tablet(s) Oral at bedtime  thiamine 100 milliGRAM(s) Oral daily    MEDICATIONS  (PRN):  acetaminophen     Tablet .. 650 milliGRAM(s) Oral every 6 hours PRN Temp greater or equal to 38C (100.4F), Mild Pain (1 - 3)  dextrose Oral Gel 15 Gram(s) Oral once PRN Blood Glucose LESS THAN 70 milliGRAM(s)/deciliter  LORazepam     Tablet 2 milliGRAM(s) Oral every 4 hours PRN Symptom-triggered 2 point increase in CIWA-Ar  LORazepam   Injectable 2 milliGRAM(s) IV Push every 4 hours PRN Symptom-triggered: each CIWA -Ar score 8 or GREATER      Vital Signs Last 24 Hrs  T(C): 38.6 (2022 08:30), Max: 38.7 (2022 23:35)  T(F): 101.4 (2022 08:30), Max: 101.6 (2022 23:35)  HR: 103 (2022 07:21) (98 - 124)  BP: 117/77 (2022 07:21) (117/77 - 166/82)  BP(mean): 93 (2022 07:21) (90 - 116)  RR: 26 (2022 07:21) (15 - 33)  SpO2: 99% (2022 07:21) (96% - 100%)    Parameters below as of 2022 07:21  Patient On (Oxygen Delivery Method): room air        PHYSICAL EXAM:        Neck: No LAD, No JVD  Respiratory: CTAB/L  Cardiovascular: S1 and S2, RRR, no M/G/R  Gastrointestinal: BS+, soft, NT/ND  Vascular: 2+ peripheral pulses  Neurological: Alert     LABS:                        8.4    9.83  )-----------( 194      ( 2022 05:45 )             26.7     11    143  |  102  |  38<H>  ----------------------------<  175<H>  3.6   |  25  |  4.7<HH>    Ca    9.3      2022 05:45  Phos  3.8       Mg     1.8         TPro  7.0  /  Alb  3.6  /  TBili  0.7  /  DBili  x   /  AST  24  /  ALT  16  /  AlkPhos  84        Urinalysis Basic - ( 2022 01:30 )    Color: Yellow / Appearance: Clear / S.020 / pH: x  Gluc: x / Ketone: 15  / Bili: Negative / Urobili: 0.2 mg/dL   Blood: x / Protein: 100 mg/dL / Nitrite: Negative   Leuk Esterase: Negative / RBC: >50 /HPF / WBC 6-10 /HPF   Sq Epi: x / Non Sq Epi: Few /HPF / Bacteria: x      Drug Screen Urine:  Alcohol Level        RADIOLOGY & ADDITIONAL STUDIES:

## 2022-11-22 NOTE — PROGRESS NOTE ADULT - ASSESSMENT
#  Severe VAMSI on CKD, ATN in nature  - renal function improving  - good urine output  #  HTN uncontrolled now, seems to have component of agitation  #  Alcohol intoxication / withdrawal followed by addiction medicine  # Hypernatremia - improved    Recommendations:  - encourage po hydration   - BP low, febrile - hold anti-hypertensives, check pan cultures

## 2022-11-22 NOTE — PROVIDER CONTACT NOTE (OTHER) - SITUATION
Pt is agitated, confused, no positive effect after Ativan adm
BP high
CIWA is 19, agitated, confused
Patient's /91, repeat 189/100
elevated temp 101.6 F
Patient agitated, yelling, attempting to pull at lines. Telling staff to "call the police." -200s. -120, sinus tach
Pt has increased /95, HR 90

## 2022-11-22 NOTE — PROGRESS NOTE ADULT - ASSESSMENT
Impression  metabolic encephalopathy / toxic secondary to alcohol withdrawal and uremia -  Acute on Chronic Renal Failure-improving  Uremia-improving  Hyperkalemia-resolved  Severe Hyponatremia resolved   Diabetes Mellitus  Hypertension  hypernatremia-resolved    Plan:      CNS:     Ativan taper w/ PRN CIWA-driven boluses  Multivitamin, Folate, and Thiamine daily  neurology consult appreciated     HEENT:  Oral care    CARDIOVASCULAR  DC IVF  keep map > 65  keep is = os       PULMONARY  HOB @ 45 degrees, aspiration precautions    GASTROINTESTINAL feeding    BM: regimen PRN    GENITOURINARY/RENAL:  Monitor I&O  Nephrology  follow up ,follow bun, cr  improving     INFECTIOUS  no acute concerns. Monitor fever, WBC.    HEMATOLOGIC  DVT ppx: heparin SQ    ENDOCRINE  Follow up FS.  Insulin protocol as needed.  BG goal 140-180    MSK/DERM  PT/OT when cooperative    CODE STATUS: FULL  DISPO: SDU     Impression  metabolic encephalopathy / toxic secondary to alcohol withdrawal and uremia -  Acute on Chronic Renal Failure-improving  Uremia-improving  Hyperkalemia-resolved  Severe Hyponatremia resolved   Diabetes Mellitus  Hypertension  hypernatremia-resolved    Plan:      CNS:    CiIWa triggered librium  Multivitamin, Folate, and Thiamine daily  neurology consult appreciated     HEENT:  Oral care    CARDIOVASCULAR  keep map > 65  keep is = os     PULMONARY  HOB @ 45 degrees, aspiration precautions    GASTROINTESTINAL feeding    BM: regimen PRN    GENITOURINARY/RENAL:  Monitor I&O  Nephrology  follow up ,follow bun, cr  improving     INFECTIOUS  pan culture. Monitor fever, WBC. check procal. if continues to spike fever start cefepime+vanco.    HEMATOLOGIC  DVT ppx: heparin SQ    ENDOCRINE  Follow up FS.  Insulin protocol as needed.  BG goal 140-180    MSK/DERM  PT/OT when cooperative    CODE STATUS: FULL  DISPO: SDU

## 2022-11-22 NOTE — PROGRESS NOTE ADULT - SUBJECTIVE AND OBJECTIVE BOX
Patient seen and evaluated this am, awake and alert, tachycardic no complaints, febrile       T(F): 101.4 (11-22-22 @ 08:30), Max: 101.6 (11-21-22 @ 23:35)  HR: 103 (11-22-22 @ 07:21)  BP: 117/77 (11-22-22 @ 07:21)  RR: 22  SpO2: 99% (11-22-22 @ 07:21) (96% - 100%)    PHYSICAL EXAM:  GENERAL: NAD  HEAD:  Atraumatic, Normocephalic  EYES: EOMI, PERRLA, conjunctiva and sclera clear  NERVOUS SYSTEM:  Alert & Oriented X3, no focal deficits   CHEST/LUNG:  bilateral rales  HEART: Regular rate and rhythm; No murmurs, rubs, or gallops  ABDOMEN: Soft, Nontender, Nondistended; Bowel sounds present  EXTREMITIES:  2+ Peripheral Pulses, No clubbing, cyanosis, or edema    LABS  11-22    143  |  102  |  38<H>  ----------------------------<  175<H>  3.6   |  25  |  4.7<HH>    Ca    9.3      22 Nov 2022 05:45  Phos  3.8     11-22  Mg     1.8     11-22    TPro  7.0  /  Alb  3.6  /  TBili  0.7  /  DBili  x   /  AST  24  /  ALT  16  /  AlkPhos  84  11-22                          8.4    9.83  )-----------( 194      ( 22 Nov 2022 05:45 )             26.7      Culture Results:   <10,000 CFU/mL Normal Urogenital Evelin (11-11-22)    RADIOLOGY    CXR - interpreted by me - possible retrocardiac opacity    MEDICATIONS  (STANDING):  chlordiazePOXIDE 50 milliGRAM(s) Oral every 4 hours  chlordiazePOXIDE 25 milliGRAM(s) Oral every 4 hours  chlorhexidine 2% Cloths 1 Application(s) Topical <User Schedule>  dexMEDEtomidine Infusion 0.5 MICROgram(s)/kG/Hr (9.14 mL/Hr) IV Continuous <Continuous>  folic acid 1 milliGRAM(s) Oral daily  heparin   Injectable 5000 Unit(s) SubCutaneous every 8 hours  hydrALAZINE 25 milliGRAM(s) Oral three times a day  insulin lispro (ADMELOG) corrective regimen sliding scale   SubCutaneous three times a day before meals  labetalol 100 milliGRAM(s) Oral two times a day  multivitamin 1 Tablet(s) Oral daily  NIFEdipine XL 60 milliGRAM(s) Oral daily  pantoprazole    Tablet 40 milliGRAM(s) Oral before breakfast  senna 2 Tablet(s) Oral at bedtime  thiamine 100 milliGRAM(s) Oral daily    MEDICATIONS  (PRN):  acetaminophen     Tablet .. 650 milliGRAM(s) Oral every 6 hours PRN Temp greater or equal to 38C (100.4F), Mild Pain (1 - 3)  dextrose Oral Gel 15 Gram(s) Oral once PRN Blood Glucose LESS THAN 70 milliGRAM(s)/deciliter  LORazepam     Tablet 2 milliGRAM(s) Oral every 4 hours PRN Symptom-triggered 2 point increase in CIWA-Ar  LORazepam   Injectable 2 milliGRAM(s) IV Push every 4 hours PRN Symptom-triggered: each CIWA -Ar score 8 or GREATER

## 2022-11-22 NOTE — PROGRESS NOTE ADULT - SUBJECTIVE AND OBJECTIVE BOX
Nephrology Progress Note    KECIA MARTIN  [id number]  57y  Male    S:  Patient is seen and examined, events over the last 24h noted.    O:  Allergies:  No Known Allergies    Hospital Medications:   MEDICATIONS  (STANDING):  chlorhexidine 2% Cloths 1 Application(s) Topical <User Schedule>  dexMEDEtomidine Infusion 0.5 MICROgram(s)/kG/Hr (9.14 mL/Hr) IV Continuous <Continuous>  dextrose 5%. 1000 milliLiter(s) (50 mL/Hr) IV Continuous <Continuous>  dextrose 5%. 1000 milliLiter(s) (100 mL/Hr) IV Continuous <Continuous>  dextrose 50% Injectable 25 Gram(s) IV Push once  dextrose 50% Injectable 12.5 Gram(s) IV Push once  dextrose 50% Injectable 25 Gram(s) IV Push once  folic acid 1 milliGRAM(s) Oral daily  glucagon  Injectable 1 milliGRAM(s) IntraMuscular once  heparin   Injectable 5000 Unit(s) SubCutaneous every 8 hours  hydrALAZINE 25 milliGRAM(s) Oral three times a day  insulin lispro (ADMELOG) corrective regimen sliding scale   SubCutaneous three times a day before meals  labetalol 100 milliGRAM(s) Oral two times a day  multivitamin 1 Tablet(s) Oral daily  NIFEdipine XL 60 milliGRAM(s) Oral daily  pantoprazole    Tablet 40 milliGRAM(s) Oral before breakfast  senna 2 Tablet(s) Oral at bedtime  thiamine 100 milliGRAM(s) Oral daily    MEDICATIONS  (PRN):  acetaminophen     Tablet .. 650 milliGRAM(s) Oral every 6 hours PRN Temp greater or equal to 38C (100.4F), Mild Pain (1 - 3)  dextrose Oral Gel 15 Gram(s) Oral once PRN Blood Glucose LESS THAN 70 milliGRAM(s)/deciliter  LORazepam     Tablet 2 milliGRAM(s) Oral every 4 hours PRN Symptom-triggered 2 point increase in CIWA-Ar  LORazepam   Injectable 2 milliGRAM(s) IV Push every 4 hours PRN Symptom-triggered: each CIWA -Ar score 8 or GREATER    Home Medications:  acetaminophen 325 mg oral tablet: 2 tab(s) orally every 6 hours, As needed, Temp greater or equal to 38C (100.4F), Mild Pain (1 - 3) (25 Mar 2022 11:36)  atorvastatin 40 mg oral tablet: 1 tab(s) orally once a day (at bedtime) (25 Mar 2022 11:36)  DULoxetine 30 mg oral delayed release capsule: 1 cap(s) orally 2 times a day (2021 16:56)  lisinopril 20 mg oral tablet: 1 tab(s) orally once a day (2021 16:56)  NIFEdipine 60 mg oral tablet, extended release: 1 tab(s) orally once a day (25 Mar 2022 11:36)  pantoprazole 40 mg oral delayed release tablet: 1 tab(s) orally once a day (before a meal) (25 Mar 2022 11:36)      VITALS:  Daily     Daily   T(F): 100.5 (22 @ 10:38), Max: 101.6 (22 @ 23:35)  HR: 98 (22 @ 13:25)  BP: 95/57 (22 @ 13:25)  RR: 23 (22 @ 13:25)  SpO2: 98% (22 @ 13:25)  Wt(kg): --  I&O's Detail    2022 07:  -  2022 07:00  --------------------------------------------------------  IN:    Lactated Ringers: 375 mL    Oral Fluid: 760 mL  Total IN: 1135 mL    OUT:    Dexmedetomidine: 0 mL    Voided (mL): 2125 mL  Total OUT: 2125 mL    Total NET: -990 mL      2022 07:  -  2022 15:42  --------------------------------------------------------  IN:  Total IN: 0 mL    OUT:    Dexmedetomidine: 0 mL    Voided (mL): 50 mL  Total OUT: 50 mL    Total NET: -50 mL        I&O's Summary    2022 07:  -  2022 07:00  --------------------------------------------------------  IN: 1135 mL / OUT: 2125 mL / NET: -990 mL    2022 07:01  -  2022 15:42  --------------------------------------------------------  IN: 0 mL / OUT: 50 mL / NET: -50 mL          PHYSICAL EXAM:  Gen: NAD  Resp: CTAB, b/l breath sounds  Card: S1/S2  Abd: soft  Extremities: no edema      LABS:        143  |  102  |  38<H>  ----------------------------<  175<H>  3.6   |  25  |  4.7<HH>    Ca    9.3      2022 05:45  Phos  3.8       Mg     1.8         TPro  7.0  /  Alb  3.6  /  TBili  0.7  /  DBili      /  AST  24  /  ALT  16  /  AlkPhos  84        Phosphorus Level, Serum: 3.8 mg/dL (22 @ 05:45)  Phosphorus Level, Serum: 3.7 mg/dL (22 @ 05:22)    Intact PTH: 311 pg/mL (22 @ 11:45)                          8.4    9.83  )-----------( 194      ( 2022 05:45 )             26.7     Mean Cell Volume: 92.4 fL (22 @ 05:45)        Urine Studies:  Urinalysis Basic - ( 2022 01:30 )    Color: Yellow / Appearance: Clear / S.020 / pH:   Gluc:  / Ketone: 15  / Bili: Negative / Urobili: 0.2 mg/dL   Blood:  / Protein: 100 mg/dL / Nitrite: Negative   Leuk Esterase: Negative / RBC: >50 /HPF / WBC 6-10 /HPF   Sq Epi:  / Non Sq Epi: Few /HPF / Bacteria:       Creatinine trend:  Creatinine, Serum: 4.7 mg/dL (22 @ 05:45)  Creatinine, Serum: 4.9 mg/dL (22 @ 05:22)  Creatinine, Serum: 5.4 mg/dL (22 @ 05:42)  Creatinine, Serum: 6.3 mg/dL (22 @ 05:45)  Creatinine, Serum: 7.5 mg/dL (22 @ 05:23)  Creatinine, Serum: 8.7 mg/dL (22 @ 05:23)  Creatinine, Serum: 10.9 mg/dL (22 @ 05:24)

## 2022-11-23 LAB
ALBUMIN SERPL ELPH-MCNC: 3.8 G/DL — SIGNIFICANT CHANGE UP (ref 3.5–5.2)
ALP SERPL-CCNC: 90 U/L — SIGNIFICANT CHANGE UP (ref 30–115)
ALT FLD-CCNC: 16 U/L — SIGNIFICANT CHANGE UP (ref 0–41)
ANION GAP SERPL CALC-SCNC: 18 MMOL/L — HIGH (ref 7–14)
AST SERPL-CCNC: 23 U/L — SIGNIFICANT CHANGE UP (ref 0–41)
BILIRUB SERPL-MCNC: 0.5 MG/DL — SIGNIFICANT CHANGE UP (ref 0.2–1.2)
BUN SERPL-MCNC: 35 MG/DL — HIGH (ref 10–20)
CALCIUM SERPL-MCNC: 9.3 MG/DL — SIGNIFICANT CHANGE UP (ref 8.4–10.5)
CHLORIDE SERPL-SCNC: 98 MMOL/L — SIGNIFICANT CHANGE UP (ref 98–110)
CO2 SERPL-SCNC: 24 MMOL/L — SIGNIFICANT CHANGE UP (ref 17–32)
CREAT SERPL-MCNC: 4.3 MG/DL — CRITICAL HIGH (ref 0.7–1.5)
EGFR: 15 ML/MIN/1.73M2 — LOW
GLUCOSE BLDC GLUCOMTR-MCNC: 162 MG/DL — HIGH (ref 70–99)
GLUCOSE BLDC GLUCOMTR-MCNC: 181 MG/DL — HIGH (ref 70–99)
GLUCOSE BLDC GLUCOMTR-MCNC: 209 MG/DL — HIGH (ref 70–99)
GLUCOSE SERPL-MCNC: 200 MG/DL — HIGH (ref 70–99)
GRAM STN FLD: SIGNIFICANT CHANGE UP
HCT VFR BLD CALC: 30.3 % — LOW (ref 42–52)
HGB BLD-MCNC: 9.7 G/DL — LOW (ref 14–18)
MAGNESIUM SERPL-MCNC: 1.6 MG/DL — LOW (ref 1.8–2.4)
MCHC RBC-ENTMCNC: 29.8 PG — SIGNIFICANT CHANGE UP (ref 27–31)
MCHC RBC-ENTMCNC: 32 G/DL — SIGNIFICANT CHANGE UP (ref 32–37)
MCV RBC AUTO: 93.2 FL — SIGNIFICANT CHANGE UP (ref 80–94)
METHOD TYPE: SIGNIFICANT CHANGE UP
MSSA DNA SPEC QL NAA+PROBE: SIGNIFICANT CHANGE UP
NRBC # BLD: 0 /100 WBCS — SIGNIFICANT CHANGE UP (ref 0–0)
PHOSPHATE SERPL-MCNC: 3.6 MG/DL — SIGNIFICANT CHANGE UP (ref 2.1–4.9)
PLATELET # BLD AUTO: 233 K/UL — SIGNIFICANT CHANGE UP (ref 130–400)
POTASSIUM SERPL-MCNC: 3.4 MMOL/L — LOW (ref 3.5–5)
POTASSIUM SERPL-SCNC: 3.4 MMOL/L — LOW (ref 3.5–5)
PROCALCITONIN SERPL-MCNC: 0.91 NG/ML — HIGH (ref 0.02–0.1)
PROT SERPL-MCNC: 7.4 G/DL — SIGNIFICANT CHANGE UP (ref 6–8)
RBC # BLD: 3.25 M/UL — LOW (ref 4.7–6.1)
RBC # FLD: 13.4 % — SIGNIFICANT CHANGE UP (ref 11.5–14.5)
SODIUM SERPL-SCNC: 140 MMOL/L — SIGNIFICANT CHANGE UP (ref 135–146)
SPECIMEN SOURCE: SIGNIFICANT CHANGE UP
SPECIMEN SOURCE: SIGNIFICANT CHANGE UP
WBC # BLD: 7.85 K/UL — SIGNIFICANT CHANGE UP (ref 4.8–10.8)
WBC # FLD AUTO: 7.85 K/UL — SIGNIFICANT CHANGE UP (ref 4.8–10.8)

## 2022-11-23 PROCEDURE — 93306 TTE W/DOPPLER COMPLETE: CPT | Mod: 26

## 2022-11-23 PROCEDURE — 99232 SBSQ HOSP IP/OBS MODERATE 35: CPT

## 2022-11-23 RX ORDER — CEFAZOLIN SODIUM 1 G
1000 VIAL (EA) INJECTION EVERY 12 HOURS
Refills: 0 | Status: DISCONTINUED | OUTPATIENT
Start: 2022-11-23 | End: 2022-11-29

## 2022-11-23 RX ORDER — POTASSIUM CHLORIDE 20 MEQ
40 PACKET (EA) ORAL EVERY 4 HOURS
Refills: 0 | Status: COMPLETED | OUTPATIENT
Start: 2022-11-23 | End: 2022-11-23

## 2022-11-23 RX ORDER — CEFAZOLIN SODIUM 1 G
VIAL (EA) INJECTION
Refills: 0 | Status: DISCONTINUED | OUTPATIENT
Start: 2022-11-23 | End: 2022-11-23

## 2022-11-23 RX ORDER — MAGNESIUM SULFATE 500 MG/ML
2 VIAL (ML) INJECTION ONCE
Refills: 0 | Status: COMPLETED | OUTPATIENT
Start: 2022-11-23 | End: 2022-11-23

## 2022-11-23 RX ORDER — SACCHAROMYCES BOULARDII 250 MG
250 POWDER IN PACKET (EA) ORAL
Refills: 0 | Status: DISCONTINUED | OUTPATIENT
Start: 2022-11-23 | End: 2022-12-01

## 2022-11-23 RX ORDER — VANCOMYCIN HCL 1 G
1000 VIAL (EA) INTRAVENOUS ONCE
Refills: 0 | Status: COMPLETED | OUTPATIENT
Start: 2022-11-23 | End: 2022-11-23

## 2022-11-23 RX ADMIN — Medication 12.5 GRAM(S): at 12:56

## 2022-11-23 RX ADMIN — Medication 40 MILLIEQUIVALENT(S): at 17:10

## 2022-11-23 RX ADMIN — Medication 4: at 17:07

## 2022-11-23 RX ADMIN — Medication 2: at 12:27

## 2022-11-23 RX ADMIN — Medication 2: at 08:33

## 2022-11-23 RX ADMIN — Medication 100 MILLIGRAM(S): at 09:41

## 2022-11-23 RX ADMIN — Medication 100 MILLIGRAM(S): at 13:21

## 2022-11-23 RX ADMIN — Medication 100 MILLIGRAM(S): at 17:07

## 2022-11-23 RX ADMIN — Medication 100 MILLIGRAM(S): at 17:08

## 2022-11-23 RX ADMIN — Medication 100 MILLIGRAM(S): at 05:07

## 2022-11-23 RX ADMIN — Medication 1 MILLIGRAM(S): at 13:21

## 2022-11-23 RX ADMIN — Medication 250 MILLIGRAM(S): at 05:11

## 2022-11-23 RX ADMIN — Medication 25 MILLIGRAM(S): at 21:15

## 2022-11-23 RX ADMIN — HEPARIN SODIUM 5000 UNIT(S): 5000 INJECTION INTRAVENOUS; SUBCUTANEOUS at 05:07

## 2022-11-23 RX ADMIN — HEPARIN SODIUM 5000 UNIT(S): 5000 INJECTION INTRAVENOUS; SUBCUTANEOUS at 21:15

## 2022-11-23 RX ADMIN — HEPARIN SODIUM 5000 UNIT(S): 5000 INJECTION INTRAVENOUS; SUBCUTANEOUS at 13:21

## 2022-11-23 RX ADMIN — Medication 60 MILLIGRAM(S): at 05:07

## 2022-11-23 RX ADMIN — CHLORHEXIDINE GLUCONATE 1 APPLICATION(S): 213 SOLUTION TOPICAL at 05:07

## 2022-11-23 RX ADMIN — SENNA PLUS 2 TABLET(S): 8.6 TABLET ORAL at 21:15

## 2022-11-23 RX ADMIN — Medication 40 MILLIEQUIVALENT(S): at 13:03

## 2022-11-23 RX ADMIN — Medication 25 MILLIGRAM(S): at 13:20

## 2022-11-23 RX ADMIN — PANTOPRAZOLE SODIUM 40 MILLIGRAM(S): 20 TABLET, DELAYED RELEASE ORAL at 05:07

## 2022-11-23 RX ADMIN — Medication 1 TABLET(S): at 13:21

## 2022-11-23 RX ADMIN — Medication 250 MILLIGRAM(S): at 17:12

## 2022-11-23 RX ADMIN — Medication 25 MILLIGRAM(S): at 05:10

## 2022-11-23 NOTE — PROGRESS NOTE ADULT - ASSESSMENT
#  Severe VAMSI on CKD, ATN in nature  - renal function improving  - good urine output  #  HTN uncontrolled now, seems to have component of agitation  #  Alcohol intoxication / withdrawal followed by addiction medicine  # Hypernatremia - improved    Recommendations:  - encourage po hydration, may give 1/2 NS 75 cc/hr if po fluid intake is <2 L/daily  - BP low, febrile - hold anti-hypertensives, check pan cultures

## 2022-11-23 NOTE — PROGRESS NOTE ADULT - ASSESSMENT
58 y/o male with PMHx of HTN, DM, CKD4  and alcohol dependence/withdrawals presenting to ED 2 days ago with general weakness and pain for about 3 weeks and has not been taking his diabetes medications, Pt usually drinks alcohol daily, but states last time drinking was Saturday/Sunday -> admitted to unit for VAMSI on CKD4 and alcohol withdrawal with fever    Fever: panculture revealed MSSA Bacteriemia   Started on Ancef 1g Q12 (renally dosed) pending sensitivities   -F/up 2D ECHO, may need KIMBER if no findings on TTE   -F/up repeat blood cx, ESR, CRP and ID consult     VAMSI on CKD IV Scr: 13.5 on admission, suspected ATN: Improving   Complicated by Toxic Metabolic Encephalopathy: Resolving   -Scr now stabilizing around 4.3-4.5  -Nephro Consult appreciated  -PO hydration encouraged, monitor  -Daily BMP's     Type II DM: Non-compliant with Home  Meds   -FS before meals and bedtime   -c/w Insulin regimen and Keep FS below 180    ETOH Abuse with Suspected Folic Acid, Magnesium and Thiamine deficiency   -Librium PRN, c/w Thiamine, folic A, magnesium and MVI     DVT PPX: heparin   GI PPX: PPI   Full code  Dispo: from Home   -Pending Clinical and Renal function improvement /stability  58 y/o male with PMHx of HTN, DM, CKD4  and alcohol dependence/withdrawals presenting to ED 2 days ago with general weakness and pain for about 3 weeks and has not been taking his diabetes medications, Pt usually drinks alcohol daily, but states last time drinking was Saturday/Sunday -> admitted to unit for VAMSI on CKD4 and alcohol withdrawal with fever    Fever: panculture revealed MSSA Bacteriemia   Started on Ancef 1g Q12 (renally dosed) pending sensitivities   -F/up 2D ECHO, may need KIMBER if no findings on TTE   -F/up repeat blood cx, ESR, CRP and ID consult     VAMSI on CKD IV Scr: 13.5 on admission, suspected ATN: Improving   Complicated by Toxic Metabolic Encephalopathy: Resolving   -Scr now stabilizing around 4.3-4.5  -Nephro Consult appreciated  -PO hydration encouraged, monitor  -Daily BMP's     Type II DM: Non-compliant with Home  Meds   -FS before meals and bedtime   -c/w Insulin regimen and Keep FS below 180    Normocytic Anemia: likely from ETOH   -F/up Iron studies, B12 and Folate     ETOH Abuse with Suspected Folic Acid, Magnesium and Thiamine deficiency   -Librium PRN, c/w Thiamine, folic A, magnesium and MVI     DVT PPX: heparin   GI PPX: PPI   Full code  Dispo: from Home   -Pending Clinical and Renal function improvement /stability

## 2022-11-23 NOTE — CONSULT NOTE ADULT - ASSESSMENT
Patient is a 57y old  Male with multiple- co-morbidities including Alcohol abuse who presents to the ER on 11/11/22 with complaint of acute on chronic renal failure w/ severe electrolyte imbalance . On admission, he found to have hyponatremia, NA of 114, creat-13.5, K-7.0 and alcohol withdrawal. He has managed in ICU initially and then transferred to Medical floor. He spiked Fever on 11/22/22 and during fever work up , found to have positive MSSA Bacteremia. He has started on Cefazolin and The ID consult requested to assist with further evaluation and antibiotic management.     # MSSA Bacteremia- 11/22/22- ?? source   # Alcohol abuse-   # Electrolytes  imbalance   # VAMSI    would recommend:    1. Follow up final Blood cultures from 11/22 for sensitivities of MSSA  2. Repeat Blood cultures X 2 in AM to document clearing the blood stream   3. Continue Cefazolin until work up is done  4. Monitor kidney function and adjust Cefazolin doses accordingly  5. TTE followed KIMBER to ruleout Vegetation in the setting of MSSA Bacteremia  6. Management of Alcohol withdrawal as per Primary team    will follow the patient with you and make further recommendation based on the clinical course and Lab results  Thank you for the opportunity to participate in Mr. MARTIN's care    Attending Attestation :    Spent more than 65 minutes on total encounter, more than 50 % of the visit was spent counseling and/or coordinating care by the Attending physician.

## 2022-11-23 NOTE — CONSULT NOTE ADULT - REASON FOR ADMISSION
acute on chronic renal failure w/ severe electrolyte imbalance

## 2022-11-23 NOTE — PROGRESS NOTE ADULT - SUBJECTIVE AND OBJECTIVE BOX
Patient is a 57y old  Male who presents with a chief complaint of acute on chronic renal failure w/ severe electrolyte imbalance      MEDICATIONS  (STANDING):  ceFAZolin   IVPB 1000 milliGRAM(s) IV Intermittent every 12 hours  chlorhexidine 2% Cloths 1 Application(s) Topical <User Schedule>  dexMEDEtomidine Infusion 0.5 MICROgram(s)/kG/Hr (9.14 mL/Hr) IV Continuous <Continuous>  dextrose 5%. 1000 milliLiter(s) (100 mL/Hr) IV Continuous <Continuous>  dextrose 5%. 1000 milliLiter(s) (50 mL/Hr) IV Continuous <Continuous>  dextrose 50% Injectable 25 Gram(s) IV Push once  dextrose 50% Injectable 12.5 Gram(s) IV Push once  dextrose 50% Injectable 25 Gram(s) IV Push once  folic acid 1 milliGRAM(s) Oral daily  glucagon  Injectable 1 milliGRAM(s) IntraMuscular once  heparin   Injectable 5000 Unit(s) SubCutaneous every 8 hours  hydrALAZINE 25 milliGRAM(s) Oral three times a day  insulin lispro (ADMELOG) corrective regimen sliding scale   SubCutaneous three times a day before meals  labetalol 100 milliGRAM(s) Oral two times a day  multivitamin 1 Tablet(s) Oral daily  NIFEdipine XL 60 milliGRAM(s) Oral daily  pantoprazole    Tablet 40 milliGRAM(s) Oral before breakfast  potassium chloride   Powder 40 milliEquivalent(s) Oral every 4 hours  saccharomyces boulardii 250 milliGRAM(s) Oral two times a day  senna 2 Tablet(s) Oral at bedtime  thiamine 100 milliGRAM(s) Oral daily    MEDICATIONS  (PRN):  acetaminophen     Tablet .. 650 milliGRAM(s) Oral every 6 hours PRN Temp greater or equal to 38C (100.4F), Mild Pain (1 - 3)  dextrose Oral Gel 15 Gram(s) Oral once PRN Blood Glucose LESS THAN 70 milliGRAM(s)/deciliter  LORazepam     Tablet 2 milliGRAM(s) Oral every 4 hours PRN Symptom-triggered 2 point increase in CIWA-Ar  LORazepam   Injectable 2 milliGRAM(s) IV Push every 4 hours PRN Symptom-triggered: each CIWA -Ar score 8 or GREATER      CAPILLARY BLOOD GLUCOSE    POCT Blood Glucose.: 181 mg/dL (2022 07:38)  POCT Blood Glucose.: 110 mg/dL (2022 21:14)  POCT Blood Glucose.: 171 mg/dL (2022 16:30)    I&O's Summary    2022 07:01  -  2022 07:00  --------------------------------------------------------  IN: 320 mL / OUT: 575 mL / NET: -255 mL        PHYSICAL EXAM:  Vital Signs Last 24 Hrs  T(C): 37.2 (2022 05:08), Max: 38 (2022 15:35)  T(F): 99 (2022 05:08), Max: 100.4 (2022 15:35)  HR: 110 (2022 05:08) (90 - 110)  BP: 146/88 (2022 05:08) (95/57 - 146/88)  BP(mean): 96 (2022 22:24) (70 - 96)  RR: 20 (2022 05:08) (20 - 25)  SpO2: 94% (2022 22:24) (94% - 98%)    Parameters below as of 2022 22:24  Patient On (Oxygen Delivery Method): room air        GENERAL: No acute distress, well-developed  HEAD:  Atraumatic, Normocephalic  EYES: EOMI, PERRLA, conjunctiva and sclera clear  NECK: Supple, no lymphadenopathy, no JVD  CHEST/LUNG: CTAB; No wheezes, rales, or rhonchi  HEART: Regular rate and rhythm; No murmurs, rubs, or gallops  ABDOMEN: Soft, non-tender, non-distended; normal bowel sounds, no organomegaly  EXTREMITIES:  2+ peripheral pulses b/l, No clubbing, cyanosis, or edema  NEUROLOGY: A&O x 3, no focal deficits  SKIN: scaly eczematous lesions on b/l UE and LE     LABS:                        9.7    7.85  )-----------( 233      ( 2022 06:40 )             30.3     11-23    140  |  98  |  35<H>  ----------------------------<  200<H>  3.4<L>   |  24  |  4.3<HH>    Ca    9.3      2022 06:40  Phos  3.6       Mg     1.6         TPro  7.4  /  Alb  3.8  /  TBili  0.5  /  DBili  x   /  AST  23  /  ALT  16  /  AlkPhos  90            Urinalysis Basic - ( 2022 01:30 )    Color: Yellow / Appearance: Clear / S.020 / pH: x  Gluc: x / Ketone: 15  / Bili: Negative / Urobili: 0.2 mg/dL   Blood: x / Protein: 100 mg/dL / Nitrite: Negative   Leuk Esterase: Negative / RBC: >50 /HPF / WBC 6-10 /HPF   Sq Epi: x / Non Sq Epi: Few /HPF / Bacteria: x        Culture - Blood (collected 2022 05:45)  Source: .Blood None  Gram Stain (2022 06:35):    Growth in aerobic bottle: Gram Positive Cocci in Clusters    Growth in anaerobic bottle: Gram Positive Cocci in Clusters  Preliminary Report (2022 06:35):    Growth in aerobic bottle: Gram Positive Cocci in Clusters    Growth in anaerobic bottle: Gram Positive Cocci in Chains    Culture - Blood (collected 2022 01:20)  Source: .Blood Blood  Gram Stain (2022 10:56):    Growth in aerobic bottle: Gram Positive Cocci in Clusters    Growth in anaerobic bottle: Gram Positive Cocci in Clusters  Preliminary Report (2022 10:56):    Growth in aerobic bottle: Gram Positive Cocci in Clusters    Growth in anaerobic bottle: Gram Positive Cocci in Clusters    ***Blood Panel PCR results on this specimen are available    approximately 3 hours after the Gram stain result.***    Gram stain, PCR, and/or culture results may not always    correspond due to difference in methodologies.    ************************************************************    This PCR assay was performed by multiplex PCR. This    Assay tests for 66 bacterial and resistance gene targets.    Please refer to the [facility] test directory    at [email or web address] for details.  Organism: Blood Culture PCR (2022 04:28)  Organism: Blood Culture PCR (2022 04:28)

## 2022-11-23 NOTE — CHART NOTE - NSCHARTNOTEFT_GEN_A_CORE
Called by Fotoshkolao, pt is growing gram pos cocci in clusters in aerobic bottle collected 11/22/22, will give one dose of vancomycin pending ID consult. F/u repeat blood cultures.

## 2022-11-23 NOTE — PROGRESS NOTE ADULT - SUBJECTIVE AND OBJECTIVE BOX
Nephrology progress note    Patient is seen and examined, events over the last 24 h noted .    Allergies:  No Known Allergies    Hospital Medications:   MEDICATIONS  (STANDING):  ceFAZolin   IVPB 1000 milliGRAM(s) IV Intermittent every 12 hours  chlorhexidine 2% Cloths 1 Application(s) Topical <User Schedule>  dexMEDEtomidine Infusion 0.5 MICROgram(s)/kG/Hr (9.14 mL/Hr) IV Continuous <Continuous>  dextrose 5%. 1000 milliLiter(s) (100 mL/Hr) IV Continuous <Continuous>  dextrose 5%. 1000 milliLiter(s) (50 mL/Hr) IV Continuous <Continuous>  dextrose 50% Injectable 25 Gram(s) IV Push once  dextrose 50% Injectable 12.5 Gram(s) IV Push once  dextrose 50% Injectable 25 Gram(s) IV Push once  folic acid 1 milliGRAM(s) Oral daily  glucagon  Injectable 1 milliGRAM(s) IntraMuscular once  heparin   Injectable 5000 Unit(s) SubCutaneous every 8 hours  hydrALAZINE 25 milliGRAM(s) Oral three times a day  insulin lispro (ADMELOG) corrective regimen sliding scale   SubCutaneous three times a day before meals  labetalol 100 milliGRAM(s) Oral two times a day  multivitamin 1 Tablet(s) Oral daily  NIFEdipine XL 60 milliGRAM(s) Oral daily  pantoprazole    Tablet 40 milliGRAM(s) Oral before breakfast  saccharomyces boulardii 250 milliGRAM(s) Oral two times a day  senna 2 Tablet(s) Oral at bedtime  thiamine 100 milliGRAM(s) Oral daily        VITALS:  T(F): 99 (22 @ 05:08), Max: 100.5 (22 @ 10:38)  HR: 110 (22 @ 05:08)  BP: 146/88 (22 @ 05:08)  RR: 20 (22 @ 05:08)  SpO2: 94% (22 @ 22:24)  Wt(kg): --     @ 07:01  -   @ 07:00  --------------------------------------------------------  IN: 1135 mL / OUT: 2125 mL / NET: -990 mL     @ 07:01  -   @ 07:00  --------------------------------------------------------  IN: 320 mL / OUT: 575 mL / NET: -255 mL      Height (cm): 172.7 (:38)  Weight (kg): 68.2 (:38)  BMI (kg/m2): 22.9 (:38)  BSA (m2): 1.81 (:38)    PHYSICAL EXAM:  Constitutional: NAD  HEENT: anicteric sclera  Neck: No JVD  Respiratory: CTA  Cardiovascular: S1, S2, RRR  Gastrointestinal: BS+, soft, NT/ND  Extremities: No peripheral edema  Neurological: A/O x 3  : HAs way.   Skin: No rashes  Vascular Access:    LABS:      140  |  98  |  35<H>  ----------------------------<  200<H>  3.4<L>   |  24  |  4.3<HH>  Creatinine Trend: 4.3<--, 4.7<--, 4.9<--, 5.4<--, 6.3<--, 7.5<--    Ca    9.3      2022 06:40  Phos  3.6       Mg     1.6         TPro  7.4  /  Alb  3.8  /  TBili  0.5  /  DBili      /  AST  23  /  ALT  16  /  AlkPhos  90                            9.7    7.85  )-----------( 233      ( 2022 06:40 )             30.3       Urine Studies:  Urinalysis Basic - ( 2022 01:30 )    Color: Yellow / Appearance: Clear / S.020 / pH:   Gluc:  / Ketone: 15  / Bili: Negative / Urobili: 0.2 mg/dL   Blood:  / Protein: 100 mg/dL / Nitrite: Negative   Leuk Esterase: Negative / RBC: >50 /HPF / WBC 6-10 /HPF   Sq Epi:  / Non Sq Epi: Few /HPF / Bacteria:         RADIOLOGY & ADDITIONAL STUDIES:

## 2022-11-23 NOTE — CONSULT NOTE ADULT - SUBJECTIVE AND OBJECTIVE BOX
Patient is a 57y old  Male who presents with a chief complaint of acute on chronic renal failure w/ severe electrolyte imbalance (2022 11:46)      REVIEW OF SYSTEMS: Total of twelve systems have been reviewed with patient and found to be negative unless mentioned in HPI        PAST MEDICAL & SURGICAL HISTORY:  Diabetes  Hypertension  HLD (hyperlipidemia)  Neuropathy  Glaucoma  Chronic kidney disease, unspecified CKD stage  requiring HD 2022  Chronic alcohol abuse  No significant past surgical history        SOCIAL HISTORY  Alcohol: Does not drink  Tobacco: Does not smoke  Illicit substance use: None      FAMILY HISTORY: Non contributory to the present illness        ALLERGIES: No Known Allergies      Vital Signs Last 24 Hrs  T(C): 37.6 (2022 14:00), Max: 38 (2022 15:35)  T(F): 99.6 (2022 14:00), Max: 100.4 (2022 15:35)  HR: 113 (2022 14:00) (90 - 113)  BP: 128/60 (2022 14:00) (101/60 - 146/88)  BP(mean): 96 (2022 22:24) (74 - 96)  RR: 18 (2022 14:00) (18 - 25)  SpO2: 94% (2022 22:24) (94% - 98%)    Parameters below as of 2022 22:24  Patient On (Oxygen Delivery Method): room air        PHYSICAL EXAM:  GENERAL: Not in distress   CHEST/LUNG:  Aire ntry bilaterally  HEART: s1 and s2 present  ABDOMEN:  Nontender and  Nondistended  EXTREMITIES: No pedal  edema  CNS: Awake and Alert      LABS:                        9.7    7.85  )-----------( 233      ( 2022 06:40 )             30.3           140  |  98  |  35<H>  ----------------------------<  200<H>  3.4<L>   |  24  |  4.3<HH>    Ca    9.3      2022 06:40  Phos  3.6     11  Mg     1.6         TPro  7.4  /  Alb  3.8  /  TBili  0.5  /  DBili  x   /  AST  23  /  ALT  16  /  AlkPhos  90  11-23        CAPILLARY BLOOD GLUCOSE  POCT Blood Glucose.: 181 mg/dL (2022 07:38)  POCT Blood Glucose.: 110 mg/dL (2022 21:14)  POCT Blood Glucose.: 171 mg/dL (2022 16:30)        Urinalysis Basic - ( 2022 01:30 )  Color: Yellow / Appearance: Clear / S.020 / pH: x  Gluc: x / Ketone: 15  / Bili: Negative / Urobili: 0.2 mg/dL   Blood: x / Protein: 100 mg/dL / Nitrite: Negative   Leuk Esterase: Negative / RBC: >50 /HPF / WBC 6-10 /HPF   Sq Epi: x / Non Sq Epi: Few /HPF / Bacteria: x        MEDICATIONS  (STANDING):  ceFAZolin   IVPB 1000 milliGRAM(s) IV Intermittent every 12 hours  chlorhexidine 2% Cloths 1 Application(s) Topical <User Schedule>  dexMEDEtomidine Infusion 0.5 MICROgram(s)/kG/Hr (9.14 mL/Hr) IV Continuous <Continuous>  folic acid 1 milliGRAM(s) Oral daily  glucagon  Injectable 1 milliGRAM(s) IntraMuscular once  heparin   Injectable 5000 Unit(s) SubCutaneous every 8 hours  hydrALAZINE 25 milliGRAM(s) Oral three times a day  insulin lispro (ADMELOG) corrective regimen sliding scale   SubCutaneous three times a day before meals  labetalol 100 milliGRAM(s) Oral two times a day  multivitamin 1 Tablet(s) Oral daily  NIFEdipine XL 60 milliGRAM(s) Oral daily  pantoprazole    Tablet 40 milliGRAM(s) Oral before breakfast  potassium chloride   Powder 40 milliEquivalent(s) Oral every 4 hours  saccharomyces boulardii 250 milliGRAM(s) Oral two times a day  senna 2 Tablet(s) Oral at bedtime  thiamine 100 milliGRAM(s) Oral daily    MEDICATIONS  (PRN):  acetaminophen     Tablet .. 650 milliGRAM(s) Oral every 6 hours PRN Temp greater or equal to 38C (100.4F), Mild Pain (1 - 3)  dextrose Oral Gel 15 Gram(s) Oral once PRN Blood Glucose LESS THAN 70 milliGRAM(s)/deciliter  LORazepam     Tablet 2 milliGRAM(s) Oral every 4 hours PRN Symptom-triggered 2 point increase in CIWA-Ar  LORazepam   Injectable 2 milliGRAM(s) IV Push every 4 hours PRN Symptom-triggered: each CIWA -Ar score 8 or GREATER          RADIOLOGY & ADDITIONAL TESTS:    < from: Xray Chest 1 View- PORTABLE-Routine (Xray Chest 1 View- PORTABLE-Routine .) (22 @ 06:41) >    No radiographic evidence of acute cardiopulmonary disease.    < end of copied text >  < from: Xray Chest 1 View- PORTABLE-Routine (Xray Chest 1 View- PORTABLE-Routine in AM.) (22 @ 06:00) >  No radiographic evidence of acute cardiopulmonary disease.    < end of copied text >  Culture - Blood (22 @ 05:45)   Gram Stain:   Growth in aerobic bottle: Gram Positive Cocci in Clusters   Growth in anaerobic bottle: Gram Positive Cocci in Clusters   Specimen Source: .Blood None   Culture Results:   Growth in aerobic bottle: Gram Positive Cocci in Clusters   Growth in anaerobic bottle: Gram Positive Cocci in Chains     Culture - Blood in AM (22 @ 01:20)   Gram Stain:   Growth in aerobic bottle: Gram Positive Cocci in Clusters   Growth in anaerobic bottle: Gram Positive Cocci in Clusters   - Methicillin SENSITIVE Staphylococcus aureus (MSSA): Detec Any isolate of Staphylococcus aureus from a blood culture is NOT considered a contaminant.   Specimen Source: .Blood Blood   Organism: Blood Culture PCR   Culture Results:   Growth in aerobic bottle: Gram Positive Cocci in Clusters   Growth in anaerobic bottle: Gram Positive Cocci in Clusters COVID-19 PCR (22 @ 16:40)   COVID-19 PCR: NotDetec               Patient is a 57y old  Male with multiple- co-morbidities including Alcohol abuse who presents to the ER on 22 with complaint of acute on chronic renal failure w/ severe electrolyte imbalance . On admission, he found to have hyponatremia, NA of 114, creat-13.5, K-7.0 and alcohol withdrawal. He has managed in ICU initially and then transferred to Medical floor. He spiked Fever on 22 and during fever work up , found to have positive MSSA Bacteremia. He has started on Cefazolin and The ID consult requested to assist with further evaluation and antibiotic management.       REVIEW OF SYSTEMS: Total of twelve systems have been reviewed and found to be negative unless mentioned in HPI        PAST MEDICAL & SURGICAL HISTORY:  Diabetes  Hypertension  HLD (hyperlipidemia)  Neuropathy  Glaucoma  Chronic kidney disease, unspecified CKD stage  requiring HD 2022  Chronic alcohol abuse  No significant past surgical history        SOCIAL HISTORY  Alcohol:  Alcohol abuse  Tobacco: Does not smoke  Illicit substance use: None      FAMILY HISTORY: Non contributory to the present illness        ALLERGIES: No Known Allergies      Vital Signs Last 24 Hrs  T(C): 37.6 (2022 14:00), Max: 38 (2022 15:35)  T(F): 99.6 (2022 14:00), Max: 100.4 (2022 15:35)  HR: 113 (2022 14:00) (90 - 113)  BP: 128/60 (2022 14:00) (101/60 - 146/88)  BP(mean): 96 (2022 22:24) (74 - 96)  RR: 18 (2022 14:00) (18 - 25)  SpO2: 94% (2022 22:24) (94% - 98%)    Parameters below as of 2022 22:24  Patient On (Oxygen Delivery Method): room air        PHYSICAL EXAM:  GENERAL: Not in distress   CHEST/LUNG: Not using accessory muscles   HEART: s1 and s2 present  ABDOMEN:  Nontender and  Nondistended  EXTREMITIES: No pedal  edema  CNS: Awake and Alert      LABS:                        9.7    7.85  )-----------( 233      ( 2022 06:40 )             30.3       11-23    140  |  98  |  35<H>  ----------------------------<  200<H>  3.4<L>   |  24  |  4.3<HH>    Ca    9.3      2022 06:40  Phos  3.6       Mg     1.6         TPro  7.4  /  Alb  3.8  /  TBili  0.5  /  DBili  x   /  AST  23  /  ALT  16  /  AlkPhos  90          CAPILLARY BLOOD GLUCOSE  POCT Blood Glucose.: 181 mg/dL (2022 07:38)  POCT Blood Glucose.: 110 mg/dL (2022 21:14)  POCT Blood Glucose.: 171 mg/dL (2022 16:30)        Urinalysis Basic - ( 2022 01:30 )  Color: Yellow / Appearance: Clear / S.020 / pH: x  Gluc: x / Ketone: 15  / Bili: Negative / Urobili: 0.2 mg/dL   Blood: x / Protein: 100 mg/dL / Nitrite: Negative   Leuk Esterase: Negative / RBC: >50 /HPF / WBC 6-10 /HPF   Sq Epi: x / Non Sq Epi: Few /HPF / Bacteria: x        MEDICATIONS  (STANDING):  ceFAZolin   IVPB 1000 milliGRAM(s) IV Intermittent every 12 hours  chlorhexidine 2% Cloths 1 Application(s) Topical <User Schedule>  dexMEDEtomidine Infusion 0.5 MICROgram(s)/kG/Hr (9.14 mL/Hr) IV Continuous <Continuous>  folic acid 1 milliGRAM(s) Oral daily  glucagon  Injectable 1 milliGRAM(s) IntraMuscular once  heparin   Injectable 5000 Unit(s) SubCutaneous every 8 hours  hydrALAZINE 25 milliGRAM(s) Oral three times a day  insulin lispro (ADMELOG) corrective regimen sliding scale   SubCutaneous three times a day before meals  labetalol 100 milliGRAM(s) Oral two times a day  multivitamin 1 Tablet(s) Oral daily  NIFEdipine XL 60 milliGRAM(s) Oral daily  pantoprazole    Tablet 40 milliGRAM(s) Oral before breakfast  potassium chloride   Powder 40 milliEquivalent(s) Oral every 4 hours  saccharomyces boulardii 250 milliGRAM(s) Oral two times a day  senna 2 Tablet(s) Oral at bedtime  thiamine 100 milliGRAM(s) Oral daily    MEDICATIONS  (PRN):  acetaminophen     Tablet .. 650 milliGRAM(s) Oral every 6 hours PRN Temp greater or equal to 38C (100.4F), Mild Pain (1 - 3)  dextrose Oral Gel 15 Gram(s) Oral once PRN Blood Glucose LESS THAN 70 milliGRAM(s)/deciliter  LORazepam     Tablet 2 milliGRAM(s) Oral every 4 hours PRN Symptom-triggered 2 point increase in CIWA-Ar  LORazepam   Injectable 2 milliGRAM(s) IV Push every 4 hours PRN Symptom-triggered: each CIWA -Ar score 8 or GREATER          RADIOLOGY & ADDITIONAL TESTS:    < from: Xray Chest 1 View- PORTABLE-Routine (Xray Chest 1 View- PORTABLE-Routine .) (22 @ 06:41) >    No radiographic evidence of acute cardiopulmonary disease.      < from: Xray Chest 1 View- PORTABLE-Routine (Xray Chest 1 View- PORTABLE-Routine in AM.) (22 @ 06:00) >  No radiographic evidence of acute cardiopulmonary disease.      MICROBIOLOGY DATA:    Culture - Blood (22 @ 05:45)   Gram Stain: Growth in aerobic bottle: Gram Positive Cocci in Clusters   Growth in anaerobic bottle: Gram Positive Cocci in Clusters   Specimen Source: .Blood None   Culture Results: Growth in aerobic bottle: Gram Positive Cocci in Clusters   Growth in anaerobic bottle: Gram Positive Cocci in Chains     Culture - Blood in AM (22 @ 01:20)   Gram Stain: Growth in aerobic bottle: Gram Positive Cocci in Clusters   Growth in anaerobic bottle: Gram Positive Cocci in Clusters   - Methicillin SENSITIVE Staphylococcus aureus (MSSA): Detec Any isolate of Staphylococcus aureus from a blood culture is NOT considered a contaminant.   Specimen Source: .Blood Blood   Organism: Blood Culture PCR   Culture Results: Growth in aerobic bottle: Gram Positive Cocci in Clusters   Growth in anaerobic bottle: Gram Positive Cocci in Clusters     COVID-19 PCR (22 @ 16:40)   COVID-19 PCR: NotDetec

## 2022-11-24 LAB
-  AMPICILLIN/SULBACTAM: SIGNIFICANT CHANGE UP
-  CEFAZOLIN: SIGNIFICANT CHANGE UP
-  CLINDAMYCIN: SIGNIFICANT CHANGE UP
-  ERYTHROMYCIN: SIGNIFICANT CHANGE UP
-  GENTAMICIN: SIGNIFICANT CHANGE UP
-  OXACILLIN: SIGNIFICANT CHANGE UP
-  PENICILLIN: SIGNIFICANT CHANGE UP
-  RIFAMPIN: SIGNIFICANT CHANGE UP
-  TETRACYCLINE: SIGNIFICANT CHANGE UP
-  TRIMETHOPRIM/SULFAMETHOXAZOLE: SIGNIFICANT CHANGE UP
-  VANCOMYCIN: SIGNIFICANT CHANGE UP
ALBUMIN SERPL ELPH-MCNC: 3.8 G/DL — SIGNIFICANT CHANGE UP (ref 3.5–5.2)
ALP SERPL-CCNC: 105 U/L — SIGNIFICANT CHANGE UP (ref 30–115)
ALT FLD-CCNC: 14 U/L — SIGNIFICANT CHANGE UP (ref 0–41)
ANION GAP SERPL CALC-SCNC: 19 MMOL/L — HIGH (ref 7–14)
AST SERPL-CCNC: 27 U/L — SIGNIFICANT CHANGE UP (ref 0–41)
BASOPHILS # BLD AUTO: 0.03 K/UL — SIGNIFICANT CHANGE UP (ref 0–0.2)
BASOPHILS NFR BLD AUTO: 0.4 % — SIGNIFICANT CHANGE UP (ref 0–1)
BILIRUB SERPL-MCNC: 0.4 MG/DL — SIGNIFICANT CHANGE UP (ref 0.2–1.2)
BUN SERPL-MCNC: 36 MG/DL — HIGH (ref 10–20)
CALCIUM SERPL-MCNC: 9.2 MG/DL — SIGNIFICANT CHANGE UP (ref 8.4–10.5)
CHLORIDE SERPL-SCNC: 99 MMOL/L — SIGNIFICANT CHANGE UP (ref 98–110)
CO2 SERPL-SCNC: 22 MMOL/L — SIGNIFICANT CHANGE UP (ref 17–32)
CREAT SERPL-MCNC: 4.3 MG/DL — CRITICAL HIGH (ref 0.7–1.5)
CULTURE RESULTS: SIGNIFICANT CHANGE UP
CULTURE RESULTS: SIGNIFICANT CHANGE UP
EGFR: 15 ML/MIN/1.73M2 — LOW
EOSINOPHIL # BLD AUTO: 0.41 K/UL — SIGNIFICANT CHANGE UP (ref 0–0.7)
EOSINOPHIL NFR BLD AUTO: 5.6 % — SIGNIFICANT CHANGE UP (ref 0–8)
GLUCOSE BLDC GLUCOMTR-MCNC: 151 MG/DL — HIGH (ref 70–99)
GLUCOSE BLDC GLUCOMTR-MCNC: 162 MG/DL — HIGH (ref 70–99)
GLUCOSE BLDC GLUCOMTR-MCNC: 212 MG/DL — HIGH (ref 70–99)
GLUCOSE BLDC GLUCOMTR-MCNC: 243 MG/DL — HIGH (ref 70–99)
GLUCOSE SERPL-MCNC: 158 MG/DL — HIGH (ref 70–99)
GRAM STN FLD: SIGNIFICANT CHANGE UP
GRAM STN FLD: SIGNIFICANT CHANGE UP
HCT VFR BLD CALC: 27.6 % — LOW (ref 42–52)
HGB BLD-MCNC: 8.6 G/DL — LOW (ref 14–18)
IMM GRANULOCYTES NFR BLD AUTO: 0.5 % — HIGH (ref 0.1–0.3)
IRON SATN MFR SERPL: 10 % — LOW (ref 15–50)
IRON SATN MFR SERPL: 21 UG/DL — LOW (ref 35–150)
LYMPHOCYTES # BLD AUTO: 0.98 K/UL — LOW (ref 1.2–3.4)
LYMPHOCYTES # BLD AUTO: 13.3 % — LOW (ref 20.5–51.1)
MAGNESIUM SERPL-MCNC: 2.1 MG/DL — SIGNIFICANT CHANGE UP (ref 1.8–2.4)
MCHC RBC-ENTMCNC: 29.1 PG — SIGNIFICANT CHANGE UP (ref 27–31)
MCHC RBC-ENTMCNC: 31.2 G/DL — LOW (ref 32–37)
MCV RBC AUTO: 93.2 FL — SIGNIFICANT CHANGE UP (ref 80–94)
METHOD TYPE: SIGNIFICANT CHANGE UP
MONOCYTES # BLD AUTO: 0.43 K/UL — SIGNIFICANT CHANGE UP (ref 0.1–0.6)
MONOCYTES NFR BLD AUTO: 5.8 % — SIGNIFICANT CHANGE UP (ref 1.7–9.3)
NEUTROPHILS # BLD AUTO: 5.48 K/UL — SIGNIFICANT CHANGE UP (ref 1.4–6.5)
NEUTROPHILS NFR BLD AUTO: 74.4 % — SIGNIFICANT CHANGE UP (ref 42.2–75.2)
NRBC # BLD: 0 /100 WBCS — SIGNIFICANT CHANGE UP (ref 0–0)
ORGANISM # SPEC MICROSCOPIC CNT: SIGNIFICANT CHANGE UP
PHOSPHATE SERPL-MCNC: 4.1 MG/DL — SIGNIFICANT CHANGE UP (ref 2.1–4.9)
PLATELET # BLD AUTO: 273 K/UL — SIGNIFICANT CHANGE UP (ref 130–400)
POTASSIUM SERPL-MCNC: 3.8 MMOL/L — SIGNIFICANT CHANGE UP (ref 3.5–5)
POTASSIUM SERPL-SCNC: 3.8 MMOL/L — SIGNIFICANT CHANGE UP (ref 3.5–5)
PROT SERPL-MCNC: 7.5 G/DL — SIGNIFICANT CHANGE UP (ref 6–8)
RBC # BLD: 2.96 M/UL — LOW (ref 4.7–6.1)
RBC # FLD: 13.4 % — SIGNIFICANT CHANGE UP (ref 11.5–14.5)
SODIUM SERPL-SCNC: 140 MMOL/L — SIGNIFICANT CHANGE UP (ref 135–146)
SPECIMEN SOURCE: SIGNIFICANT CHANGE UP
SPECIMEN SOURCE: SIGNIFICANT CHANGE UP
TIBC SERPL-MCNC: 200 UG/DL — LOW (ref 220–430)
UIBC SERPL-MCNC: 179 UG/DL — SIGNIFICANT CHANGE UP (ref 110–370)
WBC # BLD: 7.37 K/UL — SIGNIFICANT CHANGE UP (ref 4.8–10.8)
WBC # FLD AUTO: 7.37 K/UL — SIGNIFICANT CHANGE UP (ref 4.8–10.8)

## 2022-11-24 PROCEDURE — 99232 SBSQ HOSP IP/OBS MODERATE 35: CPT

## 2022-11-24 RX ADMIN — Medication 2: at 07:41

## 2022-11-24 RX ADMIN — Medication 25 MILLIGRAM(S): at 05:43

## 2022-11-24 RX ADMIN — Medication 100 MILLIGRAM(S): at 05:43

## 2022-11-24 RX ADMIN — HEPARIN SODIUM 5000 UNIT(S): 5000 INJECTION INTRAVENOUS; SUBCUTANEOUS at 21:20

## 2022-11-24 RX ADMIN — Medication 250 MILLIGRAM(S): at 17:07

## 2022-11-24 RX ADMIN — Medication 60 MILLIGRAM(S): at 05:43

## 2022-11-24 RX ADMIN — CHLORHEXIDINE GLUCONATE 1 APPLICATION(S): 213 SOLUTION TOPICAL at 05:46

## 2022-11-24 RX ADMIN — Medication 100 MILLIGRAM(S): at 05:41

## 2022-11-24 RX ADMIN — Medication 100 MILLIGRAM(S): at 17:07

## 2022-11-24 RX ADMIN — HEPARIN SODIUM 5000 UNIT(S): 5000 INJECTION INTRAVENOUS; SUBCUTANEOUS at 05:44

## 2022-11-24 RX ADMIN — Medication 100 MILLIGRAM(S): at 11:26

## 2022-11-24 RX ADMIN — Medication 4: at 11:24

## 2022-11-24 RX ADMIN — Medication 25 MILLIGRAM(S): at 21:20

## 2022-11-24 RX ADMIN — Medication 1 TABLET(S): at 11:26

## 2022-11-24 RX ADMIN — Medication 1 MILLIGRAM(S): at 11:26

## 2022-11-24 RX ADMIN — Medication 25 MILLIGRAM(S): at 13:50

## 2022-11-24 RX ADMIN — HEPARIN SODIUM 5000 UNIT(S): 5000 INJECTION INTRAVENOUS; SUBCUTANEOUS at 13:50

## 2022-11-24 RX ADMIN — PANTOPRAZOLE SODIUM 40 MILLIGRAM(S): 20 TABLET, DELAYED RELEASE ORAL at 07:42

## 2022-11-24 RX ADMIN — SENNA PLUS 2 TABLET(S): 8.6 TABLET ORAL at 21:20

## 2022-11-24 RX ADMIN — Medication 250 MILLIGRAM(S): at 05:43

## 2022-11-24 RX ADMIN — Medication 2: at 16:27

## 2022-11-24 NOTE — PROGRESS NOTE ADULT - SUBJECTIVE AND OBJECTIVE BOX
Nephrology progress note    Patient is seen and examined, events over the last 24 h noted .  Off IVF, low grade temp  Allergies:  No Known Allergies    Hospital Medications:   MEDICATIONS  (STANDING):  ceFAZolin   IVPB 1000 milliGRAM(s) IV Intermittent every 12 hours  chlorhexidine 2% Cloths 1 Application(s) Topical <User Schedule>  dexMEDEtomidine Infusion 0.5 MICROgram(s)/kG/Hr (9.14 mL/Hr) IV Continuous <Continuous>  dextrose 5%. 1000 milliLiter(s) (100 mL/Hr) IV Continuous <Continuous>  dextrose 5%. 1000 milliLiter(s) (50 mL/Hr) IV Continuous <Continuous>  dextrose 50% Injectable 12.5 Gram(s) IV Push once  dextrose 50% Injectable 25 Gram(s) IV Push once  dextrose 50% Injectable 25 Gram(s) IV Push once  folic acid 1 milliGRAM(s) Oral daily  glucagon  Injectable 1 milliGRAM(s) IntraMuscular once  heparin   Injectable 5000 Unit(s) SubCutaneous every 8 hours  hydrALAZINE 25 milliGRAM(s) Oral three times a day  insulin lispro (ADMELOG) corrective regimen sliding scale   SubCutaneous three times a day before meals  labetalol 100 milliGRAM(s) Oral two times a day  multivitamin 1 Tablet(s) Oral daily  NIFEdipine XL 60 milliGRAM(s) Oral daily  pantoprazole    Tablet 40 milliGRAM(s) Oral before breakfast  saccharomyces boulardii 250 milliGRAM(s) Oral two times a day  senna 2 Tablet(s) Oral at bedtime  thiamine 100 milliGRAM(s) Oral daily        VITALS:  T(F): 100.1 (22 @ 04:27), Max: 100.1 (22 @ 21:28)  HR: 107 (22 @ 04:27)  BP: 133/74 (22 @ 04:27)  RR: 18 (22 @ 04:27)  SpO2: --  Wt(kg): --     @ 07:01  -   @ 07:00  --------------------------------------------------------  IN: 320 mL / OUT: 575 mL / NET: -255 mL     @ 07:01  -   @ 07:00  --------------------------------------------------------  IN: 0 mL / OUT: 400 mL / NET: -400 mL          PHYSICAL EXAM:  Constitutional: NAD  HEENT: anicteric sclera  Neck: No JVD  Respiratory: CTA  Cardiovascular: S1, S2, RRR  Gastrointestinal: BS+, soft, NT/ND  Extremities:  No peripheral edema  Neurological: A/O x 3  : No CVA tenderness.  Skin: No rashes  Vascular Access:    LABS:      140  |  98  |  35<H>  ----------------------------<  200<H>  3.4<L>   |  24  |  4.3<HH>    Ca    9.3      2022 06:40  Phos  3.6       Mg     1.6         TPro  7.4  /  Alb  3.8  /  TBili  0.5  /  DBili      /  AST  23  /  ALT  16  /  AlkPhos  90                            9.7    7.85  )-----------( 233      ( 2022 06:40 )             30.3       Urine Studies:  Urinalysis Basic - ( 2022 01:30 )    Color: Yellow / Appearance: Clear / S.020 / pH:   Gluc:  / Ketone: 15  / Bili: Negative / Urobili: 0.2 mg/dL   Blood:  / Protein: 100 mg/dL / Nitrite: Negative   Leuk Esterase: Negative / RBC: >50 /HPF / WBC 6-10 /HPF   Sq Epi:  / Non Sq Epi: Few /HPF / Bacteria:         RADIOLOGY & ADDITIONAL STUDIES:

## 2022-11-24 NOTE — PROGRESS NOTE ADULT - ASSESSMENT
56 y/o male with PMHx of HTN, DM, CKD4  and alcohol dependence/withdrawals presenting to ED 2 days ago with general weakness and pain for about 3 weeks and has not been taking his diabetes medications, Pt usually drinks alcohol daily, but states last time drinking was Saturday/Sunday -> admitted to unit for VAMSI on CKD4 and alcohol withdrawal with fever    Fever: panculture revealed MSSA Bacteriemia   Started on Ancef 1g Q12 (renally dosed) pending sensitivities   - 2D ECHO: no Mention of vegetation Will schedule for  TTE   -ID consult appreciated   -F/up repeat blood cx, ESR, CRP     VAMSI on CKD IV Scr: 13.5 on admission, suspected ATN: Improving   Complicated by Toxic Metabolic Encephalopathy: Resolving   -Scr now stabilizing around 4.3-4.5  -Nephro Consult appreciated  -PO hydration encouraged, monitor  -Daily BMP's     Type II DM: Non-compliant with Home  Meds   -FS before meals and bedtime   -c/w Insulin regimen and Keep FS below 180    Normocytic Anemia: likely from ETOH   -F/up Iron studies, B12 and Folate     ETOH Abuse with Suspected Folic Acid, Magnesium and Thiamine deficiency   -Librium PRN, c/w Thiamine, folic A, magnesium and MVI     DVT PPX: heparin   GI PPX: PPI   Full code  Dispo: from Home   -Pending Clinical and Renal function improvement /stability

## 2022-11-24 NOTE — PROGRESS NOTE ADULT - ASSESSMENT
#  Severe VAMSI on CKD, ATN in nature  - renal function improving  #  HTN uncontrolled now, seems to have component of agitation  #  Alcohol intoxication / withdrawal followed by addiction medicine  # Hypernatremia - improved  #Staph aureus in BCx 11/22 MSSA    Recommendations:  - encourage po hydration, may give 1/2 NS 75 cc/hr if po fluid intake is <2 L/daily  - BP is better, on antihypertensives  - on Cefazolin as per ID, unclear source

## 2022-11-25 LAB
ANION GAP SERPL CALC-SCNC: 17 MMOL/L — HIGH (ref 7–14)
BASOPHILS # BLD AUTO: 0.04 K/UL — SIGNIFICANT CHANGE UP (ref 0–0.2)
BASOPHILS NFR BLD AUTO: 0.6 % — SIGNIFICANT CHANGE UP (ref 0–1)
BUN SERPL-MCNC: 35 MG/DL — HIGH (ref 10–20)
CALCIUM SERPL-MCNC: 8.9 MG/DL — SIGNIFICANT CHANGE UP (ref 8.4–10.5)
CHLORIDE SERPL-SCNC: 100 MMOL/L — SIGNIFICANT CHANGE UP (ref 98–110)
CO2 SERPL-SCNC: 21 MMOL/L — SIGNIFICANT CHANGE UP (ref 17–32)
CREAT SERPL-MCNC: 4.2 MG/DL — CRITICAL HIGH (ref 0.7–1.5)
CRP SERPL-MCNC: 132 MG/L — HIGH
CULTURE RESULTS: SIGNIFICANT CHANGE UP
EGFR: 15 ML/MIN/1.73M2 — LOW
EOSINOPHIL # BLD AUTO: 0.35 K/UL — SIGNIFICANT CHANGE UP (ref 0–0.7)
EOSINOPHIL NFR BLD AUTO: 5 % — SIGNIFICANT CHANGE UP (ref 0–8)
ERYTHROCYTE [SEDIMENTATION RATE] IN BLOOD: 142 MM/HR — HIGH (ref 0–10)
FERRITIN SERPL-MCNC: 1153 NG/ML — HIGH (ref 30–400)
FOLATE SERPL-MCNC: >20 NG/ML — SIGNIFICANT CHANGE UP
GLUCOSE BLDC GLUCOMTR-MCNC: 106 MG/DL — HIGH (ref 70–99)
GLUCOSE BLDC GLUCOMTR-MCNC: 132 MG/DL — HIGH (ref 70–99)
GLUCOSE BLDC GLUCOMTR-MCNC: 248 MG/DL — HIGH (ref 70–99)
GLUCOSE SERPL-MCNC: 141 MG/DL — HIGH (ref 70–99)
HCT VFR BLD CALC: 25.9 % — LOW (ref 42–52)
HGB BLD-MCNC: 8.2 G/DL — LOW (ref 14–18)
IMM GRANULOCYTES NFR BLD AUTO: 0.3 % — SIGNIFICANT CHANGE UP (ref 0.1–0.3)
LYMPHOCYTES # BLD AUTO: 1.35 K/UL — SIGNIFICANT CHANGE UP (ref 1.2–3.4)
LYMPHOCYTES # BLD AUTO: 19.3 % — LOW (ref 20.5–51.1)
MCHC RBC-ENTMCNC: 29.2 PG — SIGNIFICANT CHANGE UP (ref 27–31)
MCHC RBC-ENTMCNC: 31.7 G/DL — LOW (ref 32–37)
MCV RBC AUTO: 92.2 FL — SIGNIFICANT CHANGE UP (ref 80–94)
MONOCYTES # BLD AUTO: 0.58 K/UL — SIGNIFICANT CHANGE UP (ref 0.1–0.6)
MONOCYTES NFR BLD AUTO: 8.3 % — SIGNIFICANT CHANGE UP (ref 1.7–9.3)
NEUTROPHILS # BLD AUTO: 4.64 K/UL — SIGNIFICANT CHANGE UP (ref 1.4–6.5)
NEUTROPHILS NFR BLD AUTO: 66.5 % — SIGNIFICANT CHANGE UP (ref 42.2–75.2)
NRBC # BLD: 0 /100 WBCS — SIGNIFICANT CHANGE UP (ref 0–0)
PLATELET # BLD AUTO: 255 K/UL — SIGNIFICANT CHANGE UP (ref 130–400)
POTASSIUM SERPL-MCNC: 3.5 MMOL/L — SIGNIFICANT CHANGE UP (ref 3.5–5)
POTASSIUM SERPL-SCNC: 3.5 MMOL/L — SIGNIFICANT CHANGE UP (ref 3.5–5)
RBC # BLD: 2.81 M/UL — LOW (ref 4.7–6.1)
RBC # FLD: 13.2 % — SIGNIFICANT CHANGE UP (ref 11.5–14.5)
SARS-COV-2 RNA SPEC QL NAA+PROBE: SIGNIFICANT CHANGE UP
SODIUM SERPL-SCNC: 138 MMOL/L — SIGNIFICANT CHANGE UP (ref 135–146)
SPECIMEN SOURCE: SIGNIFICANT CHANGE UP
VIT B12 SERPL-MCNC: 892 PG/ML — SIGNIFICANT CHANGE UP (ref 232–1245)
WBC # BLD: 6.98 K/UL — SIGNIFICANT CHANGE UP (ref 4.8–10.8)
WBC # FLD AUTO: 6.98 K/UL — SIGNIFICANT CHANGE UP (ref 4.8–10.8)

## 2022-11-25 PROCEDURE — 99232 SBSQ HOSP IP/OBS MODERATE 35: CPT

## 2022-11-25 RX ADMIN — PANTOPRAZOLE SODIUM 40 MILLIGRAM(S): 20 TABLET, DELAYED RELEASE ORAL at 06:03

## 2022-11-25 RX ADMIN — Medication 2: at 07:18

## 2022-11-25 RX ADMIN — Medication 100 MILLIGRAM(S): at 17:09

## 2022-11-25 RX ADMIN — Medication 100 MILLIGRAM(S): at 05:35

## 2022-11-25 RX ADMIN — Medication 250 MILLIGRAM(S): at 05:36

## 2022-11-25 RX ADMIN — SENNA PLUS 2 TABLET(S): 8.6 TABLET ORAL at 21:35

## 2022-11-25 RX ADMIN — HEPARIN SODIUM 5000 UNIT(S): 5000 INJECTION INTRAVENOUS; SUBCUTANEOUS at 21:35

## 2022-11-25 RX ADMIN — Medication 250 MILLIGRAM(S): at 17:09

## 2022-11-25 RX ADMIN — Medication 1 MILLIGRAM(S): at 13:49

## 2022-11-25 RX ADMIN — Medication 4: at 16:38

## 2022-11-25 RX ADMIN — HEPARIN SODIUM 5000 UNIT(S): 5000 INJECTION INTRAVENOUS; SUBCUTANEOUS at 13:49

## 2022-11-25 RX ADMIN — Medication 60 MILLIGRAM(S): at 05:36

## 2022-11-25 RX ADMIN — HEPARIN SODIUM 5000 UNIT(S): 5000 INJECTION INTRAVENOUS; SUBCUTANEOUS at 05:36

## 2022-11-25 RX ADMIN — Medication 1 TABLET(S): at 13:49

## 2022-11-25 RX ADMIN — Medication 100 MILLIGRAM(S): at 13:49

## 2022-11-25 RX ADMIN — CHLORHEXIDINE GLUCONATE 1 APPLICATION(S): 213 SOLUTION TOPICAL at 05:36

## 2022-11-25 RX ADMIN — Medication 25 MILLIGRAM(S): at 13:49

## 2022-11-25 RX ADMIN — Medication 25 MILLIGRAM(S): at 05:35

## 2022-11-25 NOTE — PROGRESS NOTE ADULT - ASSESSMENT
58 y/o male with PMHx of HTN, DM, CKD4  and alcohol dependence/withdrawals presenting to ED 2 days ago with general weakness and pain for about 3 weeks and has not been taking his diabetes medications, Pt usually drinks alcohol daily, but states last time drinking was Saturday/Sunday -> admitted to unit for VAMSI on CKD4 and alcohol withdrawal with fever    Fever: panculture revealed MSSA Bacteriemia   Started on Ancef 1g Q12 (renally dosed) pending sensitivities   - 2D ECHO: no Mention of vegetation  - Scheduled for KIMBER on Monday  -Blood cx positive from 11/22, 11/23  -ID consult appreciated       VAMSI on CKD IV Scr: 13.5 on admission, suspected ATN: Improving   Complicated by Toxic Metabolic Encephalopathy: Resolving   -Scr now stabilizing around 4.3-4.5  -Nephro Consult appreciated  -PO hydration encouraged, monitor  -Daily BMP's     Type II DM: Non-compliant with Home  Meds   -FS before meals and bedtime   -c/w Insulin regimen and Keep FS below 180    Normocytic Anemia: likely from ETOH   - Iron studies reveal REGGIE, will start Venofer x 5 doses   - B12 and Folate: WNL     ETOH Abuse with Suspected Folic Acid, Magnesium and Thiamine deficiency   -Librium PRN, c/w Thiamine, folic A, magnesium and MVI     DVT PPX: heparin   GI PPX: PPI   Full code  Dispo: from Home   -Pending Clinical and Renal function improvement /stability

## 2022-11-25 NOTE — PROGRESS NOTE ADULT - SUBJECTIVE AND OBJECTIVE BOX
Patient is a 57y old  Male who presents with a chief complaint of acute on chronic renal failure w/ severe electrolyte imbalance        MEDICATIONS  (STANDING):  ceFAZolin   IVPB 1000 milliGRAM(s) IV Intermittent every 12 hours  chlorhexidine 2% Cloths 1 Application(s) Topical <User Schedule>  dexMEDEtomidine Infusion 0.5 MICROgram(s)/kG/Hr (9.14 mL/Hr) IV Continuous <Continuous>  dextrose 5%. 1000 milliLiter(s) (100 mL/Hr) IV Continuous <Continuous>  dextrose 5%. 1000 milliLiter(s) (50 mL/Hr) IV Continuous <Continuous>  dextrose 50% Injectable 25 Gram(s) IV Push once  dextrose 50% Injectable 12.5 Gram(s) IV Push once  dextrose 50% Injectable 25 Gram(s) IV Push once  folic acid 1 milliGRAM(s) Oral daily  glucagon  Injectable 1 milliGRAM(s) IntraMuscular once  heparin   Injectable 5000 Unit(s) SubCutaneous every 8 hours  hydrALAZINE 25 milliGRAM(s) Oral three times a day  insulin lispro (ADMELOG) corrective regimen sliding scale   SubCutaneous three times a day before meals  labetalol 100 milliGRAM(s) Oral two times a day  multivitamin 1 Tablet(s) Oral daily  NIFEdipine XL 60 milliGRAM(s) Oral daily  pantoprazole    Tablet 40 milliGRAM(s) Oral before breakfast  saccharomyces boulardii 250 milliGRAM(s) Oral two times a day  senna 2 Tablet(s) Oral at bedtime  thiamine 100 milliGRAM(s) Oral daily    MEDICATIONS  (PRN):  acetaminophen     Tablet .. 650 milliGRAM(s) Oral every 6 hours PRN Temp greater or equal to 38C (100.4F), Mild Pain (1 - 3)  dextrose Oral Gel 15 Gram(s) Oral once PRN Blood Glucose LESS THAN 70 milliGRAM(s)/deciliter  LORazepam     Tablet 2 milliGRAM(s) Oral every 4 hours PRN Symptom-triggered 2 point increase in CIWA-Ar  LORazepam   Injectable 2 milliGRAM(s) IV Push every 4 hours PRN Symptom-triggered: each CIWA -Ar score 8 or GREATER      CAPILLARY BLOOD GLUCOSE      POCT Blood Glucose.: 212 mg/dL (24 Nov 2022 21:25)  POCT Blood Glucose.: 151 mg/dL (24 Nov 2022 16:23)  POCT Blood Glucose.: 243 mg/dL (24 Nov 2022 11:17)    I&O's Summary    24 Nov 2022 07:01  -  25 Nov 2022 07:00  --------------------------------------------------------  IN: 0 mL / OUT: 200 mL / NET: -200 mL        PHYSICAL EXAM:  Vital Signs Last 24 Hrs  T(C): 36.9 (25 Nov 2022 06:05), Max: 37.2 (24 Nov 2022 14:07)  T(F): 98.5 (25 Nov 2022 06:05), Max: 98.9 (24 Nov 2022 14:07)  HR: 109 (25 Nov 2022 06:05) (98 - 109)  BP: 130/71 (25 Nov 2022 06:05) (110/65 - 140/76)  BP(mean): --  RR: 18 (25 Nov 2022 06:05) (18 - 18)  SpO2: --      GENERAL: No acute distress, well-developed  HEAD:  Atraumatic, Normocephalic  EYES: EOMI, PERRLA, conjunctiva and sclera clear  NECK: Supple, no lymphadenopathy, no JVD  CHEST/LUNG: CTAB; No wheezes, rales, or rhonchi  HEART: Regular rate and rhythm; No murmurs, rubs, or gallops  ABDOMEN: Soft, non-tender, non-distended; normal bowel sounds, no organomegaly  EXTREMITIES:  2+ peripheral pulses b/l, No clubbing, cyanosis, or edema  NEUROLOGY: A&O x 3, no focal deficits  SKIN: scaly eczematous lesions on b/l UE and LE     LABS:                        8.6    7.37  )-----------( 273      ( 24 Nov 2022 06:48 )             27.6     11-24    140  |  99  |  36<H>  ----------------------------<  158<H>  3.8   |  22  |  4.3<HH>    Ca    9.2      24 Nov 2022 06:48  Phos  4.1     11-24  Mg     2.1     11-24    TPro  7.5  /  Alb  3.8  /  TBili  0.4  /  DBili  x   /  AST  27  /  ALT  14  /  AlkPhos  105  11-24        Culture - Blood (collected 23 Nov 2022 06:40)  Source: .Blood None  Gram Stain (24 Nov 2022 14:55):    Growth in aerobic bottle: Gram Positive Cocci in Clusters    Growth in anaerobic bottle: Gram Positive Cocci in Clusters  Final Report (25 Nov 2022 07:07):    Growth in aerobic and anaerobic bottles: Staphylococcus aureus    See previous culture [id number]

## 2022-11-25 NOTE — PROGRESS NOTE ADULT - ASSESSMENT
Patient is a 57y old  Male with multiple- co-morbidities including Alcohol abuse who presents to the ER on 11/11/22 with complaint of acute on chronic renal failure w/ severe electrolyte imbalance . On admission, he found to have hyponatremia, NA of 114, creat-13.5, K-7.0 and alcohol withdrawal. He has managed in ICU initially and then transferred to Medical floor. He spiked Fever on 11/22/22 and during fever work up , found to have positive MSSA Bacteremia. He has started on Cefazolin and The ID consult requested to assist with further evaluation and antibiotic management.     # Hospital onset MSSA Bacteremia- 11/22/22- ?? source   - TTE Echo Complete w/o Contrast w/ Doppler (11.23.22 @ 10:26): mild MVR 11/23, EF 73%  - Blood Cx 11/22-11/23 +     # Alcohol abuse-   # Electrolytes  imbalance   # VAMSI    Recommendations  This is a preliminary incomplete pended note, all final recommendations to follow after interview and examination of the patient.    Please call or message on [employer] Teams if with any questions.  Spectra [phone]       Patient is a 57y old  Male with multiple- co-morbidities including Alcohol abuse who presents to the ER on 11/11/22 with complaint of acute on chronic renal failure w/ severe electrolyte imbalance . On admission, he found to have hyponatremia, NA of 114, creat-13.5, K-7.0 and alcohol withdrawal. He has managed in ICU initially and then transferred to Medical floor. He spiked Fever on 11/22/22 and during fever work up , found to have positive MSSA Bacteremia. He has started on Cefazolin and The ID consult requested to assist with further evaluation and antibiotic management.     # Hospital onset MSSA Bacteremia- 11/22/22- ?? source - no clear phleblitis   - TTE Echo Complete w/o Contrast w/ Doppler (11.23.22 @ 10:26): mild MVR 11/23, EF 73%  - Blood Cx 11/22-11/23 +     # Alcohol abuse-   # Electrolytes  imbalance   # VAMSI    Recommendations  - contineu cefazolin -- increase to 2g q 12 hours for bacteremia  - follow-up blood Cx from 11/24 and 11/25  - TTE is negative, would obtain KIMBER as source unclear     Discussed with patient     Please call or message on [employer] Teams if with any questions.  Spectra [phone]

## 2022-11-25 NOTE — PROGRESS NOTE ADULT - SUBJECTIVE AND OBJECTIVE BOX
KECIA MARTIN  57y, Male  Allergy: No Known Allergies      LOS  14d    CHIEF COMPLAINT: acute on chronic renal failure w/ severe electrolyte imbalance (25 Nov 2022 08:41)      INTERVAL EVENTS/HPI  - No acute events overnight  - T(F): , Max: 98.9 (11-24-22 @ 14:07)  - Denies any worsening symptoms  - Tolerating medication  - WBC Count: 7.37 (11-24-22 @ 06:48)  WBC Count: 7.85 (11-23-22 @ 06:40)     - Creatinine, Serum: 4.3 (11-24-22 @ 06:48)       ROS  General: Denies rigors, nightsweats  HEENT: Denies headache, rhinorrhea, sore throat, eye pain  CV: Denies CP, palpitations  PULM: Denies wheezing, hemoptysis  GI: Denies hematemesis, hematochezia, melena  : Denies discharge, hematuria  MSK: Denies arthralgias, myalgias  SKIN: Denies rash, lesions  NEURO: Denies paresthesias, weakness  PSYCH: Denies depression, anxiety    VITALS:  T(F): 98.5, Max: 98.9 (11-24-22 @ 14:07)  HR: 109  BP: 130/71  RR: 18Vital Signs Last 24 Hrs  T(C): 36.9 (25 Nov 2022 06:05), Max: 37.2 (24 Nov 2022 14:07)  T(F): 98.5 (25 Nov 2022 06:05), Max: 98.9 (24 Nov 2022 14:07)  HR: 109 (25 Nov 2022 06:05) (98 - 109)  BP: 130/71 (25 Nov 2022 06:05) (110/65 - 140/76)  BP(mean): --  RR: 18 (25 Nov 2022 06:05) (18 - 18)  SpO2: --        PHYSICAL EXAM:  Gen: NAD, resting in bed  HEENT: Normocephalic, atraumatic  Neck: supple, no lymphadenopathy  CV: Regular rate & regular rhythm  Lungs: decreased BS at bases, no fremitus  Abdomen: Soft, BS present  Ext: Warm, well perfused  Neuro: non focal, awake  Skin: no rash, no erythema  Lines: no phlebitis    FH: Non-contributory  Social Hx: Non-contributory    TESTS & MEASUREMENTS:                        8.6    7.37  )-----------( 273      ( 24 Nov 2022 06:48 )             27.6     11-24    140  |  99  |  36<H>  ----------------------------<  158<H>  3.8   |  22  |  4.3<HH>    Ca    9.2      24 Nov 2022 06:48  Phos  4.1     11-24  Mg     2.1     11-24    TPro  7.5  /  Alb  3.8  /  TBili  0.4  /  DBili  x   /  AST  27  /  ALT  14  /  AlkPhos  105  11-24      LIVER FUNCTIONS - ( 24 Nov 2022 06:48 )  Alb: 3.8 g/dL / Pro: 7.5 g/dL / ALK PHOS: 105 U/L / ALT: 14 U/L / AST: 27 U/L / GGT: x               Culture - Blood (collected 11-23-22 @ 06:40)  Source: .Blood None  Gram Stain (11-24-22 @ 14:55):    Growth in aerobic bottle: Gram Positive Cocci in Clusters    Growth in anaerobic bottle: Gram Positive Cocci in Clusters  Final Report (11-25-22 @ 07:07):    Growth in aerobic and anaerobic bottles: Staphylococcus aureus    See previous culture [id number]    Culture - Blood (collected 11-22-22 @ 05:45)  Source: .Blood None  Gram Stain (11-23-22 @ 06:35):    Growth in aerobic bottle: Gram Positive Cocci in Clusters    Growth in anaerobic bottle: Gram Positive Cocci in Clusters  Final Report (11-24-22 @ 18:23):    Growth in aerobic and anaerobic bottles: Staphylococcus aureus    See previous culture [id number]    Culture - Blood (collected 11-22-22 @ 01:20)  Source: .Blood Blood  Gram Stain (11-23-22 @ 10:56):    Growth in aerobic bottle: Gram Positive Cocci in Clusters    Growth in anaerobic bottle: Gram Positive Cocci in Clusters  Final Report (11-24-22 @ 13:06):    Growth in aerobic and anaerobic bottles: Staphylococcus aureus    ***Blood Panel PCR results on this specimen are available    approximately 3 hours after the Gram stain result.***    Gram stain, PCR, and/or culture results may not always    correspond dueto difference in methodologies.    ************************************************************    This PCR assay was performed by multiplex PCR. This    Assay tests for 66 bacterial and resistance gene targets.    Please refer to the [facility] test directory    at [email or web address] for details.  Organism: Blood Culture PCR  Staphylococcus aureus (11-24-22 @ 13:06)  Organism: Staphylococcus aureus (11-24-22 @ 13:06)      -  Ampicillin/Sulbactam: S <=8/4      -  Cefazolin: S <=4      -  Clindamycin: S <=0.25      -  Erythromycin: S <=0.25      -  Gentamicin: S <=1 Should not be used as monotherapy      -  Oxacillin: S 0.5 Oxacillin predicts susceptibility for dicloxacillin, methicillin, and nafcillin      -  Penicillin: R >8      -  Rifampin: S <=1 Should not be used as monotherapy      -  Tetracycline: S <=1      -  Trimethoprim/Sulfamethoxazole: S <=0.5/9.5      -  Vancomycin: S 2      Method Type: NARCISO  Organism: Blood Culture PCR (11-24-22 @ 13:06)      -  Methicillin SENSITIVE Staphylococcus aureus (MSSA): Detec Any isolate of Staphylococcus aureus from a blood culture is NOT considered a contaminant.      Method Type: PCR    Culture - Urine (collected 11-11-22 @ 15:40)  Source: Clean Catch Clean Catch (Midstream)  Final Report (11-12-22 @ 19:12):    <10,000 CFU/mL Normal Urogenital Evelin            INFECTIOUS DISEASES TESTING  Procalcitonin, Serum: 0.91 (11-22-22 @ 14:40)  COVID-19 PCR: NotDetec (11-11-22 @ 16:40)  Procalcitonin, Serum: 0.69 (03-21-22 @ 06:40)  Procalcitonin, Serum: 1.09 (03-16-22 @ 06:09)  COVID-19 PCR: NotDetec (03-15-22 @ 12:11)      INFLAMMATORY MARKERS      RADIOLOGY & ADDITIONAL TESTS:  I have personally reviewed the last available Chest xray  CXR      CT      CARDIOLOGY TESTING  12 Lead ECG:   Ventricular Rate 65 BPM    Atrial Rate 65 BPM    P-R Interval 172 ms    QRS Duration 84 ms    Q-T Interval 432 ms    QTC Calculation(Bazett) 449 ms    P Axis 68 degrees    R Axis 75 degrees    T Axis 66 degrees    Diagnosis Line Normal sinus rhythm    Confirmed by DAVID MI MD ([id number]) on 11/13/2022 9:50:05 AM (11-12-22 @ 08:52)  12 Lead ECG:   Ventricular Rate 64 BPM    Atrial Rate 64 BPM    P-R Interval 168 ms    QRS Duration 86 ms    Q-T Interval 434 ms    QTC Calculation(Bazett) 447 ms    P Axis 68 degrees    R Axis 77 degrees    T Axis 68 degrees    Diagnosis Line Normal sinus rhythm    Confirmed by DAVID MI MD ([id number]) on 11/13/2022 9:49:47 AM (11-12-22 @ 08:52)      MEDICATIONS  ceFAZolin   IVPB 1000 IV Intermittent every 12 hours  chlorhexidine 2% Cloths 1 Topical <User Schedule>  dexMEDEtomidine Infusion 0.5 IV Continuous <Continuous>  dextrose 5%. 1000 IV Continuous <Continuous>  dextrose 5%. 1000 IV Continuous <Continuous>  dextrose 50% Injectable 25 IV Push once  dextrose 50% Injectable 12.5 IV Push once  dextrose 50% Injectable 25 IV Push once  folic acid 1 Oral daily  glucagon  Injectable 1 IntraMuscular once  heparin   Injectable 5000 SubCutaneous every 8 hours  hydrALAZINE 25 Oral three times a day  insulin lispro (ADMELOG) corrective regimen sliding scale  SubCutaneous three times a day before meals  labetalol 100 Oral two times a day  multivitamin 1 Oral daily  NIFEdipine XL 60 Oral daily  pantoprazole    Tablet 40 Oral before breakfast  saccharomyces boulardii 250 Oral two times a day  senna 2 Oral at bedtime  thiamine 100 Oral daily      WEIGHT  Weight (kg): 68.2 (11-22-22 @ 23:38)      ANTIBIOTICS:  ceFAZolin   IVPB 1000 milliGRAM(s) IV Intermittent every 12 hours      All available historical records have been reviewed

## 2022-11-26 LAB
ANION GAP SERPL CALC-SCNC: 19 MMOL/L — HIGH (ref 7–14)
BASOPHILS # BLD AUTO: 0.04 K/UL — SIGNIFICANT CHANGE UP (ref 0–0.2)
BASOPHILS NFR BLD AUTO: 0.6 % — SIGNIFICANT CHANGE UP (ref 0–1)
BUN SERPL-MCNC: 34 MG/DL — HIGH (ref 10–20)
CALCIUM SERPL-MCNC: 9 MG/DL — SIGNIFICANT CHANGE UP (ref 8.4–10.5)
CHLORIDE SERPL-SCNC: 102 MMOL/L — SIGNIFICANT CHANGE UP (ref 98–110)
CO2 SERPL-SCNC: 21 MMOL/L — SIGNIFICANT CHANGE UP (ref 17–32)
CREAT SERPL-MCNC: 4.2 MG/DL — CRITICAL HIGH (ref 0.7–1.5)
CULTURE RESULTS: SIGNIFICANT CHANGE UP
EGFR: 15 ML/MIN/1.73M2 — LOW
EOSINOPHIL # BLD AUTO: 0.42 K/UL — SIGNIFICANT CHANGE UP (ref 0–0.7)
EOSINOPHIL NFR BLD AUTO: 6.6 % — SIGNIFICANT CHANGE UP (ref 0–8)
GLUCOSE BLDC GLUCOMTR-MCNC: 153 MG/DL — HIGH (ref 70–99)
GLUCOSE BLDC GLUCOMTR-MCNC: 157 MG/DL — HIGH (ref 70–99)
GLUCOSE BLDC GLUCOMTR-MCNC: 323 MG/DL — HIGH (ref 70–99)
GLUCOSE BLDC GLUCOMTR-MCNC: 80 MG/DL — SIGNIFICANT CHANGE UP (ref 70–99)
GLUCOSE SERPL-MCNC: 147 MG/DL — HIGH (ref 70–99)
GRAM STN FLD: SIGNIFICANT CHANGE UP
GRAM STN FLD: SIGNIFICANT CHANGE UP
HCT VFR BLD CALC: 26.7 % — LOW (ref 42–52)
HGB BLD-MCNC: 8.4 G/DL — LOW (ref 14–18)
IMM GRANULOCYTES NFR BLD AUTO: 0.3 % — SIGNIFICANT CHANGE UP (ref 0.1–0.3)
LYMPHOCYTES # BLD AUTO: 1.23 K/UL — SIGNIFICANT CHANGE UP (ref 1.2–3.4)
LYMPHOCYTES # BLD AUTO: 19.5 % — LOW (ref 20.5–51.1)
MCHC RBC-ENTMCNC: 29.4 PG — SIGNIFICANT CHANGE UP (ref 27–31)
MCHC RBC-ENTMCNC: 31.5 G/DL — LOW (ref 32–37)
MCV RBC AUTO: 93.4 FL — SIGNIFICANT CHANGE UP (ref 80–94)
MONOCYTES # BLD AUTO: 0.55 K/UL — SIGNIFICANT CHANGE UP (ref 0.1–0.6)
MONOCYTES NFR BLD AUTO: 8.7 % — SIGNIFICANT CHANGE UP (ref 1.7–9.3)
NEUTROPHILS # BLD AUTO: 4.06 K/UL — SIGNIFICANT CHANGE UP (ref 1.4–6.5)
NEUTROPHILS NFR BLD AUTO: 64.3 % — SIGNIFICANT CHANGE UP (ref 42.2–75.2)
NRBC # BLD: 0 /100 WBCS — SIGNIFICANT CHANGE UP (ref 0–0)
PLATELET # BLD AUTO: 312 K/UL — SIGNIFICANT CHANGE UP (ref 130–400)
POTASSIUM SERPL-MCNC: 4 MMOL/L — SIGNIFICANT CHANGE UP (ref 3.5–5)
POTASSIUM SERPL-SCNC: 4 MMOL/L — SIGNIFICANT CHANGE UP (ref 3.5–5)
RBC # BLD: 2.86 M/UL — LOW (ref 4.7–6.1)
RBC # FLD: 13 % — SIGNIFICANT CHANGE UP (ref 11.5–14.5)
SODIUM SERPL-SCNC: 142 MMOL/L — SIGNIFICANT CHANGE UP (ref 135–146)
SPECIMEN SOURCE: SIGNIFICANT CHANGE UP
SPECIMEN SOURCE: SIGNIFICANT CHANGE UP
WBC # BLD: 6.32 K/UL — SIGNIFICANT CHANGE UP (ref 4.8–10.8)
WBC # FLD AUTO: 6.32 K/UL — SIGNIFICANT CHANGE UP (ref 4.8–10.8)

## 2022-11-26 PROCEDURE — 99232 SBSQ HOSP IP/OBS MODERATE 35: CPT

## 2022-11-26 RX ORDER — IRON SUCROSE 20 MG/ML
100 INJECTION, SOLUTION INTRAVENOUS EVERY 24 HOURS
Refills: 0 | Status: COMPLETED | OUTPATIENT
Start: 2022-11-26 | End: 2022-11-30

## 2022-11-26 RX ADMIN — Medication 8: at 16:52

## 2022-11-26 RX ADMIN — IRON SUCROSE 210 MILLIGRAM(S): 20 INJECTION, SOLUTION INTRAVENOUS at 10:50

## 2022-11-26 RX ADMIN — Medication 100 MILLIGRAM(S): at 05:25

## 2022-11-26 RX ADMIN — Medication 100 MILLIGRAM(S): at 11:34

## 2022-11-26 RX ADMIN — Medication 250 MILLIGRAM(S): at 16:56

## 2022-11-26 RX ADMIN — Medication 25 MILLIGRAM(S): at 21:23

## 2022-11-26 RX ADMIN — Medication 2: at 07:33

## 2022-11-26 RX ADMIN — Medication 60 MILLIGRAM(S): at 05:25

## 2022-11-26 RX ADMIN — Medication 100 MILLIGRAM(S): at 16:56

## 2022-11-26 RX ADMIN — PANTOPRAZOLE SODIUM 40 MILLIGRAM(S): 20 TABLET, DELAYED RELEASE ORAL at 06:05

## 2022-11-26 RX ADMIN — Medication 250 MILLIGRAM(S): at 05:25

## 2022-11-26 RX ADMIN — Medication 25 MILLIGRAM(S): at 05:25

## 2022-11-26 RX ADMIN — CHLORHEXIDINE GLUCONATE 1 APPLICATION(S): 213 SOLUTION TOPICAL at 05:24

## 2022-11-26 RX ADMIN — Medication 100 MILLIGRAM(S): at 05:27

## 2022-11-26 RX ADMIN — Medication 1 TABLET(S): at 11:33

## 2022-11-26 RX ADMIN — HEPARIN SODIUM 5000 UNIT(S): 5000 INJECTION INTRAVENOUS; SUBCUTANEOUS at 05:26

## 2022-11-26 RX ADMIN — HEPARIN SODIUM 5000 UNIT(S): 5000 INJECTION INTRAVENOUS; SUBCUTANEOUS at 14:31

## 2022-11-26 RX ADMIN — Medication 1 MILLIGRAM(S): at 11:33

## 2022-11-26 RX ADMIN — SENNA PLUS 2 TABLET(S): 8.6 TABLET ORAL at 21:23

## 2022-11-26 RX ADMIN — Medication 25 MILLIGRAM(S): at 14:31

## 2022-11-26 RX ADMIN — HEPARIN SODIUM 5000 UNIT(S): 5000 INJECTION INTRAVENOUS; SUBCUTANEOUS at 21:29

## 2022-11-26 NOTE — CHART NOTE - NSCHARTNOTEFT_GEN_A_CORE
Registered Dietitian Follow-Up     Patient Profile Reviewed                           Yes [x]   No []     Nutrition History Previously Obtained        Yes []    No [x]      Pertinent Subjective Information: Discussed with pt at bedside. Pt has good appetite; prefers food brought in by family. Pt consumed 100% of food brought in from home. No chewing or swallowing difficulties noted. No nausea or vomiting reported.      Pertinent Medical Information: 58 y/o male with PMHx of HTN, DM, CKD4  and alcohol dependence/withdrawals presenting to ED 2 days ago with general weakness and pain for about 3 weeks and has not been taking his diabetes medications, Pt usually drinks alcohol daily, but states last time drinking was Saturday/Sunday -> admitted to unit for VAMSI on CKD4 and alcohol withdrawal with fever.  # VAMSI on CKD IV Scr: 13.5 on admission, suspected ATN: Improving   # Complicated by Toxic Metabolic Encephalopathy: Resolving   # Type II DM: Non-compliant with home meds   # Normocytic Anemia: likely from ETOH   # ETOH Abuse with Suspected Folic Acid, Magnesium and Thiamine deficiency - receiving supplements    Diet, NPO:   NPO for Procedure/Test     NPO Start Date: 28-Nov-2022,   NPO Start Time: 00:01  Except Medications (11-25-22 @ 09:06) [Active]  Diet, Consistent Carbohydrate Renal/No Snacks:   Easy to Chew (EASYTOCHEW) (11-20-22 @ 12:00) [Active]    Anthropometrics:  - Ht: 172.7 cm  - Wt: 68.2 KG on 11/22  - BMI: 22.9  - IBW: 64.5 KG     Pertinent Lab Data:  11/26 @ 07:30 - RBC 2.86 L; H/H 8.4/26.7 L; BUN 34 H; Cr 4.2 H;  H; eGFR 15 L    Pertinent Meds:  MEDICATIONS  (STANDING):  ceFAZolin   IVPB 1000 milliGRAM(s) IV Intermittent every 12 hours  chlorhexidine 2% Cloths 1 Application(s) Topical <User Schedule>  dexMEDEtomidine Infusion 0.5 MICROgram(s)/kG/Hr (9.14 mL/Hr) IV Continuous <Continuous>  dextrose 5%. 1000 milliLiter(s) (50 mL/Hr) IV Continuous <Continuous>  dextrose 5%. 1000 milliLiter(s) (100 mL/Hr) IV Continuous <Continuous>  dextrose 50% Injectable 25 Gram(s) IV Push once  dextrose 50% Injectable 12.5 Gram(s) IV Push once  dextrose 50% Injectable 25 Gram(s) IV Push once  folic acid 1 milliGRAM(s) Oral daily  glucagon  Injectable 1 milliGRAM(s) IntraMuscular once  heparin   Injectable 5000 Unit(s) SubCutaneous every 8 hours  hydrALAZINE 25 milliGRAM(s) Oral three times a day  insulin lispro (ADMELOG) corrective regimen sliding scale   SubCutaneous three times a day before meals  iron sucrose IVPB 100 milliGRAM(s) IV Intermittent every 24 hours  labetalol 100 milliGRAM(s) Oral two times a day  multivitamin 1 Tablet(s) Oral daily  NIFEdipine XL 60 milliGRAM(s) Oral daily  pantoprazole    Tablet 40 milliGRAM(s) Oral before breakfast  saccharomyces boulardii 250 milliGRAM(s) Oral two times a day  senna 2 Tablet(s) Oral at bedtime  thiamine 100 milliGRAM(s) Oral daily    MEDICATIONS  (PRN):  acetaminophen     Tablet .. 650 milliGRAM(s) Oral every 6 hours PRN Temp greater or equal to 38C (100.4F), Mild Pain (1 - 3)  dextrose Oral Gel 15 Gram(s) Oral once PRN Blood Glucose LESS THAN 70 milliGRAM(s)/deciliter  LORazepam     Tablet 2 milliGRAM(s) Oral every 4 hours PRN Symptom-triggered 2 point increase in CIWA-Ar  LORazepam   Injectable 2 milliGRAM(s) IV Push every 4 hours PRN Symptom-triggered: each CIWA -Ar score 8 or GREATER    Physical Findings:  - Appearance: alert; edema 1+ to left hand; right hand per flowsheet   - GI function: fecal incontinence; last BM on 11/25 per pt  - Tubes: n/a   - Oral/Mouth cavity: tolerating current diet texture   - Skin: no pressure injuries noted      Nutrition Requirements  Weight Used: 67.7 KG -- per previous RD note     Estimated Energy Needs: continue   ENERGY: 5242-0398 kcals (25-30 kcal)  PROTEIN: 81-95g protein (1.2-1.4g/kg/BW)  FLUID 1:1 kcal for estimated fluid needs    Nutrient Intake: Consuming 100% of meals brought in from home     [x] Previous Nutrition Diagnosis: Inadequate Oral Intake            [] Ongoing          [x] Resolved    Nutrition Intervention: meals, snacks, coordination of care     Goal/Expected Outcome: pt to continue to tolerate PO diet and meet >75% of estimated nutrient needs     Indicator/Monitoring: diet order, PO intake, weights, labs, NFPF, body composition, BM    Recommendations:  1. Continue with current diet order  2. Encourage PO intake    Pt assessed to be at low nutrition risk; will f/u in 7-10 days.    RD to remain available: Maria Alejandra Hogan [phone]

## 2022-11-26 NOTE — PROGRESS NOTE ADULT - ASSESSMENT
56 y/o male with PMHx of HTN, DM, CKD4  and alcohol dependence/withdrawals presenting to ED 2 days ago with general weakness and pain for about 3 weeks and has not been taking his diabetes medications, Pt usually drinks alcohol daily, but states last time drinking was Saturday/Sunday -> admitted to unit for VAMSI on CKD4 and alcohol withdrawal with fever    Fever: panculture revealed MSSA Bacteriemia   Started on Ancef 1g Q12 (renally dosed) pending sensitivities   - 2D ECHO: no Mention of vegetation  - Scheduled for KIMBER on Monday  -Blood cx positive from 11/22, 11/23, 24  -ID consult appreciated       VAMSI on CKD IV Scr: 13.5 on admission, suspected ATN: Improving   Complicated by Toxic Metabolic Encephalopathy: Resolving   -Scr now stabilizing around 4.3-4.5  -Nephro Consult appreciated  -PO hydration encouraged, monitor  -Daily BMP's     Type II DM: Non-compliant with Home  Meds   -FS before meals and bedtime   -c/w Insulin regimen and Keep FS below 180    Normocytic Anemia: likely from ETOH   - Iron studies reveal REGGIE, will start Venofer x 5 doses   - B12 and Folate: WNL     ETOH Abuse with Suspected Folic Acid, Magnesium and Thiamine deficiency   -Librium PRN, c/w Thiamine, folic A, magnesium and MVI     DVT PPX: heparin   GI PPX: PPI   Full code  Dispo: from Home   -Pending  Renal function improvement /stability   -Finding on KIMBER and IV antibiotics regimen for home    56 y/o male with PMHx of HTN, DM, CKD4  and alcohol dependence/withdrawals presenting to ED 2 days ago with general weakness and pain for about 3 weeks and has not been taking his diabetes medications, Pt usually drinks alcohol daily, but states last time drinking was Saturday/Sunday -> admitted to unit for VAMSI on CKD4 and alcohol withdrawal with fever    Fever: panculture revealed MSSA Bacteriemia   Started on Ancef 1g Q12 (renally dosed) pending sensitivities   - 2D ECHO: no Mention of vegetation  - Scheduled for KIMBER on Monday  -Blood cx positive from 11/22, 11/23, 24  -ESR: 142, CRP: 132  -ID consult appreciated       VAMSI on CKD IV Scr: 13.5 on admission, suspected ATN: Improving   Complicated by Toxic Metabolic Encephalopathy: Resolving   -Scr now stabilizing around 4.3-4.5  -Nephro Consult appreciated  -PO hydration encouraged, monitor  -Daily BMP's     Type II DM: Non-compliant with Home  Meds   -FS before meals and bedtime   -c/w Insulin regimen and Keep FS below 180    Normocytic Anemia: likely from ETOH   - Iron studies reveal REGGIE, will start Venofer x 5 doses   - B12 and Folate: WNL     ETOH Abuse with Suspected Folic Acid, Magnesium and Thiamine deficiency   -Librium PRN, c/w Thiamine, folic A, magnesium and MVI     DVT PPX: heparin   GI PPX: PPI   Full code  Dispo: from Home   -Pending  Renal function improvement /stability   -Finding on KIMBER and IV antibiotics regimen for home

## 2022-11-26 NOTE — PROGRESS NOTE ADULT - SUBJECTIVE AND OBJECTIVE BOX
Patient is a 57y old  Male who presents with a chief complaint of acute on chronic renal failure w/ severe electrolyte imbalance      MEDICATIONS  (STANDING):  ceFAZolin   IVPB 1000 milliGRAM(s) IV Intermittent every 12 hours  chlorhexidine 2% Cloths 1 Application(s) Topical <User Schedule>  dexMEDEtomidine Infusion 0.5 MICROgram(s)/kG/Hr (9.14 mL/Hr) IV Continuous <Continuous>  dextrose 5%. 1000 milliLiter(s) (50 mL/Hr) IV Continuous <Continuous>  dextrose 5%. 1000 milliLiter(s) (100 mL/Hr) IV Continuous <Continuous>  dextrose 50% Injectable 25 Gram(s) IV Push once  dextrose 50% Injectable 12.5 Gram(s) IV Push once  dextrose 50% Injectable 25 Gram(s) IV Push once  folic acid 1 milliGRAM(s) Oral daily  glucagon  Injectable 1 milliGRAM(s) IntraMuscular once  heparin   Injectable 5000 Unit(s) SubCutaneous every 8 hours  hydrALAZINE 25 milliGRAM(s) Oral three times a day  insulin lispro (ADMELOG) corrective regimen sliding scale   SubCutaneous three times a day before meals  iron sucrose IVPB 100 milliGRAM(s) IV Intermittent every 24 hours  labetalol 100 milliGRAM(s) Oral two times a day  multivitamin 1 Tablet(s) Oral daily  NIFEdipine XL 60 milliGRAM(s) Oral daily  pantoprazole    Tablet 40 milliGRAM(s) Oral before breakfast  saccharomyces boulardii 250 milliGRAM(s) Oral two times a day  senna 2 Tablet(s) Oral at bedtime  thiamine 100 milliGRAM(s) Oral daily    MEDICATIONS  (PRN):  acetaminophen     Tablet .. 650 milliGRAM(s) Oral every 6 hours PRN Temp greater or equal to 38C (100.4F), Mild Pain (1 - 3)  dextrose Oral Gel 15 Gram(s) Oral once PRN Blood Glucose LESS THAN 70 milliGRAM(s)/deciliter  LORazepam     Tablet 2 milliGRAM(s) Oral every 4 hours PRN Symptom-triggered 2 point increase in CIWA-Ar  LORazepam   Injectable 2 milliGRAM(s) IV Push every 4 hours PRN Symptom-triggered: each CIWA -Ar score 8 or GREATER      CAPILLARY BLOOD GLUCOSE      POCT Blood Glucose.: 153 mg/dL (26 Nov 2022 07:27)  POCT Blood Glucose.: 106 mg/dL (25 Nov 2022 21:43)  POCT Blood Glucose.: 248 mg/dL (25 Nov 2022 16:29)  POCT Blood Glucose.: 132 mg/dL (25 Nov 2022 11:32)    I&O's Summary      PHYSICAL EXAM:  Vital Signs Last 24 Hrs  T(C): 36.7 (26 Nov 2022 05:21), Max: 37.4 (25 Nov 2022 20:42)  T(F): 98.1 (26 Nov 2022 05:21), Max: 99.4 (25 Nov 2022 20:42)  HR: 101 (26 Nov 2022 05:21) (92 - 101)  BP: 121/72 (26 Nov 2022 05:21) (111/61 - 121/72)  BP(mean): --  RR: 18 (26 Nov 2022 05:21) (18 - 18)  SpO2: --        GENERAL: No acute distress, well-developed  HEAD:  Atraumatic, Normocephalic  EYES: EOMI, PERRLA, conjunctiva and sclera clear  NECK: Supple, no lymphadenopathy, no JVD  CHEST/LUNG: CTAB; No wheezes, rales, or rhonchi  HEART: Regular rate and rhythm; No murmurs, rubs, or gallops  ABDOMEN: Soft, non-tender, non-distended; normal bowel sounds, no organomegaly  EXTREMITIES:  2+ peripheral pulses b/l, No clubbing, cyanosis, or edema  NEUROLOGY: A&O x 3, no focal deficits  SKIN: scaly eczematous lesions on b/l UE and LE     LABS:                        8.2    6.98  )-----------( 255      ( 25 Nov 2022 08:37 )             25.9     11-25    138  |  100  |  35<H>  ----------------------------<  141<H>  3.5   |  21  |  4.2<HH>    Ca    8.9      25 Nov 2022 08:37        Culture - Blood (collected 24 Nov 2022 16:18)  Source: .Blood None  Gram Stain (26 Nov 2022 03:03):    Growth in aerobic bottle: Gram Positive Cocci in Clusters    Growth in anaerobic bottle: Gram Positive Cocci in Clusters  Preliminary Report (26 Nov 2022 03:03):    Growth in aerobic bottle: Gram Positive Cocci in Clusters    Growth in anaerobic bottle: Gram Positive Cocci in Clusters    Culture - Blood (collected 24 Nov 2022 06:48)  Source: .Blood None  Preliminary Report (26 Nov 2022 01:02):    No growth to date.    Culture - Blood (collected 24 Nov 2022 06:48)  Source: .Blood None  Preliminary Report (26 Nov 2022 01:02):    No growth to date.      RADIOLOGY & ADDITIONAL TESTS:

## 2022-11-27 LAB
ANION GAP SERPL CALC-SCNC: 17 MMOL/L — HIGH (ref 7–14)
BASOPHILS # BLD AUTO: 0.03 K/UL — SIGNIFICANT CHANGE UP (ref 0–0.2)
BASOPHILS NFR BLD AUTO: 0.6 % — SIGNIFICANT CHANGE UP (ref 0–1)
BUN SERPL-MCNC: 30 MG/DL — HIGH (ref 10–20)
CALCIUM SERPL-MCNC: 9.5 MG/DL — SIGNIFICANT CHANGE UP (ref 8.4–10.5)
CHLORIDE SERPL-SCNC: 100 MMOL/L — SIGNIFICANT CHANGE UP (ref 98–110)
CO2 SERPL-SCNC: 23 MMOL/L — SIGNIFICANT CHANGE UP (ref 17–32)
CREAT SERPL-MCNC: 3.9 MG/DL — HIGH (ref 0.7–1.5)
EGFR: 17 ML/MIN/1.73M2 — LOW
EOSINOPHIL # BLD AUTO: 0.38 K/UL — SIGNIFICANT CHANGE UP (ref 0–0.7)
EOSINOPHIL NFR BLD AUTO: 7.9 % — SIGNIFICANT CHANGE UP (ref 0–8)
GLUCOSE BLDC GLUCOMTR-MCNC: 149 MG/DL — HIGH (ref 70–99)
GLUCOSE BLDC GLUCOMTR-MCNC: 176 MG/DL — HIGH (ref 70–99)
GLUCOSE BLDC GLUCOMTR-MCNC: 199 MG/DL — HIGH (ref 70–99)
GLUCOSE BLDC GLUCOMTR-MCNC: 300 MG/DL — HIGH (ref 70–99)
GLUCOSE SERPL-MCNC: 174 MG/DL — HIGH (ref 70–99)
GRAM STN FLD: SIGNIFICANT CHANGE UP
GRAM STN FLD: SIGNIFICANT CHANGE UP
HCT VFR BLD CALC: 28.7 % — LOW (ref 42–52)
HGB BLD-MCNC: 9.1 G/DL — LOW (ref 14–18)
IMM GRANULOCYTES NFR BLD AUTO: 0.4 % — HIGH (ref 0.1–0.3)
LYMPHOCYTES # BLD AUTO: 1.03 K/UL — LOW (ref 1.2–3.4)
LYMPHOCYTES # BLD AUTO: 21.3 % — SIGNIFICANT CHANGE UP (ref 20.5–51.1)
MCHC RBC-ENTMCNC: 29.3 PG — SIGNIFICANT CHANGE UP (ref 27–31)
MCHC RBC-ENTMCNC: 31.7 G/DL — LOW (ref 32–37)
MCV RBC AUTO: 92.3 FL — SIGNIFICANT CHANGE UP (ref 80–94)
MONOCYTES # BLD AUTO: 0.46 K/UL — SIGNIFICANT CHANGE UP (ref 0.1–0.6)
MONOCYTES NFR BLD AUTO: 9.5 % — HIGH (ref 1.7–9.3)
NEUTROPHILS # BLD AUTO: 2.92 K/UL — SIGNIFICANT CHANGE UP (ref 1.4–6.5)
NEUTROPHILS NFR BLD AUTO: 60.3 % — SIGNIFICANT CHANGE UP (ref 42.2–75.2)
NRBC # BLD: 0 /100 WBCS — SIGNIFICANT CHANGE UP (ref 0–0)
PLATELET # BLD AUTO: 361 K/UL — SIGNIFICANT CHANGE UP (ref 130–400)
POTASSIUM SERPL-MCNC: 4.1 MMOL/L — SIGNIFICANT CHANGE UP (ref 3.5–5)
POTASSIUM SERPL-SCNC: 4.1 MMOL/L — SIGNIFICANT CHANGE UP (ref 3.5–5)
RBC # BLD: 3.11 M/UL — LOW (ref 4.7–6.1)
RBC # FLD: 12.6 % — SIGNIFICANT CHANGE UP (ref 11.5–14.5)
SODIUM SERPL-SCNC: 140 MMOL/L — SIGNIFICANT CHANGE UP (ref 135–146)
WBC # BLD: 4.84 K/UL — SIGNIFICANT CHANGE UP (ref 4.8–10.8)
WBC # FLD AUTO: 4.84 K/UL — SIGNIFICANT CHANGE UP (ref 4.8–10.8)

## 2022-11-27 PROCEDURE — 99232 SBSQ HOSP IP/OBS MODERATE 35: CPT

## 2022-11-27 RX ADMIN — Medication 100 MILLIGRAM(S): at 05:44

## 2022-11-27 RX ADMIN — Medication 25 MILLIGRAM(S): at 22:42

## 2022-11-27 RX ADMIN — Medication 25 MILLIGRAM(S): at 13:22

## 2022-11-27 RX ADMIN — HEPARIN SODIUM 5000 UNIT(S): 5000 INJECTION INTRAVENOUS; SUBCUTANEOUS at 05:46

## 2022-11-27 RX ADMIN — Medication 100 MILLIGRAM(S): at 05:54

## 2022-11-27 RX ADMIN — Medication 1 MILLIGRAM(S): at 11:45

## 2022-11-27 RX ADMIN — Medication 250 MILLIGRAM(S): at 05:44

## 2022-11-27 RX ADMIN — Medication 100 MILLIGRAM(S): at 11:46

## 2022-11-27 RX ADMIN — Medication 25 MILLIGRAM(S): at 05:44

## 2022-11-27 RX ADMIN — SENNA PLUS 2 TABLET(S): 8.6 TABLET ORAL at 22:42

## 2022-11-27 RX ADMIN — HEPARIN SODIUM 5000 UNIT(S): 5000 INJECTION INTRAVENOUS; SUBCUTANEOUS at 13:21

## 2022-11-27 RX ADMIN — IRON SUCROSE 210 MILLIGRAM(S): 20 INJECTION, SOLUTION INTRAVENOUS at 08:35

## 2022-11-27 RX ADMIN — Medication 100 MILLIGRAM(S): at 17:09

## 2022-11-27 RX ADMIN — Medication 250 MILLIGRAM(S): at 17:09

## 2022-11-27 RX ADMIN — Medication 6: at 16:40

## 2022-11-27 RX ADMIN — CHLORHEXIDINE GLUCONATE 1 APPLICATION(S): 213 SOLUTION TOPICAL at 05:42

## 2022-11-27 RX ADMIN — Medication 1 TABLET(S): at 11:46

## 2022-11-27 RX ADMIN — HEPARIN SODIUM 5000 UNIT(S): 5000 INJECTION INTRAVENOUS; SUBCUTANEOUS at 22:42

## 2022-11-27 RX ADMIN — Medication 60 MILLIGRAM(S): at 05:44

## 2022-11-27 RX ADMIN — Medication 100 MILLIGRAM(S): at 17:08

## 2022-11-27 RX ADMIN — PANTOPRAZOLE SODIUM 40 MILLIGRAM(S): 20 TABLET, DELAYED RELEASE ORAL at 05:44

## 2022-11-27 NOTE — PROGRESS NOTE ADULT - SUBJECTIVE AND OBJECTIVE BOX
Patient is a 57y old  Male who presents with a chief complaint of acute on chronic renal failure w/ severe electrolyte imbalance       MEDICATIONS  (STANDING):  ceFAZolin   IVPB 1000 milliGRAM(s) IV Intermittent every 12 hours  chlorhexidine 2% Cloths 1 Application(s) Topical <User Schedule>  dexMEDEtomidine Infusion 0.5 MICROgram(s)/kG/Hr (9.14 mL/Hr) IV Continuous <Continuous>  dextrose 5%. 1000 milliLiter(s) (100 mL/Hr) IV Continuous <Continuous>  dextrose 5%. 1000 milliLiter(s) (50 mL/Hr) IV Continuous <Continuous>  dextrose 50% Injectable 25 Gram(s) IV Push once  dextrose 50% Injectable 12.5 Gram(s) IV Push once  dextrose 50% Injectable 25 Gram(s) IV Push once  folic acid 1 milliGRAM(s) Oral daily  glucagon  Injectable 1 milliGRAM(s) IntraMuscular once  heparin   Injectable 5000 Unit(s) SubCutaneous every 8 hours  hydrALAZINE 25 milliGRAM(s) Oral three times a day  insulin lispro (ADMELOG) corrective regimen sliding scale   SubCutaneous three times a day before meals  iron sucrose IVPB 100 milliGRAM(s) IV Intermittent every 24 hours  labetalol 100 milliGRAM(s) Oral two times a day  multivitamin 1 Tablet(s) Oral daily  NIFEdipine XL 60 milliGRAM(s) Oral daily  pantoprazole    Tablet 40 milliGRAM(s) Oral before breakfast  saccharomyces boulardii 250 milliGRAM(s) Oral two times a day  senna 2 Tablet(s) Oral at bedtime  thiamine 100 milliGRAM(s) Oral daily    MEDICATIONS  (PRN):  acetaminophen     Tablet .. 650 milliGRAM(s) Oral every 6 hours PRN Temp greater or equal to 38C (100.4F), Mild Pain (1 - 3)  dextrose Oral Gel 15 Gram(s) Oral once PRN Blood Glucose LESS THAN 70 milliGRAM(s)/deciliter  LORazepam     Tablet 2 milliGRAM(s) Oral every 4 hours PRN Symptom-triggered 2 point increase in CIWA-Ar  LORazepam   Injectable 2 milliGRAM(s) IV Push every 4 hours PRN Symptom-triggered: each CIWA -Ar score 8 or GREATER      CAPILLARY BLOOD GLUCOSE      POCT Blood Glucose.: 176 mg/dL (27 Nov 2022 08:07)  POCT Blood Glucose.: 80 mg/dL (26 Nov 2022 21:19)  POCT Blood Glucose.: 323 mg/dL (26 Nov 2022 16:27)  POCT Blood Glucose.: 157 mg/dL (26 Nov 2022 11:31)    I&O's Summary      PHYSICAL EXAM:  Vital Signs Last 24 Hrs  T(C): 37.5 (27 Nov 2022 05:13), Max: 37.5 (27 Nov 2022 05:13)  T(F): 99.5 (27 Nov 2022 05:13), Max: 99.5 (27 Nov 2022 05:13)  HR: 101 (27 Nov 2022 05:13) (97 - 115)  BP: 115/64 (27 Nov 2022 05:13) (107/64 - 157/93)  BP(mean): --  RR: 18 (27 Nov 2022 05:13) (18 - 18)  SpO2: --      GENERAL: No acute distress, well-developed  HEAD:  Atraumatic, Normocephalic  EYES: EOMI, PERRLA, conjunctiva and sclera clear  NECK: Supple, no lymphadenopathy, no JVD  CHEST/LUNG: CTAB; No wheezes, rales, or rhonchi  HEART: Regular rate and rhythm; No murmurs, rubs, or gallops  ABDOMEN: Soft, non-tender, non-distended; normal bowel sounds, no organomegaly  EXTREMITIES:  2+ peripheral pulses b/l, No clubbing, cyanosis, or edema  NEUROLOGY: A&O x 3, no focal deficits  SKIN: scaly eczematous lesions on b/l UE and LE         LABS:                        9.1    4.84  )-----------( 361      ( 27 Nov 2022 07:33 )             28.7     11-27    140  |  100  |  30<H>  ----------------------------<  174<H>  4.1   |  23  |  3.9<H>    Ca    9.5      27 Nov 2022 07:33        Culture - Blood (collected 25 Nov 2022 08:37)  Source: .Blood None  Gram Stain (27 Nov 2022 08:23):    Growth in aerobic bottle: Gram positive cocci in pairs    Growth in anaerobic bottle: Gram Positive Cocci in Clusters  Preliminary Report (27 Nov 2022 08:23):    Growth in aerobic bottle: Gram positive cocci in pairs    Growth in anaerobic bottle: Gram Positive Cocci in Clusters    Culture - Blood (collected 24 Nov 2022 16:18)  Source: .Blood None  Gram Stain (26 Nov 2022 03:03):    Growth in aerobic bottle: Gram Positive Cocci in Clusters    Growth in anaerobic bottle: Gram Positive Cocci in Clusters  Final Report (26 Nov 2022 16:51):    Growth in aerobic and anaerobic bottles: Staphylococcus aureus    See previous culture [id number]

## 2022-11-27 NOTE — PROGRESS NOTE ADULT - ASSESSMENT
58 y/o male with PMHx of HTN, DM, CKD4  and alcohol dependence/withdrawals presenting to ED 2 days ago with general weakness and pain for about 3 weeks and has not been taking his diabetes medications, Pt usually drinks alcohol daily, but states last time drinking was Saturday/Sunday -> admitted to unit for VAMSI on CKD4 and alcohol withdrawal with fever    Fever: panculture revealed MSSA Bacteriemia   Started on Ancef 1g Q12 (renally dosed) pending sensitivities   - 2D ECHO: no Mention of vegetation, Scheduled for KIMBER on Monday  ***NPO after midnight, COVID Today   -Blood cx positive from 11/22, 11/23, 11/24, 11/25  -ESR: 142, CRP: 132  -ID consult appreciated       VAMSI on CKD IV Scr: 13.5 on admission, suspected ATN: Improving   Complicated by Toxic Metabolic Encephalopathy: Resolving   -Scr now stabilizing around 3.9-4.0    -Nephro Consult appreciated  -PO hydration encouraged, monitor  -Daily BMP's     Type II DM: Non-compliant with Home  Meds   -FS before meals and bedtime   -c/w Insulin regimen and Keep FS below 180    Normocytic Anemia: likely from ETOH   - Iron studies reveal REGGIE, will start Venofer x 5 doses   - B12 and Folate: WNL     ETOH Abuse with Suspected Folic Acid, Magnesium and Thiamine deficiency   -Librium PRN, c/w Thiamine, folic A, magnesium and MVI     DVT PPX: heparin   GI PPX: PPI   Full code  Dispo: from Home   -Pending  Renal function improvement /stability   -Finding on KIMBER and IV antibiotics regimen for home

## 2022-11-27 NOTE — PROGRESS NOTE ADULT - SUBJECTIVE AND OBJECTIVE BOX
Nephrology progress note    Patient was seen and examined, events over the last 24 h noted .  Cr imrpoved a bit    Allergies:  No Known Allergies    Hospital Medications:   MEDICATIONS  (STANDING):  ceFAZolin   IVPB 1000 milliGRAM(s) IV Intermittent every 12 hours  chlorhexidine 2% Cloths 1 Application(s) Topical <User Schedule>  dexMEDEtomidine Infusion 0.5 MICROgram(s)/kG/Hr (9.14 mL/Hr) IV Continuous <Continuous>  dextrose 5%. 1000 milliLiter(s) (50 mL/Hr) IV Continuous <Continuous>  dextrose 5%. 1000 milliLiter(s) (100 mL/Hr) IV Continuous <Continuous>  dextrose 50% Injectable 25 Gram(s) IV Push once  dextrose 50% Injectable 12.5 Gram(s) IV Push once  dextrose 50% Injectable 25 Gram(s) IV Push once  folic acid 1 milliGRAM(s) Oral daily  glucagon  Injectable 1 milliGRAM(s) IntraMuscular once  heparin   Injectable 5000 Unit(s) SubCutaneous every 8 hours  hydrALAZINE 25 milliGRAM(s) Oral three times a day  insulin lispro (ADMELOG) corrective regimen sliding scale   SubCutaneous three times a day before meals  iron sucrose IVPB 100 milliGRAM(s) IV Intermittent every 24 hours  labetalol 100 milliGRAM(s) Oral two times a day  multivitamin 1 Tablet(s) Oral daily  NIFEdipine XL 60 milliGRAM(s) Oral daily  pantoprazole    Tablet 40 milliGRAM(s) Oral before breakfast  saccharomyces boulardii 250 milliGRAM(s) Oral two times a day  senna 2 Tablet(s) Oral at bedtime  thiamine 100 milliGRAM(s) Oral daily        VITALS:  T(F): 99.5 (22 @ 05:13), Max: 99.5 (22 @ 05:13)  HR: 101 (22 @ 05:13)  BP: 115/64 (22 @ 05:13)  RR: 18 (22 @ 05:13)  SpO2: --  Wt(kg): --        PHYSICAL EXAM:  Constitutional: NAD  HEENT: anicteric sclera, oropharynx clear, MMM  Neck: No JVD  Respiratory: CTAB, no wheezes, rales or rhonchi  Cardiovascular: S1, S2, RRR  Gastrointestinal: BS+, soft, NT/ND  Extremities: No cyanosis or clubbing. No peripheral edema  :  No way.   Skin: No rashes    LABS:      140  |  100  |  30<H>  ----------------------------<  174<H>  4.1   |  23  |  3.9<H>    Ca    9.5      2022 07:33                            9.1    4.84  )-----------( 361      ( 2022 07:33 )             28.7       Urine Studies:  Urinalysis Basic - ( 2022 01:30 )    Color: Yellow / Appearance: Clear / S.020 / pH:   Gluc:  / Ketone: 15  / Bili: Negative / Urobili: 0.2 mg/dL   Blood:  / Protein: 100 mg/dL / Nitrite: Negative   Leuk Esterase: Negative / RBC: >50 /HPF / WBC 6-10 /HPF   Sq Epi:  / Non Sq Epi: Few /HPF / Bacteria:         RADIOLOGY & ADDITIONAL STUDIES:

## 2022-11-27 NOTE — PROGRESS NOTE ADULT - ASSESSMENT
#  Severe VAMSI on CKD, ATN in nature  - renal function improving  #  HTN uncontrolled now, seems to have component of agitation  #  Alcohol intoxication / withdrawal followed by addiction medicine  # Hypernatremia - improved  #Staph aureus in BCx 11/22 MSSA    Recommendations:  - encourage po hydratio  - BP is better, on antihypertensives  - on Cefazolin as per ID, unclear source

## 2022-11-28 LAB
ANION GAP SERPL CALC-SCNC: 16 MMOL/L — HIGH (ref 7–14)
BASOPHILS # BLD AUTO: 0.04 K/UL — SIGNIFICANT CHANGE UP (ref 0–0.2)
BASOPHILS NFR BLD AUTO: 0.8 % — SIGNIFICANT CHANGE UP (ref 0–1)
BUN SERPL-MCNC: 24 MG/DL — HIGH (ref 10–20)
CALCIUM SERPL-MCNC: 9.4 MG/DL — SIGNIFICANT CHANGE UP (ref 8.4–10.5)
CHLORIDE SERPL-SCNC: 101 MMOL/L — SIGNIFICANT CHANGE UP (ref 98–110)
CO2 SERPL-SCNC: 23 MMOL/L — SIGNIFICANT CHANGE UP (ref 17–32)
CREAT SERPL-MCNC: 3.4 MG/DL — HIGH (ref 0.7–1.5)
CULTURE RESULTS: SIGNIFICANT CHANGE UP
EGFR: 20 ML/MIN/1.73M2 — LOW
EOSINOPHIL # BLD AUTO: 0.4 K/UL — SIGNIFICANT CHANGE UP (ref 0–0.7)
EOSINOPHIL NFR BLD AUTO: 7.8 % — SIGNIFICANT CHANGE UP (ref 0–8)
GLUCOSE BLDC GLUCOMTR-MCNC: 151 MG/DL — HIGH (ref 70–99)
GLUCOSE BLDC GLUCOMTR-MCNC: 163 MG/DL — HIGH (ref 70–99)
GLUCOSE BLDC GLUCOMTR-MCNC: 170 MG/DL — HIGH (ref 70–99)
GLUCOSE BLDC GLUCOMTR-MCNC: 207 MG/DL — HIGH (ref 70–99)
GLUCOSE BLDC GLUCOMTR-MCNC: 220 MG/DL — HIGH (ref 70–99)
GLUCOSE SERPL-MCNC: 173 MG/DL — HIGH (ref 70–99)
HCT VFR BLD CALC: 27.5 % — LOW (ref 42–52)
HGB BLD-MCNC: 8.7 G/DL — LOW (ref 14–18)
IMM GRANULOCYTES NFR BLD AUTO: 0.6 % — HIGH (ref 0.1–0.3)
LYMPHOCYTES # BLD AUTO: 1.07 K/UL — LOW (ref 1.2–3.4)
LYMPHOCYTES # BLD AUTO: 20.9 % — SIGNIFICANT CHANGE UP (ref 20.5–51.1)
MCHC RBC-ENTMCNC: 29.3 PG — SIGNIFICANT CHANGE UP (ref 27–31)
MCHC RBC-ENTMCNC: 31.6 G/DL — LOW (ref 32–37)
MCV RBC AUTO: 92.6 FL — SIGNIFICANT CHANGE UP (ref 80–94)
MONOCYTES # BLD AUTO: 0.39 K/UL — SIGNIFICANT CHANGE UP (ref 0.1–0.6)
MONOCYTES NFR BLD AUTO: 7.6 % — SIGNIFICANT CHANGE UP (ref 1.7–9.3)
NEUTROPHILS # BLD AUTO: 3.2 K/UL — SIGNIFICANT CHANGE UP (ref 1.4–6.5)
NEUTROPHILS NFR BLD AUTO: 62.3 % — SIGNIFICANT CHANGE UP (ref 42.2–75.2)
NRBC # BLD: 0 /100 WBCS — SIGNIFICANT CHANGE UP (ref 0–0)
PLATELET # BLD AUTO: 440 K/UL — HIGH (ref 130–400)
POTASSIUM SERPL-MCNC: 3.9 MMOL/L — SIGNIFICANT CHANGE UP (ref 3.5–5)
POTASSIUM SERPL-SCNC: 3.9 MMOL/L — SIGNIFICANT CHANGE UP (ref 3.5–5)
RBC # BLD: 2.97 M/UL — LOW (ref 4.7–6.1)
RBC # FLD: 12.5 % — SIGNIFICANT CHANGE UP (ref 11.5–14.5)
SARS-COV-2 RNA SPEC QL NAA+PROBE: SIGNIFICANT CHANGE UP
SODIUM SERPL-SCNC: 140 MMOL/L — SIGNIFICANT CHANGE UP (ref 135–146)
SPECIMEN SOURCE: SIGNIFICANT CHANGE UP
WBC # BLD: 5.13 K/UL — SIGNIFICANT CHANGE UP (ref 4.8–10.8)
WBC # FLD AUTO: 5.13 K/UL — SIGNIFICANT CHANGE UP (ref 4.8–10.8)

## 2022-11-28 PROCEDURE — 93325 DOPPLER ECHO COLOR FLOW MAPG: CPT | Mod: 26

## 2022-11-28 PROCEDURE — 93320 DOPPLER ECHO COMPLETE: CPT | Mod: 26

## 2022-11-28 PROCEDURE — 99232 SBSQ HOSP IP/OBS MODERATE 35: CPT

## 2022-11-28 PROCEDURE — 93312 ECHO TRANSESOPHAGEAL: CPT | Mod: 26

## 2022-11-28 RX ORDER — INSULIN LISPRO 100/ML
4 VIAL (ML) SUBCUTANEOUS ONCE
Refills: 0 | Status: COMPLETED | OUTPATIENT
Start: 2022-11-28 | End: 2022-11-28

## 2022-11-28 RX ADMIN — Medication 100 MILLIGRAM(S): at 18:50

## 2022-11-28 RX ADMIN — CHLORHEXIDINE GLUCONATE 1 APPLICATION(S): 213 SOLUTION TOPICAL at 06:29

## 2022-11-28 RX ADMIN — Medication 4: at 18:50

## 2022-11-28 RX ADMIN — Medication 25 MILLIGRAM(S): at 21:26

## 2022-11-28 RX ADMIN — HEPARIN SODIUM 5000 UNIT(S): 5000 INJECTION INTRAVENOUS; SUBCUTANEOUS at 21:25

## 2022-11-28 RX ADMIN — Medication 100 MILLIGRAM(S): at 06:15

## 2022-11-28 RX ADMIN — SENNA PLUS 2 TABLET(S): 8.6 TABLET ORAL at 21:26

## 2022-11-28 RX ADMIN — Medication 250 MILLIGRAM(S): at 18:50

## 2022-11-28 RX ADMIN — IRON SUCROSE 210 MILLIGRAM(S): 20 INJECTION, SOLUTION INTRAVENOUS at 11:33

## 2022-11-28 NOTE — PROGRESS NOTE ADULT - ASSESSMENT
#  Severe VAMSI on CKD, ATN in nature  - renal function improving, UA with hematuria while pt had Blanco - please REPEAT UA  #  HTN controlled now,   #  Alcohol intoxication / withdrawal followed by addiction medicine  # Hypernatremia - improved  #Staph aureus in BCx 11/22 MSSA    Recommendations:  - encourage po hydration  - BP is better, on antihypertensives  - on Cefazolin as per ID, unclear source

## 2022-11-28 NOTE — CHART NOTE - NSCHARTNOTEFT_GEN_A_CORE
POST OPERATIVE PROCEDURAL DOCUMENTATION  PRE-OP DIAGNOSIS:  MSSA bacteremia.   Rule out endocarditis     POST-OP DIAGNOSIS:  No vegetation  Mild MR  Mild calcification of the aortic valve     PROCEDURE: Transesophageal echocardiogram    Primary Physician: Dr. Licona  Assistant: Ra    ANESTHESIA TYPE  [ x ] Conscious Sedation    CONDITION  [  ] Good    ESTIMATED BLOOD LOSS: None    COMPLICATIONS: None    FINDINGS:    After risks and benefits of procedures were explained, informed consent was obtained and placed in chart. Refer to Anesthesia note for sedation details.  The KIMBER probe was passed into the esophagus without difficulty.  Transesophageal images were obtained.  The KIMBER probe was removed without difficulty and examined.  There was no evidence for bleeding.  The patient tolerated the procedure well without any immediate KIMBER-related complications.      Preliminary Findings:  LA: Normal Size  FLACO: Left atrial appendage was clear of clot and smoke.  LV: LVEF was estimated at 60%.   MV: Mild MR, no evidence of MS. No vegetation.   AV: No evidence of AI, no evidence of AS. Aortic valve is trileaflet. Mild calcification. No vegetation.   RA: Normal size.   TV: no TR. No vegetation.   PV: no PI. No vegetation.   IAS: no PFO. No R-> L shunt per color doppler.   There was mild, non-mobile atheroma seen in the thoracic aorta.     DIAGNOSIS/IMPRESSION:  - No evidence of vegetation   - Mild MR  - Normal EF    PLAN OF CARE:  - Return to inpatient bed   - Antibiotic therapy per ID POST OPERATIVE PROCEDURAL DOCUMENTATION  PRE-OP DIAGNOSIS:  MSSA bacteremia.   Rule out endocarditis     POST-OP DIAGNOSIS:  No vegetation  Mild MR  Mild calcification of the aortic valve     PROCEDURE: Transesophageal echocardiogram    Primary Physician: Dr. Licona  Assistant: Ra    ANESTHESIA TYPE  [ x ] Conscious Sedation    CONDITION  [  ] Good    ESTIMATED BLOOD LOSS: None    COMPLICATIONS: None    FINDINGS:    After risks and benefits of procedures were explained, informed consent was obtained and placed in chart. Refer to Anesthesia note for sedation details.  The KIMBER probe was passed into the esophagus without difficulty.  Transesophageal images were obtained.  The KIMBER probe was removed without difficulty and examined.  There was no evidence for bleeding.  The patient tolerated the procedure well without any immediate KIMBER-related complications.      Preliminary Findings:  LA: Normal Size  FLACO: Left atrial appendage was clear of clot and smoke.  LV: LVEF was estimated at 60%.   MV: Mild MR, no evidence of MS. No vegetation.   AV: No evidence of AI, no evidence of AS. Aortic valve is trileaflet. Mild calcification. No vegetation.   RA: Normal size.   TV: no TR. No vegetation.   PV: no PI. No vegetation.   IAS: no PFO. No R-> L shunt per color doppler. Lipomatous hypertrophy of the septum.   There was mild, non-mobile atheroma seen in the thoracic aorta.     DIAGNOSIS/IMPRESSION:  - No evidence of vegetation   - Mild MR  - Normal EF    PLAN OF CARE:  - Return to inpatient bed   - Antibiotic therapy per ID POST OPERATIVE PROCEDURAL DOCUMENTATION  PRE-OP DIAGNOSIS:  MSSA bacteremia.   Rule out endocarditis     POST-OP DIAGNOSIS:  No vegetation  Mild MR  Mild calcification of the aortic valve     PROCEDURE: Transesophageal echocardiogram    Primary Physician: Dr. Licona  Assistant: Ra    ANESTHESIA TYPE  [ x ] Conscious Sedation    CONDITION  [  ] Good    ESTIMATED BLOOD LOSS: None    COMPLICATIONS: None    FINDINGS:    After risks and benefits of procedures were explained, informed consent was obtained and placed in chart. Refer to Anesthesia note for sedation details.  The KIMBER probe was passed into the esophagus without difficulty.  Transesophageal images were obtained.  The KIMBER probe was removed without difficulty and examined.  There was no evidence for bleeding.  The patient tolerated the procedure well without any immediate KIMBER-related complications.      Preliminary Findings:  LA: Normal Size  FLACO: Left atrial appendage was clear of clot and smoke.  LV: LVEF was estimated at 60%.   MV: Mild MR, no evidence of MS. No vegetation.   AV: Trace AI, no evidence of AS. Aortic valve is trileaflet. Mild calcification. No vegetation.   RA: Normal size.   TV: Trace TR. No vegetation.   PV: no PI. No vegetation.   IAS: no PFO. No R-> L shunt per color doppler. Lipomatous hypertrophy of the septum.   There was mild, non-mobile atheroma seen in the thoracic aorta.     DIAGNOSIS/IMPRESSION:  - No evidence of vegetation   - Mild MR  - Normal EF    PLAN OF CARE:  - Return to inpatient bed   - Antibiotic therapy per ID

## 2022-11-28 NOTE — PROGRESS NOTE ADULT - SUBJECTIVE AND OBJECTIVE BOX
Patient is a 57y old  Male who presents with a chief complaint of acute on chronic renal failure w/ severe electrolyte imbalance        MEDICATIONS  (STANDING):  ceFAZolin   IVPB 1000 milliGRAM(s) IV Intermittent every 12 hours  chlorhexidine 2% Cloths 1 Application(s) Topical <User Schedule>  dexMEDEtomidine Infusion 0.5 MICROgram(s)/kG/Hr (9.14 mL/Hr) IV Continuous <Continuous>  dextrose 5%. 1000 milliLiter(s) (100 mL/Hr) IV Continuous <Continuous>  dextrose 5%. 1000 milliLiter(s) (50 mL/Hr) IV Continuous <Continuous>  dextrose 50% Injectable 25 Gram(s) IV Push once  dextrose 50% Injectable 12.5 Gram(s) IV Push once  dextrose 50% Injectable 25 Gram(s) IV Push once  folic acid 1 milliGRAM(s) Oral daily  glucagon  Injectable 1 milliGRAM(s) IntraMuscular once  heparin   Injectable 5000 Unit(s) SubCutaneous every 8 hours  hydrALAZINE 25 milliGRAM(s) Oral three times a day  insulin lispro (ADMELOG) corrective regimen sliding scale   SubCutaneous three times a day before meals  iron sucrose IVPB 100 milliGRAM(s) IV Intermittent every 24 hours  labetalol 100 milliGRAM(s) Oral two times a day  multivitamin 1 Tablet(s) Oral daily  NIFEdipine XL 60 milliGRAM(s) Oral daily  pantoprazole    Tablet 40 milliGRAM(s) Oral before breakfast  saccharomyces boulardii 250 milliGRAM(s) Oral two times a day  senna 2 Tablet(s) Oral at bedtime  thiamine 100 milliGRAM(s) Oral daily    MEDICATIONS  (PRN):  acetaminophen     Tablet .. 650 milliGRAM(s) Oral every 6 hours PRN Temp greater or equal to 38C (100.4F), Mild Pain (1 - 3)  dextrose Oral Gel 15 Gram(s) Oral once PRN Blood Glucose LESS THAN 70 milliGRAM(s)/deciliter  LORazepam     Tablet 2 milliGRAM(s) Oral every 4 hours PRN Symptom-triggered 2 point increase in CIWA-Ar  LORazepam   Injectable 2 milliGRAM(s) IV Push every 4 hours PRN Symptom-triggered: each CIWA -Ar score 8 or GREATER      CAPILLARY BLOOD GLUCOSE      POCT Blood Glucose.: 199 mg/dL (27 Nov 2022 22:09)  POCT Blood Glucose.: 300 mg/dL (27 Nov 2022 16:33)  POCT Blood Glucose.: 149 mg/dL (27 Nov 2022 11:37)  POCT Blood Glucose.: 176 mg/dL (27 Nov 2022 08:07)    I&O's Summary    27 Nov 2022 07:01  -  28 Nov 2022 07:00  --------------------------------------------------------  IN: 50 mL / OUT: 0 mL / NET: 50 mL        PHYSICAL EXAM:  Vital Signs Last 24 Hrs  T(C): 36.9 (28 Nov 2022 05:00), Max: 36.9 (28 Nov 2022 05:00)  T(F): 98.4 (28 Nov 2022 05:00), Max: 98.4 (28 Nov 2022 05:00)  HR: 91 (28 Nov 2022 05:00) (84 - 93)  BP: 120/73 (28 Nov 2022 05:00) (103/59 - 120/73)  BP(mean): --  RR: 18 (28 Nov 2022 05:00) (18 - 18)  SpO2: --      GENERAL: No acute distress, well-developed  HEAD:  Atraumatic, Normocephalic  EYES: EOMI, PERRLA, conjunctiva and sclera clear  NECK: Supple, no lymphadenopathy, no JVD  CHEST/LUNG: CTAB; No wheezes, rales, or rhonchi  HEART: Regular rate and rhythm; No murmurs, rubs, or gallops  ABDOMEN: Soft, non-tender, non-distended; normal bowel sounds, no organomegaly  EXTREMITIES:  2+ peripheral pulses b/l, No clubbing, cyanosis, or edema  NEUROLOGY: A&O x 3, no focal deficits  SKIN: scaly eczematous lesions on b/l UE and LE     LABS:                        9.1    4.84  )-----------( 361      ( 27 Nov 2022 07:33 )             28.7     11-27    140  |  100  |  30<H>  ----------------------------<  174<H>  4.1   |  23  |  3.9<H>    Ca    9.5      27 Nov 2022 07:33            Culture - Blood (collected 25 Nov 2022 08:37)  Source: .Blood None  Gram Stain (27 Nov 2022 08:23):    Growth in aerobic bottle: Gram positive cocci in pairs    Growth in anaerobic bottle: Gram Positive Cocci in Clusters  Preliminary Report (27 Nov 2022 21:28):    Growth in aerobic and anaerobic bottles: Staphylococcus aureus    See previous culture [id number]        RADIOLOGY & ADDITIONAL TESTS:  Results Reviewed:   Imaging Personally Reviewed:  Electrocardiogram Personally Reviewed:    COORDINATION OF CARE:  Care Discussed with Consultants/Other Providers [Y/N]:  Prior or Outpatient Records Reviewed [Y/N]:   Patient is a 57y old  Male who presents with a chief complaint of acute on chronic renal failure w/ severe electrolyte imbalance    Patient seen and examined   KIMBER today, pt is adamant to go home today regardless of antibiotic    MEDICATIONS  (STANDING):  ceFAZolin   IVPB 1000 milliGRAM(s) IV Intermittent every 12 hours  chlorhexidine 2% Cloths 1 Application(s) Topical <User Schedule>  dexMEDEtomidine Infusion 0.5 MICROgram(s)/kG/Hr (9.14 mL/Hr) IV Continuous <Continuous>  dextrose 5%. 1000 milliLiter(s) (100 mL/Hr) IV Continuous <Continuous>  dextrose 5%. 1000 milliLiter(s) (50 mL/Hr) IV Continuous <Continuous>  dextrose 50% Injectable 25 Gram(s) IV Push once  dextrose 50% Injectable 12.5 Gram(s) IV Push once  dextrose 50% Injectable 25 Gram(s) IV Push once  folic acid 1 milliGRAM(s) Oral daily  glucagon  Injectable 1 milliGRAM(s) IntraMuscular once  heparin   Injectable 5000 Unit(s) SubCutaneous every 8 hours  hydrALAZINE 25 milliGRAM(s) Oral three times a day  insulin lispro (ADMELOG) corrective regimen sliding scale   SubCutaneous three times a day before meals  iron sucrose IVPB 100 milliGRAM(s) IV Intermittent every 24 hours  labetalol 100 milliGRAM(s) Oral two times a day  multivitamin 1 Tablet(s) Oral daily  NIFEdipine XL 60 milliGRAM(s) Oral daily  pantoprazole    Tablet 40 milliGRAM(s) Oral before breakfast  saccharomyces boulardii 250 milliGRAM(s) Oral two times a day  senna 2 Tablet(s) Oral at bedtime  thiamine 100 milliGRAM(s) Oral daily    MEDICATIONS  (PRN):  acetaminophen     Tablet .. 650 milliGRAM(s) Oral every 6 hours PRN Temp greater or equal to 38C (100.4F), Mild Pain (1 - 3)  dextrose Oral Gel 15 Gram(s) Oral once PRN Blood Glucose LESS THAN 70 milliGRAM(s)/deciliter  LORazepam     Tablet 2 milliGRAM(s) Oral every 4 hours PRN Symptom-triggered 2 point increase in CIWA-Ar  LORazepam   Injectable 2 milliGRAM(s) IV Push every 4 hours PRN Symptom-triggered: each CIWA -Ar score 8 or GREATER      CAPILLARY BLOOD GLUCOSE      POCT Blood Glucose.: 199 mg/dL (27 Nov 2022 22:09)  POCT Blood Glucose.: 300 mg/dL (27 Nov 2022 16:33)  POCT Blood Glucose.: 149 mg/dL (27 Nov 2022 11:37)  POCT Blood Glucose.: 176 mg/dL (27 Nov 2022 08:07)    I&O's Summary    27 Nov 2022 07:01  -  28 Nov 2022 07:00  --------------------------------------------------------  IN: 50 mL / OUT: 0 mL / NET: 50 mL        PHYSICAL EXAM:  Vital Signs Last 24 Hrs  T(C): 36.9 (28 Nov 2022 05:00), Max: 36.9 (28 Nov 2022 05:00)  T(F): 98.4 (28 Nov 2022 05:00), Max: 98.4 (28 Nov 2022 05:00)  HR: 91 (28 Nov 2022 05:00) (84 - 93)  BP: 120/73 (28 Nov 2022 05:00) (103/59 - 120/73)  BP(mean): --  RR: 18 (28 Nov 2022 05:00) (18 - 18)  SpO2: --      GENERAL: No acute distress, well-developed  HEAD:  Atraumatic, Normocephalic  EYES: EOMI, PERRLA, conjunctiva and sclera clear  NECK: Supple, no lymphadenopathy, no JVD  CHEST/LUNG: CTAB; No wheezes, rales, or rhonchi  HEART: Regular rate and rhythm; No murmurs, rubs, or gallops  ABDOMEN: Soft, non-tender, non-distended; normal bowel sounds, no organomegaly  EXTREMITIES:  2+ peripheral pulses b/l, No clubbing, cyanosis, or edema  NEUROLOGY: A&O x 3, no focal deficits  SKIN: scaly eczematous lesions on b/l UE and LE     LABS:                        9.1    4.84  )-----------( 361      ( 27 Nov 2022 07:33 )             28.7     11-27    140  |  100  |  30<H>  ----------------------------<  174<H>  4.1   |  23  |  3.9<H>    Ca    9.5      27 Nov 2022 07:33            Culture - Blood (collected 25 Nov 2022 08:37)  Source: .Blood None  Gram Stain (27 Nov 2022 08:23):    Growth in aerobic bottle: Gram positive cocci in pairs    Growth in anaerobic bottle: Gram Positive Cocci in Clusters  Preliminary Report (27 Nov 2022 21:28):    Growth in aerobic and anaerobic bottles: Staphylococcus aureus    See previous culture [id number]        RADIOLOGY & ADDITIONAL TESTS:  Results Reviewed:   Imaging Personally Reviewed:  Electrocardiogram Personally Reviewed:    COORDINATION OF CARE:  Care Discussed with Consultants/Other Providers [Y/N]:  Prior or Outpatient Records Reviewed [Y/N]:

## 2022-11-28 NOTE — PRE-ANESTHESIA EVALUATION ADULT - HEIGHT IN INCHES
IM select note    Patient: India Lemos Date: 7/10/2017   : 1955 Attending: Chandler Rogers MD   61 year old female      Subjective:    had hemodialysis  Tolerated well  Afebrile  Remains on IV antibiotics  No nursing concerns  Hemoglobin is on the lower end        Allergies: NKDA    Medications/Infusions: Reviewed    Review of Systems: Review of systems not obtained due to patient's current status        Vital reviewed in chart     Tele-reviewed          Physical Exam:     GENERAL: The patient is encephalopathic, nonverbal.   HEENT: Head is grossly atraumatic.   NECK: Supple, with trach.  Lungs: Decreased breath sounds at bases  HEART: S1 and S2 regular  ABDOMEN: Benign, soft, obese. Bowel sounds present. PEG tube is intact.  EXTREMITIES: Trace edema  NEUROLOGIC: encephalopathy. Nonverbal.   Back-wound under dressing    Laboratory Results:     Recent Labs  Lab 07/10/17  1430 07/10/17  0540 17  0550 17  0600  17  0450  17  0511   WBC  --  14.4* 14.9*  --   --  13.6*  < > 17.2*   HCT  --  24.2* 24.9*  --   --  24.9*  < > 26.0*   HGB  --  7.0* 7.2*  --   --  7.1*  < > 7.3*   PLT  --  355 359  --   --  329  < > 338   INR  --  1.1 1.1 1.1  < >  --   < > 1.1   PTT 52* 91* 70* 57*  < >  --   < > 46*   SODIUM  --  134*  --   --   --  134*  --  133*   POTASSIUM  --  3.5  --   --   --  3.8  --  3.9   CHLORIDE  --  97*  --   --   --  98  --  98   CO2  --  24  --   --   --  23  --  24   CALCIUM  --  9.3  --   --   --  9.3  --  9.7   GLUCOSE  --  119*  --   --   --  103*  --  80   BUN  --  72*  --   --   --  54*  --  68*   CREATININE  --  2.85*  --   --   --  2.26*  --  2.42*   AST  --  15  --   --   --   --   --   --    GPT  --  7  --   --   --   --   --   --    ALKPT  --  93  --   --   --   --   --   --    BILIRUBIN  --  0.4  --   --   --   --   --   --    ALBUMIN  --  1.5*  --   --   --   --   --   --    GFRNA  --  17  --   --   --  22  --  21   PHOS  --  6.1*  --   --   --  5.9*  --  4.9*   < >  = values in this interval not displayed.    CT pelvis- IMPRESSION:    1.  Large decubitus ulcer with sinus tract extending right of midline  within the sacral soft tissues with associated extensive osteomyelitis of  the sacrum and coccyx with extension cephalad potentially to involve the  posterior elements of the L5 vertebra. More cephalad involvement difficult  to entirely exclude.  2. Associated phlegmon surrounding is questioned extending anterior to the  distal sacrum posterior to the distal rectosigmoid colon. Phlegmon  involving the posterior musculature of the lower lumbar spine and sacrum  more pronounced on the right may very well be present.  Osteomyelitis/discitis at the L5-S1 level not excluded. MRI without and  with contrast would be helpful to further evaluate if clinically possible.  3. No discrete intrapelvic abscess.  4. Additional findings as above     SADI-  Left ventricular ejection fraction, 30%.  Trace mitral valve regurgitation.  Bioprosthetic aortic valve.  No periprosthetic regurgitation.  Mild tricuspid valve regurgitation.  No mobile vegetation on the mitral tricuspid or bioprosthetic aortic valve visualized.  Possible, small thrombus in the left atrial appendage.  No complications with procedure.    Assessment:   Sepsis. 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  Persistent vegetative state  Chronic respiratory failure status post tracheotomy of vent  End-stage renal disease on hemodialysis  Nonhealing sacral decubitus  Cardiac arrest with the anoxic encephalopathy  Dysphagia on tube feeding  Recurrent sepsis UTI aspiration pneumonia and line sepsis   Chronic atrial fibrillation on anticoagulation  Anemia status post packed red blood transfusion  Hyponatremia    Plans/Recommendations:    continue supportive care  Hemodialysis  Wound care  Anticoagulation  Monitor labs  Tube feeding as per dietary   overall poor prognosis  Chance Che,  MD            This document is done with electronic dictation device, although reviewed but still may have some typographical errors.   8

## 2022-11-28 NOTE — PRE-ANESTHESIA EVALUATION ADULT - NSRADCARDRESULTSFT_GEN_ALL_CORE
TTE 11/23/22  Summary:   1. LV Ejection Fraction by Trinidad's Method with a biplane EF of 73 %.   2. Normal left atrial size.   3. Normal right atrial size.   4. There is no evidence of pericardial effusion.   5. Mild mitral valve regurgitation.   6. Normal trileaflet aortic valve with normal opening.

## 2022-11-28 NOTE — PROGRESS NOTE ADULT - SUBJECTIVE AND OBJECTIVE BOX
Nephrology progress note    Patient is seen and examined, events over the last 24 h noted .  No complaints, off IVF, creat cont to improve  Allergies:  No Known Allergies    Hospital Medications:   MEDICATIONS  (STANDING):  ceFAZolin   IVPB 1000 milliGRAM(s) IV Intermittent every 12 hours  chlorhexidine 2% Cloths 1 Application(s) Topical <User Schedule>  dexMEDEtomidine Infusion 0.5 MICROgram(s)/kG/Hr (9.14 mL/Hr) IV Continuous <Continuous>  dextrose 5%. 1000 milliLiter(s) (100 mL/Hr) IV Continuous <Continuous>  dextrose 5%. 1000 milliLiter(s) (50 mL/Hr) IV Continuous <Continuous>  dextrose 50% Injectable 25 Gram(s) IV Push once  dextrose 50% Injectable 12.5 Gram(s) IV Push once  dextrose 50% Injectable 25 Gram(s) IV Push once  folic acid 1 milliGRAM(s) Oral daily  glucagon  Injectable 1 milliGRAM(s) IntraMuscular once  heparin   Injectable 5000 Unit(s) SubCutaneous every 8 hours  hydrALAZINE 25 milliGRAM(s) Oral three times a day  insulin lispro (ADMELOG) corrective regimen sliding scale   SubCutaneous three times a day before meals  iron sucrose IVPB 100 milliGRAM(s) IV Intermittent every 24 hours  labetalol 100 milliGRAM(s) Oral two times a day  multivitamin 1 Tablet(s) Oral daily  NIFEdipine XL 60 milliGRAM(s) Oral daily  pantoprazole    Tablet 40 milliGRAM(s) Oral before breakfast  saccharomyces boulardii 250 milliGRAM(s) Oral two times a day  senna 2 Tablet(s) Oral at bedtime  thiamine 100 milliGRAM(s) Oral daily        VITALS:  T(F): 98.4 (22 @ 05:00), Max: 98.4 (22 @ 05:00)  HR: 91 (22 @ 05:00)  BP: 120/73 (22 @ 05:00)  RR: 18 (22 @ 05:00)  SpO2: --  Wt(kg): --     @ 07:01  -   @ 07:00  --------------------------------------------------------  IN: 50 mL / OUT: 0 mL / NET: 50 mL          PHYSICAL EXAM:  Constitutional: NAD  HEENT: anicteric sclera, oropharynx clear, MMM  Neck: No JVD  Respiratory: CTAB, no wheezes, rales or rhonchi  Cardiovascular: S1, S2, RRR  Gastrointestinal: BS+, soft, NT/ND  Extremities: No peripheral edema  Neurological: A/O x 3,  : No CVA tenderness. No way.   Skin: No rashes  Vascular Access:    LABS:      140  |  101  |  24<H>  ----------------------------<  173<H>  3.9   |  23  |  3.4<H>    Ca    9.4      2022 06:28                            8.7    5.13  )-----------( 440      ( 2022 06:28 )             27.5       Urine Studies:  Urinalysis Basic - ( 2022 01:30 )    Color: Yellow / Appearance: Clear / S.020 / pH:   Gluc:  / Ketone: 15  / Bili: Negative / Urobili: 0.2 mg/dL   Blood:  / Protein: 100 mg/dL / Nitrite: Negative   Leuk Esterase: Negative / RBC: >50 /HPF / WBC 6-10 /HPF   Sq Epi:  / Non Sq Epi: Few /HPF / Bacteria:         RADIOLOGY & ADDITIONAL STUDIES:

## 2022-11-28 NOTE — PROGRESS NOTE ADULT - ASSESSMENT
58 y/o male with PMHx of HTN, DM, CKD4  and alcohol dependence/withdrawals presenting to ED 2 days ago with general weakness and pain for about 3 weeks and has not been taking his diabetes medications, Pt usually drinks alcohol daily, but states last time drinking was Saturday/Sunday -> admitted to unit for VAMSI on CKD4 and alcohol withdrawal with fever    Fever: panculture revealed MSSA Bacteriemia   Started on Ancef 1g Q12 (renally dosed) pending sensitivities   - 2D ECHO: no Mention of vegetation, Scheduled for KIMBER on Monday  ***NPO after midnight, COVID Today   -Blood cx positive from 11/22, 11/23, 11/24, 11/25  -ESR: 142, CRP: 132  -ID consult appreciated       VAMSI on CKD IV Scr: 13.5 on admission, suspected ATN: Improving   Complicated by Toxic Metabolic Encephalopathy: Resolving   -Scr now stabilizing around 3.9-4.0    -Nephro Consult appreciated  -PO hydration encouraged, monitor  -Daily BMP's     Type II DM: Non-compliant with Home  Meds   -FS before meals and bedtime   -c/w Insulin regimen and Keep FS below 180    Normocytic Anemia: likely from ETOH   - Iron studies reveal REGGIE, will start Venofer x 5 doses   - B12 and Folate: WNL     ETOH Abuse with Suspected Folic Acid, Magnesium and Thiamine deficiency   -Librium PRN, c/w Thiamine, folic A, magnesium and MVI     DVT PPX: heparin   GI PPX: PPI   Full code  Dispo: from Home   -Pending  Renal function improvement /stability   -Finding on KIMBER and IV antibiotics regimen for home  58 y/o male with PMHx of HTN, DM, CKD4  and alcohol dependence/withdrawals presenting to ED 2 days ago with general weakness and pain for about 3 weeks and has not been taking his diabetes medications, Pt usually drinks alcohol daily, but states last time drinking was Saturday/Sunday -> admitted to unit for VAMSI on CKD4 and alcohol withdrawal with fever    Fever: panculture revealed MSSA Bacteriemia   Started on Ancef 1g Q12 (renally dosed) pending sensitivities   - 2D ECHO: no Mention of vegetation, Scheduled for KIMBER on Monday  -Blood cx positive from 11/22, 11/23, 11/24, 11/25  -ESR: 142, CRP: 132  -ID consult appreciated       VAMSI on CKD IV Scr: 13.5 on admission, suspected ATN: Improving   Complicated by Toxic Metabolic Encephalopathy: Resolving   -Scr now stabilizing around 3.9-4.0    -Nephro Consult appreciated  -PO hydration encouraged, monitor  -Daily BMP's     Type II DM: Non-compliant with Home  Meds   -FS before meals and bedtime   -c/w Insulin regimen and Keep FS below 180    Normocytic Anemia: likely from ETOH   - Iron studies reveal REGGIE, will start Venofer x 5 doses   - B12 and Folate: WNL     ETOH Abuse with Suspected Folic Acid, Magnesium and Thiamine deficiency   -Librium PRN, c/w Thiamine, folic A, magnesium and MVI     DVT PPX: heparin   GI PPX: PPI   Full code  Dispo: from Home   -Pending  Renal function improvement /stability   -Finding on KIMBER and IV antibiotics regimen for home

## 2022-11-29 LAB
ANION GAP SERPL CALC-SCNC: 16 MMOL/L — HIGH (ref 7–14)
BASOPHILS # BLD AUTO: 0.04 K/UL — SIGNIFICANT CHANGE UP (ref 0–0.2)
BASOPHILS NFR BLD AUTO: 0.7 % — SIGNIFICANT CHANGE UP (ref 0–1)
BUN SERPL-MCNC: 20 MG/DL — SIGNIFICANT CHANGE UP (ref 10–20)
CALCIUM SERPL-MCNC: 9.2 MG/DL — SIGNIFICANT CHANGE UP (ref 8.4–10.5)
CHLORIDE SERPL-SCNC: 106 MMOL/L — SIGNIFICANT CHANGE UP (ref 98–110)
CO2 SERPL-SCNC: 19 MMOL/L — SIGNIFICANT CHANGE UP (ref 17–32)
CREAT SERPL-MCNC: 3.1 MG/DL — HIGH (ref 0.7–1.5)
EGFR: 22 ML/MIN/1.73M2 — LOW
EOSINOPHIL # BLD AUTO: 0.46 K/UL — SIGNIFICANT CHANGE UP (ref 0–0.7)
EOSINOPHIL NFR BLD AUTO: 7.6 % — SIGNIFICANT CHANGE UP (ref 0–8)
GLUCOSE BLDC GLUCOMTR-MCNC: 168 MG/DL — HIGH (ref 70–99)
GLUCOSE BLDC GLUCOMTR-MCNC: 215 MG/DL — HIGH (ref 70–99)
GLUCOSE BLDC GLUCOMTR-MCNC: 248 MG/DL — HIGH (ref 70–99)
GLUCOSE BLDC GLUCOMTR-MCNC: 278 MG/DL — HIGH (ref 70–99)
GLUCOSE SERPL-MCNC: 210 MG/DL — HIGH (ref 70–99)
HCT VFR BLD CALC: 30.6 % — LOW (ref 42–52)
HGB BLD-MCNC: 9.9 G/DL — LOW (ref 14–18)
HYPOCHROMIA BLD QL: SLIGHT — SIGNIFICANT CHANGE UP
IMM GRANULOCYTES NFR BLD AUTO: 0.8 % — HIGH (ref 0.1–0.3)
LG PLATELETS BLD QL AUTO: SLIGHT — SIGNIFICANT CHANGE UP
LYMPHOCYTES # BLD AUTO: 0.99 K/UL — LOW (ref 1.2–3.4)
LYMPHOCYTES # BLD AUTO: 16.3 % — LOW (ref 20.5–51.1)
MCHC RBC-ENTMCNC: 29.6 PG — SIGNIFICANT CHANGE UP (ref 27–31)
MCHC RBC-ENTMCNC: 32.4 G/DL — SIGNIFICANT CHANGE UP (ref 32–37)
MCV RBC AUTO: 91.3 FL — SIGNIFICANT CHANGE UP (ref 80–94)
MONOCYTES # BLD AUTO: 0.32 K/UL — SIGNIFICANT CHANGE UP (ref 0.1–0.6)
MONOCYTES NFR BLD AUTO: 5.3 % — SIGNIFICANT CHANGE UP (ref 1.7–9.3)
NEUTROPHILS # BLD AUTO: 4.2 K/UL — SIGNIFICANT CHANGE UP (ref 1.4–6.5)
NEUTROPHILS NFR BLD AUTO: 69.3 % — SIGNIFICANT CHANGE UP (ref 42.2–75.2)
NRBC # BLD: 0 /100 WBCS — SIGNIFICANT CHANGE UP (ref 0–0)
PLAT MORPH BLD: NORMAL — SIGNIFICANT CHANGE UP
PLATELET # BLD AUTO: 344 K/UL — SIGNIFICANT CHANGE UP (ref 130–400)
PLATELET CLUMP BLD QL SMEAR: SIGNIFICANT CHANGE UP
PLATELET COUNT - ESTIMATE: NORMAL — SIGNIFICANT CHANGE UP
POTASSIUM SERPL-MCNC: 4.5 MMOL/L — SIGNIFICANT CHANGE UP (ref 3.5–5)
POTASSIUM SERPL-SCNC: 4.5 MMOL/L — SIGNIFICANT CHANGE UP (ref 3.5–5)
RBC # BLD: 3.35 M/UL — LOW (ref 4.7–6.1)
RBC # FLD: 12.7 % — SIGNIFICANT CHANGE UP (ref 11.5–14.5)
RBC BLD AUTO: NORMAL — SIGNIFICANT CHANGE UP
SODIUM SERPL-SCNC: 141 MMOL/L — SIGNIFICANT CHANGE UP (ref 135–146)
WBC # BLD: 6.06 K/UL — SIGNIFICANT CHANGE UP (ref 4.8–10.8)
WBC # FLD AUTO: 6.06 K/UL — SIGNIFICANT CHANGE UP (ref 4.8–10.8)

## 2022-11-29 PROCEDURE — 99232 SBSQ HOSP IP/OBS MODERATE 35: CPT

## 2022-11-29 RX ORDER — CEFAZOLIN SODIUM 1 G
2000 VIAL (EA) INJECTION EVERY 12 HOURS
Refills: 0 | Status: DISCONTINUED | OUTPATIENT
Start: 2022-11-29 | End: 2022-12-01

## 2022-11-29 RX ADMIN — Medication 100 MILLIGRAM(S): at 11:17

## 2022-11-29 RX ADMIN — Medication 250 MILLIGRAM(S): at 17:08

## 2022-11-29 RX ADMIN — CHLORHEXIDINE GLUCONATE 1 APPLICATION(S): 213 SOLUTION TOPICAL at 05:02

## 2022-11-29 RX ADMIN — HEPARIN SODIUM 5000 UNIT(S): 5000 INJECTION INTRAVENOUS; SUBCUTANEOUS at 14:37

## 2022-11-29 RX ADMIN — Medication 100 MILLIGRAM(S): at 17:07

## 2022-11-29 RX ADMIN — Medication 1 TABLET(S): at 11:17

## 2022-11-29 RX ADMIN — Medication 100 MILLIGRAM(S): at 05:02

## 2022-11-29 RX ADMIN — PANTOPRAZOLE SODIUM 40 MILLIGRAM(S): 20 TABLET, DELAYED RELEASE ORAL at 06:09

## 2022-11-29 RX ADMIN — Medication 100 MILLIGRAM(S): at 05:01

## 2022-11-29 RX ADMIN — HEPARIN SODIUM 5000 UNIT(S): 5000 INJECTION INTRAVENOUS; SUBCUTANEOUS at 05:02

## 2022-11-29 RX ADMIN — IRON SUCROSE 210 MILLIGRAM(S): 20 INJECTION, SOLUTION INTRAVENOUS at 08:41

## 2022-11-29 RX ADMIN — Medication 1 MILLIGRAM(S): at 11:17

## 2022-11-29 RX ADMIN — Medication 25 MILLIGRAM(S): at 14:36

## 2022-11-29 RX ADMIN — Medication 60 MILLIGRAM(S): at 05:02

## 2022-11-29 RX ADMIN — Medication 25 MILLIGRAM(S): at 05:02

## 2022-11-29 RX ADMIN — Medication 100 MILLIGRAM(S): at 17:08

## 2022-11-29 RX ADMIN — Medication 6: at 11:45

## 2022-11-29 RX ADMIN — Medication 250 MILLIGRAM(S): at 05:02

## 2022-11-29 RX ADMIN — Medication 4: at 16:53

## 2022-11-29 NOTE — PROGRESS NOTE ADULT - ASSESSMENT
58 y/o male with PMHx of HTN, DM, CKD4  and alcohol dependence/withdrawals presenting to ED 2 days ago with general weakness and pain for about 3 weeks and has not been taking his diabetes medications, Pt usually drinks alcohol daily, but states last time drinking was Saturday/Sunday -> admitted to unit for VAMSI on CKD4 and alcohol withdrawal with fever    Fever: panculture revealed MSSA Bacteriemia   Started on Ancef 1g Q12 (renally dosed) pending sensitivities   - 2D ECHO: and KIMBER no Mention of vegetation,    -Blood cx positive from 11/22, 11/23, 11/24, 11/25  -Blood Cx: Prelim negative from 11/27 and 11/29   -ESR: 142, CRP: 132  -ID f/up for home Antibiotics       VAMSI on CKD IV Scr: 13.5 on admission, suspected ATN: Improving   Complicated by Toxic Metabolic Encephalopathy: Resolving   -Scr now stabilizing around 3.9-4.0    -Nephro Consult appreciated  -PO hydration encouraged, monitor  -Daily BMP's     Type II DM: Non-compliant with Home  Meds   -FS before meals and bedtime   -c/w Insulin regimen and Keep FS below 180    Normocytic Anemia: likely from ETOH   - Iron studies reveal REGGIE, will start Venofer x 5 doses   - B12 and Folate: WNL     ETOH Abuse with Suspected Folic Acid, Magnesium and Thiamine deficiency   -Librium PRN, c/w Thiamine, folic A, magnesium and MVI     DVT PPX: heparin   GI PPX: PPI   Full code  Dispo: from Home   -Pending IV antibiotics regimen for home    58 y/o male with PMHx of HTN, DM, CKD4  and alcohol dependence/withdrawals presenting to ED 2 days ago with general weakness and pain for about 3 weeks and has not been taking his diabetes medications, Pt usually drinks alcohol daily, but states last time drinking was Saturday/Sunday -> admitted to unit for VAMSI on CKD4 and alcohol withdrawal with fever    Fever: panculture revealed MSSA Bacteriemia   Started on Ancef 1g Q12 (renally dosed)   - 2D ECHO: and KIMBER no Mention of vegetation,    -Blood cx positive from 11/22, 11/23, 11/24, 11/25  -Blood Cx: Prelim negative from 11/27 and 11/29   -ESR: 142, CRP: 132  -ID f/up for home Antibiotics       VAMSI on CKD IV Scr: 13.5 on admission, suspected ATN: Improving   Complicated by Toxic Metabolic Encephalopathy: Resolving   -Scr now stabilizing around 3.1-3.4  -Nephro Consult appreciated  -PO hydration encouraged, monitor  -Daily BMP's     Type II DM: Non-compliant with Home  Meds   -FS before meals and bedtime   -c/w Insulin regimen and Keep FS below 180    Normocytic Anemia: likely from ETOH   - Iron studies reveal REGGIE, will start Venofer x 5 doses   - B12 and Folate: WNL     ETOH Abuse with Suspected Folic Acid, Magnesium and Thiamine deficiency   -Librium PRN, c/w Thiamine, folic A, magnesium and MVI     DVT PPX: heparin   GI PPX: PPI   Full code  Dispo: from Home   -Pending IV antibiotics regimen for home

## 2022-11-29 NOTE — PROGRESS NOTE ADULT - SUBJECTIVE AND OBJECTIVE BOX
Patient is a 57y old  Male who presents with a chief complaint of acute on chronic renal failure w/ severe electrolyte imbalance     MEDICATIONS  (STANDING):  ceFAZolin   IVPB 1000 milliGRAM(s) IV Intermittent every 12 hours  chlorhexidine 2% Cloths 1 Application(s) Topical <User Schedule>  dexMEDEtomidine Infusion 0.5 MICROgram(s)/kG/Hr (9.14 mL/Hr) IV Continuous <Continuous>  dextrose 5%. 1000 milliLiter(s) (100 mL/Hr) IV Continuous <Continuous>  dextrose 5%. 1000 milliLiter(s) (50 mL/Hr) IV Continuous <Continuous>  dextrose 50% Injectable 25 Gram(s) IV Push once  dextrose 50% Injectable 12.5 Gram(s) IV Push once  dextrose 50% Injectable 25 Gram(s) IV Push once  folic acid 1 milliGRAM(s) Oral daily  glucagon  Injectable 1 milliGRAM(s) IntraMuscular once  heparin   Injectable 5000 Unit(s) SubCutaneous every 8 hours  hydrALAZINE 25 milliGRAM(s) Oral three times a day  insulin lispro (ADMELOG) corrective regimen sliding scale   SubCutaneous three times a day before meals  iron sucrose IVPB 100 milliGRAM(s) IV Intermittent every 24 hours  labetalol 100 milliGRAM(s) Oral two times a day  multivitamin 1 Tablet(s) Oral daily  NIFEdipine XL 60 milliGRAM(s) Oral daily  pantoprazole    Tablet 40 milliGRAM(s) Oral before breakfast  saccharomyces boulardii 250 milliGRAM(s) Oral two times a day  senna 2 Tablet(s) Oral at bedtime  thiamine 100 milliGRAM(s) Oral daily    MEDICATIONS  (PRN):  acetaminophen     Tablet .. 650 milliGRAM(s) Oral every 6 hours PRN Temp greater or equal to 38C (100.4F), Mild Pain (1 - 3)  dextrose Oral Gel 15 Gram(s) Oral once PRN Blood Glucose LESS THAN 70 milliGRAM(s)/deciliter      CAPILLARY BLOOD GLUCOSE      POCT Blood Glucose.: 215 mg/dL (29 Nov 2022 07:58)  POCT Blood Glucose.: 220 mg/dL (28 Nov 2022 20:58)  POCT Blood Glucose.: 207 mg/dL (28 Nov 2022 17:45)    I&O's Summary      PHYSICAL EXAM:  Vital Signs Last 24 Hrs  T(C): 37 (29 Nov 2022 04:49), Max: 37 (29 Nov 2022 04:49)  T(F): 98.6 (29 Nov 2022 04:49), Max: 98.6 (29 Nov 2022 04:49)  HR: 100 (29 Nov 2022 04:49) (91 - 101)  BP: 152/74 (29 Nov 2022 04:49) (120/73 - 157/94)  BP(mean): 96 (28 Nov 2022 13:58) (96 - 96)  RR: 18 (29 Nov 2022 04:49) (18 - 18)  SpO2: 94% (28 Nov 2022 13:58) (94% - 94%)    Parameters below as of 28 Nov 2022 13:58      O2 Concentration (%): 40    GENERAL: No acute distress, well-developed  HEAD:  Atraumatic, Normocephalic  EYES: EOMI, PERRLA, conjunctiva and sclera clear  NECK: Supple, no lymphadenopathy, no JVD  CHEST/LUNG: CTAB; No wheezes, rales, or rhonchi  HEART: Regular rate and rhythm; No murmurs, rubs, or gallops  ABDOMEN: Soft, non-tender, non-distended; normal bowel sounds, no organomegaly  EXTREMITIES:  2+ peripheral pulses b/l, No clubbing, cyanosis, or edema  NEUROLOGY: A&O x 3, no focal deficits  SKIN: scaly eczematous lesions on b/l UE and LE     LABS:                        9.9    6.06  )-----------( x        ( 29 Nov 2022 06:40 )             30.6     11-28    140  |  101  |  24<H>  ----------------------------<  173<H>  3.9   |  23  |  3.4<H>    Ca    9.4      28 Nov 2022 06:28        Culture - Blood (collected 27 Nov 2022 07:33)  Source: .Blood None  Preliminary Report (29 Nov 2022 03:01):    No growth to date.

## 2022-11-29 NOTE — PROGRESS NOTE ADULT - ASSESSMENT
Patient is a 57y old  Male with multiple- co-morbidities including Alcohol abuse who presents to the ER on 11/11/22 with complaint of acute on chronic renal failure w/ severe electrolyte imbalance . On admission, he found to have hyponatremia, NA of 114, creat-13.5, K-7.0 and alcohol withdrawal. He has managed in ICU initially and then transferred to Medical floor. He spiked Fever on 11/22/22 and during fever work up , found to have positive MSSA Bacteremia. He has started on Cefazolin and The ID consult requested to assist with further evaluation and antibiotic management.     # Hospital onset MSSA Bacteremia- 11/22/22- ?? source - no clear phleblitis   - TTE Echo Complete w/o Contrast w/ Doppler (11.23.22 @ 10:26): mild MVR 11/23, EF 73%  - Blood Cx 11/22-11/25+  - blood cx 11/27 NG  - KIMBER 11/28 - no evidence of vegetation     # Alcohol abuse-   # Electrolytes  imbalance   # VAMSI    Recommendations  - contineu cefazolin -- increase to 2g q 12 hours for bacteremia  - follow-up blood cx from 11/28  - if blood cx from 11/28 is negative, plan for 2 weeks of IV antibiotics from time of culture clearance (end date 12/11)   - will need surveillance blood cx 1 week after completing antibiotics     - Weekly CBC, CMP, ESR/CRP,   - ID follow-up with Dr. Saleem Hooper for Telehealth. We will call the patient between 10:30-1:30      [address]       [phone]       Fax [phone]    Please call or message on [employer] Teams if with any questions.  Spectra [phone]

## 2022-11-29 NOTE — PROGRESS NOTE ADULT - ASSESSMENT
#  Severe VAMSI on CKD, ATN in nature  - renal function improving  #  HTN uncontrolled now, seems to have component of agitation  #  Alcohol intoxication / withdrawal followed by addiction medicine  # Hypernatremia - improved  # MSSA bacteremia     Recommendations:  - encourage po hydration  - BP is controlled.  Cont current anti-HTN regimen  - on Cefazolin as per ID, KIMBER neg for vegetations

## 2022-11-29 NOTE — PROGRESS NOTE ADULT - SUBJECTIVE AND OBJECTIVE BOX
Nephrology Progress Note    KECIA MARTIN  MRN-[id number]  57y  Male    S:  Patient is seen and examined, events over the last 24h noted.    O:  Allergies:  No Known Allergies    Hospital Medications:   MEDICATIONS  (STANDING):  ceFAZolin   IVPB 2000 milliGRAM(s) IV Intermittent every 12 hours  chlorhexidine 2% Cloths 1 Application(s) Topical <User Schedule>  dexMEDEtomidine Infusion 0.5 MICROgram(s)/kG/Hr (9.14 mL/Hr) IV Continuous <Continuous>  dextrose 5%. 1000 milliLiter(s) (100 mL/Hr) IV Continuous <Continuous>  dextrose 5%. 1000 milliLiter(s) (50 mL/Hr) IV Continuous <Continuous>  dextrose 50% Injectable 25 Gram(s) IV Push once  dextrose 50% Injectable 12.5 Gram(s) IV Push once  dextrose 50% Injectable 25 Gram(s) IV Push once  folic acid 1 milliGRAM(s) Oral daily  glucagon  Injectable 1 milliGRAM(s) IntraMuscular once  heparin   Injectable 5000 Unit(s) SubCutaneous every 8 hours  hydrALAZINE 25 milliGRAM(s) Oral three times a day  insulin lispro (ADMELOG) corrective regimen sliding scale   SubCutaneous three times a day before meals  iron sucrose IVPB 100 milliGRAM(s) IV Intermittent every 24 hours  labetalol 100 milliGRAM(s) Oral two times a day  multivitamin 1 Tablet(s) Oral daily  NIFEdipine XL 60 milliGRAM(s) Oral daily  pantoprazole    Tablet 40 milliGRAM(s) Oral before breakfast  saccharomyces boulardii 250 milliGRAM(s) Oral two times a day  senna 2 Tablet(s) Oral at bedtime  thiamine 100 milliGRAM(s) Oral daily    MEDICATIONS  (PRN):  acetaminophen     Tablet .. 650 milliGRAM(s) Oral every 6 hours PRN Temp greater or equal to 38C (100.4F), Mild Pain (1 - 3)  dextrose Oral Gel 15 Gram(s) Oral once PRN Blood Glucose LESS THAN 70 milliGRAM(s)/deciliter    Home Medications:  acetaminophen 325 mg oral tablet: 2 tab(s) orally every 6 hours, As needed, Temp greater or equal to 38C (100.4F), Mild Pain (1 - 3) (25 Mar 2022 11:36)  atorvastatin 40 mg oral tablet: 1 tab(s) orally once a day (at bedtime) (25 Mar 2022 11:36)  DULoxetine 30 mg oral delayed release capsule: 1 cap(s) orally 2 times a day (25 Jul 2021 16:56)  lisinopril 20 mg oral tablet: 1 tab(s) orally once a day (25 Jul 2021 16:56)  NIFEdipine 60 mg oral tablet, extended release: 1 tab(s) orally once a day (25 Mar 2022 11:36)  pantoprazole 40 mg oral delayed release tablet: 1 tab(s) orally once a day (before a meal) (25 Mar 2022 11:36)      VITALS:  T(F): 97.5 (11-29-22 @ 13:23), Max: 98.6 (11-29-22 @ 04:49)  HR: 104 (11-29-22 @ 13:23)  BP: 121/75 (11-29-22 @ 13:23)  RR: 18 (11-29-22 @ 13:23)  SpO2: --  Wt(kg): --  I&O's Detail    29 Nov 2022 07:01  -  29 Nov 2022 16:04  --------------------------------------------------------  IN:    IV PiggyBack: 100 mL  Total IN: 100 mL    OUT:  Total OUT: 0 mL    Total NET: 100 mL        I&O's Summary    29 Nov 2022 07:01  -  29 Nov 2022 16:04  --------------------------------------------------------  IN: 100 mL / OUT: 0 mL / NET: 100 mL          PHYSICAL EXAM:  Gen: NAD  Resp: CTAB  Card: S1/S2  Abd: soft  Extremities: no edema      LABS:    11-29    141  |  106  |  20  ----------------------------<  210<H>  4.5   |  19  |  3.1<H>    Ca    9.2      29 Nov 2022 06:40      Phosphorus Level, Serum: 4.1 mg/dL (11-24-22 @ 06:48)  Phosphorus Level, Serum: 3.6 mg/dL (11-23-22 @ 06:40)    Intact PTH: 311 pg/mL (03-16-22 @ 11:45)                          9.9    6.06  )-----------( 344      ( 29 Nov 2022 06:40 )             30.6     Mean Cell Volume: 91.3 fL (11-29-22 @ 06:40)    Iron Total, Serum: 21 ug/dL (11-24-22 @ 06:48)  % Saturation, Iron: 10 % (11-24-22 @ 06:48)      Culture Results:   No growth to date. (11-27 @ 07:33)  Culture Results:   Growth in aerobic and anaerobic bottles: Staphylococcus aureus  See previous culture [id number] (11-25 @ 08:37)    Creatinine trend:  Creatinine, Serum: 3.1 mg/dL (11-29-22 @ 06:40)  Creatinine, Serum: 3.4 mg/dL (11-28-22 @ 06:28)  Creatinine, Serum: 3.9 mg/dL (11-27-22 @ 07:33)  Creatinine, Serum: 4.2 mg/dL (11-26-22 @ 07:30)  Creatinine, Serum: 4.2 mg/dL (11-25-22 @ 08:37)  Creatinine, Serum: 4.3 mg/dL (11-24-22 @ 06:48)

## 2022-11-29 NOTE — PROGRESS NOTE ADULT - SUBJECTIVE AND OBJECTIVE BOX
KECIA MARTIN  57y, Male  Allergy: No Known Allergies      LOS  18d    CHIEF COMPLAINT: acute on chronic renal failure w/ severe electrolyte imbalance (29 Nov 2022 08:01)      INTERVAL EVENTS/HPI  - No acute events overnight  - T(F): , Max: 98.6 (11-29-22 @ 04:49)  - Denies any worsening symptoms  - Tolerating medication  - WBC Count: 6.06 (11-29-22 @ 06:40)  WBC Count: 5.13 (11-28-22 @ 06:28)     - Creatinine, Serum: 3.1 (11-29-22 @ 06:40)  Creatinine, Serum: 3.4 (11-28-22 @ 06:28)       ROS  General: Denies rigors, nightsweats  HEENT: Denies headache, rhinorrhea, sore throat, eye pain  CV: Denies CP, palpitations  PULM: Denies wheezing, hemoptysis  GI: Denies hematemesis, hematochezia, melena  : Denies discharge, hematuria  MSK: Denies arthralgias, myalgias  SKIN: Denies rash, lesions  NEURO: Denies paresthesias, weakness  PSYCH: Denies depression, anxiety    VITALS:  T(F): 98.6, Max: 98.6 (11-29-22 @ 04:49)  HR: 100  BP: 152/74  RR: 18Vital Signs Last 24 Hrs  T(C): 37 (29 Nov 2022 04:49), Max: 37 (29 Nov 2022 04:49)  T(F): 98.6 (29 Nov 2022 04:49), Max: 98.6 (29 Nov 2022 04:49)  HR: 100 (29 Nov 2022 04:49) (91 - 101)  BP: 152/74 (29 Nov 2022 04:49) (120/73 - 157/94)  BP(mean): 96 (28 Nov 2022 13:58) (96 - 96)  RR: 18 (29 Nov 2022 04:49) (18 - 18)  SpO2: 94% (28 Nov 2022 13:58) (94% - 94%)    Parameters below as of 28 Nov 2022 13:58      O2 Concentration (%): 40    PHYSICAL EXAM:  Gen: NAD, resting in bed  HEENT: Normocephalic, atraumatic  Neck: supple, no lymphadenopathy  CV: Regular rate & regular rhythm  Lungs: decreased BS at bases, no fremitus  Abdomen: Soft, BS present  Ext: Warm, well perfused  Neuro: non focal, awake  Skin: no rash, no erythema  Lines: no phlebitis    FH: Non-contributory  Social Hx: Non-contributory    TESTS & MEASUREMENTS:                        9.9    6.06  )-----------( 344      ( 29 Nov 2022 06:40 )             30.6     11-29    141  |  106  |  20  ----------------------------<  210<H>  4.5   |  19  |  3.1<H>    Ca    9.2      29 Nov 2022 06:40              Culture - Blood (collected 11-27-22 @ 07:33)  Source: .Blood None  Preliminary Report (11-29-22 @ 03:01):    No growth to date.    Culture - Blood (collected 11-25-22 @ 08:37)  Source: .Blood None  Gram Stain (11-27-22 @ 08:23):    Growth in aerobic bottle: Gram positive cocci in pairs    Growth in anaerobic bottle: Gram Positive Cocci in Clusters  Final Report (11-28-22 @ 19:25):    Growth in aerobic and anaerobic bottles: Staphylococcus aureus    See previous culture [id number]    Culture - Blood (collected 11-24-22 @ 16:18)  Source: .Blood None  Gram Stain (11-26-22 @ 03:03):    Growth in aerobic bottle: Gram Positive Cocci in Clusters    Growth in anaerobic bottle: Gram Positive Cocci in Clusters  Final Report (11-26-22 @ 16:51):    Growth in aerobic and anaerobic bottles: Staphylococcus aureus    See previous culture [id number]    Culture - Blood (collected 11-24-22 @ 06:48)  Source: .Blood None  Preliminary Report (11-26-22 @ 01:02):    No growth to date.    Culture - Blood (collected 11-24-22 @ 06:48)  Source: .Blood None  Preliminary Report (11-26-22 @ 01:02):    No growth to date.    Culture - Blood (collected 11-23-22 @ 06:40)  Source: .Blood None  Gram Stain (11-24-22 @ 14:55):    Growth in aerobic bottle: Gram Positive Cocci in Clusters    Growth in anaerobic bottle: Gram Positive Cocci in Clusters  Final Report (11-25-22 @ 07:07):    Growth in aerobic and anaerobic bottles: Staphylococcus aureus    See previous culture [id number]    Culture - Blood (collected 11-22-22 @ 05:45)  Source: .Blood None  Gram Stain (11-23-22 @ 06:35):    Growth in aerobic bottle: Gram Positive Cocci in Clusters    Growth in anaerobic bottle: Gram Positive Cocci in Clusters  Final Report (11-24-22 @ 18:23):    Growth in aerobic and anaerobic bottles: Staphylococcus aureus    See previous culture [id number]    Culture - Blood (collected 11-22-22 @ 01:20)  Source: .Blood Blood  Gram Stain (11-23-22 @ 10:56):    Growth in aerobic bottle: Gram Positive Cocci in Clusters    Growth in anaerobic bottle: Gram Positive Cocci in Clusters  Final Report (11-24-22 @ 13:06):    Growth in aerobic and anaerobic bottles: Staphylococcus aureus    ***Blood Panel PCR results on this specimen are available    approximately 3 hours after the Gram stain result.***    Gram stain, PCR, and/or culture results may not always    correspond dueto difference in methodologies.    ************************************************************    This PCR assay was performed by multiplex PCR. This    Assay tests for 66 bacterial and resistance gene targets.    Please refer to the [facility] test directory    at [email or web address] for details.  Organism: Blood Culture PCR  Staphylococcus aureus (11-24-22 @ 13:06)  Organism: Staphylococcus aureus (11-24-22 @ 13:06)      -  Ampicillin/Sulbactam: S <=8/4      -  Cefazolin: S <=4      -  Clindamycin: S <=0.25      -  Erythromycin: S <=0.25      -  Gentamicin: S <=1 Should not be used as monotherapy      -  Oxacillin: S 0.5 Oxacillin predicts susceptibility for dicloxacillin, methicillin, and nafcillin      -  Penicillin: R >8      -  Rifampin: S <=1 Should not be used as monotherapy      -  Tetracycline: S <=1      -  Trimethoprim/Sulfamethoxazole: S <=0.5/9.5      -  Vancomycin: S 2      Method Type: NARCISO  Organism: Blood Culture PCR (11-24-22 @ 13:06)      -  Methicillin SENSITIVE Staphylococcus aureus (MSSA): Detec Any isolate of Staphylococcus aureus from a blood culture is NOT considered a contaminant.      Method Type: PCR    Culture - Urine (collected 11-11-22 @ 15:40)  Source: Clean Catch Clean Catch (Midstream)  Final Report (11-12-22 @ 19:12):    <10,000 CFU/mL Normal Urogenital Evelin            INFECTIOUS DISEASES TESTING  COVID-19 PCR: NotDetec (11-28-22 @ 07:28)  COVID-19 PCR: NotDetec (11-25-22 @ 17:45)  Procalcitonin, Serum: 0.91 (11-22-22 @ 14:40)  COVID-19 PCR: NotDetec (11-11-22 @ 16:40)  Procalcitonin, Serum: 0.69 (03-21-22 @ 06:40)  Procalcitonin, Serum: 1.09 (03-16-22 @ 06:09)  COVID-19 PCR: NotDetec (03-15-22 @ 12:11)      INFLAMMATORY MARKERS  Sedimentation Rate, Erythrocyte: 142 mm/Hr (11-25-22 @ 08:37)  C-Reactive Protein, Serum: 132 mg/L (11-25-22 @ 08:37)      RADIOLOGY & ADDITIONAL TESTS:  I have personally reviewed the last available Chest xray  CXR      CT      CARDIOLOGY TESTING      MEDICATIONS  ceFAZolin   IVPB 1000 IV Intermittent every 12 hours  chlorhexidine 2% Cloths 1 Topical <User Schedule>  dexMEDEtomidine Infusion 0.5 IV Continuous <Continuous>  dextrose 5%. 1000 IV Continuous <Continuous>  dextrose 5%. 1000 IV Continuous <Continuous>  dextrose 50% Injectable 25 IV Push once  dextrose 50% Injectable 12.5 IV Push once  dextrose 50% Injectable 25 IV Push once  folic acid 1 Oral daily  glucagon  Injectable 1 IntraMuscular once  heparin   Injectable 5000 SubCutaneous every 8 hours  hydrALAZINE 25 Oral three times a day  insulin lispro (ADMELOG) corrective regimen sliding scale  SubCutaneous three times a day before meals  iron sucrose IVPB 100 IV Intermittent every 24 hours  labetalol 100 Oral two times a day  multivitamin 1 Oral daily  NIFEdipine XL 60 Oral daily  pantoprazole    Tablet 40 Oral before breakfast  saccharomyces boulardii 250 Oral two times a day  senna 2 Oral at bedtime  thiamine 100 Oral daily      WEIGHT  Weight (kg): 68.2 (11-28-22 @ 13:58)  Creatinine, Serum: 3.1 mg/dL (11-29-22 @ 06:40)      ANTIBIOTICS:  ceFAZolin   IVPB 1000 milliGRAM(s) IV Intermittent every 12 hours      All available historical records have been reviewed

## 2022-11-30 LAB
ANION GAP SERPL CALC-SCNC: 13 MMOL/L — SIGNIFICANT CHANGE UP (ref 7–14)
BASOPHILS # BLD AUTO: 0.05 K/UL — SIGNIFICANT CHANGE UP (ref 0–0.2)
BASOPHILS NFR BLD AUTO: 0.7 % — SIGNIFICANT CHANGE UP (ref 0–1)
BUN SERPL-MCNC: 18 MG/DL — SIGNIFICANT CHANGE UP (ref 10–20)
CALCIUM SERPL-MCNC: 9 MG/DL — SIGNIFICANT CHANGE UP (ref 8.4–10.5)
CHLORIDE SERPL-SCNC: 100 MMOL/L — SIGNIFICANT CHANGE UP (ref 98–110)
CO2 SERPL-SCNC: 25 MMOL/L — SIGNIFICANT CHANGE UP (ref 17–32)
CREAT SERPL-MCNC: 2.8 MG/DL — HIGH (ref 0.7–1.5)
CULTURE RESULTS: SIGNIFICANT CHANGE UP
CULTURE RESULTS: SIGNIFICANT CHANGE UP
EGFR: 25 ML/MIN/1.73M2 — LOW
EOSINOPHIL # BLD AUTO: 0.49 K/UL — SIGNIFICANT CHANGE UP (ref 0–0.7)
EOSINOPHIL NFR BLD AUTO: 7 % — SIGNIFICANT CHANGE UP (ref 0–8)
GLUCOSE BLDC GLUCOMTR-MCNC: 168 MG/DL — HIGH (ref 70–99)
GLUCOSE BLDC GLUCOMTR-MCNC: 169 MG/DL — HIGH (ref 70–99)
GLUCOSE BLDC GLUCOMTR-MCNC: 179 MG/DL — HIGH (ref 70–99)
GLUCOSE SERPL-MCNC: 176 MG/DL — HIGH (ref 70–99)
HCT VFR BLD CALC: 27.8 % — LOW (ref 42–52)
HGB BLD-MCNC: 8.8 G/DL — LOW (ref 14–18)
IMM GRANULOCYTES NFR BLD AUTO: 0.9 % — HIGH (ref 0.1–0.3)
LYMPHOCYTES # BLD AUTO: 1.12 K/UL — LOW (ref 1.2–3.4)
LYMPHOCYTES # BLD AUTO: 16 % — LOW (ref 20.5–51.1)
MCHC RBC-ENTMCNC: 29 PG — SIGNIFICANT CHANGE UP (ref 27–31)
MCHC RBC-ENTMCNC: 31.7 G/DL — LOW (ref 32–37)
MCV RBC AUTO: 91.7 FL — SIGNIFICANT CHANGE UP (ref 80–94)
MONOCYTES # BLD AUTO: 0.45 K/UL — SIGNIFICANT CHANGE UP (ref 0.1–0.6)
MONOCYTES NFR BLD AUTO: 6.4 % — SIGNIFICANT CHANGE UP (ref 1.7–9.3)
NEUTROPHILS # BLD AUTO: 4.85 K/UL — SIGNIFICANT CHANGE UP (ref 1.4–6.5)
NEUTROPHILS NFR BLD AUTO: 69 % — SIGNIFICANT CHANGE UP (ref 42.2–75.2)
NRBC # BLD: 0 /100 WBCS — SIGNIFICANT CHANGE UP (ref 0–0)
PLATELET # BLD AUTO: 516 K/UL — HIGH (ref 130–400)
POTASSIUM SERPL-MCNC: 4.2 MMOL/L — SIGNIFICANT CHANGE UP (ref 3.5–5)
POTASSIUM SERPL-SCNC: 4.2 MMOL/L — SIGNIFICANT CHANGE UP (ref 3.5–5)
RBC # BLD: 3.03 M/UL — LOW (ref 4.7–6.1)
RBC # FLD: 12.6 % — SIGNIFICANT CHANGE UP (ref 11.5–14.5)
SODIUM SERPL-SCNC: 138 MMOL/L — SIGNIFICANT CHANGE UP (ref 135–146)
SPECIMEN SOURCE: SIGNIFICANT CHANGE UP
SPECIMEN SOURCE: SIGNIFICANT CHANGE UP
WBC # BLD: 7.02 K/UL — SIGNIFICANT CHANGE UP (ref 4.8–10.8)
WBC # FLD AUTO: 7.02 K/UL — SIGNIFICANT CHANGE UP (ref 4.8–10.8)

## 2022-11-30 PROCEDURE — 99232 SBSQ HOSP IP/OBS MODERATE 35: CPT

## 2022-11-30 RX ADMIN — Medication 25 MILLIGRAM(S): at 15:10

## 2022-11-30 RX ADMIN — Medication 250 MILLIGRAM(S): at 05:22

## 2022-11-30 RX ADMIN — Medication 250 MILLIGRAM(S): at 18:04

## 2022-11-30 RX ADMIN — Medication 100 MILLIGRAM(S): at 18:04

## 2022-11-30 RX ADMIN — Medication 1 MILLIGRAM(S): at 11:46

## 2022-11-30 RX ADMIN — Medication 2: at 18:04

## 2022-11-30 RX ADMIN — Medication 100 MILLIGRAM(S): at 05:23

## 2022-11-30 RX ADMIN — Medication 100 MILLIGRAM(S): at 11:47

## 2022-11-30 RX ADMIN — CHLORHEXIDINE GLUCONATE 1 APPLICATION(S): 213 SOLUTION TOPICAL at 05:22

## 2022-11-30 RX ADMIN — Medication 60 MILLIGRAM(S): at 05:23

## 2022-11-30 RX ADMIN — Medication 25 MILLIGRAM(S): at 21:21

## 2022-11-30 RX ADMIN — PANTOPRAZOLE SODIUM 40 MILLIGRAM(S): 20 TABLET, DELAYED RELEASE ORAL at 05:23

## 2022-11-30 RX ADMIN — HEPARIN SODIUM 5000 UNIT(S): 5000 INJECTION INTRAVENOUS; SUBCUTANEOUS at 21:21

## 2022-11-30 RX ADMIN — Medication 1 TABLET(S): at 11:46

## 2022-11-30 RX ADMIN — SENNA PLUS 2 TABLET(S): 8.6 TABLET ORAL at 21:21

## 2022-11-30 RX ADMIN — Medication 25 MILLIGRAM(S): at 05:22

## 2022-11-30 RX ADMIN — HEPARIN SODIUM 5000 UNIT(S): 5000 INJECTION INTRAVENOUS; SUBCUTANEOUS at 15:10

## 2022-11-30 RX ADMIN — IRON SUCROSE 210 MILLIGRAM(S): 20 INJECTION, SOLUTION INTRAVENOUS at 09:47

## 2022-11-30 RX ADMIN — Medication 100 MILLIGRAM(S): at 05:22

## 2022-11-30 RX ADMIN — HEPARIN SODIUM 5000 UNIT(S): 5000 INJECTION INTRAVENOUS; SUBCUTANEOUS at 05:23

## 2022-11-30 NOTE — PROCEDURE NOTE - NSTIMEOUT_GEN_A_CORE
Father  Still living? No  Family history of stomach cancer, Age at diagnosis: 21-30
Patient's first and last name, , procedure, and correct site confirmed prior to the start of procedure.

## 2022-11-30 NOTE — PROGRESS NOTE ADULT - SUBJECTIVE AND OBJECTIVE BOX
Progress note    Patient seen and examined at bedside. No acute distress.    INTERVAL HPI/OVERNIGHT EVENTS:    REVIEW OF SYSTEMS:  CONSTITUTIONAL: No fever, weight loss, or fatigue  EYES: No eye pain, visual disturbances, or discharge  ENMT:  No difficulty hearing, tinnitus, vertigo; No sinus or throat pain  NECK: No pain or stiffness  BREASTS: No pain, masses, or nipple discharge  RESPIRATORY: No cough, wheezing, chills or hemoptysis; No shortness of breath  CARDIOVASCULAR: No chest pain, palpitations, dizziness, or leg swelling  GASTROINTESTINAL: No abdominal or epigastric pain. No nausea, vomiting, or hematemesis; No diarrhea or constipation. No melena or hematochezia.  GENITOURINARY: No dysuria, frequency, hematuria, or incontinence  NEUROLOGICAL: No headaches, memory loss, loss of strength, numbness, or tremors  SKIN: No itching, burning, rashes, or lesions   LYMPH NODES: No enlarged glands  ENDOCRINE: No heat or cold intolerance; No hair loss  MUSCULOSKELETAL: No joint pain or swelling; No muscle, back, or extremity pain  PSYCHIATRIC: No depression, anxiety, mood swings, or difficulty sleeping  HEME/LYMPH: No easy bruising, or bleeding gums  ALLERY AND IMMUNOLOGIC: No hives or eczema  FAMILY HISTORY:    T(C): 37.2 (11-30-22 @ 05:04), Max: 37.2 (11-30-22 @ 05:04)  HR: 106 (11-30-22 @ 05:04) (92 - 106)  BP: 124/69 (11-30-22 @ 05:04) (124/69 - 125/76)  RR: 18 (11-30-22 @ 05:04) (18 - 18)  SpO2: --  Wt(kg): --Vital Signs Last 24 Hrs  T(C): 37.2 (30 Nov 2022 05:04), Max: 37.2 (30 Nov 2022 05:04)  T(F): 99 (30 Nov 2022 05:04), Max: 99 (30 Nov 2022 05:04)  HR: 106 (30 Nov 2022 05:04) (92 - 106)  BP: 124/69 (30 Nov 2022 05:04) (124/69 - 125/76)  BP(mean): --  RR: 18 (30 Nov 2022 05:04) (18 - 18)  SpO2: --      No Known Allergies      PHYSICAL EXAM:  GENERAL: NAD, well-groomed, well-developed  HEAD:  Atraumatic, Normocephalic  EYES: EOMI, PERRLA, conjunctiva and sclera clear  ENMT: No tonsillar erythema, exudates, or enlargement; Moist mucous membranes, Good dentition, No lesions  NECK: Supple, No JVD, Normal thyroid  NERVOUS SYSTEM:  Alert & Oriented X3, Good concentration; Motor Strength 5/5 B/L upper and lower extremities; DTRs 2+ intact and symmetric  CHEST/LUNG: Clear to percussion bilaterally; No rales, rhonchi, wheezing, or rubs  HEART: Regular rate and rhythm; No murmurs, rubs, or gallops  ABDOMEN: Soft, Nontender, Nondistended; Bowel sounds present  EXTREMITIES:  2+ Peripheral Pulses, No clubbing, cyanosis, or edema  LYMPH: No lymphadenopathy noted  SKIN: No rashes or lesions    Consultant(s) Notes Reviewed:  [x ] YES  [ ] NO  Care Discussed with Consultants/Other Providers [ x] YES  [ ] NO    LABS:      RADIOLOGY & ADDITIONAL TESTS:    Imaging Personally Reviewed:  [ ] YES  [ ] NO  acetaminophen     Tablet .. 650 milliGRAM(s) Oral every 6 hours PRN  ceFAZolin   IVPB 2000 milliGRAM(s) IV Intermittent every 12 hours  chlorhexidine 2% Cloths 1 Application(s) Topical <User Schedule>  dexMEDEtomidine Infusion 0.5 MICROgram(s)/kG/Hr IV Continuous <Continuous>  dextrose 5%. 1000 milliLiter(s) IV Continuous <Continuous>  dextrose 5%. 1000 milliLiter(s) IV Continuous <Continuous>  dextrose 50% Injectable 25 Gram(s) IV Push once  dextrose 50% Injectable 12.5 Gram(s) IV Push once  dextrose 50% Injectable 25 Gram(s) IV Push once  dextrose Oral Gel 15 Gram(s) Oral once PRN  folic acid 1 milliGRAM(s) Oral daily  glucagon  Injectable 1 milliGRAM(s) IntraMuscular once  heparin   Injectable 5000 Unit(s) SubCutaneous every 8 hours  hydrALAZINE 25 milliGRAM(s) Oral three times a day  insulin lispro (ADMELOG) corrective regimen sliding scale   SubCutaneous three times a day before meals  labetalol 100 milliGRAM(s) Oral two times a day  multivitamin 1 Tablet(s) Oral daily  NIFEdipine XL 60 milliGRAM(s) Oral daily  pantoprazole    Tablet 40 milliGRAM(s) Oral before breakfast  saccharomyces boulardii 250 milliGRAM(s) Oral two times a day  senna 2 Tablet(s) Oral at bedtime  thiamine 100 milliGRAM(s) Oral daily      HEALTH ISSUES - PROBLEM Dx:  Alcohol dependence with withdrawal    58 y/o male with PMHx of HTN, DM, CKD4  and alcohol dependence/withdrawals presenting to ED 2 days ago with general weakness and pain for about 3 weeks and has not been taking his diabetes medications, Pt usually drinks alcohol daily, but states last time drinking was Saturday/Sunday -> admitted to unit for VAMSI on CKD4 and alcohol withdrawal with fever    Fever: panculture revealed MSSA Bacteriemia   Started on Ancef 1g Q12 (renally dosed)   - 2D ECHO: and KIMBER no Mention of vegetation,    -Blood cx positive from 11/22, 11/23, 11/24, 11/25  -Blood Cx: Prelim negative from 11/27 and 11/29   -ESR: 142, CRP: 132  -ID - IV abx x 2 weeks from last Cx (end date 12/11)    VAMSI on CKD IV Scr: 13.5 on admission, suspected ATN: Improving   Complicated by Toxic Metabolic Encephalopathy: Resolving   -Scr now stabilizing around 3.1-3.4  -Nephro Consult appreciated  -PO hydration encouraged, monitor  -Daily BMP's     Type II DM: Non-compliant with Home  Meds   -FS before meals and bedtime   -c/w Insulin regimen and Keep FS below 180    Normocytic Anemia: likely from ETOH   - Iron studies reveal REGGIE, will start Venofer x 5 doses   - B12 and Folate: WNL     ETOH Abuse with Suspected Folic Acid, Magnesium and Thiamine deficiency   -Librium PRN, c/w Thiamine, folic A, magnesium and MVI     DVT PPX: heparin   GI PPX: PPI   Full code  Dispo: from Home, tentative plans for home pending abx approval from insurance    Total time spent to complete patient's bedside assessment, review medical chart, discuss medical plan of care with covering medical team was ____25____ with > 50% of time spent face to face w/ patient, discussion with patient/family and/or coordination of care Progress note    Patient seen and examined at bedside. No acute distress.    INTERVAL HPI/OVERNIGHT EVENTS:    REVIEW OF SYSTEMS:  CONSTITUTIONAL: No fever, weight loss, or fatigue  EYES: No eye pain, visual disturbances, or discharge  ENMT:  No difficulty hearing, tinnitus, vertigo; No sinus or throat pain  NECK: No pain or stiffness  BREASTS: No pain, masses, or nipple discharge  RESPIRATORY: No cough, wheezing, chills or hemoptysis; No shortness of breath  CARDIOVASCULAR: No chest pain, palpitations, dizziness, or leg swelling  GASTROINTESTINAL: No abdominal or epigastric pain. No nausea, vomiting, or hematemesis; No diarrhea or constipation. No melena or hematochezia.  GENITOURINARY: No dysuria, frequency, hematuria, or incontinence  NEUROLOGICAL: No headaches, memory loss, loss of strength, numbness, or tremors  SKIN: No itching, burning, rashes, or lesions   LYMPH NODES: No enlarged glands  ENDOCRINE: No heat or cold intolerance; No hair loss  MUSCULOSKELETAL: No joint pain or swelling; No muscle, back, or extremity pain  PSYCHIATRIC: No depression, anxiety, mood swings, or difficulty sleeping  HEME/LYMPH: No easy bruising, or bleeding gums  ALLERY AND IMMUNOLOGIC: No hives or eczema  FAMILY HISTORY:    T(C): 37.2 (11-30-22 @ 05:04), Max: 37.2 (11-30-22 @ 05:04)  HR: 106 (11-30-22 @ 05:04) (92 - 106)  BP: 124/69 (11-30-22 @ 05:04) (124/69 - 125/76)  RR: 18 (11-30-22 @ 05:04) (18 - 18)  SpO2: --  Wt(kg): --Vital Signs Last 24 Hrs  T(C): 37.2 (30 Nov 2022 05:04), Max: 37.2 (30 Nov 2022 05:04)  T(F): 99 (30 Nov 2022 05:04), Max: 99 (30 Nov 2022 05:04)  HR: 106 (30 Nov 2022 05:04) (92 - 106)  BP: 124/69 (30 Nov 2022 05:04) (124/69 - 125/76)  BP(mean): --  RR: 18 (30 Nov 2022 05:04) (18 - 18)  SpO2: --      No Known Allergies      PHYSICAL EXAM:  GENERAL: NAD, well-groomed, well-developed  HEAD:  Atraumatic, Normocephalic  EYES: EOMI, PERRLA, conjunctiva and sclera clear  ENMT: No tonsillar erythema, exudates, or enlargement; Moist mucous membranes, Good dentition, No lesions  NECK: Supple, No JVD, Normal thyroid  NERVOUS SYSTEM:  Alert & Oriented X3, Good concentration; Motor Strength 5/5 B/L upper and lower extremities; DTRs 2+ intact and symmetric  CHEST/LUNG: Clear to percussion bilaterally; No rales, rhonchi, wheezing, or rubs  HEART: Regular rate and rhythm; No murmurs, rubs, or gallops  ABDOMEN: Soft, Nontender, Nondistended; Bowel sounds present  EXTREMITIES:  2+ Peripheral Pulses, No clubbing, cyanosis, or edema  LYMPH: No lymphadenopathy noted  SKIN: No rashes or lesions    Consultant(s) Notes Reviewed:  [x ] YES  [ ] NO  Care Discussed with Consultants/Other Providers [ x] YES  [ ] NO    LABS:      RADIOLOGY & ADDITIONAL TESTS:    Imaging Personally Reviewed:  [ ] YES  [ ] NO  acetaminophen     Tablet .. 650 milliGRAM(s) Oral every 6 hours PRN  ceFAZolin   IVPB 2000 milliGRAM(s) IV Intermittent every 12 hours  chlorhexidine 2% Cloths 1 Application(s) Topical <User Schedule>  dexMEDEtomidine Infusion 0.5 MICROgram(s)/kG/Hr IV Continuous <Continuous>  dextrose 5%. 1000 milliLiter(s) IV Continuous <Continuous>  dextrose 5%. 1000 milliLiter(s) IV Continuous <Continuous>  dextrose 50% Injectable 25 Gram(s) IV Push once  dextrose 50% Injectable 12.5 Gram(s) IV Push once  dextrose 50% Injectable 25 Gram(s) IV Push once  dextrose Oral Gel 15 Gram(s) Oral once PRN  folic acid 1 milliGRAM(s) Oral daily  glucagon  Injectable 1 milliGRAM(s) IntraMuscular once  heparin   Injectable 5000 Unit(s) SubCutaneous every 8 hours  hydrALAZINE 25 milliGRAM(s) Oral three times a day  insulin lispro (ADMELOG) corrective regimen sliding scale   SubCutaneous three times a day before meals  labetalol 100 milliGRAM(s) Oral two times a day  multivitamin 1 Tablet(s) Oral daily  NIFEdipine XL 60 milliGRAM(s) Oral daily  pantoprazole    Tablet 40 milliGRAM(s) Oral before breakfast  saccharomyces boulardii 250 milliGRAM(s) Oral two times a day  senna 2 Tablet(s) Oral at bedtime  thiamine 100 milliGRAM(s) Oral daily      HEALTH ISSUES - PROBLEM Dx:  Alcohol dependence with withdrawal    56 y/o male with PMHx of HTN, DM, CKD4  and alcohol dependence/withdrawals presenting to ED 2 days ago with general weakness and pain for about 3 weeks and has not been taking his diabetes medications, Pt usually drinks alcohol daily, but states last time drinking was Saturday/Sunday -> admitted to unit for VAMSI on CKD4 and alcohol withdrawal with fever    Fever: panculture revealed MSSA Bacteriemia   Started on Ancef 1g Q12 (renally dosed)   - 2D ECHO: and KIMBER no Mention of vegetation,    -Blood cx positive from 11/22, 11/23, 11/24, 11/25  -Blood Cx: Prelim negative from 11/27 and 11/29   -ESR: 142, CRP: 132  -ID - IV abx x 2 weeks from last Cx (end date 12/11)    VAMSI on CKD IV Scr: 13.5 on admission, suspected ATN: Improving   Complicated by Toxic Metabolic Encephalopathy: Resolving   -Scr now stabilizing around 3.1-3.4  -Nephro Consult appreciated  -PO hydration encouraged, monitor  -Daily BMP's     Type II DM: Non-compliant with Home  Meds   -FS before meals and bedtime   -c/w Insulin regimen and Keep FS below 180    Normocytic Anemia: likely from ETOH   - Iron studies reveal REGGIE, will start Venofer x 5 doses   - B12 and Folate: WNL     ETOH Abuse with Suspected Folic Acid, Magnesium and Thiamine deficiency   -Librium PRN, c/w Thiamine, folic A, magnesium and MVI     DVT PPX: heparin   GI PPX: PPI   Full code  Dispo: from Home, pharmacy [facility] [phone]    Total time spent to complete patient's bedside assessment, review medical chart, discuss medical plan of care with covering medical team was ____25____ with > 50% of time spent face to face w/ patient, discussion with patient/family and/or coordination of care Progress note    Patient seen and examined at bedside. No acute distress.    INTERVAL HPI/OVERNIGHT EVENTS:    REVIEW OF SYSTEMS:  CONSTITUTIONAL: No fever, weight loss, or fatigue  EYES: No eye pain, visual disturbances, or discharge  ENMT:  No difficulty hearing, tinnitus, vertigo; No sinus or throat pain  NECK: No pain or stiffness  BREASTS: No pain, masses, or nipple discharge  RESPIRATORY: No cough, wheezing, chills or hemoptysis; No shortness of breath  CARDIOVASCULAR: No chest pain, palpitations, dizziness, or leg swelling  GASTROINTESTINAL: No abdominal or epigastric pain. No nausea, vomiting, or hematemesis; No diarrhea or constipation. No melena or hematochezia.  GENITOURINARY: No dysuria, frequency, hematuria, or incontinence  NEUROLOGICAL: No headaches, memory loss, loss of strength, numbness, or tremors  SKIN: No itching, burning, rashes, or lesions   LYMPH NODES: No enlarged glands  ENDOCRINE: No heat or cold intolerance; No hair loss  MUSCULOSKELETAL: No joint pain or swelling; No muscle, back, or extremity pain  PSYCHIATRIC: No depression, anxiety, mood swings, or difficulty sleeping  HEME/LYMPH: No easy bruising, or bleeding gums  ALLERY AND IMMUNOLOGIC: No hives or eczema  FAMILY HISTORY:    T(C): 37.2 (11-30-22 @ 05:04), Max: 37.2 (11-30-22 @ 05:04)  HR: 106 (11-30-22 @ 05:04) (92 - 106)  BP: 124/69 (11-30-22 @ 05:04) (124/69 - 125/76)  RR: 18 (11-30-22 @ 05:04) (18 - 18)  SpO2: --  Wt(kg): --Vital Signs Last 24 Hrs  T(C): 37.2 (30 Nov 2022 05:04), Max: 37.2 (30 Nov 2022 05:04)  T(F): 99 (30 Nov 2022 05:04), Max: 99 (30 Nov 2022 05:04)  HR: 106 (30 Nov 2022 05:04) (92 - 106)  BP: 124/69 (30 Nov 2022 05:04) (124/69 - 125/76)  BP(mean): --  RR: 18 (30 Nov 2022 05:04) (18 - 18)  SpO2: --      No Known Allergies      PHYSICAL EXAM:  GENERAL: NAD, well-groomed, well-developed  HEAD:  Atraumatic, Normocephalic  EYES: EOMI, PERRLA, conjunctiva and sclera clear  ENMT: No tonsillar erythema, exudates, or enlargement; Moist mucous membranes, Good dentition, No lesions  NECK: Supple, No JVD, Normal thyroid  NERVOUS SYSTEM:  Alert & Oriented X3, Good concentration; Motor Strength 5/5 B/L upper and lower extremities; DTRs 2+ intact and symmetric  CHEST/LUNG: Clear to percussion bilaterally; No rales, rhonchi, wheezing, or rubs  HEART: Regular rate and rhythm; No murmurs, rubs, or gallops  ABDOMEN: Soft, Nontender, Nondistended; Bowel sounds present  EXTREMITIES:  2+ Peripheral Pulses, No clubbing, cyanosis, or edema  LYMPH: No lymphadenopathy noted  SKIN: No rashes or lesions    Consultant(s) Notes Reviewed:  [x ] YES  [ ] NO  Care Discussed with Consultants/Other Providers [ x] YES  [ ] NO    LABS:      RADIOLOGY & ADDITIONAL TESTS:    Imaging Personally Reviewed:  [ ] YES  [ ] NO  acetaminophen     Tablet .. 650 milliGRAM(s) Oral every 6 hours PRN  ceFAZolin   IVPB 2000 milliGRAM(s) IV Intermittent every 12 hours  chlorhexidine 2% Cloths 1 Application(s) Topical <User Schedule>  dexMEDEtomidine Infusion 0.5 MICROgram(s)/kG/Hr IV Continuous <Continuous>  dextrose 5%. 1000 milliLiter(s) IV Continuous <Continuous>  dextrose 5%. 1000 milliLiter(s) IV Continuous <Continuous>  dextrose 50% Injectable 25 Gram(s) IV Push once  dextrose 50% Injectable 12.5 Gram(s) IV Push once  dextrose 50% Injectable 25 Gram(s) IV Push once  dextrose Oral Gel 15 Gram(s) Oral once PRN  folic acid 1 milliGRAM(s) Oral daily  glucagon  Injectable 1 milliGRAM(s) IntraMuscular once  heparin   Injectable 5000 Unit(s) SubCutaneous every 8 hours  hydrALAZINE 25 milliGRAM(s) Oral three times a day  insulin lispro (ADMELOG) corrective regimen sliding scale   SubCutaneous three times a day before meals  labetalol 100 milliGRAM(s) Oral two times a day  multivitamin 1 Tablet(s) Oral daily  NIFEdipine XL 60 milliGRAM(s) Oral daily  pantoprazole    Tablet 40 milliGRAM(s) Oral before breakfast  saccharomyces boulardii 250 milliGRAM(s) Oral two times a day  senna 2 Tablet(s) Oral at bedtime  thiamine 100 milliGRAM(s) Oral daily      HEALTH ISSUES - PROBLEM Dx:  Alcohol dependence with withdrawal    56 y/o male with PMHx of HTN, DM, CKD4  and alcohol dependence/withdrawals presenting to ED 2 days ago with general weakness and pain for about 3 weeks and has not been taking his diabetes medications, Pt usually drinks alcohol daily, but states last time drinking was Saturday/Sunday -> admitted to unit for VAMSI on CKD4 and alcohol withdrawal with fever    Fever: panculture revealed MSSA Bacteriemia   Started on Ancef 1g Q12 (renally dosed)   - 2D ECHO: and KIMBER no Mention of vegetation,    -Blood cx positive from 11/22, 11/23, 11/24, 11/25  -Blood Cx: Prelim negative from 11/27 and 11/29   -ESR: 142, CRP: 132  -ID - IV abx x 2 weeks from last Cx (end date 12/11)    VAMSI on CKD IV Scr: 13.5 on admission, suspected ATN: Improving   Complicated by Toxic Metabolic Encephalopathy: Resolving   -Scr now stabilizing around 3.1-3.4  -Nephro Consult appreciated  -PO hydration encouraged, monitor  -Daily BMP's     Type II DM: Non-compliant with Home  Meds   -FS before meals and bedtime   -c/w Insulin regimen and Keep FS below 180    Normocytic Anemia: likely from ETOH   - Iron studies reveal REGGIE, will start Venofer x 5 doses   - B12 and Folate: WNL     ETOH Abuse with Suspected Folic Acid, Magnesium and Thiamine deficiency   -Librium PRN, c/w Thiamine, folic A, magnesium and MVI     DVT PPX: heparin   GI PPX: PPI   Full code  Dispo: from Home, pharmacy [facility] [phone]    Total time spent to complete patient's bedside assessment, review medical chart, discuss medical plan of care with covering medical team was ____25____ with > 50% of time spent face to face w/ patient, discussion with patient/family and/or coordination of care

## 2022-11-30 NOTE — PROGRESS NOTE ADULT - PROVIDER SPECIALTY LIST ADULT
Critical Care
Hospitalist
MICU
Nephrology
Pulmonology
Pulmonology
Hospitalist
MICU
Nephrology
Pulmonology
Addiction Medicine
Critical Care
Hospitalist
Infectious Disease
Nephrology
Pulmonology
Addiction Medicine
Hospitalist
Hospitalist
Infectious Disease
MICU
MICU
Nephrology
Pulmonology

## 2022-11-30 NOTE — PROGRESS NOTE ADULT - REASON FOR ADMISSION
acute on chronic renal failure w/ severe electrolyte imbalance

## 2022-11-30 NOTE — PROCEDURE NOTE - ADDITIONAL PROCEDURE DETAILS
Pagar.me Powerglide Pro Midline catheter  18g 10cm catheter placed under ultrasound guidance  access confirmed, OK to use

## 2022-12-01 ENCOUNTER — TRANSCRIPTION ENCOUNTER (OUTPATIENT)
Age: 57
End: 2022-12-01

## 2022-12-01 VITALS — DIASTOLIC BLOOD PRESSURE: 68 MMHG | SYSTOLIC BLOOD PRESSURE: 115 MMHG

## 2022-12-01 LAB
GLUCOSE BLDC GLUCOMTR-MCNC: 157 MG/DL — HIGH (ref 70–99)
GLUCOSE BLDC GLUCOMTR-MCNC: 187 MG/DL — HIGH (ref 70–99)
GLUCOSE BLDC GLUCOMTR-MCNC: 194 MG/DL — HIGH (ref 70–99)

## 2022-12-01 PROCEDURE — 99239 HOSP IP/OBS DSCHRG MGMT >30: CPT

## 2022-12-01 RX ORDER — FOLIC ACID 0.8 MG
1 TABLET ORAL
Qty: 30 | Refills: 0
Start: 2022-12-01 | End: 2022-12-30

## 2022-12-01 RX ORDER — LISINOPRIL 2.5 MG/1
1 TABLET ORAL
Qty: 0 | Refills: 0 | DISCHARGE

## 2022-12-01 RX ORDER — CEFAZOLIN SODIUM 1 G
2 VIAL (EA) INJECTION
Qty: 0 | Refills: 0 | DISCHARGE
Start: 2022-12-01 | End: 2022-12-11

## 2022-12-01 RX ORDER — MULTIVIT-MIN/FERROUS GLUCONATE 9 MG/15 ML
1 LIQUID (ML) ORAL
Qty: 30 | Refills: 0
Start: 2022-12-01 | End: 2022-12-30

## 2022-12-01 RX ORDER — THIAMINE MONONITRATE (VIT B1) 100 MG
1 TABLET ORAL
Qty: 30 | Refills: 0
Start: 2022-12-01 | End: 2022-12-30

## 2022-12-01 RX ADMIN — Medication 1 TABLET(S): at 11:45

## 2022-12-01 RX ADMIN — Medication 60 MILLIGRAM(S): at 05:26

## 2022-12-01 RX ADMIN — Medication 1 MILLIGRAM(S): at 11:45

## 2022-12-01 RX ADMIN — Medication 2: at 12:16

## 2022-12-01 RX ADMIN — Medication 250 MILLIGRAM(S): at 05:26

## 2022-12-01 RX ADMIN — Medication 25 MILLIGRAM(S): at 05:26

## 2022-12-01 RX ADMIN — HEPARIN SODIUM 5000 UNIT(S): 5000 INJECTION INTRAVENOUS; SUBCUTANEOUS at 06:52

## 2022-12-01 RX ADMIN — Medication 2: at 08:48

## 2022-12-01 RX ADMIN — PANTOPRAZOLE SODIUM 40 MILLIGRAM(S): 20 TABLET, DELAYED RELEASE ORAL at 06:52

## 2022-12-01 RX ADMIN — Medication 100 MILLIGRAM(S): at 06:52

## 2022-12-01 RX ADMIN — Medication 100 MILLIGRAM(S): at 05:24

## 2022-12-01 NOTE — DISCHARGE NOTE PROVIDER - NSDCCPCAREPLAN_GEN_ALL_CORE_FT
PRINCIPAL DISCHARGE DIAGNOSIS  Diagnosis: Acute renal failure  Assessment and Plan of Treatment:       SECONDARY DISCHARGE DIAGNOSES  Diagnosis: Acidosis, metabolic  Assessment and Plan of Treatment:     Diagnosis: Hyperkalemia  Assessment and Plan of Treatment:     Diagnosis: Hyponatremia  Assessment and Plan of Treatment:     Diagnosis: Bacteremia  Assessment and Plan of Treatment: Please continue with Cefazolin until 12/11

## 2022-12-01 NOTE — DISCHARGE NOTE PROVIDER - PROVIDER TOKENS
PROVIDER:[TOKEN:[[id number]]],PROVIDER:[TOKEN:[[id number]]],PROVIDER:[TOKEN:[[id number]]]

## 2022-12-01 NOTE — DISCHARGE NOTE PROVIDER - HOSPITAL COURSE
58 y/o male with PMHx of HTN, DM, CKD4  and alcohol dependence/withdrawals presenting to ED 2 days ago with general weakness and pain for about 3 weeks and has not been taking his diabetes medications, Pt usually drinks alcohol daily, but states last time drinking was Saturday/Sunday -> admitted to unit for VAMSI on CKD4 and alcohol withdrawal with fever    Fever: panculture revealed MSSA Bacteriemia   Started on Ancef 1g Q12 (renally dosed)   - 2D ECHO: and KIMBER no Mention of vegetation,    -Blood cx positive from 11/22, 11/23, 11/24, 11/25  -Blood Cx: Prelim negative from 11/27 and 11/29   -ESR: 142, CRP: 132  -ID - IV abx x 2 weeks from last Cx (end date 12/11)    VAMSI on CKD IV Scr: 13.5 on admission, suspected ATN: Improving   Complicated by Toxic Metabolic Encephalopathy: Resolving   -Scr now stabilizing around 3.1-3.4  -Nephro Consult appreciated  -PO hydration encouraged, monitor  -Daily BMP's     Type II DM: Non-compliant with Home  Meds   -FS before meals and bedtime   -c/w Insulin regimen and Keep FS below 180    Normocytic Anemia: likely from ETOH   - Iron studies reveal REGGIE, will start Venofer x 5 doses   - B12 and Folate: WNL     ETOH Abuse with Suspected Folic Acid, Magnesium and Thiamine deficiency   -Librium PRN, c/w Thiamine, folic A, magnesium and MVI

## 2022-12-01 NOTE — DISCHARGE NOTE PROVIDER - CARE PROVIDER_API CALL
Aime Jones)  Internal Medicine  [address], [address] [place] [address]  Phone: [phone]  Fax: [phone]  Follow Up Time:     Amaury Galvan)  Internal Medicine; Nephrology  [address] [place] [address]  Phone: [phone]  Fax: [phone]  Follow Up Time:     Lucinda Lopez)  Infectious Disease; Internal Medicine  [address] [place] [address]  Phone: [phone]  Fax: [phone]  Follow Up Time:

## 2022-12-01 NOTE — DISCHARGE NOTE PROVIDER - CARE PROVIDERS DIRECT ADDRESSES
,[email or web address],DirectAddress_Unknown,DirectAddress_Unknown

## 2022-12-01 NOTE — DISCHARGE NOTE NURSING/CASE MANAGEMENT/SOCIAL WORK - PATIENT PORTAL LINK FT
You can access the FollowMyHealth Patient Portal offered by [facility] by registering at the following website: [email or web address]. By joining [facility]’s FollowMyHealth portal, you will also be able to view your health information using other applications (apps) compatible with our system.

## 2022-12-01 NOTE — DISCHARGE NOTE PROVIDER - NSDCMRMEDTOKEN_GEN_ALL_CORE_FT
acetaminophen 325 mg oral tablet: 2 tab(s) orally every 6 hours, As needed, Temp greater or equal to 38C (100.4F), Mild Pain (1 - 3)  atorvastatin 40 mg oral tablet: 1 tab(s) orally once a day (at bedtime)  ceFAZolin: 2 gram(s) intravenous every 12 hours  Centrum oral tablet: 1 tab(s) orally once a day   DULoxetine 30 mg oral delayed release capsule: 1 cap(s) orally 2 times a day  folic acid 1 mg oral tablet: 1 tab(s) orally once a day   glipiZIDE 2.5 mg oral tablet, extended release: 1 tab(s) orally once a day  lisinopril 20 mg oral tablet: 1 tab(s) orally once a day  NIFEdipine 60 mg oral tablet, extended release: 1 tab(s) orally once a day  pantoprazole 40 mg oral delayed release tablet: 1 tab(s) orally once a day (before a meal)  thiamine 50 mg oral tablet: 1 tab(s) orally once a day    acetaminophen 325 mg oral tablet: 2 tab(s) orally every 6 hours, As needed, Temp greater or equal to 38C (100.4F), Mild Pain (1 - 3)  atorvastatin 40 mg oral tablet: 1 tab(s) orally once a day (at bedtime)  ceFAZolin: 2 gram(s) intravenous every 12 hours  Centrum oral tablet: 1 tab(s) orally once a day   DULoxetine 30 mg oral delayed release capsule: 1 cap(s) orally 2 times a day  folic acid 1 mg oral tablet: 1 tab(s) orally once a day   glipiZIDE 2.5 mg oral tablet, extended release: 1 tab(s) orally once a day  NIFEdipine 60 mg oral tablet, extended release: 1 tab(s) orally once a day  pantoprazole 40 mg oral delayed release tablet: 1 tab(s) orally once a day (before a meal)  thiamine 50 mg oral tablet: 1 tab(s) orally once a day

## 2022-12-01 NOTE — DISCHARGE NOTE PROVIDER - NSDCFUSCHEDAPPT_GEN_ALL_CORE_FT
Eliceo Romero Physician [facility]  NEUROLOGY [address]  Scheduled Appointment: 02/17/2023

## 2022-12-03 LAB
CULTURE RESULTS: SIGNIFICANT CHANGE UP
CULTURE RESULTS: SIGNIFICANT CHANGE UP
SPECIMEN SOURCE: SIGNIFICANT CHANGE UP
SPECIMEN SOURCE: SIGNIFICANT CHANGE UP

## 2022-12-04 LAB
CULTURE RESULTS: SIGNIFICANT CHANGE UP
SPECIMEN SOURCE: SIGNIFICANT CHANGE UP

## 2022-12-08 DIAGNOSIS — Z71.41 ALCOHOL ABUSE COUNSELING AND SURVEILLANCE OF ALCOHOLIC: ICD-10-CM

## 2022-12-08 DIAGNOSIS — E83.39 OTHER DISORDERS OF PHOSPHORUS METABOLISM: ICD-10-CM

## 2022-12-08 DIAGNOSIS — G47.33 OBSTRUCTIVE SLEEP APNEA (ADULT) (PEDIATRIC): ICD-10-CM

## 2022-12-08 DIAGNOSIS — H40.9 UNSPECIFIED GLAUCOMA: ICD-10-CM

## 2022-12-08 DIAGNOSIS — N18.4 CHRONIC KIDNEY DISEASE, STAGE 4 (SEVERE): ICD-10-CM

## 2022-12-08 DIAGNOSIS — E83.42 HYPOMAGNESEMIA: ICD-10-CM

## 2022-12-08 DIAGNOSIS — D64.9 ANEMIA, UNSPECIFIED: ICD-10-CM

## 2022-12-08 DIAGNOSIS — F10.239 ALCOHOL DEPENDENCE WITH WITHDRAWAL, UNSPECIFIED: ICD-10-CM
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
## 2022-12-08 DIAGNOSIS — E11.40 TYPE 2 DIABETES MELLITUS WITH DIABETIC NEUROPATHY, UNSPECIFIED: ICD-10-CM

## 2022-12-08 DIAGNOSIS — E11.22 TYPE 2 DIABETES MELLITUS WITH DIABETIC CHRONIC KIDNEY DISEASE: ICD-10-CM

## 2022-12-08 DIAGNOSIS — E87.1 HYPO-OSMOLALITY AND HYPONATREMIA: ICD-10-CM

## 2022-12-08 DIAGNOSIS — I12.9 HYPERTENSIVE CHRONIC KIDNEY DISEASE WITH STAGE 1 THROUGH STAGE 4 CHRONIC KIDNEY DISEASE, OR UNSPECIFIED CHRONIC KIDNEY DISEASE: ICD-10-CM

## 2022-12-08 DIAGNOSIS — R53.1 WEAKNESS: ICD-10-CM

## 2022-12-08 DIAGNOSIS — N17.0 ACUTE KIDNEY FAILURE WITH TUBULAR NECROSIS: ICD-10-CM

## 2022-12-08 DIAGNOSIS — Z79.84 LONG TERM (CURRENT) USE OF ORAL HYPOGLYCEMIC DRUGS: ICD-10-CM

## 2022-12-08 DIAGNOSIS — G92.8 OTHER TOXIC ENCEPHALOPATHY: ICD-10-CM

## 2022-12-08 DIAGNOSIS — R45.1 RESTLESSNESS AND AGITATION: ICD-10-CM

## 2022-12-08 DIAGNOSIS — E87.20 ACIDOSIS, UNSPECIFIED: ICD-10-CM

## 2022-12-08 DIAGNOSIS — E53.8 DEFICIENCY OF OTHER SPECIFIED B GROUP VITAMINS: ICD-10-CM

## 2022-12-08 DIAGNOSIS — Z78.1 PHYSICAL RESTRAINT STATUS: ICD-10-CM

## 2022-12-08 DIAGNOSIS — Z91.199 PATIENT'S NONCOMPLIANCE WITH OTHER MEDICAL TREATMENT AND REGIMEN DUE TO UNSPECIFIED REASON: ICD-10-CM

## 2022-12-08 DIAGNOSIS — E78.5 HYPERLIPIDEMIA, UNSPECIFIED: ICD-10-CM

## 2022-12-08 DIAGNOSIS — B95.61 METHICILLIN SUSCEPTIBLE STAPHYLOCOCCUS AUREUS INFECTION AS THE CAUSE OF DISEASES CLASSIFIED ELSEWHERE: ICD-10-CM

## 2022-12-08 DIAGNOSIS — E87.0 HYPEROSMOLALITY AND HYPERNATREMIA: ICD-10-CM

## 2022-12-08 DIAGNOSIS — Z28.21 IMMUNIZATION NOT CARRIED OUT BECAUSE OF PATIENT REFUSAL: ICD-10-CM

## 2022-12-08 DIAGNOSIS — E87.5 HYPERKALEMIA: ICD-10-CM

## 2022-12-08 DIAGNOSIS — R78.81 BACTEREMIA: ICD-10-CM

## 2022-12-08 DIAGNOSIS — E51.9 THIAMINE DEFICIENCY, UNSPECIFIED: ICD-10-CM

## 2023-03-31 ENCOUNTER — APPOINTMENT (OUTPATIENT)
Dept: NEUROLOGY | Facility: CLINIC | Age: 58
End: 2023-03-31
Payer: COMMERCIAL

## 2023-03-31 VITALS
WEIGHT: 160 LBS | HEIGHT: 67 IN | BODY MASS INDEX: 25.11 KG/M2 | HEART RATE: 76 BPM | SYSTOLIC BLOOD PRESSURE: 93 MMHG | DIASTOLIC BLOOD PRESSURE: 57 MMHG

## 2023-03-31 DIAGNOSIS — G62.9 POLYNEUROPATHY, UNSPECIFIED: ICD-10-CM

## 2023-03-31 DIAGNOSIS — M54.16 RADICULOPATHY, LUMBAR REGION: ICD-10-CM

## 2023-03-31 PROBLEM — N18.9 CHRONIC KIDNEY DISEASE, UNSPECIFIED: Chronic | Status: ACTIVE | Noted: 2022-11-11

## 2023-03-31 PROCEDURE — 99214 OFFICE O/P EST MOD 30 MIN: CPT

## 2023-03-31 NOTE — PHYSICAL EXAM
[FreeTextEntry1] : Mental status: Awake, alert and oriented x3. Recent and remote memory intact. Naming, repetition and comprehension intact. Attention/concentration intact. No dysarthria, no aphasia. Fund of knowledge appropriate. \par Cranial nerves: Pupils equally round and reactive to light, visual fields full, no nystagmus, extraocular muscles intact, V1 through V3 intact bilaterally and symmetric, face symmetric, hearing intact to finger rub, palate elevation symmetric, tongue was midline.\par Motor: MRC grading 5/5 b/l UE/LE.  strength 5/5. positive SLR test in the right \par Sensation:Hyperesthesia in the feet. Reduced vibration in the toes to 5 sec and absent position. \par Coordination: No dysmetria on finger-to-nose and heel-to-shin. No dysdiadokinesia.\par Reflexes: 2+ in bilateral UE 1+ in the knees and absent in the ankles. downgoing toes bilaterally. (-) Helms.\par Gait: unsteady tandem and wide based.. Romberg positive. Antalgic today, putting more weight on the left leg.

## 2023-03-31 NOTE — ASSESSMENT
[FreeTextEntry1] : 57 year old M w HTN and DM is here as a follow up visit for peripheral neuropathy 2/2 to diabetes. Has numbness  but denies pain and paresthesia at night.  His blood pressure is on 90s and noted to have antalgic gait likely due to right lumbar radiculopathy. \par \par - cw gabapentin 600 mg TID 4 days a week and BID for three days a week \par - Continue folic acid \par - PT for lumbar radiculopathy \par - RTC in 9 months. \par \par

## 2023-03-31 NOTE — HISTORY OF PRESENT ILLNESS
[FreeTextEntry1] : 57 year old man is here as a follow up visit for diabetic peripheral sensorimotor neuropathy confirmed with EMG He is currently on gabapentin 600 mg TID. He reports stable numbness and paresthesia. His balance is slightly worse and reports back pain with no significant sciatica. \par

## 2023-04-03 ENCOUNTER — RX RENEWAL (OUTPATIENT)
Age: 58
End: 2023-04-03

## 2023-04-06 NOTE — DISCHARGE NOTE PROVIDER - NSDCHC_MEDRECSTATUS_GEN_ALL_CORE
Patient is a 70y old  Male who presents with a chief complaint of R toe infection (05 Apr 2023 11:35)    f/u toe infection    Interval History/ROS:  no fever/chills.  no n/v/d.  no abdominal pain.  no dysuria.  Remainder of ROS otherwise negative.    PAST MEDICAL & SURGICAL HISTORY:  DM (diabetes mellitus)  HTN (hypertension)  Neuropathy due to peripheral vascular disease    Allergies  No Known Allergies    ANTIMICROBIALS:  vancomycin  IVPB 750 every 12 hours (3/30-4/5)  active:  cefepime   IVPB 2000 every 12 hours (3/30-)    MEDICATIONS  (STANDING):  amLODIPine   Tablet 10 daily  aspirin enteric coated 81 daily  gabapentin 200 at bedtime  heparin   Injectable 5000 every 8 hours  influenza  Vaccine (HIGH DOSE) 0.7 once  insulin glargine Injectable (LANTUS) 10 at bedtime  insulin lispro (ADMELOG) corrective regimen sliding scale  three times a day before meals  insulin lispro (ADMELOG) corrective regimen sliding scale  at bedtime  losartan 25 daily    Vital Signs Last 24 Hrs  T(F): 97.9 (04-06-23 @ 05:52), Max: 98.1 (04-05-23 @ 21:49)  HR: 71 (04-06-23 @ 05:52)  BP: 171/78 (04-06-23 @ 05:52)  RR: 18 (04-06-23 @ 05:52)  SpO2: 98% (04-06-23 @ 05:52) (95% - 99%)    PHYSICAL EXAM:  Constitutional: non-toxic  HEAD/EYES: anicteric  ENT:  supple  Cardiovascular:   normal S1, S2  Respiratory:  clear BS bilaterally  GI:  soft, non-tender, normal bowel sounds  :  no cabello  Musculoskeletal:  no synovitis  Neurologic: awake and alert, normal strength  Skin:  R great toe with dry gangrenous changes at the tip; nail removed; decreased sensation; no drainage, mild swelling  Psychiatric:  awake, alert, appropriate mood                            10.4   5.42  )-----------( 289      ( 06 Apr 2023 07:10 )             31.8 04-06    138  |  103  |  19  ----------------------------<  161  4.2   |  25  |  0.80  Ca    9.2      06 Apr 2023 07:10Phos  3.5     04-06Mg     2.3     04-06    WBC Count: 7.04 (04-05-23 @ 07:16)  WBC Count: 6.09 (04-04-23 @ 07:05)  WBC Count: 7.99 (04-03-23 @ 06:44)  WBC Count: 6.90 (04-02-23 @ 07:03)  WBC Count: 6.57 (04-01-23 @ 05:15)  WBC Count: 7.60 (03-31-23 @ 06:43)  WBC Count: 7.41 (03-30-23 @ 01:26)    Sedimentation Rate, Erythrocyte: 117 (03-31 @ 06:43)  C-Reactive Protein, Serum: 69 (03-30 @ 01:26)    MICROBIOLOGY:  Vancomycin Level, Trough: <4.0 (04-04 @ 09:29)  Vancomycin Level, Trough: 24.0 (04-02 @ 17:55)  Vancomycin Level, Trough: 10.9 (04-01 @ 05:15)    Culture - Other (collected 03-30-23 @ 06:35)  Source: .Other Right foot  Final Report (04-02-23 @ 10:47):    Rare Enterobacter cloacae complex    Numerous Staphylococcus lugdunensis    Rare Delftia acidovorans group    Normal skin narcisa isolated  Organism: Enterobacter cloacae complex  Staphylococcus lugdunensis  Delftia acidovorans group (04-02-23 @ 10:47)  Organism: Delftia acidovorans group (04-02-23 @ 10:47)      Method Type: AMI      -  Amikacin: S <=16      -  Aztreonam: S <=4      -  Cefepime: S <=2      -  Ceftriaxone: S <=1      -  Ciprofloxacin: S <=0.25      -  Gentamicin: R >8      -  Levofloxacin: S <=0.5      -  Meropenem: S <=1      -  Piperacillin/Tazobactam: S <=8      -  Tobramycin: I 8      -  Trimethoprim/Sulfamethoxazole: S <=0.5/9.5  Organism: Staphylococcus lugdunensis (04-02-23 @ 10:47)      Method Type: AMI      -  Ampicillin/Sulbactam: S <=8/4      -  Cefazolin: S <=4      -  Clindamycin: S <=0.25      -  Erythromycin: S <=0.25      -  Gentamicin: S <=1 Should not be used as monotherapy      -  Oxacillin: S <=0.25      -  Rifampin: S <=1 Should not be used as monotherapy      -  Tetracycline: S <=1      -  Trimethoprim/Sulfamethoxazole: S <=0.5/9.5      -  Vancomycin: S 1  Organism: Enterobacter cloacae complex (04-02-23 @ 10:47)      Method Type: AMI      -  Amikacin: S <=16      -  Amoxicillin/Clavulanic Acid: R >16/8      -  Ampicillin: R <=8 These ampicillin results predict results for amoxicillin      -  Ampicillin/Sulbactam: R <=4/2 Enterobacter, Klebsiella aerogenes, Citrobacter, and Serratia may develop resistance during prolonged therapy (3-4 days)      -  Aztreonam: S <=4      -  Cefazolin: R >16 Enterobacter, Klebsiella aerogenes, Citrobacter, and Serratia may develop resistance during prolonged therapy (3-4 days)      -  Cefepime: S <=2      -  Cefoxitin: R >16      -  Ceftriaxone: S <=1 Enterobacter, Klebsiella aerogenes, Citrobacter, and Serratia may develop resistance during prolonged therapy      -  Ciprofloxacin: S <=0.25      -  Ertapenem: S <=0.5      -  Gentamicin: S <=2      -  Imipenem: S <=1      -  Levofloxacin: S <=0.5      -  Meropenem: S <=1      -  Piperacillin/Tazobactam: S <=8      -  Tobramycin: S <=2      -  Trimethoprim/Sulfamethoxazole: S <=0.5/9.5    Culture - Blood (collected 03-30-23 @ 01:20)  Source: .Blood  Final Report (04-04-23 @ 09:01):    No Growth Final    Culture - Blood (collected 03-30-23 @ 01:10)  Source: .Blood  Final Report (04-04-23 @ 09:01):    No Growth Final    Rapid RVP Result: NotDetec (03-30 @ 03:27)    RADIOLOGY:  imaging below personally reviewed and agree with findings    VA Duplex Lower Ext Vein Scan, Bilat (04.01.23 @ 19:32) >  IMPRESSION: Diameters of the right and left greater and lesser saphenous veins as tabulated above.    VA Duplex Lower Ext Vein Scan, Right (03.30.23 @ 14:48) >  IMPRESSION: No evidence of right lower extremity deep venous thrombosis.    Xray Chest 1 View- PORTABLE-Urgent (03.30.23 @ 03:53) >  IMPRESSION:  Clear lungs.    Xray Foot AP + Lateral + Oblique, Right (03.30.23 @ 03:53) >  IMPRESSION:  Evaluation limited due to the patient's overlying sock.  No acute displaced fracture or dislocation. Osteoarthritic changes throughout the forefoot. No cortical erosion or periostitis to suggest osteomyelitis. Vascular calcifications noted.   Admission Reconciliation is Completed  Discharge Reconciliation is Completed

## 2023-04-28 ENCOUNTER — APPOINTMENT (OUTPATIENT)
Dept: VASCULAR SURGERY | Facility: CLINIC | Age: 58
End: 2023-04-28
Payer: COMMERCIAL

## 2023-04-28 VITALS — HEIGHT: 67 IN | BODY MASS INDEX: 25.11 KG/M2 | WEIGHT: 160 LBS

## 2023-04-28 VITALS — DIASTOLIC BLOOD PRESSURE: 73 MMHG | SYSTOLIC BLOOD PRESSURE: 147 MMHG

## 2023-04-28 PROCEDURE — 99204 OFFICE O/P NEW MOD 45 MIN: CPT

## 2023-04-28 PROCEDURE — 93971 EXTREMITY STUDY: CPT

## 2023-04-28 RX ORDER — ROSUVASTATIN CALCIUM 5 MG/1
TABLET, FILM COATED ORAL
Refills: 0 | Status: ACTIVE | COMMUNITY

## 2023-04-28 NOTE — CONSULT LETTER
[Dear  ___] : Dear  [unfilled], [Consult Letter:] : I had the pleasure of evaluating your patient, [unfilled]. [Please see my note below.] : Please see my note below. [FreeTextEntry2] : Dear Dr. Aime Jones,

## 2023-05-02 ENCOUNTER — OUTPATIENT (OUTPATIENT)
Dept: OUTPATIENT SERVICES | Facility: HOSPITAL | Age: 58
LOS: 1 days | End: 2023-05-02
Payer: COMMERCIAL

## 2023-05-02 VITALS
TEMPERATURE: 98 F | SYSTOLIC BLOOD PRESSURE: 137 MMHG | WEIGHT: 160.28 LBS | HEIGHT: 68 IN | RESPIRATION RATE: 18 BRPM | DIASTOLIC BLOOD PRESSURE: 75 MMHG | OXYGEN SATURATION: 100 % | HEART RATE: 68 BPM

## 2023-05-02 DIAGNOSIS — Z98.890 OTHER SPECIFIED POSTPROCEDURAL STATES: Chronic | ICD-10-CM

## 2023-05-02 DIAGNOSIS — Z01.818 ENCOUNTER FOR OTHER PREPROCEDURAL EXAMINATION: ICD-10-CM

## 2023-05-02 DIAGNOSIS — N18.6 END STAGE RENAL DISEASE: ICD-10-CM

## 2023-05-02 LAB
A1C WITH ESTIMATED AVERAGE GLUCOSE RESULT: 5.8 % — HIGH (ref 4–5.6)
ALBUMIN SERPL ELPH-MCNC: 5.2 G/DL — SIGNIFICANT CHANGE UP (ref 3.5–5.2)
ALP SERPL-CCNC: 150 U/L — HIGH (ref 30–115)
ALT FLD-CCNC: 19 U/L — SIGNIFICANT CHANGE UP (ref 0–41)
ANION GAP SERPL CALC-SCNC: 12 MMOL/L — SIGNIFICANT CHANGE UP (ref 7–14)
APTT BLD: 31.9 SEC — SIGNIFICANT CHANGE UP (ref 27–39.2)
AST SERPL-CCNC: 26 U/L — SIGNIFICANT CHANGE UP (ref 0–41)
BASOPHILS # BLD AUTO: 0.05 K/UL — SIGNIFICANT CHANGE UP (ref 0–0.2)
BASOPHILS NFR BLD AUTO: 1.1 % — HIGH (ref 0–1)
BILIRUB SERPL-MCNC: 0.6 MG/DL — SIGNIFICANT CHANGE UP (ref 0.2–1.2)
BUN SERPL-MCNC: 41 MG/DL — HIGH (ref 10–20)
CALCIUM SERPL-MCNC: 9.9 MG/DL — SIGNIFICANT CHANGE UP (ref 8.4–10.5)
CHLORIDE SERPL-SCNC: 104 MMOL/L — SIGNIFICANT CHANGE UP (ref 98–110)
CO2 SERPL-SCNC: 22 MMOL/L — SIGNIFICANT CHANGE UP (ref 17–32)
CREAT SERPL-MCNC: 4.1 MG/DL — CRITICAL HIGH (ref 0.7–1.5)
EGFR: 16 ML/MIN/1.73M2 — LOW
EOSINOPHIL # BLD AUTO: 0.41 K/UL — SIGNIFICANT CHANGE UP (ref 0–0.7)
EOSINOPHIL NFR BLD AUTO: 8.6 % — HIGH (ref 0–8)
ESTIMATED AVERAGE GLUCOSE: 120 MG/DL — HIGH (ref 68–114)
GLUCOSE SERPL-MCNC: 137 MG/DL — HIGH (ref 70–99)
HCT VFR BLD CALC: 29.5 % — LOW (ref 42–52)
HGB BLD-MCNC: 9.6 G/DL — LOW (ref 14–18)
IMM GRANULOCYTES NFR BLD AUTO: 0.2 % — SIGNIFICANT CHANGE UP (ref 0.1–0.3)
INR BLD: 1 RATIO — SIGNIFICANT CHANGE UP (ref 0.65–1.3)
LYMPHOCYTES # BLD AUTO: 1.17 K/UL — LOW (ref 1.2–3.4)
LYMPHOCYTES # BLD AUTO: 24.6 % — SIGNIFICANT CHANGE UP (ref 20.5–51.1)
MCHC RBC-ENTMCNC: 29.1 PG — SIGNIFICANT CHANGE UP (ref 27–31)
MCHC RBC-ENTMCNC: 32.5 G/DL — SIGNIFICANT CHANGE UP (ref 32–37)
MCV RBC AUTO: 89.4 FL — SIGNIFICANT CHANGE UP (ref 80–94)
MONOCYTES # BLD AUTO: 0.45 K/UL — SIGNIFICANT CHANGE UP (ref 0.1–0.6)
MONOCYTES NFR BLD AUTO: 9.5 % — HIGH (ref 1.7–9.3)
NEUTROPHILS # BLD AUTO: 2.67 K/UL — SIGNIFICANT CHANGE UP (ref 1.4–6.5)
NEUTROPHILS NFR BLD AUTO: 56 % — SIGNIFICANT CHANGE UP (ref 42.2–75.2)
NRBC # BLD: 0 /100 WBCS — SIGNIFICANT CHANGE UP (ref 0–0)
PLATELET # BLD AUTO: 138 K/UL — SIGNIFICANT CHANGE UP (ref 130–400)
PMV BLD: 10.4 FL — SIGNIFICANT CHANGE UP (ref 7.4–10.4)
POTASSIUM SERPL-MCNC: 4.9 MMOL/L — SIGNIFICANT CHANGE UP (ref 3.5–5)
POTASSIUM SERPL-SCNC: 4.9 MMOL/L — SIGNIFICANT CHANGE UP (ref 3.5–5)
PROT SERPL-MCNC: 8.2 G/DL — HIGH (ref 6–8)
PROTHROM AB SERPL-ACNC: 11.4 SEC — SIGNIFICANT CHANGE UP (ref 9.95–12.87)
RBC # BLD: 3.3 M/UL — LOW (ref 4.7–6.1)
RBC # FLD: 12.7 % — SIGNIFICANT CHANGE UP (ref 11.5–14.5)
SODIUM SERPL-SCNC: 138 MMOL/L — SIGNIFICANT CHANGE UP (ref 135–146)
WBC # BLD: 4.76 K/UL — LOW (ref 4.8–10.8)
WBC # FLD AUTO: 4.76 K/UL — LOW (ref 4.8–10.8)

## 2023-05-02 PROCEDURE — 83036 HEMOGLOBIN GLYCOSYLATED A1C: CPT

## 2023-05-02 PROCEDURE — 36415 COLL VENOUS BLD VENIPUNCTURE: CPT

## 2023-05-02 PROCEDURE — 93005 ELECTROCARDIOGRAM TRACING: CPT

## 2023-05-02 PROCEDURE — 80053 COMPREHEN METABOLIC PANEL: CPT

## 2023-05-02 PROCEDURE — 85730 THROMBOPLASTIN TIME PARTIAL: CPT

## 2023-05-02 PROCEDURE — 99214 OFFICE O/P EST MOD 30 MIN: CPT | Mod: 25

## 2023-05-02 PROCEDURE — 85025 COMPLETE CBC W/AUTO DIFF WBC: CPT

## 2023-05-02 PROCEDURE — 93010 ELECTROCARDIOGRAM REPORT: CPT

## 2023-05-02 PROCEDURE — 85610 PROTHROMBIN TIME: CPT

## 2023-05-02 RX ORDER — DULOXETINE HYDROCHLORIDE 30 MG/1
1 CAPSULE, DELAYED RELEASE ORAL
Qty: 0 | Refills: 0 | DISCHARGE

## 2023-05-02 NOTE — H&P PST ADULT - HISTORY OF PRESENT ILLNESS
Patient is a 57 year old male presenting to PAST in preparation for  CREATION OF LEFT UPPER EXTREMITY ARTERIO VENOUS FISTULA on 5/18_ under lsb  anesthesia by Dr. De Leon  Reports h/o cri for aprox 1 year, reports that his bun creatinine are increasing and he will possibly need hmd, he is having procedure in preparation for hmd   PATIENT CURRENTLY DENIES CHEST PAIN  SHORTNESS OF BREATH  PALPITATIONS,  DYSURIA, OR UPPER RESPIRATORY INFECTION IN PAST 2 WEEKS  EXERCISE  TOLERANCE  1-2 FLIGHT OF STAIRS  WITHOUT SHORTNESS OF BREATH    Anesthesia Alert  NO--Difficult Airway  NO--History of neck surgery or radiation  NO--Limited ROM of neck  NO--History of Malignant hyperthermia  NO--Personal or family history of Pseudocholinesterase deficiency  NO--Prior Anesthesia Complication  NO--Latex Allergy  NO--Loose teeth  NO--History of Rheumatoid Arthritis  NO--STACEY  NO-- BLEEDING RISK  NO--Other_____    As per patient, this is their complete medical and surgical history, including medications both prescribed or over the counter.  Patient verbalized understanding of instructions and was given the opportunity to ask questions and have them answered.

## 2023-05-02 NOTE — H&P PST ADULT - NSICDXPASTMEDICALHX_GEN_ALL_CORE_FT
PAST MEDICAL HISTORY:  Chronic kidney disease, unspecified CKD stage requiring HD march 2022    Diabetes     Glaucoma     HLD (hyperlipidemia)     Hypertension     Neuropathy

## 2023-05-03 DIAGNOSIS — N18.6 END STAGE RENAL DISEASE: ICD-10-CM

## 2023-05-03 DIAGNOSIS — Z01.818 ENCOUNTER FOR OTHER PREPROCEDURAL EXAMINATION: ICD-10-CM

## 2023-05-17 ENCOUNTER — APPOINTMENT (OUTPATIENT)
Dept: VASCULAR SURGERY | Facility: CLINIC | Age: 58
End: 2023-05-17

## 2023-05-17 NOTE — ASU PATIENT PROFILE, ADULT - NS TRANSFER PATIENT BELONGINGS
Enoxaparin/Lovenox - Compliance/Enoxaparin/Lovenox - Dietary Advice/Enoxaparin/Lovenox - Follow up monitoring/Enoxaparin/Lovenox - Potential for adverse drug reactions and interactions None

## 2023-05-17 NOTE — ASU PATIENT PROFILE, ADULT - FALL HARM RISK - UNIVERSAL INTERVENTIONS
Bed in lowest position, wheels locked, appropriate side rails in place/Call bell, personal items and telephone in reach/Instruct patient to call for assistance before getting out of bed or chair/Non-slip footwear when patient is out of bed/[place] to call system/Physically safe environment - no spills, clutter or unnecessary equipment/Purposeful Proactive Rounding/Room/bathroom lighting operational, light cord in reach
English

## 2023-05-18 ENCOUNTER — TRANSCRIPTION ENCOUNTER (OUTPATIENT)
Age: 58
End: 2023-05-18

## 2023-05-18 ENCOUNTER — OUTPATIENT (OUTPATIENT)
Dept: INPATIENT UNIT | Facility: HOSPITAL | Age: 58
LOS: 1 days | Discharge: HOME | End: 2023-05-18
Payer: COMMERCIAL

## 2023-05-18 ENCOUNTER — APPOINTMENT (OUTPATIENT)
Dept: VASCULAR SURGERY | Facility: HOSPITAL | Age: 58
End: 2023-05-18

## 2023-05-18 VITALS
OXYGEN SATURATION: 98 % | SYSTOLIC BLOOD PRESSURE: 144 MMHG | RESPIRATION RATE: 16 BRPM | HEART RATE: 66 BPM | DIASTOLIC BLOOD PRESSURE: 70 MMHG | WEIGHT: 160.06 LBS | HEIGHT: 68 IN | TEMPERATURE: 97 F

## 2023-05-18 VITALS
RESPIRATION RATE: 16 BRPM | SYSTOLIC BLOOD PRESSURE: 134 MMHG | OXYGEN SATURATION: 99 % | DIASTOLIC BLOOD PRESSURE: 84 MMHG | HEART RATE: 63 BPM

## 2023-05-18 DIAGNOSIS — N18.6 END STAGE RENAL DISEASE: ICD-10-CM

## 2023-05-18 DIAGNOSIS — Z98.890 OTHER SPECIFIED POSTPROCEDURAL STATES: Chronic | ICD-10-CM

## 2023-05-18 LAB
BLD GP AB SCN SERPL QL: SIGNIFICANT CHANGE UP
GLUCOSE BLDC GLUCOMTR-MCNC: 130 MG/DL — HIGH (ref 70–99)

## 2023-05-18 PROCEDURE — 86900 BLOOD TYPING SEROLOGIC ABO: CPT

## 2023-05-18 PROCEDURE — 36821 AV FUSION DIRECT ANY SITE: CPT

## 2023-05-18 PROCEDURE — 86850 RBC ANTIBODY SCREEN: CPT

## 2023-05-18 PROCEDURE — C1889: CPT

## 2023-05-18 PROCEDURE — 36415 COLL VENOUS BLD VENIPUNCTURE: CPT

## 2023-05-18 PROCEDURE — 82962 GLUCOSE BLOOD TEST: CPT

## 2023-05-18 PROCEDURE — 86901 BLOOD TYPING SEROLOGIC RH(D): CPT

## 2023-05-18 RX ORDER — ACETAMINOPHEN 500 MG
1000 TABLET ORAL ONCE
Refills: 0 | Status: COMPLETED | OUTPATIENT
Start: 2023-05-18 | End: 2023-05-18

## 2023-05-18 RX ORDER — ONDANSETRON 8 MG/1
4 TABLET, FILM COATED ORAL ONCE
Refills: 0 | Status: DISCONTINUED | OUTPATIENT
Start: 2023-05-18 | End: 2023-05-18

## 2023-05-18 RX ORDER — SODIUM CHLORIDE 9 MG/ML
1000 INJECTION, SOLUTION INTRAVENOUS
Refills: 0 | Status: DISCONTINUED | OUTPATIENT
Start: 2023-05-18 | End: 2023-05-18

## 2023-05-18 RX ORDER — HYDROMORPHONE HYDROCHLORIDE 2 MG/ML
0.5 INJECTION INTRAMUSCULAR; INTRAVENOUS; SUBCUTANEOUS
Refills: 0 | Status: DISCONTINUED | OUTPATIENT
Start: 2023-05-18 | End: 2023-05-18

## 2023-05-18 RX ADMIN — Medication 1000 MILLIGRAM(S): at 13:05

## 2023-05-18 NOTE — ASU DISCHARGE PLAN (ADULT/PEDIATRIC) - NS MD DC FALL RISK RISK
For information on Fall & Injury Prevention, visit: [email or web address] OR  [email or web address] OR  https://www.cdc.gov/steadi/patient.html

## 2023-05-18 NOTE — ASU DISCHARGE PLAN (ADULT/PEDIATRIC) - CARE PROVIDER_API CALL
Nico De Leon)  Surgery; Vascular Surgery  [address] [place] [address]  Phone: [phone]  Fax: [phone]  Follow Up Time: 2 weeks

## 2023-05-18 NOTE — BRIEF OPERATIVE NOTE - NSICDXBRIEFPROCEDURE_GEN_ALL_CORE_FT
PROCEDURES:  Creation of left brachiocephalic arteriovenous fistula 18-May-2023 12:46:03  Junie Bush

## 2023-05-23 DIAGNOSIS — Z79.84 LONG TERM (CURRENT) USE OF ORAL HYPOGLYCEMIC DRUGS: ICD-10-CM

## 2023-05-23 DIAGNOSIS — N18.6 END STAGE RENAL DISEASE: ICD-10-CM

## 2023-05-23 DIAGNOSIS — I12.0 HYPERTENSIVE CHRONIC KIDNEY DISEASE WITH STAGE 5 CHRONIC KIDNEY DISEASE OR END STAGE RENAL DISEASE: ICD-10-CM

## 2023-05-23 DIAGNOSIS — E11.22 TYPE 2 DIABETES MELLITUS WITH DIABETIC CHRONIC KIDNEY DISEASE: ICD-10-CM

## 2023-06-07 ENCOUNTER — APPOINTMENT (OUTPATIENT)
Dept: VASCULAR SURGERY | Facility: CLINIC | Age: 58
End: 2023-06-07
Payer: COMMERCIAL

## 2023-06-07 VITALS
SYSTOLIC BLOOD PRESSURE: 170 MMHG | DIASTOLIC BLOOD PRESSURE: 86 MMHG | WEIGHT: 161 LBS | HEIGHT: 67 IN | HEART RATE: 88 BPM | BODY MASS INDEX: 25.27 KG/M2

## 2023-06-07 PROCEDURE — 99024 POSTOP FOLLOW-UP VISIT: CPT

## 2023-07-09 NOTE — PROCEDURE NOTE - NSANESTHESIA_GEN_A_CORE
[FreeTextEntry1] : Erna Mitchell is a 27 year old Female  with h/o gestational HTN and pre-eclampsia c/b peripartum CM/HFrecEF (EF 25-30%, LVEDD 5.2 cm  improved to 55% ) who is here for follow-up. Appears euvolemic and normotensive.\par \par 1. HFrEF - presumably peripartum CM \par - continue Coreg 25 mg po BID\par - c/w Entresto 49/51 twice/day; continue to stagger the medications\par - continue lasix prn\par - Discussed keeping a low salt diet, avoid eating out from restaurants.\par - daily weight and B/P log, bring to appt\par - repeat TTE on GDMT improved; no indication for ICD\par - discussed family planning with patient and discussed changing to meds she can take with a pregnancy; defer for now; aware of risks of pregnancy \par - repeat TTE\par \par RTC 6 months with NP, then me in 1 year or sooner prn\par \par 
1% lidocaine

## 2023-08-09 ENCOUNTER — APPOINTMENT (OUTPATIENT)
Dept: VASCULAR SURGERY | Facility: CLINIC | Age: 58
End: 2023-08-09
Payer: COMMERCIAL

## 2023-08-09 DIAGNOSIS — N18.6 END STAGE RENAL DISEASE: ICD-10-CM

## 2023-08-09 DIAGNOSIS — Z98.890 OTHER SPECIFIED POSTPROCEDURAL STATES: ICD-10-CM

## 2023-08-09 DIAGNOSIS — T82.898D OTHER SPECIFIED COMPLICATION OF VASCULAR PROSTHETIC DEVICES, IMPLANTS AND GRAFTS, SUBSEQUENT ENCOUNTER: ICD-10-CM

## 2023-08-09 PROCEDURE — 93990 DOPPLER FLOW TESTING: CPT

## 2023-08-09 PROCEDURE — 99024 POSTOP FOLLOW-UP VISIT: CPT

## 2023-08-09 NOTE — DATA REVIEWED
[FreeTextEntry1] : I performed an arterial duplex which was medically necessary to evaluate for AVF. It showed patent left UE AVF with vein dilatation 6 mm.

## 2023-09-20 NOTE — DISCHARGE NOTE PROVIDER - NSDCDCMDCOMP_GEN_ALL_CORE
Quality 226: Preventive Care And Screening: Tobacco Use: Screening And Cessation Intervention: Patient screened for tobacco use and is an ex/non-smoker Detail Level: Detailed Quality 431: Preventive Care And Screening: Unhealthy Alcohol Use - Screening: Patient not identified as an unhealthy alcohol user when screened for unhealthy alcohol use using a systematic screening method This document is complete and the patient is ready for discharge.

## 2023-10-17 ENCOUNTER — INPATIENT (INPATIENT)
Facility: HOSPITAL | Age: 58
LOS: 12 days | Discharge: HOME HEALTH | DRG: 673 | End: 2023-10-30
Attending: STUDENT IN AN ORGANIZED HEALTH CARE EDUCATION/TRAINING PROGRAM | Admitting: INTERNAL MEDICINE
Payer: COMMERCIAL

## 2023-10-17 VITALS
OXYGEN SATURATION: 99 % | HEIGHT: 67 IN | WEIGHT: 160.06 LBS | HEART RATE: 74 BPM | DIASTOLIC BLOOD PRESSURE: 52 MMHG | SYSTOLIC BLOOD PRESSURE: 93 MMHG | RESPIRATION RATE: 20 BRPM

## 2023-10-17 DIAGNOSIS — N17.9 ACUTE KIDNEY FAILURE, UNSPECIFIED: ICD-10-CM

## 2023-10-17 DIAGNOSIS — Z98.890 OTHER SPECIFIED POSTPROCEDURAL STATES: Chronic | ICD-10-CM

## 2023-10-17 LAB
ALBUMIN SERPL ELPH-MCNC: 3.8 G/DL — SIGNIFICANT CHANGE UP (ref 3.5–5.2)
ALBUMIN SERPL ELPH-MCNC: 3.8 G/DL — SIGNIFICANT CHANGE UP (ref 3.5–5.2)
ALP SERPL-CCNC: 164 U/L — HIGH (ref 30–115)
ALP SERPL-CCNC: 164 U/L — HIGH (ref 30–115)
ALT FLD-CCNC: 42 U/L — HIGH (ref 0–41)
ALT FLD-CCNC: 42 U/L — HIGH (ref 0–41)
AMORPH SED URNS QL MICRO: PRESENT
AMORPH SED URNS QL MICRO: PRESENT
ANION GAP SERPL CALC-SCNC: 29 MMOL/L — HIGH (ref 7–14)
ANION GAP SERPL CALC-SCNC: 29 MMOL/L — HIGH (ref 7–14)
APAP SERPL-MCNC: <5 UG/ML — LOW (ref 10–30)
APAP SERPL-MCNC: <5 UG/ML — LOW (ref 10–30)
APPEARANCE UR: ABNORMAL
APPEARANCE UR: ABNORMAL
APTT BLD: 39.6 SEC — HIGH (ref 27–39.2)
APTT BLD: 39.6 SEC — HIGH (ref 27–39.2)
AST SERPL-CCNC: 92 U/L — HIGH (ref 0–41)
AST SERPL-CCNC: 92 U/L — HIGH (ref 0–41)
BACTERIA # UR AUTO: ABNORMAL /HPF
BACTERIA # UR AUTO: ABNORMAL /HPF
BASOPHILS # BLD AUTO: 0.01 K/UL — SIGNIFICANT CHANGE UP (ref 0–0.2)
BASOPHILS # BLD AUTO: 0.01 K/UL — SIGNIFICANT CHANGE UP (ref 0–0.2)
BASOPHILS NFR BLD AUTO: 0.1 % — SIGNIFICANT CHANGE UP (ref 0–1)
BASOPHILS NFR BLD AUTO: 0.1 % — SIGNIFICANT CHANGE UP (ref 0–1)
BILIRUB SERPL-MCNC: 0.4 MG/DL — SIGNIFICANT CHANGE UP (ref 0.2–1.2)
BILIRUB SERPL-MCNC: 0.4 MG/DL — SIGNIFICANT CHANGE UP (ref 0.2–1.2)
BILIRUB UR-MCNC: NEGATIVE — SIGNIFICANT CHANGE UP
BILIRUB UR-MCNC: NEGATIVE — SIGNIFICANT CHANGE UP
BUN SERPL-MCNC: 134 MG/DL — CRITICAL HIGH (ref 10–20)
BUN SERPL-MCNC: 134 MG/DL — CRITICAL HIGH (ref 10–20)
BURR CELLS BLD QL SMEAR: PRESENT — SIGNIFICANT CHANGE UP
BURR CELLS BLD QL SMEAR: PRESENT — SIGNIFICANT CHANGE UP
CALCIUM SERPL-MCNC: 7 MG/DL — LOW (ref 8.4–10.5)
CALCIUM SERPL-MCNC: 7 MG/DL — LOW (ref 8.4–10.5)
CHLORIDE SERPL-SCNC: 77 MMOL/L — LOW (ref 98–110)
CHLORIDE SERPL-SCNC: 77 MMOL/L — LOW (ref 98–110)
CO2 SERPL-SCNC: 8 MMOL/L — CRITICAL LOW (ref 17–32)
CO2 SERPL-SCNC: 8 MMOL/L — CRITICAL LOW (ref 17–32)
COLOR SPEC: YELLOW — SIGNIFICANT CHANGE UP
COLOR SPEC: YELLOW — SIGNIFICANT CHANGE UP
CREAT SERPL-MCNC: 15.6 MG/DL — CRITICAL HIGH (ref 0.7–1.5)
CREAT SERPL-MCNC: 15.6 MG/DL — CRITICAL HIGH (ref 0.7–1.5)
DIFF PNL FLD: ABNORMAL
DIFF PNL FLD: ABNORMAL
EGFR: 3 ML/MIN/1.73M2 — LOW
EGFR: 3 ML/MIN/1.73M2 — LOW
EOSINOPHIL # BLD AUTO: 0 K/UL — SIGNIFICANT CHANGE UP (ref 0–0.7)
EOSINOPHIL # BLD AUTO: 0 K/UL — SIGNIFICANT CHANGE UP (ref 0–0.7)
EOSINOPHIL NFR BLD AUTO: 0 % — SIGNIFICANT CHANGE UP (ref 0–8)
EOSINOPHIL NFR BLD AUTO: 0 % — SIGNIFICANT CHANGE UP (ref 0–8)
EPI CELLS # UR: PRESENT
EPI CELLS # UR: PRESENT
ERYTHROCYTE [SEDIMENTATION RATE] IN BLOOD: 146 MM/HR — HIGH (ref 0–10)
ERYTHROCYTE [SEDIMENTATION RATE] IN BLOOD: 146 MM/HR — HIGH (ref 0–10)
ETHANOL SERPL-MCNC: <10 MG/DL — SIGNIFICANT CHANGE UP
ETHANOL SERPL-MCNC: <10 MG/DL — SIGNIFICANT CHANGE UP
GLUCOSE BLDC GLUCOMTR-MCNC: 298 MG/DL — HIGH (ref 70–99)
GLUCOSE BLDC GLUCOMTR-MCNC: 298 MG/DL — HIGH (ref 70–99)
GLUCOSE SERPL-MCNC: 292 MG/DL — HIGH (ref 70–99)
GLUCOSE SERPL-MCNC: 292 MG/DL — HIGH (ref 70–99)
GLUCOSE UR QL: 250 MG/DL
GLUCOSE UR QL: 250 MG/DL
HCT VFR BLD CALC: 23.4 % — LOW (ref 42–52)
HCT VFR BLD CALC: 23.4 % — LOW (ref 42–52)
HGB BLD-MCNC: 8.6 G/DL — LOW (ref 14–18)
HGB BLD-MCNC: 8.6 G/DL — LOW (ref 14–18)
IMM GRANULOCYTES NFR BLD AUTO: 0.9 % — HIGH (ref 0.1–0.3)
IMM GRANULOCYTES NFR BLD AUTO: 0.9 % — HIGH (ref 0.1–0.3)
INR BLD: 1.24 RATIO — SIGNIFICANT CHANGE UP (ref 0.65–1.3)
INR BLD: 1.24 RATIO — SIGNIFICANT CHANGE UP (ref 0.65–1.3)
KETONES UR-MCNC: ABNORMAL MG/DL
KETONES UR-MCNC: ABNORMAL MG/DL
LACTATE SERPL-SCNC: 0.8 MMOL/L — SIGNIFICANT CHANGE UP (ref 0.7–2)
LACTATE SERPL-SCNC: 0.8 MMOL/L — SIGNIFICANT CHANGE UP (ref 0.7–2)
LEUKOCYTE ESTERASE UR-ACNC: ABNORMAL
LEUKOCYTE ESTERASE UR-ACNC: ABNORMAL
LG PLATELETS BLD QL AUTO: SLIGHT — SIGNIFICANT CHANGE UP
LG PLATELETS BLD QL AUTO: SLIGHT — SIGNIFICANT CHANGE UP
LYMPHOCYTES # BLD AUTO: 0.26 K/UL — LOW (ref 1.2–3.4)
LYMPHOCYTES # BLD AUTO: 0.26 K/UL — LOW (ref 1.2–3.4)
LYMPHOCYTES # BLD AUTO: 2.6 % — LOW (ref 20.5–51.1)
LYMPHOCYTES # BLD AUTO: 2.6 % — LOW (ref 20.5–51.1)
MCHC RBC-ENTMCNC: 32 PG — HIGH (ref 27–31)
MCHC RBC-ENTMCNC: 32 PG — HIGH (ref 27–31)
MCHC RBC-ENTMCNC: 36.8 G/DL — SIGNIFICANT CHANGE UP (ref 32–37)
MCHC RBC-ENTMCNC: 36.8 G/DL — SIGNIFICANT CHANGE UP (ref 32–37)
MCV RBC AUTO: 87 FL — SIGNIFICANT CHANGE UP (ref 80–94)
MCV RBC AUTO: 87 FL — SIGNIFICANT CHANGE UP (ref 80–94)
MONOCYTES # BLD AUTO: 0.5 K/UL — SIGNIFICANT CHANGE UP (ref 0.1–0.6)
MONOCYTES # BLD AUTO: 0.5 K/UL — SIGNIFICANT CHANGE UP (ref 0.1–0.6)
MONOCYTES NFR BLD AUTO: 5 % — SIGNIFICANT CHANGE UP (ref 1.7–9.3)
MONOCYTES NFR BLD AUTO: 5 % — SIGNIFICANT CHANGE UP (ref 1.7–9.3)
NEUTROPHILS # BLD AUTO: 9.08 K/UL — HIGH (ref 1.4–6.5)
NEUTROPHILS # BLD AUTO: 9.08 K/UL — HIGH (ref 1.4–6.5)
NEUTROPHILS NFR BLD AUTO: 91.4 % — HIGH (ref 42.2–75.2)
NEUTROPHILS NFR BLD AUTO: 91.4 % — HIGH (ref 42.2–75.2)
NITRITE UR-MCNC: NEGATIVE — SIGNIFICANT CHANGE UP
NITRITE UR-MCNC: NEGATIVE — SIGNIFICANT CHANGE UP
NRBC # BLD: 0 /100 WBCS — SIGNIFICANT CHANGE UP (ref 0–0)
NRBC # BLD: 0 /100 WBCS — SIGNIFICANT CHANGE UP (ref 0–0)
NRBC # BLD: 0 /100 — SIGNIFICANT CHANGE UP (ref 0–0)
NRBC # BLD: 0 /100 — SIGNIFICANT CHANGE UP (ref 0–0)
OVALOCYTES BLD QL SMEAR: SLIGHT — SIGNIFICANT CHANGE UP
OVALOCYTES BLD QL SMEAR: SLIGHT — SIGNIFICANT CHANGE UP
PH UR: 6 — SIGNIFICANT CHANGE UP (ref 5–8)
PH UR: 6 — SIGNIFICANT CHANGE UP (ref 5–8)
PLAT MORPH BLD: NORMAL — SIGNIFICANT CHANGE UP
PLAT MORPH BLD: NORMAL — SIGNIFICANT CHANGE UP
PLATELET # BLD AUTO: 142 K/UL — SIGNIFICANT CHANGE UP (ref 130–400)
PLATELET # BLD AUTO: 142 K/UL — SIGNIFICANT CHANGE UP (ref 130–400)
PLATELET COUNT - ESTIMATE: NORMAL — SIGNIFICANT CHANGE UP
PLATELET COUNT - ESTIMATE: NORMAL — SIGNIFICANT CHANGE UP
PMV BLD: 10.1 FL — SIGNIFICANT CHANGE UP (ref 7.4–10.4)
PMV BLD: 10.1 FL — SIGNIFICANT CHANGE UP (ref 7.4–10.4)
POTASSIUM SERPL-MCNC: 4.6 MMOL/L — SIGNIFICANT CHANGE UP (ref 3.5–5)
POTASSIUM SERPL-MCNC: 4.6 MMOL/L — SIGNIFICANT CHANGE UP (ref 3.5–5)
POTASSIUM SERPL-SCNC: 4.6 MMOL/L — SIGNIFICANT CHANGE UP (ref 3.5–5)
POTASSIUM SERPL-SCNC: 4.6 MMOL/L — SIGNIFICANT CHANGE UP (ref 3.5–5)
PROT SERPL-MCNC: 7.4 G/DL — SIGNIFICANT CHANGE UP (ref 6–8)
PROT SERPL-MCNC: 7.4 G/DL — SIGNIFICANT CHANGE UP (ref 6–8)
PROT UR-MCNC: 300 MG/DL
PROT UR-MCNC: 300 MG/DL
PROTHROM AB SERPL-ACNC: 14.2 SEC — HIGH (ref 9.95–12.87)
PROTHROM AB SERPL-ACNC: 14.2 SEC — HIGH (ref 9.95–12.87)
RBC # BLD: 2.69 M/UL — LOW (ref 4.7–6.1)
RBC # BLD: 2.69 M/UL — LOW (ref 4.7–6.1)
RBC # FLD: 13.3 % — SIGNIFICANT CHANGE UP (ref 11.5–14.5)
RBC # FLD: 13.3 % — SIGNIFICANT CHANGE UP (ref 11.5–14.5)
RBC BLD AUTO: ABNORMAL
RBC BLD AUTO: ABNORMAL
RBC CASTS # UR COMP ASSIST: 50 /HPF — HIGH (ref 0–4)
RBC CASTS # UR COMP ASSIST: 50 /HPF — HIGH (ref 0–4)
SALICYLATES SERPL-MCNC: <0.3 MG/DL — LOW (ref 4–30)
SALICYLATES SERPL-MCNC: <0.3 MG/DL — LOW (ref 4–30)
SODIUM SERPL-SCNC: 114 MMOL/L — CRITICAL LOW (ref 135–146)
SODIUM SERPL-SCNC: 114 MMOL/L — CRITICAL LOW (ref 135–146)
SP GR SPEC: 1.02 — SIGNIFICANT CHANGE UP (ref 1–1.03)
SP GR SPEC: 1.02 — SIGNIFICANT CHANGE UP (ref 1–1.03)
SPHEROCYTES BLD QL SMEAR: SLIGHT — SIGNIFICANT CHANGE UP
SPHEROCYTES BLD QL SMEAR: SLIGHT — SIGNIFICANT CHANGE UP
TOXIC GRANULES BLD QL SMEAR: PRESENT — SIGNIFICANT CHANGE UP
TOXIC GRANULES BLD QL SMEAR: PRESENT — SIGNIFICANT CHANGE UP
UROBILINOGEN FLD QL: 0.2 MG/DL — SIGNIFICANT CHANGE UP (ref 0.2–1)
UROBILINOGEN FLD QL: 0.2 MG/DL — SIGNIFICANT CHANGE UP (ref 0.2–1)
WBC # BLD: 9.94 K/UL — SIGNIFICANT CHANGE UP (ref 4.8–10.8)
WBC # BLD: 9.94 K/UL — SIGNIFICANT CHANGE UP (ref 4.8–10.8)
WBC # FLD AUTO: 9.94 K/UL — SIGNIFICANT CHANGE UP (ref 4.8–10.8)
WBC # FLD AUTO: 9.94 K/UL — SIGNIFICANT CHANGE UP (ref 4.8–10.8)
WBC UR QL: 10 /HPF — HIGH (ref 0–5)
WBC UR QL: 10 /HPF — HIGH (ref 0–5)

## 2023-10-17 PROCEDURE — 82803 BLOOD GASES ANY COMBINATION: CPT

## 2023-10-17 PROCEDURE — 86900 BLOOD TYPING SEROLOGIC ABO: CPT

## 2023-10-17 PROCEDURE — 84443 ASSAY THYROID STIM HORMONE: CPT

## 2023-10-17 PROCEDURE — 71045 X-RAY EXAM CHEST 1 VIEW: CPT

## 2023-10-17 PROCEDURE — 88304 TISSUE EXAM BY PATHOLOGIST: CPT

## 2023-10-17 PROCEDURE — 76882 US LMTD JT/FCL EVL NVASC XTR: CPT | Mod: RT

## 2023-10-17 PROCEDURE — 97116 GAIT TRAINING THERAPY: CPT | Mod: GP

## 2023-10-17 PROCEDURE — 83036 HEMOGLOBIN GLYCOSYLATED A1C: CPT

## 2023-10-17 PROCEDURE — 87186 SC STD MICRODIL/AGAR DIL: CPT

## 2023-10-17 PROCEDURE — 73700 CT LOWER EXTREMITY W/O DYE: CPT | Mod: 26,RT,MA

## 2023-10-17 PROCEDURE — 97110 THERAPEUTIC EXERCISES: CPT | Mod: GP

## 2023-10-17 PROCEDURE — 36415 COLL VENOUS BLD VENIPUNCTURE: CPT

## 2023-10-17 PROCEDURE — 74018 RADEX ABDOMEN 1 VIEW: CPT

## 2023-10-17 PROCEDURE — 88311 DECALCIFY TISSUE: CPT

## 2023-10-17 PROCEDURE — 74176 CT ABD & PELVIS W/O CONTRAST: CPT | Mod: 26,MA

## 2023-10-17 PROCEDURE — 80061 LIPID PANEL: CPT

## 2023-10-17 PROCEDURE — 87205 SMEAR GRAM STAIN: CPT

## 2023-10-17 PROCEDURE — 70450 CT HEAD/BRAIN W/O DYE: CPT | Mod: 26,MA

## 2023-10-17 PROCEDURE — C9113: CPT

## 2023-10-17 PROCEDURE — 97162 PT EVAL MOD COMPLEX 30 MIN: CPT | Mod: GP

## 2023-10-17 PROCEDURE — 85610 PROTHROMBIN TIME: CPT

## 2023-10-17 PROCEDURE — 86850 RBC ANTIBODY SCREEN: CPT

## 2023-10-17 PROCEDURE — 82533 TOTAL CORTISOL: CPT

## 2023-10-17 PROCEDURE — 80048 BASIC METABOLIC PNL TOTAL CA: CPT

## 2023-10-17 PROCEDURE — 83930 ASSAY OF BLOOD OSMOLALITY: CPT

## 2023-10-17 PROCEDURE — 36430 TRANSFUSION BLD/BLD COMPNT: CPT

## 2023-10-17 PROCEDURE — 93005 ELECTROCARDIOGRAM TRACING: CPT

## 2023-10-17 PROCEDURE — 80074 ACUTE HEPATITIS PANEL: CPT

## 2023-10-17 PROCEDURE — 84484 ASSAY OF TROPONIN QUANT: CPT

## 2023-10-17 PROCEDURE — 76775 US EXAM ABDO BACK WALL LIM: CPT

## 2023-10-17 PROCEDURE — 82010 KETONE BODYS QUAN: CPT

## 2023-10-17 PROCEDURE — 84300 ASSAY OF URINE SODIUM: CPT

## 2023-10-17 PROCEDURE — 84132 ASSAY OF SERUM POTASSIUM: CPT

## 2023-10-17 PROCEDURE — 99285 EMERGENCY DEPT VISIT HI MDM: CPT

## 2023-10-17 PROCEDURE — 90935 HEMODIALYSIS ONE EVALUATION: CPT

## 2023-10-17 PROCEDURE — ZZZZZ: CPT

## 2023-10-17 PROCEDURE — 82330 ASSAY OF CALCIUM: CPT

## 2023-10-17 PROCEDURE — 82962 GLUCOSE BLOOD TEST: CPT

## 2023-10-17 PROCEDURE — 83605 ASSAY OF LACTIC ACID: CPT

## 2023-10-17 PROCEDURE — 82248 BILIRUBIN DIRECT: CPT

## 2023-10-17 PROCEDURE — 84100 ASSAY OF PHOSPHORUS: CPT

## 2023-10-17 PROCEDURE — 87070 CULTURE OTHR SPECIMN AEROBIC: CPT

## 2023-10-17 PROCEDURE — 80053 COMPREHEN METABOLIC PANEL: CPT

## 2023-10-17 PROCEDURE — 85018 HEMOGLOBIN: CPT

## 2023-10-17 PROCEDURE — P9016: CPT

## 2023-10-17 PROCEDURE — 84540 ASSAY OF URINE/UREA-N: CPT

## 2023-10-17 PROCEDURE — 92526 ORAL FUNCTION THERAPY: CPT | Mod: GN

## 2023-10-17 PROCEDURE — 86923 COMPATIBILITY TEST ELECTRIC: CPT

## 2023-10-17 PROCEDURE — 86901 BLOOD TYPING SEROLOGIC RH(D): CPT

## 2023-10-17 PROCEDURE — 71045 X-RAY EXAM CHEST 1 VIEW: CPT | Mod: 26

## 2023-10-17 PROCEDURE — 80202 ASSAY OF VANCOMYCIN: CPT

## 2023-10-17 PROCEDURE — 80307 DRUG TEST PRSMV CHEM ANLYZR: CPT

## 2023-10-17 PROCEDURE — 82570 ASSAY OF URINE CREATININE: CPT

## 2023-10-17 PROCEDURE — 85025 COMPLETE CBC W/AUTO DIFF WBC: CPT

## 2023-10-17 PROCEDURE — 84295 ASSAY OF SERUM SODIUM: CPT

## 2023-10-17 PROCEDURE — 81001 URINALYSIS AUTO W/SCOPE: CPT

## 2023-10-17 PROCEDURE — 83735 ASSAY OF MAGNESIUM: CPT

## 2023-10-17 PROCEDURE — 86706 HEP B SURFACE ANTIBODY: CPT

## 2023-10-17 PROCEDURE — 85027 COMPLETE CBC AUTOMATED: CPT

## 2023-10-17 PROCEDURE — 73590 X-RAY EXAM OF LOWER LEG: CPT | Mod: 26,RT

## 2023-10-17 PROCEDURE — 82247 BILIRUBIN TOTAL: CPT

## 2023-10-17 PROCEDURE — 85014 HEMATOCRIT: CPT

## 2023-10-17 RX ORDER — CARVEDILOL PHOSPHATE 80 MG/1
1 CAPSULE, EXTENDED RELEASE ORAL
Refills: 0 | DISCHARGE

## 2023-10-17 RX ORDER — THIAMINE MONONITRATE (VIT B1) 100 MG
500 TABLET ORAL ONCE
Refills: 0 | Status: COMPLETED | OUTPATIENT
Start: 2023-10-17 | End: 2023-10-17

## 2023-10-17 RX ORDER — ROSUVASTATIN CALCIUM 5 MG/1
1 TABLET ORAL
Refills: 0 | DISCHARGE

## 2023-10-17 RX ORDER — SODIUM BICARBONATE 1 MEQ/ML
100 SYRINGE (ML) INTRAVENOUS ONCE
Refills: 0 | Status: COMPLETED | OUTPATIENT
Start: 2023-10-17 | End: 2023-10-17

## 2023-10-17 RX ORDER — SODIUM BICARBONATE 1 MEQ/ML
0.21 SYRINGE (ML) INTRAVENOUS
Qty: 150 | Refills: 0 | Status: DISCONTINUED | OUTPATIENT
Start: 2023-10-17 | End: 2023-10-19

## 2023-10-17 RX ORDER — VANCOMYCIN HCL 1 G
1500 VIAL (EA) INTRAVENOUS ONCE
Refills: 0 | Status: COMPLETED | OUTPATIENT
Start: 2023-10-17 | End: 2023-10-17

## 2023-10-17 RX ORDER — MORPHINE SULFATE 50 MG/1
4 CAPSULE, EXTENDED RELEASE ORAL ONCE
Refills: 0 | Status: DISCONTINUED | OUTPATIENT
Start: 2023-10-17 | End: 2023-10-17

## 2023-10-17 RX ORDER — SODIUM CHLORIDE 9 MG/ML
2300 INJECTION, SOLUTION INTRAVENOUS ONCE
Refills: 0 | Status: COMPLETED | OUTPATIENT
Start: 2023-10-17 | End: 2023-10-17

## 2023-10-17 RX ORDER — CEFEPIME 1 G/1
2000 INJECTION, POWDER, FOR SOLUTION INTRAMUSCULAR; INTRAVENOUS ONCE
Refills: 0 | Status: COMPLETED | OUTPATIENT
Start: 2023-10-17 | End: 2023-10-17

## 2023-10-17 RX ADMIN — Medication 100 MILLIEQUIVALENT(S): at 17:16

## 2023-10-17 RX ADMIN — MORPHINE SULFATE 4 MILLIGRAM(S): 50 CAPSULE, EXTENDED RELEASE ORAL at 15:07

## 2023-10-17 RX ADMIN — Medication 100 MEQ/KG/HR: at 18:03

## 2023-10-17 RX ADMIN — Medication 105 MILLIGRAM(S): at 17:16

## 2023-10-17 RX ADMIN — SODIUM CHLORIDE 2300 MILLILITER(S): 9 INJECTION, SOLUTION INTRAVENOUS at 14:25

## 2023-10-17 RX ADMIN — Medication 300 MILLIGRAM(S): at 14:24

## 2023-10-17 RX ADMIN — CEFEPIME 100 MILLIGRAM(S): 1 INJECTION, POWDER, FOR SOLUTION INTRAMUSCULAR; INTRAVENOUS at 14:23

## 2023-10-17 NOTE — CONSULT NOTE ADULT - NS ATTEND AMEND GEN_ALL_CORE FT
58Mm with PMH of DM, HTN, HLD, ESRD on HD, ETOH abuse, s/p left AV fistula (5/2023 Dr. De Leon presents with weakness, AMS, decreased urine output.  Found to be in significant renal failure, hyponatremic, altered with RLE wound for 1 week.     Vitals labs and images reviewed  CT Lower Extremity No Cont, Right (10.17.23 @ 19:12) >  IMPRESSION:  1. Focal subcutaneous soft tissue thickening with associated inflammatory   changes along the right superficial inguinal region, suspicious for   phlegmon; no evidence of subcutaneous gas to suggest necrotizing   fasciitis.  2. Several mildly enlarged right superficial and deep inguinal lymph   nodes, possibly reactive.    Plan  - low suspicion of necrotizing soft tissue infection.  Likely spontaneously drained abscess  - MICU admission for management of diabetes and renal failure  - broad spectrum abx  - ID consult  - Renal consult for possible HD given acidosis  - NPO  - IVF  - may require further I and D once stabilized

## 2023-10-17 NOTE — H&P ADULT - HISTORY OF PRESENT ILLNESS
57 y/o male with hx of ETOH abuse, CKD, NIDDM , HTN, HLD, presented to the ED for decreased urinary output and increased thirst since yesterday. Patient was brought to the ED by daughter who states that the patient has a right lower leg abscess that has increased in size with purulent drainage for one week after sustaining trauma to the right leg two weeks ago. As per patient, admits to frequent urination about 5 times a day with little to no urinary output. Patient admits to increased thirst along with suprapubic abdominal pain and dysuria. Patient denies fever/chills, nausea/vomiting, SOB, chest pain , heart palpitations, cough, congestion, appetite loss, changes in bowel movements, hematuria or pyuria.     59 y/o male with hx of ETOH abuse, CKD with left wrist fistula, NIDDM , HTN, HLD, presented to the ED for decreased urinary output and increased thirst since yesterday. Patient was brought to the ED by daughter who states that the patient has a right lower leg abscess that has increased in size with purulent drainage for one week after sustaining trauma to the right leg two weeks ago. As per patient, admits to frequent urination about 5 times a day with little to no urinary output. Patient admits to increased thirst along with suprapubic abdominal pain and dysuria. Patient denies fever/chills, nausea/vomiting, SOB, chest pain , heart palpitations, cough, congestion, appetite loss, changes in bowel movements, hematuria or pyuria.

## 2023-10-17 NOTE — ED PROVIDER NOTE - CRITICAL CARE ATTENDING CONTRIBUTION TO CARE
I agree with the PA documentation and have performed the substantiate of amount of history, physical exam, medical decision making. I personally evaluated patient. I agree with the findings and plan with all documentation on chart except as documented  in my note.    59 y/o M PMH Alcohol Abuse and Dependence, Chronic alcohol abuse, HTN, Dyslipidemia, Neuropathy, CKD with multiple episodes of VAMSI, Glaucoma who presents to the ED with     ED work up reviewed and results and plan of care discussed with patient. Patient requires admission for further work up, monitoring, and management. Need for admission discussed with patient. I agree with the PA documentation and have performed the substantiate of amount of history, physical exam, medical decision making. I personally evaluated patient. I agree with the findings and plan with all documentation on chart except as documented  in my note.    59 y/o M PMH Alcohol Abuse and Dependence, Chronic alcohol abuse, HTN, Dyslipidemia, Neuropathy, CKD with multiple episodes of VAMSI, Glaucoma who presents to the emergency department brought in by daughter for right lower extremity wound with increasing weakness, confusion.  Patient fell and injured his right lateral knee area over head of fibula about 1 week ago.  Patient did not think much of it but it got more swollen red and painful.  Patient had redness and pain to his inguinal area and followed with his kidney doctor and saw the dermatologist.  Patient was referred to the emergency department for further work-up and evaluation.  Patient denies any recent fever or chills.  Patient denies any chest pain or shortness of breath.  Patient has generalized weakness and has not urinated today.    On exam, vital signs reviewed.  Patient is afebrile but chronically ill-appearing and appears older than stated age.  Patient has normal heart and lung exam no abdominal tenderness.  Patient has large and impressive lymphadenopathy to the right inguinal area with no fluctuance.  Patient has a fluctuant open wound overlying skin near head of fibula on the right.  Patient has active pus drainage to this wound after expression.  Concern for possible sepsis and patient is high risk given diabetes and CKD.  Large-bore IV access obtained and patient had sepsis labs and culture sent.  Concern for possible osteomyelitis versus necrotizing fasciitis and emergent x-ray performed.  Patient given MRSA and Pseudomonas coverage.  CRP sent to calculate necrotizing fasciitis score and ESR also added.  X-ray does not show any air under the skin.  Patient has a normal lactate and a normal white blood cell count.  Labs concerning for acute kidney injury and patient has significant hyponatremia.  Renal doctor for patient Dr. Calhoun he called and plan made to give 2 pushes of bicarb and started on bicarb drip.  Patient has normal potassium and is not fluid overloaded and Dr. Dougherty and he does not believe patient requires emergent dialysis.  Will give IV for hydration and Blanco placed.  Surgery consulted for care for concern for necrotizing fasciitis given electrolyte abnormalities and clinical picture.  Patient went for CT scan of lower extremity.    We will follow-up work-up and patient will require intensive care unit evaluation given infection and end organ damage.     ED work up reviewed and results and plan of care discussed with patient. Patient requires admission for further work up, monitoring, and management. Need for admission discussed with patient. I agree with the PA documentation and have performed the substantiate of amount of history, physical exam, medical decision making. I personally evaluated patient. I agree with the findings and plan with all documentation on chart except as documented  in my note.    57 y/o M PMH Alcohol Abuse and Dependence, Chronic alcohol abuse, HTN, Dyslipidemia, Neuropathy, CKD with multiple episodes of VAMSI, Glaucoma who presents to the emergency department brought in by daughter for right lower extremity wound with increasing weakness, confusion.  Patient fell and injured his right lateral knee area over head of fibula about 1 week ago.  Patient did not think much of it but it got more swollen red and painful.  Patient had redness and pain to his inguinal area and followed with his kidney doctor and saw the dermatologist.  Patient was referred to the emergency department for further work-up and evaluation.  Patient denies any recent fever or chills.  Patient denies any chest pain or shortness of breath.  Patient has generalized weakness and has not urinated today.    On exam, vital signs reviewed.  Patient is afebrile but chronically ill-appearing and appears older than stated age.  Patient has normal heart and lung exam no abdominal tenderness.  Patient has large and impressive lymphadenopathy to the right inguinal area with no fluctuance.  Patient has a fluctuant open wound overlying skin near head of fibula on the right.  Patient has active pus drainage to this wound after expression.  Concern for possible sepsis and patient is high risk given diabetes and CKD.  Large-bore IV access obtained and patient had sepsis labs and culture sent.  Concern for possible osteomyelitis versus necrotizing fasciitis and emergent x-ray performed.  Patient given MRSA and Pseudomonas coverage.  CRP sent to calculate necrotizing fasciitis score and ESR also added.  X-ray does not show any air under the skin.  Patient has a normal lactate and a normal white blood cell count.  Labs concerning for acute kidney injury and patient has significant hyponatremia.  Renal doctor for patient Dr. Bernard he called and plan made to give 2 pushes of bicarb and started on bicarb drip.  Patient has normal potassium and is not fluid overloaded and Dr. Bernard and he does not believe patient requires emergent dialysis.  Will give IV for hydration and Blanco placed.  Surgery consulted for care for concern for necrotizing fasciitis given electrolyte abnormalities and clinical picture.  Patient went for CT scan of lower extremity.    We will follow-up work-up and patient will require intensive care unit evaluation given infection and end organ damage.     ED work up reviewed and results and plan of care discussed with patient. Patient requires admission for further work up, monitoring, and management. Need for admission discussed with patient.

## 2023-10-17 NOTE — H&P ADULT - NSHPLABSRESULTS_GEN_ALL_CORE
(10-17 @ 14:30)                      8.6  9.94 )-----------( 142                 23.4    Neutrophils = 9.08 (91.4%)  Lymphocytes = 0.26 (2.6%)  Eosinophils = 0.00 (0.0%)  Basophils = 0.01 (0.1%)  Monocytes = 0.50 (5.0%)  Bands = --%    10-17    114<LL>  |  77<L>  |  134<HH>  ----------------------------<  292<H>  4.6   |  8<LL>  |  15.6<HH>    Ca    7.0<L>      17 Oct 2023 14:30    TPro  7.4  /  Alb  3.8  /  TBili  0.4  /  DBili  x   /  AST  92<H>  /  ALT  42<H>  /  AlkPhos  164<H>  10-17    ( 17 Oct 2023 14:30 )   PT: 14.20 sec;   INR: 1.24 ratio;       PTT:39.6 sec      RVP:          Tox:   (10-17 @ 22:26)  Acetaminophen Level, Serum: <5.0 [10.0 - 30.0]  Barbiturates Measurement: --  Benzodiazepines: --  Ethanol, Whole Blood: --  Salicylate Level, Serum: <0.3 [4.0 - 30.0]        Urinalysis Basic - ( 17 Oct 2023 14:30 )    Color: x / Appearance: x / SG: x / pH: x  Gluc: 292 mg/dL / Ketone: x  / Bili: x / Urobili: x   Blood: x / Protein: x / Nitrite: x   Leuk Esterase: x / RBC: x / WBC x   Sq Epi: x / Non Sq Epi: x / Bacteria: x    CT head non-contrast   Impression: No acute intracranial pathology.    Stable mild chronic microvascular ischemic changes.    CT Lower extremity non-contrast right  Impression: 1. Focal subcutaneous soft tissue thickening with associated inflammatory   changes along the right superficial inguinal region, suspicious for   phlegmon; no evidence of subcutaneous gas to suggest necrotizing   fasciitis.  2. Several mildly enlarged right superficial and deep inguinal lymph   nodes, possibly reactive.    CT abdomen and pelvis   Impression: Partially imaged right inguinal inflammatory change and mildly enlarged   lymph nodes. Recommend dedicated imaging for further evaluation.    Portable chest x-ray   Impression: Support devices: None.  Cardiac/mediastinum/hilum: Magnified versus enlarged cardiac silhouette.  Lung parenchyma/Pleura: No focal pulmonary consolidation or pneumothorax.  Skeleton/soft tissues: Unremarkable.  No focal pulmonary consolidation    X-ray Tib Fib   Impression: Limited evaluation of the soft tissues by plain film. Focal soft tissue   bulge in the lateral leg at the level of the proximal fibula. Correlation   with physical exam is needed.  No acute osseous abnormality by plain film. Calcaneal spurring. Vascular   calcifications.

## 2023-10-17 NOTE — ED PROVIDER NOTE - OBJECTIVE STATEMENT
57 y/o male with hx of ETOH abuse, CKD with last creatinine 2.5 two days ago, NIDDM , HTN presents to the ED for evaluation by daughter for right lower leg swelling, purulence with drainage worsening x 1 week. patient with increased confusion and weakness as per daughter. patient s/p direct blow to right leg two weeks ago. patient with lymphadenopathy to right groin. no abdominal pain . patient with decreased urinary output. no fevers. no head injury.

## 2023-10-17 NOTE — CONSULT NOTE ADULT - SUBJECTIVE AND OBJECTIVE BOX
KECIA MARTIN [id number]  58y Male      HPI: PT VERY CONFUSED, HPI FROM CHART AND ER STAFF  58 yr old M/PMHCX: DM, HTN, HLD, CKD, ETOH abuse, s/p left AV fistula (5/2023 Dr. De Leon)for  - now brought in by daughter for increasing confusion, weakness and inability to urinate since this AM, He also has an injury to right lateral shin which he sustained approx 1 week ago (?hit it on wheelchair) which has been getting worse and draining.  Denies F/C/N/V.    Per ED staff, WBC = 9, LA = 0.8, NA = 114; creat = 15; hypotensive (not on pressors); per ER PA, copious purulence obtained from leg wound      PAST MEDICAL & SURGICAL HISTORY:  Diabetes  Hypertension  HLD (hyperlipidemia)  Neuropathy  Glaucoma  Chronic kidney disease, unspecified CKD stage  requiring HD march 2022  ETOH abuse      H/O colonoscopy    MEDICATIONS  (STANDING):  sodium bicarbonate  Infusion 0.207 mEq/kG/Hr (100 mL/Hr) IV Continuous <Continuous>    MEDICATIONS  (PRN):      Allergies    No Known Drug Allergies  Bananas (Nausea; Urticaria)  apple (Urticaria)    Intolerances        REVIEW OF SYSTEMS    [ ] A ten-point review of systems was otherwise negative except as noted.  [x ] Due to altered mental status/intubation, subjective information were not able to be obtained from the patient. History was obtained, to the extent possible, from review of the chart and collateral sources of information.      Vital Signs Last 24 Hrs  T(C): 36.7 (17 Oct 2023 18:10), Max: 36.7 (17 Oct 2023 18:10)  T(F): 98 (17 Oct 2023 18:10), Max: 98 (17 Oct 2023 18:10)  HR: 74 (17 Oct 2023 18:10) (74 - 74)  BP: 127/82 (17 Oct 2023 18:10) (93/52 - 127/82)  BP(mean): --  RR: 20 (17 Oct 2023 18:10) (20 - 20)  SpO2: 100% (17 Oct 2023 18:10) (99% - 100%)    Parameters below as of 17 Oct 2023 18:10  Patient On (Oxygen Delivery Method): room air        PHYSICAL EXAM:  GENERAL: NAD, confused  CHEST/LUNG: Clear to auscultation bilaterally  HEART: Regular rate and rhythm  ABDOMEN: Soft, distended; tympanic, NT   EXTREMITIES:  Right lateral calf with open ulceration w/sof tissue extension - no bleeding, purulence, erythema, edema; +mildly tender. Sensation frossly intact to lower leg - able to bend at knee/ankle; +warm to touch      LABS:  Labs:  CAPILLARY BLOOD GLUCOSE      POCT Blood Glucose.: 298 mg/dL (17 Oct 2023 14:29)                          8.6    9.94  )-----------( 142      ( 17 Oct 2023 14:30 )             23.4       Auto Neutrophil %: 91.4 % (10-17-23 @ 14:30)  Auto Immature Granulocyte %: 0.9 % (10-17-23 @ 14:30)    10-17    114<LL>  |  77<L>  |  134<HH>  ----------------------------<  292<H>  4.6   |  8<LL>  |  15.6<HH>      Calcium: 7.0 mg/dL (10-17-23 @ 14:30)      LFTs:             7.4  | 0.4  | 92       ------------------[164     ( 17 Oct 2023 14:30 )  3.8  | x    | 42          Lipase:x      Amylase:x         Lactate, Blood: 0.8 mmol/L (10-17-23 @ 14:30)      Coags:     14.20  ----< 1.24    ( 17 Oct 2023 14:30 )     39.6        Urinalysis Basic - ( 17 Oct 2023 14:30 )    Color: x / Appearance: x / SG: x / pH: x  Gluc: 292 mg/dL / Ketone: x  / Bili: x / Urobili: x   Blood: x / Protein: x / Nitrite: x   Leuk Esterase: x / RBC: x / WBC x   Sq Epi: x / Non Sq Epi: x / Bacteria: x    RADIOLOGY & ADDITIONAL STUDIES:  Xray Tibia + Fibula 2 Views, Right (10.17.23 @ 14:43) >  Findings/  impression:    Limited evaluation of the soft tissues by plain film. Focal soft tissue   bulge in the lateral leg at the level of the proximal fibula. Correlation   with physical exam is needed.    No acute osseous abnormality by plain film. Calcaneal spurring. Vascular   calcifications.      CT Abdomen and Pelvis No Cont (10.17.23 @ 19:12) >  IMPRESSION:  Partially imaged right inguinal inflammatory change and mildly enlarged   lymph nodes. Recommend dedicated imaging for further evaluation.         KECIA MARTIN [id number]  58y Male      HPI: PT VERY CONFUSED, HPI FROM CHART AND ER STAFF  58 yr old M/PMHCX: DM, HTN, HLD, CKD, ETOH abuse, s/p left AV fistula (5/2023 Dr. De Leon)for  - now brought in by daughter for increasing confusion, weakness and inability to urinate since this AM, He also has an injury to right lateral shin which he sustained approx 1 week ago (?hit it on wheelchair) which has been getting worse and draining.  Denies F/C/N/V.    Per ED staff, WBC = 9, LA = 0.8, NA = 114; creat = 15; hypotensive (not on pressors); per ER PA, copious purulence obtained from leg wound      PAST MEDICAL & SURGICAL HISTORY:  Diabetes  Hypertension  HLD (hyperlipidemia)  Neuropathy  Glaucoma  Chronic kidney disease, unspecified CKD stage  requiring HD march 2022  ETOH abuse      H/O colonoscopy    MEDICATIONS  (STANDING):  sodium bicarbonate  Infusion 0.207 mEq/kG/Hr (100 mL/Hr) IV Continuous <Continuous>    MEDICATIONS  (PRN):      Allergies    No Known Drug Allergies  Bananas (Nausea; Urticaria)  apple (Urticaria)    Intolerances        REVIEW OF SYSTEMS    [ ] A ten-point review of systems was otherwise negative except as noted.  [x ] Due to altered mental status/intubation, subjective information were not able to be obtained from the patient. History was obtained, to the extent possible, from review of the chart and collateral sources of information.      Vital Signs Last 24 Hrs  T(C): 36.7 (17 Oct 2023 18:10), Max: 36.7 (17 Oct 2023 18:10)  T(F): 98 (17 Oct 2023 18:10), Max: 98 (17 Oct 2023 18:10)  HR: 74 (17 Oct 2023 18:10) (74 - 74)  BP: 127/82 (17 Oct 2023 18:10) (93/52 - 127/82)  BP(mean): --  RR: 20 (17 Oct 2023 18:10) (20 - 20)  SpO2: 100% (17 Oct 2023 18:10) (99% - 100%)    Parameters below as of 17 Oct 2023 18:10  Patient On (Oxygen Delivery Method): room air        PHYSICAL EXAM:  GENERAL: NAD, confused  CHEST/LUNG: Clear to auscultation bilaterally  HEART: Regular rate and rhythm  ABDOMEN: Soft, distended; tympanic, NT   EXTREMITIES:  Right lateral calf with ulceration, boggy/fluctuant; overlying skin intact; able to express small amount of purulent discharge via pinpoint opening; no surrounding erythema, edema; +mildly tender. Sensation grossly intact to lower leg - able to bend at knee/ankle and wiggles toes; +warm to touch      LABS:  Labs:  CAPILLARY BLOOD GLUCOSE      POCT Blood Glucose.: 298 mg/dL (17 Oct 2023 14:29)                          8.6    9.94  )-----------( 142      ( 17 Oct 2023 14:30 )             23.4       Auto Neutrophil %: 91.4 % (10-17-23 @ 14:30)  Auto Immature Granulocyte %: 0.9 % (10-17-23 @ 14:30)    10-17    114<LL>  |  77<L>  |  134<HH>  ----------------------------<  292<H>  4.6   |  8<LL>  |  15.6<HH>      Calcium: 7.0 mg/dL (10-17-23 @ 14:30)      LFTs:             7.4  | 0.4  | 92       ------------------[164     ( 17 Oct 2023 14:30 )  3.8  | x    | 42          Lipase:x      Amylase:x         Lactate, Blood: 0.8 mmol/L (10-17-23 @ 14:30)      Coags:     14.20  ----< 1.24    ( 17 Oct 2023 14:30 )     39.6        Urinalysis Basic - ( 17 Oct 2023 14:30 )    Color: x / Appearance: x / SG: x / pH: x  Gluc: 292 mg/dL / Ketone: x  / Bili: x / Urobili: x   Blood: x / Protein: x / Nitrite: x   Leuk Esterase: x / RBC: x / WBC x   Sq Epi: x / Non Sq Epi: x / Bacteria: x    RADIOLOGY & ADDITIONAL STUDIES:  Xray Tibia + Fibula 2 Views, Right (10.17.23 @ 14:43) >  Findings/  impression:    Limited evaluation of the soft tissues by plain film. Focal soft tissue   bulge in the lateral leg at the level of the proximal fibula. Correlation   with physical exam is needed.    No acute osseous abnormality by plain film. Calcaneal spurring. Vascular   calcifications.      CT Abdomen and Pelvis No Cont (10.17.23 @ 19:12) >  IMPRESSION:  Partially imaged right inguinal inflammatory change and mildly enlarged   lymph nodes. Recommend dedicated imaging for further evaluation.         KECIA MARTIN [id number]  58y Male      HPI: PT VERY CONFUSED, HPI FROM CHART AND ER STAFF  58 yr old M/PMHCX: DM, HTN, HLD, CKD, ETOH abuse, s/p left AV fistula (5/2023 Dr. De Leon)for  - now brought in by daughter for increasing confusion, weakness and inability to urinate since this AM, He also has an injury to right lateral shin which he sustained approx 1 week ago (?hit it on wheelchair) which has been getting worse and draining.  Denies F/C/N/V.    Per ED staff, WBC = 9, LA = 0.8, NA = 114; creat = 15; hypotensive (not on pressors); per ER PA, copious purulence obtained from leg wound      PAST MEDICAL & SURGICAL HISTORY:  Diabetes  Hypertension  HLD (hyperlipidemia)  Neuropathy  Glaucoma  Chronic kidney disease, unspecified CKD stage  requiring HD march 2022  ETOH abuse      H/O colonoscopy    MEDICATIONS  (STANDING):  sodium bicarbonate  Infusion 0.207 mEq/kG/Hr (100 mL/Hr) IV Continuous <Continuous>    MEDICATIONS  (PRN):      Allergies    No Known Drug Allergies  Bananas (Nausea; Urticaria)  apple (Urticaria)    Intolerances        REVIEW OF SYSTEMS    [ ] A ten-point review of systems was otherwise negative except as noted.  [x ] Due to altered mental status/intubation, subjective information were not able to be obtained from the patient. History was obtained, to the extent possible, from review of the chart and collateral sources of information.      Vital Signs Last 24 Hrs  T(C): 36.7 (17 Oct 2023 18:10), Max: 36.7 (17 Oct 2023 18:10)  T(F): 98 (17 Oct 2023 18:10), Max: 98 (17 Oct 2023 18:10)  HR: 74 (17 Oct 2023 18:10) (74 - 74)  BP: 127/82 (17 Oct 2023 18:10) (93/52 - 127/82)  BP(mean): --  RR: 20 (17 Oct 2023 18:10) (20 - 20)  SpO2: 100% (17 Oct 2023 18:10) (99% - 100%)    Parameters below as of 17 Oct 2023 18:10  Patient On (Oxygen Delivery Method): room air        PHYSICAL EXAM:  GENERAL: NAD, confused  CHEST/LUNG: Clear to auscultation bilaterally  HEART: Regular rate and rhythm  ABDOMEN: Soft, distended; tympanic, NT   EXTREMITIES:  Right lateral calf with ulceration, boggy/fluctuant; overlying skin intact; able to express small amount of purulent discharge via pinpoint opening; no surrounding erythema, edema; +mildly tender. Sensation grossly intact to lower leg - able to bend at knee/ankle and wiggles toes; +warm to touch      LABS:  Labs:  CAPILLARY BLOOD GLUCOSE      POCT Blood Glucose.: 298 mg/dL (17 Oct 2023 14:29)                          8.6    9.94  )-----------( 142      ( 17 Oct 2023 14:30 )             23.4       Auto Neutrophil %: 91.4 % (10-17-23 @ 14:30)  Auto Immature Granulocyte %: 0.9 % (10-17-23 @ 14:30)    10-17    114<LL>  |  77<L>  |  134<HH>  ----------------------------<  292<H>  4.6   |  8<LL>  |  15.6<HH>      Calcium: 7.0 mg/dL (10-17-23 @ 14:30)      LFTs:             7.4  | 0.4  | 92       ------------------[164     ( 17 Oct 2023 14:30 )  3.8  | x    | 42          Lipase:x      Amylase:x         Lactate, Blood: 0.8 mmol/L (10-17-23 @ 14:30)      Coags:     14.20  ----< 1.24    ( 17 Oct 2023 14:30 )     39.6        Urinalysis Basic - ( 17 Oct 2023 14:30 )    Color: x / Appearance: x / SG: x / pH: x  Gluc: 292 mg/dL / Ketone: x  / Bili: x / Urobili: x   Blood: x / Protein: x / Nitrite: x   Leuk Esterase: x / RBC: x / WBC x   Sq Epi: x / Non Sq Epi: x / Bacteria: x    RADIOLOGY & ADDITIONAL STUDIES:  Xray Tibia + Fibula 2 Views, Right (10.17.23 @ 14:43) >  Findings/  impression:    Limited evaluation of the soft tissues by plain film. Focal soft tissue   bulge in the lateral leg at the level of the proximal fibula. Correlation   with physical exam is needed.    No acute osseous abnormality by plain film. Calcaneal spurring. Vascular   calcifications.      CT Abdomen and Pelvis No Cont (10.17.23 @ 19:12) >  IMPRESSION:  Partially imaged right inguinal inflammatory change and mildly enlarged   lymph nodes. Recommend dedicated imaging for further evaluation.      CT Lower Extremity No Cont, Right (10.17.23 @ 19:12) >  FINDINGS:    SOFT TISSUES: Focal subcutaneous soft tissue thickening with associated   inflammatory changes along the right superficial inguinal region, with   several associated mildly enlarged right superficial and deep inguinal   lymph nodes nodes measuring up to 1.7 x 1.0 cm (2/57.) No evidence of   subcutaneous gas to suggest necrotizing fasciitis. Diffuse subcutaneous   edema is noted throughout the lower extremity, which is nonspecific.    BONES/JOINTS: No evidence of acute fracture or dislocation. Small right   suprapatellar joint effusion. Moderate degenerative changes of the right   hip and knee.    OTHER: Blanco catheter balloon within a decompressed urinary bladder.   Atherosclerotic vascular calcification.      IMPRESSION:  1. Focal subcutaneous soft tissue thickening with associated inflammatory   changes along the right superficial inguinal region, suspicious for   phlegmon; no evidence of subcutaneous gas to suggest necrotizing   fasciitis.  2. Several mildly enlarged right superficial and deep inguinal lymph   nodes, possibly reactive.

## 2023-10-17 NOTE — ED PROVIDER NOTE - CARE PLAN
Principal Discharge DX:	Acute renal failure  Secondary Diagnosis:	Hyponatremia  Secondary Diagnosis:	Leg abscess   1

## 2023-10-17 NOTE — PROGRESS NOTE ADULT - ASSESSMENT
PT VERY CONFUSED, HPI FROM CHART AND ER STAFF  58 yr old M/PMHCX: DM, HTN, HLD, CKD, ETOH abuse, s/p left AV fistula (5/2023 Dr. De Leon)for  - now brought in by daughter for increasing confusion, weakness and inability to urinate since this AM, He also has an injury to right lateral shin which he sustained approx 1 week ago (?hit it on wheelchair) which has been getting worse and draining.  Denies F/C/N/V.    Per ED staff, WBC = 9, LA = 0.8, NA = 114; creat = 15; hypotensive (not on pressors); per ER PA, copious purulence obtained from leg wound    1. VAMSI on CKD  2. Hyponatremia  2, Right calf ulceration - r/o abscess    -F/U CT RLE: if +abscess, will evaluate for I&D  -cont abx for now  -will follow and make further recs  -D/W Dr. Navarro

## 2023-10-17 NOTE — H&P ADULT - ASSESSMENT
57 y/o male with hx of ETOH abuse, CKD, NIDDM , HTN, HLD, presented to the ED for decreased urinary output and increased thirst since yesterday. Vital signs WNL. Physical exam revealed a distended, hard, non-tender abdomen along with bilaterally expiratory rales. Pt found to have edema and an abscess of the right lower extremity with associated erythema and tenderness. Ed course included an I&D of right lower leg abscess and pt given cefepime and vancomycin. Patient has worsening renal function with BUN /creatinine of 134/15.6, hyponatremia 114, chloride of 77, anion gap 29, Hyperglycemic 292. Urine shows turbid appearance, with traces of ketones, protein 300, large amounts of blood, and bacteriuria. Patient will be admitted to ICU for worsening renal failure      Plan:   1. acute on chronic Renal failure with high anion gap metabolic acidosis  - started IV sodium bicarb drip   - check I&Os per ICU hourly   - nephrology consult @ am  - possible RRT    2. Severe Hyponatremia with slight confusion   - urine electrolytes   - check thyroid function test, cortisol and lipid panels.     3. Hyperglycemia   - fingersticks with insulin coverage     4. ETOH abuse   - IV thyamine, folic acid, multivitamin CIWA with benzo protocol    -watch for electrolyte imbalance     Case discussed with Dr. Ferguson ICU        59 y/o male with hx of ETOH abuse, CKD, NIDDM , HTN, HLD, presented to the ED for decreased urinary output and increased thirst since yesterday. Vital signs WNL. Physical exam revealed a distended, hard, non-tender abdomen along with bilaterally expiratory rales. Pt found to have edema and an abscess of the right lower extremity with associated erythema and tenderness. Pt has a left wrist AV fistula that has a palpable thrill in the event that dialysis needs to be initiated Ed course included an I&D of right lower leg abscess and pt given cefepime and vancomycin. Patient has worsening renal function with BUN /creatinine of 134/15.6, hyponatremia 114, chloride of 77, anion gap 29, Hyperglycemic 292. Urine shows turbid appearance, with traces of ketones, protein 300, large amounts of blood, and bacteriuria. Patient will be admitted to ICU for worsening renal failure      Plan:   1. acute on chronic Renal failure with high anion gap metabolic acidosis  - started IV sodium bicarb drip   - check I&Os per ICU hourly   - nephrology consult @ am  - possible RRT    2. Severe Hyponatremia with slight confusion   - urine electrolytes   - check thyroid function test, cortisol and lipid panels.     3. Hyperglycemia   - fingersticks with insulin coverage     4. ETOH abuse   - IV thyamine, folic acid, multivitamin CIWA with benzo protocol    -watch for electrolyte imbalance     Case discussed with Dr. Ferguson ICU

## 2023-10-17 NOTE — H&P ADULT - NSHPPHYSICALEXAM_GEN_ALL_CORE
GENERAL:  59y/o Male laying in bed uncomfortable, chronically ill appearing   HEAD:  Atraumatic, Normocephalic  EYES:  PERRLA, EOMI  CHEST/LUNG: Labored breathing, bilateral expiratory rales heard on ascultation.   HEART: Regular rate and rhythm; S1&S2  ABDOMEN: Distended, hard, non-tender abdomen. Active bowel sounds in all 4 quadrants. No pulsatile masses.   EXTREMITIES:  Right lower extremity edema, tenderness and drained abscess with no surrounding erythema s/p I&D. Peripheral Pulses Present, No clubbing, no cyanosis, or no edema  PSYCH: AAOx3, cooperative   NEUROLOGY: WNL  SKIN: WNL GENERAL:  57y/o Male laying in bed uncomfortable, chronically ill appearing   HEAD:  Atraumatic, Normocephalic  EYES:  PERRLA, EOMI  CHEST/LUNG: Labored breathing, bilateral expiratory rales heard on ascultation.   HEART: Regular rate and rhythm; S1&S2  ABDOMEN: Distended, hard, non-tender abdomen. Active bowel sounds in all 4 quadrants. No pulsatile masses.   EXTREMITIES:  Left wrist AV fistula with palpable thrill. Right lower extremity edema, tenderness and drained abscess with no surrounding erythema s/p I&D. Peripheral Pulses Present, No clubbing, no cyanosis, or no edema  PSYCH: AAOx3, cooperative   NEUROLOGY: WNL

## 2023-10-17 NOTE — ED ADULT NURSE NOTE - NSICDXPASTMEDICALHX_GEN_ALL_CORE_FT
PAST MEDICAL HISTORY:  Chronic kidney disease, unspecified CKD stage requiring HD march 2022    Diabetes     Glaucoma     HLD (hyperlipidemia)     Hypertension     Neuropathy      PAST MEDICAL HISTORY:  Chronic kidney disease, unspecified CKD stage requiring HD march 2022    Diabetes     ETOH abuse     Glaucoma     HLD (hyperlipidemia)     Hypertension     Neuropathy

## 2023-10-17 NOTE — ED PROVIDER NOTE - AXIS
[FreeTextEntry1] : 49 yo male presents for evaluation of chest congestion with PRN wheezing for nine months. He complains of minimal dry cough without chest pain or hemoptysis. The patient was recently started on ICS/LABA inhaler with albuterol MDI. Normal

## 2023-10-17 NOTE — H&P ADULT - NS ATTEND AMEND GEN_ALL_CORE FT
57 y/o male with hx of ETOH abuse, CKD, NIDDM , HTN, HLD, presented to the ED for decreased urinary output and increased thirst since yesterday. Vital signs WNL. Physical exam revealed a distended, hard, non-tender abdomen along with bilaterally expiratory rales. Pt found to have edema and an abscess of the right lower extremity with associated erythema and tenderness. Pt has a left wrist AV fistula that has a palpable thrill in the event that dialysis needs to be initiated Ed course included an I&D of right lower leg abscess and pt given cefepime and vancomycin. Patient has worsening renal function with BUN /creatinine of 134/15.6, hyponatremia 114, chloride of 77, anion gap 29, Hyperglycemic 292. Urine shows turbid appearance, with traces of ketones, protein 300, large amounts of blood, and bacteriuria. Patient will be admitted to ICU for worsening renal failure      Plan:   1. acute on chronic Renal failure with high anion gap metabolic acidosis  - started IV sodium bicarb drip   - check I&Os per ICU hourly   - nephrology consult @ am  - possible RRT    2. Severe Hyponatremia with slight confusion   - urine electrolytes   - check thyroid function test, cortisol and lipid panels.     3. Hyperglycemia   - fingersticks with insulin coverage     4. ETOH abuse   - IV thyamine, folic acid, multivitamin CIWA with benzo protocol    -watch for electrolyte imbalance

## 2023-10-17 NOTE — CONSULT NOTE ADULT - ASSESSMENT
PT VERY CONFUSED, HPI FROM CHART AND ER STAFF    58 yr old M/PMHCX: DM, HTN, HLD, CKD, ETOH abuse, s/p left AV fistula (5/2023 Dr. De Leon)for  - now brought in by daughter for increasing confusion, weakness and inability to urinate since this AM, He also has an injury to right lateral shin which he sustained approx 1 week ago (?hit it on wheelchair) which has been getting worse and draining.  Denies F/C/N/V.    Per ED staff, WBC = 9, LA = 0.8, NA = 114; creat = 15; hypotensive (not on pressors); per ER PA, copious purulence obtained from leg wound    1. VAMSI on CKD  2. Hyponatremia  2, Right calf ulceration - r/o abscess, r/o nec fasciitis      Case D/W Dr. Navarro:  -F/U CT RLE: if +abscess, will evaluate for I&D  CT A/P negative except for some right inguinal CARLEE & inlfammatory changes  -not likely necrotizing fasciitis: pt afebrile, normotensive, no leukocytosis  -cont abx for now; f/u ID  -f/u rpt labs; trend lactate  -cont IV fluids  -f/u renal for poss HD  -will follow and make further recs  -D/W Dr. Navarro  PT VERY CONFUSED, HPI FROM CHART AND ER STAFF    58 yr old M/PMHCX: DM, HTN, HLD, CKD, ETOH abuse, s/p left AV fistula (5/2023 Dr. De Leon)for  - now brought in by daughter for increasing confusion, weakness and inability to urinate since this AM, He also has an injury to right lateral shin which he sustained approx 1 week ago (?hit it on wheelchair) which has been getting worse and draining.  Denies F/C/N/V.    Per ED staff, WBC = 9, LA = 0.8, NA = 114; creat = 15; hypotensive (not on pressors); per ER PA, copious purulence obtained from leg wound    1. VAMSI on CKD  2. Hyponatremia  2, Right calf ulceration - r/o abscess, r/o nec fasciitis      Case D/W Dr. Navarro:  -F/U CT RLE: if +abscess, will evaluate for I&D  CT A/P negative except for some right inguinal CARLEE & inflammatory changes  -not likely necrotizing fasciitis: pt afebrile, normotensive, no leukocytosis, normal lactate   -cont abx for now; f/u ID  -f/u rpt labs; trend lactate  -cont IV fluids  -f/u renal for poss HD  -ICU eval/admission  -will follow and make further recs  -D/W Dr. Navarro  PT VERY CONFUSED, HPI FROM CHART AND ER STAFF    58 yr old M/PMHCX: DM, HTN, HLD, CKD, ETOH abuse, s/p left AV fistula (5/2023 Dr. De Leon)for  - now brought in by daughter for increasing confusion, weakness and inability to urinate since this AM, He also has an injury to right lateral shin which he sustained approx 1 week ago (?hit it on wheelchair) which has been getting worse and draining.  Denies F/C/N/V.    Per ED staff, WBC = 9, LA = 0.8, NA = 114; creat = 15; hypotensive (not on pressors); per ER PA, copious purulence obtained from leg wound    1. VAMSI on CKD  2. Hyponatremia  2, Right calf ulceration - r/o abscess, r/o nec fasciitis      Case D/W Dr. Navarro:  -F/U CT RLE: if +abscess, will evaluate for I&D  CT A/P negative except for some right inguinal CARLEE & inflammatory changes  -not likely necrotizing fasciitis: pt afebrile, normotensive, no leukocytosis, normal lactate   -cont abx for now; f/u ID  -f/u rpt labs; trend lactate  -cont IV fluids  -f/u renal for poss HD  -ICU eval/admission  -will follow and make further recs  -D/W Dr. Navarro    ADDENDUM:  CT Lower Extremity No Cont, Right (10.17.23 @ 19:12) >  IMPRESSION:  1. Focal subcutaneous soft tissue thickening with associated inflammatory   changes along the right superficial inguinal region, suspicious for   phlegmon; no evidence of subcutaneous gas to suggest necrotizing   fasciitis.  2. Several mildly enlarged right superficial and deep inguinal lymph   nodes, possibly reactive.  =================  Above D/W Dr. Navarro: plan remains as above

## 2023-10-17 NOTE — ED ADULT NURSE REASSESSMENT NOTE - NS ED NURSE REASSESS COMMENT FT1
Pt endorsed to Randy PIERCE RN. Pt remains asleep in NAD, pending admitting orders and room assignment.
Pt taken to CT by transport with primary RN at bedside.
ICU team at bedside.
Returned from CT, pt remains asleep, easily aroused in NAD. Bed linens changed, sacral area skin remains intact. No bleeding noted from right lower extremity wound.

## 2023-10-17 NOTE — ED PROVIDER NOTE - NSICDXPASTMEDICALHX_GEN_ALL_CORE_FT
PAST MEDICAL HISTORY:  Chronic kidney disease, unspecified CKD stage requiring HD march 2022    Diabetes     ETOH abuse     Glaucoma     HLD (hyperlipidemia)     Hypertension     Neuropathy

## 2023-10-17 NOTE — ED PROVIDER NOTE - PHYSICAL EXAMINATION
Vital Signs: I have reviewed the initial vital signs.  Constitutional: chronically ill  eyes: clear conjunctiva  ENT: dry mucous membranes  Cardiovascular: regular rate, regular rhythm, no murmur appreciated  Respiratory: unlabored respiratory effort, clear to auscultation bilaterally  Gastrointestinal: soft, non-tender, +distended  abdomen, no pulsatile mass  Musculoskeletal: supple neck, no lower extremity edema, no bony tenderness  Integumentary: right lateral leg +tenderness , fluctuance , erythema and purulent drainage , no surrounding erythema   heme: no lymphangitis, +right inguinal lymphadenopathy   Neurologic: awake, alert, cranial nerves II-XII grossly intact, extremities’ motor and sensory functions grossly intact, no focal deficits, GCS 15

## 2023-10-17 NOTE — PROGRESS NOTE ADULT - SUBJECTIVE AND OBJECTIVE BOX
KECIA MARTIN [id number]  58y Male      HPI: PT VERY CONFUSED, HPI FROM CHART AND ER STAFF  58 yr old M/PMHCX: DM, HTN, HLD, CKD, ETOH abuse, s/p left AV fistula (5/2023 Dr. De Leon)for  - now brought in by daughter for increasing confusion, weakness and inability to urinate since this AM, He also has an injury to right lateral shin which he sustained approx 1 week ago (?hit it on wheelchair) which has been getting worse and draining.  Denies F/C/N/V.    Per ED staff, WBC = 9, LA = 0.8, NA = 114; creat = 15; hypotensive (not on pressors); per ER PA, copious purulence obtained from leg wound      PAST MEDICAL & SURGICAL HISTORY:  Diabetes  Hypertension  HLD (hyperlipidemia)  Neuropathy  Glaucoma  Chronic kidney disease, unspecified CKD stage  requiring HD march 2022  ETOH abuse      H/O colonoscopy    MEDICATIONS  (STANDING):  sodium bicarbonate  Infusion 0.207 mEq/kG/Hr (100 mL/Hr) IV Continuous <Continuous>    MEDICATIONS  (PRN):      Allergies    No Known Drug Allergies  Bananas (Nausea; Urticaria)  apple (Urticaria)    Intolerances        REVIEW OF SYSTEMS    [ ] A ten-point review of systems was otherwise negative except as noted.  [x ] Due to altered mental status/intubation, subjective information were not able to be obtained from the patient. History was obtained, to the extent possible, from review of the chart and collateral sources of information.      Vital Signs Last 24 Hrs  T(C): 36.7 (17 Oct 2023 18:10), Max: 36.7 (17 Oct 2023 18:10)  T(F): 98 (17 Oct 2023 18:10), Max: 98 (17 Oct 2023 18:10)  HR: 74 (17 Oct 2023 18:10) (74 - 74)  BP: 127/82 (17 Oct 2023 18:10) (93/52 - 127/82)  BP(mean): --  RR: 20 (17 Oct 2023 18:10) (20 - 20)  SpO2: 100% (17 Oct 2023 18:10) (99% - 100%)    Parameters below as of 17 Oct 2023 18:10  Patient On (Oxygen Delivery Method): room air        PHYSICAL EXAM:  GENERAL: NAD, confused  CHEST/LUNG: Clear to auscultation bilaterally  HEART: Regular rate and rhythm  ABDOMEN: Soft, distended; tympanic, NT   EXTREMITIES:  Right lateral calf with open ulceration w/sof tissue extension - no bleeding, purulence, erythema, edema; +mildly tender. Sensation frossly intact to lower leg - able to bend at knee/ankle; +warm to touch      LABS:  Labs:  CAPILLARY BLOOD GLUCOSE      POCT Blood Glucose.: 298 mg/dL (17 Oct 2023 14:29)                          8.6    9.94  )-----------( 142      ( 17 Oct 2023 14:30 )             23.4       Auto Neutrophil %: 91.4 % (10-17-23 @ 14:30)  Auto Immature Granulocyte %: 0.9 % (10-17-23 @ 14:30)    10-17    114<LL>  |  77<L>  |  134<HH>  ----------------------------<  292<H>  4.6   |  8<LL>  |  15.6<HH>      Calcium: 7.0 mg/dL (10-17-23 @ 14:30)      LFTs:             7.4  | 0.4  | 92       ------------------[164     ( 17 Oct 2023 14:30 )  3.8  | x    | 42          Lipase:x      Amylase:x         Lactate, Blood: 0.8 mmol/L (10-17-23 @ 14:30)      Coags:     14.20  ----< 1.24    ( 17 Oct 2023 14:30 )     39.6        Urinalysis Basic - ( 17 Oct 2023 14:30 )    Color: x / Appearance: x / SG: x / pH: x  Gluc: 292 mg/dL / Ketone: x  / Bili: x / Urobili: x   Blood: x / Protein: x / Nitrite: x   Leuk Esterase: x / RBC: x / WBC x   Sq Epi: x / Non Sq Epi: x / Bacteria: x    RADIOLOGY & ADDITIONAL STUDIES:  Xray Tibia + Fibula 2 Views, Right (10.17.23 @ 14:43) >  Findings/  impression:    Limited evaluation of the soft tissues by plain film. Focal soft tissue   bulge in the lateral leg at the level of the proximal fibula. Correlation   with physical exam is needed.    No acute osseous abnormality by plain film. Calcaneal spurring. Vascular   calcifications.

## 2023-10-17 NOTE — ED PROVIDER NOTE - PROGRESS NOTE DETAILS
sx consultation obtained. will preform ct scan of right leg to r/o osteomyelitis spoke with dr. lawson regarding results of diagnostics. advised bicarb drip with bolus

## 2023-10-17 NOTE — ED ADULT NURSE NOTE - CHIEF COMPLAINT QUOTE
as per daughter, right leg abcess, sent in by dermatologist for iv abx, also complains of inability to urinate since this am, and weakness

## 2023-10-17 NOTE — ED PROVIDER NOTE - CLINICAL SUMMARY MEDICAL DECISION MAKING FREE TEXT BOX
58yM Alcohol Abuse and Dependence, Chronic alcohol abuse, HTN, Dyslipidemia, Neuropathy, CKD with multiple episodes of VAMSI ( Dr Bernard)  Glaucoma brought in by daughter for right lower extremity wound with increasing weakness, confusion. fluctuane  wound to  right  leg over fibula - with  inguinal LAD,  I and D in ED  larg purlent outpt  cefepime and Vanoco given,  surgery  consulted CT leg - no  nec fasc /  Focal subcutaneous soft tissue thickening with associated inflammatory changes along the right superficial inguinal region, suspicious for phlegmon; no evidence of subcutaneous gas to suggest necrotizing fasciitis.  2. Several mildly enlarged right superficial and deep inguinal lymph nodes, possibly reactive.   Pt with  acute renal failure 134/15  ( previous May  41/4.1),  K wnl,  Bicarb  8,  Hyponatremic 114 -  Nephrology Dr Bernard consulted -  no  hypertonic saline at this time, nahco3 drip started .  CT head

## 2023-10-18 DIAGNOSIS — F10.239 ALCOHOL DEPENDENCE WITH WITHDRAWAL, UNSPECIFIED: ICD-10-CM

## 2023-10-18 DIAGNOSIS — I89.1 LYMPHANGITIS: ICD-10-CM

## 2023-10-18 LAB
A1C WITH ESTIMATED AVERAGE GLUCOSE RESULT: 8.2 % — HIGH (ref 4–5.6)
A1C WITH ESTIMATED AVERAGE GLUCOSE RESULT: 8.2 % — HIGH (ref 4–5.6)
ANION GAP SERPL CALC-SCNC: 23 MMOL/L — HIGH (ref 7–14)
ANION GAP SERPL CALC-SCNC: 23 MMOL/L — HIGH (ref 7–14)
ANION GAP SERPL CALC-SCNC: 25 MMOL/L — HIGH (ref 7–14)
ANION GAP SERPL CALC-SCNC: 25 MMOL/L — HIGH (ref 7–14)
ANION GAP SERPL CALC-SCNC: 29 MMOL/L — HIGH (ref 7–14)
ANION GAP SERPL CALC-SCNC: 29 MMOL/L — HIGH (ref 7–14)
ANION GAP SERPL CALC-SCNC: 30 MMOL/L — HIGH (ref 7–14)
ANION GAP SERPL CALC-SCNC: 30 MMOL/L — HIGH (ref 7–14)
APPEARANCE UR: ABNORMAL
APPEARANCE UR: ABNORMAL
B-OH-BUTYR SERPL-SCNC: 1.7 MMOL/L — HIGH
B-OH-BUTYR SERPL-SCNC: 1.7 MMOL/L — HIGH
BASE EXCESS BLDA CALC-SCNC: -17.3 MMOL/L — LOW (ref -2–3)
BASE EXCESS BLDA CALC-SCNC: -17.3 MMOL/L — LOW (ref -2–3)
BILIRUB UR-MCNC: ABNORMAL
BILIRUB UR-MCNC: ABNORMAL
BUN SERPL-MCNC: 103 MG/DL — CRITICAL HIGH (ref 10–20)
BUN SERPL-MCNC: 103 MG/DL — CRITICAL HIGH (ref 10–20)
BUN SERPL-MCNC: 104 MG/DL — CRITICAL HIGH (ref 10–20)
BUN SERPL-MCNC: 104 MG/DL — CRITICAL HIGH (ref 10–20)
BUN SERPL-MCNC: 133 MG/DL — CRITICAL HIGH (ref 10–20)
BUN SERPL-MCNC: 133 MG/DL — CRITICAL HIGH (ref 10–20)
BUN SERPL-MCNC: 138 MG/DL — CRITICAL HIGH (ref 10–20)
BUN SERPL-MCNC: 138 MG/DL — CRITICAL HIGH (ref 10–20)
CALCIUM SERPL-MCNC: 6.8 MG/DL — LOW (ref 8.4–10.5)
CALCIUM SERPL-MCNC: 6.8 MG/DL — LOW (ref 8.4–10.5)
CALCIUM SERPL-MCNC: 7 MG/DL — LOW (ref 8.4–10.5)
CALCIUM SERPL-MCNC: 7.1 MG/DL — LOW (ref 8.4–10.5)
CALCIUM SERPL-MCNC: 7.1 MG/DL — LOW (ref 8.4–10.5)
CHLORIDE SERPL-SCNC: 80 MMOL/L — LOW (ref 98–110)
CHLORIDE SERPL-SCNC: 84 MMOL/L — LOW (ref 98–110)
CHLORIDE SERPL-SCNC: 84 MMOL/L — LOW (ref 98–110)
CHLORIDE SERPL-SCNC: 85 MMOL/L — LOW (ref 98–110)
CHLORIDE SERPL-SCNC: 85 MMOL/L — LOW (ref 98–110)
CHOLEST SERPL-MCNC: 126 MG/DL — SIGNIFICANT CHANGE UP
CHOLEST SERPL-MCNC: 126 MG/DL — SIGNIFICANT CHANGE UP
CO2 SERPL-SCNC: 11 MMOL/L — LOW (ref 17–32)
CO2 SERPL-SCNC: 11 MMOL/L — LOW (ref 17–32)
CO2 SERPL-SCNC: 12 MMOL/L — LOW (ref 17–32)
CO2 SERPL-SCNC: 12 MMOL/L — LOW (ref 17–32)
CO2 SERPL-SCNC: 16 MMOL/L — LOW (ref 17–32)
CO2 SERPL-SCNC: 16 MMOL/L — LOW (ref 17–32)
CO2 SERPL-SCNC: 18 MMOL/L — SIGNIFICANT CHANGE UP (ref 17–32)
CO2 SERPL-SCNC: 18 MMOL/L — SIGNIFICANT CHANGE UP (ref 17–32)
COLOR SPEC: ABNORMAL
COLOR SPEC: ABNORMAL
CREAT ?TM UR-MCNC: 80 MG/DL — SIGNIFICANT CHANGE UP
CREAT ?TM UR-MCNC: 80 MG/DL — SIGNIFICANT CHANGE UP
CREAT SERPL-MCNC: 12.6 MG/DL — CRITICAL HIGH (ref 0.7–1.5)
CREAT SERPL-MCNC: 12.6 MG/DL — CRITICAL HIGH (ref 0.7–1.5)
CREAT SERPL-MCNC: 13.8 MG/DL — CRITICAL HIGH (ref 0.7–1.5)
CREAT SERPL-MCNC: 13.8 MG/DL — CRITICAL HIGH (ref 0.7–1.5)
CREAT SERPL-MCNC: 14 MG/DL — CRITICAL HIGH (ref 0.7–1.5)
CREAT SERPL-MCNC: 14 MG/DL — CRITICAL HIGH (ref 0.7–1.5)
CREAT SERPL-MCNC: 16.3 MG/DL — CRITICAL HIGH (ref 0.7–1.5)
CREAT SERPL-MCNC: 16.3 MG/DL — CRITICAL HIGH (ref 0.7–1.5)
CRP SERPL-MCNC: 217.3 MG/L — HIGH
CRP SERPL-MCNC: 217.3 MG/L — HIGH
DIFF PNL FLD: ABNORMAL
DIFF PNL FLD: ABNORMAL
EGFR: 3 ML/MIN/1.73M2 — LOW
EGFR: 3 ML/MIN/1.73M2 — LOW
EGFR: 4 ML/MIN/1.73M2 — LOW
ESTIMATED AVERAGE GLUCOSE: 189 MG/DL — HIGH (ref 68–114)
ESTIMATED AVERAGE GLUCOSE: 189 MG/DL — HIGH (ref 68–114)
GAS PNL BLDA: SIGNIFICANT CHANGE UP
GAS PNL BLDA: SIGNIFICANT CHANGE UP
GLUCOSE BLDC GLUCOMTR-MCNC: 106 MG/DL — HIGH (ref 70–99)
GLUCOSE BLDC GLUCOMTR-MCNC: 106 MG/DL — HIGH (ref 70–99)
GLUCOSE BLDC GLUCOMTR-MCNC: 124 MG/DL — HIGH (ref 70–99)
GLUCOSE BLDC GLUCOMTR-MCNC: 124 MG/DL — HIGH (ref 70–99)
GLUCOSE BLDC GLUCOMTR-MCNC: 137 MG/DL — HIGH (ref 70–99)
GLUCOSE BLDC GLUCOMTR-MCNC: 137 MG/DL — HIGH (ref 70–99)
GLUCOSE BLDC GLUCOMTR-MCNC: 139 MG/DL — HIGH (ref 70–99)
GLUCOSE BLDC GLUCOMTR-MCNC: 139 MG/DL — HIGH (ref 70–99)
GLUCOSE BLDC GLUCOMTR-MCNC: 140 MG/DL — HIGH (ref 70–99)
GLUCOSE BLDC GLUCOMTR-MCNC: 140 MG/DL — HIGH (ref 70–99)
GLUCOSE BLDC GLUCOMTR-MCNC: 153 MG/DL — HIGH (ref 70–99)
GLUCOSE BLDC GLUCOMTR-MCNC: 153 MG/DL — HIGH (ref 70–99)
GLUCOSE BLDC GLUCOMTR-MCNC: 174 MG/DL — HIGH (ref 70–99)
GLUCOSE BLDC GLUCOMTR-MCNC: 174 MG/DL — HIGH (ref 70–99)
GLUCOSE BLDC GLUCOMTR-MCNC: 184 MG/DL — HIGH (ref 70–99)
GLUCOSE BLDC GLUCOMTR-MCNC: 184 MG/DL — HIGH (ref 70–99)
GLUCOSE BLDC GLUCOMTR-MCNC: 202 MG/DL — HIGH (ref 70–99)
GLUCOSE BLDC GLUCOMTR-MCNC: 202 MG/DL — HIGH (ref 70–99)
GLUCOSE BLDC GLUCOMTR-MCNC: 218 MG/DL — HIGH (ref 70–99)
GLUCOSE BLDC GLUCOMTR-MCNC: 218 MG/DL — HIGH (ref 70–99)
GLUCOSE BLDC GLUCOMTR-MCNC: 237 MG/DL — HIGH (ref 70–99)
GLUCOSE BLDC GLUCOMTR-MCNC: 237 MG/DL — HIGH (ref 70–99)
GLUCOSE BLDC GLUCOMTR-MCNC: 344 MG/DL — HIGH (ref 70–99)
GLUCOSE BLDC GLUCOMTR-MCNC: 344 MG/DL — HIGH (ref 70–99)
GLUCOSE BLDC GLUCOMTR-MCNC: 99 MG/DL — SIGNIFICANT CHANGE UP (ref 70–99)
GLUCOSE BLDC GLUCOMTR-MCNC: 99 MG/DL — SIGNIFICANT CHANGE UP (ref 70–99)
GLUCOSE SERPL-MCNC: 120 MG/DL — HIGH (ref 70–99)
GLUCOSE SERPL-MCNC: 120 MG/DL — HIGH (ref 70–99)
GLUCOSE SERPL-MCNC: 133 MG/DL — HIGH (ref 70–99)
GLUCOSE SERPL-MCNC: 133 MG/DL — HIGH (ref 70–99)
GLUCOSE SERPL-MCNC: 153 MG/DL — HIGH (ref 70–99)
GLUCOSE SERPL-MCNC: 153 MG/DL — HIGH (ref 70–99)
GLUCOSE SERPL-MCNC: 250 MG/DL — HIGH (ref 70–99)
GLUCOSE SERPL-MCNC: 250 MG/DL — HIGH (ref 70–99)
GLUCOSE UR QL: 250 MG/DL
GLUCOSE UR QL: 250 MG/DL
HCO3 BLDA-SCNC: 10 MMOL/L — CRITICAL LOW (ref 21–28)
HCO3 BLDA-SCNC: 10 MMOL/L — CRITICAL LOW (ref 21–28)
HCT VFR BLD CALC: 23.4 % — LOW (ref 42–52)
HCT VFR BLD CALC: 23.4 % — LOW (ref 42–52)
HDLC SERPL-MCNC: 17 MG/DL — LOW
HDLC SERPL-MCNC: 17 MG/DL — LOW
HGB BLD-MCNC: 8.6 G/DL — LOW (ref 14–18)
HGB BLD-MCNC: 8.6 G/DL — LOW (ref 14–18)
INR BLD: 1.3 RATIO — SIGNIFICANT CHANGE UP (ref 0.65–1.3)
INR BLD: 1.3 RATIO — SIGNIFICANT CHANGE UP (ref 0.65–1.3)
KETONES UR-MCNC: NEGATIVE MG/DL — SIGNIFICANT CHANGE UP
KETONES UR-MCNC: NEGATIVE MG/DL — SIGNIFICANT CHANGE UP
LEUKOCYTE ESTERASE UR-ACNC: ABNORMAL
LEUKOCYTE ESTERASE UR-ACNC: ABNORMAL
LIPID PNL WITH DIRECT LDL SERPL: 38 MG/DL — SIGNIFICANT CHANGE UP
LIPID PNL WITH DIRECT LDL SERPL: 38 MG/DL — SIGNIFICANT CHANGE UP
MAGNESIUM SERPL-MCNC: 2.2 MG/DL — SIGNIFICANT CHANGE UP (ref 1.8–2.4)
MAGNESIUM SERPL-MCNC: 2.2 MG/DL — SIGNIFICANT CHANGE UP (ref 1.8–2.4)
MCHC RBC-ENTMCNC: 31.6 PG — HIGH (ref 27–31)
MCHC RBC-ENTMCNC: 31.6 PG — HIGH (ref 27–31)
MCHC RBC-ENTMCNC: 36.8 G/DL — SIGNIFICANT CHANGE UP (ref 32–37)
MCHC RBC-ENTMCNC: 36.8 G/DL — SIGNIFICANT CHANGE UP (ref 32–37)
MCV RBC AUTO: 86 FL — SIGNIFICANT CHANGE UP (ref 80–94)
MCV RBC AUTO: 86 FL — SIGNIFICANT CHANGE UP (ref 80–94)
NITRITE UR-MCNC: POSITIVE
NITRITE UR-MCNC: POSITIVE
NON HDL CHOLESTEROL: 109 MG/DL — SIGNIFICANT CHANGE UP
NON HDL CHOLESTEROL: 109 MG/DL — SIGNIFICANT CHANGE UP
NRBC # BLD: 0 /100 WBCS — SIGNIFICANT CHANGE UP (ref 0–0)
NRBC # BLD: 0 /100 WBCS — SIGNIFICANT CHANGE UP (ref 0–0)
OSMOLALITY SERPL: 311 MOS/KG — HIGH (ref 280–301)
OSMOLALITY SERPL: 311 MOS/KG — HIGH (ref 280–301)
PCO2 BLDA: 28 MMHG — LOW (ref 35–48)
PCO2 BLDA: 28 MMHG — LOW (ref 35–48)
PH BLDA: 7.16 — CRITICAL LOW (ref 7.35–7.45)
PH BLDA: 7.16 — CRITICAL LOW (ref 7.35–7.45)
PH UR: 5 — SIGNIFICANT CHANGE UP (ref 5–8)
PH UR: 5 — SIGNIFICANT CHANGE UP (ref 5–8)
PHOSPHATE SERPL-MCNC: 10.4 MG/DL — HIGH (ref 2.1–4.9)
PHOSPHATE SERPL-MCNC: 10.4 MG/DL — HIGH (ref 2.1–4.9)
PLATELET # BLD AUTO: 159 K/UL — SIGNIFICANT CHANGE UP (ref 130–400)
PLATELET # BLD AUTO: 159 K/UL — SIGNIFICANT CHANGE UP (ref 130–400)
PMV BLD: 10.6 FL — HIGH (ref 7.4–10.4)
PMV BLD: 10.6 FL — HIGH (ref 7.4–10.4)
PO2 BLDA: 136 MMHG — HIGH (ref 83–108)
PO2 BLDA: 136 MMHG — HIGH (ref 83–108)
POTASSIUM SERPL-MCNC: 2.8 MMOL/L — LOW (ref 3.5–5)
POTASSIUM SERPL-MCNC: 2.8 MMOL/L — LOW (ref 3.5–5)
POTASSIUM SERPL-MCNC: 3.3 MMOL/L — LOW (ref 3.5–5)
POTASSIUM SERPL-MCNC: 3.3 MMOL/L — LOW (ref 3.5–5)
POTASSIUM SERPL-MCNC: 3.7 MMOL/L — SIGNIFICANT CHANGE UP (ref 3.5–5)
POTASSIUM SERPL-MCNC: 3.7 MMOL/L — SIGNIFICANT CHANGE UP (ref 3.5–5)
POTASSIUM SERPL-MCNC: 3.9 MMOL/L — SIGNIFICANT CHANGE UP (ref 3.5–5)
POTASSIUM SERPL-MCNC: 3.9 MMOL/L — SIGNIFICANT CHANGE UP (ref 3.5–5)
POTASSIUM SERPL-SCNC: 2.8 MMOL/L — LOW (ref 3.5–5)
POTASSIUM SERPL-SCNC: 2.8 MMOL/L — LOW (ref 3.5–5)
POTASSIUM SERPL-SCNC: 3.3 MMOL/L — LOW (ref 3.5–5)
POTASSIUM SERPL-SCNC: 3.3 MMOL/L — LOW (ref 3.5–5)
POTASSIUM SERPL-SCNC: 3.7 MMOL/L — SIGNIFICANT CHANGE UP (ref 3.5–5)
POTASSIUM SERPL-SCNC: 3.7 MMOL/L — SIGNIFICANT CHANGE UP (ref 3.5–5)
POTASSIUM SERPL-SCNC: 3.9 MMOL/L — SIGNIFICANT CHANGE UP (ref 3.5–5)
POTASSIUM SERPL-SCNC: 3.9 MMOL/L — SIGNIFICANT CHANGE UP (ref 3.5–5)
PROT UR-MCNC: >=1000 MG/DL
PROT UR-MCNC: >=1000 MG/DL
PROTHROM AB SERPL-ACNC: 14.9 SEC — HIGH (ref 9.95–12.87)
PROTHROM AB SERPL-ACNC: 14.9 SEC — HIGH (ref 9.95–12.87)
RBC # BLD: 2.72 M/UL — LOW (ref 4.7–6.1)
RBC # BLD: 2.72 M/UL — LOW (ref 4.7–6.1)
RBC # FLD: 13.4 % — SIGNIFICANT CHANGE UP (ref 11.5–14.5)
RBC # FLD: 13.4 % — SIGNIFICANT CHANGE UP (ref 11.5–14.5)
SAO2 % BLDA: 99 % — HIGH (ref 94–98)
SAO2 % BLDA: 99 % — HIGH (ref 94–98)
SODIUM SERPL-SCNC: 120 MMOL/L — LOW (ref 135–146)
SODIUM SERPL-SCNC: 120 MMOL/L — LOW (ref 135–146)
SODIUM SERPL-SCNC: 122 MMOL/L — LOW (ref 135–146)
SODIUM SERPL-SCNC: 122 MMOL/L — LOW (ref 135–146)
SODIUM SERPL-SCNC: 125 MMOL/L — LOW (ref 135–146)
SODIUM SERPL-SCNC: 125 MMOL/L — LOW (ref 135–146)
SODIUM SERPL-SCNC: 126 MMOL/L — LOW (ref 135–146)
SODIUM SERPL-SCNC: 126 MMOL/L — LOW (ref 135–146)
SODIUM UR-SCNC: 49 MMOL/L — SIGNIFICANT CHANGE UP
SODIUM UR-SCNC: 49 MMOL/L — SIGNIFICANT CHANGE UP
SP GR SPEC: 1.02 — SIGNIFICANT CHANGE UP (ref 1–1.03)
SP GR SPEC: 1.02 — SIGNIFICANT CHANGE UP (ref 1–1.03)
TRIGL SERPL-MCNC: 356 MG/DL — HIGH
TRIGL SERPL-MCNC: 356 MG/DL — HIGH
TROPONIN T SERPL-MCNC: <0.01 NG/ML — SIGNIFICANT CHANGE UP
TROPONIN T SERPL-MCNC: <0.01 NG/ML — SIGNIFICANT CHANGE UP
TSH SERPL-MCNC: 0.45 UIU/ML — SIGNIFICANT CHANGE UP (ref 0.27–4.2)
TSH SERPL-MCNC: 0.45 UIU/ML — SIGNIFICANT CHANGE UP (ref 0.27–4.2)
UROBILINOGEN FLD QL: 0.2 MG/DL — SIGNIFICANT CHANGE UP (ref 0.2–1)
UROBILINOGEN FLD QL: 0.2 MG/DL — SIGNIFICANT CHANGE UP (ref 0.2–1)
UUN UR-MCNC: 281 MG/DL — SIGNIFICANT CHANGE UP
UUN UR-MCNC: 281 MG/DL — SIGNIFICANT CHANGE UP
WBC # BLD: 9.72 K/UL — SIGNIFICANT CHANGE UP (ref 4.8–10.8)
WBC # BLD: 9.72 K/UL — SIGNIFICANT CHANGE UP (ref 4.8–10.8)
WBC # FLD AUTO: 9.72 K/UL — SIGNIFICANT CHANGE UP (ref 4.8–10.8)
WBC # FLD AUTO: 9.72 K/UL — SIGNIFICANT CHANGE UP (ref 4.8–10.8)

## 2023-10-18 PROCEDURE — 99233 SBSQ HOSP IP/OBS HIGH 50: CPT

## 2023-10-18 PROCEDURE — 99222 1ST HOSP IP/OBS MODERATE 55: CPT

## 2023-10-18 PROCEDURE — 99291 CRITICAL CARE FIRST HOUR: CPT

## 2023-10-18 PROCEDURE — 74018 RADEX ABDOMEN 1 VIEW: CPT | Mod: 26

## 2023-10-18 PROCEDURE — 99221 1ST HOSP IP/OBS SF/LOW 40: CPT

## 2023-10-18 PROCEDURE — 93010 ELECTROCARDIOGRAM REPORT: CPT

## 2023-10-18 RX ORDER — DEXTROSE 50 % IN WATER 50 %
25 SYRINGE (ML) INTRAVENOUS ONCE
Refills: 0 | Status: DISCONTINUED | OUTPATIENT
Start: 2023-10-18 | End: 2023-10-18

## 2023-10-18 RX ORDER — PANTOPRAZOLE SODIUM 20 MG/1
40 TABLET, DELAYED RELEASE ORAL DAILY
Refills: 0 | Status: DISCONTINUED | OUTPATIENT
Start: 2023-10-18 | End: 2023-10-20

## 2023-10-18 RX ORDER — INSULIN LISPRO 100/ML
VIAL (ML) SUBCUTANEOUS AT BEDTIME
Refills: 0 | Status: DISCONTINUED | OUTPATIENT
Start: 2023-10-18 | End: 2023-10-18

## 2023-10-18 RX ORDER — FUROSEMIDE 40 MG
20 TABLET ORAL ONCE
Refills: 0 | Status: COMPLETED | OUTPATIENT
Start: 2023-10-18 | End: 2023-10-18

## 2023-10-18 RX ORDER — CEFEPIME 1 G/1
500 INJECTION, POWDER, FOR SOLUTION INTRAMUSCULAR; INTRAVENOUS EVERY 24 HOURS
Refills: 0 | Status: DISCONTINUED | OUTPATIENT
Start: 2023-10-18 | End: 2023-10-18

## 2023-10-18 RX ORDER — INSULIN LISPRO 100/ML
VIAL (ML) SUBCUTANEOUS EVERY 6 HOURS
Refills: 0 | Status: DISCONTINUED | OUTPATIENT
Start: 2023-10-18 | End: 2023-10-18

## 2023-10-18 RX ORDER — THIAMINE MONONITRATE (VIT B1) 100 MG
100 TABLET ORAL DAILY
Refills: 0 | Status: COMPLETED | OUTPATIENT
Start: 2023-10-18 | End: 2023-10-20

## 2023-10-18 RX ORDER — INSULIN HUMAN 100 [IU]/ML
1 INJECTION, SOLUTION SUBCUTANEOUS
Qty: 100 | Refills: 0 | Status: DISCONTINUED | OUTPATIENT
Start: 2023-10-18 | End: 2023-10-20

## 2023-10-18 RX ORDER — SODIUM CHLORIDE 9 MG/ML
1000 INJECTION, SOLUTION INTRAVENOUS
Refills: 0 | Status: DISCONTINUED | OUTPATIENT
Start: 2023-10-18 | End: 2023-10-18

## 2023-10-18 RX ORDER — POTASSIUM CHLORIDE 20 MEQ
20 PACKET (EA) ORAL
Refills: 0 | Status: COMPLETED | OUTPATIENT
Start: 2023-10-18 | End: 2023-10-18

## 2023-10-18 RX ORDER — HEPARIN SODIUM 5000 [USP'U]/ML
5000 INJECTION INTRAVENOUS; SUBCUTANEOUS EVERY 8 HOURS
Refills: 0 | Status: DISCONTINUED | OUTPATIENT
Start: 2023-10-18 | End: 2023-10-20

## 2023-10-18 RX ORDER — INSULIN LISPRO 100/ML
VIAL (ML) SUBCUTANEOUS
Refills: 0 | Status: DISCONTINUED | OUTPATIENT
Start: 2023-10-18 | End: 2023-10-18

## 2023-10-18 RX ORDER — DEXTROSE 50 % IN WATER 50 %
15 SYRINGE (ML) INTRAVENOUS ONCE
Refills: 0 | Status: DISCONTINUED | OUTPATIENT
Start: 2023-10-18 | End: 2023-10-18

## 2023-10-18 RX ORDER — CHLORHEXIDINE GLUCONATE 213 G/1000ML
1 SOLUTION TOPICAL
Refills: 0 | Status: DISCONTINUED | OUTPATIENT
Start: 2023-10-18 | End: 2023-10-20

## 2023-10-18 RX ORDER — THIAMINE MONONITRATE (VIT B1) 100 MG
100 TABLET ORAL ONCE
Refills: 0 | Status: COMPLETED | OUTPATIENT
Start: 2023-10-18 | End: 2023-10-18

## 2023-10-18 RX ORDER — GLUCAGON INJECTION, SOLUTION 0.5 MG/.1ML
1 INJECTION, SOLUTION SUBCUTANEOUS ONCE
Refills: 0 | Status: DISCONTINUED | OUTPATIENT
Start: 2023-10-18 | End: 2023-10-20

## 2023-10-18 RX ORDER — FOLIC ACID 0.8 MG
1 TABLET ORAL DAILY
Refills: 0 | Status: DISCONTINUED | OUTPATIENT
Start: 2023-10-18 | End: 2023-10-20

## 2023-10-18 RX ORDER — PIPERACILLIN AND TAZOBACTAM 4; .5 G/20ML; G/20ML
3.38 INJECTION, POWDER, LYOPHILIZED, FOR SOLUTION INTRAVENOUS EVERY 12 HOURS
Refills: 0 | Status: DISCONTINUED | OUTPATIENT
Start: 2023-10-19 | End: 2023-10-20

## 2023-10-18 RX ADMIN — Medication 50 MILLIEQUIVALENT(S): at 22:28

## 2023-10-18 RX ADMIN — HEPARIN SODIUM 5000 UNIT(S): 5000 INJECTION INTRAVENOUS; SUBCUTANEOUS at 05:49

## 2023-10-18 RX ADMIN — Medication 1 TABLET(S): at 11:59

## 2023-10-18 RX ADMIN — Medication 100 MILLIGRAM(S): at 11:59

## 2023-10-18 RX ADMIN — Medication 100 MILLIGRAM(S): at 05:49

## 2023-10-18 RX ADMIN — CEFEPIME 100 MILLIGRAM(S): 1 INJECTION, POWDER, FOR SOLUTION INTRAMUSCULAR; INTRAVENOUS at 11:59

## 2023-10-18 RX ADMIN — Medication 50 MILLIEQUIVALENT(S): at 23:26

## 2023-10-18 RX ADMIN — Medication 20 MILLIGRAM(S): at 00:18

## 2023-10-18 RX ADMIN — INSULIN HUMAN 1 UNIT(S)/HR: 100 INJECTION, SOLUTION SUBCUTANEOUS at 12:28

## 2023-10-18 RX ADMIN — HEPARIN SODIUM 5000 UNIT(S): 5000 INJECTION INTRAVENOUS; SUBCUTANEOUS at 14:16

## 2023-10-18 RX ADMIN — Medication 100 MEQ/KG/HR: at 19:24

## 2023-10-18 RX ADMIN — INSULIN HUMAN 1 UNIT(S)/HR: 100 INJECTION, SOLUTION SUBCUTANEOUS at 19:24

## 2023-10-18 RX ADMIN — Medication 1 MILLIGRAM(S): at 11:59

## 2023-10-18 RX ADMIN — SODIUM CHLORIDE 100 MILLILITER(S): 9 INJECTION, SOLUTION INTRAVENOUS at 19:24

## 2023-10-18 RX ADMIN — HEPARIN SODIUM 5000 UNIT(S): 5000 INJECTION INTRAVENOUS; SUBCUTANEOUS at 21:34

## 2023-10-18 RX ADMIN — Medication 100 MEQ/KG/HR: at 23:26

## 2023-10-18 RX ADMIN — PANTOPRAZOLE SODIUM 40 MILLIGRAM(S): 20 TABLET, DELAYED RELEASE ORAL at 12:00

## 2023-10-18 RX ADMIN — Medication 100 MEQ/KG/HR: at 05:49

## 2023-10-18 RX ADMIN — SODIUM CHLORIDE 100 MILLILITER(S): 9 INJECTION, SOLUTION INTRAVENOUS at 17:31

## 2023-10-18 RX ADMIN — Medication 4: at 05:48

## 2023-10-18 NOTE — CONSULT NOTE ADULT - SUBJECTIVE AND OBJECTIVE BOX
Patient is a 58y old  Male who presents with a chief complaint of Worsening Renal Failure (18 Oct 2023 08:21)      HPI:  59 y/o male with hx of ETOH abuse, CKD with left wrist fistula, NIDDM , HTN, HLD, presented to the ED for decreased urinary output and increased thirst since yesterday. Patient was brought to the ED by daughter who states that the patient has a right lower leg abscess that has increased in size with purulent drainage for one week after sustaining trauma to the right leg two weeks ago. As per patient, admits to frequent urination about 5 times a day with little to no urinary output. Patient admits to increased thirst along with suprapubic abdominal pain and dysuria. Patient denies fever/chills, nausea/vomiting, SOB, chest pain , heart palpitations, cough, congestion, appetite loss, changes in bowel movements, hematuria or pyuria.     (17 Oct 2023 23:29)  patient now on bicarb drip   seen by CARISA merino for leg ulceration no intervention recommend abx     PAST MEDICAL & SURGICAL HISTORY:  Diabetes      Hypertension      HLD (hyperlipidemia)      Neuropathy      Glaucoma      Chronic kidney disease, unspecified CKD stage  requiring HD march 2022      ETOH abuse      H/O colonoscopy        Allergies    No Known Drug Allergies  Bananas (Nausea; Urticaria)  apple (Urticaria)    Intolerances      Family history : no cardiovscular family history   Home Medications:  carvedilol 12.5 mg oral tablet: 1 orally once a day (17 Oct 2023 15:05)  gabapentin 600 mg oral tablet: 1 orally 3 times a day (17 Oct 2023 15:05)  Januvia 50 mg oral tablet: 1 orally once a day (17 Oct 2023 15:05)  rosuvastatin 10 mg oral capsule: 1 orally once a day (17 Oct 2023 15:05)  Vitamin B-100 oral tablet: 1 orally once a day (17 Oct 2023 15:05)    Occupation:  Alochol: Denied  Smoking: Denied  Drug Use: Denied  Marital Status:         ROS: as in HPI; All other systems reviewed are negative    ICU Vital Signs Last 24 Hrs  T(C): 37 (18 Oct 2023 06:05), Max: 37 (18 Oct 2023 06:05)  T(F): 98.6 (18 Oct 2023 06:05), Max: 98.6 (18 Oct 2023 06:05)  HR: 69 (18 Oct 2023 06:05) (69 - 86)  BP: 124/82 (18 Oct 2023 06:05) (93/52 - 143/65)  BP(mean): --  ABP: --  ABP(mean): --  RR: 18 (18 Oct 2023 06:05) (18 - 20)  SpO2: 100% (18 Oct 2023 06:05) (99% - 100%)    O2 Parameters below as of 18 Oct 2023 06:05  Patient On (Oxygen Delivery Method): room air              Physical Examination:    General: No acute distress.  Alert, oriented, interactive, nonfocal    HEENT: Pupils equal, reactive to light.  Symmetric.    PULM: Clear to auscultation bilaterally, no significant sputum production    CVS: Regular rate and rhythm, no murmurs, rubs, or gallops    ABD: Soft, nondistended, nontender, normoactive bowel sounds, no masses    EXT: fistula   right laterla leg ulceration redness with purulent secretion     SKIN: Warm and well perfused, no rashes noted.    Neurology : no motor or sensory deficit             ABG - ( 18 Oct 2023 04:30 )  pH, Arterial: 7.16  pH, Blood: x     /  pCO2: 28    /  pO2: 136   / HCO3: 10    / Base Excess: -17.3 /  SaO2: 99.0                I&O's Detail    17 Oct 2023 07:01  -  18 Oct 2023 07:00  --------------------------------------------------------  IN:    Sodium Bicarbonate: 800 mL  Total IN: 800 mL    OUT:    Voided (mL): 25 mL  Total OUT: 25 mL    Total NET: 775 mL            LABS:                        8.6    9.94  )-----------( 142      ( 17 Oct 2023 14:30 )             23.4     17 Oct 2023 14:30    114    |  77     |  134    ----------------------------<  292    4.6     |  8      |  15.6     Ca    7.0        17 Oct 2023 14:30    TPro  7.4    /  Alb  3.8    /  TBili  0.4    /  DBili  x      /  AST  92     /  ALT  42     /  AlkPhos  164    17 Oct 2023 14:30  Amylase x     lipase x              CAPILLARY BLOOD GLUCOSE      POCT Blood Glucose.: 344 mg/dL (18 Oct 2023 05:41)    PT/INR - ( 17 Oct 2023 14:30 )   PT: 14.20 sec;   INR: 1.24 ratio         PTT - ( 17 Oct 2023 14:30 )  PTT:39.6 sec  Urinalysis Basic - ( 17 Oct 2023 14:30 )    Color: x / Appearance: x / SG: x / pH: x  Gluc: 292 mg/dL / Ketone: x  / Bili: x / Urobili: x   Blood: x / Protein: x / Nitrite: x   Leuk Esterase: x / RBC: x / WBC x   Sq Epi: x / Non Sq Epi: x / Bacteria: x      Culture    Lactate, Blood: 0.8 mmol/L (10-17-23 @ 14:30)      MEDICATIONS  (STANDING):  chlorhexidine 2% Cloths 1 Application(s) Topical <User Schedule>  dextrose 5%. 1000 milliLiter(s) (50 mL/Hr) IV Continuous <Continuous>  dextrose 50% Injectable 25 Gram(s) IV Push once  folic acid 1 milliGRAM(s) Oral daily  glucagon  Injectable 1 milliGRAM(s) IntraMuscular once  heparin   Injectable 5000 Unit(s) SubCutaneous every 8 hours  insulin lispro (ADMELOG) corrective regimen sliding scale   SubCutaneous every 6 hours  multivitamin 1 Tablet(s) Oral daily  pantoprazole  Injectable 40 milliGRAM(s) IV Push daily  sodium bicarbonate  Infusion 0.207 mEq/kG/Hr (100 mL/Hr) IV Continuous <Continuous>  thiamine 100 milliGRAM(s) Oral daily    MEDICATIONS  (PRN):  dextrose Oral Gel 15 Gram(s) Oral once PRN Blood Glucose LESS THAN 70 milliGRAM(s)/deciliter  LORazepam   Injectable 1 milliGRAM(s) IV Push every 4 hours PRN CIWA-Ar score increase by 2 points and a total score of 7 or less        RADIOLOGY: ***   CXR:  TLC:  OG:  ET tube:        CAM ICU:  ECHO:

## 2023-10-18 NOTE — CONSULT NOTE ADULT - SUBJECTIVE AND OBJECTIVE BOX
INFECTIOUS DISEASE CONSULT NOTE    Patient is a 58y old  Male who presents with a chief complaint of Worsening Renal Failure (18 Oct 2023 10:12)    HPI:  59 y/o male with hx of ETOH abuse, CKD with left wrist fistula, NIDDM , HTN, HLD, presented to the ED for decreased urinary output and increased thirst since yesterday. Patient was brought to the ED by daughter who states that the patient has a right lower leg abscess that has increased in size with purulent drainage for one week after sustaining trauma to the right leg two weeks ago. As per patient, admits to frequent urination about 5 times a day with little to no urinary output. Patient admits to increased thirst along with suprapubic abdominal pain and dysuria. Patient denies fever/chills, nausea/vomiting, SOB, chest pain , heart palpitations, cough, congestion, appetite loss, changes in bowel movements, hematuria or pyuria.     (17 Oct 2023 23:29)         Prior hospital charts reviewed [Yes]  Primary team notes reviewed [Yes]  Other consultant notes reviewed [Yes]    REVIEW OF SYSTEMS:  CONSTITUTIONAL: No fever or chills  HEAD: No lesion on scalp  EYES: No visual disturbance  ENT: No sore throat  RESPIRATORY: No cough, no shortness of breath  CARDIOVASCULAR: No chest pain or palpitations  GASTROINTESTINAL: No abdominal or epigastric pain  GENITOURINARY: No dysuria  NEUROLOGICAL: No headache/dizziness  MUSCULOSKELETAL: No joint pain, erythema, or swelling; no back pain  SKIN: No itching, rashes  All other ROS negative except noted above    PAST MEDICAL & SURGICAL HISTORY:  Diabetes      Hypertension      HLD (hyperlipidemia)      Neuropathy      Glaucoma      Chronic kidney disease, unspecified CKD stage  requiring HD march 2022      ETOH abuse      H/O colonoscopy          SOCIAL HISTORY:  - Born in _____, migrated to  in 19XX  - Currently working as / Retired  - Lives with _____; no pets  - No recent travel  - Denies tobacco use  - Denies alcohol use  - Denies illicit drug use  - Currently sexually active, has one male/female sexual partner    FAMILY HISTORY:  FH: diabetes mellitus (Father)        Allergies:  No Known Drug Allergies  Bananas (Nausea; Urticaria)  apple (Urticaria)      ANTIMICROBIALS:  cefepime   IVPB 500 every 24 hours      ANTIMICROBIALS (past 90 days):  MEDICATIONS  (STANDING):    cefepime   IVPB   100 mL/Hr IV Intermittent (10-17-23 @ 14:23)    vancomycin  IVPB   300 mL/Hr IV Intermittent (10-17-23 @ 14:24)        OTHER MEDS:   MEDICATIONS  (STANDING):  dextrose 50% Injectable 25 once  dextrose Oral Gel 15 once PRN  glucagon  Injectable 1 once  heparin   Injectable 5000 every 8 hours  insulin regular Infusion 1 <Continuous>  LORazepam   Injectable 1 every 4 hours PRN  pantoprazole  Injectable 40 daily      VITALS:  Vital Signs Last 24 Hrs  T(F): 98.6 (10-18-23 @ 06:05), Max: 98.6 (10-18-23 @ 06:05)    Vital Signs Last 24 Hrs  HR: 71 (10-18-23 @ 11:00) (69 - 86)  BP: 133/81 (10-18-23 @ 11:00) (93/52 - 143/65)  RR: 18 (10-18-23 @ 11:00)  SpO2: 100% (10-18-23 @ 11:00) (99% - 100%)  Wt(kg): --    EXAM:  GENERAL: NAD, lying in bed  HEAD: No head lesions  EYES: Conjunctiva pink and cornea white  EAR, NOSE, MOUTH, THROAT: Normal external ears and nose, no discharges; moist mucous membranes  NECK: Supple, nontender to palpation; no JVD  RESPIRATORY: Clear to auscultation bilaterally  CARDIOVASCULAR: S1 S2  GASTROINTESTINAL: Soft, nontender, nondistended; normoactive bowel sounds  EXTREMITIES: No clubbing, cyanosis, or petal edema  NERVOUS SYSTEM: Alert and oriented to person, time, place and situation, speech clear. No focal deficits   MUSCULOSKELETAL: No joint erythema, swelling or pain  SKIN: No rashes or lesions, no superficial thrombophlebitis  PSYCH: Normal affect    Labs:                        8.6    9.72  )-----------( 159      ( 18 Oct 2023 08:43 )             23.4     10-18    120<L>  |  80<L>  |  138<HH>  ----------------------------<  250<H>  3.9   |  11<L>  |  16.3<HH>    Ca    7.0<L>      18 Oct 2023 08:43  Phos  10.4     10-18  Mg     2.2     10-18    TPro  7.4  /  Alb  3.8  /  TBili  0.4  /  DBili  x   /  AST  92<H>  /  ALT  42<H>  /  AlkPhos  164<H>  10-17      WBC Trend:  WBC Count: 9.72 (10-18-23 @ 08:43)  WBC Count: 9.94 (10-17-23 @ 14:30)      Auto Neutrophil #: 9.08 K/uL (10-17-23 @ 14:30)  Auto Neutrophil #: 2.67 K/uL (05-02-23 @ 08:06)  Auto Neutrophil #: 4.85 K/uL (11-30-22 @ 07:08)  Auto Neutrophil #: 4.20 K/uL (11-29-22 @ 06:40)  Auto Neutrophil #: 3.20 K/uL (11-28-22 @ 06:28)      Creatine Trend:  Creatinine: 16.3 (10-18)  Creatinine: 15.6 (10-17)      Liver Biochemical Testing Trend:  Alanine Aminotransferase (ALT/SGPT): 42 *H* (10-17)  Alanine Aminotransferase (ALT/SGPT): 19 (05-02)  Alanine Aminotransferase (ALT/SGPT): 14 (11-24)  Alanine Aminotransferase (ALT/SGPT): 16 (11-23)  Alanine Aminotransferase (ALT/SGPT): 16 (11-22)  Aspartate Aminotransferase (AST/SGOT): 92 (10-17-23 @ 14:30)  Aspartate Aminotransferase (AST/SGOT): 26 (05-02-23 @ 08:06)  Aspartate Aminotransferase (AST/SGOT): 27 (11-24-22 @ 06:48)  Aspartate Aminotransferase (AST/SGOT): 23 (11-23-22 @ 06:40)  Aspartate Aminotransferase (AST/SGOT): 24 (11-22-22 @ 05:45)  Bilirubin Total: 0.4 (10-17)  Bilirubin Total, Serum: 0.6 (05-02)  Bilirubin Total, Serum: 0.4 (11-24)  Bilirubin Total, Serum: 0.5 (11-23)  Bilirubin Total, Serum: 0.7 (11-22)      Trend LDH      Auto Eosinophil %: 0.0 % (10-17-23 @ 14:30)      Urinalysis Basic - ( 18 Oct 2023 08:43 )    Color: x / Appearance: x / SG: x / pH: x  Gluc: 250 mg/dL / Ketone: x  / Bili: x / Urobili: x   Blood: x / Protein: x / Nitrite: x   Leuk Esterase: x / RBC: x / WBC x   Sq Epi: x / Non Sq Epi: x / Bacteria: x        MICROBIOLOGY:    C-Reactive Protein, Serum: 217.3 (10-17)  Blood Gas Arterial, Lactate: 0.60 (10-18 @ 04:30)  Lactate, Blood: 0.8 (10-17 @ 14:30)    A1C with Estimated Average Glucose Result: 5.8 % (05-02-23 @ 08:06)      RADIOLOGY:  imaging below personally reviewed      < from: CT Head No Cont (10.17.23 @ 22:08) >  IMPRESSION:    No acute intracranial pathology.    Stable mild chronic microvascular ischemic changes.    < end of copied text >    < from: CT Abdomen and Pelvis No Cont (10.17.23 @ 19:12) >  FINDINGS:  Evaluation of solid organs and vascular structures is limited due to lack   of intravenous contrast.    LOWER CHEST: There is bibasilar subsegmental atelectasis.    HEPATOBILIARY: Unremarkable unenhanced liver. Cholelithiasis.    SPLEEN: Unremarkable.    PANCREAS: Unremarkable.    ADRENAL GLANDS: Unremarkable.    KIDNEYS: No hydronephrosis. Nonobstructing right renal nephrolithiasis.    ABDOMINOPELVIC NODES: Partially imaged right inguinal inflammatory change   and mildly enlarged lymph nodes    PELVIC ORGANS: Urinary bladder Contracted around a Blanco catheter and   therefore not well evaluated    PERITONEUM/MESENTERY/BOWEL: No bowel obstruction, ascites or free   intraperitoneal air. Normal caliber appendix    BONES/SOFT TISSUES: Degenerative changes of the spine.    IMPRESSION:  Partially imaged right inguinal inflammatory change and mildly enlarged   lymph nodes. Recommend dedicated imaging for further evaluation.    < end of copied text >    < from: CT Lower Extremity No Cont, Right (10.17.23 @ 19:12) >  IMPRESSION:  1. Focal subcutaneous soft tissue thickening with associated inflammatory   changes along the right superficial inguinal region, suspicious for   phlegmon; no evidence of subcutaneous gas to suggest necrotizing   fasciitis.  2. Several mildly enlarged right superficial and deep inguinal lymph   nodes, possibly reactive.      < end of copied text >   INFECTIOUS DISEASE CONSULT NOTE    Patient is a 58y old  Male who presents with a chief complaint of Worsening Renal Failure (18 Oct 2023 10:12)    HPI:  59 y/o male with hx of ETOH abuse, CKD with left wrist fistula, NIDDM , HTN, HLD, presented to the ED for decreased urinary output and increased thirst since yesterday. Patient was brought to the ED by daughter who states that the patient has a right lower leg abscess that has increased in size with purulent drainage for one week after sustaining trauma to the right leg two weeks ago. As per patient, admits to frequent urination about 5 times a day with little to no urinary output. Patient admits to increased thirst along with suprapubic abdominal pain and dysuria. Patient denies fever/chills, nausea/vomiting, SOB, chest pain , heart palpitations, cough, congestion, appetite loss, changes in bowel movements, hematuria or pyuria.     (17 Oct 2023 23:29)     Prior hospital charts reviewed [Yes]  Primary team notes reviewed [Yes]  Other consultant notes reviewed [Yes]    REVIEW OF SYSTEMS:  Unable to obtain due to cognitive impairment    PAST MEDICAL & SURGICAL HISTORY:  Diabetes      Hypertension      HLD (hyperlipidemia)      Neuropathy      Glaucoma      Chronic kidney disease, unspecified CKD stage  requiring HD march 2022      ETOH abuse      H/O colonoscopy          SOCIAL HISTORY:  - Denies tobacco use  - Denies alcohol use  - Denies illicit drug use      FAMILY HISTORY:  FH: diabetes mellitus (Father)        Allergies:  No Known Drug Allergies  Bananas (Nausea; Urticaria)  apple (Urticaria)      ANTIMICROBIALS:  cefepime   IVPB 500 every 24 hours      ANTIMICROBIALS (past 90 days):  MEDICATIONS  (STANDING):    cefepime   IVPB   100 mL/Hr IV Intermittent (10-17-23 @ 14:23)    vancomycin  IVPB   300 mL/Hr IV Intermittent (10-17-23 @ 14:24)        OTHER MEDS:   MEDICATIONS  (STANDING):  dextrose 50% Injectable 25 once  dextrose Oral Gel 15 once PRN  glucagon  Injectable 1 once  heparin   Injectable 5000 every 8 hours  insulin regular Infusion 1 <Continuous>  LORazepam   Injectable 1 every 4 hours PRN  pantoprazole  Injectable 40 daily      VITALS:  Vital Signs Last 24 Hrs  T(F): 98.6 (10-18-23 @ 06:05), Max: 98.6 (10-18-23 @ 06:05)    Vital Signs Last 24 Hrs  HR: 71 (10-18-23 @ 11:00) (69 - 86)  BP: 133/81 (10-18-23 @ 11:00) (93/52 - 143/65)  RR: 18 (10-18-23 @ 11:00)  SpO2: 100% (10-18-23 @ 11:00) (99% - 100%)  Wt(kg): --    EXAM:  GENERAL: restless, lying in bed  HEAD: No head lesions  EYES: Conjunctiva pink and cornea white  EAR, NOSE, MOUTH, THROAT: Normal external ears and nose, no discharges; moist mucous membranes; poor dentition  NECK: Supple, nontender to palpation; no JVD  RESPIRATORY: Clear to auscultation bilaterally  CARDIOVASCULAR: S1 S2  GASTROINTESTINAL: Soft, nontender, distended abdomen; normoactive bowel sounds  EXTREMITIES: No clubbing, cyanosis, or petal edema  NERVOUS SYSTEM: Alert and awake but not oriented  MUSCULOSKELETAL: No joint erythema, swelling or pain  SKIN: Enlarged right groin LN with erythema and warmth, crusted wound on lateral right LE with mild drainage, no superficial thrombophlebitis  PSYCH: Normal affect    Labs:                        8.6    9.72  )-----------( 159      ( 18 Oct 2023 08:43 )             23.4     10-18    120<L>  |  80<L>  |  138<HH>  ----------------------------<  250<H>  3.9   |  11<L>  |  16.3<HH>    Ca    7.0<L>      18 Oct 2023 08:43  Phos  10.4     10-18  Mg     2.2     10-18    TPro  7.4  /  Alb  3.8  /  TBili  0.4  /  DBili  x   /  AST  92<H>  /  ALT  42<H>  /  AlkPhos  164<H>  10-17      WBC Trend:  WBC Count: 9.72 (10-18-23 @ 08:43)  WBC Count: 9.94 (10-17-23 @ 14:30)      Auto Neutrophil #: 9.08 K/uL (10-17-23 @ 14:30)  Auto Neutrophil #: 2.67 K/uL (05-02-23 @ 08:06)  Auto Neutrophil #: 4.85 K/uL (11-30-22 @ 07:08)  Auto Neutrophil #: 4.20 K/uL (11-29-22 @ 06:40)  Auto Neutrophil #: 3.20 K/uL (11-28-22 @ 06:28)      Creatine Trend:  Creatinine: 16.3 (10-18)  Creatinine: 15.6 (10-17)      Liver Biochemical Testing Trend:  Alanine Aminotransferase (ALT/SGPT): 42 *H* (10-17)  Alanine Aminotransferase (ALT/SGPT): 19 (05-02)  Alanine Aminotransferase (ALT/SGPT): 14 (11-24)  Alanine Aminotransferase (ALT/SGPT): 16 (11-23)  Alanine Aminotransferase (ALT/SGPT): 16 (11-22)  Aspartate Aminotransferase (AST/SGOT): 92 (10-17-23 @ 14:30)  Aspartate Aminotransferase (AST/SGOT): 26 (05-02-23 @ 08:06)  Aspartate Aminotransferase (AST/SGOT): 27 (11-24-22 @ 06:48)  Aspartate Aminotransferase (AST/SGOT): 23 (11-23-22 @ 06:40)  Aspartate Aminotransferase (AST/SGOT): 24 (11-22-22 @ 05:45)  Bilirubin Total: 0.4 (10-17)  Bilirubin Total, Serum: 0.6 (05-02)  Bilirubin Total, Serum: 0.4 (11-24)  Bilirubin Total, Serum: 0.5 (11-23)  Bilirubin Total, Serum: 0.7 (11-22)      Trend LDH      Auto Eosinophil %: 0.0 % (10-17-23 @ 14:30)      Urinalysis Basic - ( 18 Oct 2023 08:43 )    Color: x / Appearance: x / SG: x / pH: x  Gluc: 250 mg/dL / Ketone: x  / Bili: x / Urobili: x   Blood: x / Protein: x / Nitrite: x   Leuk Esterase: x / RBC: x / WBC x   Sq Epi: x / Non Sq Epi: x / Bacteria: x        MICROBIOLOGY:    C-Reactive Protein, Serum: 217.3 (10-17)  Blood Gas Arterial, Lactate: 0.60 (10-18 @ 04:30)  Lactate, Blood: 0.8 (10-17 @ 14:30)    A1C with Estimated Average Glucose Result: 5.8 % (05-02-23 @ 08:06)      RADIOLOGY:  imaging below personally reviewed      < from: CT Head No Cont (10.17.23 @ 22:08) >  IMPRESSION:    No acute intracranial pathology.    Stable mild chronic microvascular ischemic changes.    < end of copied text >    < from: CT Abdomen and Pelvis No Cont (10.17.23 @ 19:12) >  FINDINGS:  Evaluation of solid organs and vascular structures is limited due to lack   of intravenous contrast.    LOWER CHEST: There is bibasilar subsegmental atelectasis.    HEPATOBILIARY: Unremarkable unenhanced liver. Cholelithiasis.    SPLEEN: Unremarkable.    PANCREAS: Unremarkable.    ADRENAL GLANDS: Unremarkable.    KIDNEYS: No hydronephrosis. Nonobstructing right renal nephrolithiasis.    ABDOMINOPELVIC NODES: Partially imaged right inguinal inflammatory change   and mildly enlarged lymph nodes    PELVIC ORGANS: Urinary bladder Contracted around a Blanco catheter and   therefore not well evaluated    PERITONEUM/MESENTERY/BOWEL: No bowel obstruction, ascites or free   intraperitoneal air. Normal caliber appendix    BONES/SOFT TISSUES: Degenerative changes of the spine.    IMPRESSION:  Partially imaged right inguinal inflammatory change and mildly enlarged   lymph nodes. Recommend dedicated imaging for further evaluation.    < end of copied text >    < from: CT Lower Extremity No Cont, Right (10.17.23 @ 19:12) >  IMPRESSION:  1. Focal subcutaneous soft tissue thickening with associated inflammatory   changes along the right superficial inguinal region, suspicious for   phlegmon; no evidence of subcutaneous gas to suggest necrotizing   fasciitis.  2. Several mildly enlarged right superficial and deep inguinal lymph   nodes, possibly reactive.      < end of copied text >

## 2023-10-18 NOTE — CONSULT NOTE ADULT - ASSESSMENT
IMPRESSION:  metabolic acidosis   VAMSI on ckd   sepsis   leg ulceration ?? abscess   hyperglycemia ?? DKA     PLAN:    CNS: no sedation     HEENT: oral care     PULMONARY: keep pox >92 %     CARDIOVASCULAR: continue IV bicarb drip follow morning bicarb   CE   echo       GI: GI prophylaxis.       RENAL:renal consult for possible HD   way   monitor is and os   lytes       INFECTIOUS DISEASE:s/pvanco and cefepime   continue abx   wound cx   follow G sx   ID consult     HEMATOLOGICAL:  DVT prophylaxis doppler lower ext     ENDOCRINE:  Follow up FS.    check hydroxybuterate   start inuslin drip follow       MUSCULOSKELETAL:  MICU       CRITICAL CARE TIME SPENT: ***

## 2023-10-18 NOTE — CONSULT NOTE ADULT - ASSESSMENT
ID is consulted for infected RLE wound  Afebrile on admission, no leukocytosis  Sustained trauma at right lower leg 2 weeks ago, now with increasing abscess  BCx x2 pending  CT Ab/pelvis showed partially imaged right inguinal inflammatory change and mildly enlarged lymph nodes  CT RLE showed focal subcutaneous soft tissue thickening with associated inflammatory  changes along the right superficial inguinal region, suspicious for phlegmon    Antibiotics:  Vancomycin 10/17  Cefepime 10/17 ->      IMPRESSION:      RECOMMENDATIONS:        * THIS IS AN INCOMPLETE NOTE. FINAL RECOMMENDATION IS PENDING *       57 y/o male with hx of ETOH abuse, CKD with left wrist fistula, NIDDM , HTN, HLD, presented to the ED for decreased urinary output and increased thirst since yesterday.    History provided by daughter at bedside    ID is consulted for infected RLE wound  Afebrile on admission, no leukocytosis  Sustained trauma at right lower leg 2 weeks ago, now with increasing abscess  BCx x2 pending  CT Ab/pelvis showed partially imaged right inguinal inflammatory change and mildly enlarged lymph nodes  CT RLE showed focal subcutaneous soft tissue thickening with associated inflammatory  changes along the right superficial inguinal region, suspicious for phlegmon    Antibiotics:  Vancomycin 10/17  Cefepime 10/17 ->    Right LE wound is not overtly infected, but there is purulent drainage expressed from wound, Cx sent  Concerning for cellulitis with possible early abscess formation at right groin, CT showed possible phlegmon formation  Acute encephalopathy possibly from uremia  Distended abdomen but CT A/P showed no obstruction, would repeat KUB      IMPRESSION:  Right LE wound  Right groin lymphadenopathy  Distended abdomen  Uremic encephalopathy  VAMSI on CKD    RECOMMENDATIONS:  - Repeat vancomycin level in AM, if < 15, can give one dose of IV vancomycin 1000mg  - Dose vancomycin per level  - D/C cefepime in light of encephalopathy and acute renal failure, start IV Zosyn 3.375gm q12hrs  - Follow up BCx, UCx, and abscess Cx  - Repeat KUB and recall surgery if there is concern for obstruction  - Nephrology for HD  - Trend WBC, fever curve, transaminases, creatinine daily  - Will continue to follow      Shaylee Olivas D.O.  Attending Physician  Division of Infectious Diseases  [facility]  Please contact me via [employer] Teams

## 2023-10-18 NOTE — PROGRESS NOTE ADULT - ASSESSMENT
Plan:   1. acute on chronic Renal failure with high anion gap metabolic acidosis  - started IV sodium bicarb drip   - check I&Os per ICU hourly   - nephrology consult @ am  - possible RRT    2. Severe Hyponatremia with slight confusion   - urine electrolytes   - check thyroid function test, cortisol and lipid panels.     3. Hyperglycemia   - fingersticks with insulin coverage     4. ETOH abuse   - IV thyamine, folic acid, multivitamin CIWA with benzo protocol    -watch for electrolyte imbalance. Plan:   1. acute on chronic Renal failure with high anion gap metabolic acidosis  - started IV sodium bicarb drip   - check I&Os per ICU hourly   - nephrology consult noted. started on HD via AVF    2. Severe Hyponatremia with slight confusion   - urine electrolytes   - check thyroid function test, cortisol and lipid panels.   - BMP q 6 hr  - avoid > 8 meq increase in Na level over 24 hours.    3. Hyperglycemia   - fingersticks with insulin coverage   - BHB elevated.  - started on inuslin gtt  - follow repeat BHB  - cautious hydration. high risk for volume overload considering severe VAMSI on CKD    4. ETOH abuse   - IV thyamine, folic acid, multivitamin CIWA with benzo protocol    -watch for electrolyte imbalance.

## 2023-10-18 NOTE — CONSULT NOTE ADULT - ASSESSMENT
1)  Stage 4 CKD secondary to DM, HTN, incomplete recovery from previous severe VAMSI    2)  Severe VAMSI on CKD ? if due to sepsis.  No evidence of bladder outlet obstruction, ? if obstructed above level of bladder    3)  HAGMA likely due to uremia, not much better after IV NaHCO3    4)  Hyponatremia likely due to what appears to be anephric state w/ expanced intravascular volume    Recommendations:    1)  Pt needs acute HD today for refractory severe acidosis and delirium which is at least partially due to uremia    2)  Will try to use LUE AVF.  However, may not be useable at present, in which case will need [place]    3)  F/U cultures of right lateral leg purulent drainage    4)  2 sets blood C&S    5)  ID currently seeing pt for Abx recommendations

## 2023-10-18 NOTE — PROGRESS NOTE ADULT - ASSESSMENT
58Mm with PMH of DM, HTN, HLD, ESRD on HD, ETOH abuse, s/p left AV fistula (5/2023 Dr. De Leon presents with weakness, AMS, decreased urine output.  Found to be in significant renal failure, hyponatremic, altered with RLE wound for 1 week.     Plan:  - Recommend dedicated ultrasound of right groin and right leg.   - Recommend local wound care to RLE, topical bacitracin, gauze and paper tape.   - Low suspicion of necrotizing soft tissue infection.  Likely spontaneously drained abscess  - Continue broad spectrum abx  - Renal consult for possible HD given acidosis  - NPO  - IVF  - may require further I and D once stabilized, possible debridement of right lower extremity  - Surgery team following     58Mm with PMH of DM, HTN, HLD, ESRD on HD, ETOH abuse, s/p left AV fistula (5/2023 Dr. De Leon presents with weakness, AMS, decreased urine output.  Found to be in significant renal failure, hyponatremic, altered with RLE wound for 1 week.     Plan:  - Recommend dedicated ultrasound of right groin and right leg.   - Recommend local wound care to RLE, topical bacitracin, gauze and paper tape.  - Low suspicion of necrotizing soft tissue infection.  Likely spontaneously drained abscess with lymphadenitis  - Continue broad spectrum abx  - Renal consult for HD - in setting of metabolic acidosis  - NPO  - IVF  - may benefit from I and D/biopsy/debridement once stabilized, with observation and reevaluation of R groin for abscess formation.  - Surgery will follow

## 2023-10-18 NOTE — PATIENT PROFILE ADULT - FALL HARM RISK - RISK INTERVENTIONS
Assistance OOB with selected safe patient handling equipment/Assistance with ambulation/Communicate Fall Risk and Risk Factors to all staff, patient, and family/Discuss with provider need for PT consult/Monitor for mental status changes/Monitor gait and stability/Provide patient with walking aids - walker, cane, crutches/Reinforce activity limits and safety measures with patient and family/Toileting schedule using arm’s reach rule for commode and bathroom/Use of alarms - bed, chair and/or voice tab/Visual Cue: Yellow wristband/Bed in lowest position, wheels locked, appropriate side rails in place/Call bell, personal items and telephone in reach/Instruct patient to call for assistance before getting out of bed or chair/Non-slip footwear when patient is out of bed/[place] to call system/Physically safe environment - no spills, clutter or unnecessary equipment/Purposeful Proactive Rounding/Room/bathroom lighting operational, light cord in reach

## 2023-10-18 NOTE — PROGRESS NOTE ADULT - SUBJECTIVE AND OBJECTIVE BOX
SUBJECTIVE:    Patient is a 58y old Male who presents with a chief complaint of Worsening Renal Failure (18 Oct 2023 08:57)    Currently admitted to medicine with the primary diagnosis of Acute renal failure        Interval history:     Overnight events noted.   Admit Diagnosis:  Acute renal failure        PAST MEDICAL & SURGICAL HISTORY  Diabetes    Hypertension    HLD (hyperlipidemia)    Neuropathy    Glaucoma    Chronic kidney disease, unspecified CKD stage  requiring HD march 2022    ETOH abuse    H/O colonoscopy    No pertinent past medical history    Diabetes    Hypertension    HLD (hyperlipidemia)    Neuropathy    Glaucoma    Chronic kidney disease, unspecified CKD stage    Chronic alcohol abuse    ETOH abuse    No significant past surgical history    H/O colonoscopy        SOCIAL HISTORY:  Negative for smoking/alcohol/drug use.     ALLERGIES:  No Known Drug Allergies  Bananas (Nausea; Urticaria)  apple (Urticaria)      PHYSICAL EXAM:  GEN: No acute distress  LUNGS: Clear to auscultation bilaterally   HEART: S1/S2  ABD: Soft, NT/ND. BS +  EXT: no cyanosis/edema  NEURO: AAOX3    Intravenous access:   NG tube:   Blanco Catheter:     VITALS:   T(C): 37 (10-18-23 @ 06:05), Max: 37 (10-18-23 @ 06:05)  T(F): 98.6 (10-18-23 @ 06:05), Max: 98.6 (10-18-23 @ 06:05)  HR: 70 (10-18-23 @ 09:00) (69 - 86)  BP: 125/69 (10-18-23 @ 09:00) (93/52 - 143/65)  RR: 18 (10-18-23 @ 09:00) (18 - 20)  SpO2: 100% (10-18-23 @ 09:00) (99% - 100%)    I&Os:  10-17-23 @ 07:01  -  10-18-23 @ 07:00  --------------------------------------------------------  IN: 800 mL / OUT: 25 mL / NET: 775 mL        MEDICATIONS:  STANDING:  cefepime   IVPB 500 milliGRAM(s) IV Intermittent every 24 hours, 10-18-23 @ 09:13  chlorhexidine 2% Cloths 1 Application(s) Topical <User Schedule>, 10-18-23 @ 01:03  dextrose 5%. 1000 milliLiter(s) (50 mL/Hr) IV Continuous <Continuous>, 10-18-23 @ 01:10  dextrose 50% Injectable 25 Gram(s) IV Push once, 10-18-23 @ 01:10  folic acid 1 milliGRAM(s) Oral daily, 10-18-23 @ 01:07  glucagon  Injectable 1 milliGRAM(s) IntraMuscular once, 10-18-23 @ 01:10  heparin   Injectable 5000 Unit(s) SubCutaneous every 8 hours, 10-18-23 @ 01:03  insulin lispro (ADMELOG) corrective regimen sliding scale   SubCutaneous three times a day before meals, 10-18-23 @ 09:07  insulin lispro (ADMELOG) corrective regimen sliding scale   SubCutaneous at bedtime, 10-18-23 @ 22:00  multivitamin 1 Tablet(s) Oral daily, 10-18-23 @ 01:07  pantoprazole  Injectable 40 milliGRAM(s) IV Push daily, 10-18-23 @ 01:03  sodium bicarbonate  Infusion 0.207 mEq/kG/Hr (100 mL/Hr) IV Continuous <Continuous>, 10-17-23 @ 16:52  thiamine 100 milliGRAM(s) Oral daily, 10-18-23 @ 01:07      PRN:  dextrose Oral Gel 15 Gram(s) Oral once, 10-18-23 @ 01:10 PRN  LORazepam   Injectable 1 milliGRAM(s) IV Push every 4 hours, 10-18-23 @ 01:07 PRN    Diet, NPO:   Except Medications     Special Instructions for Nursing:  Except Medications (10-18-23 @ 01:10) [Active]    LABS:                        8.6    9.72  )-----------( 159      ( 18 Oct 2023 08:43 )             23.4     WBC trend: 9.72 <--, 9.94 <--  Hgb: 8.6 [10-18-23 @ 08:43]<--, 8.6 [10-17-23 @ 14:30]<--    10-18    120<L>  |  80<L>  |  138<HH>  ----------------------------<  250<H>  3.9   |  11<L>  |  16.3<HH>    Ca    7.0<L>      18 Oct 2023 08:43  Phos  10.4     10-18  Mg     2.2     10-18    TPro  7.4  /  Alb  3.8  /  TBili  0.4  /  DBili  x   /  AST  92<H>  /  ALT  42<H>  /  AlkPhos  164<H>  10-17    Creatinine trend: 16.3<--, 15.6<--    SODIUM TREND: Sodium 120 [10-18 @ 08:43]<--, Sodium 114 [10-17 @ 14:30]<--  PT/INR - ( 17 Oct 2023 14:30 )   PT: 14.20 sec;   INR: 1.24 ratio         PTT - ( 17 Oct 2023 14:30 )  PTT:39.6 sec  POC Glucose: 344[10-18-23 @ 05:41]<--, 298[10-17-23 @ 14:29]<--  ABG - ( 18 Oct 2023 04:30 )  pH, Arterial: 7.16  pH, Blood: x     /  pCO2: 28    /  pO2: 136   / HCO3: 10    / Base Excess: -17.3 /  SaO2: 99.0        C-Reactive Protein, Serum: 217.3 mg/L (10-17-23 @ 14:30)      Sedimentation Rate, Erythrocyte: 146 mm/Hr (10-17-23 @ 14:30)    Urinalysis Basic - ( 18 Oct 2023 08:43 )    Color: x / Appearance: x / SG: x / pH: x  Gluc: 250 mg/dL / Ketone: x  / Bili: x / Urobili: x   Blood: x / Protein: x / Nitrite: x   Leuk Esterase: x / RBC: x / WBC x   Sq Epi: x / Non Sq Epi: x / Bacteria: x      RADIOLOGY:   SUBJECTIVE:    Patient is a 58y old Male who presents with a chief complaint of Worsening Renal Failure (18 Oct 2023 08:57)    Currently admitted to medicine with the primary diagnosis of Acute renal failure      Interval history:     Overnight events noted. Patient seems confused.     Admit Diagnosis:  Acute renal failure        PAST MEDICAL & SURGICAL HISTORY  Diabetes    Hypertension    HLD (hyperlipidemia)    Neuropathy    Glaucoma    Chronic kidney disease, unspecified CKD stage  requiring HD march 2022    ETOH abuse    H/O colonoscopy    No pertinent past medical history    Diabetes    Hypertension    HLD (hyperlipidemia)    Neuropathy    Glaucoma    Chronic kidney disease, unspecified CKD stage    Chronic alcohol abuse    ETOH abuse    No significant past surgical history    H/O colonoscopy        SOCIAL HISTORY:  Negative for smoking/alcohol/drug use.     ALLERGIES:  No Known Drug Allergies  Bananas (Nausea; Urticaria)  apple (Urticaria)      PHYSICAL EXAM:  GEN: No acute distress  HEAD: atraumatic, normocephalic  EYES: Pink conjunctiva. PERRL  ENT: moist mucus membranes  LUNGS: Clear to auscultation bilaterally   HEART: S1/S2  ABD: Soft, NT/ND. BS +  EXT: no cyanosis/edema. right leg wound noted just below knee with active pus.  NEURO: AAOX1-2    Intravenous access:   NG tube:   Blanco Catheter:     VITALS:   T(C): 37 (10-18-23 @ 06:05), Max: 37 (10-18-23 @ 06:05)  T(F): 98.6 (10-18-23 @ 06:05), Max: 98.6 (10-18-23 @ 06:05)  HR: 70 (10-18-23 @ 09:00) (69 - 86)  BP: 125/69 (10-18-23 @ 09:00) (93/52 - 143/65)  RR: 18 (10-18-23 @ 09:00) (18 - 20)  SpO2: 100% (10-18-23 @ 09:00) (99% - 100%)    I&Os:  10-17-23 @ 07:01  -  10-18-23 @ 07:00  --------------------------------------------------------  IN: 800 mL / OUT: 25 mL / NET: 775 mL        MEDICATIONS:  STANDING:  cefepime   IVPB 500 milliGRAM(s) IV Intermittent every 24 hours, 10-18-23 @ 09:13  chlorhexidine 2% Cloths 1 Application(s) Topical <User Schedule>, 10-18-23 @ 01:03  dextrose 5%. 1000 milliLiter(s) (50 mL/Hr) IV Continuous <Continuous>, 10-18-23 @ 01:10  dextrose 50% Injectable 25 Gram(s) IV Push once, 10-18-23 @ 01:10  folic acid 1 milliGRAM(s) Oral daily, 10-18-23 @ 01:07  glucagon  Injectable 1 milliGRAM(s) IntraMuscular once, 10-18-23 @ 01:10  heparin   Injectable 5000 Unit(s) SubCutaneous every 8 hours, 10-18-23 @ 01:03  insulin lispro (ADMELOG) corrective regimen sliding scale   SubCutaneous three times a day before meals, 10-18-23 @ 09:07  insulin lispro (ADMELOG) corrective regimen sliding scale   SubCutaneous at bedtime, 10-18-23 @ 22:00  multivitamin 1 Tablet(s) Oral daily, 10-18-23 @ 01:07  pantoprazole  Injectable 40 milliGRAM(s) IV Push daily, 10-18-23 @ 01:03  sodium bicarbonate  Infusion 0.207 mEq/kG/Hr (100 mL/Hr) IV Continuous <Continuous>, 10-17-23 @ 16:52  thiamine 100 milliGRAM(s) Oral daily, 10-18-23 @ 01:07      PRN:  dextrose Oral Gel 15 Gram(s) Oral once, 10-18-23 @ 01:10 PRN  LORazepam   Injectable 1 milliGRAM(s) IV Push every 4 hours, 10-18-23 @ 01:07 PRN    Diet, NPO:   Except Medications     Special Instructions for Nursing:  Except Medications (10-18-23 @ 01:10) [Active]    LABS:                        8.6    9.72  )-----------( 159      ( 18 Oct 2023 08:43 )             23.4     WBC trend: 9.72 <--, 9.94 <--  Hgb: 8.6 [10-18-23 @ 08:43]<--, 8.6 [10-17-23 @ 14:30]<--    10-18    120<L>  |  80<L>  |  138<HH>  ----------------------------<  250<H>  3.9   |  11<L>  |  16.3<HH>    Ca    7.0<L>      18 Oct 2023 08:43  Phos  10.4     10-18  Mg     2.2     10-18    TPro  7.4  /  Alb  3.8  /  TBili  0.4  /  DBili  x   /  AST  92<H>  /  ALT  42<H>  /  AlkPhos  164<H>  10-17    Creatinine trend: 16.3<--, 15.6<--    SODIUM TREND: Sodium 120 [10-18 @ 08:43]<--, Sodium 114 [10-17 @ 14:30]<--  PT/INR - ( 17 Oct 2023 14:30 )   PT: 14.20 sec;   INR: 1.24 ratio         PTT - ( 17 Oct 2023 14:30 )  PTT:39.6 sec  POC Glucose: 344[10-18-23 @ 05:41]<--, 298[10-17-23 @ 14:29]<--  ABG - ( 18 Oct 2023 04:30 )  pH, Arterial: 7.16  pH, Blood: x     /  pCO2: 28    /  pO2: 136   / HCO3: 10    / Base Excess: -17.3 /  SaO2: 99.0        C-Reactive Protein, Serum: 217.3 mg/L (10-17-23 @ 14:30)      Sedimentation Rate, Erythrocyte: 146 mm/Hr (10-17-23 @ 14:30)    Urinalysis Basic - ( 18 Oct 2023 08:43 )    Color: x / Appearance: x / SG: x / pH: x  Gluc: 250 mg/dL / Ketone: x  / Bili: x / Urobili: x   Blood: x / Protein: x / Nitrite: x   Leuk Esterase: x / RBC: x / WBC x   Sq Epi: x / Non Sq Epi: x / Bacteria: x      RADIOLOGY:

## 2023-10-18 NOTE — CONSULT NOTE ADULT - SUBJECTIVE AND OBJECTIVE BOX
[facility]  INITIAL CONSULT NOTE  --------------------------------------------------------------------------------  HPI:  59 yo male w/ PMHx as below, including Stage 4 CKD, HTN, alcohol abuse.  Recent blunt trauma to right lateral lower leg w/ development of abscess plus mass-like enlargement of LN's right groin, for which he came to hospital.  Found to have Screat 15.6, serum HCO3- 8.  Started on NaHCO3 drip.  Blanco placed, oliguric.  Hemodynamically stable.  Daughter states pt's mental status has deteriorated between yesterday and now (daughter at bedside).        PAST HISTORY  --------------------------------------------------------------------------------  PAST MEDICAL & SURGICAL HISTORY:  Diabetes      Hypertension      HLD (hyperlipidemia)      Neuropathy      Glaucoma      Chronic kidney disease, unspecified CKD stage  requiring HD march 2022      ETOH abuse      H/O colonoscopy        FAMILY HISTORY:  FH: diabetes mellitus (Father)      PAST SOCIAL HISTORY:    ALLERGIES & MEDICATIONS  --------------------------------------------------------------------------------  Allergies    No Known Drug Allergies  Bananas (Nausea; Urticaria)  apple (Urticaria)    Intolerances      Standing Inpatient Medications  cefepime   IVPB 500 milliGRAM(s) IV Intermittent every 24 hours  chlorhexidine 2% Cloths 1 Application(s) Topical <User Schedule>  dextrose 5%. 1000 milliLiter(s) IV Continuous <Continuous>  dextrose 50% Injectable 25 Gram(s) IV Push once  folic acid 1 milliGRAM(s) Oral daily  glucagon  Injectable 1 milliGRAM(s) IntraMuscular once  heparin   Injectable 5000 Unit(s) SubCutaneous every 8 hours  insulin regular Infusion 1 Unit(s)/Hr IV Continuous <Continuous>  multivitamin 1 Tablet(s) Oral daily  pantoprazole  Injectable 40 milliGRAM(s) IV Push daily  sodium bicarbonate  Infusion 0.207 mEq/kG/Hr IV Continuous <Continuous>  thiamine 100 milliGRAM(s) Oral daily    PRN Inpatient Medications  dextrose Oral Gel 15 Gram(s) Oral once PRN  LORazepam   Injectable 1 milliGRAM(s) IV Push every 4 hours PRN      REVIEW OF SYSTEMS  --------------------------------------------------------------------------------  Gen: No weight changes, fatigue, fevers/chills, weakness  Skin: No rashes  Head/Eyes/Ears/Mouth: No headache; Normal hearing; Normal vision w/o blurriness; No sinus pain/discomfort, sore throat  Respiratory: No dyspnea, cough, wheezing, hemoptysis  CV: No chest pain, PND, orthopnea  GI: No abdominal pain, diarrhea, constipation, nausea, vomiting, melena, hematochezia  : No increased frequency, dysuria, hematuria, nocturia  MSK: No joint pain/swelling; no back pain; no edema  Neuro: No dizziness/lightheadedness, weakness, seizures, numbness, tingling  Heme: No easy bruising or bleeding  Endo: No heat/cold intolerance  Psych: No significant nervousness, anxiety, stress, depression    All other systems were reviewed and are negative, except as noted.    VITALS/PHYSICAL EXAM  --------------------------------------------------------------------------------  T(C): 37 (10-18-23 @ 06:05), Max: 37 (10-18-23 @ 06:05)  HR: 71 (10-18-23 @ 11:00) (69 - 86)  BP: 133/81 (10-18-23 @ 11:00) (106/66 - 143/65)  RR: 18 (10-18-23 @ 11:00) (18 - 20)  SpO2: 100% (10-18-23 @ 11:00) (99% - 100%)  Wt(kg): --  Height (cm): 170.2 (10-17-23 @ 12:41)  Weight (kg): 72.6 (10-17-23 @ 12:41)  BMI (kg/m2): 25.1 (10-17-23 @ 12:41)  BSA (m2): 1.84 (10-17-23 @ 12:41)      10-17-23 @ 07:01  -  10-18-23 @ 07:00  --------------------------------------------------------  IN: 900 mL / OUT: 25 mL / NET: 875 mL    10-18-23 @ 07:01  -  10-18-23 @ 13:54  --------------------------------------------------------  IN: 500 mL / OUT: 0 mL / NET: 500 mL      Physical Exam:  	Gen: appears acutely ill  	HEENT:  anicteric, facial swelling  	Pulm: CTA B/L  	CV: RRR, S1S2; no rub  	Abd: +BS, distended, firm, no mass  	UE: Warm, FROM, no edema, scaling rash; LUE radiocephalic AVF thrill and bruit up to mid forearm  	LE: Warm, FROM, right lateral calf w/ fluctuant mass; firm linear mass right groin  	Neuro: delirious  	Skin: Warm, scaling, flaking rash all 4 extremities  	Vascular access:    LABS/STUDIES  --------------------------------------------------------------------------------              8.6    9.72  >-----------<  159      [10-18-23 @ 08:43]              23.4     120  |  80  |  138  ----------------------------<  250      [10-18-23 @ 08:43]  3.9   |  11  |  16.3        Ca     7.0     [10-18-23 @ 08:43]      Mg     2.2     [10-18-23 @ 08:43]      Phos  10.4     [10-18-23 @ 08:43]    TPro  7.4  /  Alb  3.8  /  TBili  0.4  /  DBili  x   /  AST  92  /  ALT  42  /  AlkPhos  164  [10-17-23 @ 14:30]    PT/INR: PT 14.90, INR 1.30       [10-18-23 @ 08:43]  PTT: 39.6       [10-17-23 @ 14:30]    Serum Osmolality 311      [10-18-23 @ 08:43]    Creatinine Trend:  SCr 16.3 [10-18 @ 08:43]  SCr 15.6 [10-17 @ 14:30]    Urinalysis - [10-18-23 @ 11:30]      Color Red / Appearance Turbid / SG 1.017 / pH 5.0      Gluc 250 / Ketone Negative  / Bili Small / Urobili 0.2       Blood Large / Protein >=1000 / Leuk Est Moderate / Nitrite Positive      RBC >50 / WBC 15 / Hyaline  / Gran  / Sq Epi  / Non Sq Epi 5 / Bacteria Moderate    Urine Creatinine 80      [10-18-23 @ 11:30]  Urine Sodium 49.0      [10-18-23 @ 11:30]  Urine Urea Nitrogen 281      [10-18-23 @ 11:30]    Iron 21, TIBC 200, %sat 10      [11-24-22 @ 06:48]  Ferritin 1153      [11-24-22 @ 06:48]  Lipid: chol 126, , HDL 17, LDL --      [10-18-23 @ 08:43]    HBsAb Nonreact      [03-15-22 @ 19:10]  HBsAg Nonreact      [03-15-22 @ 19:10]  HBcAb Nonreact      [03-15-22 @ 19:10]    LYNN: titer Negative, pattern --      [03-16-22 @ 11:45]  C3 Complement 95      [03-16-22 @ 11:45]  C4 Complement 30      [03-16-22 @ 11:45]  ANCA: cANCA Negative, pANCA Negative, atypical ANCA Indeterminate Method interference due to LYNN Fluorescence      [03-16-22 @ 11:45]  anti-GBM 3      [03-16-22 @ 12:01]  Immunofixation Serum:   No Monoclonal Band Identified    Reference Range: None Detected      [03-16-22 @ 11:45]  SPEP Interpretation: Normal Electrophoresis Pattern      [03-16-22 @ 11:45]  Immunofixation Urine: Reference Range: None Detected      [03-16-22 @ 19:30]  UPEP Interpretation: Mild Selective (Glomerular) Proteinuria      [03-16-22 @ 19:30]

## 2023-10-18 NOTE — CONSULT NOTE ADULT - PROBLEM SELECTOR RECOMMENDATION 9
After evaluation at this time protocol .....Pt should be on Thiamine and Folic acid. Pt will be monitored and supportive care provided.  Obtain UDS if not done already.  Use of Vistaril for anxiety.  Consider adding gabapentin for anxiety standing order and follow up with PMD/Program for continuation of treatment.    Abdominal ultrasound AFP every 6 months for HCC screening  -EGD outpatient for esophageal varices screening   -Follow up with Private GI or our [facility] located at [address] [place] [address]. Phone Number: [phone]  -Alcohol counseling provided   CATCH team involved for aftercare and pt will follow up with aftercare After evaluation at this time secondary to use history and hx of ICU delirium on precedex would start on ativan IV protocol  Need to confirm information with family. Pt should be on Thiamine and Folic acid. Pt will be monitored and supportive care provided.  Obtain UDS if not done already.  Use of Vistaril for anxiety.  Consider adding gabapentin for anxiety standing order and follow up with PMD/Program for continuation of treatment.    Abdominal ultrasound AFP every 6 months for HCC screening  -EGD outpatient for esophageal varices screening   -Follow up with Private GI or our [facility] located at [address] [place] [address]. Phone Number: [phone]  -Alcohol counseling provided   CATCH team involved for aftercare and pt will follow up with aftercare After evaluation at this time secondary to use history and hx of ICU delirium on precedex would consider starting  on ativan IV protocol  Need to confirm information with family. Pt should be on Thiamine and Folic acid. Pt will be monitored and supportive care provided.  Obtain UDS if not done already.  Use of Vistaril for anxiety.  Consider adding gabapentin for anxiety standing order and follow up with PMD/Program for continuation of treatment.    Abdominal ultrasound AFP every 6 months for HCC screening  -EGD outpatient for esophageal varices screening   -Follow up with Private GI or our [facility] located at [address] [place] [address]. Phone Number: [phone]  -Alcohol counseling provided   CATCH team involved for aftercare and pt will follow up with aftercare

## 2023-10-18 NOTE — CONSULT NOTE ADULT - SUBJECTIVE AND OBJECTIVE BOX
INCOMPLETE NOTE:    FROM ER/H&P:  History of Present Illness:   59 y/o male with hx of ETOH abuse, CKD with left wrist fistula, NIDDM , HTN, HLD, presented to the ED for decreased urinary output and increased thirst since yesterday. Patient was brought to the ED by daughter who states that the patient has a right lower leg abscess that has increased in size with purulent drainage for one week after sustaining trauma to the right leg two weeks ago. As per patient, admits to frequent urination about 5 times a day with little to no urinary output. Patient admits to increased thirst along with suprapubic abdominal pain and dysuria. Patient denies fever/chills, nausea/vomiting, SOB, chest pain , heart palpitations, cough, congestion, appetite loss, changes in bowel movements, hematuria or pyuria.          Pt interviewed, examined and EMR chart reviewed.  Pt admits to drinking--- x    months. Last Drink   Hx of withdrawal   variable periods of sobriety in the past.  Has been in detox before _____yes,   _____No    SOCIAL HISTORY:    REVIEW OF SYSTEMS:    Constitutional: No fever, weight loss or fatigue  ENT:  No difficulty hearing, tinnitus, vertigo; No sinus or throat pain  Neck: No pain or stiffness  Respiratory: No cough, wheezing, chills or hemoptysis  Cardiovascular: No chest pain, palpitations, shortness of breath, dizziness or leg swelling  Gastrointestinal: No abdominal or epigastric pain. No nausea, vomiting or hematemesis; No diarrhea or constipation. No melena or hematochezia.  Neurological: No headaches, memory loss, loss of strength, numbness or tremors  Musculoskeletal: No joint pain or swelling; No muscle, back or extremity pain  Psychiatric: No depression, anxiety, mood swings or difficulty sleeping    MEDICATIONS  (STANDING):  chlorhexidine 2% Cloths 1 Application(s) Topical <User Schedule>  dextrose 5%. 1000 milliLiter(s) (50 mL/Hr) IV Continuous <Continuous>  dextrose 50% Injectable 25 Gram(s) IV Push once  folic acid 1 milliGRAM(s) Oral daily  glucagon  Injectable 1 milliGRAM(s) IntraMuscular once  heparin   Injectable 5000 Unit(s) SubCutaneous every 8 hours  insulin lispro (ADMELOG) corrective regimen sliding scale   SubCutaneous every 6 hours  multivitamin 1 Tablet(s) Oral daily  pantoprazole  Injectable 40 milliGRAM(s) IV Push daily  sodium bicarbonate  Infusion 0.207 mEq/kG/Hr (100 mL/Hr) IV Continuous <Continuous>  thiamine 100 milliGRAM(s) Oral daily    MEDICATIONS  (PRN):  dextrose Oral Gel 15 Gram(s) Oral once PRN Blood Glucose LESS THAN 70 milliGRAM(s)/deciliter  LORazepam   Injectable 1 milliGRAM(s) IV Push every 4 hours PRN CIWA-Ar score increase by 2 points and a total score of 7 or less      Vital Signs Last 24 Hrs  T(C): 37 (18 Oct 2023 06:05), Max: 37 (18 Oct 2023 06:05)  T(F): 98.6 (18 Oct 2023 06:05), Max: 98.6 (18 Oct 2023 06:05)  HR: 69 (18 Oct 2023 06:05) (69 - 86)  BP: 124/82 (18 Oct 2023 06:05) (93/52 - 143/65)  BP(mean): --  RR: 18 (18 Oct 2023 06:05) (18 - 20)  SpO2: 100% (18 Oct 2023 06:05) (99% - 100%)    Parameters below as of 18 Oct 2023 06:05  Patient On (Oxygen Delivery Method): room air        PHYSICAL EXAM:    Constitutional: NAD, well-groomed, well-developed  HEENT: PERRLA, EOMI, Normal Hearing,  Neck: No LAD, No JVD  Back: Normal spine flexure, No CVA tenderness  Respiratory: CTAB/L  Cardiovascular: S1 and S2, RRR, no M/G/R  Gastrointestinal: BS+, soft, NT/ND  Extremities: No peripheral edema  Neurological: A/O x 3, no focal deficits    LABS:                        8.6    9.94  )-----------( 142      ( 17 Oct 2023 14:30 )             23.4     10-17    114<LL>  |  77<L>  |  134<HH>  ----------------------------<  292<H>  4.6   |  8<LL>  |  15.6<HH>    Ca    7.0<L>      17 Oct 2023 14:30    TPro  7.4  /  Alb  3.8  /  TBili  0.4  /  DBili  x   /  AST  92<H>  /  ALT  42<H>  /  AlkPhos  164<H>  10-17    PT/INR - ( 17 Oct 2023 14:30 )   PT: 14.20 sec;   INR: 1.24 ratio         PTT - ( 17 Oct 2023 14:30 )  PTT:39.6 sec  Urinalysis Basic - ( 17 Oct 2023 14:30 )    Color: x / Appearance: x / SG: x / pH: x  Gluc: 292 mg/dL / Ketone: x  / Bili: x / Urobili: x   Blood: x / Protein: x / Nitrite: x   Leuk Esterase: x / RBC: x / WBC x   Sq Epi: x / Non Sq Epi: x / Bacteria: x      Drug Screen Urine:  Alcohol Level  Alcohol, Blood: <10 mg/dL (10-17-23 @ 20:30)        RADIOLOGY & ADDITIONAL STUDIES:   INCOMPLETE NOTE:    FROM ER/H&P:  History of Present Illness:   57 y/o male with hx of ETOH abuse, CKD with left wrist fistula, NIDDM , HTN, HLD, presented to the ED for decreased urinary output and increased thirst since yesterday. Patient was brought to the ED by daughter who states that the patient has a right lower leg abscess that has increased in size with purulent drainage for one week after sustaining trauma to the right leg two weeks ago. As per patient, admits to frequent urination about 5 times a day with little to no urinary output. Patient admits to increased thirst along with suprapubic abdominal pain and dysuria. Patient denies fever/chills, nausea/vomiting, SOB, chest pain , heart palpitations, cough, congestion, appetite loss, changes in bowel movements, hematuria or pyuria.          Pt interviewed, examined and EMR chart reviewed. Sedated and unreliable history.  Pt admits to drinking 2 days per week  Last Drink few days ago  Hx of withdrawal   variable periods of sobriety in the past.  Has been in detox before _X____yes,   _____No    Wife's statement last admission. Pt was in critical care on precedex  "Pt wife at bedside and states that her  began drinking the day he left from his last admission in April 2022. She states he tries ti hide his drinking and sneaks but is drunk everyday."          MEDICATIONS  (STANDING):  chlorhexidine 2% Cloths 1 Application(s) Topical <User Schedule>  dextrose 5%. 1000 milliLiter(s) (50 mL/Hr) IV Continuous <Continuous>  dextrose 50% Injectable 25 Gram(s) IV Push once  folic acid 1 milliGRAM(s) Oral daily  glucagon  Injectable 1 milliGRAM(s) IntraMuscular once  heparin   Injectable 5000 Unit(s) SubCutaneous every 8 hours  insulin lispro (ADMELOG) corrective regimen sliding scale   SubCutaneous every 6 hours  multivitamin 1 Tablet(s) Oral daily  pantoprazole  Injectable 40 milliGRAM(s) IV Push daily  sodium bicarbonate  Infusion 0.207 mEq/kG/Hr (100 mL/Hr) IV Continuous <Continuous>  thiamine 100 milliGRAM(s) Oral daily    MEDICATIONS  (PRN):  dextrose Oral Gel 15 Gram(s) Oral once PRN Blood Glucose LESS THAN 70 milliGRAM(s)/deciliter  LORazepam   Injectable 1 milliGRAM(s) IV Push every 4 hours PRN CIWA-Ar score increase by 2 points and a total score of 7 or less      Vital Signs Last 24 Hrs  T(C): 37 (18 Oct 2023 06:05), Max: 37 (18 Oct 2023 06:05)  T(F): 98.6 (18 Oct 2023 06:05), Max: 98.6 (18 Oct 2023 06:05)  HR: 69 (18 Oct 2023 06:05) (69 - 86)  BP: 124/82 (18 Oct 2023 06:05) (93/52 - 143/65)  BP(mean): --  RR: 18 (18 Oct 2023 06:05) (18 - 20)  SpO2: 100% (18 Oct 2023 06:05) (99% - 100%)    Parameters below as of 18 Oct 2023 06:05  Patient On (Oxygen Delivery Method): room air        PHYSICAL EXAM:    Constitutional: NAD, well-groomed, well-developed  HEENT: PERRLA, EOMI, Normal Hearing,  Neck: No LAD, No JVD  Back: Normal spine flexure, No CVA tenderness  Respiratory: CTAB/L  Cardiovascular: S1 and S2, RRR, no M/G/R  Gastrointestinal: BS+, soft, NT/ND  Extremities: No peripheral edema  Neurological: A/O x 3, no focal deficits    LABS:                        8.6    9.94  )-----------( 142      ( 17 Oct 2023 14:30 )             23.4     10-17    114<LL>  |  77<L>  |  134<HH>  ----------------------------<  292<H>  4.6   |  8<LL>  |  15.6<HH>    Ca    7.0<L>      17 Oct 2023 14:30    TPro  7.4  /  Alb  3.8  /  TBili  0.4  /  DBili  x   /  AST  92<H>  /  ALT  42<H>  /  AlkPhos  164<H>  10-17    PT/INR - ( 17 Oct 2023 14:30 )   PT: 14.20 sec;   INR: 1.24 ratio         PTT - ( 17 Oct 2023 14:30 )  PTT:39.6 sec  Urinalysis Basic - ( 17 Oct 2023 14:30 )    Color: x / Appearance: x / SG: x / pH: x  Gluc: 292 mg/dL / Ketone: x  / Bili: x / Urobili: x   Blood: x / Protein: x / Nitrite: x   Leuk Esterase: x / RBC: x / WBC x   Sq Epi: x / Non Sq Epi: x / Bacteria: x      Drug Screen Urine:  Alcohol Level  Alcohol, Blood: <10 mg/dL (10-17-23 @ 20:30)        RADIOLOGY & ADDITIONAL STUDIES:

## 2023-10-18 NOTE — PROGRESS NOTE ADULT - SUBJECTIVE AND OBJECTIVE BOX
GENERAL SURGERY PROGRESS NOTE    Patient: KECIA MARTIN , 58y (61)Male   MRN: [id number]  Location: [facility] ED Hold 011 1  Visit: 10-17-23 Inpatient  Date: 10-18-23 @ 15:40    Events of past 24 hours:  NAEON, patient still not at baseline according to daughter at bedside however is A&Ox3.     PAST MEDICAL & SURGICAL HISTORY:  Diabetes      Hypertension      HLD (hyperlipidemia)      Neuropathy      Glaucoma      Chronic kidney disease, unspecified CKD stage  requiring HD 2022      ETOH abuse      H/O colonoscopy          Vitals:   T(F): 98.6 (10-18-23 @ 06:05), Max: 98.6 (10-18-23 @ 06:05)  HR: 111 (10-18-23 @ 15:00)  BP: 146/93 (10-18-23 @ 15:00)  RR: 18 (10-18-23 @ 15:00)  SpO2: 100% (10-18-23 @ 15:00)      Diet, NPO:   Except Medications      Fluids:     I & O's:    10-17-23 @ 07:01  -  10-18-23 @ 07:00  --------------------------------------------------------  IN:    Sodium Bicarbonate: 900 mL  Total IN: 900 mL    OUT:    Voided (mL): 25 mL  Total OUT: 25 mL    Total NET: 875 mL      EXAM:  GENERAL: NAD, lying in bed, A&Ox3  RESPIRATORY: Clear to auscultation bilaterally  CARDIOVASCULAR: S1 S2  GASTROINTESTINAL: Soft, nontender, nondistended; normoactive bowel sounds  EXTREMITIES: No clubbing, cyanosis, or petal edema  NERVOUS SYSTEM: Alert and oriented to person, time, place and situation, speech clear. No focal deficits   MUSCULOSKELETAL: No joint erythema, swelling or pain  SKIN: Right groin induration and blanching erythema, likely component of lymphadentitis, non fluctuant and no discrete fluid collection observed on ultrasound. RLE with purulent draining skin ulceration, indurated, non fluctuant, no palpable crepitius, compartments soft. Minimal pain to palpation.   PSYCH: Normal affect      MEDICATIONS  (STANDING):  cefepime   IVPB 500 milliGRAM(s) IV Intermittent every 24 hours  chlorhexidine 2% Cloths 1 Application(s) Topical <User Schedule>  dextrose 5%. 1000 milliLiter(s) (50 mL/Hr) IV Continuous <Continuous>  dextrose 50% Injectable 25 Gram(s) IV Push once  folic acid 1 milliGRAM(s) Oral daily  glucagon  Injectable 1 milliGRAM(s) IntraMuscular once  heparin   Injectable 5000 Unit(s) SubCutaneous every 8 hours  insulin regular Infusion 1 Unit(s)/Hr (1 mL/Hr) IV Continuous <Continuous>  multivitamin 1 Tablet(s) Oral daily  pantoprazole  Injectable 40 milliGRAM(s) IV Push daily  sodium bicarbonate  Infusion 0.207 mEq/kG/Hr (100 mL/Hr) IV Continuous <Continuous>  thiamine 100 milliGRAM(s) Oral daily    MEDICATIONS  (PRN):  dextrose Oral Gel 15 Gram(s) Oral once PRN Blood Glucose LESS THAN 70 milliGRAM(s)/deciliter  LORazepam   Injectable 1 milliGRAM(s) IV Push every 4 hours PRN CIWA-Ar score increase by 2 points and a total score of 7 or less      DVT PROPHYLAXIS: heparin   Injectable 5000 Unit(s) SubCutaneous every 8 hours    GI PROPHYLAXIS: pantoprazole  Injectable 40 milliGRAM(s) IV Push daily    ANTICOAGULATION:   ANTIBIOTICS:  cefepime   IVPB 500 milliGRAM(s)            LAB/STUDIES:  Labs:  CAPILLARY BLOOD GLUCOSE      POCT Blood Glucose.: 174 mg/dL (18 Oct 2023 14:43)  POCT Blood Glucose.: 184 mg/dL (18 Oct 2023 14:12)  POCT Blood Glucose.: 202 mg/dL (18 Oct 2023 13:26)  POCT Blood Glucose.: 218 mg/dL (18 Oct 2023 12:40)  POCT Blood Glucose.: 237 mg/dL (18 Oct 2023 11:14)  POCT Blood Glucose.: 344 mg/dL (18 Oct 2023 05:41)                          8.6    9.72  )-----------( 159      ( 18 Oct 2023 08:43 )             23.4         10-18    120<L>  |  80<L>  |  138<HH>  ----------------------------<  250<H>  3.9   |  11<L>  |  16.3<HH>      Calcium: 7.0 mg/dL (10-18-23 @ 08:43)      LFTs:             7.4  | 0.4  | 92       ------------------[164     ( 17 Oct 2023 14:30 )  3.8  | x    | 42          Lipase:x      Amylase:x         Blood Gas Arterial, Lactate: 0.60 mmol/L (10-18-23 @ 04:30)  Lactate, Blood: 0.8 mmol/L (10-17-23 @ 14:30)    ABG - ( 18 Oct 2023 04:30 )  pH: 7.16  /  pCO2: 28    /  pO2: 136   / HCO3: 10    / Base Excess: -17.3 /  SaO2: 99.0              Coags:     14.90  ----< 1.30    ( 18 Oct 2023 08:43 )     x                 Alcohol, Blood: <10 mg/dL (10-17-23 @ 20:30)    Urinalysis Basic - ( 18 Oct 2023 11:30 )    Color: Red / Appearance: Turbid / S.017 / pH: x  Gluc: x / Ketone: Negative mg/dL  / Bili: Small / Urobili: 0.2 mg/dL   Blood: x / Protein: >=1000 mg/dL / Nitrite: Positive   Leuk Esterase: Moderate / RBC: >50 /HPF / WBC 15 /HPF   Sq Epi: x / Non Sq Epi: 5 /HPF / Bacteria: Moderate /HPF            Alcohol, Blood: <10 mg/dL (10-17-23 @ 20:30)      IMAGING:  < from: CT Abdomen and Pelvis No Cont (10.17.23 @ 19:12) >  Partially imaged right inguinal inflammatory change and mildly enlarged   lymph nodes. Recommend dedicated imaging for further evaluation.    < end of copied text >  < from: CT Lower Extremity No Cont, Right (10.17.23 @ 19:12) >  IMPRESSION:  1. Focal subcutaneous soft tissue thickening with associated inflammatory   changes along the right superficial inguinal region, suspicious for   phlegmon; no evidence of subcutaneous gas to suggest necrotizing   fasciitis.  2. Several mildly enlarged right superficial and deep inguinal lymph   nodes, possibly reactive.    < end of copied text >

## 2023-10-19 ENCOUNTER — APPOINTMENT (OUTPATIENT)
Dept: BURN CARE | Facility: CLINIC | Age: 58
End: 2023-10-19

## 2023-10-19 LAB
A1C WITH ESTIMATED AVERAGE GLUCOSE RESULT: 8 % — HIGH (ref 4–5.6)
A1C WITH ESTIMATED AVERAGE GLUCOSE RESULT: 8 % — HIGH (ref 4–5.6)
ANION GAP SERPL CALC-SCNC: 21 MMOL/L — HIGH (ref 7–14)
ANION GAP SERPL CALC-SCNC: 21 MMOL/L — HIGH (ref 7–14)
ANION GAP SERPL CALC-SCNC: 23 MMOL/L — HIGH (ref 7–14)
ANION GAP SERPL CALC-SCNC: 23 MMOL/L — HIGH (ref 7–14)
ANION GAP SERPL CALC-SCNC: 25 MMOL/L — HIGH (ref 7–14)
ANION GAP SERPL CALC-SCNC: 25 MMOL/L — HIGH (ref 7–14)
B-OH-BUTYR SERPL-SCNC: 1.1 MMOL/L — HIGH
B-OH-BUTYR SERPL-SCNC: 1.1 MMOL/L — HIGH
BUN SERPL-MCNC: 105 MG/DL — CRITICAL HIGH (ref 10–20)
BUN SERPL-MCNC: 105 MG/DL — CRITICAL HIGH (ref 10–20)
BUN SERPL-MCNC: 107 MG/DL — CRITICAL HIGH (ref 10–20)
BUN SERPL-MCNC: 107 MG/DL — CRITICAL HIGH (ref 10–20)
BUN SERPL-MCNC: 66 MG/DL — CRITICAL HIGH (ref 10–20)
BUN SERPL-MCNC: 66 MG/DL — CRITICAL HIGH (ref 10–20)
CALCIUM SERPL-MCNC: 6.7 MG/DL — LOW (ref 8.4–10.5)
CALCIUM SERPL-MCNC: 6.7 MG/DL — LOW (ref 8.4–10.5)
CALCIUM SERPL-MCNC: 6.9 MG/DL — LOW (ref 8.4–10.5)
CALCIUM SERPL-MCNC: 6.9 MG/DL — LOW (ref 8.4–10.5)
CALCIUM SERPL-MCNC: 7.5 MG/DL — LOW (ref 8.4–10.5)
CALCIUM SERPL-MCNC: 7.5 MG/DL — LOW (ref 8.4–10.5)
CHLORIDE SERPL-SCNC: 83 MMOL/L — LOW (ref 98–110)
CHLORIDE SERPL-SCNC: 83 MMOL/L — LOW (ref 98–110)
CHLORIDE SERPL-SCNC: 84 MMOL/L — LOW (ref 98–110)
CHLORIDE SERPL-SCNC: 84 MMOL/L — LOW (ref 98–110)
CHLORIDE SERPL-SCNC: 88 MMOL/L — LOW (ref 98–110)
CHLORIDE SERPL-SCNC: 88 MMOL/L — LOW (ref 98–110)
CO2 SERPL-SCNC: 17 MMOL/L — SIGNIFICANT CHANGE UP (ref 17–32)
CO2 SERPL-SCNC: 17 MMOL/L — SIGNIFICANT CHANGE UP (ref 17–32)
CO2 SERPL-SCNC: 19 MMOL/L — SIGNIFICANT CHANGE UP (ref 17–32)
CO2 SERPL-SCNC: 19 MMOL/L — SIGNIFICANT CHANGE UP (ref 17–32)
CO2 SERPL-SCNC: 21 MMOL/L — SIGNIFICANT CHANGE UP (ref 17–32)
CO2 SERPL-SCNC: 21 MMOL/L — SIGNIFICANT CHANGE UP (ref 17–32)
CREAT SERPL-MCNC: 13.8 MG/DL — CRITICAL HIGH (ref 0.7–1.5)
CREAT SERPL-MCNC: 13.8 MG/DL — CRITICAL HIGH (ref 0.7–1.5)
CREAT SERPL-MCNC: 14.8 MG/DL — CRITICAL HIGH (ref 0.7–1.5)
CREAT SERPL-MCNC: 14.8 MG/DL — CRITICAL HIGH (ref 0.7–1.5)
CREAT SERPL-MCNC: 8.4 MG/DL — CRITICAL HIGH (ref 0.7–1.5)
CREAT SERPL-MCNC: 8.4 MG/DL — CRITICAL HIGH (ref 0.7–1.5)
CULTURE RESULTS: NO GROWTH — SIGNIFICANT CHANGE UP
CULTURE RESULTS: NO GROWTH — SIGNIFICANT CHANGE UP
EGFR: 3 ML/MIN/1.73M2 — LOW
EGFR: 3 ML/MIN/1.73M2 — LOW
EGFR: 4 ML/MIN/1.73M2 — LOW
EGFR: 4 ML/MIN/1.73M2 — LOW
EGFR: 7 ML/MIN/1.73M2 — LOW
EGFR: 7 ML/MIN/1.73M2 — LOW
ESTIMATED AVERAGE GLUCOSE: 183 MG/DL — HIGH (ref 68–114)
ESTIMATED AVERAGE GLUCOSE: 183 MG/DL — HIGH (ref 68–114)
GAS PNL BLDA: SIGNIFICANT CHANGE UP
GAS PNL BLDA: SIGNIFICANT CHANGE UP
GLUCOSE BLDC GLUCOMTR-MCNC: 114 MG/DL — HIGH (ref 70–99)
GLUCOSE BLDC GLUCOMTR-MCNC: 114 MG/DL — HIGH (ref 70–99)
GLUCOSE BLDC GLUCOMTR-MCNC: 115 MG/DL — HIGH (ref 70–99)
GLUCOSE BLDC GLUCOMTR-MCNC: 115 MG/DL — HIGH (ref 70–99)
GLUCOSE BLDC GLUCOMTR-MCNC: 132 MG/DL — HIGH (ref 70–99)
GLUCOSE BLDC GLUCOMTR-MCNC: 132 MG/DL — HIGH (ref 70–99)
GLUCOSE BLDC GLUCOMTR-MCNC: 140 MG/DL — HIGH (ref 70–99)
GLUCOSE BLDC GLUCOMTR-MCNC: 140 MG/DL — HIGH (ref 70–99)
GLUCOSE BLDC GLUCOMTR-MCNC: 141 MG/DL — HIGH (ref 70–99)
GLUCOSE BLDC GLUCOMTR-MCNC: 141 MG/DL — HIGH (ref 70–99)
GLUCOSE BLDC GLUCOMTR-MCNC: 153 MG/DL — HIGH (ref 70–99)
GLUCOSE BLDC GLUCOMTR-MCNC: 153 MG/DL — HIGH (ref 70–99)
GLUCOSE BLDC GLUCOMTR-MCNC: 169 MG/DL — HIGH (ref 70–99)
GLUCOSE BLDC GLUCOMTR-MCNC: 169 MG/DL — HIGH (ref 70–99)
GLUCOSE BLDC GLUCOMTR-MCNC: 172 MG/DL — HIGH (ref 70–99)
GLUCOSE BLDC GLUCOMTR-MCNC: 172 MG/DL — HIGH (ref 70–99)
GLUCOSE BLDC GLUCOMTR-MCNC: 178 MG/DL — HIGH (ref 70–99)
GLUCOSE BLDC GLUCOMTR-MCNC: 178 MG/DL — HIGH (ref 70–99)
GLUCOSE BLDC GLUCOMTR-MCNC: 92 MG/DL — SIGNIFICANT CHANGE UP (ref 70–99)
GLUCOSE BLDC GLUCOMTR-MCNC: 92 MG/DL — SIGNIFICANT CHANGE UP (ref 70–99)
GLUCOSE SERPL-MCNC: 113 MG/DL — HIGH (ref 70–99)
GLUCOSE SERPL-MCNC: 113 MG/DL — HIGH (ref 70–99)
GLUCOSE SERPL-MCNC: 125 MG/DL — HIGH (ref 70–99)
GLUCOSE SERPL-MCNC: 125 MG/DL — HIGH (ref 70–99)
GLUCOSE SERPL-MCNC: 134 MG/DL — HIGH (ref 70–99)
GLUCOSE SERPL-MCNC: 134 MG/DL — HIGH (ref 70–99)
HAV IGM SER-ACNC: SIGNIFICANT CHANGE UP
HAV IGM SER-ACNC: SIGNIFICANT CHANGE UP
HBV CORE IGM SER-ACNC: SIGNIFICANT CHANGE UP
HBV CORE IGM SER-ACNC: SIGNIFICANT CHANGE UP
HBV SURFACE AB SER-ACNC: SIGNIFICANT CHANGE UP
HBV SURFACE AB SER-ACNC: SIGNIFICANT CHANGE UP
HBV SURFACE AG SER-ACNC: SIGNIFICANT CHANGE UP
HBV SURFACE AG SER-ACNC: SIGNIFICANT CHANGE UP
HCV AB S/CO SERPL IA: 0.07 S/CO — SIGNIFICANT CHANGE UP (ref 0–0.99)
HCV AB S/CO SERPL IA: 0.07 S/CO — SIGNIFICANT CHANGE UP (ref 0–0.99)
HCV AB SERPL-IMP: SIGNIFICANT CHANGE UP
HCV AB SERPL-IMP: SIGNIFICANT CHANGE UP
POTASSIUM SERPL-MCNC: 3.3 MMOL/L — LOW (ref 3.5–5)
POTASSIUM SERPL-MCNC: 3.3 MMOL/L — LOW (ref 3.5–5)
POTASSIUM SERPL-MCNC: 3.6 MMOL/L — SIGNIFICANT CHANGE UP (ref 3.5–5)
POTASSIUM SERPL-SCNC: 3.3 MMOL/L — LOW (ref 3.5–5)
POTASSIUM SERPL-SCNC: 3.3 MMOL/L — LOW (ref 3.5–5)
POTASSIUM SERPL-SCNC: 3.6 MMOL/L — SIGNIFICANT CHANGE UP (ref 3.5–5)
SODIUM SERPL-SCNC: 125 MMOL/L — LOW (ref 135–146)
SODIUM SERPL-SCNC: 125 MMOL/L — LOW (ref 135–146)
SODIUM SERPL-SCNC: 126 MMOL/L — LOW (ref 135–146)
SODIUM SERPL-SCNC: 126 MMOL/L — LOW (ref 135–146)
SODIUM SERPL-SCNC: 130 MMOL/L — LOW (ref 135–146)
SODIUM SERPL-SCNC: 130 MMOL/L — LOW (ref 135–146)
SPECIMEN SOURCE: SIGNIFICANT CHANGE UP
SPECIMEN SOURCE: SIGNIFICANT CHANGE UP
VANCOMYCIN TROUGH SERPL-MCNC: 13.5 UG/ML — HIGH (ref 5–10)
VANCOMYCIN TROUGH SERPL-MCNC: 13.5 UG/ML — HIGH (ref 5–10)

## 2023-10-19 PROCEDURE — 99233 SBSQ HOSP IP/OBS HIGH 50: CPT

## 2023-10-19 PROCEDURE — 99291 CRITICAL CARE FIRST HOUR: CPT

## 2023-10-19 PROCEDURE — 76775 US EXAM ABDO BACK WALL LIM: CPT | Mod: 26

## 2023-10-19 PROCEDURE — 71045 X-RAY EXAM CHEST 1 VIEW: CPT | Mod: 26

## 2023-10-19 PROCEDURE — 76882 US LMTD JT/FCL EVL NVASC XTR: CPT | Mod: 26,RT

## 2023-10-19 RX ORDER — DEXTROSE 50 % IN WATER 50 %
12.5 SYRINGE (ML) INTRAVENOUS ONCE
Refills: 0 | Status: DISCONTINUED | OUTPATIENT
Start: 2023-10-19 | End: 2023-10-20

## 2023-10-19 RX ORDER — POTASSIUM CHLORIDE 20 MEQ
20 PACKET (EA) ORAL ONCE
Refills: 0 | Status: COMPLETED | OUTPATIENT
Start: 2023-10-19 | End: 2023-10-19

## 2023-10-19 RX ORDER — SENNA PLUS 8.6 MG/1
2 TABLET ORAL ONCE
Refills: 0 | Status: DISCONTINUED | OUTPATIENT
Start: 2023-10-19 | End: 2023-10-19

## 2023-10-19 RX ORDER — SODIUM CHLORIDE 9 MG/ML
1000 INJECTION, SOLUTION INTRAVENOUS
Refills: 0 | Status: DISCONTINUED | OUTPATIENT
Start: 2023-10-19 | End: 2023-10-20

## 2023-10-19 RX ORDER — CALCIUM ACETATE 667 MG
667 TABLET ORAL
Refills: 0 | Status: DISCONTINUED | OUTPATIENT
Start: 2023-10-19 | End: 2023-10-20

## 2023-10-19 RX ORDER — VANCOMYCIN HCL 1 G
1000 VIAL (EA) INTRAVENOUS ONCE
Refills: 0 | Status: COMPLETED | OUTPATIENT
Start: 2023-10-19 | End: 2023-10-19

## 2023-10-19 RX ORDER — INSULIN GLARGINE 100 [IU]/ML
20 INJECTION, SOLUTION SUBCUTANEOUS ONCE
Refills: 0 | Status: DISCONTINUED | OUTPATIENT
Start: 2023-10-19 | End: 2023-10-19

## 2023-10-19 RX ORDER — DEXTROSE 50 % IN WATER 50 %
15 SYRINGE (ML) INTRAVENOUS ONCE
Refills: 0 | Status: DISCONTINUED | OUTPATIENT
Start: 2023-10-19 | End: 2023-10-20

## 2023-10-19 RX ORDER — SENNA PLUS 8.6 MG/1
2 TABLET ORAL AT BEDTIME
Refills: 0 | Status: DISCONTINUED | OUTPATIENT
Start: 2023-10-19 | End: 2023-10-19

## 2023-10-19 RX ORDER — DEXTROSE 50 % IN WATER 50 %
25 SYRINGE (ML) INTRAVENOUS ONCE
Refills: 0 | Status: DISCONTINUED | OUTPATIENT
Start: 2023-10-19 | End: 2023-10-20

## 2023-10-19 RX ORDER — POLYETHYLENE GLYCOL 3350 17 G/17G
17 POWDER, FOR SOLUTION ORAL DAILY
Refills: 0 | Status: DISCONTINUED | OUTPATIENT
Start: 2023-10-19 | End: 2023-10-19

## 2023-10-19 RX ORDER — POTASSIUM CHLORIDE 20 MEQ
20 PACKET (EA) ORAL
Refills: 0 | Status: COMPLETED | OUTPATIENT
Start: 2023-10-19 | End: 2023-10-19

## 2023-10-19 RX ADMIN — PIPERACILLIN AND TAZOBACTAM 25 GRAM(S): 4; .5 INJECTION, POWDER, LYOPHILIZED, FOR SOLUTION INTRAVENOUS at 18:52

## 2023-10-19 RX ADMIN — HEPARIN SODIUM 5000 UNIT(S): 5000 INJECTION INTRAVENOUS; SUBCUTANEOUS at 05:45

## 2023-10-19 RX ADMIN — HEPARIN SODIUM 5000 UNIT(S): 5000 INJECTION INTRAVENOUS; SUBCUTANEOUS at 14:17

## 2023-10-19 RX ADMIN — Medication 250 MILLIGRAM(S): at 16:31

## 2023-10-19 RX ADMIN — PANTOPRAZOLE SODIUM 40 MILLIGRAM(S): 20 TABLET, DELAYED RELEASE ORAL at 14:17

## 2023-10-19 RX ADMIN — Medication 1 MILLIGRAM(S): at 14:17

## 2023-10-19 RX ADMIN — HEPARIN SODIUM 5000 UNIT(S): 5000 INJECTION INTRAVENOUS; SUBCUTANEOUS at 21:03

## 2023-10-19 RX ADMIN — Medication 50 MILLIEQUIVALENT(S): at 03:43

## 2023-10-19 RX ADMIN — Medication 1 TABLET(S): at 14:17

## 2023-10-19 RX ADMIN — PIPERACILLIN AND TAZOBACTAM 25 GRAM(S): 4; .5 INJECTION, POWDER, LYOPHILIZED, FOR SOLUTION INTRAVENOUS at 05:45

## 2023-10-19 RX ADMIN — Medication 10 MILLIGRAM(S): at 14:17

## 2023-10-19 RX ADMIN — Medication 50 MILLIEQUIVALENT(S): at 23:47

## 2023-10-19 RX ADMIN — Medication 50 MILLIEQUIVALENT(S): at 21:02

## 2023-10-19 RX ADMIN — INSULIN HUMAN 1 UNIT(S)/HR: 100 INJECTION, SOLUTION SUBCUTANEOUS at 20:26

## 2023-10-19 RX ADMIN — Medication 100 MILLIGRAM(S): at 14:17

## 2023-10-19 RX ADMIN — CHLORHEXIDINE GLUCONATE 1 APPLICATION(S): 213 SOLUTION TOPICAL at 05:45

## 2023-10-19 NOTE — PROGRESS NOTE ADULT - ASSESSMENT
IMPRESSION:  Metabolic vs toxic encephalopathy   metabolic acidosis   VAMSI on CKD  sepsis   leg ulceration r/o abscess   DKA     PLAN:    CNS:   no sedation   CIWA protocol with Ativan PRN  Multivitamin/folate/thiamine    HEENT: oral care     PULMONARY: keep pox >92 %     CARDIOVASCULAR:   continue with IV bicarb drip for now  CE negative  echo   vasopressor as needed. keep map >65    GI:   GI prophylaxis. NPO for now. start PO diet once mental statues improves  KUB reviewed, mainly gas pattern. Follow up official read. monitor BM    RENAL:  pt needs urgent HD, patient has an AVF, not functional. will place UDALL today for urgent dialysis  continue with way   monitor is and os   lytes       INFECTIOUS DISEASE:  continue abx: Zosyn 3.375gm q12hrs  s/p surgery eval: no drainable collection at wound site  follow up blood culture, wound culture  MRSA nares.  ID follow up    HEMATOLOGICAL:  DVT prophylaxis doppler lower ext     ENDOCRINE:   insulin drip, q1 fs as anion gap is still open this am  Transitioning to SQ insulin if BG <200 and pt is able to eat and two of the following are met: AG <12, HCO3 =15, pH >7.3. Overlap IV gtt/SQ insulin by 2-4h. resume home insulin dose  endocrine eval    MUSCULOSKELETAL: bed rest    FULL CODE  Lines: PIV  Dispo: MICU    IMPRESSION:  Metabolic vs toxic encephalopathy   metabolic acidosis   VAMSI on CKD  sepsis   leg ulceration r/o abscess   DKA   hyponatremia     PLAN:    CNS:   no sedation   CIWA protocol with Ativan PRN  Multivitamin/folate/thiamine    HEENT: oral care     PULMONARY: keep pox >92 %     CARDIOVASCULAR:   continue with IV bicarb drip for now  CE negative  echo   vasopressor as needed. keep map >65    GI:   GI prophylaxis. NPO for now. start PO diet once mental statues improves  KUB reviewed, mainly gas pattern. Follow up official read. monitor BM    RENAL:  pt needs urgent HD, patient has an AVF, not functional. will place UDALL today for urgent dialysis  continue with way   monitor is and os   lytes       INFECTIOUS DISEASE:  continue abx: Zosyn 3.375gm q12hrs  s/p surgery eval: no drainable collection at wound site  follow up blood culture, wound culture  MRSA nares.  ID follow up    HEMATOLOGICAL:  DVT prophylaxis doppler lower ext     ENDOCRINE:   insulin drip, q1 fs as anion gap is still open this am  Transitioning to SQ insulin if BG <200 and pt is able to eat and two of the following are met: AG <12, HCO3 =15, pH >7.3. Overlap IV gtt/SQ insulin by 2-4h. resume home insulin dose  endocrine eval    MUSCULOSKELETAL: bed rest    FULL CODE  Lines: PIV  Dispo: MICU    IMPRESSION:  Metabolic vs toxic encephalopathy   metabolic acidosis   VAMSI on CKD  sepsis   leg ulceration r/o abscess   DKA   hyponatremia     PLAN:    CNS:   no sedation   CIWA protocol with Ativan PRN  Multivitamin/folate/thiamine    HEENT: oral care     PULMONARY: keep pox >92 %     CARDIOVASCULAR:   continue with IV bicarb dripd/c after today HD   CE negative  echo   vasopressor as needed. keep map >65    GI:   GI prophylaxis. NPO for now. start PO diet once mental statues improves  KUB reviewed, mainly gas pattern. Follow up official read. monitor BM    RENAL:  pt needs urgent HD, patient has an AVF, not functional.    continue with way   monitor is and os   lytes       INFECTIOUS DISEASE:  continue abx: Zosyn 3.375gm q12hrs  s/p surgery eval: no drainable collection at wound site  follow up blood culture, wound culture  MRSA nares.  ID follow up    HEMATOLOGICAL:  DVT prophylaxis doppler lower ext     ENDOCRINE:   insulin drip, q1 fs as anion gap is still open this am  Transitioning to SQ insulin if BG <200 and pt is able to eat and two of the following are met: AG <12, HCO3 =15, pH >7.3. Overlap IV gtt/SQ insulin by 2-4h. resume home insulin dose  endocrine eval    MUSCULOSKELETAL: bed rest    FULL CODE  Lines: PIV  Dispo: MICU

## 2023-10-19 NOTE — CONSULT NOTE ADULT - ASSESSMENT
58Mm with PMH of DM, HTN, HLD, ESRD on HD, ETOH abuse, s/p left AV fistula (5/2023 Dr. De Leon presents with weakness, AMS, decreased urine output.  Found to be in significant renal failure, hyponatremic, altered with RLE wound for 1 week. GI consulted for ileus.    Problem 1-Ileus   Diffuse gaseous distention of the small and large bowel, may reflect an   ileus. Continued attention on follow-up is recommended.  Moderate rectal stool burden.  patient had regular bm at bedside after upon rectal exam  Rec  -manual disimpaction unsuccessful stool burden too high up  -daily KUBs  -ambulate as tolerated   -patient declined rectal tube  -can put NG tube to suction  -optimize all electrolytes   -surgery following  -will follow    Problem 2-anemia no gross GI bleeding  Rec  -Maintain Hemodynamic Stability   -Monitor CBC  -CMP,Optimize Electrolytes  -PT,PTT,INR  -EKG, Chest-Xray   -Transfuse prn to hgb >8  -Two large bore IV lines  -PPI BID  -Monitor Vital Signs  -Monitor Stool For blood, frequency, consistency, melena  -Active Type and Screen  -Iron Studies, Folate, Vitamin B12 levels     Problem 3-altered LFTs  ETOH use  Rec  -check acute hepatitis panel  -monitor LFTS  -ETOH cessation advised     Problem 4-Partially imaged right inguinal inflammatory change and mildly enlarged   lymph nodes. Recommend dedicated imaging for further evaluation.  Rec  - Care as per primary team

## 2023-10-19 NOTE — PROGRESS NOTE ADULT - ASSESSMENT
# Stage 4 CKD secondary to DM, HTN, incomplete recovery from previous severe VAMSI  # Severe VAMSI on CKD d/t ?sepsis - started HD on 10/18/23 via LUE AVF   # HAGMA likely due to DKA / uremia   # Hyponatremia likely due ?VAMSI  # Alcohol abuse    - s/p HD yesterday  - remains oliguric    Recommendations:  - HD again today via LUE AVF, 2.5hrs, , UF 1.5L as tolerated  - d/c bicarb drip  - limit fluid intake to 1 liter daily  - hyperphos/hypoCa- start phoslo 1 tab TID with meals  - cont abx per ID recs; dose for iHD, monitor vanc level  - insulin drip per ICU protocol  - monitor for ETOH withdrawal/ CIWA protocol

## 2023-10-19 NOTE — PROGRESS NOTE ADULT - ASSESSMENT
59 y/o male with hx of ETOH abuse, CKD with left wrist fistula, NIDDM , HTN, HLD, presented to the ED for decreased urinary output and increased thirst since yesterday.    History provided by daughter at bedside    ID is following for infected RLE wound  Afebrile on admission, no leukocytosis  Sustained trauma at right lower leg 2 weeks ago, now with increasing abscess  BCx x2 pending  CT Ab/pelvis showed partially imaged right inguinal inflammatory change and mildly enlarged lymph nodes  CT RLE showed focal subcutaneous soft tissue thickening with associated inflammatory  changes along the right superficial inguinal region, suspicious for phlegmon    Antibiotics:  Vancomycin 10/17  Cefepime 10/17 - 10/18  Zosyn 10/19 ->    Right LE wound is not overtly infected, but there is purulent drainage expressed from wound, Cx sent  Concerning for cellulitis with possible early abscess formation at right groin, CT showed possible phlegmon formation  Acute encephalopathy possibly from uremia  Distended abdomen but CT A/P showed no obstruction, would repeat KUB      IMPRESSION:  Right LE wound  Right groin lymphadenopathy  Distended abdomen  Uremic encephalopathy  VAMSI on CKD  Alcohol abuse    RECOMMENDATIONS:  - Repeat vancomycin level in AM, if < 15, can give one dose of IV vancomycin 1000mg  - Dose vancomycin per level  - Continue IV Zosyn 3.375gm q12hrs  - Follow up BCx, UCx, and abscess Cx  - Repeat KUB and recall surgery if there is concern for obstruction  - Nephrology for HD  - [facility] protocol  - Trend WBC, fever curve, transaminases, creatinine daily  - Will continue to follow      * THIS IS AN INCOMPLETE NOTE. FINAL RECOMMENDATION IS PENDING *       59 y/o male with hx of ETOH abuse, CKD with left wrist fistula, NIDDM , HTN, HLD, presented to the ED for decreased urinary output and increased thirst since yesterday.    History provided by daughter at bedside    ID is following for infected RLE wound  Afebrile on admission, no leukocytosis  Sustained trauma at right lower leg 2 weeks ago, now with increasing abscess  BCx x2 pending  CT Ab/pelvis showed partially imaged right inguinal inflammatory change and mildly enlarged lymph nodes  CT RLE showed focal subcutaneous soft tissue thickening with associated inflammatory  changes along the right superficial inguinal region, suspicious for phlegmon    Antibiotics:  Vancomycin 10/17  Cefepime 10/17 - 10/18  Zosyn 10/19 ->    Right LE wound is not overtly infected, but there is purulent drainage expressed from wound, Cx sent  Concerning for cellulitis with possible early abscess formation at right groin, CT showed possible phlegmon formation  Acute encephalopathy possibly from uremia  Distended abdomen but CT A/P showed no obstruction, would repeat KUB      IMPRESSION:  Right LE wound  Right groin lymphadenopathy  Ileus  Uremic encephalopathy  VAMSI on CKD  Alcohol abuse    RECOMMENDATIONS:  - Vancomycin level 13.5 pre-HD, ordered one dose of IV vancomycin 1000mg  - Dose vancomycin per level  - Continue IV Zosyn 3.375gm q12hrs  - Follow up BCx, UCx, and abscess Cx  - Bowel regimen, surgery follow up for ileus  - Nephrology for HD  - [facility] protocol  - Trend WBC, fever curve, transaminases, creatinine daily  - Will continue to follow      Shaylee Olivas D.O.  Attending Physician  Division of Infectious Diseases  [facility]  Please contact me via [employer] Teams

## 2023-10-19 NOTE — PROGRESS NOTE ADULT - SUBJECTIVE AND OBJECTIVE BOX
SUBJECTIVE:    Patient is a 58y old Male who presents with a chief complaint of Worsening Renal Failure (18 Oct 2023 08:57)    Currently admitted to medicine with the primary diagnosis of Acute renal failure    Interval history:     Overnight events noted. Patient seems confused.     Admit Diagnosis:  Acute renal failure        PAST MEDICAL & SURGICAL HISTORY  Diabetes    Hypertension    HLD (hyperlipidemia)    Neuropathy    Glaucoma    Chronic kidney disease, unspecified CKD stage  requiring HD march 2022    ETOH abuse    H/O colonoscopy    No pertinent past medical history    Diabetes    Hypertension    HLD (hyperlipidemia)    Neuropathy    Glaucoma    Chronic kidney disease, unspecified CKD stage    Chronic alcohol abuse    ETOH abuse    No significant past surgical history    H/O colonoscopy        SOCIAL HISTORY:  Negative for smoking/alcohol/drug use.     ALLERGIES:  No Known Drug Allergies  Bananas (Nausea; Urticaria)  apple (Urticaria)      PHYSICAL EXAM:  GEN: No acute distress  HEAD: atraumatic, normocephalic  EYES: Pink conjunctiva. PERRL  ENT: moist mucus membranes  LUNGS: Clear to auscultation bilaterally   HEART: S1/S2  ABD: Soft, NT/ND. BS +  EXT: no cyanosis/edema. right leg wound noted just below knee with active pus.  NEURO: AAOX1-2    Intravenous access:   NG tube:   Blanco Catheter:         RADIOLOGY:  < from: Xray Chest 1 View- PORTABLE-Routine (10.19.23 @ 07:13) >  Pulmonaryvascular congestion.    < end of copied text >    < from: Xray Kidney Ureter Bladder (10.18.23 @ 19:32) >  Diffuse gaseous distention of the small and large bowel, may reflect an   ileus. Continued attention on follow-up is recommended.    Moderate rectal stool burden.    < end of copied text >    < from: CT Head No Cont (10.17.23 @ 22:08) >  No acute intracranial pathology.    Stable mild chronic microvascular ischemic changes.    < end of copied text >    < from: CT Abdomen and Pelvis No Cont (10.17.23 @ 19:12) >  Partially imaged right inguinal inflammatory change and mildly enlarged   lymph nodes. Recommend dedicated imaging for further evaluation.    < end of copied text >    < from: CT Lower Extremity No Cont, Right (10.17.23 @ 19:12) >  1. Focal subcutaneous soft tissue thickening with associated inflammatory   changes along the right superficial inguinal region, suspicious for   phlegmon; no evidence of subcutaneous gas to suggest necrotizing   fasciitis.  2. Several mildly enlarged right superficial and deep inguinal lymph   nodes, possibly reactive.    < end of copied text >   SUBJECTIVE:    Patient is a 58y old Male who presents with a chief complaint of Worsening Renal Failure (18 Oct 2023 08:57)    Currently admitted to medicine with the primary diagnosis of Acute renal failure    Interval history:     Overnight events noted. Patient seems confused today. Denies any new complaints.    Admit Diagnosis:  Acute renal failure        PAST MEDICAL & SURGICAL HISTORY  Diabetes    Hypertension    HLD (hyperlipidemia)    Neuropathy    Glaucoma    Chronic kidney disease, unspecified CKD stage  requiring HD march 2022    ETOH abuse    H/O colonoscopy    No pertinent past medical history    Diabetes    Hypertension    HLD (hyperlipidemia)    Neuropathy    Glaucoma    Chronic kidney disease, unspecified CKD stage    Chronic alcohol abuse    ETOH abuse    No significant past surgical history    H/O colonoscopy        SOCIAL HISTORY:  Negative for smoking/alcohol/drug use.     ALLERGIES:  No Known Drug Allergies  Bananas (Nausea; Urticaria)  apple (Urticaria)      PHYSICAL EXAM:  GEN: No acute distress  HEAD: atraumatic, normocephalic  EYES: Pink conjunctiva. PERRL  ENT: moist mucus membranes  LUNGS: Clear to auscultation bilaterally   HEART: S1/S2  ABD: Distended abdomen, BS +  EXT: no cyanosis/edema. right leg wound noted just below knee with active pus.  NEURO: AAOX1-2    Intravenous access:   NG tube:   Blanco Catheter:         RADIOLOGY:  < from: Xray Chest 1 View- PORTABLE-Routine (10.19.23 @ 07:13) >  Pulmonaryvascular congestion.    < end of copied text >    < from: Xray Kidney Ureter Bladder (10.18.23 @ 19:32) >  Diffuse gaseous distention of the small and large bowel, may reflect an   ileus. Continued attention on follow-up is recommended.    Moderate rectal stool burden.    < end of copied text >    < from: CT Head No Cont (10.17.23 @ 22:08) >  No acute intracranial pathology.    Stable mild chronic microvascular ischemic changes.    < end of copied text >    < from: CT Abdomen and Pelvis No Cont (10.17.23 @ 19:12) >  Partially imaged right inguinal inflammatory change and mildly enlarged   lymph nodes. Recommend dedicated imaging for further evaluation.    < end of copied text >    < from: CT Lower Extremity No Cont, Right (10.17.23 @ 19:12) >  1. Focal subcutaneous soft tissue thickening with associated inflammatory   changes along the right superficial inguinal region, suspicious for   phlegmon; no evidence of subcutaneous gas to suggest necrotizing   fasciitis.  2. Several mildly enlarged right superficial and deep inguinal lymph   nodes, possibly reactive.    < end of copied text >

## 2023-10-19 NOTE — CONSULT NOTE ADULT - SUBJECTIVE AND OBJECTIVE BOX
Chief complaint/Reason for consult: ileus    HPI:  59 y/o male with hx of ETOH abuse, CKD with left wrist fistula, NIDDM , HTN, HLD, presented to the ED for decreased urinary output and increased thirst since yesterday. Patient was brought to the ED by daughter who states that the patient has a right lower leg abscess that has increased in size with purulent drainage for one week after sustaining trauma to the right leg two weeks ago. As per patient, admits to frequent urination about 5 times a day with little to no urinary output. Patient admits to increased thirst along with suprapubic abdominal pain and dysuria. Patient denies fever/chills, nausea/vomiting, SOB, chest pain , heart palpitations, cough, congestion, appetite loss, changes in bowel movements, hematuria or pyuria.     (17 Oct 2023 23:29)    GI Updates: 58Mm with PMH of DM, HTN, HLD, ESRD on HD, ETOH abuse, s/p left AV fistula (5/2023 Dr. De Leon presents with weakness, AMS, decreased urine output.  Found to be in significant renal failure, hyponatremic, altered with RLE wound for 1 week. GI consulted for ileus. Patient with no bms for 5 days finally had a bm at bedside.  No hematemesis, melena, blood in stool reported.       PAST MEDICAL & SURGICAL HISTORY:   Diabetes      Hypertension      HLD (hyperlipidemia)      Neuropathy      Glaucoma      Chronic kidney disease, unspecified CKD stage  requiring HD march 2022      ETOH abuse      H/O colonoscopy            Family history:  FAMILY HISTORY:  FH: diabetes mellitus (Father)      No GI cancers in first or second degree relatives    Social History: No smoking. +alcohol. No illegal drug use.    Allergies:   No Known Drug Allergies  Bananas (Nausea; Urticaria)  apple (Urticaria)  Intolerances      MEDICATIONS: Home Medications:  carvedilol 12.5 mg oral tablet: 1 orally once a day (17 Oct 2023 15:05)  gabapentin 600 mg oral tablet: 1 orally 3 times a day (17 Oct 2023 15:05)  Januvia 50 mg oral tablet: 1 orally once a day (17 Oct 2023 15:05)  rosuvastatin 10 mg oral capsule: 1 orally once a day (17 Oct 2023 15:05)  Vitamin B-100 oral tablet: 1 orally once a day (17 Oct 2023 15:05)    MEDICATIONS  (STANDING):  chlorhexidine 2% Cloths 1 Application(s) Topical <User Schedule>  dextrose 5%. 1000 milliLiter(s) (50 mL/Hr) IV Continuous <Continuous>  dextrose 5%. 1000 milliLiter(s) (100 mL/Hr) IV Continuous <Continuous>  dextrose 50% Injectable 25 Gram(s) IV Push once  dextrose 50% Injectable 12.5 Gram(s) IV Push once  dextrose 50% Injectable 25 Gram(s) IV Push once  folic acid 1 milliGRAM(s) Oral daily  glucagon  Injectable 1 milliGRAM(s) IntraMuscular once  heparin   Injectable 5000 Unit(s) SubCutaneous every 8 hours  insulin regular Infusion 1 Unit(s)/Hr (1 mL/Hr) IV Continuous <Continuous>  multivitamin 1 Tablet(s) Oral daily  pantoprazole  Injectable 40 milliGRAM(s) IV Push daily  piperacillin/tazobactam IVPB.. 3.375 Gram(s) IV Intermittent every 12 hours  sodium bicarbonate  Infusion 0.207 mEq/kG/Hr (100 mL/Hr) IV Continuous <Continuous>  thiamine 100 milliGRAM(s) Oral daily    MEDICATIONS  (PRN):  dextrose Oral Gel 15 Gram(s) Oral once PRN Blood Glucose LESS THAN 70 milliGRAM(s)/deciliter  LORazepam   Injectable 1 milliGRAM(s) IV Push every 4 hours PRN CIWA-Ar score increase by 2 points and a total score of 7 or less        REVIEW OF SYSTEMS  General:  No weight loss, fevers, or chills.  Eyes:  No reported pain or visual changes  ENT:  No sore throat or runny nose.  NECK: No stiffness or lymphadenopathy  CV:  No chest pain or palpitations.  Resp:  No shortness of breath, cough, wheezing or hemoptysis  GI:  No abdominal pain, nausea, vomiting, dysphagia, diarrhea or constipation. No rectal bleeding, melena, or hematemesis.  Neuro:  No tingling, numbness       VITALS:   T(F): 98.5 (10-19-23 @ 15:13), Max: 99.2 (10-19-23 @ 04:09)  HR: 96 (10-19-23 @ 16:00) (70 - 96)  BP: 105/69 (10-19-23 @ 16:00) (98/60 - 176/103)  RR: 32 (10-19-23 @ 16:00) (8 - 48)  SpO2: 97% (10-19-23 @ 16:00) (95% - 100%)    PHYSICAL EXAM:  GENERAL: AAOx3, no acute distress.  HEAD:  Atraumatic, Normocephalic  EYES: conjunctiva and sclera clear  NECK: Supple, No thyromegaly   CHEST/LUNG: Clear to auscultation bilaterally; No wheeze, rhonchi, or rales  HEART: Regular rate and rhythm; normal S1, S2, No murmurs.  ABDOMEN: Soft, nontender, +distended; hypoactive Bowel sounds present  NEUROLOGY: No asterixis + tremor  SKIN: Intact, no jaundice  Rectal exam-stool burden out of finger's reach        LABS:  10-19    125<L>  |  83<L>  |  107<HH>  ----------------------------<  134<H>  3.6   |  19  |  13.8<HH>    Ca    6.7<L>      19 Oct 2023 05:40  Phos  10.4     10-18  Mg     2.2     10-18                            8.6    9.72  )-----------( 159      ( 18 Oct 2023 08:43 )             23.4       PT/INR - ( 18 Oct 2023 08:43 )   PT: 14.90 sec;   INR: 1.30 ratio             IMAGING:    < from: CT Abdomen and Pelvis No Cont (10.17.23 @ 19:12) >    ACC: [id number] EXAM:  CT ABDOMEN AND PELVIS   ORDERED BY: HEATHER GRANT     PROCEDURE DATE:  10/17/2023          INTERPRETATION:  CLINICAL STATEMENT: Abdominal pain.    TECHNIQUE:  Contiguous axial CT images were obtained of the abdomen and   pelvis without intravenous contrast administration. Oral contrast was not   administered. Reformatted images in the coronal and sagittal planes were   acquired.    COMPARISON CT: 3/15/2022    FINDINGS:  Evaluation of solid organs and vascular structures is limited due to lack   of intravenous contrast.    LOWER CHEST: There is bibasilar subsegmental atelectasis.    HEPATOBILIARY: Unremarkable unenhanced liver. Cholelithiasis.    SPLEEN: Unremarkable.    PANCREAS: Unremarkable.    ADRENAL GLANDS: Unremarkable.    KIDNEYS: No hydronephrosis. Nonobstructing right renal nephrolithiasis.    ABDOMINOPELVIC NODES: Partially imaged right inguinal inflammatory change   and mildly enlarged lymph nodes    PELVIC ORGANS: Urinary bladder Contracted around a Blanco catheter and   therefore not well evaluated    PERITONEUM/MESENTERY/BOWEL: No bowel obstruction, ascites or free   intraperitoneal air. Normal caliber appendix    BONES/SOFT TISSUES: Degenerative changes of the spine.    IMPRESSION:  Partially imaged right inguinal inflammatory change and mildly enlarged   lymph nodes. Recommend dedicated imaging for further evaluation.    --- End of Report ---            HERNANDEZ COTTON MD; Attending Radiologist  This document has been electronically signed. Oct17 2023  8:18PM    < end of copied text >      < from: Xray Kidney Ureter Bladder (10.18.23 @ 19:32) >  ACC: [id number] EXAM:  XR KUB 1 VIEW   ORDERED BY: DIRK BONILLA     PROCEDURE DATE:  10/18/2023          INTERPRETATION:  Clinical Indication: Abdominal distention.    Technique: Supine views of the abdomen    Comparison: CT abdomen pelvis 10/17/2023    Findings:    Catheters and tubes: None  Bowel gas pattern: Diffuse gaseous distention of the small and large   bowel. Moderate rectal stool burden.  Calcifications: No abnormal calcifications.  Skeletal: Degenerative changes.  Other: None.    IMPRESSION:    Diffuse gaseous distention of the small and large bowel, may reflect an   ileus. Continued attention on follow-up is recommended.    Moderate rectal stool burden.    --- End of Report ---            WILFRED MCGHEE MD; Attending Radiologist  This document has been electronically signed. Oct 19 2023  9:25AM    < end of copied text >

## 2023-10-19 NOTE — PROGRESS NOTE ADULT - ASSESSMENT
Plan:   1. acute on chronic Renal failure with high anion gap metabolic acidosis  - started IV sodium bicarb drip   - check I&Os per ICU hourly   - nephrology consult noted. started on HD via AVF    2. Severe Hyponatremia with slight confusion   - urine electrolytes   - check thyroid function test, cortisol and lipid panels.   - BMP q 6 hr  - avoid > 8 meq increase in Na level over 24 hours.    3. Hyperglycemia   - fingersticks with insulin coverage   - BHB elevated.  - started on inuslin gtt  - follow repeat BHB  - cautious hydration. high risk for volume overload considering severe VAMSI on CKD    4. ETOH abuse   - IV thyamine, folic acid, multivitamin CIWA with benzo protocol    -watch for electrolyte imbalance. Plan:   1. acute on chronic Renal failure with high anion gap metabolic acidosis  - started IV sodium bicarb drip   - check I&Os per ICU hourly   - nephrology consult noted. s/p HD via AVF    2. Severe Hyponatremia with slight confusion   - urine electrolytes   - check thyroid function test, cortisol and lipid panels.   - BMP q 6 hr  - avoid > 8 meq increase in Na level over 24 hours.    3. Hyperglycemia / DKA   - fingersticks with insulin coverage   - BHB elevated.  - on inuslin infusion switched to s/q insulin  - follow repeat BHB, improved.  - cautious hydration. high risk for volume overload considering severe VAMSI on CKD    4. ETOH abuse   - IV thyamine, folic acid, multivitamin CIWA with benzo protocol    -watch for electrolyte imbalance.

## 2023-10-19 NOTE — PROGRESS NOTE ADULT - SUBJECTIVE AND OBJECTIVE BOX
Nephrology Progress Note    KECIA MARTIN  [id number]  58y  Male    S:  Patient is seen and examined, events over the last 24h noted.    O:  Allergies:  No Known Drug Allergies  Bananas (Nausea; Urticaria)  apple (Urticaria)    Hospital Medications:   MEDICATIONS  (STANDING):  chlorhexidine 2% Cloths 1 Application(s) Topical <User Schedule>  dextrose 5%. 1000 milliLiter(s) (100 mL/Hr) IV Continuous <Continuous>  dextrose 5%. 1000 milliLiter(s) (50 mL/Hr) IV Continuous <Continuous>  dextrose 50% Injectable 12.5 Gram(s) IV Push once  dextrose 50% Injectable 25 Gram(s) IV Push once  dextrose 50% Injectable 25 Gram(s) IV Push once  folic acid 1 milliGRAM(s) Oral daily  glucagon  Injectable 1 milliGRAM(s) IntraMuscular once  heparin   Injectable 5000 Unit(s) SubCutaneous every 8 hours  insulin glargine Injectable (LANTUS) 20 Unit(s) SubCutaneous once  insulin regular Infusion 1 Unit(s)/Hr (1 mL/Hr) IV Continuous <Continuous>  multivitamin 1 Tablet(s) Oral daily  pantoprazole  Injectable 40 milliGRAM(s) IV Push daily  piperacillin/tazobactam IVPB.. 3.375 Gram(s) IV Intermittent every 12 hours  sodium bicarbonate  Infusion 0.207 mEq/kG/Hr (100 mL/Hr) IV Continuous <Continuous>  thiamine 100 milliGRAM(s) Oral daily    MEDICATIONS  (PRN):  dextrose Oral Gel 15 Gram(s) Oral once PRN Blood Glucose LESS THAN 70 milliGRAM(s)/deciliter  LORazepam   Injectable 1 milliGRAM(s) IV Push every 4 hours PRN CIWA-Ar score increase by 2 points and a total score of 7 or less    Home Medications:  carvedilol 12.5 mg oral tablet: 1 orally once a day (17 Oct 2023 15:05)  gabapentin 600 mg oral tablet: 1 orally 3 times a day (17 Oct 2023 15:05)  Januvia 50 mg oral tablet: 1 orally once a day (17 Oct 2023 15:05)  rosuvastatin 10 mg oral capsule: 1 orally once a day (17 Oct 2023 15:05)  Vitamin B-100 oral tablet: 1 orally once a day (17 Oct 2023 15:05)      VITALS:  Daily Height in cm: 167.64 (18 Oct 2023 15:30)    Daily   T(F): 98.5 (10-19-23 @ 15:13), Max: 99.2 (10-19-23 @ 04:09)  HR: 94 (10-19-23 @ 15:13)  BP: 133/77 (10-19-23 @ 15:13)  RR: 13 (10-19-23 @ 15:00)  SpO2: 99% (10-19-23 @ 15:13)  Wt(kg): --  I&O's Detail    18 Oct 2023 07:01  -  19 Oct 2023 07:00  --------------------------------------------------------  IN:    dextrose 5%: 200 mL    Insulin: 7 mL    IV PiggyBack: 100 mL    IV PiggyBack: 300 mL    Sodium Bicarbonate: 1700 mL  Total IN: 2307 mL    OUT:    Indwelling Catheter - Urethral (mL): 125 mL    Other (mL): 1000 mL    Voided (mL): 130 mL  Total OUT: 1255 mL    Total NET: 1052 mL      19 Oct 2023 07:01  -  19 Oct 2023 15:26  --------------------------------------------------------  IN:    Insulin: 8 mL    Oral Fluid: 100 mL    Sodium Bicarbonate: 900 mL  Total IN: 1008 mL    OUT:    Indwelling Catheter - Urethral (mL): 125 mL    Other (mL): 1000 mL  Total OUT: 1125 mL    Total NET: -117 mL        I&O's Summary    18 Oct 2023 07:01  -  19 Oct 2023 07:00  --------------------------------------------------------  IN: 2307 mL / OUT: 1255 mL / NET: 1052 mL    19 Oct 2023 07:01  -  19 Oct 2023 15:26  --------------------------------------------------------  IN: 1008 mL / OUT: 1125 mL / NET: -117 mL      Height (cm): 167.6 (10-18 @ 15:30)  Weight (kg): 74.7 (10-18 @ 15:30)  BMI (kg/m2): 26.6 (10-18 @ 15:30)  BSA (m2): 1.84 (10-18 @ 15:30)    PHYSICAL EXAM:  Gen: NAD  Chest: b/l breath sounds  Abd: soft  Extremities: no edema  Vascular access:       LABS:  ABG - ( 18 Oct 2023 04:30 )  pH, Arterial: 7.16  pH, Blood: x     /  pCO2: 28    /  pO2: 136   / HCO3: 10    / Base Excess: -17.3 /  SaO2: 99.0                10-19    125<L>  |  83<L>  |  107<HH>  ----------------------------<  134<H>  3.6   |  19  |  13.8<HH>    Ca    6.7<L>      19 Oct 2023 05:40  Phos  10.4     10-18  Mg     2.2     10-18      eGFR: 4 mL/min/1.73m2 (10-19-23 @ 05:40)  eGFR: 3 mL/min/1.73m2 (10-19-23 @ 02:34)    Phosphorus: 10.4 mg/dL (10-18-23 @ 08:43)  Phosphorus Level, Serum: 4.1 mg/dL (11-24-22 @ 06:48)    Osmolality, Serum: 311 mos/kg (10-18-23 @ 08:43)  Intact PTH: 311 pg/mL (03-16-22 @ 11:45)                          8.6    9.72  )-----------( 159      ( 18 Oct 2023 08:43 )             23.4     Mean Cell Volume: 86.0 fL (10-18-23 @ 08:43)    Urine Studies:  Protein, Urine: >=1000 mg/dL (10-18-23 @ 11:30)  White Blood Cell - Urine: 15 /HPF (10-18-23 @ 11:30)  Red Blood Cell - Urine: >50 /HPF (10-18-23 @ 11:30)    Urea Nitrogen,  Random Urine: 281 mg/dL (10-18-23 @ 11:30)    Sodium, Random Urine: 49.0 mmoL/L (10-18 @ 11:30)  Creatinine, Random Urine: 80 mg/dL (10-18 @ 11:30)    Creatinine trend:  Creatinine: 13.8 mg/dL (10-19-23 @ 05:40)  Creatinine: 14.8 mg/dL (10-19-23 @ 02:34)  Creatinine: 13.8 mg/dL (10-18-23 @ 21:48)  Creatinine: 12.6 mg/dL (10-18-23 @ 19:32)  Creatinine: 14.0 mg/dL (10-18-23 @ 14:48)  Creatinine: 16.3 mg/dL (10-18-23 @ 08:43)  Creatinine: 15.6 mg/dL (10-17-23 @ 14:30)  Creatinine, Serum: 4.1 mg/dL (05-02-23 @ 08:06)  Creatinine, Serum: 2.8 mg/dL (11-30-22 @ 07:08)    Hepatitis B Surface Antigen: Nonreact (10-18-23 @ 17:20)  Hepatitis C Virus S/CO Ratio: 0.07 S/CO (10-18-23 @ 17:20)  Hepatitis B Core IgM Antibody: Nonreact (10-18-23 @ 17:20)    US Renal:   ACC: [id number] EXAM:  US KIDNEY(S)   ORDERED BY: BRANDON SANCHEZ     PROCEDURE DATE:  10/19/2023      INTERPRETATION:  CLINICAL INFORMATION: Worsening renal function CT scan   dated    COMPARISON: October 17, 2023    TECHNIQUE: Sonography of the kidneys and bladder.    FINDINGS:    Right kidney: 10.8 x 6.1 x 3.9 cm. No hydronephrosis or calculi.    Left kidney:  8.6 x 4.4 x 3.3 cm. No hydronephrosis or calculi.      IMPRESSION:    Normal renal ultrasound.       Nephrology Progress Note    KECIA MARTIN  MRN-[id number]  58y  Male    S:  Patient is seen and examined, events over the last 24h noted.    O:  Allergies:  No Known Drug Allergies  Bananas (Nausea; Urticaria)  apple (Urticaria)    Hospital Medications:   MEDICATIONS  (STANDING):  folic acid 1 milliGRAM(s) Oral daily  heparin   Injectable 5000 Unit(s) SubCutaneous every 8 hours  insulin glargine Injectable (LANTUS) 20 Unit(s) SubCutaneous once  insulin regular Infusion 1 Unit(s)/Hr (1 mL/Hr) IV Continuous <Continuous>  multivitamin 1 Tablet(s) Oral daily  pantoprazole  Injectable 40 milliGRAM(s) IV Push daily  piperacillin/tazobactam IVPB.. 3.375 Gram(s) IV Intermittent every 12 hours  thiamine 100 milliGRAM(s) Oral daily    MEDICATIONS  (PRN):  dextrose Oral Gel 15 Gram(s) Oral once PRN Blood Glucose LESS THAN 70 milliGRAM(s)/deciliter  LORazepam   Injectable 1 milliGRAM(s) IV Push every 4 hours PRN CIWA-Ar score increase by 2 points and a total score of 7 or less    Home Medications:  carvedilol 12.5 mg oral tablet: 1 orally once a day (17 Oct 2023 15:05)  gabapentin 600 mg oral tablet: 1 orally 3 times a day (17 Oct 2023 15:05)  Januvia 50 mg oral tablet: 1 orally once a day (17 Oct 2023 15:05)  rosuvastatin 10 mg oral capsule: 1 orally once a day (17 Oct 2023 15:05)  Vitamin B-100 oral tablet: 1 orally once a day (17 Oct 2023 15:05)      VITALS:  Daily Height in cm: 167.64 (18 Oct 2023 15:30)    T(F): 98.5 (10-19-23 @ 15:13), Max: 99.2 (10-19-23 @ 04:09)  HR: 94 (10-19-23 @ 15:13)  BP: 133/77 (10-19-23 @ 15:13)  RR: 13 (10-19-23 @ 15:00)  SpO2: 99% (10-19-23 @ 15:13)  Wt(kg): --  I&O's Detail    18 Oct 2023 07:01  -  19 Oct 2023 07:00  --------------------------------------------------------  IN:    dextrose 5%: 200 mL    Insulin: 7 mL    IV PiggyBack: 100 mL    IV PiggyBack: 300 mL    Sodium Bicarbonate: 1700 mL  Total IN: 2307 mL    OUT:    Indwelling Catheter - Urethral (mL): 125 mL    Other (mL): 1000 mL    Voided (mL): 130 mL  Total OUT: 1255 mL    Total NET: 1052 mL      19 Oct 2023 07:01  -  19 Oct 2023 15:26  --------------------------------------------------------  IN:    Insulin: 8 mL    Oral Fluid: 100 mL    Sodium Bicarbonate: 900 mL  Total IN: 1008 mL    OUT:    Indwelling Catheter - Urethral (mL): 125 mL    Other (mL): 1000 mL  Total OUT: 1125 mL    Total NET: -117 mL        I&O's Summary    18 Oct 2023 07:01  -  19 Oct 2023 07:00  --------------------------------------------------------  IN: 2307 mL / OUT: 1255 mL / NET: 1052 mL    19 Oct 2023 07:01  -  19 Oct 2023 15:26  --------------------------------------------------------  IN: 1008 mL / OUT: 1125 mL / NET: -117 mL      Height (cm): 167.6 (10-18 @ 15:30)  Weight (kg): 74.7 (10-18 @ 15:30)  BMI (kg/m2): 26.6 (10-18 @ 15:30)  BSA (m2): 1.84 (10-18 @ 15:30)    PHYSICAL EXAM:  Gen: NAD, ill appearing  Chest: b/l breath sounds  Abd: soft  Extremities: no edema  Vascular access: LUE AVF +thrill    LABS:  ABG - ( 18 Oct 2023 04:30 )  pH, Arterial: 7.16  pH, Blood: x     /  pCO2: 28    /  pO2: 136   / HCO3: 10    / Base Excess: -17.3 /  SaO2: 99.0        10-19    125<L>  |  83<L>  |  107<HH>  ----------------------------<  134<H>  3.6   |  19  |  13.8<HH>    Ca    6.7<L>      19 Oct 2023 05:40  Phos  10.4     10-18  Mg     2.2     10-18                          8.6    9.72  )-----------( 159      ( 18 Oct 2023 08:43 )             23.4     Mean Cell Volume: 86.0 fL (10-18-23 @ 08:43)    Urine Studies:  Protein, Urine: >=1000 mg/dL (10-18-23 @ 11:30)  White Blood Cell - Urine: 15 /HPF (10-18-23 @ 11:30)  Red Blood Cell - Urine: >50 /HPF (10-18-23 @ 11:30)    Urea Nitrogen,  Random Urine: 281 mg/dL (10-18-23 @ 11:30)    Sodium, Random Urine: 49.0 mmoL/L (10-18 @ 11:30)  Creatinine, Random Urine: 80 mg/dL (10-18 @ 11:30)    Creatinine trend:  Creatinine: 13.8 mg/dL (10-19-23 @ 05:40)  Creatinine: 14.8 mg/dL (10-19-23 @ 02:34)  Creatinine: 13.8 mg/dL (10-18-23 @ 21:48)  Creatinine: 12.6 mg/dL (10-18-23 @ 19:32)  Creatinine: 14.0 mg/dL (10-18-23 @ 14:48)  Creatinine: 16.3 mg/dL (10-18-23 @ 08:43)  Creatinine: 15.6 mg/dL (10-17-23 @ 14:30)  Creatinine, Serum: 4.1 mg/dL (05-02-23 @ 08:06)  Creatinine, Serum: 2.8 mg/dL (11-30-22 @ 07:08)    Sodium: 125 mmol/L (10-19-23 @ 05:40)  Sodium: 126 mmol/L (10-19-23 @ 02:34)  Sodium: 125 mmol/L (10-18-23 @ 21:48)  Sodium: 126 mmol/L (10-18-23 @ 19:32)  Sodium: 122 mmol/L (10-18-23 @ 14:48)  Sodium: 120 mmol/L (10-18-23 @ 08:43)  Sodium: 114 mmol/L (10-17-23 @ 14:30)      Sodium, Random Urine: 49.0 mmoL/L (10-18-23 @ 11:30)  Osmolality, Serum: 311 mos/kg [280 - 301] (10-18-23 @ 08:43)    Thyroid Stimulating Hormone, Serum: 0.45 uIU/mL [0.27 - 4.20] (10-18-23 @ 08:43)  Triiodothyronine, Total (T3 Total): 47 ng/dL [80 - 200] (03-15-22 @ 12:11)  Thyroid Stimulating Hormone, Serum: 3.51 uIU/mL [0.27 - 4.20] (03-15-22 @ 12:11)        Hepatitis B Surface Antigen: Nonreact (10-18-23 @ 17:20)  Hepatitis C Virus S/CO Ratio: 0.07 S/CO (10-18-23 @ 17:20)  Hepatitis B Core IgM Antibody: Nonreact (10-18-23 @ 17:20)    US Renal:   ACC: [id number] EXAM:  US KIDNEY(S)   ORDERED BY: BRANDON SANCHEZ     PROCEDURE DATE:  10/19/2023      INTERPRETATION:  CLINICAL INFORMATION: Worsening renal function CT scan   dated    COMPARISON: October 17, 2023    TECHNIQUE: Sonography of the kidneys and bladder.    FINDINGS:    Right kidney: 10.8 x 6.1 x 3.9 cm. No hydronephrosis or calculi.    Left kidney:  8.6 x 4.4 x 3.3 cm. No hydronephrosis or calculi.      IMPRESSION:    Normal renal ultrasound.

## 2023-10-19 NOTE — PROGRESS NOTE ADULT - ASSESSMENT
58Mm with PMH of DM, HTN, HLD, ESRD on HD, ETOH abuse, s/p left AV fistula (5/2023 Dr. De Leon presents with weakness, AMS, decreased urine output.  Found to be in significant renal failure, hyponatremic, altered with RLE wound for 1 week.     Plan:  - Recommend dedicated ultrasound of right groin and right leg.   - Continue with local wound care to RLE, topical bacitracin, gauze and paper tape.   - Low suspicion of necrotizing soft tissue infection.  Likely spontaneously drained abscess  - Continue broad spectrum abx  - Renal consult for possible HD given acidosis  - NPO  - IVF  - may require further I and D once stabilized, possible debridement of right lower extremity  - Surgery team following     58Mm with PMH of DM, HTN, HLD, ESRD on HD, ETOH abuse, s/p left AV fistula (5/2023 Dr. De Leon presents with weakness, AMS, decreased urine output.  Found to be in significant renal failure, hyponatremic, altered with RLE wound for 1 week.     Plan:  - Recommend dedicated ultrasound of right groin and right leg.   - Continue with local wound care to RLE, switch to BID moist to dry gauze dressing for autodebridement.  - Low suspicion of necrotizing soft tissue infection.  Likely spontaneously drained abscess with lymphadenitis.  - Continue broad spectrum abx  - Renal consult, HD.  - NPO, may advance if no N/V  - IVF  - may require I and D/biopsy/debridement of right lower extremity  - Surgery will continue to follow.

## 2023-10-19 NOTE — PROGRESS NOTE ADULT - SUBJECTIVE AND OBJECTIVE BOX
INFECTIOUS DISEASE FOLLOW UP NOTE:    Interval History/ROS: Patient is a 58y old  Male who presents with a chief complaint of Worsening Renal Failure (19 Oct 2023 08:15)      Overnight events:    REVIEW OF SYSTEMS:        Prior hospital charts reviewed [Yes]  Primary team notes reviewed [Yes]  Other consultant notes reviewed [Yes]    Allergies:  No Known Drug Allergies  Bananas (Nausea; Urticaria)  apple (Urticaria)      ANTIMICROBIALS:   piperacillin/tazobactam IVPB.. 3.375 every 12 hours      OTHER MEDS: MEDICATIONS  (STANDING):  glucagon  Injectable 1 once  heparin   Injectable 5000 every 8 hours  insulin regular Infusion 1 <Continuous>  LORazepam   Injectable 1 every 4 hours PRN  pantoprazole  Injectable 40 daily      Vital Signs Last 24 Hrs  T(F): 97.3 (10-19-23 @ 07:20), Max: 99.2 (10-19-23 @ 04:09)    Vital Signs Last 24 Hrs  HR: 81 (10-19-23 @ 07:20) (70 - 111)  BP: 122/68 (10-19-23 @ 07:20) (107/78 - 166/71)  RR: 11 (10-19-23 @ 07:20)  SpO2: 98% (10-19-23 @ 07:20) (95% - 100%)  Wt(kg): --    EXAM:      Labs:                        8.6    9.72  )-----------( 159      ( 18 Oct 2023 08:43 )             23.4     10-19    125<L>  |  83<L>  |  107<HH>  ----------------------------<  134<H>  3.6   |  19  |  13.8<HH>    Ca    6.7<L>      19 Oct 2023 05:40  Phos  10.4     10-18  Mg     2.2     10-18    TPro  7.4  /  Alb  3.8  /  TBili  0.4  /  DBili  x   /  AST  92<H>  /  ALT  42<H>  /  AlkPhos  164<H>  10-17      WBC Trend:  WBC Count: 9.72 (10-18-23 @ 08:43)  WBC Count: 9.94 (10-17-23 @ 14:30)      Creatine Trend:  Creatinine: 13.8 (10-19)  Creatinine: 14.8 (10-19)  Creatinine: 13.8 (10-18)  Creatinine: 12.6 (10-18)      Liver Biochemical Testing Trend:  Alanine Aminotransferase (ALT/SGPT): 42 *H* (10-17)  Alanine Aminotransferase (ALT/SGPT): 19 (05-02)  Alanine Aminotransferase (ALT/SGPT): 14 (11-24)  Alanine Aminotransferase (ALT/SGPT): 16 (11-23)  Alanine Aminotransferase (ALT/SGPT): 16 (11-22)  Aspartate Aminotransferase (AST/SGOT): 92 (10-17-23 @ 14:30)  Aspartate Aminotransferase (AST/SGOT): 26 (05-02-23 @ 08:06)  Aspartate Aminotransferase (AST/SGOT): 27 (11-24-22 @ 06:48)  Aspartate Aminotransferase (AST/SGOT): 23 (11-23-22 @ 06:40)  Aspartate Aminotransferase (AST/SGOT): 24 (11-22-22 @ 05:45)  Bilirubin Total: 0.4 (10-17)  Bilirubin Total, Serum: 0.6 (05-02)  Bilirubin Total, Serum: 0.4 (11-24)  Bilirubin Total, Serum: 0.5 (11-23)  Bilirubin Total, Serum: 0.7 (11-22)      Trend LDH      Urinalysis Basic - ( 19 Oct 2023 05:40 )    Color: x / Appearance: x / SG: x / pH: x  Gluc: 134 mg/dL / Ketone: x  / Bili: x / Urobili: x   Blood: x / Protein: x / Nitrite: x   Leuk Esterase: x / RBC: x / WBC x   Sq Epi: x / Non Sq Epi: x / Bacteria: x        MICROBIOLOGY:        Culture - Blood (collected 17 Oct 2023 14:30)  Source: .Blood Blood-Peripheral  Preliminary Report:    No growth at 24 hours    Culture - Blood (collected 17 Oct 2023 14:30)  Source: .Blood Blood-Peripheral  Preliminary Report:    No growth at 24 hours    Culture - Blood (collected 29 Nov 2022 06:40)  Source: .Blood None  Final Report:    No Growth Final    Culture - Blood (collected 28 Nov 2022 06:28)  Source: .Blood None  Final Report:    No Growth Final    Culture - Blood (collected 27 Nov 2022 07:33)  Source: .Blood None  Final Report:    No Growth Final    Culture - Blood (collected 25 Nov 2022 08:37)  Source: .Blood None  Final Report:    Growth in aerobic and anaerobic bottles: Staphylococcus aureus    See previous culture [id number]    Culture - Blood (collected 24 Nov 2022 16:18)  Source: .Blood None  Final Report:    Growth in aerobic and anaerobic bottles: Staphylococcus aureus    See previous culture [id number]    Culture - Blood (collected 24 Nov 2022 06:48)  Source: .Blood None  Final Report:    No Growth Final    Culture - Blood (collected 24 Nov 2022 06:48)  Source: .Blood None  Final Report:    No Growth Final    Culture - Blood (collected 23 Nov 2022 06:40)  Source: .Blood None  Final Report:    Growth in aerobic and anaerobic bottles: Staphylococcus aureus    See previous culture [id number]                                C-Reactive Protein, Serum: 217.3 (10-17)      Blood Gas Arterial, Lactate: 0.60 (10-18 @ 04:30)  Lactate, Blood: 0.8 (10-17 @ 14:30)      RADIOLOGY:  imaging below personally reviewed    < from: Xray Chest 1 View- PORTABLE-Routine (10.19.23 @ 07:13) >  Impression:    Pulmonaryvascular congestion.    < end of copied text >    < from: CT Head No Cont (10.17.23 @ 22:08) >  IMPRESSION:    No acute intracranial pathology.    Stable mild chronic microvascular ischemic changes.    < end of copied text >    < from: CT Abdomen and Pelvis No Cont (10.17.23 @ 19:12) >  FINDINGS:  Evaluation of solid organs and vascular structures is limited due to lack   of intravenous contrast.    LOWER CHEST: There is bibasilar subsegmental atelectasis.    HEPATOBILIARY: Unremarkable unenhanced liver. Cholelithiasis.    SPLEEN: Unremarkable.    PANCREAS: Unremarkable.    ADRENAL GLANDS: Unremarkable.    KIDNEYS: No hydronephrosis. Nonobstructing right renal nephrolithiasis.    ABDOMINOPELVIC NODES: Partially imaged right inguinal inflammatory change   and mildly enlarged lymph nodes    PELVIC ORGANS: Urinary bladder Contracted around a Blanco catheter and   therefore not well evaluated    PERITONEUM/MESENTERY/BOWEL: No bowel obstruction, ascites or free   intraperitoneal air. Normal caliber appendix    BONES/SOFT TISSUES: Degenerative changes of the spine.    IMPRESSION:  Partially imaged right inguinal inflammatory change and mildly enlarged   lymph nodes. Recommend dedicated imaging for further evaluation.    < end of copied text >    < from: CT Lower Extremity No Cont, Right (10.17.23 @ 19:12) >  IMPRESSION:  1. Focal subcutaneous soft tissue thickening with associated inflammatory   changes along the right superficial inguinal region, suspicious for   phlegmon; no evidence of subcutaneous gas to suggest necrotizing   fasciitis.  2. Several mildly enlarged right superficial and deep inguinal lymph   nodes, possibly reactive.      < end of copied text > INFECTIOUS DISEASE FOLLOW UP NOTE:    Interval History/ROS: Patient is a 58y old  Male who presents with a chief complaint of Worsening Renal Failure (19 Oct 2023 08:15)    Overnight events: No event overnight. Confused today.    REVIEW OF SYSTEMS:  Unable to obtain due to cognitive impairment      Prior hospital charts reviewed [Yes]  Primary team notes reviewed [Yes]  Other consultant notes reviewed [Yes]    Allergies:  No Known Drug Allergies  Bananas (Nausea; Urticaria)  apple (Urticaria)      ANTIMICROBIALS:   piperacillin/tazobactam IVPB.. 3.375 every 12 hours      OTHER MEDS: MEDICATIONS  (STANDING):  glucagon  Injectable 1 once  heparin   Injectable 5000 every 8 hours  insulin regular Infusion 1 <Continuous>  LORazepam   Injectable 1 every 4 hours PRN  pantoprazole  Injectable 40 daily      Vital Signs Last 24 Hrs  T(F): 97.3 (10-19-23 @ 07:20), Max: 99.2 (10-19-23 @ 04:09)    Vital Signs Last 24 Hrs  HR: 81 (10-19-23 @ 07:20) (70 - 111)  BP: 122/68 (10-19-23 @ 07:20) (107/78 - 166/71)  RR: 11 (10-19-23 @ 07:20)  SpO2: 98% (10-19-23 @ 07:20) (95% - 100%)  Wt(kg): --    EXAM:  GENERAL: restless, lying in bed  HEAD: No head lesions  EYES: Conjunctiva pink and cornea white  EAR, NOSE, MOUTH, THROAT: Normal external ears and nose, no discharges; moist mucous membranes; poor dentition  NECK: Supple, nontender to palpation; no JVD  RESPIRATORY: Clear to auscultation bilaterally  CARDIOVASCULAR: S1 S2  GASTROINTESTINAL: Soft, nontender, distended abdomen; normoactive bowel sounds  EXTREMITIES: No clubbing, cyanosis, or petal edema  NERVOUS SYSTEM: Alert and awake but not oriented  MUSCULOSKELETAL: No joint erythema, swelling or pain  SKIN: Enlarged right groin LN with erythema and warmth, crusted wound on lateral right LE with mild drainage, no superficial thrombophlebitis  PSYCH: Normal affect    Labs:                        8.6    9.72  )-----------( 159      ( 18 Oct 2023 08:43 )             23.4     10-19    125<L>  |  83<L>  |  107<HH>  ----------------------------<  134<H>  3.6   |  19  |  13.8<HH>    Ca    6.7<L>      19 Oct 2023 05:40  Phos  10.4     10-18  Mg     2.2     10-18    TPro  7.4  /  Alb  3.8  /  TBili  0.4  /  DBili  x   /  AST  92<H>  /  ALT  42<H>  /  AlkPhos  164<H>  10-17      WBC Trend:  WBC Count: 9.72 (10-18-23 @ 08:43)  WBC Count: 9.94 (10-17-23 @ 14:30)      Creatine Trend:  Creatinine: 13.8 (10-19)  Creatinine: 14.8 (10-19)  Creatinine: 13.8 (10-18)  Creatinine: 12.6 (10-18)      Liver Biochemical Testing Trend:  Alanine Aminotransferase (ALT/SGPT): 42 *H* (10-17)  Alanine Aminotransferase (ALT/SGPT): 19 (05-02)  Alanine Aminotransferase (ALT/SGPT): 14 (11-24)  Alanine Aminotransferase (ALT/SGPT): 16 (11-23)  Alanine Aminotransferase (ALT/SGPT): 16 (11-22)  Aspartate Aminotransferase (AST/SGOT): 92 (10-17-23 @ 14:30)  Aspartate Aminotransferase (AST/SGOT): 26 (05-02-23 @ 08:06)  Aspartate Aminotransferase (AST/SGOT): 27 (11-24-22 @ 06:48)  Aspartate Aminotransferase (AST/SGOT): 23 (11-23-22 @ 06:40)  Aspartate Aminotransferase (AST/SGOT): 24 (11-22-22 @ 05:45)  Bilirubin Total: 0.4 (10-17)  Bilirubin Total, Serum: 0.6 (05-02)  Bilirubin Total, Serum: 0.4 (11-24)  Bilirubin Total, Serum: 0.5 (11-23)  Bilirubin Total, Serum: 0.7 (11-22)      Trend LDH      Urinalysis Basic - ( 19 Oct 2023 05:40 )    Color: x / Appearance: x / SG: x / pH: x  Gluc: 134 mg/dL / Ketone: x  / Bili: x / Urobili: x   Blood: x / Protein: x / Nitrite: x   Leuk Esterase: x / RBC: x / WBC x   Sq Epi: x / Non Sq Epi: x / Bacteria: x        MICROBIOLOGY:        Culture - Blood (collected 17 Oct 2023 14:30)  Source: .Blood Blood-Peripheral  Preliminary Report:    No growth at 24 hours    Culture - Blood (collected 17 Oct 2023 14:30)  Source: .Blood Blood-Peripheral  Preliminary Report:    No growth at 24 hours    Culture - Blood (collected 29 Nov 2022 06:40)  Source: .Blood None  Final Report:    No Growth Final    Culture - Blood (collected 28 Nov 2022 06:28)  Source: .Blood None  Final Report:    No Growth Final    Culture - Blood (collected 27 Nov 2022 07:33)  Source: .Blood None  Final Report:    No Growth Final    Culture - Blood (collected 25 Nov 2022 08:37)  Source: .Blood None  Final Report:    Growth in aerobic and anaerobic bottles: Staphylococcus aureus    See previous culture [id number]    Culture - Blood (collected 24 Nov 2022 16:18)  Source: .Blood None  Final Report:    Growth in aerobic and anaerobic bottles: Staphylococcus aureus    See previous culture [id number]    Culture - Blood (collected 24 Nov 2022 06:48)  Source: .Blood None  Final Report:    No Growth Final    Culture - Blood (collected 24 Nov 2022 06:48)  Source: .Blood None  Final Report:    No Growth Final    Culture - Blood (collected 23 Nov 2022 06:40)  Source: .Blood None  Final Report:    Growth in aerobic and anaerobic bottles: Staphylococcus aureus    See previous culture [id number]    C-Reactive Protein, Serum: 217.3 (10-17)      Blood Gas Arterial, Lactate: 0.60 (10-18 @ 04:30)  Lactate, Blood: 0.8 (10-17 @ 14:30)      RADIOLOGY:  imaging below personally reviewed    < from: Xray Chest 1 View- PORTABLE-Routine (10.19.23 @ 07:13) >  Impression:    Pulmonaryvascular congestion.    < end of copied text >    < from: CT Head No Cont (10.17.23 @ 22:08) >  IMPRESSION:    No acute intracranial pathology.    Stable mild chronic microvascular ischemic changes.    < end of copied text >    < from: CT Abdomen and Pelvis No Cont (10.17.23 @ 19:12) >  FINDINGS:  Evaluation of solid organs and vascular structures is limited due to lack   of intravenous contrast.    LOWER CHEST: There is bibasilar subsegmental atelectasis.    HEPATOBILIARY: Unremarkable unenhanced liver. Cholelithiasis.    SPLEEN: Unremarkable.    PANCREAS: Unremarkable.    ADRENAL GLANDS: Unremarkable.    KIDNEYS: No hydronephrosis. Nonobstructing right renal nephrolithiasis.    ABDOMINOPELVIC NODES: Partially imaged right inguinal inflammatory change   and mildly enlarged lymph nodes    PELVIC ORGANS: Urinary bladder Contracted around a Blanco catheter and   therefore not well evaluated    PERITONEUM/MESENTERY/BOWEL: No bowel obstruction, ascites or free   intraperitoneal air. Normal caliber appendix    BONES/SOFT TISSUES: Degenerative changes of the spine.    IMPRESSION:  Partially imaged right inguinal inflammatory change and mildly enlarged   lymph nodes. Recommend dedicated imaging for further evaluation.    < end of copied text >    < from: CT Lower Extremity No Cont, Right (10.17.23 @ 19:12) >  IMPRESSION:  1. Focal subcutaneous soft tissue thickening with associated inflammatory   changes along the right superficial inguinal region, suspicious for   phlegmon; no evidence of subcutaneous gas to suggest necrotizing   fasciitis.  2. Several mildly enlarged right superficial and deep inguinal lymph   nodes, possibly reactive.      < end of copied text >

## 2023-10-19 NOTE — PROGRESS NOTE ADULT - ATTENDING COMMENTS
patient seen and examined agree above note   KUB   renal and bladder US   continue bicarb drip until HD   udall for HD   follow renal   continue abx follow wound cx bld cx  continue insulin follow FS Q1 hrs patient seen and examined agree above note   KUB   renal and bladder US   continue bicarb drip until HD   follow renal   continue abx follow wound cx bld cx  continue insulin follow FS Q1 hrs

## 2023-10-19 NOTE — PROGRESS NOTE ADULT - SUBJECTIVE AND OBJECTIVE BOX
Patient is a 58y old  Male who presents with a chief complaint of Worsening Renal Failure (18 Oct 2023 15:39)      Over Night Events:  Patient seen and examined.   still confused this am    ROS:  See HPI    PHYSICAL EXAM    ICU Vital Signs Last 24 Hrs  T(C): 36.3 (19 Oct 2023 07:20), Max: 37.3 (19 Oct 2023 04:09)  T(F): 97.3 (19 Oct 2023 07:20), Max: 99.2 (19 Oct 2023 04:09)  HR: 81 (19 Oct 2023 07:20) (70 - 111)  BP: 122/68 (19 Oct 2023 07:20) (107/78 - 166/71)  BP(mean): 87 (19 Oct 2023 06:11) (85 - 115)  ABP: --  ABP(mean): --  RR: 11 (19 Oct 2023 07:20) (8 - 48)  SpO2: 98% (19 Oct 2023 07:20) (95% - 100%)    O2 Parameters below as of 18 Oct 2023 16:30  Patient On (Oxygen Delivery Method): room air            General: confused  Lymph Nodes: NO cervical LN   Lungs: Bilateral BS  Cardiovascular: Regular   Abdomen: Soft, Positive BS  Extremities: No clubbing   Skin: warm   Neurological: confused unable to answer questions  Musculoskeletal: move all ext     I&O's Detail    18 Oct 2023 07:01  -  19 Oct 2023 07:00  --------------------------------------------------------  IN:    dextrose 5%: 200 mL    Insulin: 7 mL    IV PiggyBack: 100 mL    IV PiggyBack: 300 mL    Sodium Bicarbonate: 1700 mL  Total IN: 2307 mL    OUT:    Indwelling Catheter - Urethral (mL): 110 mL    Other (mL): 1000 mL    Voided (mL): 130 mL  Total OUT: 1240 mL    Total NET: 1067 mL          LABS:                          8.6    9.72  )-----------( 159      ( 18 Oct 2023 08:43 )             23.4         19 Oct 2023 02:34    126    |  84     |  105    ----------------------------<  125    3.6     |  17     |  14.8     Ca    6.9        19 Oct 2023 02:34  Phos  10.4      18 Oct 2023 08:43  Mg     2.2       18 Oct 2023 08:43                                               PT/INR - ( 18 Oct 2023 08:43 )   PT: 14.90 sec;   INR: 1.30 ratio         PTT - ( 17 Oct 2023 14:30 )  PTT:39.6 sec                                       Urinalysis Basic - ( 19 Oct 2023 02:34 )    Color: x / Appearance: x / SG: x / pH: x  Gluc: 125 mg/dL / Ketone: x  / Bili: x / Urobili: x   Blood: x / Protein: x / Nitrite: x   Leuk Esterase: x / RBC: x / WBC x   Sq Epi: x / Non Sq Epi: x / Bacteria: x        Lactate, Blood: 0.8 mmol/L (10-17-23 @ 14:30)    CARDIAC MARKERS ( 18 Oct 2023 14:48 )  x     / <0.01 ng/mL / x     / x     / x                                                            Culture - Blood (collected 17 Oct 2023 14:30)  Source: .Blood Blood-Peripheral  Preliminary Report (18 Oct 2023 22:01):    No growth at 24 hours    Culture - Blood (collected 17 Oct 2023 14:30)  Source: .Blood Blood-Peripheral  Preliminary Report (18 Oct 2023 22:01):    No growth at 24 hours                                                                                       ABG - ( 18 Oct 2023 04:30 )  pH, Arterial: 7.16  pH, Blood: x     /  pCO2: 28    /  pO2: 136   / HCO3: 10    / Base Excess: -17.3 /  SaO2: 99.0                MEDICATIONS  (STANDING):  chlorhexidine 2% Cloths 1 Application(s) Topical <User Schedule>  folic acid 1 milliGRAM(s) Oral daily  glucagon  Injectable 1 milliGRAM(s) IntraMuscular once  heparin   Injectable 5000 Unit(s) SubCutaneous every 8 hours  insulin regular Infusion 1 Unit(s)/Hr (1 mL/Hr) IV Continuous <Continuous>  multivitamin 1 Tablet(s) Oral daily  pantoprazole  Injectable 40 milliGRAM(s) IV Push daily  piperacillin/tazobactam IVPB.. 3.375 Gram(s) IV Intermittent every 12 hours  sodium bicarbonate  Infusion 0.207 mEq/kG/Hr (100 mL/Hr) IV Continuous <Continuous>  thiamine 100 milliGRAM(s) Oral daily    MEDICATIONS  (PRN):  LORazepam   Injectable 1 milliGRAM(s) IV Push every 4 hours PRN CIWA-Ar score increase by 2 points and a total score of 7 or less               Patient is a 58y old  Male who presents with a chief complaint of Worsening Renal Failure (18 Oct 2023 15:39)      Over Night Events:  Patient seen and examined.   still confused this am  s/p HD yesterday   ROS:  See HPI    PHYSICAL EXAM    ICU Vital Signs Last 24 Hrs  T(C): 36.3 (19 Oct 2023 07:20), Max: 37.3 (19 Oct 2023 04:09)  T(F): 97.3 (19 Oct 2023 07:20), Max: 99.2 (19 Oct 2023 04:09)  HR: 81 (19 Oct 2023 07:20) (70 - 111)  BP: 122/68 (19 Oct 2023 07:20) (107/78 - 166/71)  BP(mean): 87 (19 Oct 2023 06:11) (85 - 115)  ABP: --  ABP(mean): --  RR: 11 (19 Oct 2023 07:20) (8 - 48)  SpO2: 98% (19 Oct 2023 07:20) (95% - 100%)    O2 Parameters below as of 18 Oct 2023 16:30  Patient On (Oxygen Delivery Method): room air            General: confused  Lymph Nodes: NO cervical LN   Lungs: Bilateral BS  Cardiovascular: Regular   Abdomen: Soft, Positive BS  Extremities: No clubbing   Skin: warm   Neurological: confused unable to answer questions  Musculoskeletal: move all ext     I&O's Detail    18 Oct 2023 07:01  -  19 Oct 2023 07:00  --------------------------------------------------------  IN:    dextrose 5%: 200 mL    Insulin: 7 mL    IV PiggyBack: 100 mL    IV PiggyBack: 300 mL    Sodium Bicarbonate: 1700 mL  Total IN: 2307 mL    OUT:    Indwelling Catheter - Urethral (mL): 110 mL    Other (mL): 1000 mL    Voided (mL): 130 mL  Total OUT: 1240 mL    Total NET: 1067 mL          LABS:                          8.6    9.72  )-----------( 159      ( 18 Oct 2023 08:43 )             23.4         19 Oct 2023 02:34    126    |  84     |  105    ----------------------------<  125    3.6     |  17     |  14.8     Ca    6.9        19 Oct 2023 02:34  Phos  10.4      18 Oct 2023 08:43  Mg     2.2       18 Oct 2023 08:43                                               PT/INR - ( 18 Oct 2023 08:43 )   PT: 14.90 sec;   INR: 1.30 ratio         PTT - ( 17 Oct 2023 14:30 )  PTT:39.6 sec                                       Urinalysis Basic - ( 19 Oct 2023 02:34 )    Color: x / Appearance: x / SG: x / pH: x  Gluc: 125 mg/dL / Ketone: x  / Bili: x / Urobili: x   Blood: x / Protein: x / Nitrite: x   Leuk Esterase: x / RBC: x / WBC x   Sq Epi: x / Non Sq Epi: x / Bacteria: x        Lactate, Blood: 0.8 mmol/L (10-17-23 @ 14:30)    CARDIAC MARKERS ( 18 Oct 2023 14:48 )  x     / <0.01 ng/mL / x     / x     / x                                                            Culture - Blood (collected 17 Oct 2023 14:30)  Source: .Blood Blood-Peripheral  Preliminary Report (18 Oct 2023 22:01):    No growth at 24 hours    Culture - Blood (collected 17 Oct 2023 14:30)  Source: .Blood Blood-Peripheral  Preliminary Report (18 Oct 2023 22:01):    No growth at 24 hours                                                                                       ABG - ( 18 Oct 2023 04:30 )  pH, Arterial: 7.16  pH, Blood: x     /  pCO2: 28    /  pO2: 136   / HCO3: 10    / Base Excess: -17.3 /  SaO2: 99.0                MEDICATIONS  (STANDING):  chlorhexidine 2% Cloths 1 Application(s) Topical <User Schedule>  folic acid 1 milliGRAM(s) Oral daily  glucagon  Injectable 1 milliGRAM(s) IntraMuscular once  heparin   Injectable 5000 Unit(s) SubCutaneous every 8 hours  insulin regular Infusion 1 Unit(s)/Hr (1 mL/Hr) IV Continuous <Continuous>  multivitamin 1 Tablet(s) Oral daily  pantoprazole  Injectable 40 milliGRAM(s) IV Push daily  piperacillin/tazobactam IVPB.. 3.375 Gram(s) IV Intermittent every 12 hours  sodium bicarbonate  Infusion 0.207 mEq/kG/Hr (100 mL/Hr) IV Continuous <Continuous>  thiamine 100 milliGRAM(s) Oral daily    MEDICATIONS  (PRN):  LORazepam   Injectable 1 milliGRAM(s) IV Push every 4 hours PRN CIWA-Ar score increase by 2 points and a total score of 7 or less

## 2023-10-19 NOTE — PROGRESS NOTE ADULT - SUBJECTIVE AND OBJECTIVE BOX
HPI:  Pt lila historian, seen and examined at bedside, has 1 to 1 sitter.     PAST MEDICAL & SURGICAL HISTORY:  Diabetes    Hypertension    HLD (hyperlipidemia)    Neuropathy    Glaucoma    Chronic kidney disease, unspecified CKD stage  requiring HD march 2022    ETOH abuse    H/O colonoscopy      Allergies    No Known Drug Allergies  Bananas (Nausea; Urticaria)  apple (Urticaria)      MEDICATIONS  (STANDING):  bisacodyl Suppository 10 milliGRAM(s) Rectal once  chlorhexidine 2% Cloths 1 Application(s) Topical <User Schedule>  folic acid 1 milliGRAM(s) Oral daily  glucagon  Injectable 1 milliGRAM(s) IntraMuscular once  heparin   Injectable 5000 Unit(s) SubCutaneous every 8 hours  insulin regular Infusion 1 Unit(s)/Hr (1 mL/Hr) IV Continuous <Continuous>  multivitamin 1 Tablet(s) Oral daily  pantoprazole  Injectable 40 milliGRAM(s) IV Push daily  piperacillin/tazobactam IVPB.. 3.375 Gram(s) IV Intermittent every 12 hours  polyethylene glycol 3350 17 Gram(s) Oral daily  senna 2 Tablet(s) Oral at bedtime  senna 2 Tablet(s) Oral once  sodium bicarbonate  Infusion 0.207 mEq/kG/Hr (100 mL/Hr) IV Continuous <Continuous>  thiamine 100 milliGRAM(s) Oral daily    MEDICATIONS  (PRN):  LORazepam   Injectable 1 milliGRAM(s) IV Push every 4 hours PRN CIWA-Ar score increase by 2 points and a total score of 7 or less    ROS:  Cannot obtain from patient.        Vital Signs Last 24 Hrs  T(F): 97.8 (19 Oct 2023 11:04), Max: 99.2 (19 Oct 2023 04:09)  HR: 84 (19 Oct 2023 11:10) (72 - 111)  BP: 120/90 (19 Oct 2023 11:10) (104/66 - 166/71)  BP(mean): 113 (19 Oct 2023 10:00) (85 - 115)  RR: 28 (19 Oct 2023 11:10) (8 - 48)  SpO2: 98% (19 Oct 2023 11:10) (95% - 100%)    PHYSICAL EXAM:      Constitutional: confused  Gastrointestinal: soft nt  nd + bs no rebound or guarding  Extremities: normal rom, no edema, calf tenderness  Skin: right groin swollen lymph node, abscess cavity in rle calf area with some necrotic tissue present, ther is some swelling at superior aspect concerning for further abscess        10-19    125<L>  |  83<L>  |  107<HH>  ----------------------------<  134<H>  3.6   |  19  |  13.8<HH>    Ca    6.7<L>      19 Oct 2023 05:40  Phos  10.4     10-18  Mg     2.2     10-18    TPro  7.4  /  Alb  3.8  /  TBili  0.4  /  DBili  x   /  AST  92<H>  /  ALT  42<H>  /  AlkPhos  164<H>  10-17      PT/INR - ( 18 Oct 2023 08:43 )   PT: 14.90 sec;   INR: 1.30 ratio         PTT - ( 17 Oct 2023 14:30 )  PTT:39.6 sec    Urinalysis Basic - ( 19 Oct 2023 05:40 )    Color: x / Appearance: x / SG: x / pH: x  Gluc: 134 mg/dL / Ketone: x  / Bili: x / Urobili: x   Blood: x / Protein: x / Nitrite: x   Leuk Esterase: x / RBC: x / WBC x   Sq Epi: x / Non Sq Epi: x / Bacteria: x

## 2023-10-20 ENCOUNTER — RESULT REVIEW (OUTPATIENT)
Age: 58
End: 2023-10-20

## 2023-10-20 ENCOUNTER — TRANSCRIPTION ENCOUNTER (OUTPATIENT)
Age: 58
End: 2023-10-20

## 2023-10-20 DIAGNOSIS — L02.214 CUTANEOUS ABSCESS OF GROIN: ICD-10-CM

## 2023-10-20 DIAGNOSIS — L97.919 NON-PRESSURE CHRONIC ULCER OF UNSPECIFIED PART OF RIGHT LOWER LEG WITH UNSPECIFIED SEVERITY: ICD-10-CM

## 2023-10-20 LAB
-  AMOXICILLIN/CLAVULANIC ACID: SIGNIFICANT CHANGE UP
-  AMOXICILLIN/CLAVULANIC ACID: SIGNIFICANT CHANGE UP
-  AMPICILLIN/SULBACTAM: SIGNIFICANT CHANGE UP
-  AMPICILLIN/SULBACTAM: SIGNIFICANT CHANGE UP
-  AMPICILLIN: SIGNIFICANT CHANGE UP
-  AMPICILLIN: SIGNIFICANT CHANGE UP
-  CEFAZOLIN: SIGNIFICANT CHANGE UP
-  CEFAZOLIN: SIGNIFICANT CHANGE UP
-  CEFTRIAXONE: SIGNIFICANT CHANGE UP
-  CEFTRIAXONE: SIGNIFICANT CHANGE UP
-  CIPROFLOXACIN: SIGNIFICANT CHANGE UP
-  CIPROFLOXACIN: SIGNIFICANT CHANGE UP
-  CLINDAMYCIN: SIGNIFICANT CHANGE UP
-  CLINDAMYCIN: SIGNIFICANT CHANGE UP
-  DAPTOMYCIN: SIGNIFICANT CHANGE UP
-  DAPTOMYCIN: SIGNIFICANT CHANGE UP
-  ERYTHROMYCIN: SIGNIFICANT CHANGE UP
-  ERYTHROMYCIN: SIGNIFICANT CHANGE UP
-  GENTAMICIN: SIGNIFICANT CHANGE UP
-  GENTAMICIN: SIGNIFICANT CHANGE UP
-  LEVOFLOXACIN: SIGNIFICANT CHANGE UP
-  LEVOFLOXACIN: SIGNIFICANT CHANGE UP
-  LINEZOLID: SIGNIFICANT CHANGE UP
-  LINEZOLID: SIGNIFICANT CHANGE UP
-  MEROPENEM: SIGNIFICANT CHANGE UP
-  MEROPENEM: SIGNIFICANT CHANGE UP
-  MOXIFLOXACIN: SIGNIFICANT CHANGE UP
-  MOXIFLOXACIN: SIGNIFICANT CHANGE UP
-  OXACILLIN: SIGNIFICANT CHANGE UP
-  PENICILLIN: SIGNIFICANT CHANGE UP
-  PENICILLIN: SIGNIFICANT CHANGE UP
-  RIFAMPIN: SIGNIFICANT CHANGE UP
-  RIFAMPIN: SIGNIFICANT CHANGE UP
-  TETRACYCLINE: SIGNIFICANT CHANGE UP
-  TETRACYCLINE: SIGNIFICANT CHANGE UP
-  TRIMETHOPRIM/SULFAMETHOXAZOLE: SIGNIFICANT CHANGE UP
-  TRIMETHOPRIM/SULFAMETHOXAZOLE: SIGNIFICANT CHANGE UP
-  VANCOMYCIN: SIGNIFICANT CHANGE UP
-  VANCOMYCIN: SIGNIFICANT CHANGE UP
ALBUMIN SERPL ELPH-MCNC: 2.8 G/DL — LOW (ref 3.5–5.2)
ALBUMIN SERPL ELPH-MCNC: 2.8 G/DL — LOW (ref 3.5–5.2)
ALP SERPL-CCNC: 99 U/L — SIGNIFICANT CHANGE UP (ref 30–115)
ALP SERPL-CCNC: 99 U/L — SIGNIFICANT CHANGE UP (ref 30–115)
ALT FLD-CCNC: 40 U/L — SIGNIFICANT CHANGE UP (ref 0–41)
ALT FLD-CCNC: 40 U/L — SIGNIFICANT CHANGE UP (ref 0–41)
ANION GAP SERPL CALC-SCNC: 22 MMOL/L — HIGH (ref 7–14)
ANION GAP SERPL CALC-SCNC: 22 MMOL/L — HIGH (ref 7–14)
ANION GAP SERPL CALC-SCNC: 26 MMOL/L — HIGH (ref 7–14)
AST SERPL-CCNC: 81 U/L — HIGH (ref 0–41)
AST SERPL-CCNC: 81 U/L — HIGH (ref 0–41)
B-OH-BUTYR SERPL-SCNC: 3.3 MMOL/L — HIGH
B-OH-BUTYR SERPL-SCNC: 3.3 MMOL/L — HIGH
BASOPHILS # BLD AUTO: 0.02 K/UL — SIGNIFICANT CHANGE UP (ref 0–0.2)
BASOPHILS # BLD AUTO: 0.02 K/UL — SIGNIFICANT CHANGE UP (ref 0–0.2)
BASOPHILS NFR BLD AUTO: 0.3 % — SIGNIFICANT CHANGE UP (ref 0–1)
BASOPHILS NFR BLD AUTO: 0.3 % — SIGNIFICANT CHANGE UP (ref 0–1)
BILIRUB SERPL-MCNC: 0.5 MG/DL — SIGNIFICANT CHANGE UP (ref 0.2–1.2)
BILIRUB SERPL-MCNC: 0.5 MG/DL — SIGNIFICANT CHANGE UP (ref 0.2–1.2)
BLD GP AB SCN SERPL QL: SIGNIFICANT CHANGE UP
BLD GP AB SCN SERPL QL: SIGNIFICANT CHANGE UP
BUN SERPL-MCNC: 73 MG/DL — CRITICAL HIGH (ref 10–20)
BUN SERPL-MCNC: 73 MG/DL — CRITICAL HIGH (ref 10–20)
BUN SERPL-MCNC: 75 MG/DL — CRITICAL HIGH (ref 10–20)
BUN SERPL-MCNC: 75 MG/DL — CRITICAL HIGH (ref 10–20)
BUN SERPL-MCNC: 78 MG/DL — CRITICAL HIGH (ref 10–20)
BUN SERPL-MCNC: 78 MG/DL — CRITICAL HIGH (ref 10–20)
CALCIUM SERPL-MCNC: 7.6 MG/DL — LOW (ref 8.4–10.5)
CALCIUM SERPL-MCNC: 7.6 MG/DL — LOW (ref 8.4–10.5)
CALCIUM SERPL-MCNC: 8 MG/DL — LOW (ref 8.4–10.5)
CALCIUM SERPL-MCNC: 8 MG/DL — LOW (ref 8.4–10.5)
CALCIUM SERPL-MCNC: 8.3 MG/DL — LOW (ref 8.4–10.5)
CALCIUM SERPL-MCNC: 8.3 MG/DL — LOW (ref 8.4–10.5)
CHLORIDE SERPL-SCNC: 88 MMOL/L — LOW (ref 98–110)
CHLORIDE SERPL-SCNC: 88 MMOL/L — LOW (ref 98–110)
CHLORIDE SERPL-SCNC: 89 MMOL/L — LOW (ref 98–110)
CO2 SERPL-SCNC: 20 MMOL/L — SIGNIFICANT CHANGE UP (ref 17–32)
CO2 SERPL-SCNC: 21 MMOL/L — SIGNIFICANT CHANGE UP (ref 17–32)
CO2 SERPL-SCNC: 21 MMOL/L — SIGNIFICANT CHANGE UP (ref 17–32)
CORTIS AM PEAK SERPL-MCNC: 14.5 UG/DL — SIGNIFICANT CHANGE UP (ref 6–18.4)
CORTIS AM PEAK SERPL-MCNC: 14.5 UG/DL — SIGNIFICANT CHANGE UP (ref 6–18.4)
CREAT SERPL-MCNC: 10.2 MG/DL — CRITICAL HIGH (ref 0.7–1.5)
CREAT SERPL-MCNC: 10.2 MG/DL — CRITICAL HIGH (ref 0.7–1.5)
CREAT SERPL-MCNC: 10.8 MG/DL — CRITICAL HIGH (ref 0.7–1.5)
CREAT SERPL-MCNC: 10.8 MG/DL — CRITICAL HIGH (ref 0.7–1.5)
CREAT SERPL-MCNC: 10.9 MG/DL — CRITICAL HIGH (ref 0.7–1.5)
CREAT SERPL-MCNC: 10.9 MG/DL — CRITICAL HIGH (ref 0.7–1.5)
EGFR: 5 ML/MIN/1.73M2 — LOW
EOSINOPHIL # BLD AUTO: 0.15 K/UL — SIGNIFICANT CHANGE UP (ref 0–0.7)
EOSINOPHIL # BLD AUTO: 0.15 K/UL — SIGNIFICANT CHANGE UP (ref 0–0.7)
EOSINOPHIL NFR BLD AUTO: 2 % — SIGNIFICANT CHANGE UP (ref 0–8)
EOSINOPHIL NFR BLD AUTO: 2 % — SIGNIFICANT CHANGE UP (ref 0–8)
GLUCOSE BLDC GLUCOMTR-MCNC: 104 MG/DL — HIGH (ref 70–99)
GLUCOSE BLDC GLUCOMTR-MCNC: 105 MG/DL — HIGH (ref 70–99)
GLUCOSE BLDC GLUCOMTR-MCNC: 105 MG/DL — HIGH (ref 70–99)
GLUCOSE BLDC GLUCOMTR-MCNC: 106 MG/DL — HIGH (ref 70–99)
GLUCOSE BLDC GLUCOMTR-MCNC: 106 MG/DL — HIGH (ref 70–99)
GLUCOSE BLDC GLUCOMTR-MCNC: 107 MG/DL — HIGH (ref 70–99)
GLUCOSE BLDC GLUCOMTR-MCNC: 107 MG/DL — HIGH (ref 70–99)
GLUCOSE BLDC GLUCOMTR-MCNC: 109 MG/DL — HIGH (ref 70–99)
GLUCOSE BLDC GLUCOMTR-MCNC: 109 MG/DL — HIGH (ref 70–99)
GLUCOSE BLDC GLUCOMTR-MCNC: 112 MG/DL — HIGH (ref 70–99)
GLUCOSE BLDC GLUCOMTR-MCNC: 91 MG/DL — SIGNIFICANT CHANGE UP (ref 70–99)
GLUCOSE BLDC GLUCOMTR-MCNC: 91 MG/DL — SIGNIFICANT CHANGE UP (ref 70–99)
GLUCOSE BLDC GLUCOMTR-MCNC: 98 MG/DL — SIGNIFICANT CHANGE UP (ref 70–99)
GLUCOSE BLDC GLUCOMTR-MCNC: 98 MG/DL — SIGNIFICANT CHANGE UP (ref 70–99)
GLUCOSE SERPL-MCNC: 87 MG/DL — SIGNIFICANT CHANGE UP (ref 70–99)
GLUCOSE SERPL-MCNC: 87 MG/DL — SIGNIFICANT CHANGE UP (ref 70–99)
GLUCOSE SERPL-MCNC: 91 MG/DL — SIGNIFICANT CHANGE UP (ref 70–99)
GLUCOSE SERPL-MCNC: 91 MG/DL — SIGNIFICANT CHANGE UP (ref 70–99)
GLUCOSE SERPL-MCNC: 93 MG/DL — SIGNIFICANT CHANGE UP (ref 70–99)
GLUCOSE SERPL-MCNC: 93 MG/DL — SIGNIFICANT CHANGE UP (ref 70–99)
HCT VFR BLD CALC: 19.9 % — LOW (ref 42–52)
HCT VFR BLD CALC: 19.9 % — LOW (ref 42–52)
HCT VFR BLD CALC: 20.1 % — LOW (ref 42–52)
HCT VFR BLD CALC: 20.1 % — LOW (ref 42–52)
HGB BLD-MCNC: 7.1 G/DL — LOW (ref 14–18)
HGB BLD-MCNC: 7.1 G/DL — LOW (ref 14–18)
HGB BLD-MCNC: 7.3 G/DL — LOW (ref 14–18)
HGB BLD-MCNC: 7.3 G/DL — LOW (ref 14–18)
IMM GRANULOCYTES NFR BLD AUTO: 0.8 % — HIGH (ref 0.1–0.3)
IMM GRANULOCYTES NFR BLD AUTO: 0.8 % — HIGH (ref 0.1–0.3)
LYMPHOCYTES # BLD AUTO: 0.5 K/UL — LOW (ref 1.2–3.4)
LYMPHOCYTES # BLD AUTO: 0.5 K/UL — LOW (ref 1.2–3.4)
LYMPHOCYTES # BLD AUTO: 6.8 % — LOW (ref 20.5–51.1)
LYMPHOCYTES # BLD AUTO: 6.8 % — LOW (ref 20.5–51.1)
MAGNESIUM SERPL-MCNC: 1.9 MG/DL — SIGNIFICANT CHANGE UP (ref 1.8–2.4)
MAGNESIUM SERPL-MCNC: 1.9 MG/DL — SIGNIFICANT CHANGE UP (ref 1.8–2.4)
MCHC RBC-ENTMCNC: 31.6 PG — HIGH (ref 27–31)
MCHC RBC-ENTMCNC: 31.6 PG — HIGH (ref 27–31)
MCHC RBC-ENTMCNC: 32.4 PG — HIGH (ref 27–31)
MCHC RBC-ENTMCNC: 32.4 PG — HIGH (ref 27–31)
MCHC RBC-ENTMCNC: 35.7 G/DL — SIGNIFICANT CHANGE UP (ref 32–37)
MCHC RBC-ENTMCNC: 35.7 G/DL — SIGNIFICANT CHANGE UP (ref 32–37)
MCHC RBC-ENTMCNC: 36.3 G/DL — SIGNIFICANT CHANGE UP (ref 32–37)
MCHC RBC-ENTMCNC: 36.3 G/DL — SIGNIFICANT CHANGE UP (ref 32–37)
MCV RBC AUTO: 88.4 FL — SIGNIFICANT CHANGE UP (ref 80–94)
MCV RBC AUTO: 88.4 FL — SIGNIFICANT CHANGE UP (ref 80–94)
MCV RBC AUTO: 89.3 FL — SIGNIFICANT CHANGE UP (ref 80–94)
MCV RBC AUTO: 89.3 FL — SIGNIFICANT CHANGE UP (ref 80–94)
METHOD TYPE: SIGNIFICANT CHANGE UP
METHOD TYPE: SIGNIFICANT CHANGE UP
MONOCYTES # BLD AUTO: 0.77 K/UL — HIGH (ref 0.1–0.6)
MONOCYTES # BLD AUTO: 0.77 K/UL — HIGH (ref 0.1–0.6)
MONOCYTES NFR BLD AUTO: 10.4 % — HIGH (ref 1.7–9.3)
MONOCYTES NFR BLD AUTO: 10.4 % — HIGH (ref 1.7–9.3)
NEUTROPHILS # BLD AUTO: 5.87 K/UL — SIGNIFICANT CHANGE UP (ref 1.4–6.5)
NEUTROPHILS # BLD AUTO: 5.87 K/UL — SIGNIFICANT CHANGE UP (ref 1.4–6.5)
NEUTROPHILS NFR BLD AUTO: 79.7 % — HIGH (ref 42.2–75.2)
NEUTROPHILS NFR BLD AUTO: 79.7 % — HIGH (ref 42.2–75.2)
NRBC # BLD: 0 /100 WBCS — SIGNIFICANT CHANGE UP (ref 0–0)
PHOSPHATE SERPL-MCNC: 5.2 MG/DL — HIGH (ref 2.1–4.9)
PHOSPHATE SERPL-MCNC: 5.2 MG/DL — HIGH (ref 2.1–4.9)
PLATELET # BLD AUTO: 142 K/UL — SIGNIFICANT CHANGE UP (ref 130–400)
PLATELET # BLD AUTO: 142 K/UL — SIGNIFICANT CHANGE UP (ref 130–400)
PLATELET # BLD AUTO: 152 K/UL — SIGNIFICANT CHANGE UP (ref 130–400)
PLATELET # BLD AUTO: 152 K/UL — SIGNIFICANT CHANGE UP (ref 130–400)
PMV BLD: 10 FL — SIGNIFICANT CHANGE UP (ref 7.4–10.4)
PMV BLD: 10 FL — SIGNIFICANT CHANGE UP (ref 7.4–10.4)
PMV BLD: 10.5 FL — HIGH (ref 7.4–10.4)
PMV BLD: 10.5 FL — HIGH (ref 7.4–10.4)
POTASSIUM SERPL-MCNC: 3.4 MMOL/L — LOW (ref 3.5–5)
POTASSIUM SERPL-MCNC: 3.4 MMOL/L — LOW (ref 3.5–5)
POTASSIUM SERPL-MCNC: 3.5 MMOL/L — SIGNIFICANT CHANGE UP (ref 3.5–5)
POTASSIUM SERPL-MCNC: 3.5 MMOL/L — SIGNIFICANT CHANGE UP (ref 3.5–5)
POTASSIUM SERPL-MCNC: 4 MMOL/L — SIGNIFICANT CHANGE UP (ref 3.5–5)
POTASSIUM SERPL-MCNC: 4 MMOL/L — SIGNIFICANT CHANGE UP (ref 3.5–5)
POTASSIUM SERPL-SCNC: 3.4 MMOL/L — LOW (ref 3.5–5)
POTASSIUM SERPL-SCNC: 3.4 MMOL/L — LOW (ref 3.5–5)
POTASSIUM SERPL-SCNC: 3.5 MMOL/L — SIGNIFICANT CHANGE UP (ref 3.5–5)
POTASSIUM SERPL-SCNC: 3.5 MMOL/L — SIGNIFICANT CHANGE UP (ref 3.5–5)
POTASSIUM SERPL-SCNC: 4 MMOL/L — SIGNIFICANT CHANGE UP (ref 3.5–5)
POTASSIUM SERPL-SCNC: 4 MMOL/L — SIGNIFICANT CHANGE UP (ref 3.5–5)
PROT SERPL-MCNC: 5.6 G/DL — LOW (ref 6–8)
PROT SERPL-MCNC: 5.6 G/DL — LOW (ref 6–8)
RBC # BLD: 2.25 M/UL — LOW (ref 4.7–6.1)
RBC # FLD: 13.9 % — SIGNIFICANT CHANGE UP (ref 11.5–14.5)
RBC # FLD: 13.9 % — SIGNIFICANT CHANGE UP (ref 11.5–14.5)
RBC # FLD: 14.2 % — SIGNIFICANT CHANGE UP (ref 11.5–14.5)
RBC # FLD: 14.2 % — SIGNIFICANT CHANGE UP (ref 11.5–14.5)
SODIUM SERPL-SCNC: 132 MMOL/L — LOW (ref 135–146)
SODIUM SERPL-SCNC: 132 MMOL/L — LOW (ref 135–146)
SODIUM SERPL-SCNC: 134 MMOL/L — LOW (ref 135–146)
SODIUM SERPL-SCNC: 134 MMOL/L — LOW (ref 135–146)
SODIUM SERPL-SCNC: 135 MMOL/L — SIGNIFICANT CHANGE UP (ref 135–146)
SODIUM SERPL-SCNC: 135 MMOL/L — SIGNIFICANT CHANGE UP (ref 135–146)
VANCOMYCIN FLD-MCNC: 22.8 UG/ML — HIGH (ref 5–10)
VANCOMYCIN FLD-MCNC: 22.8 UG/ML — HIGH (ref 5–10)
WBC # BLD: 7.37 K/UL — SIGNIFICANT CHANGE UP (ref 4.8–10.8)
WBC # BLD: 7.37 K/UL — SIGNIFICANT CHANGE UP (ref 4.8–10.8)
WBC # BLD: 7.55 K/UL — SIGNIFICANT CHANGE UP (ref 4.8–10.8)
WBC # BLD: 7.55 K/UL — SIGNIFICANT CHANGE UP (ref 4.8–10.8)
WBC # FLD AUTO: 7.37 K/UL — SIGNIFICANT CHANGE UP (ref 4.8–10.8)
WBC # FLD AUTO: 7.37 K/UL — SIGNIFICANT CHANGE UP (ref 4.8–10.8)
WBC # FLD AUTO: 7.55 K/UL — SIGNIFICANT CHANGE UP (ref 4.8–10.8)
WBC # FLD AUTO: 7.55 K/UL — SIGNIFICANT CHANGE UP (ref 4.8–10.8)

## 2023-10-20 PROCEDURE — 10061 I&D ABSCESS COMP/MULTIPLE: CPT

## 2023-10-20 PROCEDURE — 71045 X-RAY EXAM CHEST 1 VIEW: CPT | Mod: 26

## 2023-10-20 PROCEDURE — 99233 SBSQ HOSP IP/OBS HIGH 50: CPT

## 2023-10-20 PROCEDURE — 88304 TISSUE EXAM BY PATHOLOGIST: CPT | Mod: 26

## 2023-10-20 PROCEDURE — 11042 DBRDMT SUBQ TIS 1ST 20SQCM/<: CPT

## 2023-10-20 PROCEDURE — 11045 DBRDMT SUBQ TISS EACH ADDL: CPT

## 2023-10-20 PROCEDURE — 88311 DECALCIFY TISSUE: CPT | Mod: 26

## 2023-10-20 PROCEDURE — 74018 RADEX ABDOMEN 1 VIEW: CPT | Mod: 26

## 2023-10-20 PROCEDURE — 99291 CRITICAL CARE FIRST HOUR: CPT

## 2023-10-20 RX ORDER — VANCOMYCIN HCL 1 G
1000 VIAL (EA) INTRAVENOUS
Refills: 0 | Status: DISCONTINUED | OUTPATIENT
Start: 2023-10-20 | End: 2023-10-23

## 2023-10-20 RX ORDER — MORPHINE SULFATE 50 MG/1
4 CAPSULE, EXTENDED RELEASE ORAL
Refills: 0 | Status: DISCONTINUED | OUTPATIENT
Start: 2023-10-20 | End: 2023-10-21

## 2023-10-20 RX ORDER — DEXTROSE MONOHYDRATE, SODIUM CHLORIDE, AND POTASSIUM CHLORIDE 50; .745; 4.5 G/1000ML; G/1000ML; G/1000ML
1000 INJECTION, SOLUTION INTRAVENOUS
Refills: 0 | Status: DISCONTINUED | OUTPATIENT
Start: 2023-10-20 | End: 2023-10-23

## 2023-10-20 RX ORDER — HEPARIN SODIUM 5000 [USP'U]/ML
5000 INJECTION INTRAVENOUS; SUBCUTANEOUS EVERY 8 HOURS
Refills: 0 | Status: DISCONTINUED | OUTPATIENT
Start: 2023-10-20 | End: 2023-10-30

## 2023-10-20 RX ORDER — ONDANSETRON 8 MG/1
4 TABLET, FILM COATED ORAL EVERY 8 HOURS
Refills: 0 | Status: DISCONTINUED | OUTPATIENT
Start: 2023-10-20 | End: 2023-10-30

## 2023-10-20 RX ORDER — OXYCODONE AND ACETAMINOPHEN 5; 325 MG/1; MG/1
1 TABLET ORAL ONCE
Refills: 0 | Status: DISCONTINUED | OUTPATIENT
Start: 2023-10-20 | End: 2023-10-21

## 2023-10-20 RX ORDER — ONDANSETRON 8 MG/1
4 TABLET, FILM COATED ORAL ONCE
Refills: 0 | Status: DISCONTINUED | OUTPATIENT
Start: 2023-10-20 | End: 2023-10-21

## 2023-10-20 RX ORDER — INSULIN HUMAN 100 [IU]/ML
1 INJECTION, SOLUTION SUBCUTANEOUS
Qty: 100 | Refills: 0 | Status: DISCONTINUED | OUTPATIENT
Start: 2023-10-20 | End: 2023-10-23

## 2023-10-20 RX ORDER — INSULIN HUMAN 100 [IU]/ML
1 INJECTION, SOLUTION SUBCUTANEOUS
Qty: 50 | Refills: 0 | Status: DISCONTINUED | OUTPATIENT
Start: 2023-10-20 | End: 2023-10-20

## 2023-10-20 RX ORDER — PIPERACILLIN AND TAZOBACTAM 4; .5 G/20ML; G/20ML
3.38 INJECTION, POWDER, LYOPHILIZED, FOR SOLUTION INTRAVENOUS EVERY 12 HOURS
Refills: 0 | Status: DISCONTINUED | OUTPATIENT
Start: 2023-10-20 | End: 2023-10-23

## 2023-10-20 RX ORDER — CHLORHEXIDINE GLUCONATE 213 G/1000ML
1 SOLUTION TOPICAL
Refills: 0 | Status: DISCONTINUED | OUTPATIENT
Start: 2023-10-20 | End: 2023-10-21

## 2023-10-20 RX ORDER — DEXTROSE 50 % IN WATER 50 %
25 SYRINGE (ML) INTRAVENOUS
Refills: 0 | Status: DISCONTINUED | OUTPATIENT
Start: 2023-10-20 | End: 2023-10-22

## 2023-10-20 RX ORDER — VANCOMYCIN HCL 1 G
1000 VIAL (EA) INTRAVENOUS
Refills: 0 | Status: DISCONTINUED | OUTPATIENT
Start: 2023-10-20 | End: 2023-10-20

## 2023-10-20 RX ORDER — DEXTROSE 50 % IN WATER 50 %
50 SYRINGE (ML) INTRAVENOUS
Refills: 0 | Status: DISCONTINUED | OUTPATIENT
Start: 2023-10-20 | End: 2023-10-22

## 2023-10-20 RX ORDER — CALCIUM ACETATE 667 MG
667 TABLET ORAL
Refills: 0 | Status: DISCONTINUED | OUTPATIENT
Start: 2023-10-20 | End: 2023-10-30

## 2023-10-20 RX ORDER — FOLIC ACID 0.8 MG
1 TABLET ORAL DAILY
Refills: 0 | Status: DISCONTINUED | OUTPATIENT
Start: 2023-10-20 | End: 2023-10-30

## 2023-10-20 RX ORDER — SODIUM CHLORIDE 9 MG/ML
1000 INJECTION INTRAMUSCULAR; INTRAVENOUS; SUBCUTANEOUS
Refills: 0 | Status: DISCONTINUED | OUTPATIENT
Start: 2023-10-20 | End: 2023-10-21

## 2023-10-20 RX ORDER — PANTOPRAZOLE SODIUM 20 MG/1
40 TABLET, DELAYED RELEASE ORAL DAILY
Refills: 0 | Status: DISCONTINUED | OUTPATIENT
Start: 2023-10-20 | End: 2023-10-22

## 2023-10-20 RX ADMIN — PANTOPRAZOLE SODIUM 40 MILLIGRAM(S): 20 TABLET, DELAYED RELEASE ORAL at 11:35

## 2023-10-20 RX ADMIN — HEPARIN SODIUM 5000 UNIT(S): 5000 INJECTION INTRAVENOUS; SUBCUTANEOUS at 05:12

## 2023-10-20 RX ADMIN — SODIUM CHLORIDE 25 MILLILITER(S): 9 INJECTION INTRAMUSCULAR; INTRAVENOUS; SUBCUTANEOUS at 18:32

## 2023-10-20 RX ADMIN — HEPARIN SODIUM 5000 UNIT(S): 5000 INJECTION INTRAVENOUS; SUBCUTANEOUS at 21:08

## 2023-10-20 RX ADMIN — CHLORHEXIDINE GLUCONATE 1 APPLICATION(S): 213 SOLUTION TOPICAL at 05:12

## 2023-10-20 RX ADMIN — PIPERACILLIN AND TAZOBACTAM 25 GRAM(S): 4; .5 INJECTION, POWDER, LYOPHILIZED, FOR SOLUTION INTRAVENOUS at 05:12

## 2023-10-20 NOTE — PROGRESS NOTE ADULT - SUBJECTIVE AND OBJECTIVE BOX
INFECTIOUS DISEASE FOLLOW UP NOTE:    Interval History/ROS: Patient is a 58y old  Male who presents with a chief complaint of Worsening Renal Failure (20 Oct 2023 08:22)      Overnight events:    REVIEW OF SYSTEMS:        Prior hospital charts reviewed [Yes]  Primary team notes reviewed [Yes]  Other consultant notes reviewed [Yes]    Allergies:  No Known Drug Allergies  Bananas (Nausea; Urticaria)  apple (Urticaria)      ANTIMICROBIALS:   piperacillin/tazobactam IVPB.. 3.375 every 12 hours      OTHER MEDS: MEDICATIONS  (STANDING):  dextrose 50% Injectable 25 once  dextrose 50% Injectable 12.5 once  dextrose 50% Injectable 25 once  dextrose Oral Gel 15 once PRN  glucagon  Injectable 1 once  heparin   Injectable 5000 every 8 hours  insulin regular Infusion 1 <Continuous>  LORazepam   Injectable 1 every 4 hours PRN  pantoprazole  Injectable 40 daily      Vital Signs Last 24 Hrs  T(F): 98.9 (10-20-23 @ 07:05), Max: 99.7 (10-19-23 @ 23:00)    Vital Signs Last 24 Hrs  HR: 82 (10-20-23 @ 06:00) (70 - 96)  BP: 111/55 (10-20-23 @ 06:00) (98/60 - 176/103)  RR: 22 (10-20-23 @ 06:00)  SpO2: 98% (10-20-23 @ 06:00) (95% - 99%)  Wt(kg): --    EXAM:      Labs:    10-19    130<L>  |  88<L>  |  66<HH>  ----------------------------<  113<H>  3.3<L>   |  21  |  8.4<HH>    Ca    7.5<L>      19 Oct 2023 15:29        WBC Trend:  WBC Count: 9.72 (10-18-23 @ 08:43)  WBC Count: 9.94 (10-17-23 @ 14:30)      Creatine Trend:  Creatinine: 8.4 (10-19)  Creatinine: 13.8 (10-19)  Creatinine: 14.8 (10-19)  Creatinine: 13.8 (10-18)      Liver Biochemical Testing Trend:  Alanine Aminotransferase (ALT/SGPT): 42 *H* (10-17)  Alanine Aminotransferase (ALT/SGPT): 19 (05-02)  Alanine Aminotransferase (ALT/SGPT): 14 (11-24)  Alanine Aminotransferase (ALT/SGPT): 16 (11-23)  Alanine Aminotransferase (ALT/SGPT): 16 (11-22)  Aspartate Aminotransferase (AST/SGOT): 92 (10-17-23 @ 14:30)  Aspartate Aminotransferase (AST/SGOT): 26 (05-02-23 @ 08:06)  Aspartate Aminotransferase (AST/SGOT): 27 (11-24-22 @ 06:48)  Aspartate Aminotransferase (AST/SGOT): 23 (11-23-22 @ 06:40)  Aspartate Aminotransferase (AST/SGOT): 24 (11-22-22 @ 05:45)  Bilirubin Total: 0.4 (10-17)  Bilirubin Total, Serum: 0.6 (05-02)  Bilirubin Total, Serum: 0.4 (11-24)  Bilirubin Total, Serum: 0.5 (11-23)  Bilirubin Total, Serum: 0.7 (11-22)      Trend LDH      Urinalysis Basic - ( 19 Oct 2023 15:29 )    Color: x / Appearance: x / SG: x / pH: x  Gluc: 113 mg/dL / Ketone: x  / Bili: x / Urobili: x   Blood: x / Protein: x / Nitrite: x   Leuk Esterase: x / RBC: x / WBC x   Sq Epi: x / Non Sq Epi: x / Bacteria: x        MICROBIOLOGY:  Vancomycin Level, Trough: 13.5 (10-19 @ 06:30)        Culture - Abscess with Gram Stain (collected 18 Oct 2023 14:20)  Source: .Abscess Leg - Right  Preliminary Report:    Moderate Staphylococcus aureus    Culture - Blood (collected 17 Oct 2023 14:30)  Source: .Blood Blood-Peripheral  Preliminary Report:    No growth at 48 Hours    Culture - Blood (collected 17 Oct 2023 14:30)  Source: .Blood Blood-Peripheral  Preliminary Report:    No growth at 48 Hours    Culture - Urine (collected 17 Oct 2023 13:53)  Source: Clean Catch Clean Catch (Midstream)  Final Report:    No growth    Culture - Blood (collected 29 Nov 2022 06:40)  Source: .Blood None  Final Report:    No Growth Final    Culture - Blood (collected 28 Nov 2022 06:28)  Source: .Blood None  Final Report:    No Growth Final    Culture - Blood (collected 27 Nov 2022 07:33)  Source: .Blood None  Final Report:    No Growth Final    Culture - Blood (collected 25 Nov 2022 08:37)  Source: .Blood None  Final Report:    Growth in aerobic and anaerobic bottles: Staphylococcus aureus    See previous culture [id number]    Culture - Blood (collected 24 Nov 2022 16:18)  Source: .Blood None  Final Report:    Growth in aerobic and anaerobic bottles: Staphylococcus aureus    See previous culture [id number]    Culture - Blood (collected 24 Nov 2022 06:48)  Source: .Blood None  Final Report:    No Growth Final      C-Reactive Protein, Serum: 217.3 (10-17)    Blood Gas Arterial, Lactate: 0.60 (10-19 @ 15:00)  Blood Gas Arterial, Lactate: 0.60 (10-18 @ 04:30)  Lactate, Blood: 0.8 (10-17 @ 14:30)      RADIOLOGY:  imaging below personally reviewed    < from: Xray Chest 1 View- PORTABLE-Routine (Xray Chest 1 View- PORTABLE-Routine in AM.) (10.20.23 @ 07:38) >  Impression:    Improving vascular congestion.    < end of copied text >    < from: CT Lower Extremity No Cont, Right (10.17.23 @ 19:12) >  IMPRESSION:  1. Focal subcutaneous soft tissue thickening with associated inflammatory   changes along the right superficial inguinal region, suspicious for   phlegmon; no evidence of subcutaneous gas to suggest necrotizing   fasciitis.  2. Several mildly enlarged right superficial and deep inguinal lymph   nodes, possibly reactive.    < end of copied text >    < from: US Extremity Nonvasc Limited, Right (10.19.23 @ 13:10) >  IMPRESSION:    Right inguinal inflammation with abscess and inflamed lymph node.    < end of copied text >   INFECTIOUS DISEASE FOLLOW UP NOTE:    Interval History/ROS: Patient is a 58y old  Male who presents with a chief complaint of Worsening Renal Failure (20 Oct 2023 08:22)    Overnight events: More alert today. Scratching his skin.    REVIEW OF SYSTEMS:  CONSTITUTIONAL: No fever or chills  HEAD: No lesion on scalp  EYES: No visual disturbance  ENT: No sore throat  RESPIRATORY: No cough, no shortness of breath  CARDIOVASCULAR: No chest pain or palpitations  GASTROINTESTINAL: No abdominal or epigastric pain  GENITOURINARY: No dysuria  NEUROLOGICAL: No headache/dizziness  MUSCULOSKELETAL: No joint pain, erythema, or swelling; no back pain  SKIN: + erythematous and indurated right groin. + right lateral leg wound  All other ROS negative except noted above        Prior hospital charts reviewed [Yes]  Primary team notes reviewed [Yes]  Other consultant notes reviewed [Yes]    Allergies:  No Known Drug Allergies  Bananas (Nausea; Urticaria)  apple (Urticaria)      ANTIMICROBIALS:   piperacillin/tazobactam IVPB.. 3.375 every 12 hours      OTHER MEDS: MEDICATIONS  (STANDING):  dextrose 50% Injectable 25 once  dextrose 50% Injectable 12.5 once  dextrose 50% Injectable 25 once  dextrose Oral Gel 15 once PRN  glucagon  Injectable 1 once  heparin   Injectable 5000 every 8 hours  insulin regular Infusion 1 <Continuous>  LORazepam   Injectable 1 every 4 hours PRN  pantoprazole  Injectable 40 daily      Vital Signs Last 24 Hrs  T(F): 98.9 (10-20-23 @ 07:05), Max: 99.7 (10-19-23 @ 23:00)    Vital Signs Last 24 Hrs  HR: 82 (10-20-23 @ 06:00) (70 - 96)  BP: 111/55 (10-20-23 @ 06:00) (98/60 - 176/103)  RR: 22 (10-20-23 @ 06:00)  SpO2: 98% (10-20-23 @ 06:00) (95% - 99%)  Wt(kg): --    EXAM:  GENERAL: NAD, lying in bed  HEAD: No head lesions  EYES: Conjunctiva pink and cornea white  EAR, NOSE, MOUTH, THROAT: Normal external ears and nose, no discharges; moist mucous membranes  NECK: Supple, nontender to palpation; no JVD  RESPIRATORY: Clear to auscultation bilaterally  CARDIOVASCULAR: S1 S2  GASTROINTESTINAL: distended abdomen, softer than yesterday, non-tender to palpation; normoactive bowel sounds  EXTREMITIES: No clubbing, cyanosis, or petal edema  NERVOUS SYSTEM: Alert and oriented to person, time only, speech clear. No focal deficits   MUSCULOSKELETAL: No joint erythema, swelling or pain  SKIN: indurated right groin area with swelling and warmth; right lateral leg wound, no superficial thrombophlebitis  PSYCH: Normal affect      Labs:    10-19    130<L>  |  88<L>  |  66<HH>  ----------------------------<  113<H>  3.3<L>   |  21  |  8.4<HH>    Ca    7.5<L>      19 Oct 2023 15:29        WBC Trend:  WBC Count: 9.72 (10-18-23 @ 08:43)  WBC Count: 9.94 (10-17-23 @ 14:30)      Creatine Trend:  Creatinine: 8.4 (10-19)  Creatinine: 13.8 (10-19)  Creatinine: 14.8 (10-19)  Creatinine: 13.8 (10-18)      Liver Biochemical Testing Trend:  Alanine Aminotransferase (ALT/SGPT): 42 *H* (10-17)  Alanine Aminotransferase (ALT/SGPT): 19 (05-02)  Alanine Aminotransferase (ALT/SGPT): 14 (11-24)  Alanine Aminotransferase (ALT/SGPT): 16 (11-23)  Alanine Aminotransferase (ALT/SGPT): 16 (11-22)  Aspartate Aminotransferase (AST/SGOT): 92 (10-17-23 @ 14:30)  Aspartate Aminotransferase (AST/SGOT): 26 (05-02-23 @ 08:06)  Aspartate Aminotransferase (AST/SGOT): 27 (11-24-22 @ 06:48)  Aspartate Aminotransferase (AST/SGOT): 23 (11-23-22 @ 06:40)  Aspartate Aminotransferase (AST/SGOT): 24 (11-22-22 @ 05:45)  Bilirubin Total: 0.4 (10-17)  Bilirubin Total, Serum: 0.6 (05-02)  Bilirubin Total, Serum: 0.4 (11-24)  Bilirubin Total, Serum: 0.5 (11-23)  Bilirubin Total, Serum: 0.7 (11-22)      Trend LDH      Urinalysis Basic - ( 19 Oct 2023 15:29 )    Color: x / Appearance: x / SG: x / pH: x  Gluc: 113 mg/dL / Ketone: x  / Bili: x / Urobili: x   Blood: x / Protein: x / Nitrite: x   Leuk Esterase: x / RBC: x / WBC x   Sq Epi: x / Non Sq Epi: x / Bacteria: x        MICROBIOLOGY:  Vancomycin Level, Trough: 13.5 (10-19 @ 06:30)        Culture - Abscess with Gram Stain (collected 18 Oct 2023 14:20)  Source: .Abscess Leg - Right  Preliminary Report:    Moderate Staphylococcus aureus    Culture - Blood (collected 17 Oct 2023 14:30)  Source: .Blood Blood-Peripheral  Preliminary Report:    No growth at 48 Hours    Culture - Blood (collected 17 Oct 2023 14:30)  Source: .Blood Blood-Peripheral  Preliminary Report:    No growth at 48 Hours    Culture - Urine (collected 17 Oct 2023 13:53)  Source: Clean Catch Clean Catch (Midstream)  Final Report:    No growth    Culture - Blood (collected 29 Nov 2022 06:40)  Source: .Blood None  Final Report:    No Growth Final    Culture - Blood (collected 28 Nov 2022 06:28)  Source: .Blood None  Final Report:    No Growth Final    Culture - Blood (collected 27 Nov 2022 07:33)  Source: .Blood None  Final Report:    No Growth Final    Culture - Blood (collected 25 Nov 2022 08:37)  Source: .Blood None  Final Report:    Growth in aerobic and anaerobic bottles: Staphylococcus aureus    See previous culture [id number]    Culture - Blood (collected 24 Nov 2022 16:18)  Source: .Blood None  Final Report:    Growth in aerobic and anaerobic bottles: Staphylococcus aureus    See previous culture [id number]    Culture - Blood (collected 24 Nov 2022 06:48)  Source: .Blood None  Final Report:    No Growth Final      C-Reactive Protein, Serum: 217.3 (10-17)    Blood Gas Arterial, Lactate: 0.60 (10-19 @ 15:00)  Blood Gas Arterial, Lactate: 0.60 (10-18 @ 04:30)  Lactate, Blood: 0.8 (10-17 @ 14:30)      RADIOLOGY:  imaging below personally reviewed    < from: Xray Chest 1 View- PORTABLE-Routine (Xray Chest 1 View- PORTABLE-Routine in AM.) (10.20.23 @ 07:38) >  Impression:    Improving vascular congestion.    < end of copied text >    < from: CT Lower Extremity No Cont, Right (10.17.23 @ 19:12) >  IMPRESSION:  1. Focal subcutaneous soft tissue thickening with associated inflammatory   changes along the right superficial inguinal region, suspicious for   phlegmon; no evidence of subcutaneous gas to suggest necrotizing   fasciitis.  2. Several mildly enlarged right superficial and deep inguinal lymph   nodes, possibly reactive.    < end of copied text >    < from: US Extremity Nonvasc Limited, Right (10.19.23 @ 13:10) >  IMPRESSION:    Right inguinal inflammation with abscess and inflamed lymph node.    < end of copied text >

## 2023-10-20 NOTE — PROGRESS NOTE ADULT - SUBJECTIVE AND OBJECTIVE BOX
SUBJECTIVE:    Patient is a 58y old Male who presents with a chief complaint of Worsening Renal Failure (18 Oct 2023 08:57)    Currently admitted to medicine with the primary diagnosis of Acute renal failure    Interval history:     Overnight events noted. Patient more awake and alert today, but not completely oriented. Denies any new complaints. s/p single large BM yesterday.    Admit Diagnosis:  Acute renal failure        PAST MEDICAL & SURGICAL HISTORY  Diabetes    Hypertension    HLD (hyperlipidemia)    Neuropathy    Glaucoma    Chronic kidney disease, unspecified CKD stage  requiring HD march 2022    ETOH abuse    H/O colonoscopy    No pertinent past medical history    Diabetes    Hypertension    HLD (hyperlipidemia)    Neuropathy    Glaucoma    Chronic kidney disease, unspecified CKD stage    Chronic alcohol abuse    ETOH abuse    No significant past surgical history    H/O colonoscopy        SOCIAL HISTORY:  Negative for smoking/alcohol/drug use.     ALLERGIES:  No Known Drug Allergies  Bananas (Nausea; Urticaria)  apple (Urticaria)      PHYSICAL EXAM:  GEN: No acute distress  HEAD: atraumatic, normocephalic  EYES: Pink conjunctiva. PERRL  ENT: moist mucus membranes  LUNGS: Clear to auscultation bilaterally   HEART: S1/S2  ABD: Distended abdomen, BS +  EXT: no cyanosis/edema. right leg wound noted just below knee + large fluctuant lesion in right upper thigh noted.  NEURO: AAOX1-2    Intravenous access:   Vital Signs Last 24 Hrs  T(C): 37.2 (20 Oct 2023 07:05), Max: 37.6 (19 Oct 2023 23:00)  T(F): 98.9 (20 Oct 2023 07:05), Max: 99.7 (19 Oct 2023 23:00)  HR: 81 (20 Oct 2023 10:00) (81 - 96)  BP: 134/59 (20 Oct 2023 10:00) (104/51 - 151/90)  BP(mean): 85 (20 Oct 2023 10:00) (73 - 100)  RR: 16 (20 Oct 2023 10:00) (11 - 97)  SpO2: 100% (20 Oct 2023 10:00) (95% - 100%)    Parameters below as of 20 Oct 2023 07:00  Patient On (Oxygen Delivery Method): room air      I&Os:  10-19-23 @ 07:01  -  10-20-23 @ 07:00  --------------------------------------------------------  IN: 1765 mL / OUT: 1521 mL / NET: 244 mL    10-20-23 @ 07:01  -  10-20-23 @ 15:12  --------------------------------------------------------  IN: 0 mL / OUT: 350 mL / NET: -350 mL        MEDICATIONS:  STANDING MEDICATIONS  calcium acetate 667 milliGRAM(s) Oral three times a day with meals, 10-19-23 @ 16:50  chlorhexidine 2% Cloths 1 Application(s) Topical <User Schedule>, 10-18-23 @ 01:03  dextrose 5%. 1000 milliLiter(s) IV Continuous <Continuous>, 10-19-23 @ 12:46  dextrose 5%. 1000 milliLiter(s) IV Continuous <Continuous>, 10-19-23 @ 12:46  dextrose 50% Injectable 25 Gram(s) IV Push once, 10-19-23 @ 12:46  dextrose 50% Injectable 25 Gram(s) IV Push once, 10-19-23 @ 12:46  dextrose 50% Injectable 12.5 Gram(s) IV Push once, 10-19-23 @ 12:46  folic acid 1 milliGRAM(s) Oral daily, 10-18-23 @ 01:07  glucagon  Injectable 1 milliGRAM(s) IntraMuscular once, 10-18-23 @ 01:10  heparin   Injectable 5000 Unit(s) SubCutaneous every 8 hours, 10-18-23 @ 01:03  insulin regular Infusion 1 Unit(s)/Hr IV Continuous <Continuous>, 10-18-23 @ 10:35  multivitamin 1 Tablet(s) Oral daily, 10-18-23 @ 01:07  pantoprazole  Injectable 40 milliGRAM(s) IV Push daily, 10-18-23 @ 01:03  piperacillin/tazobactam IVPB.. 3.375 Gram(s) IV Intermittent every 12 hours, 10-19-23 @ 00:00  vancomycin  IVPB 1000 milliGRAM(s) IV Intermittent <User Schedule>, 10-20-23 @ 10:43    PRN MEDICATIONS  dextrose Oral Gel 15 Gram(s) Oral once, 10-19-23 @ 12:46 PRN  LORazepam   Injectable 1 milliGRAM(s) IV Push every 4 hours, 10-18-23 @ 01:07 PRN    Diet, NPO after Midnight:      NPO Start Date: 19-Oct-2023,   NPO Start Time: 23:59 (10-19-23 @ 18:33) [Active]    LABS:                        7.1    7.37  )-----------( 152      ( 20 Oct 2023 07:00 )             19.9     WBC trend: 7.37 <--, 9.72 <--, 9.94 <--  Hgb: 7.1 [10-20-23 @ 07:00]<--, 8.6 [10-18-23 @ 08:43]<--, 8.6 [10-17-23 @ 14:30]<--    10-20    132<L>  |  89<L>  |  73<HH>  ----------------------------<  87  3.4<L>   |  21  |  10.9<HH>    Ca    7.6<L>      20 Oct 2023 07:00  Phos  5.2     10-20  Mg     1.9     10-20    TPro  5.6<L>  /  Alb  2.8<L>  /  TBili  0.5  /  DBili  x   /  AST  81<H>  /  ALT  40  /  AlkPhos  99  10-20    Creatinine trend: 10.9<--, 8.4<--, 13.8<--, 14.8<--, 13.8<--, 12.6<--, 14.0<--    SODIUM TREND: Sodium 132 [10-20 @ 07:00]<--, Sodium 130 [10-19 @ 15:29]<--, Sodium 125 [10-19 @ 05:40]<--, Sodium 126 [10-19 @ 02:34]<--, Sodium 125 [10-18 @ 21:48]<--, Sodium 126 [10-18 @ 19:32]<--    POC Glucose: 112 [10-20-23 @ 14:27]<--, 105 [10-20-23 @ 12:02]<--, 112 [10-20-23 @ 10:14]<--, 107 [10-20-23 @ 08:26]<--, 104 [10-20-23 @ 06:17]<--, 109 [10-20-23 @ 03:40]<--  ABG - ( 19 Oct 2023 15:00 )  pH, Arterial: 7.41  pH, Blood: x     /  pCO2: 40    /  pO2: 51    / HCO3: 25    / Base Excess: 0.7   /  SaO2: 86.7        Culture - Abscess with Gram Stain (collected 18 Oct 2023 14:20)  Source: .Abscess Leg - Right  Preliminary Report (19 Oct 2023 20:56):    Moderate Staphylococcus aureus        Culture - Abscess with Gram Stain (collected 10-18-23 @ 14:20)  Source: .Abscess Leg - Right  Preliminary Report (10-19-23 @ 20:56):    Moderate Staphylococcus aureus    Culture - Blood (collected 10-17-23 @ 14:30)  Source: .Blood Blood-Peripheral  Preliminary Report (10-19-23 @ 22:02):    No growth at 48 Hours    Culture - Blood (collected 10-17-23 @ 14:30)  Source: .Blood Blood-Peripheral  Preliminary Report (10-19-23 @ 22:02):    No growth at 48 Hours    Culture - Urine (collected 10-17-23 @ 13:53)  Source: Clean Catch Clean Catch (Midstream)  Final Report (10-19-23 @ 19:31):    No growth                C-Reactive Protein, Serum: 217.3 mg/L (10-17-23 @ 14:30)      Sedimentation Rate, Erythrocyte: 146 mm/Hr (10-17-23 @ 14:30)        Urinalysis Basic - ( 20 Oct 2023 07:00 )    Color: x / Appearance: x / SG: x / pH: x  Gluc: 87 mg/dL / Ketone: x  / Bili: x / Urobili: x   Blood: x / Protein: x / Nitrite: x   Leuk Esterase: x / RBC: x / WBC x   Sq Epi: x / Non Sq Epi: x / Bacteria: x        NG tube:   Blanco Catheter:         RADIOLOGY:    < from: Xray Chest 1 View- PORTABLE-Routine (10.19.23 @ 07:13) >  Pulmonaryvascular congestion.    < end of copied text >    < from: Xray Kidney Ureter Bladder (10.18.23 @ 19:32) >  Diffuse gaseous distention of the small and large bowel, may reflect an   ileus. Continued attention on follow-up is recommended.    Moderate rectal stool burden.    < end of copied text >    < from: CT Head No Cont (10.17.23 @ 22:08) >  No acute intracranial pathology.    Stable mild chronic microvascular ischemic changes.    < end of copied text >    < from: CT Abdomen and Pelvis No Cont (10.17.23 @ 19:12) >  Partially imaged right inguinal inflammatory change and mildly enlarged   lymph nodes. Recommend dedicated imaging for further evaluation.    < end of copied text >    < from: CT Lower Extremity No Cont, Right (10.17.23 @ 19:12) >  1. Focal subcutaneous soft tissue thickening with associated inflammatory   changes along the right superficial inguinal region, suspicious for   phlegmon; no evidence of subcutaneous gas to suggest necrotizing   fasciitis.  2. Several mildly enlarged right superficial and deep inguinal lymph   nodes, possibly reactive.    < end of copied text >

## 2023-10-20 NOTE — PROGRESS NOTE ADULT - ASSESSMENT
# Stage 4 CKD secondary to DM, HTN, incomplete recovery from previous severe VAMSI  # Severe VAMSI on CKD d/t ?sepsis - started HD on 10/18/23 via LUE AVF   # HAGMA likely due to DKA / uremia   # Hyponatremia likely due ?VAMSI  # Alcohol abuse    - s/p HD yesterday  - urine output improving    Recommendations:  - plan for next HD tomorrow unless significant improvement in renal function/urine output  - hyperphos/hypoCa- cont phoslo; monitor  - cont abx per ID recs; dose for iHD, monitor vanc level / surgery f/u for I&D  - insulin drip per ICU protocol  - monitor for ETOH withdrawal/ CIWA protocol

## 2023-10-20 NOTE — BRIEF OPERATIVE NOTE - NSICDXBRIEFPROCEDURE_GEN_ALL_CORE_FT
PROCEDURES:  Debridement of skin, subcutaneous tissue, muscle, and bone of leg 30-Oct-2023 16:25:30  Peter Rajput

## 2023-10-20 NOTE — PROGRESS NOTE ADULT - SUBJECTIVE AND OBJECTIVE BOX
57 y/o male with hx of ETOH abuse, CKD with left wrist fistula, NIDDM , HTN, HLD, presented to the ED for decreased urinary output and increased thirst 59 y/o male with hx of ETOH abuse, CKD with left wrist fistula, NIDDM , HTN, HLD, presented to the ED for decreased urinary output and increased thirst.  with rt  groin abscess/ rt  leg  ulcer  s/p R groin abscess with culture and packed with iodoform.  s/p  R leg ulcer debrided and biopsy/path sent.  seen and  examined   v/s  T 97.7, /69, HR 85, RR 16  RT  GROIN/ RT  LEG DRESSINGS  CDI  ON ZOSYN  WILL FOLLOW

## 2023-10-20 NOTE — BRIEF OPERATIVE NOTE - OPERATION/FINDINGS
I& D R groin abscess with culture and packed with iodoform. R leg ulcer debrided and biopsy/path sent. I& D R groin abscess with culture and packed with iodoform. R leg ulcer debrided and biopsy/path sent.    Note: excisional debridement

## 2023-10-20 NOTE — PROGRESS NOTE ADULT - ASSESSMENT
58Mm with PMH of DM, HTN, HLD, ESRD on HD, ETOH abuse, s/p left AV fistula (5/2023 Dr. De Leon presents with weakness, AMS, decreased urine output.  Found to be in significant renal failure, hyponatremic, altered with RLE wound for 1 week. GI consulted for ileus.    Problem 1-Ileus   Diffuse gaseous distention of the small and large bowel, may reflect an   ileus. Continued attention on follow-up is recommended.  Moderate rectal stool burden.  patient had another large bm last night  Rec  -manual disimpaction unsuccessful stool burden too high up  -daily KUBs  -ambulate as tolerated   -optimize all electrolytes   -surgery following  -will follow    Problem 2-anemia no gross GI bleeding  Rec  -Maintain Hemodynamic Stability   -Monitor CBC  -CMP,Optimize Electrolytes  -PT,PTT,INR  -EKG, Chest-Xray   -Transfuse prn to hgb >8  -Two large bore IV lines  -PPI BID  -Monitor Vital Signs  -Monitor Stool For blood, frequency, consistency, melena  -Active Type and Screen  -Iron Studies, Folate, Vitamin B12 levels     Problem 3-altered LFTs  ETOH use  Rec  -check acute hepatitis panel  -monitor LFTS  -ETOH cessation advised     Problem 4-Partially imaged right inguinal inflammatory change and mildly enlarged   lymph nodes. Recommend dedicated imaging for further evaluation.  Rec  - Care as per primary team    58Mm with PMH of DM, HTN, HLD, ESRD on HD, ETOH abuse, s/p left AV fistula (5/2023 Dr. De Leon presents with weakness, AMS, decreased urine output.  Found to be in significant renal failure, hyponatremic, altered with RLE wound for 1 week. GI consulted for ileus.    Problem 1-Ileus   Diffuse gaseous distention of the small and large bowel, may reflect an ileus. Continued attention on follow-up is recommended.  Moderate rectal stool burden.  patient had another large bm last night  Rec  -manual disimpaction yesterday unsuccessful stool burden too high up - however, pt had BM last night and today  -daily KUBs  -ambulate as tolerated   -optimize all electrolytes and keep K > 4 and Mg > 2  -surgery following  -avoid narcotics, cholinergics and sedatives  -will follow    Problem 2-anemia no gross GI bleeding  Rec  -Maintain Hemodynamic Stability   -Monitor CBC  -CMP,Optimize Electrolytes  -PT,PTT,INR  -EKG, Chest-Xray   -Transfuse prn to hgb >8  -Two large bore IV lines  -PPI BID  -Monitor Vital Signs  -Monitor Stool For blood, frequency, consistency, melena  -Active Type and Screen  -Iron Studies, Folate, Vitamin B12 levels     Problem 3-altered LFTs  ETOH use  Rec  -check acute hepatitis panel  -monitor LFTS  -ETOH cessation advised   -RUQ US     Problem 4-Partially imaged right inguinal inflammatory change and mildly enlarged   lymph nodes. Recommend dedicated imaging for further evaluation.  Rec  - Care as per primary team

## 2023-10-20 NOTE — PROGRESS NOTE ADULT - SUBJECTIVE AND OBJECTIVE BOX
58yMale  Being followed for ileus   Interval history: Patient denies nausea, vomiting, hematemesis, melena, blood in stool, diarrhea, constipation, abdominal pain. Patient had large bm last night.      PAST MEDICAL & SURGICAL HISTORY:   Diabetes      Hypertension      HLD (hyperlipidemia)      Neuropathy      Glaucoma      Chronic kidney disease, unspecified CKD stage  requiring HD march 2022      ETOH abuse      H/O colonoscopy                Social History: No smoking. + alcohol. No illegal drug use.            MEDICATIONS  (STANDING):  calcium acetate 667 milliGRAM(s) Oral three times a day with meals  chlorhexidine 2% Cloths 1 Application(s) Topical <User Schedule>  dextrose 5%. 1000 milliLiter(s) (50 mL/Hr) IV Continuous <Continuous>  dextrose 5%. 1000 milliLiter(s) (100 mL/Hr) IV Continuous <Continuous>  dextrose 50% Injectable 25 Gram(s) IV Push once  dextrose 50% Injectable 12.5 Gram(s) IV Push once  dextrose 50% Injectable 25 Gram(s) IV Push once  folic acid 1 milliGRAM(s) Oral daily  glucagon  Injectable 1 milliGRAM(s) IntraMuscular once  heparin   Injectable 5000 Unit(s) SubCutaneous every 8 hours  insulin regular Infusion 1 Unit(s)/Hr (1 mL/Hr) IV Continuous <Continuous>  multivitamin 1 Tablet(s) Oral daily  pantoprazole  Injectable 40 milliGRAM(s) IV Push daily  piperacillin/tazobactam IVPB.. 3.375 Gram(s) IV Intermittent every 12 hours  vancomycin  IVPB 1000 milliGRAM(s) IV Intermittent <User Schedule>    MEDICATIONS  (PRN):  dextrose Oral Gel 15 Gram(s) Oral once PRN Blood Glucose LESS THAN 70 milliGRAM(s)/deciliter  LORazepam   Injectable 1 milliGRAM(s) IV Push every 4 hours PRN CIWA-Ar score increase by 2 points and a total score of 7 or less      Allergies:   No Known Drug Allergies  Bananas (Nausea; Urticaria)  apple (Urticaria)  Intolerances          REVIEW OF SYSTEMS:  General:  No weight loss, fevers, or chills.  Eyes:  No reported pain or visual changes  ENT:  No sore throat or runny nose.  NECK: No stiffness   CV:  No chest pain or palpitations.  Resp:  No shortness of breath, cough  GI:  No abdominal pain, nausea, vomiting, dysphagia, diarrhea or constipation. No rectal bleeding, melena, or hematemesis.  Muscle:  No aches or weakness  Neuro:  No tingling, numbness           VITAL SIGNS:   T(F): 98.9 (10-20-23 @ 07:05), Max: 99.7 (10-19-23 @ 23:00)  HR: 81 (10-20-23 @ 10:00) (70 - 96)  BP: 134/59 (10-20-23 @ 10:00) (98/60 - 176/103)  RR: 16 (10-20-23 @ 10:00) (11 - 97)  SpO2: 100% (10-20-23 @ 10:00) (95% - 100%)    PHYSICAL EXAM:  GENERAL: AAOx3, no acute distress.  HEAD:  Atraumatic, Normocephalic  EYES: conjunctiva and sclera clear  NECK: Supple, no JVD or thyromegaly  CHEST/LUNG: Clear to auscultation bilaterally; No wheeze, rhonchi, or rales  HEART: Regular rate and rhythm; normal S1, S2, No murmurs.  ABDOMEN: Soft, nontender, nondistended; Bowel sounds present  NEUROLOGY: No asterixis + tremor.   SKIN: Intact, no jaundice            LABS:                        7.1    7.37  )-----------( 152      ( 20 Oct 2023 07:00 )             19.9     10-20    132<L>  |  89<L>  |  73<HH>  ----------------------------<  87  3.4<L>   |  21  |  10.9<HH>    Ca    7.6<L>      20 Oct 2023 07:00  Phos  5.2     10-20  Mg     1.9     10-20    TPro  5.6<L>  /  Alb  2.8<L>  /  TBili  0.5  /  DBili  x   /  AST  81<H>  /  ALT  40  /  AlkPhos  99  10-20    LIVER FUNCTIONS - ( 20 Oct 2023 07:00 )  Alb: 2.8 g/dL / Pro: 5.6 g/dL / ALK PHOS: 99 U/L / ALT: 40 U/L / AST: 81 U/L / GGT: x               IMAGING:      < from: CT Abdomen and Pelvis No Cont (10.17.23 @ 19:12) >    ACC: [id number] EXAM:  CT ABDOMEN AND PELVIS   ORDERED BY: HEATHER GRANT     PROCEDURE DATE:  10/17/2023          INTERPRETATION:  CLINICAL STATEMENT: Abdominal pain.    TECHNIQUE:  Contiguous axial CT images were obtained of the abdomen and   pelvis without intravenous contrast administration. Oral contrast was not   administered. Reformatted images in the coronal and sagittal planes were   acquired.    COMPARISON CT: 3/15/2022    FINDINGS:  Evaluation of solid organs and vascular structures is limited due to lack   of intravenous contrast.    LOWER CHEST: There is bibasilar subsegmental atelectasis.    HEPATOBILIARY: Unremarkable unenhanced liver. Cholelithiasis.    SPLEEN: Unremarkable.    PANCREAS: Unremarkable.    ADRENAL GLANDS: Unremarkable.    KIDNEYS: No hydronephrosis. Nonobstructing right renal nephrolithiasis.    ABDOMINOPELVIC NODES: Partially imaged right inguinal inflammatory change   and mildly enlarged lymph nodes    PELVIC ORGANS: Urinary bladder Contracted around a Blanco catheter and   therefore not well evaluated    PERITONEUM/MESENTERY/BOWEL: No bowel obstruction, ascites or free   intraperitoneal air. Normal caliber appendix    BONES/SOFT TISSUES: Degenerative changes of the spine.    IMPRESSION:  Partially imaged right inguinal inflammatory change and mildly enlarged   lymph nodes. Recommend dedicated imaging for further evaluation.    --- End of Report ---            HERNANDEZ COTOTN MD; Attending Radiologist  This document has been electronically signed. Oct17 2023  8:18PM    < end of copied text >      < from: Xray Kidney Ureter Bladder (10.18.23 @ 19:32) >    ACC: [id number] EXAM:  XR KUB 1 VIEW   ORDERED BY: DIRK BONILLA     PROCEDURE DATE:  10/18/2023          INTERPRETATION:  Clinical Indication: Abdominal distention.    Technique: Supine views of the abdomen    Comparison: CT abdomen pelvis 10/17/2023    Findings:    Catheters and tubes: None  Bowel gas pattern: Diffuse gaseous distention of the small and large   bowel. Moderate rectal stool burden.  Calcifications: No abnormal calcifications.  Skeletal: Degenerative changes.  Other: None.    IMPRESSION:    Diffuse gaseous distention of the small and large bowel, may reflect an   ileus. Continued attention on follow-up is recommended.    Moderate rectal stool burden.    --- End of Report ---            WILFRED MCGHEE MD; Attending Radiologist  This document has been electronically signed. Oct 19 2023  9:25AM    < end of copied text >       58yMale  Being followed for ileus   Interval history: Patient denies nausea, vomiting, hematemesis, melena, blood in stool, diarrhea, constipation, abdominal pain. Patient had large bm last night.      PAST MEDICAL & SURGICAL HISTORY:   Diabetes      Hypertension      HLD (hyperlipidemia)      Neuropathy      Glaucoma      Chronic kidney disease, unspecified CKD stage  requiring HD march 2022      ETOH abuse      H/O colonoscopy                Social History: No smoking. + alcohol. No illegal drug use.            MEDICATIONS  (STANDING):  calcium acetate 667 milliGRAM(s) Oral three times a day with meals  chlorhexidine 2% Cloths 1 Application(s) Topical <User Schedule>  dextrose 5%. 1000 milliLiter(s) (50 mL/Hr) IV Continuous <Continuous>  dextrose 5%. 1000 milliLiter(s) (100 mL/Hr) IV Continuous <Continuous>  dextrose 50% Injectable 25 Gram(s) IV Push once  dextrose 50% Injectable 12.5 Gram(s) IV Push once  dextrose 50% Injectable 25 Gram(s) IV Push once  folic acid 1 milliGRAM(s) Oral daily  glucagon  Injectable 1 milliGRAM(s) IntraMuscular once  heparin   Injectable 5000 Unit(s) SubCutaneous every 8 hours  insulin regular Infusion 1 Unit(s)/Hr (1 mL/Hr) IV Continuous <Continuous>  multivitamin 1 Tablet(s) Oral daily  pantoprazole  Injectable 40 milliGRAM(s) IV Push daily  piperacillin/tazobactam IVPB.. 3.375 Gram(s) IV Intermittent every 12 hours  vancomycin  IVPB 1000 milliGRAM(s) IV Intermittent <User Schedule>    MEDICATIONS  (PRN):  dextrose Oral Gel 15 Gram(s) Oral once PRN Blood Glucose LESS THAN 70 milliGRAM(s)/deciliter  LORazepam   Injectable 1 milliGRAM(s) IV Push every 4 hours PRN CIWA-Ar score increase by 2 points and a total score of 7 or less      Allergies:   No Known Drug Allergies  Bananas (Nausea; Urticaria)  apple (Urticaria)  Intolerances          REVIEW OF SYSTEMS:  General:  No weight loss, fevers, or chills.  Eyes:  No reported pain or visual changes  ENT:  No sore throat or runny nose.  NECK: No stiffness   CV:  No chest pain or palpitations.  Resp:  No shortness of breath, cough  GI:  No abdominal pain, nausea, vomiting, dysphagia, diarrhea or constipation. No rectal bleeding, melena, or hematemesis.  Muscle:  No aches or weakness  Neuro:  No tingling, numbness           VITAL SIGNS:   T(F): 98.9 (10-20-23 @ 07:05), Max: 99.7 (10-19-23 @ 23:00)  HR: 81 (10-20-23 @ 10:00) (70 - 96)  BP: 134/59 (10-20-23 @ 10:00) (98/60 - 176/103)  RR: 16 (10-20-23 @ 10:00) (11 - 97)  SpO2: 100% (10-20-23 @ 10:00) (95% - 100%)    PHYSICAL EXAM:  GENERAL: AAOx3, no acute distress.  HEAD:  Atraumatic, Normocephalic  EYES: conjunctiva and sclera clear  NECK: Supple, no JVD or thyromegaly  CHEST/LUNG: Clear to auscultation bilaterally; No wheeze, rhonchi, or rales  HEART: Regular rate and rhythm; normal S1, S2, No murmurs.  ABDOMEN: Soft, nontender, nondistended; Bowel sounds present  NEUROLOGY: No asterixis + tremor.   SKIN: Intact, no jaundice            LABS:                        7.1    7.37  )-----------( 152      ( 20 Oct 2023 07:00 )             19.9     10-20    132<L>  |  89<L>  |  73<HH>  ----------------------------<  87  3.4<L>   |  21  |  10.9<HH>    Ca    7.6<L>      20 Oct 2023 07:00  Phos  5.2     10-20  Mg     1.9     10-20    TPro  5.6<L>  /  Alb  2.8<L>  /  TBili  0.5  /  DBili  x   /  AST  81<H>  /  ALT  40  /  AlkPhos  99  10-20    LIVER FUNCTIONS - ( 20 Oct 2023 07:00 )  Alb: 2.8 g/dL / Pro: 5.6 g/dL / ALK PHOS: 99 U/L / ALT: 40 U/L / AST: 81 U/L / GGT: x               IMAGING:      < from: CT Abdomen and Pelvis No Cont (10.17.23 @ 19:12) >    ACC: [id number] EXAM:  CT ABDOMEN AND PELVIS   ORDERED BY: HEATHER GRANT     PROCEDURE DATE:  10/17/2023          INTERPRETATION:  CLINICAL STATEMENT: Abdominal pain.    TECHNIQUE:  Contiguous axial CT images were obtained of the abdomen and   pelvis without intravenous contrast administration. Oral contrast was not   administered. Reformatted images in the coronal and sagittal planes were   acquired.    COMPARISON CT: 3/15/2022    FINDINGS:  Evaluation of solid organs and vascular structures is limited due to lack   of intravenous contrast.    LOWER CHEST: There is bibasilar subsegmental atelectasis.    HEPATOBILIARY: Unremarkable unenhanced liver. Cholelithiasis.    SPLEEN: Unremarkable.    PANCREAS: Unremarkable.    ADRENAL GLANDS: Unremarkable.    KIDNEYS: No hydronephrosis. Nonobstructing right renal nephrolithiasis.    ABDOMINOPELVIC NODES: Partially imaged right inguinal inflammatory change   and mildly enlarged lymph nodes    PELVIC ORGANS: Urinary bladder Contracted around a Blanco catheter and   therefore not well evaluated    PERITONEUM/MESENTERY/BOWEL: No bowel obstruction, ascites or free   intraperitoneal air. Normal caliber appendix    BONES/SOFT TISSUES: Degenerative changes of the spine.    IMPRESSION:  Partially imaged right inguinal inflammatory change and mildly enlarged   lymph nodes. Recommend dedicated imaging for further evaluation.    --- End of Report ---            HERNANDEZ COTTON MD; Attending Radiologist  This document has been electronically signed. Oct17 2023  8:18PM    < end of copied text >      < from: Xray Kidney Ureter Bladder (10.18.23 @ 19:32) >    ACC: [id number] EXAM:  XR KUB 1 VIEW   ORDERED BY: DIRK BONILLA     PROCEDURE DATE:  10/18/2023          INTERPRETATION:  Clinical Indication: Abdominal distention.    Technique: Supine views of the abdomen    Comparison: CT abdomen pelvis 10/17/2023    Findings:    Catheters and tubes: None  Bowel gas pattern: Diffuse gaseous distention of the small and large   bowel. Moderate rectal stool burden.  Calcifications: No abnormal calcifications.  Skeletal: Degenerative changes.  Other: None.    IMPRESSION:    Diffuse gaseous distention of the small and large bowel, may reflect an   ileus. Continued attention on follow-up is recommended.    Moderate rectal stool burden.    --- End of Report ---            WILFRED MCGHEE MD; Attending Radiologist  This document has been electronically signed. Oct 19 2023  9:25AM    < end of copied text >

## 2023-10-20 NOTE — PROGRESS NOTE ADULT - ASSESSMENT
IMPRESSION:  Metabolic vs toxic encephalopathy improving  metabolic acidosis   VAMSI on CKD now on HD  sepsis present on admission  right ingunal abscess   Ileus  DKA resolved      PLAN:    CNS:   no sedation   CIWA protocol with Ativan PRN  Multivitamin/folate/thiamine    HEENT: oral care     PULMONARY: keep pox >92 %     CARDIOVASCULAR:   CE negative  echo   vasopressor as needed. keep map >65    GI:   GI prophylaxis. trickle feeds via ng tube today  daily KUB. Keep K >4 and Mg >2.2 until ileus resolves. Last BM 10/19, monitor  check acute hepatitis panel    RENAL:  s/p 2 sessions of HD via AVF. follow up with renal for HD  phoslo 1 tab TID with meals  continue with way   strict is and os     INFECTIOUS DISEASE:  continue abx: Zosyn 3.375gm q12hrs and vancomycin with HD. follow up vanc levels  s/p surgery eval: no drainable collection at wound site  blood culture negative so far, wound culture grew staph aureus, could be contamination  MRSA nares.  ID follow up    HEMATOLOGICAL:    DVT prophylaxis doppler lower ext   Iron Studies, Folate, Vitamin B12 levels     ENDOCRINE:   subq insulin. follow up FS with meals and qhs  endocrine eval    MUSCULOSKELETAL: bed rest    FULL CODE  Lines: PIV  Dispo: MICU monitoring for today

## 2023-10-20 NOTE — PROGRESS NOTE ADULT - NS ATTEND AMEND GEN_ALL_CORE FT
Agree with above.    Patient here with acute on chronic kidney failure complicated by ileus which resolved now.  Patient passed bowel movement.  His anemia is multifactorial in the setting of his kidney injury.  No evidence of GI related blood losses.  We will monitor for any objective evidence of GI related blood losses including hematemesis, coffee-ground emesis, melena or hematochezia.  Monitor hemoglobin daily and transfuse as needed to keep hemoglobin above 7.  pt never had EGD / colonoscopy - if no evidence of GI bleeding, can be done as OP.   Rest of recommendations per above note.      Time-based billing (NON-critical care).   50 minutes spent on total encounter; more than 50% of the visit was spent counseling and / or coordinating care by the attending physician.  The necessity of the time spent during the encounter on this date of service was due to: Coordination of care.

## 2023-10-20 NOTE — PROGRESS NOTE ADULT - ASSESSMENT
59 y/o male with hx of ETOH abuse, CKD with left wrist fistula, NIDDM , HTN, HLD, presented to the ED for decreased urinary output and increased thirst since yesterday.    History provided by daughter at bedside    ID is following for infected RLE wound  Afebrile on admission, no leukocytosis  Sustained trauma at right lower leg 2 weeks ago, now with increasing abscess  BCx x2 pending  CT Ab/pelvis showed partially imaged right inguinal inflammatory change and mildly enlarged lymph nodes  CT RLE showed focal subcutaneous soft tissue thickening with associated inflammatory  changes along the right superficial inguinal region, suspicious for phlegmon    Antibiotics:  Cefepime 10/17 - 10/18  Vancomycin 10/17, 10/19  Zosyn 10/19 ->    Right LE wound is not overtly infected, but there is purulent drainage expressed from wound, Cx grew staph aureus  Concerning for cellulitis with possible early abscess formation at right groin, CT showed possible phlegmon formation, US of right groin suggests an abscess  Acute encephalopathy possibly from uremia  KUB 10/18 suggests ileus      IMPRESSION:  Right LE wound  Right groin lymphadenopathy  Ileus  Uremic encephalopathy  VAMSI on CKD  Alcohol abuse    RECOMMENDATIONS:  - Vancomycin level 13.5 pre-HD 10/19, given one dose of IV vancomycin 1000mg, continue to dose vanco per level  - Continue Zosyn 3.375gm q12hrs for now  - Planned for OR I&D and debridement today 10/20  - Follow up BCx, UCx, and abscess Cx  - Bowel regimen, surgery follow up for ileus  - Nephrology for HD  - [facility] protocol  - Trend WBC, fever curve, transaminases, creatinine daily  - Will continue to follow        * THIS IS AN INCOMPLETE NOTE. FINAL RECOMMENDATION IS PENDING *         59 y/o male with hx of ETOH abuse, CKD with left wrist fistula, NIDDM , HTN, HLD, presented to the ED for decreased urinary output and increased thirst since yesterday.    History provided by daughter at bedside    ID is following for infected RLE wound  Afebrile on admission, no leukocytosis  Sustained trauma at right lower leg 2 weeks ago, now with increasing abscess  BCx x2 pending  CT Ab/pelvis showed partially imaged right inguinal inflammatory change and mildly enlarged lymph nodes  CT RLE showed focal subcutaneous soft tissue thickening with associated inflammatory  changes along the right superficial inguinal region, suspicious for phlegmon    Antibiotics:  Cefepime 10/17 - 10/18  Vancomycin 10/17, 10/19  Zosyn 10/19 ->    Right LE wound is not overtly infected, but there is purulent drainage expressed from wound, Cx grew staph aureus  Concerning for cellulitis with possible early abscess formation at right groin, CT showed possible phlegmon formation, US of right groin suggests an abscess  Acute encephalopathy possibly from uremia  KUB 10/18 suggests ileus      IMPRESSION:  Right LE wound  Right groin abscess  Ileus  Uremic encephalopathy  VAMSI on CKD  Alcohol abuse    RECOMMENDATIONS:  - D/C vancomycin as abscess Cx grew MSSA  - Continue Zosyn 3.375gm q12hrs for now, can further de-escalate after OR culture result  - Planned for OR I&D and debridement today 10/20  - Follow up BCx, UCx, and abscess Cx  - Bowel regimen, surgery follow up for ileus  - Nephrology for HD  - [facility] protocol  - Trend WBC, fever curve, transaminases, creatinine daily  - Will continue to follow      Please reach out to Dr. Carson on weekends for ID questions.  I will return next Monday 10/23.      Shaylee Olivas D.O.  Attending Physician  Division of Infectious Diseases  [facility]  Please contact me via [employer] Teams

## 2023-10-20 NOTE — PROGRESS NOTE ADULT - SUBJECTIVE AND OBJECTIVE BOX
Patient is a 58y old  Male who presents with a chief complaint of Worsening Renal Failure (19 Oct 2023 16:07)      Over Night Events:  Patient seen and examined.   had BM yesterday 10/19  making urine, UOP 20-40cc/hr    ROS:  See HPI    PHYSICAL EXAM    ICU Vital Signs Last 24 Hrs  T(C): 37.2 (20 Oct 2023 07:05), Max: 37.6 (19 Oct 2023 23:00)  T(F): 98.9 (20 Oct 2023 07:05), Max: 99.7 (19 Oct 2023 23:00)  HR: 82 (20 Oct 2023 06:00) (70 - 96)  BP: 111/55 (20 Oct 2023 06:00) (98/60 - 176/103)  BP(mean): 76 (20 Oct 2023 06:00) (73 - 113)  ABP: --  ABP(mean): --  RR: 22 (20 Oct 2023 06:00) (11 - 97)  SpO2: 98% (20 Oct 2023 06:00) (95% - 99%)    O2 Parameters below as of 19 Oct 2023 13:40  Patient On (Oxygen Delivery Method): room air            General: confused             Lymph Nodes: NO cervical LN   Lungs: Bilateral BS  Cardiovascular: Regular   Abdomen: distended  Extremities: No clubbing   Skin: warm   Neurological: a&o x1  Musculoskeletal: move all ext     I&O's Detail    19 Oct 2023 07:01  -  20 Oct 2023 07:00  --------------------------------------------------------  IN:    Insulin: 15 mL    IV PiggyBack: 200 mL    IV PiggyBack: 450 mL    Oral Fluid: 100 mL    Sodium Bicarbonate: 1000 mL  Total IN: 1765 mL    OUT:    Indwelling Catheter - Urethral (mL): 520 mL    Other (mL): 1000 mL    Stool (mL): 1 mL  Total OUT: 1521 mL    Total NET: 244 mL      20 Oct 2023 07:01  -  20 Oct 2023 08:22  --------------------------------------------------------  IN:  Total IN: 0 mL    OUT:    Indwelling Catheter - Urethral (mL): 45 mL  Total OUT: 45 mL    Total NET: -45 mL          LABS:                          8.6    9.72  )-----------( 159      ( 18 Oct 2023 08:43 )             23.4         19 Oct 2023 15:29    130    |  88     |  66     ----------------------------<  113    3.3     |  21     |  8.4      Ca    7.5        19 Oct 2023 15:29  Phos  10.4      18 Oct 2023 08:43  Mg     2.2       18 Oct 2023 08:43                                               PT/INR - ( 18 Oct 2023 08:43 )   PT: 14.90 sec;   INR: 1.30 ratio                                                Urinalysis Basic - ( 19 Oct 2023 15:29 )    Color: x / Appearance: x / SG: x / pH: x  Gluc: 113 mg/dL / Ketone: x  / Bili: x / Urobili: x   Blood: x / Protein: x / Nitrite: x   Leuk Esterase: x / RBC: x / WBC x   Sq Epi: x / Non Sq Epi: x / Bacteria: x        Lactate, Blood: 0.8 mmol/L (10-17-23 @ 14:30)    CARDIAC MARKERS ( 18 Oct 2023 14:48 )  x     / <0.01 ng/mL / x     / x     / x                                                            Culture - Abscess with Gram Stain (collected 18 Oct 2023 14:20)  Source: .Abscess Leg - Right  Preliminary Report (19 Oct 2023 20:56):    Moderate Staphylococcus aureus    Culture - Blood (collected 17 Oct 2023 14:30)  Source: .Blood Blood-Peripheral  Preliminary Report (19 Oct 2023 22:02):    No growth at 48 Hours    Culture - Blood (collected 17 Oct 2023 14:30)  Source: .Blood Blood-Peripheral  Preliminary Report (19 Oct 2023 22:02):    No growth at 48 Hours    Culture - Urine (collected 17 Oct 2023 13:53)  Source: Clean Catch Clean Catch (Midstream)  Final Report (19 Oct 2023 19:31):    No growth                                                                                       ABG - ( 19 Oct 2023 15:00 )  pH, Arterial: 7.41  pH, Blood: x     /  pCO2: 40    /  pO2: 51    / HCO3: 25    / Base Excess: 0.7   /  SaO2: 86.7                MEDICATIONS  (STANDING):  calcium acetate 667 milliGRAM(s) Oral three times a day with meals  chlorhexidine 2% Cloths 1 Application(s) Topical <User Schedule>  dextrose 5%. 1000 milliLiter(s) (50 mL/Hr) IV Continuous <Continuous>  dextrose 5%. 1000 milliLiter(s) (100 mL/Hr) IV Continuous <Continuous>  dextrose 50% Injectable 25 Gram(s) IV Push once  dextrose 50% Injectable 12.5 Gram(s) IV Push once  dextrose 50% Injectable 25 Gram(s) IV Push once  folic acid 1 milliGRAM(s) Oral daily  glucagon  Injectable 1 milliGRAM(s) IntraMuscular once  heparin   Injectable 5000 Unit(s) SubCutaneous every 8 hours  insulin regular Infusion 1 Unit(s)/Hr (1 mL/Hr) IV Continuous <Continuous>  multivitamin 1 Tablet(s) Oral daily  pantoprazole  Injectable 40 milliGRAM(s) IV Push daily  piperacillin/tazobactam IVPB.. 3.375 Gram(s) IV Intermittent every 12 hours  thiamine 100 milliGRAM(s) Oral daily    MEDICATIONS  (PRN):  dextrose Oral Gel 15 Gram(s) Oral once PRN Blood Glucose LESS THAN 70 milliGRAM(s)/deciliter  LORazepam   Injectable 1 milliGRAM(s) IV Push every 4 hours PRN CIWA-Ar score increase by 2 points and a total score of 7 or less

## 2023-10-20 NOTE — PROGRESS NOTE ADULT - ATTENDING COMMENTS
patient seen and examined agree above note   follow GS xfor wound care  check vanco level   off bicarb   follow renal   continue abx follow wound cx bld cx  continue insulin follow FS Q1 hrs if tolerate diet switch to lantus

## 2023-10-20 NOTE — PROGRESS NOTE ADULT - ASSESSMENT
57 y/o male with hx of ETOH abuse, CKD with left wrist fistula, NIDDM , HTN, HLD, presented to the ED for decreased urinary output and increased thirst since yesterday.      # Abdominal distention  - concern for ileus  - seen by GI  - s/p single large BM yesterda  - repeat KUB pending read but looks improved on imaging  - GI f/up    # right thigh abscess  - scheduled for debridement with surgery today  - c/w ABx  - ID f/up  - follow cultures    # acute on chronic Renal failure with high anion gap metabolic acidosis  - off bicarb drip   - check I&Os per ICU hourly   - nephrology consult noted. s/p HD via AVF on wednesday and thursday.  - monitor i/o    # Severe Hyponatremia with slight confusion:  improving  - urine electrolytes   - check thyroid function test, cortisol and lipid panels.   - BMP q 12 hr  - avoid > 8 meq increase in Na level over 24 hours.    # Hyperglycemia / DKA   - fingersticks with insulin coverage   - BHB improved on repeat labs.  - switch to s/q insulin when appropriate.  - follow repeat BHB, improved.  - cautious hydration. high risk for volume overload considering severe VAMSI on CKD    # ETOH abuse   - IV thyamine, folic acid, multivitamin CIWA with benzo protocol    - watch for electrolyte imbalance.    DVT: heparin  GI: PPZ  Diet: NPO except meds for now, pending improvement in abdominal distention and clearance by GI.  Activity: Increase as tolerated  Dispo: Acute for now 59 y/o male with hx of ETOH abuse, CKD with left wrist fistula, NIDDM , HTN, HLD, presented to the ED for decreased urinary output and increased thirst since yesterday.      # Abdominal distention  - concern for ileus  - seen by GI  - s/p single large BM yesterda  - repeat KUB pending read but looks improved on imaging  - GI f/up    # right thigh abscess  - scheduled for debridement with surgery today  - c/w ABx  - ID f/up  - follow cultures    # Anemia  - hgb trending down  - no signs of active bleed source  - trend h/h, keep T+S active  - Tx 1 unit PRBC now. keep hgb > 8    # acute on chronic Renal failure with high anion gap metabolic acidosis  - off bicarb drip   - check I&Os per ICU hourly   - nephrology consult noted. s/p HD via AVF on wednesday and thursday.  - monitor i/o    # Severe Hyponatremia with slight confusion:  improving  - urine electrolytes   - check thyroid function test, cortisol and lipid panels.   - BMP q 12 hr  - avoid > 8 meq increase in Na level over 24 hours.    # Hyperglycemia / DKA   - fingersticks with insulin coverage   - BHB improved on repeat labs.  - switch to s/q insulin when appropriate.  - follow repeat BHB, improved.  - cautious hydration. high risk for volume overload considering severe VAMSI on CKD    # ETOH abuse   - IV thyamine, folic acid, multivitamin CIWA with benzo protocol    - watch for electrolyte imbalance.    DVT: heparin  GI: PPZ  Diet: NPO except meds for now, pending improvement in abdominal distention and clearance by GI.  Activity: Increase as tolerated  Dispo: Acute for now

## 2023-10-20 NOTE — PROGRESS NOTE ADULT - SUBJECTIVE AND OBJECTIVE BOX
Nephrology Progress Note    KECIA MARTIN  [id number]  58y  Male    S:  Patient is seen and examined, events over the last 24h noted.    O:  Allergies:  No Known Drug Allergies  Bananas (Nausea; Urticaria)  apple (Urticaria)    Hospital Medications:   MEDICATIONS  (STANDING):  calcium acetate 667 milliGRAM(s) Oral three times a day with meals  folic acid 1 milliGRAM(s) Oral daily  heparin   Injectable 5000 Unit(s) SubCutaneous every 8 hours  insulin regular Infusion 1 Unit(s)/Hr (1 mL/Hr) IV Continuous <Continuous>  multivitamin 1 Tablet(s) Oral daily  pantoprazole  Injectable 40 milliGRAM(s) IV Push daily  piperacillin/tazobactam IVPB.. 3.375 Gram(s) IV Intermittent every 12 hours  thiamine 100 milliGRAM(s) Oral daily    MEDICATIONS  (PRN):  dextrose Oral Gel 15 Gram(s) Oral once PRN Blood Glucose LESS THAN 70 milliGRAM(s)/deciliter  LORazepam   Injectable 1 milliGRAM(s) IV Push every 4 hours PRN CIWA-Ar score increase by 2 points and a total score of 7 or less    Home Medications:  carvedilol 12.5 mg oral tablet: 1 orally once a day (17 Oct 2023 15:05)  gabapentin 600 mg oral tablet: 1 orally 3 times a day (17 Oct 2023 15:05)  Januvia 50 mg oral tablet: 1 orally once a day (17 Oct 2023 15:05)  rosuvastatin 10 mg oral capsule: 1 orally once a day (17 Oct 2023 15:05)  Vitamin B-100 oral tablet: 1 orally once a day (17 Oct 2023 15:05)      VITALS:  T(F): 98.9 (10-20-23 @ 07:05), Max: 99.7 (10-19-23 @ 23:00)  HR: 81 (10-20-23 @ 10:00)  BP: 134/59 (10-20-23 @ 10:00)  RR: 16 (10-20-23 @ 10:00)  SpO2: 100% (10-20-23 @ 10:00)  Wt(kg): --  I&O's Detail    19 Oct 2023 07:01  -  20 Oct 2023 07:00  --------------------------------------------------------  IN:    Insulin: 15 mL    IV PiggyBack: 200 mL    IV PiggyBack: 450 mL    Oral Fluid: 100 mL    Sodium Bicarbonate: 1000 mL  Total IN: 1765 mL    OUT:    Indwelling Catheter - Urethral (mL): 520 mL    Other (mL): 1000 mL    Stool (mL): 1 mL  Total OUT: 1521 mL    Total NET: 244 mL      20 Oct 2023 07:01  -  20 Oct 2023 10:35  --------------------------------------------------------  IN:  Total IN: 0 mL    OUT:    Indwelling Catheter - Urethral (mL): 115 mL  Total OUT: 115 mL    Total NET: -115 mL        I&O's Summary    19 Oct 2023 07:01  -  20 Oct 2023 07:00  --------------------------------------------------------  IN: 1765 mL / OUT: 1521 mL / NET: 244 mL    20 Oct 2023 07:01  -  20 Oct 2023 10:35  --------------------------------------------------------  IN: 0 mL / OUT: 115 mL / NET: -115 mL          PHYSICAL EXAM:  Gen: NAD  Chest: b/l breath sounds  Abd: soft  Extremities: no edema  Vascular access: ROSALIO OROZCO      LABS:  ABG - ( 19 Oct 2023 15:00 )  pH, Arterial: 7.41  pH, Blood: x     /  pCO2: 40    /  pO2: 51    / HCO3: 25    / Base Excess: 0.7   /  SaO2: 86.7          10-19    130<L>  |  88<L>  |  66<HH>  ----------------------------<  113<H>  3.3<L>   |  21  |  8.4<HH>    Ca    7.5<L>      19 Oct 2023 15:29      Phosphorus: 10.4 mg/dL (10-18-23 @ 08:43)          Urine Studies:  Protein, Urine: >=1000 mg/dL (10-18-23 @ 11:30)  White Blood Cell - Urine: 15 /HPF (10-18-23 @ 11:30)  Red Blood Cell - Urine: >50 /HPF (10-18-23 @ 11:30)    Culture Results:   Moderate Staphylococcus aureus (10-18 @ 14:20)  Sodium, Random Urine: 49.0 mmoL/L (10-18 @ 11:30)    Creatinine trend:  Creatinine: 8.4 mg/dL (10-19-23 @ 15:29)  Creatinine: 13.8 mg/dL (10-19-23 @ 05:40)  Creatinine: 14.8 mg/dL (10-19-23 @ 02:34)  Creatinine: 13.8 mg/dL (10-18-23 @ 21:48)  Creatinine: 12.6 mg/dL (10-18-23 @ 19:32)   Nephrology Progress Note    KECIA MARTIN  [id number]  58y  Male    S:  Patient is seen and examined, events over the last 24h noted.    O:  Allergies:  No Known Drug Allergies  Bananas (Nausea; Urticaria)  apple (Urticaria)    Hospital Medications:   MEDICATIONS  (STANDING):  calcium acetate 667 milliGRAM(s) Oral three times a day with meals  folic acid 1 milliGRAM(s) Oral daily  heparin   Injectable 5000 Unit(s) SubCutaneous every 8 hours  insulin regular Infusion 1 Unit(s)/Hr (1 mL/Hr) IV Continuous <Continuous>  multivitamin 1 Tablet(s) Oral daily  pantoprazole  Injectable 40 milliGRAM(s) IV Push daily  piperacillin/tazobactam IVPB.. 3.375 Gram(s) IV Intermittent every 12 hours  thiamine 100 milliGRAM(s) Oral daily    MEDICATIONS  (PRN):  dextrose Oral Gel 15 Gram(s) Oral once PRN Blood Glucose LESS THAN 70 milliGRAM(s)/deciliter  LORazepam   Injectable 1 milliGRAM(s) IV Push every 4 hours PRN CIWA-Ar score increase by 2 points and a total score of 7 or less    Home Medications:  carvedilol 12.5 mg oral tablet: 1 orally once a day (17 Oct 2023 15:05)  gabapentin 600 mg oral tablet: 1 orally 3 times a day (17 Oct 2023 15:05)  Januvia 50 mg oral tablet: 1 orally once a day (17 Oct 2023 15:05)  rosuvastatin 10 mg oral capsule: 1 orally once a day (17 Oct 2023 15:05)  Vitamin B-100 oral tablet: 1 orally once a day (17 Oct 2023 15:05)      VITALS:  T(F): 98.9 (10-20-23 @ 07:05), Max: 99.7 (10-19-23 @ 23:00)  HR: 81 (10-20-23 @ 10:00)  BP: 134/59 (10-20-23 @ 10:00)  RR: 16 (10-20-23 @ 10:00)  SpO2: 100% (10-20-23 @ 10:00)  Wt(kg): --  I&O's Detail    19 Oct 2023 07:01  -  20 Oct 2023 07:00  --------------------------------------------------------  IN:    Insulin: 15 mL    IV PiggyBack: 200 mL    IV PiggyBack: 450 mL    Oral Fluid: 100 mL    Sodium Bicarbonate: 1000 mL  Total IN: 1765 mL    OUT:    Indwelling Catheter - Urethral (mL): 520 mL    Other (mL): 1000 mL    Stool (mL): 1 mL  Total OUT: 1521 mL    Total NET: 244 mL      20 Oct 2023 07:01  -  20 Oct 2023 10:35  --------------------------------------------------------  IN:  Total IN: 0 mL    OUT:    Indwelling Catheter - Urethral (mL): 115 mL  Total OUT: 115 mL    Total NET: -115 mL        I&O's Summary    19 Oct 2023 07:01  -  20 Oct 2023 07:00  --------------------------------------------------------  IN: 1765 mL / OUT: 1521 mL / NET: 244 mL    20 Oct 2023 07:01  -  20 Oct 2023 10:35  --------------------------------------------------------  IN: 0 mL / OUT: 115 mL / NET: -115 mL          PHYSICAL EXAM:  Gen: NAD  Chest: b/l breath sounds  Abd: soft  Extremities: no edema, RLE abscess  Vascular access: LUE AVF      LABS:  ABG - ( 19 Oct 2023 15:00 )  pH, Arterial: 7.41  pH, Blood: x     /  pCO2: 40    /  pO2: 51    / HCO3: 25    / Base Excess: 0.7   /  SaO2: 86.7          10-19    130<L>  |  88<L>  |  66<HH>  ----------------------------<  113<H>  3.3<L>   |  21  |  8.4<HH>    Ca    7.5<L>      19 Oct 2023 15:29      Phosphorus: 10.4 mg/dL (10-18-23 @ 08:43)          Urine Studies:  Protein, Urine: >=1000 mg/dL (10-18-23 @ 11:30)  White Blood Cell - Urine: 15 /HPF (10-18-23 @ 11:30)  Red Blood Cell - Urine: >50 /HPF (10-18-23 @ 11:30)    Culture Results:   Moderate Staphylococcus aureus (10-18 @ 14:20)  Sodium, Random Urine: 49.0 mmoL/L (10-18 @ 11:30)    Creatinine trend:  Creatinine: 8.4 mg/dL (10-19-23 @ 15:29)  Creatinine: 13.8 mg/dL (10-19-23 @ 05:40)  Creatinine: 14.8 mg/dL (10-19-23 @ 02:34)  Creatinine: 13.8 mg/dL (10-18-23 @ 21:48)  Creatinine: 12.6 mg/dL (10-18-23 @ 19:32)

## 2023-10-21 LAB
ALBUMIN SERPL ELPH-MCNC: 2.9 G/DL — LOW (ref 3.5–5.2)
ALBUMIN SERPL ELPH-MCNC: 2.9 G/DL — LOW (ref 3.5–5.2)
ALP SERPL-CCNC: 96 U/L — SIGNIFICANT CHANGE UP (ref 30–115)
ALP SERPL-CCNC: 96 U/L — SIGNIFICANT CHANGE UP (ref 30–115)
ALT FLD-CCNC: 35 U/L — SIGNIFICANT CHANGE UP (ref 0–41)
ALT FLD-CCNC: 35 U/L — SIGNIFICANT CHANGE UP (ref 0–41)
ANION GAP SERPL CALC-SCNC: 23 MMOL/L — HIGH (ref 7–14)
ANION GAP SERPL CALC-SCNC: 23 MMOL/L — HIGH (ref 7–14)
AST SERPL-CCNC: 61 U/L — HIGH (ref 0–41)
AST SERPL-CCNC: 61 U/L — HIGH (ref 0–41)
BASOPHILS # BLD AUTO: 0.02 K/UL — SIGNIFICANT CHANGE UP (ref 0–0.2)
BASOPHILS # BLD AUTO: 0.02 K/UL — SIGNIFICANT CHANGE UP (ref 0–0.2)
BASOPHILS NFR BLD AUTO: 0.3 % — SIGNIFICANT CHANGE UP (ref 0–1)
BASOPHILS NFR BLD AUTO: 0.3 % — SIGNIFICANT CHANGE UP (ref 0–1)
BILIRUB DIRECT SERPL-MCNC: 0.3 MG/DL — SIGNIFICANT CHANGE UP (ref 0–0.3)
BILIRUB DIRECT SERPL-MCNC: 0.3 MG/DL — SIGNIFICANT CHANGE UP (ref 0–0.3)
BILIRUB INDIRECT FLD-MCNC: 0.3 MG/DL — SIGNIFICANT CHANGE UP (ref 0.2–1.2)
BILIRUB INDIRECT FLD-MCNC: 0.3 MG/DL — SIGNIFICANT CHANGE UP (ref 0.2–1.2)
BILIRUB SERPL-MCNC: 0.6 MG/DL — SIGNIFICANT CHANGE UP (ref 0.2–1.2)
BUN SERPL-MCNC: 82 MG/DL — CRITICAL HIGH (ref 10–20)
BUN SERPL-MCNC: 82 MG/DL — CRITICAL HIGH (ref 10–20)
CALCIUM SERPL-MCNC: 8.2 MG/DL — LOW (ref 8.4–10.5)
CALCIUM SERPL-MCNC: 8.2 MG/DL — LOW (ref 8.4–10.5)
CHLORIDE SERPL-SCNC: 92 MMOL/L — LOW (ref 98–110)
CHLORIDE SERPL-SCNC: 92 MMOL/L — LOW (ref 98–110)
CO2 SERPL-SCNC: 20 MMOL/L — SIGNIFICANT CHANGE UP (ref 17–32)
CO2 SERPL-SCNC: 20 MMOL/L — SIGNIFICANT CHANGE UP (ref 17–32)
CREAT SERPL-MCNC: 12 MG/DL — CRITICAL HIGH (ref 0.7–1.5)
CREAT SERPL-MCNC: 12 MG/DL — CRITICAL HIGH (ref 0.7–1.5)
EGFR: 4 ML/MIN/1.73M2 — LOW
EGFR: 4 ML/MIN/1.73M2 — LOW
EOSINOPHIL # BLD AUTO: 0.33 K/UL — SIGNIFICANT CHANGE UP (ref 0–0.7)
EOSINOPHIL # BLD AUTO: 0.33 K/UL — SIGNIFICANT CHANGE UP (ref 0–0.7)
EOSINOPHIL NFR BLD AUTO: 4.8 % — SIGNIFICANT CHANGE UP (ref 0–8)
EOSINOPHIL NFR BLD AUTO: 4.8 % — SIGNIFICANT CHANGE UP (ref 0–8)
GLUCOSE BLDC GLUCOMTR-MCNC: 100 MG/DL — HIGH (ref 70–99)
GLUCOSE BLDC GLUCOMTR-MCNC: 100 MG/DL — HIGH (ref 70–99)
GLUCOSE BLDC GLUCOMTR-MCNC: 101 MG/DL — HIGH (ref 70–99)
GLUCOSE BLDC GLUCOMTR-MCNC: 101 MG/DL — HIGH (ref 70–99)
GLUCOSE BLDC GLUCOMTR-MCNC: 108 MG/DL — HIGH (ref 70–99)
GLUCOSE BLDC GLUCOMTR-MCNC: 109 MG/DL — HIGH (ref 70–99)
GLUCOSE BLDC GLUCOMTR-MCNC: 109 MG/DL — HIGH (ref 70–99)
GLUCOSE BLDC GLUCOMTR-MCNC: 113 MG/DL — HIGH (ref 70–99)
GLUCOSE BLDC GLUCOMTR-MCNC: 113 MG/DL — HIGH (ref 70–99)
GLUCOSE BLDC GLUCOMTR-MCNC: 123 MG/DL — HIGH (ref 70–99)
GLUCOSE BLDC GLUCOMTR-MCNC: 123 MG/DL — HIGH (ref 70–99)
GLUCOSE BLDC GLUCOMTR-MCNC: 139 MG/DL — HIGH (ref 70–99)
GLUCOSE BLDC GLUCOMTR-MCNC: 139 MG/DL — HIGH (ref 70–99)
GLUCOSE BLDC GLUCOMTR-MCNC: 142 MG/DL — HIGH (ref 70–99)
GLUCOSE BLDC GLUCOMTR-MCNC: 142 MG/DL — HIGH (ref 70–99)
GLUCOSE BLDC GLUCOMTR-MCNC: 150 MG/DL — HIGH (ref 70–99)
GLUCOSE BLDC GLUCOMTR-MCNC: 150 MG/DL — HIGH (ref 70–99)
GLUCOSE BLDC GLUCOMTR-MCNC: 166 MG/DL — HIGH (ref 70–99)
GLUCOSE BLDC GLUCOMTR-MCNC: 166 MG/DL — HIGH (ref 70–99)
GLUCOSE BLDC GLUCOMTR-MCNC: 174 MG/DL — HIGH (ref 70–99)
GLUCOSE BLDC GLUCOMTR-MCNC: 174 MG/DL — HIGH (ref 70–99)
GLUCOSE BLDC GLUCOMTR-MCNC: 181 MG/DL — HIGH (ref 70–99)
GLUCOSE BLDC GLUCOMTR-MCNC: 181 MG/DL — HIGH (ref 70–99)
GLUCOSE BLDC GLUCOMTR-MCNC: 183 MG/DL — HIGH (ref 70–99)
GLUCOSE BLDC GLUCOMTR-MCNC: 183 MG/DL — HIGH (ref 70–99)
GLUCOSE BLDC GLUCOMTR-MCNC: 184 MG/DL — HIGH (ref 70–99)
GLUCOSE BLDC GLUCOMTR-MCNC: 184 MG/DL — HIGH (ref 70–99)
GLUCOSE BLDC GLUCOMTR-MCNC: 188 MG/DL — HIGH (ref 70–99)
GLUCOSE BLDC GLUCOMTR-MCNC: 188 MG/DL — HIGH (ref 70–99)
GLUCOSE BLDC GLUCOMTR-MCNC: 189 MG/DL — HIGH (ref 70–99)
GLUCOSE BLDC GLUCOMTR-MCNC: 189 MG/DL — HIGH (ref 70–99)
GLUCOSE BLDC GLUCOMTR-MCNC: 205 MG/DL — HIGH (ref 70–99)
GLUCOSE BLDC GLUCOMTR-MCNC: 205 MG/DL — HIGH (ref 70–99)
GLUCOSE BLDC GLUCOMTR-MCNC: 88 MG/DL — SIGNIFICANT CHANGE UP (ref 70–99)
GLUCOSE BLDC GLUCOMTR-MCNC: 88 MG/DL — SIGNIFICANT CHANGE UP (ref 70–99)
GLUCOSE SERPL-MCNC: 95 MG/DL — SIGNIFICANT CHANGE UP (ref 70–99)
GLUCOSE SERPL-MCNC: 95 MG/DL — SIGNIFICANT CHANGE UP (ref 70–99)
HCT VFR BLD CALC: 24.8 % — LOW (ref 42–52)
HCT VFR BLD CALC: 24.8 % — LOW (ref 42–52)
HGB BLD-MCNC: 8.8 G/DL — LOW (ref 14–18)
HGB BLD-MCNC: 8.8 G/DL — LOW (ref 14–18)
IMM GRANULOCYTES NFR BLD AUTO: 0.7 % — HIGH (ref 0.1–0.3)
IMM GRANULOCYTES NFR BLD AUTO: 0.7 % — HIGH (ref 0.1–0.3)
LYMPHOCYTES # BLD AUTO: 0.45 K/UL — LOW (ref 1.2–3.4)
LYMPHOCYTES # BLD AUTO: 0.45 K/UL — LOW (ref 1.2–3.4)
LYMPHOCYTES # BLD AUTO: 6.6 % — LOW (ref 20.5–51.1)
LYMPHOCYTES # BLD AUTO: 6.6 % — LOW (ref 20.5–51.1)
MAGNESIUM SERPL-MCNC: 2.2 MG/DL — SIGNIFICANT CHANGE UP (ref 1.8–2.4)
MAGNESIUM SERPL-MCNC: 2.2 MG/DL — SIGNIFICANT CHANGE UP (ref 1.8–2.4)
MCHC RBC-ENTMCNC: 30.6 PG — SIGNIFICANT CHANGE UP (ref 27–31)
MCHC RBC-ENTMCNC: 30.6 PG — SIGNIFICANT CHANGE UP (ref 27–31)
MCHC RBC-ENTMCNC: 35.5 G/DL — SIGNIFICANT CHANGE UP (ref 32–37)
MCHC RBC-ENTMCNC: 35.5 G/DL — SIGNIFICANT CHANGE UP (ref 32–37)
MCV RBC AUTO: 86.1 FL — SIGNIFICANT CHANGE UP (ref 80–94)
MCV RBC AUTO: 86.1 FL — SIGNIFICANT CHANGE UP (ref 80–94)
MONOCYTES # BLD AUTO: 0.7 K/UL — HIGH (ref 0.1–0.6)
MONOCYTES # BLD AUTO: 0.7 K/UL — HIGH (ref 0.1–0.6)
MONOCYTES NFR BLD AUTO: 10.3 % — HIGH (ref 1.7–9.3)
MONOCYTES NFR BLD AUTO: 10.3 % — HIGH (ref 1.7–9.3)
NEUTROPHILS # BLD AUTO: 5.27 K/UL — SIGNIFICANT CHANGE UP (ref 1.4–6.5)
NEUTROPHILS # BLD AUTO: 5.27 K/UL — SIGNIFICANT CHANGE UP (ref 1.4–6.5)
NEUTROPHILS NFR BLD AUTO: 77.3 % — HIGH (ref 42.2–75.2)
NEUTROPHILS NFR BLD AUTO: 77.3 % — HIGH (ref 42.2–75.2)
NRBC # BLD: 0 /100 WBCS — SIGNIFICANT CHANGE UP (ref 0–0)
NRBC # BLD: 0 /100 WBCS — SIGNIFICANT CHANGE UP (ref 0–0)
PHOSPHATE SERPL-MCNC: 7 MG/DL — HIGH (ref 2.1–4.9)
PHOSPHATE SERPL-MCNC: 7 MG/DL — HIGH (ref 2.1–4.9)
PLATELET # BLD AUTO: 163 K/UL — SIGNIFICANT CHANGE UP (ref 130–400)
PLATELET # BLD AUTO: 163 K/UL — SIGNIFICANT CHANGE UP (ref 130–400)
PMV BLD: 10.2 FL — SIGNIFICANT CHANGE UP (ref 7.4–10.4)
PMV BLD: 10.2 FL — SIGNIFICANT CHANGE UP (ref 7.4–10.4)
POTASSIUM SERPL-MCNC: 3.8 MMOL/L — SIGNIFICANT CHANGE UP (ref 3.5–5)
POTASSIUM SERPL-MCNC: 3.8 MMOL/L — SIGNIFICANT CHANGE UP (ref 3.5–5)
POTASSIUM SERPL-SCNC: 3.8 MMOL/L — SIGNIFICANT CHANGE UP (ref 3.5–5)
POTASSIUM SERPL-SCNC: 3.8 MMOL/L — SIGNIFICANT CHANGE UP (ref 3.5–5)
PROT SERPL-MCNC: 5.9 G/DL — LOW (ref 6–8)
PROT SERPL-MCNC: 5.9 G/DL — LOW (ref 6–8)
RBC # BLD: 2.88 M/UL — LOW (ref 4.7–6.1)
RBC # BLD: 2.88 M/UL — LOW (ref 4.7–6.1)
RBC # FLD: 14.1 % — SIGNIFICANT CHANGE UP (ref 11.5–14.5)
RBC # FLD: 14.1 % — SIGNIFICANT CHANGE UP (ref 11.5–14.5)
SODIUM SERPL-SCNC: 135 MMOL/L — SIGNIFICANT CHANGE UP (ref 135–146)
SODIUM SERPL-SCNC: 135 MMOL/L — SIGNIFICANT CHANGE UP (ref 135–146)
VANCOMYCIN TROUGH SERPL-MCNC: 20.7 UG/ML — HIGH (ref 5–10)
VANCOMYCIN TROUGH SERPL-MCNC: 20.7 UG/ML — HIGH (ref 5–10)
WBC # BLD: 6.82 K/UL — SIGNIFICANT CHANGE UP (ref 4.8–10.8)
WBC # BLD: 6.82 K/UL — SIGNIFICANT CHANGE UP (ref 4.8–10.8)
WBC # FLD AUTO: 6.82 K/UL — SIGNIFICANT CHANGE UP (ref 4.8–10.8)
WBC # FLD AUTO: 6.82 K/UL — SIGNIFICANT CHANGE UP (ref 4.8–10.8)

## 2023-10-21 PROCEDURE — 99233 SBSQ HOSP IP/OBS HIGH 50: CPT

## 2023-10-21 PROCEDURE — 71045 X-RAY EXAM CHEST 1 VIEW: CPT | Mod: 26

## 2023-10-21 PROCEDURE — 99024 POSTOP FOLLOW-UP VISIT: CPT

## 2023-10-21 RX ORDER — CHLORHEXIDINE GLUCONATE 213 G/1000ML
1 SOLUTION TOPICAL
Refills: 0 | Status: DISCONTINUED | OUTPATIENT
Start: 2023-10-21 | End: 2023-10-30

## 2023-10-21 RX ADMIN — Medication 667 MILLIGRAM(S): at 12:26

## 2023-10-21 RX ADMIN — Medication 250 MILLIGRAM(S): at 17:11

## 2023-10-21 RX ADMIN — PIPERACILLIN AND TAZOBACTAM 25 GRAM(S): 4; .5 INJECTION, POWDER, LYOPHILIZED, FOR SOLUTION INTRAVENOUS at 05:13

## 2023-10-21 RX ADMIN — HEPARIN SODIUM 5000 UNIT(S): 5000 INJECTION INTRAVENOUS; SUBCUTANEOUS at 05:13

## 2023-10-21 RX ADMIN — HEPARIN SODIUM 5000 UNIT(S): 5000 INJECTION INTRAVENOUS; SUBCUTANEOUS at 14:31

## 2023-10-21 RX ADMIN — HEPARIN SODIUM 5000 UNIT(S): 5000 INJECTION INTRAVENOUS; SUBCUTANEOUS at 21:22

## 2023-10-21 RX ADMIN — PIPERACILLIN AND TAZOBACTAM 25 GRAM(S): 4; .5 INJECTION, POWDER, LYOPHILIZED, FOR SOLUTION INTRAVENOUS at 18:04

## 2023-10-21 RX ADMIN — INSULIN HUMAN 1 UNIT(S)/HR: 100 INJECTION, SOLUTION SUBCUTANEOUS at 00:06

## 2023-10-21 RX ADMIN — Medication 1 MILLIGRAM(S): at 12:26

## 2023-10-21 RX ADMIN — Medication 1 TABLET(S): at 12:26

## 2023-10-21 RX ADMIN — Medication 667 MILLIGRAM(S): at 08:51

## 2023-10-21 RX ADMIN — Medication 667 MILLIGRAM(S): at 17:11

## 2023-10-21 RX ADMIN — DEXTROSE MONOHYDRATE, SODIUM CHLORIDE, AND POTASSIUM CHLORIDE 50 MILLILITER(S): 50; .745; 4.5 INJECTION, SOLUTION INTRAVENOUS at 21:58

## 2023-10-21 RX ADMIN — DEXTROSE MONOHYDRATE, SODIUM CHLORIDE, AND POTASSIUM CHLORIDE 50 MILLILITER(S): 50; .745; 4.5 INJECTION, SOLUTION INTRAVENOUS at 00:06

## 2023-10-21 RX ADMIN — PANTOPRAZOLE SODIUM 40 MILLIGRAM(S): 20 TABLET, DELAYED RELEASE ORAL at 12:26

## 2023-10-21 NOTE — PROGRESS NOTE ADULT - SUBJECTIVE AND OBJECTIVE BOX
Patient is a 58y old  Male who presents with a chief complaint of Worsening Renal Failure (20 Oct 2023 21:24)      Over Night Events:  Patient seen and examined.   awake   s/p drain groin  abscess    s/p transfusion   ROS:  See HPI    PHYSICAL EXAM    ICU Vital Signs Last 24 Hrs  T(C): 36.8 (21 Oct 2023 02:58), Max: 37.2 (20 Oct 2023 07:05)  T(F): 98.3 (21 Oct 2023 02:58), Max: 98.9 (20 Oct 2023 07:05)  HR: 81 (21 Oct 2023 05:00) (75 - 99)  BP: 126/56 (21 Oct 2023 05:00) (88/62 - 155/67)  BP(mean): 81 (21 Oct 2023 05:00) (73 - 98)  ABP: --  ABP(mean): --  RR: 14 (21 Oct 2023 05:00) (7 - 36)  SpO2: 96% (21 Oct 2023 05:00) (96% - 100%)    O2 Parameters below as of 21 Oct 2023 05:00  Patient On (Oxygen Delivery Method): room air            General:awake   HEENT:     reuben           Lymph Nodes: NO cervical LN   Lungs: Bilateral BS  Cardiovascular: Regular   Abdomen: Soft, Positive BS  Extremities: No clubbing   Skin: warm   Neurological: no focal   Musculoskeletal: move all ext     I&O's Detail    19 Oct 2023 07:01  -  20 Oct 2023 07:00  --------------------------------------------------------  IN:    Insulin: 15 mL    IV PiggyBack: 200 mL    IV PiggyBack: 450 mL    Oral Fluid: 100 mL    Sodium Bicarbonate: 1000 mL  Total IN: 1765 mL    OUT:    Indwelling Catheter - Urethral (mL): 520 mL    Other (mL): 1000 mL    Stool (mL): 1 mL  Total OUT: 1521 mL    Total NET: 244 mL      20 Oct 2023 07:01  -  21 Oct 2023 06:28  --------------------------------------------------------  IN:    dextrose 5% + sodium chloride 0.9% w/ Additives: 300 mL    Insulin: 6 mL    IV PiggyBack: 75 mL    PRBCs (Packed Red Blood Cells): 348 mL  Total IN: 729 mL    OUT:    Indwelling Catheter - Urethral (mL): 1030 mL    Voided (mL): 30 mL  Total OUT: 1060 mL    Total NET: -331 mL          LABS:                          7.3    7.55  )-----------( 142      ( 20 Oct 2023 15:41 )             20.1         20 Oct 2023 21:36    134    |  88     |  78     ----------------------------<  91     4.0     |  20     |  10.8     Ca    8.3        20 Oct 2023 21:36  Phos  5.2       20 Oct 2023 07:00  Mg     1.9       20 Oct 2023 07:00    TPro  5.6    /  Alb  2.8    /  TBili  0.5    /  DBili  x      /  AST  81     /  ALT  40     /  AlkPhos  99     20 Oct 2023 07:00  Amylase x     lipase x                                                                                        Urinalysis Basic - ( 20 Oct 2023 21:36 )    Color: x / Appearance: x / SG: x / pH: x  Gluc: 91 mg/dL / Ketone: x  / Bili: x / Urobili: x   Blood: x / Protein: x / Nitrite: x   Leuk Esterase: x / RBC: x / WBC x   Sq Epi: x / Non Sq Epi: x / Bacteria: x                                                                Culture - Abscess with Gram Stain (collected 18 Oct 2023 14:20)  Source: .Abscess Leg - Right  Preliminary Report (19 Oct 2023 20:56):    Moderate Staphylococcus aureus  Organism: Staphylococcus aureus (20 Oct 2023 22:10)  Organism: Staphylococcus aureus (20 Oct 2023 22:10)                                                                                       ABG - ( 19 Oct 2023 15:00 )  pH, Arterial: 7.41  pH, Blood: x     /  pCO2: 40    /  pO2: 51    / HCO3: 25    / Base Excess: 0.7   /  SaO2: 86.7                MEDICATIONS  (STANDING):  calcium acetate 667 milliGRAM(s) Oral three times a day with meals  chlorhexidine 2% Cloths 1 Application(s) Topical <User Schedule>  dextrose 5% + sodium chloride 0.9% with potassium chloride 40 mEq/L 1000 milliLiter(s) (50 mL/Hr) IV Continuous <Continuous>  dextrose 50% Injectable 25 milliLiter(s) IV Push every 15 minutes  dextrose 50% Injectable 50 milliLiter(s) IV Push every 15 minutes  folic acid 1 milliGRAM(s) Oral daily  heparin   Injectable 5000 Unit(s) SubCutaneous every 8 hours  insulin regular Infusion 1 Unit(s)/Hr (1 mL/Hr) IV Continuous <Continuous>  multivitamin 1 Tablet(s) Oral daily  pantoprazole  Injectable 40 milliGRAM(s) IV Push daily  piperacillin/tazobactam IVPB.. 3.375 Gram(s) IV Intermittent every 12 hours  sodium chloride 0.9%. 1000 milliLiter(s) (25 mL/Hr) IV Continuous <Continuous>  vancomycin  IVPB 1000 milliGRAM(s) IV Intermittent <User Schedule>    MEDICATIONS  (PRN):  aluminum hydroxide/magnesium hydroxide/simethicone Suspension 30 milliLiter(s) Oral every 4 hours PRN Dyspepsia  LORazepam   Injectable 2 milliGRAM(s) IV Push every 4 hours PRN CIWA-Ar score increase by 2 points and a total score of 7 or less  morphine  - Injectable 4 milliGRAM(s) IV Push every 10 minutes PRN Severe Pain (7 - 10)  ondansetron Injectable 4 milliGRAM(s) IV Push every 8 hours PRN Nausea and/or Vomiting  ondansetron Injectable 4 milliGRAM(s) IV Push once PRN Nausea and/or Vomiting  oxycodone    5 mG/acetaminophen 325 mG 1 Tablet(s) Oral once PRN Moderate Pain (4 - 6)          Xrays:  TLC:  OG:  ET tube:                                                                                    normal    ECHO:  CAM ICU:

## 2023-10-21 NOTE — PROGRESS NOTE ADULT - ASSESSMENT
IMPRESSION:  Metabolic vs toxic encephalopathy improving  metabolic acidosis   VAMSI on CKD now on HD  sepsis present on admission  groin abscess   Ileus  DKA resolved      PLAN:    CNS:   no sedation   CIWA protocol with Ativan PRN  Multivitamin/folate/thiamine    HEENT: oral care     PULMONARY: keep pox >92 %     CARDIOVASCULAR:   CE negative  vasopressor as needed. keep map >65    GI:  start feed as tolerate   GI prophylaxis.    daily KUB.   check acute hepatitis panel    RENAL:  s/p 2 sessions of HD via AVF. follow up with renal for HD  phoslo 1 tab TID with meals  continue with way   strict is and os     INFECTIOUS DISEASE:  continue abx: Zosyn 3.375gm q12hrs and vancomycin with HD. follow up vanc levels  s/p surgery eval:  and rema   blood culture negative so far, wound culture grew staph aureus, could be contamination  MRSA nares.  ID follow up    HEMATOLOGICAL:    DVT prophylaxis doppler lower ext   Iron Studies, Folate, Vitamin B12 levels     ENDOCRINE: repeat hydroxybutyrate if neg switch to Sq lantus    subq insulin. follow up FS with meals and qhs  endocrine eval    MUSCULOSKELETAL: bed rest    FULL CODE  Lines: PIV  Dispo: MICU monitoring for today

## 2023-10-21 NOTE — PROGRESS NOTE ADULT - SUBJECTIVE AND OBJECTIVE BOX
Pt feels well, wife and daughter at bedside. Pt asking whether he needs anymore surgical proceudres for his groin    T(F): , Max: 98.8 (10-21-23 @ 22:00)  HR: 73 (10-21-23 @ 22:00) (61 - 99)  BP: 106/55 (10-21-23 @ 22:00)  RR: 20 (10-21-23 @ 22:00)  SpO2: 97% (10-21-23 @ 22:00)  IN: 754 mL / OUT: 1060 mL / NET: -306 mL    IN: 1101 mL / OUT: 2675 mL / NET: -1574 mL      General: No apparent distress  Cardiovascular: S1, S2  Gastrointestinal: Soft, Non-tender, Non-distended  Respiratory: Good air entry bilaterally  Musculoskeletal: Moves all extremities  Lymphatic: No edema  Neurologic: No gross motor deficit  Dermatologic: Skin dry, L arm HD acccess, R groin incision bandage CDI                          8.8    6.82  )-----------( 163      ( 21 Oct 2023 05:45 )             24.8     10-21    135  |  92<L>  |  82<HH>  ----------------------------<  95  3.8   |  20  |  12.0<HH>    Ca    8.2<L>      21 Oct 2023 05:45  Phos  7.0     10-21  Mg     2.2     10-21    TPro  5.9<L>  /  Alb  2.9<L>  /  TBili  0.6  /  DBili  0.3  /  AST  61<H>  /  ALT  35  /  AlkPhos  96  10-21

## 2023-10-21 NOTE — PROGRESS NOTE ADULT - SUBJECTIVE AND OBJECTIVE BOX
S; Pt seen with Dr. Rajput: right groin and leg pain slightly  improved. No other complaints. Afebrile overnight  O; Vital Signs Last 24 Hrs  T(C): 37 (21 Oct 2023 07:07), Max: 37 (21 Oct 2023 07:07)  T(F): 98.6 (21 Oct 2023 07:07), Max: 98.6 (21 Oct 2023 07:07)  HR: 75 (21 Oct 2023 11:40) (75 - 99)  BP: 124/60 (21 Oct 2023 11:40) (88/62 - 147/68)  BP(mean): 83 (21 Oct 2023 07:07) (73 - 98)  RR: 18 (21 Oct 2023 11:40) (7 - 36)  SpO2: 98% (21 Oct 2023 11:40) (96% - 100%)    Parameters below as of 21 Oct 2023 11:40  Patient On (Oxygen Delivery Method): room air    MEDICATIONS  (STANDING):  calcium acetate 667 milliGRAM(s) Oral three times a day with meals  chlorhexidine 2% Cloths 1 Application(s) Topical <User Schedule>  dextrose 5% + sodium chloride 0.9% with potassium chloride 40 mEq/L 1000 milliLiter(s) (50 mL/Hr) IV Continuous <Continuous>  dextrose 50% Injectable 25 milliLiter(s) IV Push every 15 minutes  dextrose 50% Injectable 50 milliLiter(s) IV Push every 15 minutes  folic acid 1 milliGRAM(s) Oral daily  heparin   Injectable 5000 Unit(s) SubCutaneous every 8 hours  insulin regular Infusion 1 Unit(s)/Hr (1 mL/Hr) IV Continuous <Continuous>  multivitamin 1 Tablet(s) Oral daily  pantoprazole  Injectable 40 milliGRAM(s) IV Push daily  piperacillin/tazobactam IVPB.. 3.375 Gram(s) IV Intermittent every 12 hours  vancomycin  IVPB 1000 milliGRAM(s) IV Intermittent <User Schedule>    MEDICATIONS  (PRN):  aluminum hydroxide/magnesium hydroxide/simethicone Suspension 30 milliLiter(s) Oral every 4 hours PRN Dyspepsia  LORazepam   Injectable 2 milliGRAM(s) IV Push every 4 hours PRN CIWA-Ar score increase by 2 points and a total score of 7 or less  ondansetron Injectable 4 milliGRAM(s) IV Push every 8 hours PRN Nausea and/or Vomiting      EXAM:  GEN: aewake, alert S; Pt seen with Dr. Rajput: right groin and leg pain slightly  improved. No other complaints. Afebrile overnight  O; Vital Signs Last 24 Hrs  T(C): 37 (21 Oct 2023 07:07), Max: 37 (21 Oct 2023 07:07)  T(F): 98.6 (21 Oct 2023 07:07), Max: 98.6 (21 Oct 2023 07:07)  HR: 75 (21 Oct 2023 11:40) (75 - 99)  BP: 124/60 (21 Oct 2023 11:40) (88/62 - 147/68)  BP(mean): 83 (21 Oct 2023 07:07) (73 - 98)  RR: 18 (21 Oct 2023 11:40) (7 - 36)  SpO2: 98% (21 Oct 2023 11:40) (96% - 100%)    Parameters below as of 21 Oct 2023 11:40  Patient On (Oxygen Delivery Method): room air    MEDICATIONS  (STANDING):  calcium acetate 667 milliGRAM(s) Oral three times a day with meals  chlorhexidine 2% Cloths 1 Application(s) Topical <User Schedule>  dextrose 5% + sodium chloride 0.9% with potassium chloride 40 mEq/L 1000 milliLiter(s) (50 mL/Hr) IV Continuous <Continuous>  dextrose 50% Injectable 25 milliLiter(s) IV Push every 15 minutes  dextrose 50% Injectable 50 milliLiter(s) IV Push every 15 minutes  folic acid 1 milliGRAM(s) Oral daily  heparin   Injectable 5000 Unit(s) SubCutaneous every 8 hours  insulin regular Infusion 1 Unit(s)/Hr (1 mL/Hr) IV Continuous <Continuous>  multivitamin 1 Tablet(s) Oral daily  pantoprazole  Injectable 40 milliGRAM(s) IV Push daily  piperacillin/tazobactam IVPB.. 3.375 Gram(s) IV Intermittent every 12 hours  vancomycin  IVPB 1000 milliGRAM(s) IV Intermittent <User Schedule>    MEDICATIONS  (PRN):  aluminum hydroxide/magnesium hydroxide/simethicone Suspension 30 milliLiter(s) Oral every 4 hours PRN Dyspepsia  LORazepam   Injectable 2 milliGRAM(s) IV Push every 4 hours PRN CIWA-Ar score increase by 2 points and a total score of 7 or less  ondansetron Injectable 4 milliGRAM(s) IV Push every 8 hours PRN Nausea and/or Vomiting      EXAM:  GEN: awake, alert  Abd/Ext; right groin, packing removed - surgical wound with pink granulation tissue, no necrotic tissue +surrounding edema/erythema/mild tenderness - no active drainage/purulence. Right lateral calf surgical wound with pink granulation tissue - no necrotic tissue/bleeding/purulence noted.  +mild surrounding erythema/edema    Labs:  CAPILLARY BLOOD GLUCOSE      POCT Blood Glucose.: 181 mg/dL (21 Oct 2023 12:01)  POCT Blood Glucose.: 139 mg/dL (21 Oct 2023 10:42)  POCT Blood Glucose.: 113 mg/dL (21 Oct 2023 08:54)  POCT Blood Glucose.: 100 mg/dL (21 Oct 2023 07:59)  POCT Blood Glucose.: 108 mg/dL (21 Oct 2023 05:18)  POCT Blood Glucose.: 101 mg/dL (21 Oct 2023 02:35)  POCT Blood Glucose.: 109 mg/dL (21 Oct 2023 01:17)  POCT Blood Glucose.: 108 mg/dL (21 Oct 2023 00:02)  POCT Blood Glucose.: 98 mg/dL (20 Oct 2023 21:23)  POCT Blood Glucose.: 91 mg/dL (20 Oct 2023 18:57)  POCT Blood Glucose.: 104 mg/dL (20 Oct 2023 15:57)  POCT Blood Glucose.: 112 mg/dL (20 Oct 2023 14:27)                          8.8    6.82  )-----------( 163      ( 21 Oct 2023 05:45 )             24.8       Auto Neutrophil %: 77.3 % (10-21-23 @ 05:45)  Auto Immature Granulocyte %: 0.7 % (10-21-23 @ 05:45)    10-21    135  |  92<L>  |  82<HH>  ----------------------------<  95  3.8   |  20  |  12.0<HH>      Calcium: 8.2 mg/dL (10-21-23 @ 05:45)      LFTs:             5.9  | 0.6  | 61       ------------------[96      ( 21 Oct 2023 05:45 )  2.9  | 0.3  | 35          Lipase:x      Amylase:x         Blood Gas Arterial, Lactate: 0.60 mmol/L (10-19-23 @ 15:00)    ABG - ( 19 Oct 2023 15:00 )  pH: 7.41  /  pCO2: 40    /  pO2: 51    / HCO3: 25    / Base Excess: 0.7   /  SaO2: 86.7            ABG - ( 18 Oct 2023 04:30 )  pH: 7.16  /  pCO2: 28    /  pO2: 136   / HCO3: 10    / Base Excess: -17.3 /  SaO2: 99.0              Coags:        Urinalysis Basic - ( 21 Oct 2023 05:45 )    Color: x / Appearance: x / SG: x / pH: x  Gluc: 95 mg/dL / Ketone: x  / Bili: x / Urobili: x   Blood: x / Protein: x / Nitrite: x   Leuk Esterase: x / RBC: x / WBC x   Sq Epi: x / Non Sq Epi: x / Bacteria: x        Culture - Abscess with Gram Stain (collected 18 Oct 2023 14:20)  Source: .Abscess Leg - Right  Preliminary Report (19 Oct 2023 20:56):    Moderate Staphylococcus aureus  Organism: Staphylococcus aureus (20 Oct 2023 22:10)  Organism: Staphylococcus aureus (20 Oct 2023 22:10)

## 2023-10-21 NOTE — PROGRESS NOTE ADULT - NSPROGADDITIONALINFOA_GEN_ALL_CORE
I have seen and examined the patient.  I agree with the above assessment plan.  We may be able to transition to a wound VAC for the right leg wound in the near future.  Continue IV antibiotics await cultures and biopsy.  Thank you
I have seen and examined the patient and I agree with the above assessment and plan. In addition I note that the patient is much more responsive today. HD was able to be performed via the fistula. The wound is improved and the PA will place orders for saline moist to dry dressings BID. We will continue to follow for possible transition of the R groin lymphadenitis. f/u U/S.
I have seen and examined the patient.  I agree with the above assessment plan.  We may be able to transition to a wound VAC for the right leg wound in the near future.  Continue IV antibiotics await cultures and biopsy.  Thank you

## 2023-10-21 NOTE — PHYSICAL THERAPY INITIAL EVALUATION ADULT - SPECIFY REASON(S)
Chart Reviewed , attempted to see the PT this AM for Bedside PT , MELVI Negro Notified PT getting HD at this time ,Will Hold the PT and F/u as appropriated.
Chart Reviwed , attempted to see the PT this PM for Bedside PT ,  RN Uriy Notified to defer the PT at this time 2/2 Pt being Dialyzed at this time ,PT Will F/u as appropriate.
Attempted to see patient for bedside PT, Spoke with Amish OGDEN who indicated to hold PT today, pt going to OR. Will follow up tomorrow.
As per RN, pt is not coherent and unable to participate in PT at this time. Will hold PT and follow-up as appropriate to complete PT evaluation.

## 2023-10-21 NOTE — PROGRESS NOTE ADULT - ASSESSMENT
Stage 4 CKD secondary to DM, HTN, incomplete recovery from previous severe VAMSI  # Severe VAMSI on CKD d/t ?sepsis - started HD on 10/18/23 via LUE AVF   # HAGMA likely due to DKA / uremia   # Hyponatremia likely due ?VAMSI  # Alcohol abuse    - s/p HD 2 days ago  - Urine output improving but oliguric     Recommendations:  - Placed on HD    HD 3 hrs, UF 2 lit as tolerated     Monitor urine output  - Monitor and correct lytes as needed  - Hyperphosphatemia / Hypocalcemia     Cont Phoslo , monitor Ca/PO4    Monitor H/H, re-evaluate for NAIDA accordingly   - Cont abx per ID recs; dose for iHD, monitor Vanc level / surgery f/u for I&D  - Optimal glycemic control   - Monitor for ETOH withdrawal/ CIWA protocol   Continue general care and management of the rest of comorbidities as per medicine/CC

## 2023-10-21 NOTE — PROGRESS NOTE ADULT - ASSESSMENT
59 y/o male with hx of ETOH abuse, CKD with left wrist fistula, NIDDM , HTN, HLD, presented to the ED for decreased urinary output and increased thirst since yesterday.      # Abdominal distention  - improved    # groin abscess  - s/p I+D    # Anemia  - s/p PRBC    # acute on chronic Renal failure   - undergoing HD right now    # Severe Hyponatremia  - mental status improved    # Hyperglycemia / DKA   - improving    # ETOH abuse   - thaimine  - folic acid

## 2023-10-21 NOTE — PROGRESS NOTE ADULT - ASSESSMENT
58Mm with PMH of DM, HTN, HLD, ESRD on HD, ETOH abuse, s/p left AV fistula (5/2023 Dr. De Leon presents with weakness, AMS, decreased urine output.  Found to be in significant renal failure, hyponatremic, altered with RLE wound for 1 week.     POD# 1 s/p I&D right groin abscess and Right leg ulcer    - no further surgical intervention   - recommend to continue moist to dry dressings BID for today then switch to aquacell dressings daily tomorrow; lower leg wound may also benefit from wound vac (will re-eval on 10/23)  -f/u final cultures, f/U RLE pathology  -cont abx as per ID  - will follow 58Mm with PMH of DM, HTN, HLD, ESRD on HD, ETOH abuse, s/p left AV fistula (5/2023 Dr. De Leon presents with weakness, AMS, decreased urine output.  Found to be in significant renal failure, hyponatremic, altered with RLE wound for 1 week.     POD# 1 s/p I&D right groin abscess and Right leg ulcer    - no further surgical intervention   - recommend to continue moist to dry dressings BID for today then switch to aquacell dressings BID tomorrow; lower leg wound may also benefit from wound vac (will re-eval on 10/23)  -f/u final cultures, f/U RLE pathology  -cont abx as per ID  - will follow 58Mm with PMH of DM, HTN, HLD, ESRD on HD, ETOH abuse, s/p left AV fistula (5/2023 Dr. De Leon presents with weakness, AMS, decreased urine output.  Found to be in significant renal failure, hyponatremic, altered with RLE wound for 1 week.     POD# 1 s/p I&D right groin abscess and Right leg ulcer    - no further surgical intervention   - recommend to continue moist to dry dressings BID for today then switch to aquacelAg dressings daily tomorrow; lower leg wound may also benefit from wound vac (will re-eval on 10/23)  -f/u final cultures, f/U RLE pathology  -cont abx as per ID  - will follow

## 2023-10-21 NOTE — PROGRESS NOTE ADULT - SUBJECTIVE AND OBJECTIVE BOX
NEPHROLOGY PROGRESS NOTE    Patient seen and examined on rounds around 1400, events of the last 24 hrs noted  Placed on HD         PAST MEDICAL & SURGICAL HISTORY:  Diabetes  Hypertension  HLD (hyperlipidemia)  Neuropathy  Glaucoma  Chronic kidney disease, unspecified CKD stage  requiring HD march 2022  ETOH abuse  H/O colonoscopy        Allergies:  No Known Drug Allergies  Bananas (Nausea; Urticaria)  apple (Urticaria)    Home Medications Reviewed  Hospital Medications:   MEDICATIONS  (STANDING):  calcium acetate 667 milliGRAM(s) Oral three times a day with meals  chlorhexidine 2% Cloths 1 Application(s) Topical <User Schedule>  dextrose 5% + sodium chloride 0.9% with potassium chloride 40 mEq/L 1000 milliLiter(s) (50 mL/Hr) IV Continuous <Continuous>  dextrose 50% Injectable 25 milliLiter(s) IV Push every 15 minutes  dextrose 50% Injectable 50 milliLiter(s) IV Push every 15 minutes  folic acid 1 milliGRAM(s) Oral daily  heparin   Injectable 5000 Unit(s) SubCutaneous every 8 hours  insulin regular Infusion 1 Unit(s)/Hr (1 mL/Hr) IV Continuous <Continuous>  multivitamin 1 Tablet(s) Oral daily  pantoprazole  Injectable 40 milliGRAM(s) IV Push daily  piperacillin/tazobactam IVPB.. 3.375 Gram(s) IV Intermittent every 12 hours  vancomycin  IVPB 1000 milliGRAM(s) IV Intermittent <User Schedule>      SOCIAL HISTORY:  Denies ETOH,Smoking,   FAMILY HISTORY:  FH: diabetes mellitus (Father)          VITALS:  T(F): 98.3 (10-21-23 @ 18:00), Max: 98.6 (10-21-23 @ 07:07)  HR: 67 (10-21-23 @ 19:00)  BP: 99/55 (10-21-23 @ 19:00)  RR: 21 (10-21-23 @ 19:00)  SpO2: 96% (10-21-23 @ 19:00)    10-20 @ 07:01  -  10-21 @ 07:00  --------------------------------------------------------  IN: 754 mL / OUT: 1060 mL / NET: -306 mL    10-21 @ 07:01  -  10-21 @ 20:10  --------------------------------------------------------  IN: 929 mL / OUT: 2615 mL / NET: -1686 mL          10-20-23 @ 07:01  -  10-21-23 @ 07:00  --------------------------------------------------------  IN: 0 mL / OUT: 1030 mL / NET: -1030 mL    10-21-23 @ 07:01  -  10-21-23 @ 20:10  --------------------------------------------------------  IN: 0 mL / OUT: 430 mL / NET: -430 mL      I&O's Detail    20 Oct 2023 07:01  -  21 Oct 2023 07:00  --------------------------------------------------------  IN:    dextrose 5% + sodium chloride 0.9% w/ Additives: 300 mL    Insulin: 6 mL    IV PiggyBack: 100 mL    PRBCs (Packed Red Blood Cells): 348 mL  Total IN: 754 mL    OUT:    Indwelling Catheter - Urethral (mL): 1030 mL    Voided (mL): 30 mL  Total OUT: 1060 mL    Total NET: -306 mL      21 Oct 2023 07:01  -  21 Oct 2023 20:10  --------------------------------------------------------  IN:    dextrose 5% + sodium chloride 0.9% w/ Additives: 550 mL    Insulin: 29 mL    IV PiggyBack: 350 mL  Total IN: 929 mL    OUT:    Indwelling Catheter - Urethral (mL): 430 mL    Other (mL): 2000 mL    Voided (mL): 185 mL  Total OUT: 2615 mL    Total NET: -1686 mL              Gen: NAD  Chest: b/l breath sounds  Abd: soft  Extremities: no edema, RLE abscess  Vascular access: LUE AVF      LABS:  10-21    135  |  92<L>  |  82<HH>  ----------------------------<  95  3.8   |  20  |  12.0<HH>    Ca    8.2<L>      21 Oct 2023 05:45  Phos  7.0     10-21  Mg     2.2     10-21    TPro  5.9<L>  /  Alb  2.9<L>  /  TBili  0.6  /  DBili  0.3  /  AST  61<H>  /  ALT  35  /  AlkPhos  96  10-21    Creatinine Trend: 12.0 <--, 10.8 <--, 10.2 <--, 10.9 <--, 8.4 <--, 13.8 <--, 14.8 <--, 13.8 <--, 12.6 <--, 14.0 <--, 16.3 <--, 15.6 <--                        8.8    6.82  )-----------( 163      ( 21 Oct 2023 05:45 )             24.8     Urine Studies:  Urinalysis Basic - ( 21 Oct 2023 05:45 )    Color:  / Appearance:  / SG:  / pH:   Gluc: 95 mg/dL / Ketone:   / Bili:  / Urobili:    Blood:  / Protein:  / Nitrite:    Leuk Esterase:  / RBC:  / WBC    Sq Epi:  / Non Sq Epi:  / Bacteria:       Sodium, Random Urine: 49.0 mmoL/L (10-18 @ 11:30)  Creatinine, Random Urine: 80 mg/dL (10-18 @ 11:30)

## 2023-10-22 LAB
ALBUMIN SERPL ELPH-MCNC: 3 G/DL — LOW (ref 3.5–5.2)
ALBUMIN SERPL ELPH-MCNC: 3 G/DL — LOW (ref 3.5–5.2)
ALP SERPL-CCNC: 100 U/L — SIGNIFICANT CHANGE UP (ref 30–115)
ALP SERPL-CCNC: 100 U/L — SIGNIFICANT CHANGE UP (ref 30–115)
ALT FLD-CCNC: 30 U/L — SIGNIFICANT CHANGE UP (ref 0–41)
ALT FLD-CCNC: 30 U/L — SIGNIFICANT CHANGE UP (ref 0–41)
ANION GAP SERPL CALC-SCNC: 15 MMOL/L — HIGH (ref 7–14)
ANION GAP SERPL CALC-SCNC: 15 MMOL/L — HIGH (ref 7–14)
AST SERPL-CCNC: 42 U/L — HIGH (ref 0–41)
AST SERPL-CCNC: 42 U/L — HIGH (ref 0–41)
B-OH-BUTYR SERPL-SCNC: 0.8 MMOL/L — HIGH
B-OH-BUTYR SERPL-SCNC: 0.8 MMOL/L — HIGH
BASOPHILS # BLD AUTO: 0.02 K/UL — SIGNIFICANT CHANGE UP (ref 0–0.2)
BASOPHILS # BLD AUTO: 0.02 K/UL — SIGNIFICANT CHANGE UP (ref 0–0.2)
BASOPHILS NFR BLD AUTO: 0.3 % — SIGNIFICANT CHANGE UP (ref 0–1)
BASOPHILS NFR BLD AUTO: 0.3 % — SIGNIFICANT CHANGE UP (ref 0–1)
BILIRUB SERPL-MCNC: 0.6 MG/DL — SIGNIFICANT CHANGE UP (ref 0.2–1.2)
BILIRUB SERPL-MCNC: 0.6 MG/DL — SIGNIFICANT CHANGE UP (ref 0.2–1.2)
BUN SERPL-MCNC: 64 MG/DL — CRITICAL HIGH (ref 10–20)
BUN SERPL-MCNC: 64 MG/DL — CRITICAL HIGH (ref 10–20)
CALCIUM SERPL-MCNC: 8.5 MG/DL — SIGNIFICANT CHANGE UP (ref 8.4–10.5)
CALCIUM SERPL-MCNC: 8.5 MG/DL — SIGNIFICANT CHANGE UP (ref 8.4–10.5)
CHLORIDE SERPL-SCNC: 95 MMOL/L — LOW (ref 98–110)
CHLORIDE SERPL-SCNC: 95 MMOL/L — LOW (ref 98–110)
CO2 SERPL-SCNC: 24 MMOL/L — SIGNIFICANT CHANGE UP (ref 17–32)
CO2 SERPL-SCNC: 24 MMOL/L — SIGNIFICANT CHANGE UP (ref 17–32)
CREAT SERPL-MCNC: 11.3 MG/DL — CRITICAL HIGH (ref 0.7–1.5)
CREAT SERPL-MCNC: 11.3 MG/DL — CRITICAL HIGH (ref 0.7–1.5)
CULTURE RESULTS: SIGNIFICANT CHANGE UP
EGFR: 5 ML/MIN/1.73M2 — LOW
EGFR: 5 ML/MIN/1.73M2 — LOW
EOSINOPHIL # BLD AUTO: 0.4 K/UL — SIGNIFICANT CHANGE UP (ref 0–0.7)
EOSINOPHIL # BLD AUTO: 0.4 K/UL — SIGNIFICANT CHANGE UP (ref 0–0.7)
EOSINOPHIL NFR BLD AUTO: 6.9 % — SIGNIFICANT CHANGE UP (ref 0–8)
EOSINOPHIL NFR BLD AUTO: 6.9 % — SIGNIFICANT CHANGE UP (ref 0–8)
GLUCOSE BLDC GLUCOMTR-MCNC: 105 MG/DL — HIGH (ref 70–99)
GLUCOSE BLDC GLUCOMTR-MCNC: 105 MG/DL — HIGH (ref 70–99)
GLUCOSE BLDC GLUCOMTR-MCNC: 114 MG/DL — HIGH (ref 70–99)
GLUCOSE BLDC GLUCOMTR-MCNC: 114 MG/DL — HIGH (ref 70–99)
GLUCOSE BLDC GLUCOMTR-MCNC: 124 MG/DL — HIGH (ref 70–99)
GLUCOSE BLDC GLUCOMTR-MCNC: 124 MG/DL — HIGH (ref 70–99)
GLUCOSE BLDC GLUCOMTR-MCNC: 127 MG/DL — HIGH (ref 70–99)
GLUCOSE BLDC GLUCOMTR-MCNC: 127 MG/DL — HIGH (ref 70–99)
GLUCOSE BLDC GLUCOMTR-MCNC: 131 MG/DL — HIGH (ref 70–99)
GLUCOSE BLDC GLUCOMTR-MCNC: 131 MG/DL — HIGH (ref 70–99)
GLUCOSE BLDC GLUCOMTR-MCNC: 132 MG/DL — HIGH (ref 70–99)
GLUCOSE BLDC GLUCOMTR-MCNC: 132 MG/DL — HIGH (ref 70–99)
GLUCOSE BLDC GLUCOMTR-MCNC: 134 MG/DL — HIGH (ref 70–99)
GLUCOSE BLDC GLUCOMTR-MCNC: 134 MG/DL — HIGH (ref 70–99)
GLUCOSE BLDC GLUCOMTR-MCNC: 136 MG/DL — HIGH (ref 70–99)
GLUCOSE BLDC GLUCOMTR-MCNC: 136 MG/DL — HIGH (ref 70–99)
GLUCOSE BLDC GLUCOMTR-MCNC: 140 MG/DL — HIGH (ref 70–99)
GLUCOSE BLDC GLUCOMTR-MCNC: 140 MG/DL — HIGH (ref 70–99)
GLUCOSE BLDC GLUCOMTR-MCNC: 144 MG/DL — HIGH (ref 70–99)
GLUCOSE BLDC GLUCOMTR-MCNC: 144 MG/DL — HIGH (ref 70–99)
GLUCOSE BLDC GLUCOMTR-MCNC: 185 MG/DL — HIGH (ref 70–99)
GLUCOSE BLDC GLUCOMTR-MCNC: 185 MG/DL — HIGH (ref 70–99)
GLUCOSE BLDC GLUCOMTR-MCNC: 188 MG/DL — HIGH (ref 70–99)
GLUCOSE BLDC GLUCOMTR-MCNC: 188 MG/DL — HIGH (ref 70–99)
GLUCOSE BLDC GLUCOMTR-MCNC: 244 MG/DL — HIGH (ref 70–99)
GLUCOSE BLDC GLUCOMTR-MCNC: 244 MG/DL — HIGH (ref 70–99)
GLUCOSE BLDC GLUCOMTR-MCNC: 246 MG/DL — HIGH (ref 70–99)
GLUCOSE BLDC GLUCOMTR-MCNC: 246 MG/DL — HIGH (ref 70–99)
GLUCOSE SERPL-MCNC: 185 MG/DL — HIGH (ref 70–99)
GLUCOSE SERPL-MCNC: 185 MG/DL — HIGH (ref 70–99)
HCT VFR BLD CALC: 27.3 % — LOW (ref 42–52)
HCT VFR BLD CALC: 27.3 % — LOW (ref 42–52)
HGB BLD-MCNC: 9.2 G/DL — LOW (ref 14–18)
HGB BLD-MCNC: 9.2 G/DL — LOW (ref 14–18)
IMM GRANULOCYTES NFR BLD AUTO: 1.4 % — HIGH (ref 0.1–0.3)
IMM GRANULOCYTES NFR BLD AUTO: 1.4 % — HIGH (ref 0.1–0.3)
LYMPHOCYTES # BLD AUTO: 0.51 K/UL — LOW (ref 1.2–3.4)
LYMPHOCYTES # BLD AUTO: 0.51 K/UL — LOW (ref 1.2–3.4)
LYMPHOCYTES # BLD AUTO: 8.8 % — LOW (ref 20.5–51.1)
LYMPHOCYTES # BLD AUTO: 8.8 % — LOW (ref 20.5–51.1)
MAGNESIUM SERPL-MCNC: 2 MG/DL — SIGNIFICANT CHANGE UP (ref 1.8–2.4)
MAGNESIUM SERPL-MCNC: 2 MG/DL — SIGNIFICANT CHANGE UP (ref 1.8–2.4)
MCHC RBC-ENTMCNC: 30.7 PG — SIGNIFICANT CHANGE UP (ref 27–31)
MCHC RBC-ENTMCNC: 30.7 PG — SIGNIFICANT CHANGE UP (ref 27–31)
MCHC RBC-ENTMCNC: 33.7 G/DL — SIGNIFICANT CHANGE UP (ref 32–37)
MCHC RBC-ENTMCNC: 33.7 G/DL — SIGNIFICANT CHANGE UP (ref 32–37)
MCV RBC AUTO: 91 FL — SIGNIFICANT CHANGE UP (ref 80–94)
MCV RBC AUTO: 91 FL — SIGNIFICANT CHANGE UP (ref 80–94)
MONOCYTES # BLD AUTO: 0.76 K/UL — HIGH (ref 0.1–0.6)
MONOCYTES # BLD AUTO: 0.76 K/UL — HIGH (ref 0.1–0.6)
MONOCYTES NFR BLD AUTO: 13.1 % — HIGH (ref 1.7–9.3)
MONOCYTES NFR BLD AUTO: 13.1 % — HIGH (ref 1.7–9.3)
NEUTROPHILS # BLD AUTO: 4.04 K/UL — SIGNIFICANT CHANGE UP (ref 1.4–6.5)
NEUTROPHILS # BLD AUTO: 4.04 K/UL — SIGNIFICANT CHANGE UP (ref 1.4–6.5)
NEUTROPHILS NFR BLD AUTO: 69.5 % — SIGNIFICANT CHANGE UP (ref 42.2–75.2)
NEUTROPHILS NFR BLD AUTO: 69.5 % — SIGNIFICANT CHANGE UP (ref 42.2–75.2)
NRBC # BLD: 0 /100 WBCS — SIGNIFICANT CHANGE UP (ref 0–0)
NRBC # BLD: 0 /100 WBCS — SIGNIFICANT CHANGE UP (ref 0–0)
PLATELET # BLD AUTO: 162 K/UL — SIGNIFICANT CHANGE UP (ref 130–400)
PLATELET # BLD AUTO: 162 K/UL — SIGNIFICANT CHANGE UP (ref 130–400)
PMV BLD: 9.9 FL — SIGNIFICANT CHANGE UP (ref 7.4–10.4)
PMV BLD: 9.9 FL — SIGNIFICANT CHANGE UP (ref 7.4–10.4)
POTASSIUM SERPL-MCNC: 4.1 MMOL/L — SIGNIFICANT CHANGE UP (ref 3.5–5)
POTASSIUM SERPL-MCNC: 4.1 MMOL/L — SIGNIFICANT CHANGE UP (ref 3.5–5)
POTASSIUM SERPL-SCNC: 4.1 MMOL/L — SIGNIFICANT CHANGE UP (ref 3.5–5)
POTASSIUM SERPL-SCNC: 4.1 MMOL/L — SIGNIFICANT CHANGE UP (ref 3.5–5)
PROT SERPL-MCNC: 6.3 G/DL — SIGNIFICANT CHANGE UP (ref 6–8)
PROT SERPL-MCNC: 6.3 G/DL — SIGNIFICANT CHANGE UP (ref 6–8)
RBC # BLD: 3 M/UL — LOW (ref 4.7–6.1)
RBC # BLD: 3 M/UL — LOW (ref 4.7–6.1)
RBC # FLD: 14.4 % — SIGNIFICANT CHANGE UP (ref 11.5–14.5)
RBC # FLD: 14.4 % — SIGNIFICANT CHANGE UP (ref 11.5–14.5)
SODIUM SERPL-SCNC: 134 MMOL/L — LOW (ref 135–146)
SODIUM SERPL-SCNC: 134 MMOL/L — LOW (ref 135–146)
SPECIMEN SOURCE: SIGNIFICANT CHANGE UP
WBC # BLD: 5.81 K/UL — SIGNIFICANT CHANGE UP (ref 4.8–10.8)
WBC # BLD: 5.81 K/UL — SIGNIFICANT CHANGE UP (ref 4.8–10.8)
WBC # FLD AUTO: 5.81 K/UL — SIGNIFICANT CHANGE UP (ref 4.8–10.8)
WBC # FLD AUTO: 5.81 K/UL — SIGNIFICANT CHANGE UP (ref 4.8–10.8)

## 2023-10-22 PROCEDURE — 99233 SBSQ HOSP IP/OBS HIGH 50: CPT

## 2023-10-22 RX ORDER — INSULIN LISPRO 100/ML
VIAL (ML) SUBCUTANEOUS
Refills: 0 | Status: DISCONTINUED | OUTPATIENT
Start: 2023-10-22 | End: 2023-10-30

## 2023-10-22 RX ORDER — PANTOPRAZOLE SODIUM 20 MG/1
40 TABLET, DELAYED RELEASE ORAL
Refills: 0 | Status: DISCONTINUED | OUTPATIENT
Start: 2023-10-23 | End: 2023-10-25

## 2023-10-22 RX ORDER — INSULIN LISPRO 100/ML
2 VIAL (ML) SUBCUTANEOUS
Refills: 0 | Status: DISCONTINUED | OUTPATIENT
Start: 2023-10-22 | End: 2023-10-30

## 2023-10-22 RX ORDER — PANTOPRAZOLE SODIUM 20 MG/1
40 TABLET, DELAYED RELEASE ORAL ONCE
Refills: 0 | Status: COMPLETED | OUTPATIENT
Start: 2023-10-22 | End: 2023-10-22

## 2023-10-22 RX ORDER — INSULIN GLARGINE 100 [IU]/ML
5 INJECTION, SOLUTION SUBCUTANEOUS AT BEDTIME
Refills: 0 | Status: DISCONTINUED | OUTPATIENT
Start: 2023-10-22 | End: 2023-10-30

## 2023-10-22 RX ORDER — INSULIN GLARGINE 100 [IU]/ML
5 INJECTION, SOLUTION SUBCUTANEOUS ONCE
Refills: 0 | Status: COMPLETED | OUTPATIENT
Start: 2023-10-22 | End: 2023-10-22

## 2023-10-22 RX ADMIN — CHLORHEXIDINE GLUCONATE 1 APPLICATION(S): 213 SOLUTION TOPICAL at 05:18

## 2023-10-22 RX ADMIN — Medication 2: at 11:48

## 2023-10-22 RX ADMIN — Medication 667 MILLIGRAM(S): at 16:41

## 2023-10-22 RX ADMIN — INSULIN GLARGINE 5 UNIT(S): 100 INJECTION, SOLUTION SUBCUTANEOUS at 21:24

## 2023-10-22 RX ADMIN — Medication 2 UNIT(S): at 16:42

## 2023-10-22 RX ADMIN — Medication 1 MILLIGRAM(S): at 11:10

## 2023-10-22 RX ADMIN — Medication 2 UNIT(S): at 11:49

## 2023-10-22 RX ADMIN — DEXTROSE MONOHYDRATE, SODIUM CHLORIDE, AND POTASSIUM CHLORIDE 50 MILLILITER(S): 50; .745; 4.5 INJECTION, SOLUTION INTRAVENOUS at 22:04

## 2023-10-22 RX ADMIN — HEPARIN SODIUM 5000 UNIT(S): 5000 INJECTION INTRAVENOUS; SUBCUTANEOUS at 14:47

## 2023-10-22 RX ADMIN — HEPARIN SODIUM 5000 UNIT(S): 5000 INJECTION INTRAVENOUS; SUBCUTANEOUS at 05:17

## 2023-10-22 RX ADMIN — PIPERACILLIN AND TAZOBACTAM 25 GRAM(S): 4; .5 INJECTION, POWDER, LYOPHILIZED, FOR SOLUTION INTRAVENOUS at 17:08

## 2023-10-22 RX ADMIN — Medication 667 MILLIGRAM(S): at 11:48

## 2023-10-22 RX ADMIN — Medication 1 TABLET(S): at 11:10

## 2023-10-22 RX ADMIN — PIPERACILLIN AND TAZOBACTAM 25 GRAM(S): 4; .5 INJECTION, POWDER, LYOPHILIZED, FOR SOLUTION INTRAVENOUS at 05:18

## 2023-10-22 RX ADMIN — Medication 1: at 16:42

## 2023-10-22 RX ADMIN — Medication 667 MILLIGRAM(S): at 08:12

## 2023-10-22 RX ADMIN — PANTOPRAZOLE SODIUM 40 MILLIGRAM(S): 20 TABLET, DELAYED RELEASE ORAL at 11:10

## 2023-10-22 RX ADMIN — HEPARIN SODIUM 5000 UNIT(S): 5000 INJECTION INTRAVENOUS; SUBCUTANEOUS at 21:24

## 2023-10-22 RX ADMIN — INSULIN GLARGINE 5 UNIT(S): 100 INJECTION, SOLUTION SUBCUTANEOUS at 11:10

## 2023-10-22 NOTE — PHYSICAL THERAPY INITIAL EVALUATION ADULT - PERTINENT HX OF CURRENT PROBLEM, REHAB EVAL
57 y/o male with hx of ETOH abuse, CKD with left wrist fistula, NIDDM , HTN, HLD, presented to the ED for decreased urinary output and increased thirst since yesterday. Patient was brought to the ED by daughter who states that the patient has a right lower leg abscess that has increased in size with purulent drainage for one week after sustaining trauma to the right leg two weeks ago. As per patient, admits to frequent urination about 5 times a day with little to no urinary output. Patient admits to increased thirst along with suprapubic abdominal pain and dysuria. Patient denies fever/chills, nausea/vomiting, SOB, chest pain , heart palpitations, cough, congestion, appetite loss, changes in bowel movements, hematuria or pyuria.

## 2023-10-22 NOTE — PROGRESS NOTE ADULT - SUBJECTIVE AND OBJECTIVE BOX
Patient is a 58y old  Male who presents with a chief complaint of Worsening Renal Failure (21 Oct 2023 23:02)      Over Night Events:  Patient seen and examined.   on  insulin  drip   more awake follow command     ROS:  See HPI    PHYSICAL EXAM    ICU Vital Signs Last 24 Hrs  T(C): 37.1 (22 Oct 2023 03:03), Max: 37.2 (21 Oct 2023 23:01)  T(F): 98.7 (22 Oct 2023 03:03), Max: 98.9 (21 Oct 2023 23:01)  HR: 82 (22 Oct 2023 03:30) (61 - 84)  BP: 136/66 (22 Oct 2023 03:30) (96/50 - 146/73)  BP(mean): 92 (22 Oct 2023 03:30) (68 - 102)  ABP: --  ABP(mean): --  RR: 20 (22 Oct 2023 03:30) (15 - 27)  SpO2: 98% (22 Oct 2023 03:30) (96% - 99%)    O2 Parameters below as of 22 Oct 2023 03:30  Patient On (Oxygen Delivery Method): room air            General:awake follow command   HEENT:    reuben            Lymph Nodes: NO cervical LN   Lungs: Bilateral BS  Cardiovascular: Regular   Abdomen: Soft, Positive BS  Extremities: No clubbing   Skin: warm   Neurological: no focal   Musculoskeletal: move all ext     I&O's Detail    20 Oct 2023 07:01  -  21 Oct 2023 07:00  --------------------------------------------------------  IN:    dextrose 5% + sodium chloride 0.9% w/ Additives: 300 mL    Insulin: 6 mL    IV PiggyBack: 100 mL    PRBCs (Packed Red Blood Cells): 348 mL  Total IN: 754 mL    OUT:    Indwelling Catheter - Urethral (mL): 1030 mL    Voided (mL): 30 mL  Total OUT: 1060 mL    Total NET: -306 mL      21 Oct 2023 07:01  -  22 Oct 2023 06:14  --------------------------------------------------------  IN:    dextrose 5% + sodium chloride 0.9% w/ Additives: 1000 mL    Insulin: 51 mL    IV PiggyBack: 350 mL  Total IN: 1401 mL    OUT:    Indwelling Catheter - Urethral (mL): 430 mL    Other (mL): 2000 mL    Voided (mL): 245 mL  Total OUT: 2675 mL    Total NET: -1274 mL          LABS:                          8.8    6.82  )-----------( 163      ( 21 Oct 2023 05:45 )             24.8         21 Oct 2023 05:45    135    |  92     |  82     ----------------------------<  95     3.8     |  20     |  12.0     Ca    8.2        21 Oct 2023 05:45  Phos  7.0       21 Oct 2023 05:45  Mg     2.2       21 Oct 2023 05:45                                                                                      Urinalysis Basic - ( 21 Oct 2023 05:45 )    Color: x / Appearance: x / SG: x / pH: x  Gluc: 95 mg/dL / Ketone: x  / Bili: x / Urobili: x   Blood: x / Protein: x / Nitrite: x   Leuk Esterase: x / RBC: x / WBC x   Sq Epi: x / Non Sq Epi: x / Bacteria: x                                                                                                                                                     MEDICATIONS  (STANDING):  calcium acetate 667 milliGRAM(s) Oral three times a day with meals  chlorhexidine 2% Cloths 1 Application(s) Topical <User Schedule>  dextrose 5% + sodium chloride 0.9% with potassium chloride 40 mEq/L 1000 milliLiter(s) (50 mL/Hr) IV Continuous <Continuous>  dextrose 50% Injectable 25 milliLiter(s) IV Push every 15 minutes  dextrose 50% Injectable 50 milliLiter(s) IV Push every 15 minutes  folic acid 1 milliGRAM(s) Oral daily  heparin   Injectable 5000 Unit(s) SubCutaneous every 8 hours  insulin regular Infusion 1 Unit(s)/Hr (1 mL/Hr) IV Continuous <Continuous>  multivitamin 1 Tablet(s) Oral daily  pantoprazole  Injectable 40 milliGRAM(s) IV Push daily  piperacillin/tazobactam IVPB.. 3.375 Gram(s) IV Intermittent every 12 hours  vancomycin  IVPB 1000 milliGRAM(s) IV Intermittent <User Schedule>    MEDICATIONS  (PRN):  aluminum hydroxide/magnesium hydroxide/simethicone Suspension 30 milliLiter(s) Oral every 4 hours PRN Dyspepsia  LORazepam   Injectable 2 milliGRAM(s) IV Push every 4 hours PRN CIWA-Ar score increase by 2 points and a total score of 7 or less  ondansetron Injectable 4 milliGRAM(s) IV Push every 8 hours PRN Nausea and/or Vomiting          Xrays:  TLC:  OG:  ET tube:                                                                                       ECHO:  CAM ICU:

## 2023-10-22 NOTE — PROGRESS NOTE ADULT - ASSESSMENT
IMPRESSION:  Metabolic vs toxic encephalopathy improving  metabolic acidosis   KERMIT on CKD now on HD  sepsis present on admission  groin abscess   Ileus  DKA resolved      PLAN:    CNS:   no sedation   CIWA protocol with Ativan PRN  Multivitamin/folate/thiamine    HEENT: oral care     PULMONARY: keep pox >92 %     CARDIOVASCULAR:   CE negative  vasopressor as needed. keep map >65    GI:  start feed as tolerate   GI prophylaxis.    daily KUB.   check acute hepatitis panel    RENAL:  s/p 2 sessions of HD via AVF. follow up with renal for HD  phoslo 1 tab TID with meals  continue with way   strict is and os     INFECTIOUS DISEASE:  continue abx: Zosyn 3.375gm q12hrs and vancomycin with HD. follow up vanc levels  s/p surgery eval:  and rema   blood culture negative so far, wound culture grew staph aureus, could be contamination  MRSA nares.  ID follow up    HEMATOLOGICAL:    DVT prophylaxis doppler lower ext   Iron Studies, Folate, Vitamin B12 levels     ENDOCRINE: repeat hydroxybutyrate if neg switch to Sq lantus    has high anion gap metabolic acidosis because of Kermit and DKA     endocrine eval    MUSCULOSKELETAL: bed rest    FULL CODE  Lines: PIV  Dispo: MICU monitoring

## 2023-10-22 NOTE — PROGRESS NOTE ADULT - SUBJECTIVE AND OBJECTIVE BOX
Nephrology progress note    Patient was seen and examined, events over the last 24 h noted .    HD yesterday    Allergies:  No Known Drug Allergies  Bananas (Nausea; Urticaria)  apple (Urticaria)    Hospital Medications:   MEDICATIONS  (STANDING):  calcium acetate 667 milliGRAM(s) Oral three times a day with meals  chlorhexidine 2% Cloths 1 Application(s) Topical <User Schedule>  dextrose 5% + sodium chloride 0.9% with potassium chloride 40 mEq/L 1000 milliLiter(s) (50 mL/Hr) IV Continuous <Continuous>  folic acid 1 milliGRAM(s) Oral daily  heparin   Injectable 5000 Unit(s) SubCutaneous every 8 hours  insulin glargine Injectable (LANTUS) 5 Unit(s) SubCutaneous at bedtime  insulin lispro (ADMELOG) corrective regimen sliding scale   SubCutaneous three times a day before meals  insulin lispro Injectable (ADMELOG) 2 Unit(s) SubCutaneous three times a day before meals  insulin regular Infusion 1 Unit(s)/Hr (1 mL/Hr) IV Continuous <Continuous>  multivitamin 1 Tablet(s) Oral daily  piperacillin/tazobactam IVPB.. 3.375 Gram(s) IV Intermittent every 12 hours  vancomycin  IVPB 1000 milliGRAM(s) IV Intermittent <User Schedule>        VITALS:  T(F): 98 (10-22-23 @ 11:00), Max: 98.9 (10-21-23 @ 23:01)  HR: 77 (10-22-23 @ 15:01)  BP: 103/70 (10-22-23 @ 15:01)  RR: 18 (10-22-23 @ 15:01)  SpO2: 99% (10-22-23 @ 15:01)  Wt(kg): --    10-20 @ 07:01  -  10-21 @ 07:00  --------------------------------------------------------  IN: 754 mL / OUT: 1060 mL / NET: -306 mL    10-21 @ 07:01  -  10-22 @ 07:00  --------------------------------------------------------  IN: 1601 mL / OUT: 3375 mL / NET: -1774 mL    10-22 @ 07:01  -  10-22 @ 15:05  --------------------------------------------------------  IN: 450 mL / OUT: 625 mL / NET: -175 mL          PHYSICAL EXAM:  Constitutional: NAD  HEENT: anicteric sclera, oropharynx clear, MMM  Neck: No JVD  Respiratory: CTAB, no wheezes, rales or rhonchi  Cardiovascular: S1, S2, RRR  Gastrointestinal: BS+, soft, NT/ND  Extremities: No cyanosis or clubbing. No peripheral edema  :  No way.   Skin: No rashes    LABS:  10-22    134<L>  |  95<L>  |  64<HH>  ----------------------------<  185<H>  4.1   |  24  |  11.3<HH>    Ca    8.5      22 Oct 2023 09:50  Phos  7.0     10-21  Mg     2.0     10-22    TPro  6.3  /  Alb  3.0<L>  /  TBili  0.6  /  DBili      /  AST  42<H>  /  ALT  30  /  AlkPhos  100  10-22                          9.2    5.81  )-----------( 162      ( 22 Oct 2023 09:50 )             27.3       Urine Studies:  Urinalysis Basic - ( 22 Oct 2023 09:50 )    Color:  / Appearance:  / SG:  / pH:   Gluc: 185 mg/dL / Ketone:   / Bili:  / Urobili:    Blood:  / Protein:  / Nitrite:    Leuk Esterase:  / RBC:  / WBC    Sq Epi:  / Non Sq Epi:  / Bacteria:       Sodium, Random Urine: 49.0 mmoL/L (10-18 @ 11:30)  Creatinine, Random Urine: 80 mg/dL (10-18 @ 11:30)    RADIOLOGY & ADDITIONAL STUDIES:

## 2023-10-22 NOTE — PHYSICAL THERAPY INITIAL EVALUATION ADULT - ADDITIONAL COMMENTS
Per patient, they live in private home with no steps outside, 7 steps inside with (R) rail going up; was independent W/ Function activity PTA

## 2023-10-22 NOTE — PHYSICAL THERAPY INITIAL EVALUATION ADULT - GENERAL OBSERVATIONS, REHAB EVAL
12:15 - 13:00 Chart reviewed. Order received.  Patient is ok to be  seen for PT,confirmed with RN. pt encountered  Semi ruiz in the bed denies pain, and agrees to participate in session, +  RUE IV , + Tele / pulse ox  , + Primafit ,NAD.  Family at bedside .

## 2023-10-22 NOTE — PROGRESS NOTE ADULT - ASSESSMENT
59 y/o male with hx of ETOH abuse, CKD with left wrist fistula, NIDDM , HTN, HLD, presented to the ED for decreased urinary output and increased thirst    # Abdominal distention  - resolving    # groin abscess  - s/p I+D    #R leg wound  - possible wound vac by surg, pending eval tomorrow    # Anemia  - s/p PRBC    # acute on chronic Renal failure   - on HD    # Severe Hyponatremia  - mental status normal    # Hyperglycemia / DKA   - improving  - off insulin drip    # ETOH abuse   - thaimine  - folic acid

## 2023-10-22 NOTE — PROGRESS NOTE ADULT - SUBJECTIVE AND OBJECTIVE BOX
Pt seen at bedside, says he feels well. Asking about going home    T(F): , Max: 98.7 (10-22-23 @ 03:03)  HR: 95 (10-22-23 @ 23:00) (69 - 95)  BP: 176/85 (10-22-23 @ 23:00)  RR: 18 (10-22-23 @ 23:02)  SpO2: 100% (10-22-23 @ 23:00)  IN: 1601 mL / OUT: 3375 mL / NET: -1774 mL    IN: 850 mL / OUT: 1285 mL / NET: -435 mL      General: No apparent distress  Cardiovascular: S1, S2  Gastrointestinal: Soft, Non-tender, Non-distended  Respiratory: Good air entry bilaterally  Musculoskeletal: Moves all extremities  Lymphatic: No edema  Neurologic: No gross motor deficit  Dermatologic: R groin bandage, CDI. R leg wound bandaged, CDI                          9.2    5.81  )-----------( 162      ( 22 Oct 2023 09:50 )             27.3     10-22    134<L>  |  95<L>  |  64<HH>  ----------------------------<  185<H>  4.1   |  24  |  11.3<HH>    Ca    8.5      22 Oct 2023 09:50  Phos  7.0     10-21  Mg     2.0     10-22    TPro  6.3  /  Alb  3.0<L>  /  TBili  0.6  /  DBili  x   /  AST  42<H>  /  ALT  30  /  AlkPhos  100  10-22

## 2023-10-22 NOTE — PROGRESS NOTE ADULT - ASSESSMENT
58Mm with PMH of DM, HTN, HLD, ESRD on HD, ETOH abuse, s/p left AV fistula (5/2023 Dr. De Leon presents with weakness, AMS, decreased urine output.  Found to be in significant renal failure, hyponatremic, altered with RLE wound for 1 week.     POD# 2 s/p I&D right groin abscess and Right leg ulcer    - no further surgical intervention   - Wound Care Orders adjusted, lower leg wound may also benefit from wound vac (will re-eval on 10/23)  -f/u final cultures, f/U RLE pathology  -cont abx as per ID  - will follow     58Mm with PMH of DM, HTN, HLD, ESRD on HD, ETOH abuse, s/p left AV fistula (5/2023 Dr. De Leon presents with weakness, AMS, decreased urine output.  Found to be in significant renal failure, hyponatremic, altered with RLE wound for 1 week.     POD# 2 s/p I&D right groin abscess and Right leg ulcer    - no further surgical intervention  - Wound Care Orders adjusted, lower leg wound may also benefit from wound vac (will re-eval on 10/23)  - f/u final cultures, f/U RLE pathology  - cont abx as per ID  - will follow

## 2023-10-22 NOTE — SWALLOW BEDSIDE ASSESSMENT ADULT - SLP PERTINENT HISTORY OF CURRENT PROBLEM
59 y/o male with hx of ETOH abuse, CKD with left wrist fistula, NIDDM , HTN, HLD, presented to the ED for decreased urinary output and increased thirst since yesterday. Patient was brought to the ED by daughter who states that the patient has a right lower leg abscess that has increased in size with purulent drainage for one week after sustaining trauma to the right leg two weeks ago. As per patient, admits to frequent urination about 5 times a day with little to no urinary output. Patient admits to increased thirst along with suprapubic abdominal pain and dysuria. Patient denies fever/chills, nausea/vomiting, SOB, chest pain , heart palpitations, cough, congestion, appetite loss, changes in bowel movements, hematuria or pyuria.

## 2023-10-22 NOTE — PROGRESS NOTE ADULT - SUBJECTIVE AND OBJECTIVE BOX
GENERAL SURGERY PROGRESS NOTE    Patient: KECIA MARTIN , 58y ([birth year])Male   MRN: [id number]  Location: [address]  Visit: 10-17-23 Inpatient  Date: 10-22-23 @ 12:06    Events of past 24 hours:  NAEON  Patient resting in bed comfortably  Pain improving in right groin and right leg, no other complaints.       PAST MEDICAL & SURGICAL HISTORY:  Diabetes      Hypertension      HLD (hyperlipidemia)      Neuropathy      Glaucoma      Chronic kidney disease, unspecified CKD stage  requiring HD march 2022      ETOH abuse      H/O colonoscopy          Vitals:   T(F): 98 (10-22-23 @ 11:00), Max: 98.9 (10-21-23 @ 23:01)  HR: 69 (10-22-23 @ 09:01)  BP: 114/56 (10-22-23 @ 09:01)  RR: 22 (10-22-23 @ 11:00)  SpO2: 97% (10-22-23 @ 09:01)      Diet, Consistent Carbohydrate/No Snacks:   Soft and Bite Sized ([id number])      Fluids:     I & O's:    10-21-23 @ 07:01  -  10-22-23 @ 07:00  --------------------------------------------------------  IN:    dextrose 5% + sodium chloride 0.9% w/ Additives: 1100 mL    Insulin: 51 mL    IV PiggyBack: 450 mL  Total IN: 1601 mL    OUT:    Indwelling Catheter - Urethral (mL): 1130 mL    Other (mL): 2000 mL    Voided (mL): 245 mL  Total OUT: 3375 mL    Total NET: -1774 mL      EXAM:  GEN: awake, alert  Abd/Ext; right groin, packing removed - surgical wound with pink granulation tissue, no necrotic tissue +surrounding edema/erythema/mild tenderness - no active drainage/purulence. Right lateral calf surgical wound with pink granulation tissue - no necrotic tissue/bleeding/purulence noted.  +mild surrounding erythema/edema        MEDICATIONS  (STANDING):  calcium acetate 667 milliGRAM(s) Oral three times a day with meals  chlorhexidine 2% Cloths 1 Application(s) Topical <User Schedule>  dextrose 5% + sodium chloride 0.9% with potassium chloride 40 mEq/L 1000 milliLiter(s) (50 mL/Hr) IV Continuous <Continuous>  folic acid 1 milliGRAM(s) Oral daily  heparin   Injectable 5000 Unit(s) SubCutaneous every 8 hours  insulin glargine Injectable (LANTUS) 5 Unit(s) SubCutaneous at bedtime  insulin lispro (ADMELOG) corrective regimen sliding scale   SubCutaneous three times a day before meals  insulin lispro Injectable (ADMELOG) 2 Unit(s) SubCutaneous three times a day before meals  insulin regular Infusion 1 Unit(s)/Hr (1 mL/Hr) IV Continuous <Continuous>  multivitamin 1 Tablet(s) Oral daily  piperacillin/tazobactam IVPB.. 3.375 Gram(s) IV Intermittent every 12 hours  vancomycin  IVPB 1000 milliGRAM(s) IV Intermittent <User Schedule>    MEDICATIONS  (PRN):  aluminum hydroxide/magnesium hydroxide/simethicone Suspension 30 milliLiter(s) Oral every 4 hours PRN Dyspepsia  LORazepam   Injectable 2 milliGRAM(s) IV Push every 4 hours PRN CIWA-Ar score increase by 2 points and a total score of 7 or less  ondansetron Injectable 4 milliGRAM(s) IV Push every 8 hours PRN Nausea and/or Vomiting      DVT PROPHYLAXIS: heparin   Injectable 5000 Unit(s) SubCutaneous every 8 hours    GI PROPHYLAXIS:   ANTICOAGULATION:   ANTIBIOTICS:  piperacillin/tazobactam IVPB.. 3.375 Gram(s)  vancomycin  IVPB 1000 milliGRAM(s)            LAB/STUDIES:  Labs:  CAPILLARY BLOOD GLUCOSE      POCT Blood Glucose.: 244 mg/dL (22 Oct 2023 11:41)  POCT Blood Glucose.: 246 mg/dL (22 Oct 2023 10:46)  POCT Blood Glucose.: 188 mg/dL (22 Oct 2023 09:15)  POCT Blood Glucose.: 144 mg/dL (22 Oct 2023 08:09)  POCT Blood Glucose.: 114 mg/dL (22 Oct 2023 06:58)  POCT Blood Glucose.: 132 mg/dL (22 Oct 2023 06:08)  POCT Blood Glucose.: 134 mg/dL (22 Oct 2023 05:11)  POCT Blood Glucose.: 131 mg/dL (22 Oct 2023 04:14)  POCT Blood Glucose.: 136 mg/dL (22 Oct 2023 03:01)  POCT Blood Glucose.: 124 mg/dL (22 Oct 2023 02:00)  POCT Blood Glucose.: 127 mg/dL (22 Oct 2023 01:01)  POCT Blood Glucose.: 105 mg/dL (22 Oct 2023 00:06)  POCT Blood Glucose.: 88 mg/dL (21 Oct 2023 23:03)  POCT Blood Glucose.: 123 mg/dL (21 Oct 2023 22:01)  POCT Blood Glucose.: 142 mg/dL (21 Oct 2023 21:03)  POCT Blood Glucose.: 184 mg/dL (21 Oct 2023 20:02)  POCT Blood Glucose.: 205 mg/dL (21 Oct 2023 19:14)  POCT Blood Glucose.: 183 mg/dL (21 Oct 2023 18:06)  POCT Blood Glucose.: 166 mg/dL (21 Oct 2023 17:15)  POCT Blood Glucose.: 174 mg/dL (21 Oct 2023 16:26)  POCT Blood Glucose.: 189 mg/dL (21 Oct 2023 15:02)  POCT Blood Glucose.: 188 mg/dL (21 Oct 2023 14:04)  POCT Blood Glucose.: 150 mg/dL (21 Oct 2023 13:05)                          9.2    5.81  )-----------( 162      ( 22 Oct 2023 09:50 )             27.3       Auto Neutrophil %: 69.5 % (10-22-23 @ 09:50)  Auto Immature Granulocyte %: 1.4 % (10-22-23 @ 09:50)    10-22    134<L>  |  95<L>  |  64<HH>  ----------------------------<  185<H>  4.1   |  24  |  11.3<HH>      Calcium: 8.5 mg/dL (10-22-23 @ 09:50)      LFTs:             6.3  | 0.6  | 42       ------------------[100     ( 22 Oct 2023 09:50 )  3.0  | x    | 30          Lipase:x      Amylase:x         Blood Gas Arterial, Lactate: 0.60 mmol/L (10-19-23 @ 15:00)    ABG - ( 19 Oct 2023 15:00 )  pH: 7.41  /  pCO2: 40    /  pO2: 51    / HCO3: 25    / Base Excess: 0.7   /  SaO2: 86.7            ABG - ( 18 Oct 2023 04:30 )  pH: 7.16  /  pCO2: 28    /  pO2: 136   / HCO3: 10    / Base Excess: -17.3 /  SaO2: 99.0              Coags:            Urinalysis Basic - ( 22 Oct 2023 09:50 )    Color: x / Appearance: x / SG: x / pH: x  Gluc: 185 mg/dL / Ketone: x  / Bili: x / Urobili: x   Blood: x / Protein: x / Nitrite: x   Leuk Esterase: x / RBC: x / WBC x   Sq Epi: x / Non Sq Epi: x / Bacteria: x

## 2023-10-22 NOTE — PROGRESS NOTE ADULT - ASSESSMENT
Stage 4 CKD secondary to DM, HTN, incomplete recovery from previous severe VAMSI  # Severe VAMSI on CKD d/t ?sepsis - started HD on 10/18/23 via LUE AVF   # HAGMA likely due to DKA / uremia   # Hyponatremia likely due ?VAMSI  # Alcohol abuse          Recommendations:  - HD yesterday    Monitor urine output  - Monitor and correct lytes as needed  - Hyperphosphatemia / Hypocalcemia     Cont Phoslo , monitor Ca/PO4    Monitor H/H, start NAIDA  - Cont abx per ID recs; dose for iHD, monitor Vanc level / surgery f/u for I&D  - Optimal glycemic control   - Monitor for ETOH withdrawal/ CIWA protocol   Continue general care and management of the rest of comorbidities as per medicine/CC

## 2023-10-23 LAB
-  AMPICILLIN/SULBACTAM: SIGNIFICANT CHANGE UP
-  CEFAZOLIN: SIGNIFICANT CHANGE UP
-  CLINDAMYCIN: SIGNIFICANT CHANGE UP
-  ERYTHROMYCIN: SIGNIFICANT CHANGE UP
-  GENTAMICIN: SIGNIFICANT CHANGE UP
-  OXACILLIN: SIGNIFICANT CHANGE UP
-  PENICILLIN: SIGNIFICANT CHANGE UP
-  RIFAMPIN: SIGNIFICANT CHANGE UP
-  TETRACYCLINE: SIGNIFICANT CHANGE UP
-  TRIMETHOPRIM/SULFAMETHOXAZOLE: SIGNIFICANT CHANGE UP
-  VANCOMYCIN: SIGNIFICANT CHANGE UP
ALBUMIN SERPL ELPH-MCNC: 2.9 G/DL — LOW (ref 3.5–5.2)
ALBUMIN SERPL ELPH-MCNC: 2.9 G/DL — LOW (ref 3.5–5.2)
ALP SERPL-CCNC: 90 U/L — SIGNIFICANT CHANGE UP (ref 30–115)
ALP SERPL-CCNC: 90 U/L — SIGNIFICANT CHANGE UP (ref 30–115)
ALT FLD-CCNC: 25 U/L — SIGNIFICANT CHANGE UP (ref 0–41)
ALT FLD-CCNC: 25 U/L — SIGNIFICANT CHANGE UP (ref 0–41)
ANION GAP SERPL CALC-SCNC: 14 MMOL/L — SIGNIFICANT CHANGE UP (ref 7–14)
ANION GAP SERPL CALC-SCNC: 14 MMOL/L — SIGNIFICANT CHANGE UP (ref 7–14)
AST SERPL-CCNC: 34 U/L — SIGNIFICANT CHANGE UP (ref 0–41)
AST SERPL-CCNC: 34 U/L — SIGNIFICANT CHANGE UP (ref 0–41)
BILIRUB SERPL-MCNC: 0.5 MG/DL — SIGNIFICANT CHANGE UP (ref 0.2–1.2)
BILIRUB SERPL-MCNC: 0.5 MG/DL — SIGNIFICANT CHANGE UP (ref 0.2–1.2)
BUN SERPL-MCNC: 65 MG/DL — CRITICAL HIGH (ref 10–20)
BUN SERPL-MCNC: 65 MG/DL — CRITICAL HIGH (ref 10–20)
CALCIUM SERPL-MCNC: 8.8 MG/DL — SIGNIFICANT CHANGE UP (ref 8.4–10.5)
CALCIUM SERPL-MCNC: 8.8 MG/DL — SIGNIFICANT CHANGE UP (ref 8.4–10.5)
CHLORIDE SERPL-SCNC: 99 MMOL/L — SIGNIFICANT CHANGE UP (ref 98–110)
CHLORIDE SERPL-SCNC: 99 MMOL/L — SIGNIFICANT CHANGE UP (ref 98–110)
CO2 SERPL-SCNC: 23 MMOL/L — SIGNIFICANT CHANGE UP (ref 17–32)
CO2 SERPL-SCNC: 23 MMOL/L — SIGNIFICANT CHANGE UP (ref 17–32)
CREAT SERPL-MCNC: 12.4 MG/DL — CRITICAL HIGH (ref 0.7–1.5)
CREAT SERPL-MCNC: 12.4 MG/DL — CRITICAL HIGH (ref 0.7–1.5)
CULTURE RESULTS: SIGNIFICANT CHANGE UP
EGFR: 4 ML/MIN/1.73M2 — LOW
EGFR: 4 ML/MIN/1.73M2 — LOW
GLUCOSE BLDC GLUCOMTR-MCNC: 104 MG/DL — HIGH (ref 70–99)
GLUCOSE BLDC GLUCOMTR-MCNC: 104 MG/DL — HIGH (ref 70–99)
GLUCOSE BLDC GLUCOMTR-MCNC: 115 MG/DL — HIGH (ref 70–99)
GLUCOSE BLDC GLUCOMTR-MCNC: 115 MG/DL — HIGH (ref 70–99)
GLUCOSE BLDC GLUCOMTR-MCNC: 132 MG/DL — HIGH (ref 70–99)
GLUCOSE BLDC GLUCOMTR-MCNC: 132 MG/DL — HIGH (ref 70–99)
GLUCOSE BLDC GLUCOMTR-MCNC: 137 MG/DL — HIGH (ref 70–99)
GLUCOSE BLDC GLUCOMTR-MCNC: 137 MG/DL — HIGH (ref 70–99)
GLUCOSE SERPL-MCNC: 120 MG/DL — HIGH (ref 70–99)
GLUCOSE SERPL-MCNC: 120 MG/DL — HIGH (ref 70–99)
HCT VFR BLD CALC: 25.3 % — LOW (ref 42–52)
HCT VFR BLD CALC: 25.3 % — LOW (ref 42–52)
HGB BLD-MCNC: 8.9 G/DL — LOW (ref 14–18)
HGB BLD-MCNC: 8.9 G/DL — LOW (ref 14–18)
MAGNESIUM SERPL-MCNC: 1.9 MG/DL — SIGNIFICANT CHANGE UP (ref 1.8–2.4)
MAGNESIUM SERPL-MCNC: 1.9 MG/DL — SIGNIFICANT CHANGE UP (ref 1.8–2.4)
MCHC RBC-ENTMCNC: 31.1 PG — HIGH (ref 27–31)
MCHC RBC-ENTMCNC: 31.1 PG — HIGH (ref 27–31)
MCHC RBC-ENTMCNC: 35.2 G/DL — SIGNIFICANT CHANGE UP (ref 32–37)
MCHC RBC-ENTMCNC: 35.2 G/DL — SIGNIFICANT CHANGE UP (ref 32–37)
MCV RBC AUTO: 88.5 FL — SIGNIFICANT CHANGE UP (ref 80–94)
MCV RBC AUTO: 88.5 FL — SIGNIFICANT CHANGE UP (ref 80–94)
METHOD TYPE: SIGNIFICANT CHANGE UP
NRBC # BLD: 0 /100 WBCS — SIGNIFICANT CHANGE UP (ref 0–0)
NRBC # BLD: 0 /100 WBCS — SIGNIFICANT CHANGE UP (ref 0–0)
ORGANISM # SPEC MICROSCOPIC CNT: SIGNIFICANT CHANGE UP
PHOSPHATE SERPL-MCNC: 5.4 MG/DL — HIGH (ref 2.1–4.9)
PHOSPHATE SERPL-MCNC: 5.4 MG/DL — HIGH (ref 2.1–4.9)
PLATELET # BLD AUTO: 158 K/UL — SIGNIFICANT CHANGE UP (ref 130–400)
PLATELET # BLD AUTO: 158 K/UL — SIGNIFICANT CHANGE UP (ref 130–400)
PMV BLD: 10.2 FL — SIGNIFICANT CHANGE UP (ref 7.4–10.4)
PMV BLD: 10.2 FL — SIGNIFICANT CHANGE UP (ref 7.4–10.4)
POTASSIUM SERPL-MCNC: 4.5 MMOL/L — SIGNIFICANT CHANGE UP (ref 3.5–5)
POTASSIUM SERPL-MCNC: 4.5 MMOL/L — SIGNIFICANT CHANGE UP (ref 3.5–5)
POTASSIUM SERPL-SCNC: 4.5 MMOL/L — SIGNIFICANT CHANGE UP (ref 3.5–5)
POTASSIUM SERPL-SCNC: 4.5 MMOL/L — SIGNIFICANT CHANGE UP (ref 3.5–5)
PROT SERPL-MCNC: 5.9 G/DL — LOW (ref 6–8)
PROT SERPL-MCNC: 5.9 G/DL — LOW (ref 6–8)
RBC # BLD: 2.86 M/UL — LOW (ref 4.7–6.1)
RBC # BLD: 2.86 M/UL — LOW (ref 4.7–6.1)
RBC # FLD: 14.2 % — SIGNIFICANT CHANGE UP (ref 11.5–14.5)
RBC # FLD: 14.2 % — SIGNIFICANT CHANGE UP (ref 11.5–14.5)
SODIUM SERPL-SCNC: 136 MMOL/L — SIGNIFICANT CHANGE UP (ref 135–146)
SODIUM SERPL-SCNC: 136 MMOL/L — SIGNIFICANT CHANGE UP (ref 135–146)
SPECIMEN SOURCE: SIGNIFICANT CHANGE UP
WBC # BLD: 6.68 K/UL — SIGNIFICANT CHANGE UP (ref 4.8–10.8)
WBC # BLD: 6.68 K/UL — SIGNIFICANT CHANGE UP (ref 4.8–10.8)
WBC # FLD AUTO: 6.68 K/UL — SIGNIFICANT CHANGE UP (ref 4.8–10.8)
WBC # FLD AUTO: 6.68 K/UL — SIGNIFICANT CHANGE UP (ref 4.8–10.8)

## 2023-10-23 PROCEDURE — 99024 POSTOP FOLLOW-UP VISIT: CPT

## 2023-10-23 PROCEDURE — 99233 SBSQ HOSP IP/OBS HIGH 50: CPT

## 2023-10-23 PROCEDURE — 99232 SBSQ HOSP IP/OBS MODERATE 35: CPT

## 2023-10-23 RX ORDER — SALICYLIC ACID 0.5 %
1 CLEANSER (GRAM) TOPICAL EVERY 12 HOURS
Refills: 0 | Status: DISCONTINUED | OUTPATIENT
Start: 2023-10-23 | End: 2023-10-30

## 2023-10-23 RX ORDER — SODIUM ZIRCONIUM CYCLOSILICATE 10 G/10G
5 POWDER, FOR SUSPENSION ORAL ONCE
Refills: 0 | Status: DISCONTINUED | OUTPATIENT
Start: 2023-10-23 | End: 2023-10-23

## 2023-10-23 RX ORDER — LABETALOL HCL 100 MG
10 TABLET ORAL ONCE
Refills: 0 | Status: COMPLETED | OUTPATIENT
Start: 2023-10-23 | End: 2023-10-23

## 2023-10-23 RX ORDER — CEFAZOLIN SODIUM 1 G
1000 VIAL (EA) INJECTION EVERY 24 HOURS
Refills: 0 | Status: DISCONTINUED | OUTPATIENT
Start: 2023-10-23 | End: 2023-10-30

## 2023-10-23 RX ORDER — SENNA PLUS 8.6 MG/1
2 TABLET ORAL AT BEDTIME
Refills: 0 | Status: DISCONTINUED | OUTPATIENT
Start: 2023-10-23 | End: 2023-10-30

## 2023-10-23 RX ORDER — POLYETHYLENE GLYCOL 3350 17 G/17G
17 POWDER, FOR SOLUTION ORAL EVERY 12 HOURS
Refills: 0 | Status: DISCONTINUED | OUTPATIENT
Start: 2023-10-23 | End: 2023-10-30

## 2023-10-23 RX ORDER — DARBEPOETIN ALFA IN POLYSORBAT 200MCG/0.4
6040 PEN INJECTOR (ML) SUBCUTANEOUS
Refills: 0 | Status: DISCONTINUED | OUTPATIENT
Start: 2023-10-23 | End: 2023-10-24

## 2023-10-23 RX ADMIN — HEPARIN SODIUM 5000 UNIT(S): 5000 INJECTION INTRAVENOUS; SUBCUTANEOUS at 15:04

## 2023-10-23 RX ADMIN — Medication 667 MILLIGRAM(S): at 08:13

## 2023-10-23 RX ADMIN — Medication 2 UNIT(S): at 12:00

## 2023-10-23 RX ADMIN — HEPARIN SODIUM 5000 UNIT(S): 5000 INJECTION INTRAVENOUS; SUBCUTANEOUS at 05:13

## 2023-10-23 RX ADMIN — PIPERACILLIN AND TAZOBACTAM 25 GRAM(S): 4; .5 INJECTION, POWDER, LYOPHILIZED, FOR SOLUTION INTRAVENOUS at 05:13

## 2023-10-23 RX ADMIN — Medication 667 MILLIGRAM(S): at 17:15

## 2023-10-23 RX ADMIN — SENNA PLUS 2 TABLET(S): 8.6 TABLET ORAL at 21:37

## 2023-10-23 RX ADMIN — POLYETHYLENE GLYCOL 3350 17 GRAM(S): 17 POWDER, FOR SOLUTION ORAL at 12:06

## 2023-10-23 RX ADMIN — Medication 1 MILLIGRAM(S): at 12:03

## 2023-10-23 RX ADMIN — Medication 1 TABLET(S): at 12:04

## 2023-10-23 RX ADMIN — PANTOPRAZOLE SODIUM 40 MILLIGRAM(S): 20 TABLET, DELAYED RELEASE ORAL at 05:13

## 2023-10-23 RX ADMIN — Medication 10 MILLIGRAM(S): at 20:31

## 2023-10-23 RX ADMIN — Medication 100 MILLIGRAM(S): at 17:15

## 2023-10-23 RX ADMIN — CHLORHEXIDINE GLUCONATE 1 APPLICATION(S): 213 SOLUTION TOPICAL at 05:14

## 2023-10-23 RX ADMIN — Medication 667 MILLIGRAM(S): at 12:04

## 2023-10-23 RX ADMIN — INSULIN GLARGINE 5 UNIT(S): 100 INJECTION, SOLUTION SUBCUTANEOUS at 21:36

## 2023-10-23 RX ADMIN — HEPARIN SODIUM 5000 UNIT(S): 5000 INJECTION INTRAVENOUS; SUBCUTANEOUS at 21:37

## 2023-10-23 RX ADMIN — Medication 2 UNIT(S): at 08:10

## 2023-10-23 NOTE — PROVIDER CONTACT NOTE (OTHER) - SITUATION
Pt. reports itching to the upper/lower extremities, described by pt. as "moderate", pt. relates to dry skin. CIWA = 2.

## 2023-10-23 NOTE — PROGRESS NOTE ADULT - ASSESSMENT
58Mm with PMH of DM, HTN, HLD, ESRD on HD, ETOH abuse, s/p left AV fistula (5/2023 Dr. De Leon presents with weakness, AMS, decreased urine output.  Found to be in significant renal failure, hyponatremic, altered with RLE wound for 1 week. GI consulted for ileus.    Problem 1-Ileus--->resolved  patient having bms and passing flatus  Rec  -patient doing much better  -ambulate as tolerated   -optimize all electrolytes and keep K > 4 and Mg > 2  -surgery following  -ETOH cessation advised   -avoid narcotics, cholinergics and sedatives    Problem 2-anemia no gross GI bleeding  Rec  -Maintain Hemodynamic Stability   -Monitor CBC  -CMP,Optimize Electrolytes  -PT,PTT,INR  -EKG, Chest-Xray   -Transfuse prn to hgb >8  -Two large bore IV lines  -PPI BID  -Monitor Vital Signs  -Monitor Stool For blood, frequency, consistency, melena  -Active Type and Screen    Problem 3-altered LFTs--->Resolved  Rec  - Follow up with our [facility] located at [address] [place] [address]. Phone Number: [phone].   -ETOH cessation advised  -RUQ ultrasound outpatient     Problem 4-Partially imaged right inguinal inflammatory change and mildly enlarged   lymph nodes. Recommend dedicated imaging for further evaluation.  Rec  - Care as per primary team       Recall GI if needed

## 2023-10-23 NOTE — PROGRESS NOTE ADULT - ATTENDING COMMENTS
Attending Statement: I have personally performed a face to face diagnostic evaluation on this patient. The patient is suffering from:  Metabolic vs toxic encephalopathy improving  metabolic acidosis   VAMSI on CKD now on HD  sepsis present on admission  groin abscess   Ileus  DKA  I have made amendments to the documentation where necessary. I have personally seen and examined this patient.  I have fully participated in the care of this patient.  I have reviewed all pertinent clinical information, including history, physical exam, plan and note.

## 2023-10-23 NOTE — PROGRESS NOTE ADULT - SUBJECTIVE AND OBJECTIVE BOX
INFECTIOUS DISEASE FOLLOW UP NOTE:    Interval History/ROS: Patient is a 58y old  Male who presents with a chief complaint of Worsening Renal Failure (22 Oct 2023 23:45)      Overnight events:    REVIEW OF SYSTEMS:      Prior hospital charts reviewed [Yes]  Primary team notes reviewed [Yes]  Other consultant notes reviewed [Yes]    Allergies:  No Known Drug Allergies  Bananas (Nausea; Urticaria)  apple (Urticaria)      ANTIMICROBIALS:   piperacillin/tazobactam IVPB.. 3.375 every 12 hours  vancomycin  IVPB 1000 <User Schedule>      OTHER MEDS: MEDICATIONS  (STANDING):  aluminum hydroxide/magnesium hydroxide/simethicone Suspension 30 every 4 hours PRN  heparin   Injectable 5000 every 8 hours  insulin glargine Injectable (LANTUS) 5 at bedtime  insulin lispro (ADMELOG) corrective regimen sliding scale  three times a day before meals  insulin lispro Injectable (ADMELOG) 2 three times a day before meals  insulin regular Infusion 1 <Continuous>  LORazepam   Injectable 2 every 4 hours PRN  ondansetron Injectable 4 every 8 hours PRN  pantoprazole    Tablet 40 before breakfast      Vital Signs Last 24 Hrs  T(F): 97.8 (10-23-23 @ 07:05), Max: 99.7 (10-19-23 @ 23:00)    Vital Signs Last 24 Hrs  HR: 82 (10-23-23 @ 03:00) (69 - 95)  BP: 149/77 (10-23-23 @ 03:00) (103/70 - 176/85)  RR: 21 (10-23-23 @ 07:05)  SpO2: 99% (10-23-23 @ 03:03) (96% - 100%)  Wt(kg): --    EXAM:      Labs:                        8.9    6.68  )-----------( 158      ( 23 Oct 2023 06:18 )             25.3     10-23    136  |  99  |  65<HH>  ----------------------------<  120<H>  4.5   |  23  |  12.4<HH>    Ca    8.8      23 Oct 2023 06:18  Phos  5.4     10-23  Mg     1.9     10-23    TPro  5.9<L>  /  Alb  2.9<L>  /  TBili  0.5  /  DBili  x   /  AST  34  /  ALT  25  /  AlkPhos  90  10-23      WBC Trend:  WBC Count: 6.68 (10-23-23 @ 06:18)  WBC Count: 5.81 (10-22-23 @ 09:50)  WBC Count: 6.82 (10-21-23 @ 05:45)  WBC Count: 7.55 (10-20-23 @ 15:41)      Creatine Trend:  Creatinine: 12.4 (10-23)  Creatinine: 11.3 (10-22)  Creatinine: 12.0 (10-21)  Creatinine: 10.8 (10-20)      Liver Biochemical Testing Trend:  Alanine Aminotransferase (ALT/SGPT): 25 (10-23)  Alanine Aminotransferase (ALT/SGPT): 30 (10-22)  Alanine Aminotransferase (ALT/SGPT): 35 (10-21)  Alanine Aminotransferase (ALT/SGPT): 40 (10-20)  Alanine Aminotransferase (ALT/SGPT): 42 *H* (10-17)  Aspartate Aminotransferase (AST/SGOT): 34 (10-23-23 @ 06:18)  Aspartate Aminotransferase (AST/SGOT): 42 (10-22-23 @ 09:50)  Aspartate Aminotransferase (AST/SGOT): 61 (10-21-23 @ 05:45)  Aspartate Aminotransferase (AST/SGOT): 81 (10-20-23 @ 07:00)  Aspartate Aminotransferase (AST/SGOT): 92 (10-17-23 @ 14:30)  Bilirubin Total: 0.5 (10-23)  Bilirubin Total: 0.6 (10-22)  Bilirubin Total: 0.6 (10-21)  Bilirubin Direct: 0.3 (10-21)  Bilirubin Total: 0.6 (10-21)      Trend LDH      Urinalysis Basic - ( 23 Oct 2023 06:18 )    Color: x / Appearance: x / SG: x / pH: x  Gluc: 120 mg/dL / Ketone: x  / Bili: x / Urobili: x   Blood: x / Protein: x / Nitrite: x   Leuk Esterase: x / RBC: x / WBC x   Sq Epi: x / Non Sq Epi: x / Bacteria: x        MICROBIOLOGY:  Vancomycin Level, Trough: 20.7 (10-21 @ 11:00)  Vancomycin Level, Random: 22.8 (10-20 @ 07:00)  Vancomycin Level, Trough: 13.5 (10-19 @ 06:30)        Culture - Abscess with Gram Stain (collected 18 Oct 2023 14:20)  Source: .Abscess Leg - Right  Preliminary Report:    Moderate Staphylococcus aureus  Organism: Staphylococcus aureus  Organism: Staphylococcus aureus    Sensitivities:      Method Type: NARCISO      -  Ampicillin: R >8      -  Amoxicillin/Clavulanic Acid: S <=4/2      -  Ampicillin/Sulbactam: S <=8/4      -  Cefazolin: S <=4      -  Ceftriaxone: S <=4      -  Ciprofloxacin: S <=1      -  Clindamycin: S 0.5      -  Daptomycin: S 1      -  Erythromycin: S <=0.25      -  Gentamicin: S 2 Should not be used as monotherapy      -  Levofloxacin: S <=0.5      -  Linezolid: S 4      -  Moxifloxacin: S <=0.5      -  Meropenem: S <=2      -  Penicillin: R >8      -  Rifampin: S <=1 Should not be used as monotherapy      -  Tetracycline: S <=1      -  Trimethoprim/Sulfamethoxazole: S <=0.5/9.5      -  Vancomycin: S 2      -  Oxacillin: S 0.5 Oxacillin predicts susceptibility for dicloxacillin, methicillin, and nafcillin    Culture - Blood (collected 17 Oct 2023 14:30)  Source: .Blood Blood-Peripheral  Final Report:    No growth at 5 days    Culture - Blood (collected 17 Oct 2023 14:30)  Source: .Blood Blood-Peripheral  Final Report:    No growth at 5 days    Culture - Urine (collected 17 Oct 2023 13:53)  Source: Clean Catch Clean Catch (Midstream)  Final Report:    No growth    Culture - Blood (collected 29 Nov 2022 06:40)  Source: .Blood None  Final Report:    No Growth Final    Culture - Blood (collected 28 Nov 2022 06:28)  Source: .Blood None  Final Report:    No Growth Final    Culture - Blood (collected 27 Nov 2022 07:33)  Source: .Blood None  Final Report:    No Growth Final    Culture - Blood (collected 25 Nov 2022 08:37)  Source: .Blood None  Final Report:    Growth in aerobic and anaerobic bottles: Staphylococcus aureus    See previous culture [id number]    Culture - Blood (collected 24 Nov 2022 16:18)  Source: .Blood None  Final Report:    Growth in aerobic and anaerobic bottles: Staphylococcus aureus    See previous culture [id number]    Culture - Blood (collected 24 Nov 2022 06:48)  Source: .Blood None  Final Report:    No Growth Final    C-Reactive Protein, Serum: 217.3 (10-17)      RADIOLOGY:  imaging below personally reviewed    < from: Xray Chest 1 View- PORTABLE-Routine (Xray Chest 1 View- PORTABLE-Routine in AM.) (10.21.23 @ 07:17) >  Impression:    Interval increase in bilateral lung volumes, previously low.  Interval   improvement of pulmonary vascular congestion, otherwise no radiographic   evidence of acute cardiopulmonary disease.    < end of copied text >   INFECTIOUS DISEASE FOLLOW UP NOTE:    Interval History/ROS: Patient is a 58y old  Male who presents with a chief complaint of Worsening Renal Failure (22 Oct 2023 23:45)      Overnight events: No overnight event. Mental status is much improved today    REVIEW OF SYSTEMS:  CONSTITUTIONAL: No fever or chills  HEAD: No lesion on scalp  EYES: No visual disturbance  ENT: No sore throat  RESPIRATORY: No cough, no shortness of breath  CARDIOVASCULAR: No chest pain or palpitations  GASTROINTESTINAL: + abdominal distention, No abdominal or epigastric pain  GENITOURINARY: No dysuria  NEUROLOGICAL: No headache/dizziness  MUSCULOSKELETAL: No joint pain, erythema, or swelling; no back pain  SKIN: Right groin wound and right LE wound  All other ROS negative except noted above      Prior hospital charts reviewed [Yes]  Primary team notes reviewed [Yes]  Other consultant notes reviewed [Yes]    Allergies:  No Known Drug Allergies  Bananas (Nausea; Urticaria)  apple (Urticaria)      ANTIMICROBIALS:   piperacillin/tazobactam IVPB.. 3.375 every 12 hours  vancomycin  IVPB 1000 <User Schedule>      OTHER MEDS: MEDICATIONS  (STANDING):  aluminum hydroxide/magnesium hydroxide/simethicone Suspension 30 every 4 hours PRN  heparin   Injectable 5000 every 8 hours  insulin glargine Injectable (LANTUS) 5 at bedtime  insulin lispro (ADMELOG) corrective regimen sliding scale  three times a day before meals  insulin lispro Injectable (ADMELOG) 2 three times a day before meals  insulin regular Infusion 1 <Continuous>  LORazepam   Injectable 2 every 4 hours PRN  ondansetron Injectable 4 every 8 hours PRN  pantoprazole    Tablet 40 before breakfast      Vital Signs Last 24 Hrs  T(F): 97.8 (10-23-23 @ 07:05), Max: 99.7 (10-19-23 @ 23:00)    Vital Signs Last 24 Hrs  HR: 82 (10-23-23 @ 03:00) (69 - 95)  BP: 149/77 (10-23-23 @ 03:00) (103/70 - 176/85)  RR: 21 (10-23-23 @ 07:05)  SpO2: 99% (10-23-23 @ 03:03) (96% - 100%)  Wt(kg): --    EXAM:  GENERAL: NAD, sitting in chair  HEAD: No head lesions  EYES: Conjunctiva pink and cornea white  EAR, NOSE, MOUTH, THROAT: Normal external ears and nose, no discharges; moist mucous membranes  NECK: Supple, nontender to palpation; no JVD  RESPIRATORY: Bibasilar crackles  CARDIOVASCULAR: S1 S2  GASTROINTESTINAL: Soft, nontender, nondistended; normoactive bowel sounds  EXTREMITIES: No clubbing, cyanosis, 1+ bilateral petal edema  NERVOUS SYSTEM: Alert and oriented to person, time, place and situation, speech clear. No focal deficits   MUSCULOSKELETAL: No joint erythema, swelling or pain  SKIN: Right groin surgical site with serosanguinous drainage, still has induration. Right LE wound wrapped with ACE, no superficial thrombophlebitis  PSYCH: Normal affect, calm      Labs:                        8.9    6.68  )-----------( 158      ( 23 Oct 2023 06:18 )             25.3     10-23    136  |  99  |  65<HH>  ----------------------------<  120<H>  4.5   |  23  |  12.4<HH>    Ca    8.8      23 Oct 2023 06:18  Phos  5.4     10-23  Mg     1.9     10-23    TPro  5.9<L>  /  Alb  2.9<L>  /  TBili  0.5  /  DBili  x   /  AST  34  /  ALT  25  /  AlkPhos  90  10-23      WBC Trend:  WBC Count: 6.68 (10-23-23 @ 06:18)  WBC Count: 5.81 (10-22-23 @ 09:50)  WBC Count: 6.82 (10-21-23 @ 05:45)  WBC Count: 7.55 (10-20-23 @ 15:41)      Creatine Trend:  Creatinine: 12.4 (10-23)  Creatinine: 11.3 (10-22)  Creatinine: 12.0 (10-21)  Creatinine: 10.8 (10-20)      Liver Biochemical Testing Trend:  Alanine Aminotransferase (ALT/SGPT): 25 (10-23)  Alanine Aminotransferase (ALT/SGPT): 30 (10-22)  Alanine Aminotransferase (ALT/SGPT): 35 (10-21)  Alanine Aminotransferase (ALT/SGPT): 40 (10-20)  Alanine Aminotransferase (ALT/SGPT): 42 *H* (10-17)  Aspartate Aminotransferase (AST/SGOT): 34 (10-23-23 @ 06:18)  Aspartate Aminotransferase (AST/SGOT): 42 (10-22-23 @ 09:50)  Aspartate Aminotransferase (AST/SGOT): 61 (10-21-23 @ 05:45)  Aspartate Aminotransferase (AST/SGOT): 81 (10-20-23 @ 07:00)  Aspartate Aminotransferase (AST/SGOT): 92 (10-17-23 @ 14:30)  Bilirubin Total: 0.5 (10-23)  Bilirubin Total: 0.6 (10-22)  Bilirubin Total: 0.6 (10-21)  Bilirubin Direct: 0.3 (10-21)  Bilirubin Total: 0.6 (10-21)      Trend LDH      Urinalysis Basic - ( 23 Oct 2023 06:18 )    Color: x / Appearance: x / SG: x / pH: x  Gluc: 120 mg/dL / Ketone: x  / Bili: x / Urobili: x   Blood: x / Protein: x / Nitrite: x   Leuk Esterase: x / RBC: x / WBC x   Sq Epi: x / Non Sq Epi: x / Bacteria: x        MICROBIOLOGY:  Vancomycin Level, Trough: 20.7 (10-21 @ 11:00)  Vancomycin Level, Random: 22.8 (10-20 @ 07:00)  Vancomycin Level, Trough: 13.5 (10-19 @ 06:30)        Culture - Abscess with Gram Stain (collected 18 Oct 2023 14:20)  Source: .Abscess Leg - Right  Preliminary Report:    Moderate Staphylococcus aureus  Organism: Staphylococcus aureus  Organism: Staphylococcus aureus    Sensitivities:      Method Type: NARCISO      -  Ampicillin: R >8      -  Amoxicillin/Clavulanic Acid: S <=4/2      -  Ampicillin/Sulbactam: S <=8/4      -  Cefazolin: S <=4      -  Ceftriaxone: S <=4      -  Ciprofloxacin: S <=1      -  Clindamycin: S 0.5      -  Daptomycin: S 1      -  Erythromycin: S <=0.25      -  Gentamicin: S 2 Should not be used as monotherapy      -  Levofloxacin: S <=0.5      -  Linezolid: S 4      -  Moxifloxacin: S <=0.5      -  Meropenem: S <=2      -  Penicillin: R >8      -  Rifampin: S <=1 Should not be used as monotherapy      -  Tetracycline: S <=1      -  Trimethoprim/Sulfamethoxazole: S <=0.5/9.5      -  Vancomycin: S 2      -  Oxacillin: S 0.5 Oxacillin predicts susceptibility for dicloxacillin, methicillin, and nafcillin    Culture - Blood (collected 17 Oct 2023 14:30)  Source: .Blood Blood-Peripheral  Final Report:    No growth at 5 days    Culture - Blood (collected 17 Oct 2023 14:30)  Source: .Blood Blood-Peripheral  Final Report:    No growth at 5 days    Culture - Urine (collected 17 Oct 2023 13:53)  Source: Clean Catch Clean Catch (Midstream)  Final Report:    No growth    Culture - Blood (collected 29 Nov 2022 06:40)  Source: .Blood None  Final Report:    No Growth Final    Culture - Blood (collected 28 Nov 2022 06:28)  Source: .Blood None  Final Report:    No Growth Final    Culture - Blood (collected 27 Nov 2022 07:33)  Source: .Blood None  Final Report:    No Growth Final    Culture - Blood (collected 25 Nov 2022 08:37)  Source: .Blood None  Final Report:    Growth in aerobic and anaerobic bottles: Staphylococcus aureus    See previous culture [id number]    Culture - Blood (collected 24 Nov 2022 16:18)  Source: .Blood None  Final Report:    Growth in aerobic and anaerobic bottles: Staphylococcus aureus    See previous culture [id number]    Culture - Blood (collected 24 Nov 2022 06:48)  Source: .Blood None  Final Report:    No Growth Final    C-Reactive Protein, Serum: 217.3 (10-17)      RADIOLOGY:  imaging below personally reviewed    < from: Xray Chest 1 View- PORTABLE-Routine (Xray Chest 1 View- PORTABLE-Routine in AM.) (10.21.23 @ 07:17) >  Impression:    Interval increase in bilateral lung volumes, previously low.  Interval   improvement of pulmonary vascular congestion, otherwise no radiographic   evidence of acute cardiopulmonary disease.    < end of copied text >

## 2023-10-23 NOTE — PROGRESS NOTE ADULT - SUBJECTIVE AND OBJECTIVE BOX
Patient is a 58y old  Male who presents with a chief complaint of Worsening Renal Failure       T(F): 98.5 (10-23-23 @ 19:42), Max: 98.5 (10-23-23 @ 19:42)  HR: 80 (10-23-23 @ 21:10)  BP: 165/83 (10-23-23 @ 21:10)  RR: 16 (10-23-23 @ 21:10)  SpO2: 97% (10-23-23 @ 21:10) (97% - 100%)    PHYSICAL EXAM:  GENERAL: NAD  HEAD:  Atraumatic, Normocephalic  EYES: EOMI, PERRLA, conjunctiva and sclera clear  NERVOUS SYSTEM:  Alert & Oriented X3, no focal deficits   CHEST/LUNG:  bilateral rhonchi  HEART: Regular rate and rhythm; No murmurs, rubs, or gallops  ABDOMEN: Soft, Nontender, Nondistended; Bowel sounds present  EXTREMITIES:  2+ Peripheral Pulses, No clubbing, cyanosis, or edema    LABS  10-23    136  |  99  |  65<HH>  ----------------------------<  120<H>  4.5   |  23  |  12.4<HH>    Ca    8.8      23 Oct 2023 06:18  Phos  5.4     10-23  Mg     1.9     10-23    TPro  5.9<L>  /  Alb  2.9<L>  /  TBili  0.5  /  DBili  x   /  AST  34  /  ALT  25  /  AlkPhos  90  10-23                          8.9    6.68  )-----------( 158      ( 23 Oct 2023 06:18 )             25.3       Culture Results:   Numerous Staphylococcus aureus (10-20-23)  Culture Results:   Numerous Staphylococcus aureus (10-20-23)  Culture Results:   Moderate Staphylococcus aureus (10-18-23)  Culture Results:   No growth at 5 days (10-17-23)  Culture Results:   No growth at 5 days (10-17-23)  Culture Results:   No growth (10-17-23)    RADIOLOGY  < from: Xray Chest 1 View- PORTABLE-Routine (Xray Chest 1 View- PORTABLE-Routine in AM.) (10.21.23 @ 07:17) >  Impression:    Interval increase in bilateral lung volumes, previously low.  Interval   improvement of pulmonary vascular congestion, otherwise no radiographic   evidence of acute cardiopulmonary disease.    < end of copied text >    MEDICATIONS  (STANDING):  calcium acetate 667 milliGRAM(s) Oral three times a day with meals  ceFAZolin   IVPB 1000 milliGRAM(s) IV Intermittent every 24 hours  chlorhexidine 2% Cloths 1 Application(s) Topical <User Schedule>  darbepoetin Injectable Syringe 6040 MICROGram(s) IV Push <User Schedule>  folic acid 1 milliGRAM(s) Oral daily  heparin   Injectable 5000 Unit(s) SubCutaneous every 8 hours  insulin glargine Injectable (LANTUS) 5 Unit(s) SubCutaneous at bedtime  insulin lispro (ADMELOG) corrective regimen sliding scale   SubCutaneous three times a day before meals  insulin lispro Injectable (ADMELOG) 2 Unit(s) SubCutaneous three times a day before meals  multivitamin 1 Tablet(s) Oral daily  pantoprazole    Tablet 40 milliGRAM(s) Oral before breakfast  polyethylene glycol 3350 17 Gram(s) Oral every 12 hours  senna 2 Tablet(s) Oral at bedtime    MEDICATIONS  (PRN):  aluminum hydroxide/magnesium hydroxide/simethicone Suspension 30 milliLiter(s) Oral every 4 hours PRN Dyspepsia  LORazepam   Injectable 2 milliGRAM(s) IV Push every 4 hours PRN CIWA-Ar score increase by 2 points and a total score of 7 or less  ondansetron Injectable 4 milliGRAM(s) IV Push every 8 hours PRN Nausea and/or Vomiting  vitamin A &amp; D Ointment 1 Application(s) Topical every 12 hours PRN dryness

## 2023-10-23 NOTE — PROGRESS NOTE ADULT - SUBJECTIVE AND OBJECTIVE BOX
SUBJECTIVE:    Patient is a 58y old Male who presents with a chief complaint of Worsening Renal Failure (23 Oct 2023 12:14)    Overnight Events: No overnight events,, discontinued IV fluids and vancomycin    PAST MEDICAL & SURGICAL HISTORY  Diabetes    Hypertension    HLD (hyperlipidemia)    Neuropathy    Glaucoma    Chronic kidney disease, unspecified CKD stage  requiring HD march 2022    ETOH abuse    H/O colonoscopy      SOCIAL HISTORY:  Negative for smoking/alcohol/drug use.     ALLERGIES:  No Known Drug Allergies  Bananas (Nausea; Urticaria)  apple (Urticaria)    MEDICATIONS:  STANDING MEDICATIONS  calcium acetate 667 milliGRAM(s) Oral three times a day with meals  chlorhexidine 2% Cloths 1 Application(s) Topical <User Schedule>  folic acid 1 milliGRAM(s) Oral daily  heparin   Injectable 5000 Unit(s) SubCutaneous every 8 hours  insulin glargine Injectable (LANTUS) 5 Unit(s) SubCutaneous at bedtime  insulin lispro (ADMELOG) corrective regimen sliding scale   SubCutaneous three times a day before meals  insulin lispro Injectable (ADMELOG) 2 Unit(s) SubCutaneous three times a day before meals  insulin regular Infusion 1 Unit(s)/Hr IV Continuous <Continuous>  multivitamin 1 Tablet(s) Oral daily  pantoprazole    Tablet 40 milliGRAM(s) Oral before breakfast  piperacillin/tazobactam IVPB.. 3.375 Gram(s) IV Intermittent every 12 hours  polyethylene glycol 3350 17 Gram(s) Oral every 12 hours  senna 2 Tablet(s) Oral at bedtime    PRN MEDICATIONS  aluminum hydroxide/magnesium hydroxide/simethicone Suspension 30 milliLiter(s) Oral every 4 hours PRN  LORazepam   Injectable 2 milliGRAM(s) IV Push every 4 hours PRN  ondansetron Injectable 4 milliGRAM(s) IV Push every 8 hours PRN    VITALS:   T(F): 97.8, Max: 98 (10-23-23 @ 03:03)  HR: 81 (70 - 95)  BP: 146/72 (103/70 - 176/85)  RR: 11 (11 - 211)  SpO2: 99% (97% - 100%)    LABS:                        8.9    6.68  )-----------( 158      ( 23 Oct 2023 06:18 )             25.3     10-23    136  |  99  |  65<HH>  ----------------------------<  120<H>  4.5   |  23  |  12.4<HH>    Ca    8.8      23 Oct 2023 06:18  Phos  5.4     10-23  Mg     1.9     10-23    TPro  5.9<L>  /  Alb  2.9<L>  /  TBili  0.5  /  DBili  x   /  AST  34  /  ALT  25  /  AlkPhos  90  10-23      Urinalysis Basic - ( 23 Oct 2023 06:18 )    Color: x / Appearance: x / SG: x / pH: x  Gluc: 120 mg/dL / Ketone: x  / Bili: x / Urobili: x   Blood: x / Protein: x / Nitrite: x   Leuk Esterase: x / RBC: x / WBC x   Sq Epi: x / Non Sq Epi: x / Bacteria: x            Culture - Other (collected 20 Oct 2023 17:32)  Source: .Other RIGHT GROIN  Final Report (23 Oct 2023 11:50):    Numerous Staphylococcus aureus  Organism: Staphylococcus aureus (23 Oct 2023 11:50)  Organism: Staphylococcus aureus (23 Oct 2023 11:50)              10-22-23 @ 07:01  -  10-23-23 @ 07:00  --------------------------------------------------------  IN: 1050 mL / OUT: 1910 mL / NET: -860 mL    10-23-23 @ 07:01  -  10-23-23 @ 12:40  --------------------------------------------------------  IN: 125 mL / OUT: 450 mL / NET: -325 mL          IMAGING/EKG:    No recent imaging    PHYSICAL EXAM:  GEN: NAD, comfortable  LUNGS: CTAB, no w/r/r  HEART: RRR, s1 and s2 appreciated, no m/r/g  ABD: soft, NT/ND, +BS  EXT: no edema, PP b/l  NEURO: AAOX 2-3

## 2023-10-23 NOTE — PROGRESS NOTE ADULT - SUBJECTIVE AND OBJECTIVE BOX
Patient is a 58y old  Male who presents with a chief complaint of Worsening Renal Failure (23 Oct 2023 09:51)      Over Night Events:  Patient seen and examined.       ROS:  See HPI    PHYSICAL EXAM    ICU Vital Signs Last 24 Hrs  T(C): 36.6 (23 Oct 2023 07:05), Max: 36.7 (22 Oct 2023 11:00)  T(F): 97.8 (23 Oct 2023 07:05), Max: 98 (22 Oct 2023 11:00)  HR: 82 (23 Oct 2023 03:00) (69 - 95)  BP: 149/77 (23 Oct 2023 03:00) (103/70 - 176/85)  BP(mean): 106 (23 Oct 2023 03:00) (81 - 122)  ABP: --  ABP(mean): --  RR: 21 (23 Oct 2023 07:05) (13 - 211)  SpO2: 99% (23 Oct 2023 03:03) (96% - 100%)    O2 Parameters below as of 23 Oct 2023 03:00  Patient On (Oxygen Delivery Method): room air            General: confused         Lymph Nodes: NO cervical LN   Lungs: Bilateral BS  Cardiovascular: Regular   Abdomen: Soft, Positive BS  Extremities: No clubbing   Skin: warm   Neurological: a&o x3  Musculoskeletal: move all ext     I&O's Detail    22 Oct 2023 07:01  -  23 Oct 2023 07:00  --------------------------------------------------------  IN:    dextrose 5% + sodium chloride 0.9% w/ Additives: 1000 mL  Total IN: 1000 mL    OUT:    Indwelling Catheter - Urethral (mL): 1910 mL    Insulin: 0 mL  Total OUT: 1910 mL    Total NET: -910 mL          LABS:                          8.9    6.68  )-----------( 158      ( 23 Oct 2023 06:18 )             25.3         23 Oct 2023 06:18    136    |  99     |  65     ----------------------------<  120    4.5     |  23     |  12.4     Ca    8.8        23 Oct 2023 06:18  Phos  5.4       23 Oct 2023 06:18  Mg     1.9       23 Oct 2023 06:18    TPro  5.9    /  Alb  2.9    /  TBili  0.5    /  DBili  x      /  AST  34     /  ALT  25     /  AlkPhos  90     23 Oct 2023 06:18  Amylase x     lipase x                                                                                        Urinalysis Basic - ( 23 Oct 2023 06:18 )    Color: x / Appearance: x / SG: x / pH: x  Gluc: 120 mg/dL / Ketone: x  / Bili: x / Urobili: x   Blood: x / Protein: x / Nitrite: x   Leuk Esterase: x / RBC: x / WBC x   Sq Epi: x / Non Sq Epi: x / Bacteria: x                                                                                                                                                     MEDICATIONS  (STANDING):  calcium acetate 667 milliGRAM(s) Oral three times a day with meals  chlorhexidine 2% Cloths 1 Application(s) Topical <User Schedule>  dextrose 5% + sodium chloride 0.9% with potassium chloride 40 mEq/L 1000 milliLiter(s) (50 mL/Hr) IV Continuous <Continuous>  folic acid 1 milliGRAM(s) Oral daily  heparin   Injectable 5000 Unit(s) SubCutaneous every 8 hours  insulin glargine Injectable (LANTUS) 5 Unit(s) SubCutaneous at bedtime  insulin lispro (ADMELOG) corrective regimen sliding scale   SubCutaneous three times a day before meals  insulin lispro Injectable (ADMELOG) 2 Unit(s) SubCutaneous three times a day before meals  insulin regular Infusion 1 Unit(s)/Hr (1 mL/Hr) IV Continuous <Continuous>  multivitamin 1 Tablet(s) Oral daily  pantoprazole    Tablet 40 milliGRAM(s) Oral before breakfast  piperacillin/tazobactam IVPB.. 3.375 Gram(s) IV Intermittent every 12 hours    MEDICATIONS  (PRN):  aluminum hydroxide/magnesium hydroxide/simethicone Suspension 30 milliLiter(s) Oral every 4 hours PRN Dyspepsia  LORazepam   Injectable 2 milliGRAM(s) IV Push every 4 hours PRN CIWA-Ar score increase by 2 points and a total score of 7 or less  ondansetron Injectable 4 milliGRAM(s) IV Push every 8 hours PRN Nausea and/or Vomiting

## 2023-10-23 NOTE — PROGRESS NOTE ADULT - SUBJECTIVE AND OBJECTIVE BOX
58yMale  Being followed for abdominal distention   Interval history: Patient denies nausea, vomiting, hematemesis, melena, blood in stool, diarrhea, constipation, abdominal pain. Patient tolerating diet, having bms and passing flatus.      PAST MEDICAL & SURGICAL HISTORY:   Diabetes      Hypertension      HLD (hyperlipidemia)      Neuropathy      Glaucoma      Chronic kidney disease, unspecified CKD stage  requiring HD march 2022      ETOH abuse      H/O colonoscopy                Social History: No smoking. + alcohol. No illegal drug use.            MEDICATIONS  (STANDING):  calcium acetate 667 milliGRAM(s) Oral three times a day with meals  chlorhexidine 2% Cloths 1 Application(s) Topical <User Schedule>  folic acid 1 milliGRAM(s) Oral daily  heparin   Injectable 5000 Unit(s) SubCutaneous every 8 hours  insulin glargine Injectable (LANTUS) 5 Unit(s) SubCutaneous at bedtime  insulin lispro (ADMELOG) corrective regimen sliding scale   SubCutaneous three times a day before meals  insulin lispro Injectable (ADMELOG) 2 Unit(s) SubCutaneous three times a day before meals  insulin regular Infusion 1 Unit(s)/Hr (1 mL/Hr) IV Continuous <Continuous>  multivitamin 1 Tablet(s) Oral daily  pantoprazole    Tablet 40 milliGRAM(s) Oral before breakfast  piperacillin/tazobactam IVPB.. 3.375 Gram(s) IV Intermittent every 12 hours  polyethylene glycol 3350 17 Gram(s) Oral every 12 hours  senna 2 Tablet(s) Oral at bedtime    MEDICATIONS  (PRN):  aluminum hydroxide/magnesium hydroxide/simethicone Suspension 30 milliLiter(s) Oral every 4 hours PRN Dyspepsia  LORazepam   Injectable 2 milliGRAM(s) IV Push every 4 hours PRN CIWA-Ar score increase by 2 points and a total score of 7 or less  ondansetron Injectable 4 milliGRAM(s) IV Push every 8 hours PRN Nausea and/or Vomiting      Allergies:   No Known Drug Allergies  Bananas (Nausea; Urticaria)  apple (Urticaria)  Intolerances          REVIEW OF SYSTEMS:  General:  No weight loss, fevers, or chills.  Eyes:  No reported pain or visual changes  ENT:  No sore throat or runny nose.  NECK: No stiffness   CV:  No chest pain or palpitations.  Resp:  No shortness of breath, cough  GI:  No abdominal pain, nausea, vomiting, dysphagia, diarrhea or constipation. No rectal bleeding, melena, or hematemesis.  Muscle:  No aches or weakness  Neuro:  No tingling, numbness           VITAL SIGNS:   T(F): 97.8 (10-23-23 @ 07:05), Max: 98 (10-23-23 @ 03:03)  HR: 81 (10-23-23 @ 10:15) (70 - 95)  BP: 146/72 (10-23-23 @ 10:15) (103/70 - 176/85)  RR: 11 (10-23-23 @ 10:15) (11 - 211)  SpO2: 99% (10-23-23 @ 10:15) (97% - 100%)    PHYSICAL EXAM:  GENERAL: AAOx3, no acute distress.  HEAD:  Atraumatic, Normocephalic  EYES: conjunctiva and sclera clear  NECK: Supple, no JVD or thyromegaly  CHEST/LUNG: Clear to auscultation bilaterally; No wheeze, rhonchi, or rales  HEART: Regular rate and rhythm; normal S1, S2, No murmurs.  ABDOMEN: Soft, nontender, nondistended; Bowel sounds present  NEUROLOGY: No asterixis or tremor.   SKIN: Intact, no jaundice            LABS:                        8.9    6.68  )-----------( 158      ( 23 Oct 2023 06:18 )             25.3     10-23    136  |  99  |  65<HH>  ----------------------------<  120<H>  4.5   |  23  |  12.4<HH>    Ca    8.8      23 Oct 2023 06:18  Phos  5.4     10-23  Mg     1.9     10-23    TPro  5.9<L>  /  Alb  2.9<L>  /  TBili  0.5  /  DBili  x   /  AST  34  /  ALT  25  /  AlkPhos  90  10-23    LIVER FUNCTIONS - ( 23 Oct 2023 06:18 )  Alb: 2.9 g/dL / Pro: 5.9 g/dL / ALK PHOS: 90 U/L / ALT: 25 U/L / AST: 34 U/L / GGT: x               IMAGING:    < from: Xray Kidney Ureter Bladder (10.20.23 @ 14:03) >    ACC: [id number] EXAM:  XR KUB 1 VIEW   ORDERED BY: BRANDON SANCHEZ     PROCEDURE DATE:  10/20/2023          INTERPRETATION:  Clinical History / Reason for exam: Ileus    AP view of the abdomen.    COMPARISON: October 18, 2023    Findings/  impression: There are a few residual gaseous distention of the small   bowel and large bowel loops, decreased when compared to the prior exam.    --- End of Report ---            ADALBERTO MARQUES MD; Attending Radiologist  This document has been electronically signed. Oct 20 2023  3:14PM    < end of copied text >

## 2023-10-23 NOTE — PROGRESS NOTE ADULT - ASSESSMENT
57 y/o male with hx of ETOH abuse, CKD with left wrist fistula, NIDDM , HTN, HLD, presented to the ED for decreased urinary output and metabolic encephalopathy due to hyponatremia and uremia.    # Abdominal distention  - resolving    # groin abscess  - s/p I+D, on n zosyn currently, dc'ed vancomcyin  - ID is following    #R leg wound  - possible wound vac by surg, pending evaluation    # acute on chronic Renal failure   - on HD, nephrology following    # Hyperglycemia / DKA   - Resolved  - On glargine and pre-meals    # ETOH abuse   - thiamine  - folic acid

## 2023-10-23 NOTE — PROGRESS NOTE ADULT - ASSESSMENT
Stage 4 CKD secondary to DM, HTN, incomplete recovery from previous severe VAMSI  # Severe VAMSI on CKD d/t ?sepsis - started HD on 10/18/23 via LUE AVF   # HAGMA likely due to DKA / uremia, resolved  # Hyponatremia likely due toAKI  # Alcohol abuse    - non oliguric  - creat rising between HD sessions    Recommendations:  - next HD tomorrow, 3hrs, optiflux dialyzer, 3K+ bath, 2L UF as tolerated  - monitor urine output  - cont Phoslo , monitor Ca/PO4  - monitor H/H, start NAIDA wkly  - cont abx per ID recs; dose for iHD  - optimal glycemic control   Continue general care and management of the rest of comorbidities as per medicine/CC

## 2023-10-23 NOTE — PROGRESS NOTE ADULT - ASSESSMENT
59 y/o male with hx of ETOH abuse, CKD with left wrist fistula, DM , HTN, HLD, presented to the ED for decreased urinary output and increased thirst    # Abdominal distention / ileus    - resolved    # groin abscess  - s/p I+D   - antibiotics    #R leg wound  - possible wound vac by surgery    # Anemia  - s/p PRBC    # acute on chronic Renal failure   - on HD      # Hyperglycemia / DKA   - off insulin drip -> sq insulin    # ETOH abuse   - thiamine and folic acid

## 2023-10-23 NOTE — PROGRESS NOTE ADULT - ASSESSMENT
IMPRESSION:  Metabolic vs toxic encephalopathy improving  metabolic acidosis   VAMSI on CKD now on HD  sepsis present on admission  groin abscess   Ileus  DKA resolved      PLAN:    CNS:   no sedation   CIWA protocol with Ativan PRN  Multivitamin/folate/thiamine    HEENT: oral care     PULMONARY: keep pox >94 %     CARDIOVASCULAR:   CE negative  vasopressor as needed. keep map >65  d/c ivf    GI:   Low K diet  GI prophylaxis.    daily KUB.   bowel regime. senna. miralax bid  last BM??    RENAL:  HD via AVF. follow up with renal for HD  phoslo 1 tab TID with meals  continue with way. d/c way today TOV  5mg lokelma x1  strict is and os     INFECTIOUS DISEASE:  continue abx: Zosyn 3.375gm q12hrs  s/p surgery eval. Surgery follow up for possible wound vac   blood culture negative so far, wound culture grew staph aureus, could be contamination  ID follow up    HEMATOLOGICAL:    DVT prophylaxis doppler lower ext   Iron Studies, Folate, Vitamin B12 levels     ENDOCRINE: insulin per protocol    MUSCULOSKELETAL: PT/ OT    FULL CODE  Lines: PIV. d/c way  Dispo: downgrade to the medical floor   IMPRESSION:  Metabolic vs toxic encephalopathy improving  metabolic acidosis   VAMSI on CKD now on HD  sepsis present on admission  groin abscess   Ileus  DKA resolved      PLAN:    CNS:   no sedation   CIWA protocol with Ativan PRN  Multivitamin/folate/thiamine    HEENT: oral care     PULMONARY: keep pox >94 %     CARDIOVASCULAR:   CE negative  vasopressor as needed. keep map >65  d/c ivf    GI:   Low K diet  GI prophylaxis.    daily KUB.   bowel regime. senna. miralax bid  last BM??    RENAL:  HD via AVF. follow up with renal for HD  phoslo 1 tab TID with meals  continue with way. d/c way today TOV  strict is and os     INFECTIOUS DISEASE:  continue abx: Zosyn 3.375gm q12hrs  s/p surgery eval. Surgery follow up for possible wound vac   blood culture negative so far, wound culture grew staph aureus, could be contamination  ID follow up    HEMATOLOGICAL:    DVT prophylaxis doppler lower ext   Iron Studies, Folate, Vitamin B12 levels     ENDOCRINE: insulin per protocol    MUSCULOSKELETAL: PT/ OT    FULL CODE  Lines: PIV. d/c way  Dispo: downgrade to the medical floor

## 2023-10-23 NOTE — PROGRESS NOTE ADULT - SUBJECTIVE AND OBJECTIVE BOX
Nephrology Progress Note    KECIA MARTIN  [id number]  58y  Male    S:  Patient is seen and examined, events over the last 24h noted.    O:  Allergies:  No Known Drug Allergies  Bananas (Nausea; Urticaria)  apple (Urticaria)    Hospital Medications:   MEDICATIONS  (STANDING):  calcium acetate 667 milliGRAM(s) Oral three times a day with meals  chlorhexidine 2% Cloths 1 Application(s) Topical <User Schedule>  folic acid 1 milliGRAM(s) Oral daily  heparin   Injectable 5000 Unit(s) SubCutaneous every 8 hours  insulin glargine Injectable (LANTUS) 5 Unit(s) SubCutaneous at bedtime  insulin lispro (ADMELOG) corrective regimen sliding scale   SubCutaneous three times a day before meals  insulin lispro Injectable (ADMELOG) 2 Unit(s) SubCutaneous three times a day before meals  insulin regular Infusion 1 Unit(s)/Hr (1 mL/Hr) IV Continuous <Continuous>  multivitamin 1 Tablet(s) Oral daily  pantoprazole    Tablet 40 milliGRAM(s) Oral before breakfast  piperacillin/tazobactam IVPB.. 3.375 Gram(s) IV Intermittent every 12 hours  polyethylene glycol 3350 17 Gram(s) Oral every 12 hours  senna 2 Tablet(s) Oral at bedtime    MEDICATIONS  (PRN):  aluminum hydroxide/magnesium hydroxide/simethicone Suspension 30 milliLiter(s) Oral every 4 hours PRN Dyspepsia  LORazepam   Injectable 2 milliGRAM(s) IV Push every 4 hours PRN CIWA-Ar score increase by 2 points and a total score of 7 or less  ondansetron Injectable 4 milliGRAM(s) IV Push every 8 hours PRN Nausea and/or Vomiting    Home Medications:  carvedilol 12.5 mg oral tablet: 1 orally once a day (17 Oct 2023 15:05)  gabapentin 600 mg oral tablet: 1 orally 3 times a day (17 Oct 2023 15:05)  Januvia 50 mg oral tablet: 1 orally once a day (17 Oct 2023 15:05)  rosuvastatin 10 mg oral capsule: 1 orally once a day (17 Oct 2023 15:05)  Vitamin B-100 oral tablet: 1 orally once a day (17 Oct 2023 15:05)      VITALS:  Daily     Daily Weight in k.8 (23 Oct 2023 07:05)  T(F): 97.8 (10-23-23 @ 07:05), Max: 98 (10-23-23 @ 03:03)  HR: 81 (10-23-23 @ 10:15)  BP: 146/72 (10-23-23 @ 10:15)  RR: 11 (10-23-23 @ 10:15)  SpO2: 99% (10-23-23 @ 10:15)  Wt(kg): --  I&O's Detail    22 Oct 2023 07:  -  23 Oct 2023 07:00  --------------------------------------------------------  IN:    dextrose 5% + sodium chloride 0.9% w/ Additives: 1050 mL  Total IN: 1050 mL    OUT:    Indwelling Catheter - Urethral (mL): 1910 mL    Insulin: 0 mL  Total OUT: 1910 mL    Total NET: -860 mL      23 Oct 2023 07:  -  23 Oct 2023 12:14  --------------------------------------------------------  IN:    dextrose 5% + sodium chloride 0.9% w/ Additives: 125 mL  Total IN: 125 mL    OUT:    Indwelling Catheter - Urethral (mL): 450 mL    Insulin: 0 mL  Total OUT: 450 mL    Total NET: -325 mL        I&O's Summary    22 Oct 2023 07:  -  23 Oct 2023 07:00  --------------------------------------------------------  IN: 1050 mL / OUT: 1910 mL / NET: -860 mL    23 Oct 2023 07:  -  23 Oct 2023 12:14  --------------------------------------------------------  IN: 125 mL / OUT: 450 mL / NET: -325 mL          PHYSICAL EXAM:  Gen: NAD  Chest: b/l breath sounds  Abd: soft  Extremities: no edema  Vascular access:       LABS:    Blood Gas Arterial - Sodium: 125 mmol/L (10-19-23 @ 15:00)  Blood Gas Arterial - Calcium, Ionized: 0.99 mmol/L (10-19-23 @ 15:00)    10-23    136  |  99  |  65<HH>  ----------------------------<  120<H>  4.5   |  23  |  12.4<HH>    Ca    8.8      23 Oct 2023 06:18  Phos  5.4     10-23  Mg     1.9     10-23    TPro  5.9<L>  /  Alb  2.9<L>  /  TBili  0.5  /  DBili      /  AST  34  /  ALT  25  /  AlkPhos  90  10-23    eGFR: 4 mL/min/1.73m2 (10-23-23 @ 06:18)  eGFR: 5 mL/min/1.73m2 (10-22-23 @ 09:50)    Phosphorus: 5.4 mg/dL (10-23-23 @ 06:18)  Phosphorus: 7.0 mg/dL (10-21-23 @ 05:45)    Osmolality, Serum: 311 mos/kg (10-18-23 @ 08:43)  Intact PTH: 311 pg/mL (22 @ 11:45)                          8.9    6.68  )-----------( 158      ( 23 Oct 2023 06:18 )             25.3     Mean Cell Volume: 88.5 fL (10-23-23 @ 06:18)    Iron Total, Serum: 21 ug/dL (22 @ 06:48)  % Saturation, Iron: 10 % (22 @ 06:48)      % Saturation, Iron: 10 % (22 @ 06:48)    Urine Studies:  Protein, Urine: >=1000 mg/dL (10-18-23 @ 11:30)  White Blood Cell - Urine: 15 /HPF (10-18-23 @ 11:30)  Red Blood Cell - Urine: >50 /HPF (10-18-23 @ 11:30)  Protein, Urine: 300 mg/dL (10-17-23 @ 13:53)  Red Blood Cell - Urine: 50 /HPF (10-17-23 @ 13:53)  White Blood Cell - Urine: 10 /HPF (10-17-23 @ 13:53)  Protein, Urine: 100 mg/dL (22 @ 01:30)  White Blood Cell - Urine: 6-10 /HPF (22 @ 01:30)  Red Blood Cell - Urine: >50 /HPF (22 @ 01:30)  Protein, Urine: >=300 mg/dL (22 @ 15:40)  White Blood Cell - Urine: 3-5 /HPF (22 @ 15:40)  Red Blood Cell - Urine: 6-10 /HPF (22 @ 15:40)  Granular Cast: 1-2 /LPF (22 @ 15:40)  Protein, Urine: 30 mg/dL (22 @ 18:30)  White Blood Cell - Urine: 3-5 /HPF (22 @ 18:30)  Red Blood Cell - Urine: 6-10 /HPF (22 @ 18:30)  Protein, Urine: >=300 mg/dL (03-15-22 @ 15:41)  Red Blood Cell - Urine: 11-25 /HPF (03-15-22 @ 15:41)  Granular Cast: 1-2 /LPF (03-15-22 @ 15:41)  Protein, Urine: Trace (21 @ 14:08)      Urea Nitrogen,  Random Urine: 281 mg/dL (10-18-23 @ 11:30)    Culture Results:   Numerous Staphylococcus aureus (10-20 @ 17:32)  Culture Results:   Moderate Staphylococcus aureus (10-18 @ 14:20)    Creatinine trend:  Creatinine: 12.4 mg/dL (10-23-23 @ 06:18)  Creatinine: 11.3 mg/dL (10-22-23 @ 09:50)  Creatinine: 12.0 mg/dL (10-21-23 @ 05:45)  Creatinine: 10.8 mg/dL (10-20-23 @ 21:36)  Creatinine: 10.2 mg/dL (10-20-23 @ 15:41)   Nephrology Progress Note    KECIA MARTIN  MRN-[id number]  58y  Male    S:  Patient is seen and examined, events over the last 24h noted.    O:  Allergies:  No Known Drug Allergies  Bananas (Nausea; Urticaria)  apple (Urticaria)    Hospital Medications:   MEDICATIONS  (STANDING):  calcium acetate 667 milliGRAM(s) Oral three times a day with meals  chlorhexidine 2% Cloths 1 Application(s) Topical <User Schedule>  folic acid 1 milliGRAM(s) Oral daily  heparin   Injectable 5000 Unit(s) SubCutaneous every 8 hours  insulin glargine Injectable (LANTUS) 5 Unit(s) SubCutaneous at bedtime  insulin lispro (ADMELOG) corrective regimen sliding scale   SubCutaneous three times a day before meals  insulin lispro Injectable (ADMELOG) 2 Unit(s) SubCutaneous three times a day before meals  insulin regular Infusion 1 Unit(s)/Hr (1 mL/Hr) IV Continuous <Continuous>  multivitamin 1 Tablet(s) Oral daily  pantoprazole    Tablet 40 milliGRAM(s) Oral before breakfast  piperacillin/tazobactam IVPB.. 3.375 Gram(s) IV Intermittent every 12 hours  polyethylene glycol 3350 17 Gram(s) Oral every 12 hours  senna 2 Tablet(s) Oral at bedtime    MEDICATIONS  (PRN):  aluminum hydroxide/magnesium hydroxide/simethicone Suspension 30 milliLiter(s) Oral every 4 hours PRN Dyspepsia  LORazepam   Injectable 2 milliGRAM(s) IV Push every 4 hours PRN CIWA-Ar score increase by 2 points and a total score of 7 or less  ondansetron Injectable 4 milliGRAM(s) IV Push every 8 hours PRN Nausea and/or Vomiting    Home Medications:  carvedilol 12.5 mg oral tablet: 1 orally once a day (17 Oct 2023 15:05)  gabapentin 600 mg oral tablet: 1 orally 3 times a day (17 Oct 2023 15:05)  Januvia 50 mg oral tablet: 1 orally once a day (17 Oct 2023 15:05)  rosuvastatin 10 mg oral capsule: 1 orally once a day (17 Oct 2023 15:05)  Vitamin B-100 oral tablet: 1 orally once a day (17 Oct 2023 15:05)      VITALS:  Daily     Daily Weight in k.8 (23 Oct 2023 07:05)  T(F): 97.8 (10-23-23 @ 07:05), Max: 98 (10-23-23 @ 03:03)  HR: 81 (10-23-23 @ 10:15)  BP: 146/72 (10-23-23 @ 10:15)  RR: 11 (10-23-23 @ 10:15)  SpO2: 99% (10-23-23 @ 10:15)  Wt(kg): --  I&O's Detail    22 Oct 2023 07:  -  23 Oct 2023 07:00  --------------------------------------------------------  IN:    dextrose 5% + sodium chloride 0.9% w/ Additives: 1050 mL  Total IN: 1050 mL    OUT:    Indwelling Catheter - Urethral (mL): 1910 mL    Insulin: 0 mL  Total OUT: 1910 mL    Total NET: -860 mL      23 Oct 2023 07:  -  23 Oct 2023 12:14  --------------------------------------------------------  IN:    dextrose 5% + sodium chloride 0.9% w/ Additives: 125 mL  Total IN: 125 mL    OUT:    Indwelling Catheter - Urethral (mL): 450 mL    Insulin: 0 mL  Total OUT: 450 mL    Total NET: -325 mL        I&O's Summary    22 Oct 2023 07:  -  23 Oct 2023 07:00  --------------------------------------------------------  IN: 1050 mL / OUT: 1910 mL / NET: -860 mL    23 Oct 2023 07:  -  23 Oct 2023 12:14  --------------------------------------------------------  IN: 125 mL / OUT: 450 mL / NET: -325 mL          PHYSICAL EXAM:  Gen: NAD  Chest: b/l breath sounds  Abd: soft  Extremities: no edema  Vascular access: left forearm AVF +thrill      LABS:      10-23    136  |  99  |  65<HH>  ----------------------------<  120<H>  4.5   |  23  |  12.4<HH>    Ca    8.8      23 Oct 2023 06:18  Phos  5.4     10-23  Mg     1.9     10-23    TPro  5.9<L>  /  Alb  2.9<L>  /  TBili  0.5  /  DBili      /  AST  34  /  ALT  25  /  AlkPhos  90  10-23    Phosphorus: 5.4 mg/dL (10-23-23 @ 06:18)  Phosphorus: 7.0 mg/dL (10-21-23 @ 05:45)                          8.9    6.68  )-----------( 158      ( 23 Oct 2023 06:18 )             25.3     Mean Cell Volume: 88.5 fL (10-23-23 @ 06:18)      Creatinine trend:  Creatinine: 12.4 mg/dL (10-23-23 @ 06:18)  Creatinine: 11.3 mg/dL (10-22-23 @ 09:50)  Creatinine: 12.0 mg/dL (10-21-23 @ 05:45)  Creatinine: 10.8 mg/dL (10-20-23 @ 21:36)  Creatinine: 10.2 mg/dL (10-20-23 @ 15:41)

## 2023-10-23 NOTE — PROGRESS NOTE ADULT - ASSESSMENT
57 y/o male with hx of ETOH abuse, CKD with left wrist fistula, NIDDM , HTN, HLD, presented to the ED for decreased urinary output and increased thirst since yesterday.    History provided by daughter at bedside    ID is following for infected RLE wound  Afebrile on admission, no leukocytosis  Sustained trauma at right lower leg 2 weeks ago, now with increasing abscess  BCx x2 pending  CT Ab/pelvis showed partially imaged right inguinal inflammatory change and mildly enlarged lymph nodes  CT RLE showed focal subcutaneous soft tissue thickening with associated inflammatory  changes along the right superficial inguinal region, suspicious for phlegmon    Antibiotics:  Cefepime 10/17 - 10/18  Vancomycin 10/17, 10/19  Zosyn 10/19 ->    Right LE wound is not overtly infected, but there is purulent drainage expressed from wound, Cx grew staph aureus  Concerning for cellulitis with possible early abscess formation at right groin, CT showed possible phlegmon formation, US of right groin suggests an abscess, s/p I&D 10/20  Acute encephalopathy possibly from uremia, improving  KUB 10/18 suggests ileus, improved now      IMPRESSION:  Right LE wound  Right groin abscess, s/p I&D 10/20  Ileus  Uremic encephalopathy  VAMSI on CKD  Alcohol abuse    RECOMMENDATIONS:  - Continue Zosyn 3.375gm q12hrs for now, can further de-escalate after OR culture result  - Surgery eval for possible wound vac   - Follow up OR Cx  - Bowel regimen  - Nephrology for HD  - [facility] protocol  - Trend WBC, fever curve, transaminases, creatinine daily  - Will continue to follow      * THIS IS AN INCOMPLETE NOTE. FINAL RECOMMENDATION IS PENDING *   57 y/o male with hx of ETOH abuse, CKD with left wrist fistula, NIDDM , HTN, HLD, presented to the ED for decreased urinary output and increased thirst since yesterday.    History provided by daughter at bedside    ID is following for infected RLE wound  Afebrile on admission, no leukocytosis  Sustained trauma at right lower leg 2 weeks ago, now with increasing abscess  BCx x2 pending  CT Ab/pelvis showed partially imaged right inguinal inflammatory change and mildly enlarged lymph nodes  CT RLE showed focal subcutaneous soft tissue thickening with associated inflammatory  changes along the right superficial inguinal region, suspicious for phlegmon    Antibiotics:  Cefepime 10/17 - 10/18  Vancomycin 10/17, 10/19  Zosyn 10/19 ->    Right LE wound is not overtly infected, but there is purulent drainage expressed from wound, Cx grew staph aureus  Concerning for cellulitis with possible early abscess formation at right groin, CT showed possible phlegmon formation, US of right groin suggests an abscess, s/p I&D 10/20  Acute encephalopathy possibly from uremia, improving  KUB 10/18 suggests ileus, improved now      IMPRESSION:  Right LE wound  Right groin abscess, s/p I&D 10/20  Ileus  Uremic encephalopathy  VAMSI on CKD  Alcohol abuse    RECOMMENDATIONS:  - D/C Zosyn, start IV Ancef 1gm q24hrs (give post-HD on HD days)  - Surgery eval for possible wound vac   - Follow up OR Cx, growing staph aureus  - Bowel regimen  - Nephrology for HD  - [facility] protocol  - Trend WBC, fever curve, transaminases, creatinine daily  - Will continue to follow      Shaylee Olivas D.O.  Attending Physician  Division of Infectious Diseases  [facility]  Please contact me via [employer] Teams

## 2023-10-23 NOTE — CHART NOTE - NSCHARTNOTEFT_GEN_A_CORE
Received call from pharmacy that current dose of Darbepoetin should be changed. At this time it will not be verified by pharmacy for morning administration. Need to follow up with nephrology when able

## 2023-10-24 LAB
ALBUMIN SERPL ELPH-MCNC: 3.3 G/DL — LOW (ref 3.5–5.2)
ALBUMIN SERPL ELPH-MCNC: 3.3 G/DL — LOW (ref 3.5–5.2)
ALP SERPL-CCNC: 94 U/L — SIGNIFICANT CHANGE UP (ref 30–115)
ALP SERPL-CCNC: 94 U/L — SIGNIFICANT CHANGE UP (ref 30–115)
ALT FLD-CCNC: 23 U/L — SIGNIFICANT CHANGE UP (ref 0–41)
ALT FLD-CCNC: 23 U/L — SIGNIFICANT CHANGE UP (ref 0–41)
ANION GAP SERPL CALC-SCNC: 17 MMOL/L — HIGH (ref 7–14)
ANION GAP SERPL CALC-SCNC: 17 MMOL/L — HIGH (ref 7–14)
AST SERPL-CCNC: 35 U/L — SIGNIFICANT CHANGE UP (ref 0–41)
AST SERPL-CCNC: 35 U/L — SIGNIFICANT CHANGE UP (ref 0–41)
BASOPHILS # BLD AUTO: 0.03 K/UL — SIGNIFICANT CHANGE UP (ref 0–0.2)
BASOPHILS # BLD AUTO: 0.03 K/UL — SIGNIFICANT CHANGE UP (ref 0–0.2)
BASOPHILS NFR BLD AUTO: 0.4 % — SIGNIFICANT CHANGE UP (ref 0–1)
BASOPHILS NFR BLD AUTO: 0.4 % — SIGNIFICANT CHANGE UP (ref 0–1)
BILIRUB SERPL-MCNC: 0.5 MG/DL — SIGNIFICANT CHANGE UP (ref 0.2–1.2)
BILIRUB SERPL-MCNC: 0.5 MG/DL — SIGNIFICANT CHANGE UP (ref 0.2–1.2)
BUN SERPL-MCNC: 46 MG/DL — HIGH (ref 10–20)
BUN SERPL-MCNC: 46 MG/DL — HIGH (ref 10–20)
CALCIUM SERPL-MCNC: 9 MG/DL — SIGNIFICANT CHANGE UP (ref 8.4–10.5)
CALCIUM SERPL-MCNC: 9 MG/DL — SIGNIFICANT CHANGE UP (ref 8.4–10.5)
CHLORIDE SERPL-SCNC: 99 MMOL/L — SIGNIFICANT CHANGE UP (ref 98–110)
CHLORIDE SERPL-SCNC: 99 MMOL/L — SIGNIFICANT CHANGE UP (ref 98–110)
CO2 SERPL-SCNC: 23 MMOL/L — SIGNIFICANT CHANGE UP (ref 17–32)
CO2 SERPL-SCNC: 23 MMOL/L — SIGNIFICANT CHANGE UP (ref 17–32)
CREAT SERPL-MCNC: 8.9 MG/DL — CRITICAL HIGH (ref 0.7–1.5)
CREAT SERPL-MCNC: 8.9 MG/DL — CRITICAL HIGH (ref 0.7–1.5)
EGFR: 6 ML/MIN/1.73M2 — LOW
EGFR: 6 ML/MIN/1.73M2 — LOW
EOSINOPHIL # BLD AUTO: 0.45 K/UL — SIGNIFICANT CHANGE UP (ref 0–0.7)
EOSINOPHIL # BLD AUTO: 0.45 K/UL — SIGNIFICANT CHANGE UP (ref 0–0.7)
EOSINOPHIL NFR BLD AUTO: 5.9 % — SIGNIFICANT CHANGE UP (ref 0–8)
EOSINOPHIL NFR BLD AUTO: 5.9 % — SIGNIFICANT CHANGE UP (ref 0–8)
GLUCOSE BLDC GLUCOMTR-MCNC: 120 MG/DL — HIGH (ref 70–99)
GLUCOSE BLDC GLUCOMTR-MCNC: 120 MG/DL — HIGH (ref 70–99)
GLUCOSE BLDC GLUCOMTR-MCNC: 129 MG/DL — HIGH (ref 70–99)
GLUCOSE BLDC GLUCOMTR-MCNC: 129 MG/DL — HIGH (ref 70–99)
GLUCOSE BLDC GLUCOMTR-MCNC: 185 MG/DL — HIGH (ref 70–99)
GLUCOSE BLDC GLUCOMTR-MCNC: 185 MG/DL — HIGH (ref 70–99)
GLUCOSE SERPL-MCNC: 105 MG/DL — HIGH (ref 70–99)
GLUCOSE SERPL-MCNC: 105 MG/DL — HIGH (ref 70–99)
HCT VFR BLD CALC: 27.7 % — LOW (ref 42–52)
HCT VFR BLD CALC: 27.7 % — LOW (ref 42–52)
HGB BLD-MCNC: 9.5 G/DL — LOW (ref 14–18)
HGB BLD-MCNC: 9.5 G/DL — LOW (ref 14–18)
IMM GRANULOCYTES NFR BLD AUTO: 2.2 % — HIGH (ref 0.1–0.3)
IMM GRANULOCYTES NFR BLD AUTO: 2.2 % — HIGH (ref 0.1–0.3)
LYMPHOCYTES # BLD AUTO: 0.7 K/UL — LOW (ref 1.2–3.4)
LYMPHOCYTES # BLD AUTO: 0.7 K/UL — LOW (ref 1.2–3.4)
LYMPHOCYTES # BLD AUTO: 9.2 % — LOW (ref 20.5–51.1)
LYMPHOCYTES # BLD AUTO: 9.2 % — LOW (ref 20.5–51.1)
MAGNESIUM SERPL-MCNC: 1.8 MG/DL — SIGNIFICANT CHANGE UP (ref 1.8–2.4)
MAGNESIUM SERPL-MCNC: 1.8 MG/DL — SIGNIFICANT CHANGE UP (ref 1.8–2.4)
MCHC RBC-ENTMCNC: 30.4 PG — SIGNIFICANT CHANGE UP (ref 27–31)
MCHC RBC-ENTMCNC: 30.4 PG — SIGNIFICANT CHANGE UP (ref 27–31)
MCHC RBC-ENTMCNC: 34.3 G/DL — SIGNIFICANT CHANGE UP (ref 32–37)
MCHC RBC-ENTMCNC: 34.3 G/DL — SIGNIFICANT CHANGE UP (ref 32–37)
MCV RBC AUTO: 88.8 FL — SIGNIFICANT CHANGE UP (ref 80–94)
MCV RBC AUTO: 88.8 FL — SIGNIFICANT CHANGE UP (ref 80–94)
MONOCYTES # BLD AUTO: 0.33 K/UL — SIGNIFICANT CHANGE UP (ref 0.1–0.6)
MONOCYTES # BLD AUTO: 0.33 K/UL — SIGNIFICANT CHANGE UP (ref 0.1–0.6)
MONOCYTES NFR BLD AUTO: 4.4 % — SIGNIFICANT CHANGE UP (ref 1.7–9.3)
MONOCYTES NFR BLD AUTO: 4.4 % — SIGNIFICANT CHANGE UP (ref 1.7–9.3)
NEUTROPHILS # BLD AUTO: 5.89 K/UL — SIGNIFICANT CHANGE UP (ref 1.4–6.5)
NEUTROPHILS # BLD AUTO: 5.89 K/UL — SIGNIFICANT CHANGE UP (ref 1.4–6.5)
NEUTROPHILS NFR BLD AUTO: 77.9 % — HIGH (ref 42.2–75.2)
NEUTROPHILS NFR BLD AUTO: 77.9 % — HIGH (ref 42.2–75.2)
NRBC # BLD: 0 /100 WBCS — SIGNIFICANT CHANGE UP (ref 0–0)
NRBC # BLD: 0 /100 WBCS — SIGNIFICANT CHANGE UP (ref 0–0)
PLATELET # BLD AUTO: 168 K/UL — SIGNIFICANT CHANGE UP (ref 130–400)
PLATELET # BLD AUTO: 168 K/UL — SIGNIFICANT CHANGE UP (ref 130–400)
PMV BLD: 10.2 FL — SIGNIFICANT CHANGE UP (ref 7.4–10.4)
PMV BLD: 10.2 FL — SIGNIFICANT CHANGE UP (ref 7.4–10.4)
POTASSIUM SERPL-MCNC: 4.3 MMOL/L — SIGNIFICANT CHANGE UP (ref 3.5–5)
POTASSIUM SERPL-MCNC: 4.3 MMOL/L — SIGNIFICANT CHANGE UP (ref 3.5–5)
POTASSIUM SERPL-SCNC: 4.3 MMOL/L — SIGNIFICANT CHANGE UP (ref 3.5–5)
POTASSIUM SERPL-SCNC: 4.3 MMOL/L — SIGNIFICANT CHANGE UP (ref 3.5–5)
PROT SERPL-MCNC: 7.1 G/DL — SIGNIFICANT CHANGE UP (ref 6–8)
PROT SERPL-MCNC: 7.1 G/DL — SIGNIFICANT CHANGE UP (ref 6–8)
RBC # BLD: 3.12 M/UL — LOW (ref 4.7–6.1)
RBC # BLD: 3.12 M/UL — LOW (ref 4.7–6.1)
RBC # FLD: 14.3 % — SIGNIFICANT CHANGE UP (ref 11.5–14.5)
RBC # FLD: 14.3 % — SIGNIFICANT CHANGE UP (ref 11.5–14.5)
SODIUM SERPL-SCNC: 139 MMOL/L — SIGNIFICANT CHANGE UP (ref 135–146)
SODIUM SERPL-SCNC: 139 MMOL/L — SIGNIFICANT CHANGE UP (ref 135–146)
VANCOMYCIN TROUGH SERPL-MCNC: 17.2 UG/ML — HIGH (ref 5–10)
VANCOMYCIN TROUGH SERPL-MCNC: 17.2 UG/ML — HIGH (ref 5–10)
WBC # BLD: 7.57 K/UL — SIGNIFICANT CHANGE UP (ref 4.8–10.8)
WBC # BLD: 7.57 K/UL — SIGNIFICANT CHANGE UP (ref 4.8–10.8)
WBC # FLD AUTO: 7.57 K/UL — SIGNIFICANT CHANGE UP (ref 4.8–10.8)
WBC # FLD AUTO: 7.57 K/UL — SIGNIFICANT CHANGE UP (ref 4.8–10.8)

## 2023-10-24 PROCEDURE — 99232 SBSQ HOSP IP/OBS MODERATE 35: CPT

## 2023-10-24 PROCEDURE — 99024 POSTOP FOLLOW-UP VISIT: CPT

## 2023-10-24 RX ORDER — DARBEPOETIN ALFA IN POLYSORBAT 200MCG/0.4
40 PEN INJECTOR (ML) SUBCUTANEOUS
Refills: 0 | Status: DISCONTINUED | OUTPATIENT
Start: 2023-10-24 | End: 2023-10-30

## 2023-10-24 RX ORDER — LABETALOL HCL 100 MG
10 TABLET ORAL ONCE
Refills: 0 | Status: COMPLETED | OUTPATIENT
Start: 2023-10-24 | End: 2023-10-24

## 2023-10-24 RX ADMIN — Medication 10 MILLIGRAM(S): at 05:39

## 2023-10-24 RX ADMIN — SENNA PLUS 2 TABLET(S): 8.6 TABLET ORAL at 21:42

## 2023-10-24 RX ADMIN — Medication 1 TABLET(S): at 11:53

## 2023-10-24 RX ADMIN — HEPARIN SODIUM 5000 UNIT(S): 5000 INJECTION INTRAVENOUS; SUBCUTANEOUS at 14:15

## 2023-10-24 RX ADMIN — Medication 2 UNIT(S): at 11:53

## 2023-10-24 RX ADMIN — INSULIN GLARGINE 5 UNIT(S): 100 INJECTION, SOLUTION SUBCUTANEOUS at 21:42

## 2023-10-24 RX ADMIN — Medication 1 MILLIGRAM(S): at 11:53

## 2023-10-24 RX ADMIN — HEPARIN SODIUM 5000 UNIT(S): 5000 INJECTION INTRAVENOUS; SUBCUTANEOUS at 21:41

## 2023-10-24 RX ADMIN — Medication 667 MILLIGRAM(S): at 11:53

## 2023-10-24 RX ADMIN — POLYETHYLENE GLYCOL 3350 17 GRAM(S): 17 POWDER, FOR SOLUTION ORAL at 05:40

## 2023-10-24 RX ADMIN — Medication 667 MILLIGRAM(S): at 17:04

## 2023-10-24 RX ADMIN — CHLORHEXIDINE GLUCONATE 1 APPLICATION(S): 213 SOLUTION TOPICAL at 05:39

## 2023-10-24 RX ADMIN — Medication 100 MILLIGRAM(S): at 17:07

## 2023-10-24 RX ADMIN — Medication 2 UNIT(S): at 17:07

## 2023-10-24 RX ADMIN — PANTOPRAZOLE SODIUM 40 MILLIGRAM(S): 20 TABLET, DELAYED RELEASE ORAL at 05:41

## 2023-10-24 RX ADMIN — POLYETHYLENE GLYCOL 3350 17 GRAM(S): 17 POWDER, FOR SOLUTION ORAL at 17:04

## 2023-10-24 RX ADMIN — HEPARIN SODIUM 5000 UNIT(S): 5000 INJECTION INTRAVENOUS; SUBCUTANEOUS at 05:38

## 2023-10-24 RX ADMIN — Medication 1: at 17:07

## 2023-10-24 NOTE — PROGRESS NOTE ADULT - SUBJECTIVE AND OBJECTIVE BOX
59 y/o male with hx of ETOH abuse, CKD with left wrist fistula, DM , HTN, HLD, presented to the ED for decreased urinary output and increased thirst    Today:  Seen at bedside, no new complaints.        REVIEW OF SYSTEMS:  No new complaints    MEDICATIONS  (STANDING):  calcium acetate 667 milliGRAM(s) Oral three times a day with meals  ceFAZolin   IVPB 1000 milliGRAM(s) IV Intermittent every 24 hours  chlorhexidine 2% Cloths 1 Application(s) Topical <User Schedule>  darbepoetin Injectable Syringe 6040 MICROGram(s) IV Push <User Schedule>  folic acid 1 milliGRAM(s) Oral daily  heparin   Injectable 5000 Unit(s) SubCutaneous every 8 hours  insulin glargine Injectable (LANTUS) 5 Unit(s) SubCutaneous at bedtime  insulin lispro (ADMELOG) corrective regimen sliding scale   SubCutaneous three times a day before meals  insulin lispro Injectable (ADMELOG) 2 Unit(s) SubCutaneous three times a day before meals  multivitamin 1 Tablet(s) Oral daily  pantoprazole    Tablet 40 milliGRAM(s) Oral before breakfast  polyethylene glycol 3350 17 Gram(s) Oral every 12 hours  senna 2 Tablet(s) Oral at bedtime    MEDICATIONS  (PRN):  aluminum hydroxide/magnesium hydroxide/simethicone Suspension 30 milliLiter(s) Oral every 4 hours PRN Dyspepsia  LORazepam   Injectable 2 milliGRAM(s) IV Push every 4 hours PRN CIWA-Ar score increase by 2 points and a total score of 7 or less  ondansetron Injectable 4 milliGRAM(s) IV Push every 8 hours PRN Nausea and/or Vomiting  vitamin A &amp; D Ointment 1 Application(s) Topical every 12 hours PRN dryness      Allergies  No Known Drug Allergies  Bananas (Nausea; Urticaria)  apple (Urticaria)        FAMILY HISTORY:  FH: diabetes mellitus (Father)        Vital Signs Last 24 Hrs  T(C): 36.2 (24 Oct 2023 13:50), Max: 36.9 (23 Oct 2023 19:42)  T(F): 97.1 (24 Oct 2023 13:50), Max: 98.5 (23 Oct 2023 19:42)  HR: 85 (24 Oct 2023 13:50) (80 - 95)  BP: 170/77 (24 Oct 2023 13:50) (135/73 - 186/91)  BP(mean): 102 (23 Oct 2023 18:54) (102 - 102)  RR: 18 (24 Oct 2023 13:50) (13 - 18)  SpO2: 100% (24 Oct 2023 13:50) (97% - 100%)    Parameters below as of 24 Oct 2023 10:25  Patient On (Oxygen Delivery Method): room air        PHYSICAL EXAM:  GENERAL: NAD  HEAD:  Atraumatic, Normocephalic  EYES: EOMI, PERRLA, conjunctiva and sclera clear  NERVOUS SYSTEM:  Alert & Oriented X3, no focal deficits   CHEST/LUNG:  bilateral rhonchi  HEART: Regular rate and rhythm; No murmurs, rubs, or gallops  ABDOMEN: Soft, Nontender, Nondistended; Bowel sounds present  EXTREMITIES:  2+ Peripheral Pulses, No clubbing, cyanosis, or edema      LABS:                        9.5    7.57  )-----------( 168      ( 24 Oct 2023 08:45 )             27.7     10-24    139  |  99  |  46<H>  ----------------------------<  105<H>  4.3   |  23  |  8.9<HH>    Ca    9.0      24 Oct 2023 08:45  Phos  5.4     10-23  Mg     1.8     10-24    TPro  7.1  /  Alb  3.3<L>  /  TBili  0.5  /  DBili  x   /  AST  35  /  ALT  23  /  AlkPhos  94  10-24      Urinalysis Basic - ( 24 Oct 2023 08:45 )    Color: x / Appearance: x / SG: x / pH: x  Gluc: 105 mg/dL / Ketone: x  / Bili: x / Urobili: x   Blood: x / Protein: x / Nitrite: x   Leuk Esterase: x / RBC: x / WBC x   Sq Epi: x / Non Sq Epi: x / Bacteria: x

## 2023-10-24 NOTE — PROGRESS NOTE ADULT - ASSESSMENT
59 y/o male with hx of ETOH abuse, CKD with left wrist fistula, DM , HTN, HLD, presented to the ED for decreased urinary output and increased thirst    # Abdominal distention / ileus    - resolved    # groin abscess  - s/p I+D   IV Ancef 1gm q24hrs (give post-HD on HD days)  - Follow up OR Cx, growing staph aureus    #R leg wound  - surgery consult appreciated, no need for wound vac, wound care orders adjusted   IV Ancef 1gm q24hrs (give post-HD on HD days)    # Anemia  - s/p PRBC    # acute on chronic Renal failure   - on HD (had AVF previously  -Follow with Dr. Lee    # Hyperglycemia / DKA   - off insulin drip -> sc insulin    # ETOH abuse   - thiamine and folic acid    Dispo:  Follow with ID for final abx plan and renal for need to continue HD, patient currently acute.

## 2023-10-24 NOTE — PROGRESS NOTE ADULT - ASSESSMENT
57 y/o male with hx of ETOH abuse, CKD with left wrist fistula, DM , HTN, HLD, presented to the ED for decreased urinary output and increased thirst. sp I&D on groin abscess     #sp I&D  - dressing changes   - follow ID recommendations for abx     #right calf ulcer  - wound care   - consider wound vac   - r/o nec fasc  59 y/o male with hx of ETOH abuse, CKD with left wrist fistula, DM , HTN, HLD, presented to the ED for decreased urinary output and increased thirst. sp I&D on groin abscess     POD# 4 s/p I&D right groin abscess and Right leg ulcer    - no further surgical intervention  - aquacelAg dressings; for right leg ulcer   -cont abx as per ID   59 y/o male with hx of ETOH abuse, CKD with left wrist fistula, DM , HTN, HLD, presented to the ED for decreased urinary output and increased thirst, admitted for renal failure, hyponatremic, now s/p I&D on groin abscess     POD# 4 s/p I&D right groin abscess and Right leg ulcer  - no further surgical intervention warranted at this time  - cont abx as per ID  - aquacelAg dressings; for right leg ulcer   - would recommend pt f/u outpt Dr. Rajput 2 weeks in office post-discharge    pt examined with and d/w Dr. Rajput

## 2023-10-24 NOTE — PROGRESS NOTE ADULT - SUBJECTIVE AND OBJECTIVE BOX
.  Patient seen & examined.  No acute events noted overnight.  Patient reports feeling well today and in no pain.   Patient tolerates diet well.    Patient denies subjective fever, chills, tremors, N/V/D, CP or SOB.       I&O's Detail    23 Oct 2023 07:01  -  24 Oct 2023 07:00  --------------------------------------------------------  IN:    dextrose 5% + sodium chloride 0.9% w/ Additives: 125 mL  Total IN: 125 mL    OUT:    Indwelling Catheter - Urethral (mL): 450 mL    Insulin: 0 mL    Voided (mL): 1300 mL  Total OUT: 1750 mL    Total NET: -1625 mL      24 Oct 2023 07:01  -  24 Oct 2023 15:56  --------------------------------------------------------  IN:  Total IN: 0 mL    OUT:    Other (mL): 2000 mL  Total OUT: 2000 mL    Total NET: -2000 mL            MEDICATIONS  (STANDING):  calcium acetate 667 milliGRAM(s) Oral three times a day with meals  ceFAZolin   IVPB 1000 milliGRAM(s) IV Intermittent every 24 hours  chlorhexidine 2% Cloths 1 Application(s) Topical <User Schedule>  darbepoetin Injectable Syringe 6040 MICROGram(s) IV Push <User Schedule>  folic acid 1 milliGRAM(s) Oral daily  heparin   Injectable 5000 Unit(s) SubCutaneous every 8 hours  insulin glargine Injectable (LANTUS) 5 Unit(s) SubCutaneous at bedtime  insulin lispro (ADMELOG) corrective regimen sliding scale   SubCutaneous three times a day before meals  insulin lispro Injectable (ADMELOG) 2 Unit(s) SubCutaneous three times a day before meals  multivitamin 1 Tablet(s) Oral daily  pantoprazole    Tablet 40 milliGRAM(s) Oral before breakfast  polyethylene glycol 3350 17 Gram(s) Oral every 12 hours  senna 2 Tablet(s) Oral at bedtime    MEDICATIONS  (PRN):  aluminum hydroxide/magnesium hydroxide/simethicone Suspension 30 milliLiter(s) Oral every 4 hours PRN Dyspepsia  LORazepam   Injectable 2 milliGRAM(s) IV Push every 4 hours PRN CIWA-Ar score increase by 2 points and a total score of 7 or less  ondansetron Injectable 4 milliGRAM(s) IV Push every 8 hours PRN Nausea and/or Vomiting  vitamin A &amp; D Ointment 1 Application(s) Topical every 12 hours PRN dryness        Vital Signs Last 24 Hrs  T(C): 36.2 (24 Oct 2023 13:50), Max: 36.9 (23 Oct 2023 19:42)  T(F): 97.1 (24 Oct 2023 13:50), Max: 98.5 (23 Oct 2023 19:42)  HR: 85 (24 Oct 2023 13:50) (80 - 95)  BP: 170/77 (24 Oct 2023 13:50) (135/73 - 186/91)  BP(mean): 102 (23 Oct 2023 18:54) (102 - 102)  RR: 18 (24 Oct 2023 13:50) (13 - 18)  SpO2: 100% (24 Oct 2023 13:50) (97% - 100%)    Parameters below as of 24 Oct 2023 10:25  Patient On (Oxygen Delivery Method): room air          Physical Exam:  General:  WD, WN, conversant in NAD.   Neck:  Supple, No JVD.  Chest:  Clear to auscultation bilaterally, Equal expansion bilaterally, equal breath sounds, No W/R/R.  CV:  S1 & S2, RRR, No M/R/G.   Abdomen:  + Bowel sounds, soft, no distention.  Non-tender,  no rebound/guarding or peritoneal signs.  Surgical dressing is clean dry and intact.   Extremities:  No C/C/E,  No calf tenderness B/L.  Right sided calf ulcer present, dressing is clean, dry and intact  Neuro: AxAxOx4, no focal deficits.         LABS:                        9.5    7.57  )-----------( 168      ( 24 Oct 2023 08:45 )             27.7     10-24    139  |  99  |  46<H>  ----------------------------<  105<H>  4.3   |  23  |  8.9<HH>    Ca    9.0      24 Oct 2023 08:45  Phos  5.4     10-23  Mg     1.8     10-24    TPro  7.1  /  Alb  3.3<L>  /  TBili  0.5  /  DBili  x   /  AST  35  /  ALT  23  /  AlkPhos  94  10-24      Urinalysis Basic - ( 24 Oct 2023 08:45 )    Color: x / Appearance: x / SG: x / pH: x  Gluc: 105 mg/dL / Ketone: x  / Bili: x / Urobili: x   Blood: x / Protein: x / Nitrite: x   Leuk Esterase: x / RBC: x / WBC x   Sq Epi: x / Non Sq Epi: x / Bacteria: x        . .  Patient seen & examined.  No acute events noted overnight.  Patient reports feeling well today and in no pain.   Patient tolerates diet well.    Patient denies subjective fever, chills, tremors, N/V/D, CP or SOB.       I&O's Detail    23 Oct 2023 07:01  -  24 Oct 2023 07:00  --------------------------------------------------------  IN:    dextrose 5% + sodium chloride 0.9% w/ Additives: 125 mL  Total IN: 125 mL    OUT:    Indwelling Catheter - Urethral (mL): 450 mL    Insulin: 0 mL    Voided (mL): 1300 mL  Total OUT: 1750 mL    Total NET: -1625 mL      24 Oct 2023 07:01  -  24 Oct 2023 15:56  --------------------------------------------------------  IN:  Total IN: 0 mL    OUT:    Other (mL): 2000 mL  Total OUT: 2000 mL    Total NET: -2000 mL            MEDICATIONS  (STANDING):  calcium acetate 667 milliGRAM(s) Oral three times a day with meals  ceFAZolin   IVPB 1000 milliGRAM(s) IV Intermittent every 24 hours  chlorhexidine 2% Cloths 1 Application(s) Topical <User Schedule>  darbepoetin Injectable Syringe 6040 MICROGram(s) IV Push <User Schedule>  folic acid 1 milliGRAM(s) Oral daily  heparin   Injectable 5000 Unit(s) SubCutaneous every 8 hours  insulin glargine Injectable (LANTUS) 5 Unit(s) SubCutaneous at bedtime  insulin lispro (ADMELOG) corrective regimen sliding scale   SubCutaneous three times a day before meals  insulin lispro Injectable (ADMELOG) 2 Unit(s) SubCutaneous three times a day before meals  multivitamin 1 Tablet(s) Oral daily  pantoprazole    Tablet 40 milliGRAM(s) Oral before breakfast  polyethylene glycol 3350 17 Gram(s) Oral every 12 hours  senna 2 Tablet(s) Oral at bedtime    MEDICATIONS  (PRN):  aluminum hydroxide/magnesium hydroxide/simethicone Suspension 30 milliLiter(s) Oral every 4 hours PRN Dyspepsia  LORazepam   Injectable 2 milliGRAM(s) IV Push every 4 hours PRN CIWA-Ar score increase by 2 points and a total score of 7 or less  ondansetron Injectable 4 milliGRAM(s) IV Push every 8 hours PRN Nausea and/or Vomiting  vitamin A &amp; D Ointment 1 Application(s) Topical every 12 hours PRN dryness        Vital Signs Last 24 Hrs  T(C): 36.2 (24 Oct 2023 13:50), Max: 36.9 (23 Oct 2023 19:42)  T(F): 97.1 (24 Oct 2023 13:50), Max: 98.5 (23 Oct 2023 19:42)  HR: 85 (24 Oct 2023 13:50) (80 - 95)  BP: 170/77 (24 Oct 2023 13:50) (135/73 - 186/91)  BP(mean): 102 (23 Oct 2023 18:54) (102 - 102)  RR: 18 (24 Oct 2023 13:50) (13 - 18)  SpO2: 100% (24 Oct 2023 13:50) (97% - 100%)    Parameters below as of 24 Oct 2023 10:25  Patient On (Oxygen Delivery Method): room air          Physical Exam:  General:  WD, WN, conversant in NAD.   Neck:  Supple,  Chest:  Clear to auscultation bilaterally, Equal expansion bilaterally, equal breath sounds, No W/R/R.  CV:  S1 & S2, RRR, No M/R/G.   Abdomen: soft, no distention.  Non-tender,  no rebound/guarding or peritoneal signs.  surgical dressing c/d/i overlying R groin area   Extremities:  No C/C/E,  No calf tenderness B/L. (+)  Right sided calf ulcer w/ overlying dressing c/d/i  Neuro: AxAxOx4, no focal deficits.         LABS:                        9.5    7.57  )-----------( 168      ( 24 Oct 2023 08:45 )             27.7     10-24    139  |  99  |  46<H>  ----------------------------<  105<H>  4.3   |  23  |  8.9<HH>    Ca    9.0      24 Oct 2023 08:45  Phos  5.4     10-23  Mg     1.8     10-24    TPro  7.1  /  Alb  3.3<L>  /  TBili  0.5  /  DBili  x   /  AST  35  /  ALT  23  /  AlkPhos  94  10-24      Urinalysis Basic - ( 24 Oct 2023 08:45 )    Color: x / Appearance: x / SG: x / pH: x  Gluc: 105 mg/dL / Ketone: x  / Bili: x / Urobili: x   Blood: x / Protein: x / Nitrite: x   Leuk Esterase: x / RBC: x / WBC x   Sq Epi: x / Non Sq Epi: x / Bacteria: x        .

## 2023-10-24 NOTE — PROGRESS NOTE ADULT - ATTENDING COMMENTS
I have seen and examined the patient.  I agree with the above assessment plan.  Continue dressings with Aquacel.  No further intervention.  Follow-up cultures per ID.  Follow-up in my office in 2 weeks is fine to continue reevaluating the wound healing upon discharge.

## 2023-10-24 NOTE — PROGRESS NOTE ADULT - ASSESSMENT
Stage 4 CKD secondary to DM, HTN, incomplete recovery from previous severe VAMSI  # Severe VAMSI on CKD d/t ?sepsis - started HD on 10/18/23 via LUE AVF   # HAGMA likely due to DKA / uremia, resolved  # Hyponatremia likely due to VAMSI  # Alcohol abuse    Recommendations:  - HD today, 3hrs, optiflux dialyzer, 3K+ bath, 2L UF as tolerated  - cont to monitor urine output  - cont phoslo , monitor Ca/PO4  - monitor H/H, start NAIDA wkly  - cont abx per ID recs; dose for iHD  - optimal glycemic control   - needs outpatient HD set up  Continue general care and management of the rest of comorbidities as per medicine/CC

## 2023-10-24 NOTE — PROGRESS NOTE ADULT - SUBJECTIVE AND OBJECTIVE BOX
Nephrology Progress Note    KECIA MARTIN  [id number]  58y  Male    S:  Patient is seen and examined, events over the last 24h noted.    O:  Allergies:  No Known Drug Allergies  Bananas (Nausea; Urticaria)  apple (Urticaria)    Hospital Medications:   MEDICATIONS  (STANDING):  calcium acetate 667 milliGRAM(s) Oral three times a day with meals  ceFAZolin   IVPB 1000 milliGRAM(s) IV Intermittent every 24 hours  chlorhexidine 2% Cloths 1 Application(s) Topical <User Schedule>  darbepoetin Injectable Syringe 6040 MICROGram(s) IV Push <User Schedule>  folic acid 1 milliGRAM(s) Oral daily  heparin   Injectable 5000 Unit(s) SubCutaneous every 8 hours  insulin glargine Injectable (LANTUS) 5 Unit(s) SubCutaneous at bedtime  insulin lispro (ADMELOG) corrective regimen sliding scale   SubCutaneous three times a day before meals  insulin lispro Injectable (ADMELOG) 2 Unit(s) SubCutaneous three times a day before meals  multivitamin 1 Tablet(s) Oral daily  pantoprazole    Tablet 40 milliGRAM(s) Oral before breakfast  polyethylene glycol 3350 17 Gram(s) Oral every 12 hours  senna 2 Tablet(s) Oral at bedtime    MEDICATIONS  (PRN):  aluminum hydroxide/magnesium hydroxide/simethicone Suspension 30 milliLiter(s) Oral every 4 hours PRN Dyspepsia  LORazepam   Injectable 2 milliGRAM(s) IV Push every 4 hours PRN CIWA-Ar score increase by 2 points and a total score of 7 or less  ondansetron Injectable 4 milliGRAM(s) IV Push every 8 hours PRN Nausea and/or Vomiting  vitamin A &amp; D Ointment 1 Application(s) Topical every 12 hours PRN dryness    Home Medications:  carvedilol 12.5 mg oral tablet: 1 orally once a day (17 Oct 2023 15:05)  gabapentin 600 mg oral tablet: 1 orally 3 times a day (17 Oct 2023 15:05)  Januvia 50 mg oral tablet: 1 orally once a day (17 Oct 2023 15:05)  rosuvastatin 10 mg oral capsule: 1 orally once a day (17 Oct 2023 15:05)  Vitamin B-100 oral tablet: 1 orally once a day (17 Oct 2023 15:05)      VITALS:  Daily     Daily Weight in k.1 (24 Oct 2023 05:24)  T(F): 97.1 (10-24-23 @ 13:50), Max: 98.5 (10-23-23 @ 19:42)  HR: 85 (10-24-23 @ 13:50)  BP: 170/77 (10-24-23 @ 13:50)  RR: 18 (10-24-23 @ 13:50)  SpO2: 100% (10-24-23 @ 13:50)  Wt(kg): --  I&O's Detail    23 Oct 2023 07:  -  24 Oct 2023 07:00  --------------------------------------------------------  IN:    dextrose 5% + sodium chloride 0.9% w/ Additives: 125 mL  Total IN: 125 mL    OUT:    Indwelling Catheter - Urethral (mL): 450 mL    Insulin: 0 mL    Voided (mL): 1300 mL  Total OUT: 1750 mL    Total NET: -1625 mL      24 Oct 2023 07:  -  24 Oct 2023 17:27  --------------------------------------------------------  IN:  Total IN: 0 mL    OUT:    Other (mL): 2000 mL  Total OUT: 2000 mL    Total NET: -2000 mL        I&O's Summary    23 Oct 2023 07:  -  24 Oct 2023 07:00  --------------------------------------------------------  IN: 125 mL / OUT: 1750 mL / NET: -1625 mL    24 Oct 2023 07:  -  24 Oct 2023 17:27  --------------------------------------------------------  IN: 0 mL / OUT: 2000 mL / NET: -2000 mL          PHYSICAL EXAM:  Gen: NAD  Chest: b/l breath sounds  Abd: soft  Extremities: no edema  Vascular access: left forearm AVF      LABS:    10-24    139  |  99  |  46<H>  ----------------------------<  105<H>  4.3   |  23  |  8.9<HH>    Ca    9.0      24 Oct 2023 08:45  Phos  5.4     10-23  Mg     1.8     10-24    TPro  7.1  /  Alb  3.3<L>  /  TBili  0.5  /  DBili      /  AST  35  /  ALT  23  /  AlkPhos  94  10-24      Phosphorus: 5.4 mg/dL (10-23-23 @ 06:18)  Phosphorus: 7.0 mg/dL (10-21-23 @ 05:45)                          9.5    7.57  )-----------( 168      ( 24 Oct 2023 08:45 )             27.7     Mean Cell Volume: 88.8 fL (10-24-23 @ 08:45)      Culture Results:   Numerous Staphylococcus aureus (10-20 @ 17:33)  Culture Results:   Numerous Staphylococcus aureus (10-20 @ 17:32)    Creatinine trend:  Creatinine: 8.9 mg/dL (10-24-23 @ 08:45)  Creatinine: 12.4 mg/dL (10-23-23 @ 06:18)  Creatinine: 11.3 mg/dL (10-22-23 @ 09:50)  Creatinine: 12.0 mg/dL (10-21-23 @ 05:45)  Creatinine: 10.8 mg/dL (10-20-23 @ 21:36)

## 2023-10-25 LAB
ALBUMIN SERPL ELPH-MCNC: 3.1 G/DL — LOW (ref 3.5–5.2)
ALBUMIN SERPL ELPH-MCNC: 3.1 G/DL — LOW (ref 3.5–5.2)
ALP SERPL-CCNC: 88 U/L — SIGNIFICANT CHANGE UP (ref 30–115)
ALP SERPL-CCNC: 88 U/L — SIGNIFICANT CHANGE UP (ref 30–115)
ALT FLD-CCNC: 16 U/L — SIGNIFICANT CHANGE UP (ref 0–41)
ALT FLD-CCNC: 16 U/L — SIGNIFICANT CHANGE UP (ref 0–41)
ANION GAP SERPL CALC-SCNC: 16 MMOL/L — HIGH (ref 7–14)
ANION GAP SERPL CALC-SCNC: 16 MMOL/L — HIGH (ref 7–14)
AST SERPL-CCNC: 31 U/L — SIGNIFICANT CHANGE UP (ref 0–41)
AST SERPL-CCNC: 31 U/L — SIGNIFICANT CHANGE UP (ref 0–41)
BASOPHILS # BLD AUTO: 0.02 K/UL — SIGNIFICANT CHANGE UP (ref 0–0.2)
BASOPHILS # BLD AUTO: 0.02 K/UL — SIGNIFICANT CHANGE UP (ref 0–0.2)
BASOPHILS NFR BLD AUTO: 0.3 % — SIGNIFICANT CHANGE UP (ref 0–1)
BASOPHILS NFR BLD AUTO: 0.3 % — SIGNIFICANT CHANGE UP (ref 0–1)
BILIRUB SERPL-MCNC: 0.3 MG/DL — SIGNIFICANT CHANGE UP (ref 0.2–1.2)
BILIRUB SERPL-MCNC: 0.3 MG/DL — SIGNIFICANT CHANGE UP (ref 0.2–1.2)
BUN SERPL-MCNC: 47 MG/DL — HIGH (ref 10–20)
BUN SERPL-MCNC: 47 MG/DL — HIGH (ref 10–20)
CALCIUM SERPL-MCNC: 9 MG/DL — SIGNIFICANT CHANGE UP (ref 8.4–10.5)
CALCIUM SERPL-MCNC: 9 MG/DL — SIGNIFICANT CHANGE UP (ref 8.4–10.5)
CHLORIDE SERPL-SCNC: 96 MMOL/L — LOW (ref 98–110)
CHLORIDE SERPL-SCNC: 96 MMOL/L — LOW (ref 98–110)
CO2 SERPL-SCNC: 25 MMOL/L — SIGNIFICANT CHANGE UP (ref 17–32)
CO2 SERPL-SCNC: 25 MMOL/L — SIGNIFICANT CHANGE UP (ref 17–32)
CREAT SERPL-MCNC: 10.7 MG/DL — CRITICAL HIGH (ref 0.7–1.5)
CREAT SERPL-MCNC: 10.7 MG/DL — CRITICAL HIGH (ref 0.7–1.5)
EGFR: 5 ML/MIN/1.73M2 — LOW
EGFR: 5 ML/MIN/1.73M2 — LOW
EOSINOPHIL # BLD AUTO: 0.45 K/UL — SIGNIFICANT CHANGE UP (ref 0–0.7)
EOSINOPHIL # BLD AUTO: 0.45 K/UL — SIGNIFICANT CHANGE UP (ref 0–0.7)
EOSINOPHIL NFR BLD AUTO: 6.6 % — SIGNIFICANT CHANGE UP (ref 0–8)
EOSINOPHIL NFR BLD AUTO: 6.6 % — SIGNIFICANT CHANGE UP (ref 0–8)
GLUCOSE BLDC GLUCOMTR-MCNC: 103 MG/DL — HIGH (ref 70–99)
GLUCOSE BLDC GLUCOMTR-MCNC: 103 MG/DL — HIGH (ref 70–99)
GLUCOSE BLDC GLUCOMTR-MCNC: 170 MG/DL — HIGH (ref 70–99)
GLUCOSE BLDC GLUCOMTR-MCNC: 170 MG/DL — HIGH (ref 70–99)
GLUCOSE BLDC GLUCOMTR-MCNC: 80 MG/DL — SIGNIFICANT CHANGE UP (ref 70–99)
GLUCOSE BLDC GLUCOMTR-MCNC: 80 MG/DL — SIGNIFICANT CHANGE UP (ref 70–99)
GLUCOSE BLDC GLUCOMTR-MCNC: 95 MG/DL — SIGNIFICANT CHANGE UP (ref 70–99)
GLUCOSE BLDC GLUCOMTR-MCNC: 95 MG/DL — SIGNIFICANT CHANGE UP (ref 70–99)
GLUCOSE BLDC GLUCOMTR-MCNC: 97 MG/DL — SIGNIFICANT CHANGE UP (ref 70–99)
GLUCOSE BLDC GLUCOMTR-MCNC: 97 MG/DL — SIGNIFICANT CHANGE UP (ref 70–99)
GLUCOSE SERPL-MCNC: 79 MG/DL — SIGNIFICANT CHANGE UP (ref 70–99)
GLUCOSE SERPL-MCNC: 79 MG/DL — SIGNIFICANT CHANGE UP (ref 70–99)
HCT VFR BLD CALC: 26.3 % — LOW (ref 42–52)
HCT VFR BLD CALC: 26.3 % — LOW (ref 42–52)
HGB BLD-MCNC: 8.9 G/DL — LOW (ref 14–18)
HGB BLD-MCNC: 8.9 G/DL — LOW (ref 14–18)
IMM GRANULOCYTES NFR BLD AUTO: 2.2 % — HIGH (ref 0.1–0.3)
IMM GRANULOCYTES NFR BLD AUTO: 2.2 % — HIGH (ref 0.1–0.3)
LYMPHOCYTES # BLD AUTO: 0.97 K/UL — LOW (ref 1.2–3.4)
LYMPHOCYTES # BLD AUTO: 0.97 K/UL — LOW (ref 1.2–3.4)
LYMPHOCYTES # BLD AUTO: 14.3 % — LOW (ref 20.5–51.1)
LYMPHOCYTES # BLD AUTO: 14.3 % — LOW (ref 20.5–51.1)
MAGNESIUM SERPL-MCNC: 1.9 MG/DL — SIGNIFICANT CHANGE UP (ref 1.8–2.4)
MAGNESIUM SERPL-MCNC: 1.9 MG/DL — SIGNIFICANT CHANGE UP (ref 1.8–2.4)
MCHC RBC-ENTMCNC: 30.7 PG — SIGNIFICANT CHANGE UP (ref 27–31)
MCHC RBC-ENTMCNC: 30.7 PG — SIGNIFICANT CHANGE UP (ref 27–31)
MCHC RBC-ENTMCNC: 33.8 G/DL — SIGNIFICANT CHANGE UP (ref 32–37)
MCHC RBC-ENTMCNC: 33.8 G/DL — SIGNIFICANT CHANGE UP (ref 32–37)
MCV RBC AUTO: 90.7 FL — SIGNIFICANT CHANGE UP (ref 80–94)
MCV RBC AUTO: 90.7 FL — SIGNIFICANT CHANGE UP (ref 80–94)
MONOCYTES # BLD AUTO: 0.74 K/UL — HIGH (ref 0.1–0.6)
MONOCYTES # BLD AUTO: 0.74 K/UL — HIGH (ref 0.1–0.6)
MONOCYTES NFR BLD AUTO: 10.9 % — HIGH (ref 1.7–9.3)
MONOCYTES NFR BLD AUTO: 10.9 % — HIGH (ref 1.7–9.3)
NEUTROPHILS # BLD AUTO: 4.45 K/UL — SIGNIFICANT CHANGE UP (ref 1.4–6.5)
NEUTROPHILS # BLD AUTO: 4.45 K/UL — SIGNIFICANT CHANGE UP (ref 1.4–6.5)
NEUTROPHILS NFR BLD AUTO: 65.7 % — SIGNIFICANT CHANGE UP (ref 42.2–75.2)
NEUTROPHILS NFR BLD AUTO: 65.7 % — SIGNIFICANT CHANGE UP (ref 42.2–75.2)
NRBC # BLD: 0 /100 WBCS — SIGNIFICANT CHANGE UP (ref 0–0)
NRBC # BLD: 0 /100 WBCS — SIGNIFICANT CHANGE UP (ref 0–0)
PHOSPHATE SERPL-MCNC: 5.7 MG/DL — HIGH (ref 2.1–4.9)
PHOSPHATE SERPL-MCNC: 5.7 MG/DL — HIGH (ref 2.1–4.9)
PLATELET # BLD AUTO: 162 K/UL — SIGNIFICANT CHANGE UP (ref 130–400)
PLATELET # BLD AUTO: 162 K/UL — SIGNIFICANT CHANGE UP (ref 130–400)
PMV BLD: 10.2 FL — SIGNIFICANT CHANGE UP (ref 7.4–10.4)
PMV BLD: 10.2 FL — SIGNIFICANT CHANGE UP (ref 7.4–10.4)
POTASSIUM SERPL-MCNC: 4.5 MMOL/L — SIGNIFICANT CHANGE UP (ref 3.5–5)
POTASSIUM SERPL-MCNC: 4.5 MMOL/L — SIGNIFICANT CHANGE UP (ref 3.5–5)
POTASSIUM SERPL-SCNC: 4.5 MMOL/L — SIGNIFICANT CHANGE UP (ref 3.5–5)
POTASSIUM SERPL-SCNC: 4.5 MMOL/L — SIGNIFICANT CHANGE UP (ref 3.5–5)
PROT SERPL-MCNC: 6.5 G/DL — SIGNIFICANT CHANGE UP (ref 6–8)
PROT SERPL-MCNC: 6.5 G/DL — SIGNIFICANT CHANGE UP (ref 6–8)
RBC # BLD: 2.9 M/UL — LOW (ref 4.7–6.1)
RBC # BLD: 2.9 M/UL — LOW (ref 4.7–6.1)
RBC # FLD: 14.2 % — SIGNIFICANT CHANGE UP (ref 11.5–14.5)
RBC # FLD: 14.2 % — SIGNIFICANT CHANGE UP (ref 11.5–14.5)
SODIUM SERPL-SCNC: 137 MMOL/L — SIGNIFICANT CHANGE UP (ref 135–146)
SODIUM SERPL-SCNC: 137 MMOL/L — SIGNIFICANT CHANGE UP (ref 135–146)
WBC # BLD: 6.78 K/UL — SIGNIFICANT CHANGE UP (ref 4.8–10.8)
WBC # BLD: 6.78 K/UL — SIGNIFICANT CHANGE UP (ref 4.8–10.8)
WBC # FLD AUTO: 6.78 K/UL — SIGNIFICANT CHANGE UP (ref 4.8–10.8)
WBC # FLD AUTO: 6.78 K/UL — SIGNIFICANT CHANGE UP (ref 4.8–10.8)

## 2023-10-25 PROCEDURE — 99233 SBSQ HOSP IP/OBS HIGH 50: CPT

## 2023-10-25 PROCEDURE — 99024 POSTOP FOLLOW-UP VISIT: CPT

## 2023-10-25 RX ORDER — INSULIN GLARGINE 100 [IU]/ML
0 INJECTION, SOLUTION SUBCUTANEOUS
Qty: 5 | Refills: 0
Start: 2023-10-25

## 2023-10-25 RX ORDER — INSULIN ASPART 100 [IU]/ML
0 INJECTION, SOLUTION SUBCUTANEOUS
Qty: 5 | Refills: 0
Start: 2023-10-25

## 2023-10-25 RX ORDER — METFORMIN HYDROCHLORIDE 850 MG/1
1 TABLET ORAL
Qty: 60 | Refills: 0
Start: 2023-10-25

## 2023-10-25 RX ORDER — INSULIN LISPRO 100/ML
0 VIAL (ML) SUBCUTANEOUS
Qty: 5 | Refills: 0
Start: 2023-10-25

## 2023-10-25 RX ORDER — PANTOPRAZOLE SODIUM 20 MG/1
40 TABLET, DELAYED RELEASE ORAL
Refills: 0 | Status: DISCONTINUED | OUTPATIENT
Start: 2023-10-25 | End: 2023-10-30

## 2023-10-25 RX ORDER — INSULIN DETEMIR 100/ML (3)
0 INSULIN PEN (ML) SUBCUTANEOUS
Qty: 5 | Refills: 0
Start: 2023-10-25

## 2023-10-25 RX ORDER — INSULIN LISPRO 100/ML
0 VIAL (ML) SUBCUTANEOUS
Qty: 1 | Refills: 0
Start: 2023-10-25

## 2023-10-25 RX ORDER — INSULIN NPH HUM/REG INSULIN HM 70-30/ML
0 VIAL (ML) SUBCUTANEOUS
Qty: 1 | Refills: 0
Start: 2023-10-25

## 2023-10-25 RX ORDER — GLUCAGON INJECTION, SOLUTION 0.5 MG/.1ML
0 INJECTION, SOLUTION SUBCUTANEOUS
Qty: 1 | Refills: 0
Start: 2023-10-25

## 2023-10-25 RX ADMIN — Medication 667 MILLIGRAM(S): at 08:10

## 2023-10-25 RX ADMIN — Medication 1 MILLIGRAM(S): at 11:45

## 2023-10-25 RX ADMIN — Medication 667 MILLIGRAM(S): at 11:46

## 2023-10-25 RX ADMIN — Medication 2 UNIT(S): at 11:47

## 2023-10-25 RX ADMIN — PANTOPRAZOLE SODIUM 40 MILLIGRAM(S): 20 TABLET, DELAYED RELEASE ORAL at 05:54

## 2023-10-25 RX ADMIN — PANTOPRAZOLE SODIUM 40 MILLIGRAM(S): 20 TABLET, DELAYED RELEASE ORAL at 17:02

## 2023-10-25 RX ADMIN — Medication 667 MILLIGRAM(S): at 16:58

## 2023-10-25 RX ADMIN — HEPARIN SODIUM 5000 UNIT(S): 5000 INJECTION INTRAVENOUS; SUBCUTANEOUS at 22:00

## 2023-10-25 RX ADMIN — HEPARIN SODIUM 5000 UNIT(S): 5000 INJECTION INTRAVENOUS; SUBCUTANEOUS at 14:23

## 2023-10-25 RX ADMIN — INSULIN GLARGINE 5 UNIT(S): 100 INJECTION, SOLUTION SUBCUTANEOUS at 22:00

## 2023-10-25 RX ADMIN — CHLORHEXIDINE GLUCONATE 1 APPLICATION(S): 213 SOLUTION TOPICAL at 05:55

## 2023-10-25 RX ADMIN — Medication 2 UNIT(S): at 16:59

## 2023-10-25 RX ADMIN — POLYETHYLENE GLYCOL 3350 17 GRAM(S): 17 POWDER, FOR SOLUTION ORAL at 17:03

## 2023-10-25 RX ADMIN — Medication 1 TABLET(S): at 11:46

## 2023-10-25 RX ADMIN — Medication 100 MILLIGRAM(S): at 16:58

## 2023-10-25 RX ADMIN — Medication 1: at 17:00

## 2023-10-25 RX ADMIN — SENNA PLUS 2 TABLET(S): 8.6 TABLET ORAL at 21:59

## 2023-10-25 RX ADMIN — HEPARIN SODIUM 5000 UNIT(S): 5000 INJECTION INTRAVENOUS; SUBCUTANEOUS at 05:54

## 2023-10-25 NOTE — PROGRESS NOTE ADULT - ASSESSMENT
59 y/o male with hx of ETOH abuse, CKD with left wrist fistula, NIDDM , HTN, HLD, presented to the ED for decreased urinary output and increased thirst since yesterday.    History provided by daughter at bedside    ID is following for infected RLE wound  Afebrile on admission, no leukocytosis  Sustained trauma at right lower leg 2 weeks ago, now with increasing abscess  BCx x2 pending  CT Ab/pelvis showed partially imaged right inguinal inflammatory change and mildly enlarged lymph nodes  CT RLE showed focal subcutaneous soft tissue thickening with associated inflammatory  changes along the right superficial inguinal region, suspicious for phlegmon    Antibiotics:  Cefepime 10/17 - 10/18  Vancomycin 10/17, 10/19  Zosyn 10/19 ->    Right LE wound is not overtly infected, but there is purulent drainage expressed from wound, Cx grew staph aureus  Concerning for cellulitis with possible early abscess formation at right groin, CT showed possible phlegmon formation, US of right groin suggests an abscess, s/p I&D 10/20  Acute encephalopathy possibly from uremia, improving  KUB 10/18 suggests ileus, improved now      IMPRESSION:  Right LE wound  Right groin abscess, s/p I&D 10/20  Ileus  Uremic encephalopathy  VAMSI on CKD  Alcohol abuse    RECOMMENDATIONS:  - Continue IV Ancef 1gm q24hrs (give post-HD on HD days), on discharge can switch to PO Keflex 500mg q24hrs to complete 14-day treatment post-I&D (until 11/2)  - Follow up OR Cx, growing MSSA  - Bowel regimen  - Nephrology for HD  - [facility] protocol  - Trend WBC, fever curve, transaminases, creatinine daily    Shaylee Olivas D.O.  Attending Physician  Division of Infectious Diseases  [facility]  Please contact me via [employer] Teams     59 y/o male with hx of ETOH abuse, CKD with left wrist fistula, NIDDM , HTN, HLD, presented to the ED for decreased urinary output and increased thirst since yesterday.    History provided by daughter at bedside    ID is following for infected RLE wound  Afebrile on admission, no leukocytosis  Sustained trauma at right lower leg 2 weeks ago, now with increasing abscess  BCx x2 pending  CT Ab/pelvis showed partially imaged right inguinal inflammatory change and mildly enlarged lymph nodes  CT RLE showed focal subcutaneous soft tissue thickening with associated inflammatory  changes along the right superficial inguinal region, suspicious for phlegmon    Antibiotics:  Cefepime 10/17 - 10/18  Vancomycin 10/17, 10/19  Zosyn 10/19 ->    Right LE wound is not overtly infected, but there is purulent drainage expressed from wound, Cx grew staph aureus  Concerning for cellulitis with possible early abscess formation at right groin, CT showed possible phlegmon formation, US of right groin suggests an abscess, s/p I&D 10/20  Acute encephalopathy possibly from uremia, improving  KUB 10/18 suggests ileus, improved now      IMPRESSION:  Right LE wound  Right groin abscess, s/p I&D 10/20  VAMSI on CKD  Alcohol abuse    RECOMMENDATIONS:  - Continue IV Ancef 1gm q24hrs (give post-HD on HD days), on discharge can switch to PO Keflex 500mg q24hrs to complete 14-day treatment post-I&D (until 11/2)  - Follow up OR Cx, growing MSSA  - Bowel regimen  - Nephrology for HD  - [facility] protocol  - Trend WBC, fever curve, transaminases, creatinine daily    Shaylee Olivas D.O.  Attending Physician  Division of Infectious Diseases  [facility]  Please contact me via [employer] Teams

## 2023-10-25 NOTE — PROGRESS NOTE ADULT - ASSESSMENT
57 y/o male with hx of ETOH abuse, CKD with left wrist fistula, DM , HTN, HLD, presented to the ED for decreased urinary output and increased thirst, admitted for renal failure, hyponatremic, now s/p I&D on groin abscess     POD# 4 s/p I&D right groin abscess and Right leg ulcer  - no further surgical intervention warranted at this time  - cont abx as per ID  - aquacelAg dressings; for right leg ulcer   - would recommend pt f/u outpt Dr. Rajput 2 weeks in office post-discharge 57 y/o male with hx of ETOH abuse, CKD with left wrist fistula, DM , HTN, HLD, presented to the ED for decreased urinary output and increased thirst, admitted for renal failure, hyponatremic, now s/p I&D on groin abscess     POD# 5 s/p I&D right groin abscess and Right leg ulcer  - no further surgical intervention warranted at this time  - cont abx as per ID  - aquacelAg dressings; for right leg ulcer   - wound vac not warranted at this time per Dr. Rajput  - would recommend pt f/u outpt Dr. Rajput 2 weeks in office post-discharge    pt d/w Dr. Rajput  Surgery will sign off. please recall surgery PRN

## 2023-10-25 NOTE — PROGRESS NOTE ADULT - ASSESSMENT
# Stage 4 CKD secondary to DM, HTN, incomplete recovery from previous severe VAMSI  # Severe VAMSI on CKD d/t ?sepsis - started HD on 10/18/23 via LUE AVF   # Hyponatremia likely due to VAMSI  # Alcohol abuse    Recommendations:  - HD tomorrow, 3hrs, optiflux dialyzer, 3K+ bath, 2L UF as tolerated  - monitor Ca/PO4;  add sevelamer 1 tab tid qac  - monitor H/H, started NAIDA wkly  - cont abx per ID recs; dose for iHD  - optimal glycemic control   - needs outpatient HD set up

## 2023-10-25 NOTE — CONSULT NOTE ADULT - REASON FOR ADMISSION
+ anaerobic blood culture attempted 2 provided numbers spoke with father, will reach out to son
Worsening Renal Failure
+ anaerobic blood culture attempted 2 provided numbers spoke with father, will reach out to son hospital contact number provided
Worsening Renal Failure
Worsening Renal Failure

## 2023-10-25 NOTE — CONSULT NOTE ADULT - CONSULT REQUESTED DATE/TIME
17-Oct-2023 21:03
19-Oct-2023 16:07
18-Oct-2023 08:57
18-Oct-2023 11:43
18-Oct-2023 08:22
25-Oct-2023 15:08
18-Oct-2023 13:30

## 2023-10-25 NOTE — PROGRESS NOTE ADULT - SUBJECTIVE AND OBJECTIVE BOX
.  Patient seen & examined.  No acute events noted overnight.  Patient reports feeling well today and in no pain.   Patient tolerates diet well.   Patient denies subjective fever, chills, tremors, N/V/D, CP or SOB.       I&O's Detail    24 Oct 2023 07:01  -  25 Oct 2023 07:00  --------------------------------------------------------  IN:  Total IN: 0 mL    OUT:    Other (mL): 2000 mL  Total OUT: 2000 mL    Total NET: -2000 mL            MEDICATIONS  (STANDING):  calcium acetate 667 milliGRAM(s) Oral three times a day with meals  ceFAZolin   IVPB 1000 milliGRAM(s) IV Intermittent every 24 hours  chlorhexidine 2% Cloths 1 Application(s) Topical <User Schedule>  darbepoetin Injectable Syringe 40 MICROGram(s) IV Push <User Schedule>  folic acid 1 milliGRAM(s) Oral daily  heparin   Injectable 5000 Unit(s) SubCutaneous every 8 hours  insulin glargine Injectable (LANTUS) 5 Unit(s) SubCutaneous at bedtime  insulin lispro (ADMELOG) corrective regimen sliding scale   SubCutaneous three times a day before meals  insulin lispro Injectable (ADMELOG) 2 Unit(s) SubCutaneous three times a day before meals  multivitamin 1 Tablet(s) Oral daily  pantoprazole    Tablet 40 milliGRAM(s) Oral before breakfast  polyethylene glycol 3350 17 Gram(s) Oral every 12 hours  senna 2 Tablet(s) Oral at bedtime    MEDICATIONS  (PRN):  aluminum hydroxide/magnesium hydroxide/simethicone Suspension 30 milliLiter(s) Oral every 4 hours PRN Dyspepsia  LORazepam   Injectable 2 milliGRAM(s) IV Push every 4 hours PRN CIWA-Ar score increase by 2 points and a total score of 7 or less  ondansetron Injectable 4 milliGRAM(s) IV Push every 8 hours PRN Nausea and/or Vomiting  vitamin A &amp; D Ointment 1 Application(s) Topical every 12 hours PRN dryness        Vital Signs Last 24 Hrs  T(C): 35.6 (25 Oct 2023 05:34), Max: 36.2 (24 Oct 2023 13:50)  T(F): 96 (25 Oct 2023 05:34), Max: 97.1 (24 Oct 2023 13:50)  HR: 94 (25 Oct 2023 05:34) (85 - 94)  BP: 150/73 (25 Oct 2023 05:34) (135/73 - 170/77)  BP(mean): --  RR: 18 (25 Oct 2023 05:34) (18 - 18)  SpO2: 100% (24 Oct 2023 13:50) (100% - 100%)    Parameters below as of 24 Oct 2023 10:25  Patient On (Oxygen Delivery Method): room air          Physical Exam:  General:  WD, WN, conversant in NAD.   Neck:  Supple, No JVD.  Chest:  Clear to auscultation bilaterally, Equal expansion bilaterally, equal breath sounds, No W/R/R.  CV:  S1 & S2, RRR, No M/R/G.   Abdomen: soft, no distention.  Non-tender,  no rebound/guarding or peritoneal signs.  surgical dressing c/d/i overlying R groin area   Extremities:  No C/C/E,  No calf tenderness B/L. (+)  Right sided calf ulcer w/ overlying dressing c/d/i  Neuro: AxAxOx4, no focal deficits.         LABS:                        8.9    6.78  )-----------( 162      ( 25 Oct 2023 04:30 )             26.3     10-24    139  |  99  |  46<H>  ----------------------------<  105<H>  4.3   |  23  |  8.9<HH>    Ca    9.0      24 Oct 2023 08:45  Mg     1.8     10-24    TPro  7.1  /  Alb  3.3<L>  /  TBili  0.5  /  DBili  x   /  AST  35  /  ALT  23  /  AlkPhos  94  10-24      Urinalysis Basic - ( 24 Oct 2023 08:45 )    Color: x / Appearance: x / SG: x / pH: x  Gluc: 105 mg/dL / Ketone: x  / Bili: x / Urobili: x   Blood: x / Protein: x / Nitrite: x   Leuk Esterase: x / RBC: x / WBC x   Sq Epi: x / Non Sq Epi: x / Bacteria: x        .

## 2023-10-25 NOTE — PROGRESS NOTE ADULT - TIME BILLING
Coordination of care
I have personally seen and examined this patient.    I have reviewed all pertinent clinical information and reviewed all relevant imaging and diagnostic studies personally.   I counseled the patient about diagnostic testing and treatment plan. All questions were answered.   I discussed recommendations with the primary team.

## 2023-10-25 NOTE — PROGRESS NOTE ADULT - SUBJECTIVE AND OBJECTIVE BOX
SUBJECTIVE:    Patient is a 58y old Male who presents with a chief complaint of Worsening Renal Failure (25 Oct 2023 08:31)    Currently admitted to medicine with the primary diagnosis of Acute renal failure       Today is hospital day 8d. This morning he is resting comfortably in bed and reports no new issues or overnight events.     ROS:   CONSTITUTIONAL: No weakness, fevers or chills   EYES/ENT: No visual changes; No vertigo or throat pain   NECK: No pain or stiffness   RESPIRATORY: No cough, wheezing, hemoptysis; No shortness of breath   CARDIOVASCULAR: No chest pain or palpitations   GASTROINTESTINAL: No abdominal or epigastric pain. No nausea, vomiting, or hematemesis; No diarrhea or constipation. No melena or hematochezia.  GENITOURINARY: No dysuria, frequency or hematuria  NEUROLOGICAL: No numbness or weakness  SKIN: No itching, rashes      PAST MEDICAL & SURGICAL HISTORY  Diabetes    Hypertension    HLD (hyperlipidemia)    Neuropathy    Glaucoma    Chronic kidney disease, unspecified CKD stage  requiring HD march 2022    ETOH abuse    H/O colonoscopy      SOCIAL HISTORY:    ALLERGIES:  No Known Drug Allergies  Bananas (Nausea; Urticaria)  apple (Urticaria)    MEDICATIONS:  STANDING MEDICATIONS  calcium acetate 667 milliGRAM(s) Oral three times a day with meals  ceFAZolin   IVPB 1000 milliGRAM(s) IV Intermittent every 24 hours  chlorhexidine 2% Cloths 1 Application(s) Topical <User Schedule>  darbepoetin Injectable Syringe 40 MICROGram(s) IV Push <User Schedule>  folic acid 1 milliGRAM(s) Oral daily  heparin   Injectable 5000 Unit(s) SubCutaneous every 8 hours  insulin glargine Injectable (LANTUS) 5 Unit(s) SubCutaneous at bedtime  insulin lispro (ADMELOG) corrective regimen sliding scale   SubCutaneous three times a day before meals  insulin lispro Injectable (ADMELOG) 2 Unit(s) SubCutaneous three times a day before meals  multivitamin 1 Tablet(s) Oral daily  pantoprazole    Tablet 40 milliGRAM(s) Oral before breakfast  polyethylene glycol 3350 17 Gram(s) Oral every 12 hours  senna 2 Tablet(s) Oral at bedtime    PRN MEDICATIONS  aluminum hydroxide/magnesium hydroxide/simethicone Suspension 30 milliLiter(s) Oral every 4 hours PRN  LORazepam   Injectable 2 milliGRAM(s) IV Push every 4 hours PRN  ondansetron Injectable 4 milliGRAM(s) IV Push every 8 hours PRN  vitamin A &amp; D Ointment 1 Application(s) Topical every 12 hours PRN    VITALS:   T(F): 96.7  HR: 98  BP: 154/76  RR: 18  SpO2: 100%    LABS:  Negative for smoking/alcohol/drug use.                         8.9    6.78  )-----------( 162      ( 25 Oct 2023 04:30 )             26.3     10-25    137  |  96<L>  |  47<H>  ----------------------------<  79  4.5   |  25  |  10.7<HH>    Ca    9.0      25 Oct 2023 04:30  Phos  5.7     10-25  Mg     1.9     10-25    TPro  6.5  /  Alb  3.1<L>  /  TBili  0.3  /  DBili  x   /  AST  31  /  ALT  16  /  AlkPhos  88  10-25      Urinalysis Basic - ( 25 Oct 2023 04:30 )    Color: x / Appearance: x / SG: x / pH: x  Gluc: 79 mg/dL / Ketone: x  / Bili: x / Urobili: x   Blood: x / Protein: x / Nitrite: x   Leuk Esterase: x / RBC: x / WBC x   Sq Epi: x / Non Sq Epi: x / Bacteria: x                RADIOLOGY:    PHYSICAL EXAM:  GEN: No acute distress  HEENT: normocephalic, atraumatic, aniceteric  LUNGS: bl breath sounds   HEART: S1/S2 present. RRR, no murmurs  ABD: Soft, non-tender, non-distended. Bowel sounds present  EXT: warm, RLE dressing clean and intact   NEURO: AAOX3, normal affect      ASSESSMENT AND PLAN:    57 y/o male with hx of ETOH abuse, CKD with left wrist fistula, DM , HTN, HLD, presented to the ED for decreased urinary output and increased thirst    # Abdominal distention / ileus    - resolved    # Groin abscess -  s/p I+D 10/20  # R Leg wound   - OR CLx of groin abscess-  Staph aureus , sensitivity noted   - per sx- no need for wound vac - fu with Dr. Rajput outpatient in 2 weeks   - ID FU  - IV Ancef 1gm q24hrs (give post-HD on HD days)    # VAMSI on CKD4  - started HD on 10/18/23 via LUE AVF   - on HD (had AVF previously)  - Follows with Dr. Lee  - Nephro fu - needs HD slot op?     # Acute on chronic Anemia   - no gross gib  - active type and screen   - s/p PRBC  - keep Hb > 8  - GI op fu   - PPI BID     # DKA , resolved   # H/O DM   - off insulin drip -> sc insulin  - Hba1c 8  - endo eval    # H/O  ETOH abuse - thiamine and folic acid    # dvt/gi ppx/diet  # dispo: acute (rehab vs home PT)  # handoff: pending id fu / nephro fu ? op HD ? / endo eval      SUBJECTIVE:    Patient is a 58y old Male who presents with a chief complaint of Worsening Renal Failure (25 Oct 2023 08:31)    Currently admitted to medicine with the primary diagnosis of Acute renal failure       Today is hospital day 8d. This morning he is resting comfortably in bed and reports no new issues or overnight events.     ROS:   CONSTITUTIONAL: No weakness, fevers or chills   EYES/ENT: No visual changes; No vertigo or throat pain   NECK: No pain or stiffness   RESPIRATORY: No cough, wheezing, hemoptysis; No shortness of breath   CARDIOVASCULAR: No chest pain or palpitations   GASTROINTESTINAL: No abdominal or epigastric pain. No nausea, vomiting, or hematemesis; No diarrhea or constipation. No melena or hematochezia.  GENITOURINARY: No dysuria, frequency or hematuria  NEUROLOGICAL: No numbness or weakness  SKIN: No itching, rashes      PAST MEDICAL & SURGICAL HISTORY  Diabetes    Hypertension    HLD (hyperlipidemia)    Neuropathy    Glaucoma    Chronic kidney disease, unspecified CKD stage  requiring HD march 2022    ETOH abuse    H/O colonoscopy      SOCIAL HISTORY:    ALLERGIES:  No Known Drug Allergies  Bananas (Nausea; Urticaria)  apple (Urticaria)    MEDICATIONS:  STANDING MEDICATIONS  calcium acetate 667 milliGRAM(s) Oral three times a day with meals  ceFAZolin   IVPB 1000 milliGRAM(s) IV Intermittent every 24 hours  chlorhexidine 2% Cloths 1 Application(s) Topical <User Schedule>  darbepoetin Injectable Syringe 40 MICROGram(s) IV Push <User Schedule>  folic acid 1 milliGRAM(s) Oral daily  heparin   Injectable 5000 Unit(s) SubCutaneous every 8 hours  insulin glargine Injectable (LANTUS) 5 Unit(s) SubCutaneous at bedtime  insulin lispro (ADMELOG) corrective regimen sliding scale   SubCutaneous three times a day before meals  insulin lispro Injectable (ADMELOG) 2 Unit(s) SubCutaneous three times a day before meals  multivitamin 1 Tablet(s) Oral daily  pantoprazole    Tablet 40 milliGRAM(s) Oral before breakfast  polyethylene glycol 3350 17 Gram(s) Oral every 12 hours  senna 2 Tablet(s) Oral at bedtime    PRN MEDICATIONS  aluminum hydroxide/magnesium hydroxide/simethicone Suspension 30 milliLiter(s) Oral every 4 hours PRN  LORazepam   Injectable 2 milliGRAM(s) IV Push every 4 hours PRN  ondansetron Injectable 4 milliGRAM(s) IV Push every 8 hours PRN  vitamin A &amp; D Ointment 1 Application(s) Topical every 12 hours PRN    VITALS:   T(F): 96.7  HR: 98  BP: 154/76  RR: 18  SpO2: 100%    LABS:  Negative for smoking/alcohol/drug use.                         8.9    6.78  )-----------( 162      ( 25 Oct 2023 04:30 )             26.3     10-25    137  |  96<L>  |  47<H>  ----------------------------<  79  4.5   |  25  |  10.7<HH>    Ca    9.0      25 Oct 2023 04:30  Phos  5.7     10-25  Mg     1.9     10-25    TPro  6.5  /  Alb  3.1<L>  /  TBili  0.3  /  DBili  x   /  AST  31  /  ALT  16  /  AlkPhos  88  10-25      Urinalysis Basic - ( 25 Oct 2023 04:30 )    Color: x / Appearance: x / SG: x / pH: x  Gluc: 79 mg/dL / Ketone: x  / Bili: x / Urobili: x   Blood: x / Protein: x / Nitrite: x   Leuk Esterase: x / RBC: x / WBC x   Sq Epi: x / Non Sq Epi: x / Bacteria: x                RADIOLOGY:    PHYSICAL EXAM:  GEN: No acute distress  HEENT: normocephalic, atraumatic, aniceteric  LUNGS: bl breath sounds   HEART: S1/S2 present. RRR, no murmurs  ABD: Soft, non-tender, non-distended. Bowel sounds present  EXT: warm, RLE dressing clean and intact   NEURO: AAOX3, normal affect      ASSESSMENT AND PLAN:    59 y/o male with hx of ETOH abuse, CKD with left wrist fistula, DM , HTN, HLD, presented to the ED for decreased urinary output and increased thirst    # Abdominal distention / ileus    - resolved    # Groin abscess -  s/p I+D 10/20  # R Leg wound   - OR CLx of groin abscess-  Staph aureus , sensitivity noted   - per sx- no need for wound vac - fu with Dr. Rajput outpatient in 2 weeks   - Spoke to ID - can switch to po keflex 500 mg q24h to complete 14 day course from I&D upon dc (until 11/2) , awaiting official fu   - IV Ancef 1gm q24hrs (give post-HD on HD days)    # VAMSI on CKD4  - started HD on 10/18/23 via LUE AVF   - on HD (had AVF previously)  - Follows with Dr. Lee  - Nephro fu - needs HD slot op?     # Acute on chronic Anemia   - no gross gib  - active type and screen   - s/p PRBC  - keep Hb > 8  - GI op fu   - PPI BID     # DKA , resolved   # H/O DM   - off insulin drip -> sc insulin  - Hba1c 8  - endo eval    # H/O  ETOH abuse - thiamine and folic acid    # dvt/gi ppx/diet  # dispo: acute (rehab vs home PT)  # handoff: pending id fu / nephro fu ? op HD ? / endo eval

## 2023-10-25 NOTE — CONSULT NOTE ADULT - SUBJECTIVE AND OBJECTIVE BOX
Patient is a 58y old Male with a PMH of T2 DM,HTN, HLD who presents with a chief complaint of Worsening Renal Failure (25 Oct 2023 13:43)    Primary diagnosis of Acute renal failure       Today is hospital day 8d. This morning patient was seen and examined at bedside, resting comfortably in bed.    No acute or major events overnight.    PAST MEDICAL & SURGICAL HISTORY  Diabetes    Hypertension    HLD (hyperlipidemia)    Neuropathy    Glaucoma    Chronic kidney disease, unspecified CKD stage  requiring HD march 2022    ETOH abuse    H/O colonoscopy      SOCIAL HISTORY:  Social History:      ALLERGIES:  No Known Drug Allergies  Bananas (Nausea; Urticaria)  apple (Urticaria)    MEDICATIONS:  STANDING MEDICATIONS  calcium acetate 667 milliGRAM(s) Oral three times a day with meals  ceFAZolin   IVPB 1000 milliGRAM(s) IV Intermittent every 24 hours  chlorhexidine 2% Cloths 1 Application(s) Topical <User Schedule>  darbepoetin Injectable Syringe 40 MICROGram(s) IV Push <User Schedule>  folic acid 1 milliGRAM(s) Oral daily  heparin   Injectable 5000 Unit(s) SubCutaneous every 8 hours  insulin glargine Injectable (LANTUS) 5 Unit(s) SubCutaneous at bedtime  insulin lispro (ADMELOG) corrective regimen sliding scale   SubCutaneous three times a day before meals  insulin lispro Injectable (ADMELOG) 2 Unit(s) SubCutaneous three times a day before meals  multivitamin 1 Tablet(s) Oral daily  pantoprazole    Tablet 40 milliGRAM(s) Oral two times a day  polyethylene glycol 3350 17 Gram(s) Oral every 12 hours  senna 2 Tablet(s) Oral at bedtime    PRN MEDICATIONS  aluminum hydroxide/magnesium hydroxide/simethicone Suspension 30 milliLiter(s) Oral every 4 hours PRN  LORazepam   Injectable 2 milliGRAM(s) IV Push every 4 hours PRN  ondansetron Injectable 4 milliGRAM(s) IV Push every 8 hours PRN  vitamin A &amp; D Ointment 1 Application(s) Topical every 12 hours PRN    VITALS:   T(F): 96.7  HR: 98  BP: 154/76  RR: 18  SpO2: 100%    PHYSICAL EXAM:  GENERAL: NAD, well-groomed, well-developed  HEAD:  Atraumatic, Normocephalic  EYES: EOMI  NECK: Supple  NERVOUS SYSTEM:  Alert & Oriented X3, non focal   CHEST/LUNG: Clear to auscultation bilaterally; No rales, rhonchi, wheezing, or rubs  HEART: Regular rate and rhythm; No murmurs, rubs, or gallops  ABDOMEN: Soft, Nontender, Nondistended; Bowel sounds present  EXTREMITIES:  2+ Peripheral Pulses, No clubbing, cyanosis, or edema  LYMPH: No lymphadenopathy noted  SKIN: No rashes or lesions  LABS:                        8.9    6.78  )-----------( 162      ( 25 Oct 2023 04:30 )             26.3     10-25    137  |  96<L>  |  47<H>  ----------------------------<  79  4.5   |  25  |  10.7<HH>    Ca    9.0      25 Oct 2023 04:30  Phos  5.7     10-25  Mg     1.9     10-25    TPro  6.5  /  Alb  3.1<L>  /  TBili  0.3  /  DBili  x   /  AST  31  /  ALT  16  /  AlkPhos  88  10-25      Urinalysis Basic - ( 25 Oct 2023 04:30 )    Color: x / Appearance: x / SG: x / pH: x  Gluc: 79 mg/dL / Ketone: x  / Bili: x / Urobili: x   Blood: x / Protein: x / Nitrite: x   Leuk Esterase: x / RBC: x / WBC x   Sq Epi: x / Non Sq Epi: x / Bacteria: x                RADIOLOGY:

## 2023-10-25 NOTE — PROGRESS NOTE ADULT - SUBJECTIVE AND OBJECTIVE BOX
INFECTIOUS DISEASE FOLLOW UP NOTE:    Interval History/ROS: Patient is a 58y old  Male who presents with a chief complaint of Worsening Renal Failure (25 Oct 2023 18:46)      Overnight events: No overnight event.    REVIEW OF SYSTEMS:  CONSTITUTIONAL: No fever or chills  HEAD: No lesion on scalp  EYES: No visual disturbance  ENT: No sore throat  RESPIRATORY: No cough, no shortness of breath  CARDIOVASCULAR: No chest pain or palpitations  GASTROINTESTINAL: No abdominal or epigastric pain  GENITOURINARY: No dysuria  NEUROLOGICAL: No headache/dizziness  MUSCULOSKELETAL: No joint pain, erythema, or swelling; no back pain  SKIN: Right groin induration, right LE wound  All other ROS negative except noted above      Prior hospital charts reviewed [Yes]  Primary team notes reviewed [Yes]  Other consultant notes reviewed [Yes]    Allergies:  No Known Drug Allergies  Bananas (Nausea; Urticaria)  apple (Urticaria)      ANTIMICROBIALS:   ceFAZolin   IVPB 1000 every 24 hours      OTHER MEDS: MEDICATIONS  (STANDING):  aluminum hydroxide/magnesium hydroxide/simethicone Suspension 30 every 4 hours PRN  darbepoetin Injectable Syringe 40 <User Schedule>  heparin   Injectable 5000 every 8 hours  insulin glargine Injectable (LANTUS) 5 at bedtime  insulin lispro (ADMELOG) corrective regimen sliding scale  three times a day before meals  insulin lispro Injectable (ADMELOG) 2 three times a day before meals  LORazepam   Injectable 2 every 4 hours PRN  ondansetron Injectable 4 every 8 hours PRN  pantoprazole    Tablet 40 two times a day  polyethylene glycol 3350 17 every 12 hours  senna 2 at bedtime      Vital Signs Last 24 Hrs  T(F): 96.3 (10-25-23 @ 20:00), Max: 99.7 (10-19-23 @ 23:00)    Vital Signs Last 24 Hrs  HR: 93 (10-25-23 @ 20:00) (92 - 98)  BP: 157/73 (10-25-23 @ 20:00) (136/68 - 157/73)  RR: 17 (10-25-23 @ 20:00)  SpO2: 97% (10-25-23 @ 20:00) (97% - 100%)  Wt(kg): --    EXAM:  GENERAL: NAD, sitting in chair  HEAD: No head lesions  EYES: Conjunctiva pink and cornea white  EAR, NOSE, MOUTH, THROAT: Normal external ears and nose, no discharges; moist mucous membranes  NECK: Supple, nontender to palpation; no JVD  RESPIRATORY: Bibasilar crackles  CARDIOVASCULAR: S1 S2  GASTROINTESTINAL: Soft, nontender, nondistended; normoactive bowel sounds  EXTREMITIES: No clubbing, cyanosis, 1+ bilateral petal edema  NERVOUS SYSTEM: Alert and oriented to person, time, place and situation, speech clear. No focal deficits   MUSCULOSKELETAL: No joint erythema, swelling or pain  SKIN: Right groin surgical site with serosanguinous drainage, still has induration. Right LE wound wrapped with ACE, no superficial thrombophlebitis  PSYCH: Normal affect, calm    Labs:                        8.9    6.78  )-----------( 162      ( 25 Oct 2023 04:30 )             26.3     10-25    137  |  96<L>  |  47<H>  ----------------------------<  79  4.5   |  25  |  10.7<HH>    Ca    9.0      25 Oct 2023 04:30  Phos  5.7     10-25  Mg     1.9     10-25    TPro  6.5  /  Alb  3.1<L>  /  TBili  0.3  /  DBili  x   /  AST  31  /  ALT  16  /  AlkPhos  88  10-25      WBC Trend:  WBC Count: 6.78 (10-25-23 @ 04:30)  WBC Count: 7.57 (10-24-23 @ 08:45)  WBC Count: 6.68 (10-23-23 @ 06:18)  WBC Count: 5.81 (10-22-23 @ 09:50)      Creatine Trend:  Creatinine: 10.7 (10-25)  Creatinine: 8.9 (10-24)  Creatinine: 12.4 (10-23)  Creatinine: 11.3 (10-22)      Liver Biochemical Testing Trend:  Alanine Aminotransferase (ALT/SGPT): 16 (10-25)  Alanine Aminotransferase (ALT/SGPT): 23 (10-24)  Alanine Aminotransferase (ALT/SGPT): 25 (10-23)  Alanine Aminotransferase (ALT/SGPT): 30 (10-22)  Alanine Aminotransferase (ALT/SGPT): 35 (10-21)  Aspartate Aminotransferase (AST/SGOT): 31 (10-25-23 @ 04:30)  Aspartate Aminotransferase (AST/SGOT): 35 (10-24-23 @ 08:45)  Aspartate Aminotransferase (AST/SGOT): 34 (10-23-23 @ 06:18)  Aspartate Aminotransferase (AST/SGOT): 42 (10-22-23 @ 09:50)  Aspartate Aminotransferase (AST/SGOT): 61 (10-21-23 @ 05:45)  Bilirubin Total: 0.3 (10-25)  Bilirubin Total: 0.5 (10-24)  Bilirubin Total: 0.5 (10-23)  Bilirubin Total: 0.6 (10-22)  Bilirubin Total: 0.6 (10-21)      Trend LDH      Urinalysis Basic - ( 25 Oct 2023 04:30 )    Color: x / Appearance: x / SG: x / pH: x  Gluc: 79 mg/dL / Ketone: x  / Bili: x / Urobili: x   Blood: x / Protein: x / Nitrite: x   Leuk Esterase: x / RBC: x / WBC x   Sq Epi: x / Non Sq Epi: x / Bacteria: x        MICROBIOLOGY:  Vancomycin Level, Trough: 17.2 (10-24 @ 10:55)  Vancomycin Level, Trough: 20.7 (10-21 @ 11:00)  Vancomycin Level, Random: 22.8 (10-20 @ 07:00)  Vancomycin Level, Trough: 13.5 (10-19 @ 06:30)        Culture - Other (collected 20 Oct 2023 17:33)  Source: .Other None  Final Report:    Numerous Staphylococcus aureus  Organism: Staphylococcus aureus  Organism: Staphylococcus aureus    Sensitivities:      -  Clindamycin: S <=0.25      -  Oxacillin: S 0.5 Oxacillin predicts susceptibility for dicloxacillin, methicillin, and nafcillin      -  Gentamicin: S <=1 Should not be used as monotherapy      -  Cefazolin: S <=4      -  Vancomycin: S 2      -  Tetracycline: S <=1      Method Type: NARCISO      -  Ampicillin/Sulbactam: S <=8/4      -  Penicillin: R >8      -  Rifampin: S <=1 Should not be used as monotherapy      -  Erythromycin: S <=0.25      -  Trimethoprim/Sulfamethoxazole: S <=0.5/9.5    Culture - Other (collected 20 Oct 2023 17:32)  Source: .Other RIGHT GROIN  Final Report:    Numerous Staphylococcus aureus  Organism: Staphylococcus aureus  Organism: Staphylococcus aureus    Sensitivities:      -  Clindamycin: S 0.5      -  Oxacillin: S 0.5 Oxacillin predicts susceptibility for dicloxacillin, methicillin, and nafcillin      -  Gentamicin: S <=1 Should not be used as monotherapy      -  Cefazolin: S <=4      -  Vancomycin: S 2      -  Tetracycline: S <=1      Method Type: NARCISO      -  Ampicillin/Sulbactam: S <=8/4      -  Penicillin: R >8      -  Rifampin: S <=1 Should not be used as monotherapy      -  Erythromycin: S <=0.25      -  Trimethoprim/Sulfamethoxazole: S <=0.5/9.5    Culture - Abscess with Gram Stain (collected 18 Oct 2023 14:20)  Source: .Abscess Leg - Right  Final Report:    Moderate Staphylococcus aureus  Organism: Staphylococcus aureus  Organism: Staphylococcus aureus    Sensitivities:      -  Levofloxacin: S <=0.5      -  Clindamycin: S 0.5      -  Oxacillin: S 0.5 Oxacillin predicts susceptibility for dicloxacillin, methicillin, and nafcillin      -  Gentamicin: S 2 Should not be used as monotherapy      -  Daptomycin: S 1      -  Linezolid: S 4      -  Cefazolin: S <=4      -  Moxifloxacin: S <=0.5      -  Vancomycin: S 2      -  Ciprofloxacin: S <=1      -  Ceftriaxone: S <=4      -  Ampicillin: R >8      -  Tetracycline: S <=1      Method Type: NARCISO      -  Meropenem: S <=2      -  Ampicillin/Sulbactam: S <=8/4      -  Penicillin: R >8      -  Rifampin: S <=1 Should not be used as monotherapy      -  Erythromycin: S <=0.25      -  Amoxicillin/Clavulanic Acid: S <=4/2      -  Trimethoprim/Sulfamethoxazole: S <=0.5/9.5    Culture - Blood (collected 17 Oct 2023 14:30)  Source: .Blood Blood-Peripheral  Final Report:    No growth at 5 days    Culture - Blood (collected 17 Oct 2023 14:30)  Source: .Blood Blood-Peripheral  Final Report:    No growth at 5 days    Culture - Urine (collected 17 Oct 2023 13:53)  Source: Clean Catch Clean Catch (Midstream)  Final Report:    No growth    Culture - Blood (collected 29 Nov 2022 06:40)  Source: .Blood None  Final Report:    No Growth Final    Culture - Blood (collected 28 Nov 2022 06:28)  Source: .Blood None  Final Report:    No Growth Final    Culture - Blood (collected 27 Nov 2022 07:33)  Source: .Blood None  Final Report:    No Growth Final    Culture - Blood (collected 25 Nov 2022 08:37)  Source: .Blood None  Final Report:    Growth in aerobic and anaerobic bottles: Staphylococcus aureus    See previous culture [id number]      RADIOLOGY:  imaging below personally reviewed    < from: Xray Chest 1 View- PORTABLE-Routine (Xray Chest 1 View- PORTABLE-Routine in AM.) (10.21.23 @ 07:17) >  Impression:    Interval increase in bilateral lung volumes, previously low.  Interval   improvement of pulmonary vascular congestion, otherwise no radiographic   evidence of acute cardiopulmonary disease.    < end of copied text >

## 2023-10-25 NOTE — CONSULT NOTE ADULT - ASSESSMENT
58y old Male with a PMH of T2 DM,HTN, HLD who presents with a chief complaint of Worsening Renal Failure. Currently ESRD on dialysis , received Insulin infusion temporarily     #Diabetes Mellitus  - home regimen appears to be januvia 50 mg daily   - came in with high blood glucose readings s/p insulin infusion for ketoacidosis ( also had severe renal failure at the time)   -   58y old Male with a PMH of T2 DM,HTN, HLD who presents with a chief complaint of Worsening Renal Failure. Currently ESRD on dialysis , received Insulin infusion temporarily     #Diabetes Mellitus  - home regimen appears to be januvia 50 mg daily   - came in with high blood glucose readings s/p insulin infusion for ketoacidosis ( also had severe renal failure at the time)   - A1c 8 unclear reliability in the setting of renal failure and anemia  - currently fairly controlled on current regimen, recommend to continue the same  - recommend to obtain a c-peptide with serum glucose  - Discussed with the patient, in the setting of renal failure, oral options are limited, with recent ketoacidosis ( might be secondary to renal failure) reasonable to cointinue insulin which patient is agreeable to, please initiate bedside teaching, can continue current regimen on DC  - MAy benefit from a CGM outpatient , can consider a GLP1 analog ouptatient too however considering alcohol abuse history ( from chart ) , may need to be started with careful consideration

## 2023-10-25 NOTE — PROGRESS NOTE ADULT - ATTENDING COMMENTS
I have seen and examined the patient.  There is no clinical change.  The patient may follow-up with me in my office upon discharge.  We will sign off now as inpatient service.  Please reconsult or call for any questions.  Thank you very much.

## 2023-10-25 NOTE — CONSULT NOTE ADULT - CONSULT REASON
ileus
r/o nec fas; RLE abscess
Leg wound
VAMSI on CKD, acidosis
VAMSI, on CKD
Alcohol Dependency
DKA

## 2023-10-25 NOTE — PROGRESS NOTE ADULT - SUBJECTIVE AND OBJECTIVE BOX
Nephrology Progress Note    KECIA MARTIN  MRN-[id number]  58y  Male    S:  Patient is seen and examined, events over the last 24h noted.    O:  Allergies:  No Known Drug Allergies  Bananas (Nausea; Urticaria)  apple (Urticaria)    Hospital Medications:   MEDICATIONS  (STANDING):  calcium acetate 667 milliGRAM(s) Oral three times a day with meals  ceFAZolin   IVPB 1000 milliGRAM(s) IV Intermittent every 24 hours  chlorhexidine 2% Cloths 1 Application(s) Topical <User Schedule>  darbepoetin Injectable Syringe 40 MICROGram(s) IV Push <User Schedule>  folic acid 1 milliGRAM(s) Oral daily  heparin   Injectable 5000 Unit(s) SubCutaneous every 8 hours  insulin glargine Injectable (LANTUS) 5 Unit(s) SubCutaneous at bedtime  insulin lispro (ADMELOG) corrective regimen sliding scale   SubCutaneous three times a day before meals  insulin lispro Injectable (ADMELOG) 2 Unit(s) SubCutaneous three times a day before meals  multivitamin 1 Tablet(s) Oral daily  pantoprazole    Tablet 40 milliGRAM(s) Oral two times a day  polyethylene glycol 3350 17 Gram(s) Oral every 12 hours  senna 2 Tablet(s) Oral at bedtime    MEDICATIONS  (PRN):  aluminum hydroxide/magnesium hydroxide/simethicone Suspension 30 milliLiter(s) Oral every 4 hours PRN Dyspepsia  LORazepam   Injectable 2 milliGRAM(s) IV Push every 4 hours PRN CIWA-Ar score increase by 2 points and a total score of 7 or less  ondansetron Injectable 4 milliGRAM(s) IV Push every 8 hours PRN Nausea and/or Vomiting  vitamin A &amp; D Ointment 1 Application(s) Topical every 12 hours PRN dryness    Home Medications:  carvedilol 12.5 mg oral tablet: 1 orally once a day (17 Oct 2023 15:05)  gabapentin 600 mg oral tablet: 1 orally 3 times a day (17 Oct 2023 15:05)  Januvia 50 mg oral tablet: 1 orally once a day (17 Oct 2023 15:05)  rosuvastatin 10 mg oral capsule: 1 orally once a day (17 Oct 2023 15:05)  Vitamin B-100 oral tablet: 1 orally once a day (17 Oct 2023 15:05)      VITALS:  Daily     Daily Weight in k.1 (25 Oct 2023 05:34)  T(F): 96.7 (10-25-23 @ 13:01), Max: 96.7 (10-25-23 @ 13:01)  HR: 98 (10-25-23 @ 13:01)  BP: 154/76 (10-25-23 @ 13:01)  RR: 18 (10-25-23 @ 13:01)  SpO2: 100% (10-25-23 @ 13:01)  Wt(kg): --  I&O's Detail    24 Oct 2023 07:  -  25 Oct 2023 07:00  --------------------------------------------------------  IN:  Total IN: 0 mL    OUT:    Other (mL): 2000 mL  Total OUT: 2000 mL    Total NET: -2000 mL      25 Oct 2023 07:  -  25 Oct 2023 18:46  --------------------------------------------------------  IN:  Total IN: 0 mL    OUT:    Voided (mL): 375 mL  Total OUT: 375 mL    Total NET: -375 mL        I&O's Summary    24 Oct 2023 07:  -  25 Oct 2023 07:00  --------------------------------------------------------  IN: 0 mL / OUT: 2000 mL / NET: -2000 mL    25 Oct 2023 07:  -  25 Oct 2023 18:46  --------------------------------------------------------  IN: 0 mL / OUT: 375 mL / NET: -375 mL          PHYSICAL EXAM:  Gen: NAD  Chest: b/l breath sounds  Abd: soft  Extremities: no edema  Vascular access: left forearm avf      LABS:    10-25    137  |  96<L>  |  47<H>  ----------------------------<  79  4.5   |  25  |  10.7<HH>    Ca    9.0      25 Oct 2023 04:30  Phos  5.7     10-25  Mg     1.9     10-25    TPro  6.5  /  Alb  3.1<L>  /  TBili  0.3  /  DBili      /  AST  31  /  ALT  16  /  AlkPhos  88  10-25    Phosphorus: 5.7 mg/dL (10-25-23 @ 04:30)  Phosphorus: 5.4 mg/dL (10-23-23 @ 06:18)                            8.9    6.78  )-----------( 162      ( 25 Oct 2023 04:30 )             26.3     Mean Cell Volume: 90.7 fL (10-25-23 @ 04:30)    Creatinine trend:  Creatinine: 10.7 mg/dL (10-25-23 @ 04:30)  Creatinine: 8.9 mg/dL (10-24-23 @ 08:45)  Creatinine: 12.4 mg/dL (10-23-23 @ 06:18)  Creatinine: 11.3 mg/dL (10-22-23 @ 09:50)  Creatinine: 12.0 mg/dL (10-21-23 @ 05:45)  Creatinine: 10.8 mg/dL (10-20-23 @ 21:36)

## 2023-10-26 LAB
ALBUMIN SERPL ELPH-MCNC: 3.3 G/DL — LOW (ref 3.5–5.2)
ALBUMIN SERPL ELPH-MCNC: 3.3 G/DL — LOW (ref 3.5–5.2)
ALP SERPL-CCNC: 96 U/L — SIGNIFICANT CHANGE UP (ref 30–115)
ALP SERPL-CCNC: 96 U/L — SIGNIFICANT CHANGE UP (ref 30–115)
ALT FLD-CCNC: 10 U/L — SIGNIFICANT CHANGE UP (ref 0–41)
ALT FLD-CCNC: 10 U/L — SIGNIFICANT CHANGE UP (ref 0–41)
ANION GAP SERPL CALC-SCNC: 21 MMOL/L — HIGH (ref 7–14)
ANION GAP SERPL CALC-SCNC: 21 MMOL/L — HIGH (ref 7–14)
AST SERPL-CCNC: 35 U/L — SIGNIFICANT CHANGE UP (ref 0–41)
AST SERPL-CCNC: 35 U/L — SIGNIFICANT CHANGE UP (ref 0–41)
BASOPHILS # BLD AUTO: 0.02 K/UL — SIGNIFICANT CHANGE UP (ref 0–0.2)
BASOPHILS # BLD AUTO: 0.02 K/UL — SIGNIFICANT CHANGE UP (ref 0–0.2)
BASOPHILS NFR BLD AUTO: 0.3 % — SIGNIFICANT CHANGE UP (ref 0–1)
BASOPHILS NFR BLD AUTO: 0.3 % — SIGNIFICANT CHANGE UP (ref 0–1)
BILIRUB SERPL-MCNC: 0.4 MG/DL — SIGNIFICANT CHANGE UP (ref 0.2–1.2)
BILIRUB SERPL-MCNC: 0.4 MG/DL — SIGNIFICANT CHANGE UP (ref 0.2–1.2)
BUN SERPL-MCNC: 48 MG/DL — HIGH (ref 10–20)
BUN SERPL-MCNC: 48 MG/DL — HIGH (ref 10–20)
CALCIUM SERPL-MCNC: 8.9 MG/DL — SIGNIFICANT CHANGE UP (ref 8.4–10.5)
CALCIUM SERPL-MCNC: 8.9 MG/DL — SIGNIFICANT CHANGE UP (ref 8.4–10.5)
CHLORIDE SERPL-SCNC: 95 MMOL/L — LOW (ref 98–110)
CHLORIDE SERPL-SCNC: 95 MMOL/L — LOW (ref 98–110)
CO2 SERPL-SCNC: 19 MMOL/L — SIGNIFICANT CHANGE UP (ref 17–32)
CO2 SERPL-SCNC: 19 MMOL/L — SIGNIFICANT CHANGE UP (ref 17–32)
CREAT SERPL-MCNC: 12.2 MG/DL — CRITICAL HIGH (ref 0.7–1.5)
CREAT SERPL-MCNC: 12.2 MG/DL — CRITICAL HIGH (ref 0.7–1.5)
EGFR: 4 ML/MIN/1.73M2 — LOW
EGFR: 4 ML/MIN/1.73M2 — LOW
EOSINOPHIL # BLD AUTO: 0.38 K/UL — SIGNIFICANT CHANGE UP (ref 0–0.7)
EOSINOPHIL # BLD AUTO: 0.38 K/UL — SIGNIFICANT CHANGE UP (ref 0–0.7)
EOSINOPHIL NFR BLD AUTO: 6.5 % — SIGNIFICANT CHANGE UP (ref 0–8)
EOSINOPHIL NFR BLD AUTO: 6.5 % — SIGNIFICANT CHANGE UP (ref 0–8)
GLUCOSE BLDC GLUCOMTR-MCNC: 103 MG/DL — HIGH (ref 70–99)
GLUCOSE BLDC GLUCOMTR-MCNC: 103 MG/DL — HIGH (ref 70–99)
GLUCOSE BLDC GLUCOMTR-MCNC: 117 MG/DL — HIGH (ref 70–99)
GLUCOSE BLDC GLUCOMTR-MCNC: 117 MG/DL — HIGH (ref 70–99)
GLUCOSE BLDC GLUCOMTR-MCNC: 195 MG/DL — HIGH (ref 70–99)
GLUCOSE BLDC GLUCOMTR-MCNC: 195 MG/DL — HIGH (ref 70–99)
GLUCOSE BLDC GLUCOMTR-MCNC: 93 MG/DL — SIGNIFICANT CHANGE UP (ref 70–99)
GLUCOSE BLDC GLUCOMTR-MCNC: 93 MG/DL — SIGNIFICANT CHANGE UP (ref 70–99)
GLUCOSE SERPL-MCNC: 89 MG/DL — SIGNIFICANT CHANGE UP (ref 70–99)
GLUCOSE SERPL-MCNC: 89 MG/DL — SIGNIFICANT CHANGE UP (ref 70–99)
HAV IGM SER-ACNC: SIGNIFICANT CHANGE UP
HAV IGM SER-ACNC: SIGNIFICANT CHANGE UP
HBV CORE IGM SER-ACNC: SIGNIFICANT CHANGE UP
HBV CORE IGM SER-ACNC: SIGNIFICANT CHANGE UP
HBV SURFACE AG SER-ACNC: SIGNIFICANT CHANGE UP
HBV SURFACE AG SER-ACNC: SIGNIFICANT CHANGE UP
HCT VFR BLD CALC: 29.7 % — LOW (ref 42–52)
HCT VFR BLD CALC: 29.7 % — LOW (ref 42–52)
HCV AB S/CO SERPL IA: 0.07 S/CO — SIGNIFICANT CHANGE UP (ref 0–0.99)
HCV AB S/CO SERPL IA: 0.07 S/CO — SIGNIFICANT CHANGE UP (ref 0–0.99)
HCV AB SERPL-IMP: SIGNIFICANT CHANGE UP
HCV AB SERPL-IMP: SIGNIFICANT CHANGE UP
HGB BLD-MCNC: 9.6 G/DL — LOW (ref 14–18)
HGB BLD-MCNC: 9.6 G/DL — LOW (ref 14–18)
IMM GRANULOCYTES NFR BLD AUTO: 2 % — HIGH (ref 0.1–0.3)
IMM GRANULOCYTES NFR BLD AUTO: 2 % — HIGH (ref 0.1–0.3)
LYMPHOCYTES # BLD AUTO: 0.86 K/UL — LOW (ref 1.2–3.4)
LYMPHOCYTES # BLD AUTO: 0.86 K/UL — LOW (ref 1.2–3.4)
LYMPHOCYTES # BLD AUTO: 14.7 % — LOW (ref 20.5–51.1)
LYMPHOCYTES # BLD AUTO: 14.7 % — LOW (ref 20.5–51.1)
MCHC RBC-ENTMCNC: 30.9 PG — SIGNIFICANT CHANGE UP (ref 27–31)
MCHC RBC-ENTMCNC: 30.9 PG — SIGNIFICANT CHANGE UP (ref 27–31)
MCHC RBC-ENTMCNC: 32.3 G/DL — SIGNIFICANT CHANGE UP (ref 32–37)
MCHC RBC-ENTMCNC: 32.3 G/DL — SIGNIFICANT CHANGE UP (ref 32–37)
MCV RBC AUTO: 95.5 FL — HIGH (ref 80–94)
MCV RBC AUTO: 95.5 FL — HIGH (ref 80–94)
MONOCYTES # BLD AUTO: 0.38 K/UL — SIGNIFICANT CHANGE UP (ref 0.1–0.6)
MONOCYTES # BLD AUTO: 0.38 K/UL — SIGNIFICANT CHANGE UP (ref 0.1–0.6)
MONOCYTES NFR BLD AUTO: 6.5 % — SIGNIFICANT CHANGE UP (ref 1.7–9.3)
MONOCYTES NFR BLD AUTO: 6.5 % — SIGNIFICANT CHANGE UP (ref 1.7–9.3)
NEUTROPHILS # BLD AUTO: 4.1 K/UL — SIGNIFICANT CHANGE UP (ref 1.4–6.5)
NEUTROPHILS # BLD AUTO: 4.1 K/UL — SIGNIFICANT CHANGE UP (ref 1.4–6.5)
NEUTROPHILS NFR BLD AUTO: 70 % — SIGNIFICANT CHANGE UP (ref 42.2–75.2)
NEUTROPHILS NFR BLD AUTO: 70 % — SIGNIFICANT CHANGE UP (ref 42.2–75.2)
NRBC # BLD: 0 /100 WBCS — SIGNIFICANT CHANGE UP (ref 0–0)
NRBC # BLD: 0 /100 WBCS — SIGNIFICANT CHANGE UP (ref 0–0)
PLATELET # BLD AUTO: 174 K/UL — SIGNIFICANT CHANGE UP (ref 130–400)
PLATELET # BLD AUTO: 174 K/UL — SIGNIFICANT CHANGE UP (ref 130–400)
PMV BLD: 10.4 FL — SIGNIFICANT CHANGE UP (ref 7.4–10.4)
PMV BLD: 10.4 FL — SIGNIFICANT CHANGE UP (ref 7.4–10.4)
POTASSIUM SERPL-MCNC: 4.8 MMOL/L — SIGNIFICANT CHANGE UP (ref 3.5–5)
POTASSIUM SERPL-MCNC: 4.8 MMOL/L — SIGNIFICANT CHANGE UP (ref 3.5–5)
POTASSIUM SERPL-SCNC: 4.8 MMOL/L — SIGNIFICANT CHANGE UP (ref 3.5–5)
POTASSIUM SERPL-SCNC: 4.8 MMOL/L — SIGNIFICANT CHANGE UP (ref 3.5–5)
PROT SERPL-MCNC: 6.9 G/DL — SIGNIFICANT CHANGE UP (ref 6–8)
PROT SERPL-MCNC: 6.9 G/DL — SIGNIFICANT CHANGE UP (ref 6–8)
RBC # BLD: 3.11 M/UL — LOW (ref 4.7–6.1)
RBC # BLD: 3.11 M/UL — LOW (ref 4.7–6.1)
RBC # FLD: 13.8 % — SIGNIFICANT CHANGE UP (ref 11.5–14.5)
RBC # FLD: 13.8 % — SIGNIFICANT CHANGE UP (ref 11.5–14.5)
SODIUM SERPL-SCNC: 135 MMOL/L — SIGNIFICANT CHANGE UP (ref 135–146)
SODIUM SERPL-SCNC: 135 MMOL/L — SIGNIFICANT CHANGE UP (ref 135–146)
WBC # BLD: 5.86 K/UL — SIGNIFICANT CHANGE UP (ref 4.8–10.8)
WBC # BLD: 5.86 K/UL — SIGNIFICANT CHANGE UP (ref 4.8–10.8)
WBC # FLD AUTO: 5.86 K/UL — SIGNIFICANT CHANGE UP (ref 4.8–10.8)
WBC # FLD AUTO: 5.86 K/UL — SIGNIFICANT CHANGE UP (ref 4.8–10.8)

## 2023-10-26 PROCEDURE — 99232 SBSQ HOSP IP/OBS MODERATE 35: CPT

## 2023-10-26 RX ORDER — INSULIN GLARGINE 100 [IU]/ML
5 INJECTION, SOLUTION SUBCUTANEOUS
Qty: 2 | Refills: 0
Start: 2023-10-26 | End: 2023-11-24

## 2023-10-26 RX ORDER — INSULIN LISPRO 100/ML
2 VIAL (ML) SUBCUTANEOUS
Qty: 2 | Refills: 0
Start: 2023-10-26 | End: 2023-11-24

## 2023-10-26 RX ORDER — SEVELAMER CARBONATE 2400 MG/1
800 POWDER, FOR SUSPENSION ORAL
Refills: 0 | Status: DISCONTINUED | OUTPATIENT
Start: 2023-10-26 | End: 2023-10-30

## 2023-10-26 RX ADMIN — POLYETHYLENE GLYCOL 3350 17 GRAM(S): 17 POWDER, FOR SOLUTION ORAL at 17:09

## 2023-10-26 RX ADMIN — POLYETHYLENE GLYCOL 3350 17 GRAM(S): 17 POWDER, FOR SOLUTION ORAL at 05:23

## 2023-10-26 RX ADMIN — HEPARIN SODIUM 5000 UNIT(S): 5000 INJECTION INTRAVENOUS; SUBCUTANEOUS at 05:23

## 2023-10-26 RX ADMIN — SEVELAMER CARBONATE 800 MILLIGRAM(S): 2400 POWDER, FOR SUSPENSION ORAL at 13:06

## 2023-10-26 RX ADMIN — Medication 100 MILLIGRAM(S): at 17:09

## 2023-10-26 RX ADMIN — Medication 1 MILLIGRAM(S): at 13:05

## 2023-10-26 RX ADMIN — PANTOPRAZOLE SODIUM 40 MILLIGRAM(S): 20 TABLET, DELAYED RELEASE ORAL at 17:09

## 2023-10-26 RX ADMIN — SEVELAMER CARBONATE 800 MILLIGRAM(S): 2400 POWDER, FOR SUSPENSION ORAL at 17:17

## 2023-10-26 RX ADMIN — Medication 1 TABLET(S): at 13:06

## 2023-10-26 RX ADMIN — PANTOPRAZOLE SODIUM 40 MILLIGRAM(S): 20 TABLET, DELAYED RELEASE ORAL at 05:23

## 2023-10-26 RX ADMIN — Medication 2 UNIT(S): at 17:09

## 2023-10-26 RX ADMIN — Medication 667 MILLIGRAM(S): at 17:09

## 2023-10-26 RX ADMIN — CHLORHEXIDINE GLUCONATE 1 APPLICATION(S): 213 SOLUTION TOPICAL at 06:54

## 2023-10-26 RX ADMIN — Medication 667 MILLIGRAM(S): at 13:05

## 2023-10-26 RX ADMIN — Medication 667 MILLIGRAM(S): at 07:57

## 2023-10-26 RX ADMIN — INSULIN GLARGINE 5 UNIT(S): 100 INJECTION, SOLUTION SUBCUTANEOUS at 21:12

## 2023-10-26 RX ADMIN — HEPARIN SODIUM 5000 UNIT(S): 5000 INJECTION INTRAVENOUS; SUBCUTANEOUS at 13:08

## 2023-10-26 RX ADMIN — SENNA PLUS 2 TABLET(S): 8.6 TABLET ORAL at 21:13

## 2023-10-26 RX ADMIN — Medication 40 MICROGRAM(S): at 11:08

## 2023-10-26 RX ADMIN — HEPARIN SODIUM 5000 UNIT(S): 5000 INJECTION INTRAVENOUS; SUBCUTANEOUS at 21:13

## 2023-10-26 RX ADMIN — Medication 2 UNIT(S): at 07:57

## 2023-10-26 NOTE — PROGRESS NOTE ADULT - ASSESSMENT
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

## 2023-10-26 NOTE — CDI QUERY NOTE - NSCDIOTHERTXTBX_GEN_ALL_CORE_HH
Based on your professional judgment and the clinical indicators, please clarify if the debridement performed can be further specified as:    • Excisional debridement down to and including subcutaneous tissues of right lower extremity   • Non-excisional debridement down to and including subcutaneous tissues of right lower extremity   • Other (please specify):   • Clinically unable to determine    CLINICAL INDICATORS  10/20 Operative Report: … Operation performed: Incision and drainage of right groin abscess and debridement of right leg ulcer of 7 x 10 CM, subcutaneous … Procedure: … Bovie and scissors were employed to debride necrotic tissue.    10/20 Progress Note Adult-Surgery Physician Assistant/Attending: … s/p  R leg ulcer debrided and biopsy/path sent    10/25 Progress Note Adult-Surgery Physician Assistant Student/Atte: … POD# 5 s/p I&D right groin abscess and Right leg ulcer- no further surgical intervention warranted at this time    Thank you,  Laurie Naik RN [facility]  [phone]

## 2023-10-26 NOTE — PROGRESS NOTE ADULT - SUBJECTIVE AND OBJECTIVE BOX
SUBJECTIVE:    Patient is a 58y old Male who presents with a chief complaint of Worsening Renal Failure (26 Oct 2023 11:07)    Currently admitted to medicine with the primary diagnosis of Acute renal failure       Today is hospital day 9d. This morning he is resting comfortably in bed and reports no new issues or overnight events.     ROS:   CONSTITUTIONAL: No weakness, fevers or chills   EYES/ENT: No visual changes; No vertigo or throat pain   NECK: No pain or stiffness   RESPIRATORY: No cough, wheezing, hemoptysis; No shortness of breath   CARDIOVASCULAR: No chest pain or palpitations   GASTROINTESTINAL: No abdominal or epigastric pain. No nausea, vomiting, or hematemesis; No diarrhea or constipation. No melena or hematochezia.  GENITOURINARY: No dysuria, frequency or hematuria  NEUROLOGICAL: No numbness or weakness  SKIN: No itching, rashes      PAST MEDICAL & SURGICAL HISTORY  Diabetes    Hypertension    HLD (hyperlipidemia)    Neuropathy    Glaucoma    Chronic kidney disease, unspecified CKD stage  requiring HD march 2022    ETOH abuse    H/O colonoscopy      SOCIAL HISTORY:    ALLERGIES:  No Known Drug Allergies  Bananas (Nausea; Urticaria)  apple (Urticaria)    MEDICATIONS:  STANDING MEDICATIONS  calcium acetate 667 milliGRAM(s) Oral three times a day with meals  ceFAZolin   IVPB 1000 milliGRAM(s) IV Intermittent every 24 hours  chlorhexidine 2% Cloths 1 Application(s) Topical <User Schedule>  darbepoetin Injectable Syringe 40 MICROGram(s) IV Push <User Schedule>  folic acid 1 milliGRAM(s) Oral daily  heparin   Injectable 5000 Unit(s) SubCutaneous every 8 hours  insulin glargine Injectable (LANTUS) 5 Unit(s) SubCutaneous at bedtime  insulin lispro (ADMELOG) corrective regimen sliding scale   SubCutaneous three times a day before meals  insulin lispro Injectable (ADMELOG) 2 Unit(s) SubCutaneous three times a day before meals  multivitamin 1 Tablet(s) Oral daily  pantoprazole    Tablet 40 milliGRAM(s) Oral two times a day  polyethylene glycol 3350 17 Gram(s) Oral every 12 hours  senna 2 Tablet(s) Oral at bedtime  sevelamer carbonate 800 milliGRAM(s) Oral three times a day with meals    PRN MEDICATIONS  aluminum hydroxide/magnesium hydroxide/simethicone Suspension 30 milliLiter(s) Oral every 4 hours PRN  LORazepam   Injectable 2 milliGRAM(s) IV Push every 4 hours PRN  ondansetron Injectable 4 milliGRAM(s) IV Push every 8 hours PRN  vitamin A &amp; D Ointment 1 Application(s) Topical every 12 hours PRN    VITALS:   T(F): 97.6  HR: 101  BP: 172/83  RR: 18  SpO2: 99%    LABS:  Negative for smoking/alcohol/drug use.                         9.6    5.86  )-----------( 174      ( 26 Oct 2023 04:30 )             29.7     10-26    135  |  95<L>  |  48<H>  ----------------------------<  89  4.8   |  19  |  12.2<HH>    Ca    8.9      26 Oct 2023 04:30  Phos  5.7     10-25  Mg     1.9     10-25    TPro  6.9  /  Alb  3.3<L>  /  TBili  0.4  /  DBili  x   /  AST  35  /  ALT  10  /  AlkPhos  96  10-26      Urinalysis Basic - ( 26 Oct 2023 04:30 )    Color: x / Appearance: x / SG: x / pH: x  Gluc: 89 mg/dL / Ketone: x  / Bili: x / Urobili: x   Blood: x / Protein: x / Nitrite: x   Leuk Esterase: x / RBC: x / WBC x   Sq Epi: x / Non Sq Epi: x / Bacteria: x      RADIOLOGY:    PHYSICAL EXAM:  GEN: No acute distress  HEENT: normocephalic, atraumatic, aniceteric  LUNGS: bl breath sounds   HEART: S1/S2 present. RRR, no murmurs  ABD: Soft, non-tender, non-distended. Bowel sounds present  EXT: warm ; RLE dressing clean dry and intact   NEURO: AAOX3, normal affect      ASSESSMENT AND PLAN:    57 y/o male with hx of ETOH abuse, CKD with left wrist fistula, DM , HTN, HLD, presented to the ED for decreased urinary output and increased thirst    # Abdominal distention / ileus    - resolved    # Groin abscess -  s/p I+D 10/20  # R Leg wound   - OR CLx of groin abscess-  Staph aureus , sensitivity noted   - per sx- no need for wound vac - fu with Dr. Rajput outpatient in 2 weeks   - Spoke to ID - can switch to po keflex 500 mg q24h to complete 14 day course from I&D upon dc (until 11/2)   - IV Ancef 1gm q24hrs (give post-HD on HD days)    # VAMSI on CKD4 , now on NEW HD (since 10/18)  - started HD on 10/18/23 via LUE AVF   - on HD (had AVF previously)  - Follows with Dr. Lee  - Nephro following - needs op HD     # Acute on chronic Anemia   - no gross gib  - active type and screen   - s/p PRBC  - keep Hb > 8  - GI op fu   - PPI BID     # DKA , resolved   # H/O DM   - off insulin drip -> sc insulin  - Hba1c 8  - endo eval appreciated - insulin rx for op     # H/O  ETOH abuse - thiamine and folic acid    # dvt/gi ppx/diet  # dispo: dc ready to home with PT - pending O/P HD Slot

## 2023-10-26 NOTE — PROGRESS NOTE ADULT - SUBJECTIVE AND OBJECTIVE BOX
Nephrology Progress Note    KECIA MARTIN  MRN-[id number]  58y  Male    S:  Patient is seen and examined, events over the last 24h noted.    O:  Allergies:  No Known Drug Allergies  Bananas (Nausea; Urticaria)  apple (Urticaria)    Hospital Medications:   MEDICATIONS  (STANDING):  calcium acetate 667 milliGRAM(s) Oral three times a day with meals  ceFAZolin   IVPB 1000 milliGRAM(s) IV Intermittent every 24 hours  chlorhexidine 2% Cloths 1 Application(s) Topical <User Schedule>  darbepoetin Injectable Syringe 40 MICROGram(s) IV Push <User Schedule>  folic acid 1 milliGRAM(s) Oral daily  heparin   Injectable 5000 Unit(s) SubCutaneous every 8 hours  insulin glargine Injectable (LANTUS) 5 Unit(s) SubCutaneous at bedtime  insulin lispro (ADMELOG) corrective regimen sliding scale   SubCutaneous three times a day before meals  insulin lispro Injectable (ADMELOG) 2 Unit(s) SubCutaneous three times a day before meals  multivitamin 1 Tablet(s) Oral daily  pantoprazole    Tablet 40 milliGRAM(s) Oral two times a day  polyethylene glycol 3350 17 Gram(s) Oral every 12 hours  senna 2 Tablet(s) Oral at bedtime  sevelamer carbonate 800 milliGRAM(s) Oral three times a day with meals    MEDICATIONS  (PRN):  aluminum hydroxide/magnesium hydroxide/simethicone Suspension 30 milliLiter(s) Oral every 4 hours PRN Dyspepsia  LORazepam   Injectable 2 milliGRAM(s) IV Push every 4 hours PRN CIWA-Ar score increase by 2 points and a total score of 7 or less  ondansetron Injectable 4 milliGRAM(s) IV Push every 8 hours PRN Nausea and/or Vomiting  vitamin A &amp; D Ointment 1 Application(s) Topical every 12 hours PRN dryness    Home Medications:  carvedilol 12.5 mg oral tablet: 1 orally once a day (17 Oct 2023 15:05)  gabapentin 600 mg oral tablet: 1 orally 3 times a day (17 Oct 2023 15:05)  Januvia 50 mg oral tablet: 1 orally once a day (17 Oct 2023 15:05)  rosuvastatin 10 mg oral capsule: 1 orally once a day (17 Oct 2023 15:05)  Vitamin B-100 oral tablet: 1 orally once a day (17 Oct 2023 15:05)      VITALS:  Daily     Daily   T(F): 96.8 (10-26-23 @ 05:00), Max: 96.8 (10-26-23 @ 05:00)  HR: 90 (10-26-23 @ 09:30)  BP: 169/97 (10-26-23 @ 09:30)  RR: 18 (10-26-23 @ 09:30)  SpO2: 99% (10-26-23 @ 05:00)  Wt(kg): --  I&O's Detail    25 Oct 2023 07:01  -  26 Oct 2023 07:00  --------------------------------------------------------  IN:  Total IN: 0 mL    OUT:    Voided (mL): 375 mL  Total OUT: 375 mL    Total NET: -375 mL        I&O's Summary    25 Oct 2023 07:01  -  26 Oct 2023 07:00  --------------------------------------------------------  IN: 0 mL / OUT: 375 mL / NET: -375 mL          PHYSICAL EXAM:  Gen: NAD  Chest: b/l breath sounds  Abd: soft  Extremities: no edema  Vascular access:       LABS:    Blood Gas Arterial - Sodium: 125 mmol/L (10-19-23 @ 15:00)  Blood Gas Arterial - Calcium, Ionized: 0.99 mmol/L (10-19-23 @ 15:00)    10-26    135  |  95<L>  |  48<H>  ----------------------------<  89  4.8   |  19  |  12.2<HH>    Ca    8.9      26 Oct 2023 04:30  Phos  5.7     10-25  Mg     1.9     10-25    TPro  6.9  /  Alb  3.3<L>  /  TBili  0.4  /  DBili      /  AST  35  /  ALT  10  /  AlkPhos  96  10-26    eGFR: 4 mL/min/1.73m2 (10-26-23 @ 04:30)  eGFR: 5 mL/min/1.73m2 (10-25-23 @ 04:30)    Phosphorus: 5.7 mg/dL (10-25-23 @ 04:30)  Phosphorus: 5.4 mg/dL (10-23-23 @ 06:18)    Osmolality, Serum: 311 mos/kg (10-18-23 @ 08:43)  Intact PTH: 311 pg/mL (03-16-22 @ 11:45)                          9.6    5.86  )-----------( 174      ( 26 Oct 2023 04:30 )             29.7     Mean Cell Volume: 95.5 fL (10-26-23 @ 04:30)    Iron Total, Serum: 21 ug/dL (11-24-22 @ 06:48)  % Saturation, Iron: 10 % (11-24-22 @ 06:48)      % Saturation, Iron: 10 % (11-24-22 @ 06:48)    Urine Studies:  Protein, Urine: >=1000 mg/dL (10-18-23 @ 11:30)  White Blood Cell - Urine: 15 /HPF (10-18-23 @ 11:30)  Red Blood Cell - Urine: >50 /HPF (10-18-23 @ 11:30)  Protein, Urine: 300 mg/dL (10-17-23 @ 13:53)  Red Blood Cell - Urine: 50 /HPF (10-17-23 @ 13:53)  White Blood Cell - Urine: 10 /HPF (10-17-23 @ 13:53)  Protein, Urine: 100 mg/dL (11-22-22 @ 01:30)  White Blood Cell - Urine: 6-10 /HPF (11-22-22 @ 01:30)  Red Blood Cell - Urine: >50 /HPF (11-22-22 @ 01:30)  Protein, Urine: >=300 mg/dL (11-11-22 @ 15:40)  White Blood Cell - Urine: 3-5 /HPF (11-11-22 @ 15:40)  Red Blood Cell - Urine: 6-10 /HPF (11-11-22 @ 15:40)  Granular Cast: 1-2 /LPF (11-11-22 @ 15:40)  Protein, Urine: 30 mg/dL (03-17-22 @ 18:30)  White Blood Cell - Urine: 3-5 /HPF (03-17-22 @ 18:30)  Red Blood Cell - Urine: 6-10 /HPF (03-17-22 @ 18:30)  Protein, Urine: >=300 mg/dL (03-15-22 @ 15:41)  Red Blood Cell - Urine: 11-25 /HPF (03-15-22 @ 15:41)  Granular Cast: 1-2 /LPF (03-15-22 @ 15:41)  Protein, Urine: Trace (07-25-21 @ 14:08)      Urea Nitrogen,  Random Urine: 281 mg/dL (10-18-23 @ 11:30)    Culture Results:   Numerous Staphylococcus aureus (10-20 @ 17:33)  Culture Results:   Numerous Staphylococcus aureus (10-20 @ 17:32)    Creatinine trend:  Creatinine: 12.2 mg/dL (10-26-23 @ 04:30)  Creatinine: 10.7 mg/dL (10-25-23 @ 04:30)  Creatinine: 8.9 mg/dL (10-24-23 @ 08:45)  Creatinine: 12.4 mg/dL (10-23-23 @ 06:18)  Creatinine: 11.3 mg/dL (10-22-23 @ 09:50)  Creatinine: 12.0 mg/dL (10-21-23 @ 05:45)  Creatinine: 10.8 mg/dL (10-20-23 @ 21:36)  Creatinine: 10.2 mg/dL (10-20-23 @ 15:41)  Creatinine: 10.9 mg/dL (10-20-23 @ 07:00)  Creatinine: 8.4 mg/dL (10-19-23 @ 15:29)  Creatinine: 13.8 mg/dL (10-19-23 @ 05:40)  Creatinine: 14.8 mg/dL (10-19-23 @ 02:34)  Creatinine: 13.8 mg/dL (10-18-23 @ 21:48)  Creatinine: 12.6 mg/dL (10-18-23 @ 19:32)  Creatinine: 14.0 mg/dL (10-18-23 @ 14:48)  Creatinine: 16.3 mg/dL (10-18-23 @ 08:43)  Creatinine: 15.6 mg/dL (10-17-23 @ 14:30)  Creatinine, Serum: 4.1 mg/dL (05-02-23 @ 08:06)  Creatinine, Serum: 2.8 mg/dL (11-30-22 @ 07:08)  Creatinine, Serum: 3.1 mg/dL (11-29-22 @ 06:40)    Hepatitis B Surface Antigen: Nonreact (10-25-23 @ 10:12)  Hepatitis C Virus S/CO Ratio: 0.07 S/CO (10-25-23 @ 10:12)  Hepatitis B Core IgM Antibody: Nonreact (10-25-23 @ 10:12)    US Renal:   ACC: [id number] EXAM:  US KIDNEY(S)   ORDERED BY: BRANDON SANCHEZ     PROCEDURE DATE:  10/19/2023          INTERPRETATION:  CLINICAL INFORMATION: Worsening renal function CT scan   dated    COMPARISON: October 17, 2023    TECHNIQUE: Sonography of the kidneys and bladder.    FINDINGS:    Right kidney: 10.8 x 6.1 x 3.9 cm. No hydronephrosis or calculi.    Left kidney:  8.6 x 4.4 x 3.3 cm. No hydronephrosis or calculi.      IMPRESSION:    Normal renal ultrasound.        --- End of Report ---            ANABELLE ALLEN MD; Attending Interventional Radiologist  This document has been electronically signed. Oct 19 2023 12:42PM (10-19-23 @ 11:49)    US Kidney and Bladder:   ACC: [id number] EXAM:  US KIDNEYS AND BLADDER                          PROCEDURE DATE:  11/11/2022          INTERPRETATION:  CLINICAL INFORMATION: Hydronephrosis    COMPARISON: 3/15/2022    TECHNIQUE: Sonography of the kidneys.    FINDINGS:    Right kidney: 12 cm. No hydronephrosis. 1.2 cm lower pole calculus.    Left kidney:  11 cm. No hydronephrosis. 4 mm interpolar region calculus.      IMPRESSION:    No hydronephrosis bilaterally. Bilateral nonobstructing calculi measuring   1.2 cm on the right.    --- End of Report ---            NURYS BROWN MD; Attending Radiologist  This document has been electronically signed. Nov 11 2022  8:04PM (11-11-22 @ 19:42)     Nephrology Progress Note    KECIA MARTIN  MRN-[id number]  58y  Male    S:  Patient is seen and examined, events over the last 24h noted.    O:  Allergies:  No Known Drug Allergies  Bananas (Nausea; Urticaria)  apple (Urticaria)    Hospital Medications:   MEDICATIONS  (STANDING):  calcium acetate 667 milliGRAM(s) Oral three times a day with meals  ceFAZolin   IVPB 1000 milliGRAM(s) IV Intermittent every 24 hours  chlorhexidine 2% Cloths 1 Application(s) Topical <User Schedule>  darbepoetin Injectable Syringe 40 MICROGram(s) IV Push <User Schedule>  folic acid 1 milliGRAM(s) Oral daily  heparin   Injectable 5000 Unit(s) SubCutaneous every 8 hours  insulin glargine Injectable (LANTUS) 5 Unit(s) SubCutaneous at bedtime  insulin lispro (ADMELOG) corrective regimen sliding scale   SubCutaneous three times a day before meals  insulin lispro Injectable (ADMELOG) 2 Unit(s) SubCutaneous three times a day before meals  multivitamin 1 Tablet(s) Oral daily  pantoprazole    Tablet 40 milliGRAM(s) Oral two times a day  polyethylene glycol 3350 17 Gram(s) Oral every 12 hours  senna 2 Tablet(s) Oral at bedtime  sevelamer carbonate 800 milliGRAM(s) Oral three times a day with meals    MEDICATIONS  (PRN):  aluminum hydroxide/magnesium hydroxide/simethicone Suspension 30 milliLiter(s) Oral every 4 hours PRN Dyspepsia  LORazepam   Injectable 2 milliGRAM(s) IV Push every 4 hours PRN CIWA-Ar score increase by 2 points and a total score of 7 or less  ondansetron Injectable 4 milliGRAM(s) IV Push every 8 hours PRN Nausea and/or Vomiting  vitamin A &amp; D Ointment 1 Application(s) Topical every 12 hours PRN dryness    Home Medications:  carvedilol 12.5 mg oral tablet: 1 orally once a day (17 Oct 2023 15:05)  gabapentin 600 mg oral tablet: 1 orally 3 times a day (17 Oct 2023 15:05)  Januvia 50 mg oral tablet: 1 orally once a day (17 Oct 2023 15:05)  rosuvastatin 10 mg oral capsule: 1 orally once a day (17 Oct 2023 15:05)  Vitamin B-100 oral tablet: 1 orally once a day (17 Oct 2023 15:05)      VITALS:  T(F): 96.8 (10-26-23 @ 05:00), Max: 96.8 (10-26-23 @ 05:00)  HR: 90 (10-26-23 @ 09:30)  BP: 169/97 (10-26-23 @ 09:30)  RR: 18 (10-26-23 @ 09:30)  SpO2: 99% (10-26-23 @ 05:00)  Wt(kg): --  I&O's Detail    25 Oct 2023 07:01  -  26 Oct 2023 07:00  --------------------------------------------------------  IN:  Total IN: 0 mL    OUT:    Voided (mL): 375 mL  Total OUT: 375 mL    Total NET: -375 mL        I&O's Summary    25 Oct 2023 07:01  -  26 Oct 2023 07:00  --------------------------------------------------------  IN: 0 mL / OUT: 375 mL / NET: -375 mL          PHYSICAL EXAM:  Gen: NAD  Chest: b/l breath sounds  Abd: soft  Extremities: no edema  Vascular access: LUE AVF       LABS:    10-26    135  |  95<L>  |  48<H>  ----------------------------<  89  4.8   |  19  |  12.2<HH>    Ca    8.9      26 Oct 2023 04:30  Phos  5.7     10-25  Mg     1.9     10-25    TPro  6.9  /  Alb  3.3<L>  /  TBili  0.4  /  DBili      /  AST  35  /  ALT  10  /  AlkPhos  96  10-26    Phosphorus: 5.7 mg/dL (10-25-23 @ 04:30)  Phosphorus: 5.4 mg/dL (10-23-23 @ 06:18)                          9.6    5.86  )-----------( 174      ( 26 Oct 2023 04:30 )             29.7     Mean Cell Volume: 95.5 fL (10-26-23 @ 04:30)

## 2023-10-27 LAB
ALBUMIN SERPL ELPH-MCNC: 3.4 G/DL — LOW (ref 3.5–5.2)
ALBUMIN SERPL ELPH-MCNC: 3.4 G/DL — LOW (ref 3.5–5.2)
ALP SERPL-CCNC: 85 U/L — SIGNIFICANT CHANGE UP (ref 30–115)
ALP SERPL-CCNC: 85 U/L — SIGNIFICANT CHANGE UP (ref 30–115)
ALT FLD-CCNC: 8 U/L — SIGNIFICANT CHANGE UP (ref 0–41)
ALT FLD-CCNC: 8 U/L — SIGNIFICANT CHANGE UP (ref 0–41)
ANION GAP SERPL CALC-SCNC: 17 MMOL/L — HIGH (ref 7–14)
ANION GAP SERPL CALC-SCNC: 17 MMOL/L — HIGH (ref 7–14)
AST SERPL-CCNC: 33 U/L — SIGNIFICANT CHANGE UP (ref 0–41)
AST SERPL-CCNC: 33 U/L — SIGNIFICANT CHANGE UP (ref 0–41)
BASOPHILS # BLD AUTO: 0.04 K/UL — SIGNIFICANT CHANGE UP (ref 0–0.2)
BASOPHILS # BLD AUTO: 0.04 K/UL — SIGNIFICANT CHANGE UP (ref 0–0.2)
BASOPHILS NFR BLD AUTO: 0.8 % — SIGNIFICANT CHANGE UP (ref 0–1)
BASOPHILS NFR BLD AUTO: 0.8 % — SIGNIFICANT CHANGE UP (ref 0–1)
BILIRUB SERPL-MCNC: 0.4 MG/DL — SIGNIFICANT CHANGE UP (ref 0.2–1.2)
BILIRUB SERPL-MCNC: 0.4 MG/DL — SIGNIFICANT CHANGE UP (ref 0.2–1.2)
BUN SERPL-MCNC: 29 MG/DL — HIGH (ref 10–20)
BUN SERPL-MCNC: 29 MG/DL — HIGH (ref 10–20)
CALCIUM SERPL-MCNC: 9.3 MG/DL — SIGNIFICANT CHANGE UP (ref 8.4–10.5)
CALCIUM SERPL-MCNC: 9.3 MG/DL — SIGNIFICANT CHANGE UP (ref 8.4–10.5)
CHLORIDE SERPL-SCNC: 96 MMOL/L — LOW (ref 98–110)
CHLORIDE SERPL-SCNC: 96 MMOL/L — LOW (ref 98–110)
CO2 SERPL-SCNC: 25 MMOL/L — SIGNIFICANT CHANGE UP (ref 17–32)
CO2 SERPL-SCNC: 25 MMOL/L — SIGNIFICANT CHANGE UP (ref 17–32)
CREAT SERPL-MCNC: 8.2 MG/DL — CRITICAL HIGH (ref 0.7–1.5)
CREAT SERPL-MCNC: 8.2 MG/DL — CRITICAL HIGH (ref 0.7–1.5)
EGFR: 7 ML/MIN/1.73M2 — LOW
EGFR: 7 ML/MIN/1.73M2 — LOW
EOSINOPHIL # BLD AUTO: 0.38 K/UL — SIGNIFICANT CHANGE UP (ref 0–0.7)
EOSINOPHIL # BLD AUTO: 0.38 K/UL — SIGNIFICANT CHANGE UP (ref 0–0.7)
EOSINOPHIL NFR BLD AUTO: 7.6 % — SIGNIFICANT CHANGE UP (ref 0–8)
EOSINOPHIL NFR BLD AUTO: 7.6 % — SIGNIFICANT CHANGE UP (ref 0–8)
GLUCOSE BLDC GLUCOMTR-MCNC: 127 MG/DL — HIGH (ref 70–99)
GLUCOSE BLDC GLUCOMTR-MCNC: 127 MG/DL — HIGH (ref 70–99)
GLUCOSE BLDC GLUCOMTR-MCNC: 168 MG/DL — HIGH (ref 70–99)
GLUCOSE BLDC GLUCOMTR-MCNC: 168 MG/DL — HIGH (ref 70–99)
GLUCOSE BLDC GLUCOMTR-MCNC: 185 MG/DL — HIGH (ref 70–99)
GLUCOSE BLDC GLUCOMTR-MCNC: 185 MG/DL — HIGH (ref 70–99)
GLUCOSE BLDC GLUCOMTR-MCNC: 97 MG/DL — SIGNIFICANT CHANGE UP (ref 70–99)
GLUCOSE BLDC GLUCOMTR-MCNC: 97 MG/DL — SIGNIFICANT CHANGE UP (ref 70–99)
GLUCOSE SERPL-MCNC: 93 MG/DL — SIGNIFICANT CHANGE UP (ref 70–99)
GLUCOSE SERPL-MCNC: 93 MG/DL — SIGNIFICANT CHANGE UP (ref 70–99)
HCT VFR BLD CALC: 27.8 % — LOW (ref 42–52)
HCT VFR BLD CALC: 27.8 % — LOW (ref 42–52)
HGB BLD-MCNC: 9.1 G/DL — LOW (ref 14–18)
HGB BLD-MCNC: 9.1 G/DL — LOW (ref 14–18)
IMM GRANULOCYTES NFR BLD AUTO: 1.6 % — HIGH (ref 0.1–0.3)
IMM GRANULOCYTES NFR BLD AUTO: 1.6 % — HIGH (ref 0.1–0.3)
LYMPHOCYTES # BLD AUTO: 0.75 K/UL — LOW (ref 1.2–3.4)
LYMPHOCYTES # BLD AUTO: 0.75 K/UL — LOW (ref 1.2–3.4)
LYMPHOCYTES # BLD AUTO: 14.9 % — LOW (ref 20.5–51.1)
LYMPHOCYTES # BLD AUTO: 14.9 % — LOW (ref 20.5–51.1)
MCHC RBC-ENTMCNC: 30.6 PG — SIGNIFICANT CHANGE UP (ref 27–31)
MCHC RBC-ENTMCNC: 30.6 PG — SIGNIFICANT CHANGE UP (ref 27–31)
MCHC RBC-ENTMCNC: 32.7 G/DL — SIGNIFICANT CHANGE UP (ref 32–37)
MCHC RBC-ENTMCNC: 32.7 G/DL — SIGNIFICANT CHANGE UP (ref 32–37)
MCV RBC AUTO: 93.6 FL — SIGNIFICANT CHANGE UP (ref 80–94)
MCV RBC AUTO: 93.6 FL — SIGNIFICANT CHANGE UP (ref 80–94)
MONOCYTES # BLD AUTO: 0.55 K/UL — SIGNIFICANT CHANGE UP (ref 0.1–0.6)
MONOCYTES # BLD AUTO: 0.55 K/UL — SIGNIFICANT CHANGE UP (ref 0.1–0.6)
MONOCYTES NFR BLD AUTO: 11 % — HIGH (ref 1.7–9.3)
MONOCYTES NFR BLD AUTO: 11 % — HIGH (ref 1.7–9.3)
NEUTROPHILS # BLD AUTO: 3.22 K/UL — SIGNIFICANT CHANGE UP (ref 1.4–6.5)
NEUTROPHILS # BLD AUTO: 3.22 K/UL — SIGNIFICANT CHANGE UP (ref 1.4–6.5)
NEUTROPHILS NFR BLD AUTO: 64.1 % — SIGNIFICANT CHANGE UP (ref 42.2–75.2)
NEUTROPHILS NFR BLD AUTO: 64.1 % — SIGNIFICANT CHANGE UP (ref 42.2–75.2)
NRBC # BLD: 0 /100 WBCS — SIGNIFICANT CHANGE UP (ref 0–0)
NRBC # BLD: 0 /100 WBCS — SIGNIFICANT CHANGE UP (ref 0–0)
PHOSPHATE SERPL-MCNC: 4.9 MG/DL — SIGNIFICANT CHANGE UP (ref 2.1–4.9)
PHOSPHATE SERPL-MCNC: 4.9 MG/DL — SIGNIFICANT CHANGE UP (ref 2.1–4.9)
PLATELET # BLD AUTO: 182 K/UL — SIGNIFICANT CHANGE UP (ref 130–400)
PLATELET # BLD AUTO: 182 K/UL — SIGNIFICANT CHANGE UP (ref 130–400)
PMV BLD: 9.9 FL — SIGNIFICANT CHANGE UP (ref 7.4–10.4)
PMV BLD: 9.9 FL — SIGNIFICANT CHANGE UP (ref 7.4–10.4)
POTASSIUM SERPL-MCNC: 4.3 MMOL/L — SIGNIFICANT CHANGE UP (ref 3.5–5)
POTASSIUM SERPL-MCNC: 4.3 MMOL/L — SIGNIFICANT CHANGE UP (ref 3.5–5)
POTASSIUM SERPL-SCNC: 4.3 MMOL/L — SIGNIFICANT CHANGE UP (ref 3.5–5)
POTASSIUM SERPL-SCNC: 4.3 MMOL/L — SIGNIFICANT CHANGE UP (ref 3.5–5)
PROT SERPL-MCNC: 7.2 G/DL — SIGNIFICANT CHANGE UP (ref 6–8)
PROT SERPL-MCNC: 7.2 G/DL — SIGNIFICANT CHANGE UP (ref 6–8)
RBC # BLD: 2.97 M/UL — LOW (ref 4.7–6.1)
RBC # BLD: 2.97 M/UL — LOW (ref 4.7–6.1)
RBC # FLD: 13.7 % — SIGNIFICANT CHANGE UP (ref 11.5–14.5)
RBC # FLD: 13.7 % — SIGNIFICANT CHANGE UP (ref 11.5–14.5)
SODIUM SERPL-SCNC: 138 MMOL/L — SIGNIFICANT CHANGE UP (ref 135–146)
SODIUM SERPL-SCNC: 138 MMOL/L — SIGNIFICANT CHANGE UP (ref 135–146)
WBC # BLD: 5.02 K/UL — SIGNIFICANT CHANGE UP (ref 4.8–10.8)
WBC # BLD: 5.02 K/UL — SIGNIFICANT CHANGE UP (ref 4.8–10.8)
WBC # FLD AUTO: 5.02 K/UL — SIGNIFICANT CHANGE UP (ref 4.8–10.8)
WBC # FLD AUTO: 5.02 K/UL — SIGNIFICANT CHANGE UP (ref 4.8–10.8)

## 2023-10-27 PROCEDURE — 99233 SBSQ HOSP IP/OBS HIGH 50: CPT

## 2023-10-27 RX ORDER — INSULIN ASPART 100 [IU]/ML
2 INJECTION, SOLUTION SUBCUTANEOUS
Qty: 1 | Refills: 0
Start: 2023-10-27 | End: 2023-11-25

## 2023-10-27 RX ORDER — INSULIN GLARGINE 100 [IU]/ML
5 INJECTION, SOLUTION SUBCUTANEOUS
Qty: 1 | Refills: 0
Start: 2023-10-27 | End: 2023-11-25

## 2023-10-27 RX ADMIN — Medication 100 MILLIGRAM(S): at 17:58

## 2023-10-27 RX ADMIN — PANTOPRAZOLE SODIUM 40 MILLIGRAM(S): 20 TABLET, DELAYED RELEASE ORAL at 05:26

## 2023-10-27 RX ADMIN — SEVELAMER CARBONATE 800 MILLIGRAM(S): 2400 POWDER, FOR SUSPENSION ORAL at 17:58

## 2023-10-27 RX ADMIN — PANTOPRAZOLE SODIUM 40 MILLIGRAM(S): 20 TABLET, DELAYED RELEASE ORAL at 17:57

## 2023-10-27 RX ADMIN — HEPARIN SODIUM 5000 UNIT(S): 5000 INJECTION INTRAVENOUS; SUBCUTANEOUS at 21:28

## 2023-10-27 RX ADMIN — Medication 2 UNIT(S): at 17:59

## 2023-10-27 RX ADMIN — SEVELAMER CARBONATE 800 MILLIGRAM(S): 2400 POWDER, FOR SUSPENSION ORAL at 08:33

## 2023-10-27 RX ADMIN — HEPARIN SODIUM 5000 UNIT(S): 5000 INJECTION INTRAVENOUS; SUBCUTANEOUS at 17:58

## 2023-10-27 RX ADMIN — Medication 1 MILLIGRAM(S): at 11:50

## 2023-10-27 RX ADMIN — Medication 667 MILLIGRAM(S): at 08:33

## 2023-10-27 RX ADMIN — Medication 1 TABLET(S): at 11:50

## 2023-10-27 RX ADMIN — Medication 2 UNIT(S): at 08:21

## 2023-10-27 RX ADMIN — Medication 2 UNIT(S): at 11:52

## 2023-10-27 RX ADMIN — Medication 667 MILLIGRAM(S): at 17:57

## 2023-10-27 RX ADMIN — HEPARIN SODIUM 5000 UNIT(S): 5000 INJECTION INTRAVENOUS; SUBCUTANEOUS at 05:25

## 2023-10-27 RX ADMIN — SEVELAMER CARBONATE 800 MILLIGRAM(S): 2400 POWDER, FOR SUSPENSION ORAL at 11:51

## 2023-10-27 RX ADMIN — Medication 1: at 17:49

## 2023-10-27 RX ADMIN — INSULIN GLARGINE 5 UNIT(S): 100 INJECTION, SOLUTION SUBCUTANEOUS at 21:27

## 2023-10-27 RX ADMIN — Medication 667 MILLIGRAM(S): at 11:50

## 2023-10-27 NOTE — PROGRESS NOTE ADULT - SUBJECTIVE AND OBJECTIVE BOX
Nephrology Progress Note    KECIA MARTIN  MRN-[id number]  58y  Male    S:  Patient is seen and examined, events over the last 24h noted.    O:  Allergies:  No Known Drug Allergies  Bananas (Nausea; Urticaria)  apple (Urticaria)    Hospital Medications:   MEDICATIONS  (STANDING):  calcium acetate 667 milliGRAM(s) Oral three times a day with meals  ceFAZolin   IVPB 1000 milliGRAM(s) IV Intermittent every 24 hours  chlorhexidine 2% Cloths 1 Application(s) Topical <User Schedule>  darbepoetin Injectable Syringe 40 MICROGram(s) IV Push <User Schedule>  folic acid 1 milliGRAM(s) Oral daily  heparin   Injectable 5000 Unit(s) SubCutaneous every 8 hours  insulin glargine Injectable (LANTUS) 5 Unit(s) SubCutaneous at bedtime  insulin lispro (ADMELOG) corrective regimen sliding scale   SubCutaneous three times a day before meals  insulin lispro Injectable (ADMELOG) 2 Unit(s) SubCutaneous three times a day before meals  multivitamin 1 Tablet(s) Oral daily  pantoprazole    Tablet 40 milliGRAM(s) Oral two times a day  polyethylene glycol 3350 17 Gram(s) Oral every 12 hours  senna 2 Tablet(s) Oral at bedtime  sevelamer carbonate 800 milliGRAM(s) Oral three times a day with meals    MEDICATIONS  (PRN):  aluminum hydroxide/magnesium hydroxide/simethicone Suspension 30 milliLiter(s) Oral every 4 hours PRN Dyspepsia  LORazepam   Injectable 2 milliGRAM(s) IV Push every 4 hours PRN CIWA-Ar score increase by 2 points and a total score of 7 or less  ondansetron Injectable 4 milliGRAM(s) IV Push every 8 hours PRN Nausea and/or Vomiting  vitamin A &amp; D Ointment 1 Application(s) Topical every 12 hours PRN dryness    Home Medications:  carvedilol 12.5 mg oral tablet: 1 orally once a day (17 Oct 2023 15:05)  gabapentin 600 mg oral tablet: 1 orally 3 times a day (17 Oct 2023 15:05)  Januvia 50 mg oral tablet: 1 orally once a day (17 Oct 2023 15:05)  rosuvastatin 10 mg oral capsule: 1 orally once a day (17 Oct 2023 15:05)  Vitamin B-100 oral tablet: 1 orally once a day (17 Oct 2023 15:05)      VITALS:  T(F): 97.5 (10-26-23 @ 20:33), Max: 97.6 (10-26-23 @ 13:53)  HR: 97 (10-26-23 @ 20:33)  BP: 146/68 (10-26-23 @ 20:33)  RR: 16 (10-26-23 @ 20:33)  SpO2: --  Wt(kg): --  I&O's Detail    26 Oct 2023 07:01  -  27 Oct 2023 07:00  --------------------------------------------------------  IN:  Total IN: 0 mL    OUT:    Other (mL): 2000 mL  Total OUT: 2000 mL    Total NET: -2000 mL        I&O's Summary    26 Oct 2023 07:01  -  27 Oct 2023 07:00  --------------------------------------------------------  IN: 0 mL / OUT: 2000 mL / NET: -2000 mL          PHYSICAL EXAM:  Gen: NAD  Chest: b/l breath sounds  Abd: soft  Extremities: no edema  Vascular access: [facility]       LABS:    10-27    138  |  96<L>  |  29<H>  ----------------------------<  93  4.3   |  25  |  8.2<HH>    Ca    9.3      27 Oct 2023 07:25  Phos  4.9     10-27    TPro  7.2  /  Alb  3.4<L>  /  TBili  0.4  /  DBili      /  AST  33  /  ALT  8   /  AlkPhos  85  10-27    Phosphorus: 4.9 mg/dL (10-27-23 @ 07:25)  Phosphorus: 5.7 mg/dL (10-25-23 @ 04:30)                          9.1    5.02  )-----------( 182      ( 27 Oct 2023 07:25 )             27.8     Mean Cell Volume: 93.6 fL (10-27-23 @ 07:25)    Creatinine trend:  Creatinine: 8.2 mg/dL (10-27-23 @ 07:25)  Creatinine: 12.2 mg/dL (10-26-23 @ 04:30)  Creatinine: 10.7 mg/dL (10-25-23 @ 04:30)  Creatinine: 8.9 mg/dL (10-24-23 @ 08:45)  Creatinine: 12.4 mg/dL (10-23-23 @ 06:18)  Creatinine: 11.3 mg/dL (10-22-23 @ 09:50)

## 2023-10-27 NOTE — PROGRESS NOTE ADULT - ASSESSMENT
# Stage 4 CKD secondary to DM, HTN, incomplete recovery from previous severe VAMSI  # Severe VAMSI on CKD d/t ?sepsis - started HD on 10/18/23 via LUE AVF   # Hyponatremia likely due to VAMSI  # Alcohol abuse    Recommendations:  - next HD tomorrow via LUE AVF, 3hrs, optiflux dialyzer, 3K+ bath, 2-3L UF as tolerated  - monitor Ca/PO4- at goal, on binders  - monitor H/H, cont NAIDA wkly (no venofer on iv abx)  - cont abx per ID recs; dose for iHD  - needs optimal glycemic control   - pending outpatient HD set up at [address] [facility]

## 2023-10-27 NOTE — PROGRESS NOTE ADULT - SUBJECTIVE AND OBJECTIVE BOX
SUBJECTIVE:    Patient is a 58y old Male who presents with a chief complaint of Worsening Renal Failure (27 Oct 2023 10:58)    Currently admitted to medicine with the primary diagnosis of Acute renal failure       Today is hospital day 10d. This morning he is resting comfortably in bed and reports no new issues or overnight events.     ROS:   CONSTITUTIONAL: No weakness, fevers or chills   EYES/ENT: No visual changes; No vertigo or throat pain   NECK: No pain or stiffness   RESPIRATORY: No cough, wheezing, hemoptysis; No shortness of breath   CARDIOVASCULAR: No chest pain or palpitations   GASTROINTESTINAL: No abdominal or epigastric pain. No nausea, vomiting, or hematemesis; No diarrhea or constipation. No melena or hematochezia.  GENITOURINARY: No dysuria, frequency or hematuria  NEUROLOGICAL: No numbness or weakness  SKIN: No itching, rashes      PAST MEDICAL & SURGICAL HISTORY  Diabetes    Hypertension    HLD (hyperlipidemia)    Neuropathy    Glaucoma    Chronic kidney disease, unspecified CKD stage  requiring HD march 2022    ETOH abuse    H/O colonoscopy      SOCIAL HISTORY:    ALLERGIES:  No Known Drug Allergies  Bananas (Nausea; Urticaria)  apple (Urticaria)    MEDICATIONS:  STANDING MEDICATIONS  calcium acetate 667 milliGRAM(s) Oral three times a day with meals  ceFAZolin   IVPB 1000 milliGRAM(s) IV Intermittent every 24 hours  chlorhexidine 2% Cloths 1 Application(s) Topical <User Schedule>  darbepoetin Injectable Syringe 40 MICROGram(s) IV Push <User Schedule>  folic acid 1 milliGRAM(s) Oral daily  heparin   Injectable 5000 Unit(s) SubCutaneous every 8 hours  insulin glargine Injectable (LANTUS) 5 Unit(s) SubCutaneous at bedtime  insulin lispro (ADMELOG) corrective regimen sliding scale   SubCutaneous three times a day before meals  insulin lispro Injectable (ADMELOG) 2 Unit(s) SubCutaneous three times a day before meals  multivitamin 1 Tablet(s) Oral daily  pantoprazole    Tablet 40 milliGRAM(s) Oral two times a day  polyethylene glycol 3350 17 Gram(s) Oral every 12 hours  senna 2 Tablet(s) Oral at bedtime  sevelamer carbonate 800 milliGRAM(s) Oral three times a day with meals    PRN MEDICATIONS  aluminum hydroxide/magnesium hydroxide/simethicone Suspension 30 milliLiter(s) Oral every 4 hours PRN  LORazepam   Injectable 2 milliGRAM(s) IV Push every 4 hours PRN  ondansetron Injectable 4 milliGRAM(s) IV Push every 8 hours PRN  vitamin A &amp; D Ointment 1 Application(s) Topical every 12 hours PRN    VITALS:   T(F): 99.1  HR: 92  BP: 132/67  RR: 16  SpO2: 98%    LABS:  Negative for smoking/alcohol/drug use.                         9.1    5.02  )-----------( 182      ( 27 Oct 2023 07:25 )             27.8     10-27    138  |  96<L>  |  29<H>  ----------------------------<  93  4.3   |  25  |  8.2<HH>    Ca    9.3      27 Oct 2023 07:25  Phos  4.9     10-27    TPro  7.2  /  Alb  3.4<L>  /  TBili  0.4  /  DBili  x   /  AST  33  /  ALT  8   /  AlkPhos  85  10-27      Urinalysis Basic - ( 27 Oct 2023 07:25 )    Color: x / Appearance: x / SG: x / pH: x  Gluc: 93 mg/dL / Ketone: x  / Bili: x / Urobili: x   Blood: x / Protein: x / Nitrite: x   Leuk Esterase: x / RBC: x / WBC x   Sq Epi: x / Non Sq Epi: x / Bacteria: x                RADIOLOGY:    PHYSICAL EXAM:  GEN: No acute distress  HEENT: normocephalic, atraumatic, aniceteric  LUNGS: bl breath sounds  HEART: S1/S2 present. RRR, no murmurs  ABD: Soft, non-tender, non-distended. Bowel sounds present  EXT: warm   NEURO: AAOX3, normal affect      ASSESSMENT AND PLAN:    57 y/o male with hx of ETOH abuse, CKD with left wrist fistula, DM , HTN, HLD, presented to the ED for decreased urinary output and increased thirst    # Abdominal distention / ileus    - resolved    # Groin abscess -  s/p I+D 10/20  # R Leg wound   - OR CLx of groin abscess-  Staph aureus , sensitivity noted   - per sx- no need for wound vac - fu with Dr. Rajput outpatient in 2 weeks   - Spoke to ID - can switch to po keflex 500 mg q24h to complete 14 day course from I&D upon dc (until 11/2)   - IV Ancef 1gm q24hrs (give post-HD on HD days)    # VAMSI on CKD4 , now on NEW HD (since 10/18)  - started HD on 10/18/23 via LUE AVF   - on HD (had AVF previously)  - Follows with Dr. Lee  - Nephro following - needs op HD -  [address]    # Acute on chronic Anemia , stable   - no gross gib  - active type and screen   - s/p PRBC  - keep Hb > 8  - GI op fu  - PPI BID     # DKA , resolved   # H/O DM   - off insulin drip -> sc insulin  - Hba1c 8  - endo eval appreciated - insulin rx for op - not covered - fu endo     # H/O  ETOH abuse - thiamine and folic acid    # dvt/gi ppx/diet  # dispo: dc ready to home with PT - pending O/P HD [facility] / needs op rx insulin

## 2023-10-28 LAB
ALBUMIN SERPL ELPH-MCNC: 3.9 G/DL — SIGNIFICANT CHANGE UP (ref 3.5–5.2)
ALBUMIN SERPL ELPH-MCNC: 3.9 G/DL — SIGNIFICANT CHANGE UP (ref 3.5–5.2)
ALP SERPL-CCNC: 92 U/L — SIGNIFICANT CHANGE UP (ref 30–115)
ALP SERPL-CCNC: 92 U/L — SIGNIFICANT CHANGE UP (ref 30–115)
ALT FLD-CCNC: 7 U/L — SIGNIFICANT CHANGE UP (ref 0–41)
ALT FLD-CCNC: 7 U/L — SIGNIFICANT CHANGE UP (ref 0–41)
ANION GAP SERPL CALC-SCNC: 18 MMOL/L — HIGH (ref 7–14)
ANION GAP SERPL CALC-SCNC: 18 MMOL/L — HIGH (ref 7–14)
AST SERPL-CCNC: 41 U/L — SIGNIFICANT CHANGE UP (ref 0–41)
AST SERPL-CCNC: 41 U/L — SIGNIFICANT CHANGE UP (ref 0–41)
BASOPHILS # BLD AUTO: 0.04 K/UL — SIGNIFICANT CHANGE UP (ref 0–0.2)
BASOPHILS # BLD AUTO: 0.04 K/UL — SIGNIFICANT CHANGE UP (ref 0–0.2)
BASOPHILS NFR BLD AUTO: 0.7 % — SIGNIFICANT CHANGE UP (ref 0–1)
BASOPHILS NFR BLD AUTO: 0.7 % — SIGNIFICANT CHANGE UP (ref 0–1)
BILIRUB SERPL-MCNC: 0.4 MG/DL — SIGNIFICANT CHANGE UP (ref 0.2–1.2)
BILIRUB SERPL-MCNC: 0.4 MG/DL — SIGNIFICANT CHANGE UP (ref 0.2–1.2)
BUN SERPL-MCNC: 32 MG/DL — HIGH (ref 10–20)
BUN SERPL-MCNC: 32 MG/DL — HIGH (ref 10–20)
CALCIUM SERPL-MCNC: 9.6 MG/DL — SIGNIFICANT CHANGE UP (ref 8.4–10.5)
CALCIUM SERPL-MCNC: 9.6 MG/DL — SIGNIFICANT CHANGE UP (ref 8.4–10.5)
CHLORIDE SERPL-SCNC: 95 MMOL/L — LOW (ref 98–110)
CHLORIDE SERPL-SCNC: 95 MMOL/L — LOW (ref 98–110)
CO2 SERPL-SCNC: 25 MMOL/L — SIGNIFICANT CHANGE UP (ref 17–32)
CO2 SERPL-SCNC: 25 MMOL/L — SIGNIFICANT CHANGE UP (ref 17–32)
CREAT SERPL-MCNC: 9 MG/DL — CRITICAL HIGH (ref 0.7–1.5)
CREAT SERPL-MCNC: 9 MG/DL — CRITICAL HIGH (ref 0.7–1.5)
EGFR: 6 ML/MIN/1.73M2 — LOW
EGFR: 6 ML/MIN/1.73M2 — LOW
EOSINOPHIL # BLD AUTO: 0.5 K/UL — SIGNIFICANT CHANGE UP (ref 0–0.7)
EOSINOPHIL # BLD AUTO: 0.5 K/UL — SIGNIFICANT CHANGE UP (ref 0–0.7)
EOSINOPHIL NFR BLD AUTO: 9.3 % — HIGH (ref 0–8)
EOSINOPHIL NFR BLD AUTO: 9.3 % — HIGH (ref 0–8)
GLUCOSE BLDC GLUCOMTR-MCNC: 100 MG/DL — HIGH (ref 70–99)
GLUCOSE BLDC GLUCOMTR-MCNC: 100 MG/DL — HIGH (ref 70–99)
GLUCOSE BLDC GLUCOMTR-MCNC: 102 MG/DL — HIGH (ref 70–99)
GLUCOSE BLDC GLUCOMTR-MCNC: 102 MG/DL — HIGH (ref 70–99)
GLUCOSE BLDC GLUCOMTR-MCNC: 161 MG/DL — HIGH (ref 70–99)
GLUCOSE BLDC GLUCOMTR-MCNC: 161 MG/DL — HIGH (ref 70–99)
GLUCOSE BLDC GLUCOMTR-MCNC: 252 MG/DL — HIGH (ref 70–99)
GLUCOSE BLDC GLUCOMTR-MCNC: 252 MG/DL — HIGH (ref 70–99)
GLUCOSE SERPL-MCNC: 136 MG/DL — HIGH (ref 70–99)
GLUCOSE SERPL-MCNC: 136 MG/DL — HIGH (ref 70–99)
HCT VFR BLD CALC: 29.9 % — LOW (ref 42–52)
HCT VFR BLD CALC: 29.9 % — LOW (ref 42–52)
HGB BLD-MCNC: 9.7 G/DL — LOW (ref 14–18)
HGB BLD-MCNC: 9.7 G/DL — LOW (ref 14–18)
IMM GRANULOCYTES NFR BLD AUTO: 0.9 % — HIGH (ref 0.1–0.3)
IMM GRANULOCYTES NFR BLD AUTO: 0.9 % — HIGH (ref 0.1–0.3)
LYMPHOCYTES # BLD AUTO: 0.91 K/UL — LOW (ref 1.2–3.4)
LYMPHOCYTES # BLD AUTO: 0.91 K/UL — LOW (ref 1.2–3.4)
LYMPHOCYTES # BLD AUTO: 16.9 % — LOW (ref 20.5–51.1)
LYMPHOCYTES # BLD AUTO: 16.9 % — LOW (ref 20.5–51.1)
MCHC RBC-ENTMCNC: 29.8 PG — SIGNIFICANT CHANGE UP (ref 27–31)
MCHC RBC-ENTMCNC: 29.8 PG — SIGNIFICANT CHANGE UP (ref 27–31)
MCHC RBC-ENTMCNC: 32.4 G/DL — SIGNIFICANT CHANGE UP (ref 32–37)
MCHC RBC-ENTMCNC: 32.4 G/DL — SIGNIFICANT CHANGE UP (ref 32–37)
MCV RBC AUTO: 92 FL — SIGNIFICANT CHANGE UP (ref 80–94)
MCV RBC AUTO: 92 FL — SIGNIFICANT CHANGE UP (ref 80–94)
MONOCYTES # BLD AUTO: 0.45 K/UL — SIGNIFICANT CHANGE UP (ref 0.1–0.6)
MONOCYTES # BLD AUTO: 0.45 K/UL — SIGNIFICANT CHANGE UP (ref 0.1–0.6)
MONOCYTES NFR BLD AUTO: 8.3 % — SIGNIFICANT CHANGE UP (ref 1.7–9.3)
MONOCYTES NFR BLD AUTO: 8.3 % — SIGNIFICANT CHANGE UP (ref 1.7–9.3)
NEUTROPHILS # BLD AUTO: 3.44 K/UL — SIGNIFICANT CHANGE UP (ref 1.4–6.5)
NEUTROPHILS # BLD AUTO: 3.44 K/UL — SIGNIFICANT CHANGE UP (ref 1.4–6.5)
NEUTROPHILS NFR BLD AUTO: 63.9 % — SIGNIFICANT CHANGE UP (ref 42.2–75.2)
NEUTROPHILS NFR BLD AUTO: 63.9 % — SIGNIFICANT CHANGE UP (ref 42.2–75.2)
NRBC # BLD: 0 /100 WBCS — SIGNIFICANT CHANGE UP (ref 0–0)
NRBC # BLD: 0 /100 WBCS — SIGNIFICANT CHANGE UP (ref 0–0)
PLATELET # BLD AUTO: 225 K/UL — SIGNIFICANT CHANGE UP (ref 130–400)
PLATELET # BLD AUTO: 225 K/UL — SIGNIFICANT CHANGE UP (ref 130–400)
PMV BLD: 10 FL — SIGNIFICANT CHANGE UP (ref 7.4–10.4)
PMV BLD: 10 FL — SIGNIFICANT CHANGE UP (ref 7.4–10.4)
POTASSIUM SERPL-MCNC: 4.1 MMOL/L — SIGNIFICANT CHANGE UP (ref 3.5–5)
POTASSIUM SERPL-MCNC: 4.1 MMOL/L — SIGNIFICANT CHANGE UP (ref 3.5–5)
POTASSIUM SERPL-SCNC: 4.1 MMOL/L — SIGNIFICANT CHANGE UP (ref 3.5–5)
POTASSIUM SERPL-SCNC: 4.1 MMOL/L — SIGNIFICANT CHANGE UP (ref 3.5–5)
PROT SERPL-MCNC: 7.9 G/DL — SIGNIFICANT CHANGE UP (ref 6–8)
PROT SERPL-MCNC: 7.9 G/DL — SIGNIFICANT CHANGE UP (ref 6–8)
RBC # BLD: 3.25 M/UL — LOW (ref 4.7–6.1)
RBC # BLD: 3.25 M/UL — LOW (ref 4.7–6.1)
RBC # FLD: 13.7 % — SIGNIFICANT CHANGE UP (ref 11.5–14.5)
RBC # FLD: 13.7 % — SIGNIFICANT CHANGE UP (ref 11.5–14.5)
SODIUM SERPL-SCNC: 138 MMOL/L — SIGNIFICANT CHANGE UP (ref 135–146)
SODIUM SERPL-SCNC: 138 MMOL/L — SIGNIFICANT CHANGE UP (ref 135–146)
WBC # BLD: 5.39 K/UL — SIGNIFICANT CHANGE UP (ref 4.8–10.8)
WBC # BLD: 5.39 K/UL — SIGNIFICANT CHANGE UP (ref 4.8–10.8)
WBC # FLD AUTO: 5.39 K/UL — SIGNIFICANT CHANGE UP (ref 4.8–10.8)
WBC # FLD AUTO: 5.39 K/UL — SIGNIFICANT CHANGE UP (ref 4.8–10.8)

## 2023-10-28 PROCEDURE — 99233 SBSQ HOSP IP/OBS HIGH 50: CPT

## 2023-10-28 RX ADMIN — Medication 1 MILLIGRAM(S): at 14:11

## 2023-10-28 RX ADMIN — HEPARIN SODIUM 5000 UNIT(S): 5000 INJECTION INTRAVENOUS; SUBCUTANEOUS at 21:14

## 2023-10-28 RX ADMIN — POLYETHYLENE GLYCOL 3350 17 GRAM(S): 17 POWDER, FOR SOLUTION ORAL at 05:11

## 2023-10-28 RX ADMIN — Medication 3: at 17:15

## 2023-10-28 RX ADMIN — SEVELAMER CARBONATE 800 MILLIGRAM(S): 2400 POWDER, FOR SUSPENSION ORAL at 14:10

## 2023-10-28 RX ADMIN — CHLORHEXIDINE GLUCONATE 1 APPLICATION(S): 213 SOLUTION TOPICAL at 05:11

## 2023-10-28 RX ADMIN — INSULIN GLARGINE 5 UNIT(S): 100 INJECTION, SOLUTION SUBCUTANEOUS at 21:13

## 2023-10-28 RX ADMIN — HEPARIN SODIUM 5000 UNIT(S): 5000 INJECTION INTRAVENOUS; SUBCUTANEOUS at 17:15

## 2023-10-28 RX ADMIN — Medication 100 MILLIGRAM(S): at 17:21

## 2023-10-28 RX ADMIN — Medication 1 TABLET(S): at 14:10

## 2023-10-28 RX ADMIN — SEVELAMER CARBONATE 800 MILLIGRAM(S): 2400 POWDER, FOR SUSPENSION ORAL at 08:00

## 2023-10-28 RX ADMIN — HEPARIN SODIUM 5000 UNIT(S): 5000 INJECTION INTRAVENOUS; SUBCUTANEOUS at 05:11

## 2023-10-28 RX ADMIN — PANTOPRAZOLE SODIUM 40 MILLIGRAM(S): 20 TABLET, DELAYED RELEASE ORAL at 17:17

## 2023-10-28 RX ADMIN — PANTOPRAZOLE SODIUM 40 MILLIGRAM(S): 20 TABLET, DELAYED RELEASE ORAL at 05:11

## 2023-10-28 RX ADMIN — SENNA PLUS 2 TABLET(S): 8.6 TABLET ORAL at 21:13

## 2023-10-28 RX ADMIN — Medication 2 UNIT(S): at 14:09

## 2023-10-28 RX ADMIN — Medication 667 MILLIGRAM(S): at 17:16

## 2023-10-28 RX ADMIN — Medication 667 MILLIGRAM(S): at 14:10

## 2023-10-28 RX ADMIN — SEVELAMER CARBONATE 800 MILLIGRAM(S): 2400 POWDER, FOR SUSPENSION ORAL at 17:16

## 2023-10-28 RX ADMIN — Medication 667 MILLIGRAM(S): at 08:00

## 2023-10-28 RX ADMIN — Medication 2 UNIT(S): at 07:59

## 2023-10-28 RX ADMIN — Medication 2 UNIT(S): at 17:15

## 2023-10-28 NOTE — PROGRESS NOTE ADULT - SUBJECTIVE AND OBJECTIVE BOX
Nephrology progress note    Patient is seen and examined, events over the last 24 h noted .  No complaints  Allergies:  No Known Drug Allergies  Bananas (Nausea; Urticaria)  apple (Urticaria)    Hospital Medications:   MEDICATIONS  (STANDING):  calcium acetate 667 milliGRAM(s) Oral three times a day with meals  ceFAZolin   IVPB 1000 milliGRAM(s) IV Intermittent every 24 hours  chlorhexidine 2% Cloths 1 Application(s) Topical <User Schedule>  darbepoetin Injectable Syringe 40 MICROGram(s) IV Push <User Schedule>  folic acid 1 milliGRAM(s) Oral daily  heparin   Injectable 5000 Unit(s) SubCutaneous every 8 hours  insulin glargine Injectable (LANTUS) 5 Unit(s) SubCutaneous at bedtime  insulin lispro (ADMELOG) corrective regimen sliding scale   SubCutaneous three times a day before meals  insulin lispro Injectable (ADMELOG) 2 Unit(s) SubCutaneous three times a day before meals  multivitamin 1 Tablet(s) Oral daily  pantoprazole    Tablet 40 milliGRAM(s) Oral two times a day  polyethylene glycol 3350 17 Gram(s) Oral every 12 hours  senna 2 Tablet(s) Oral at bedtime  sevelamer carbonate 800 milliGRAM(s) Oral three times a day with meals        VITALS:  T(F): 98.1 (10-28-23 @ 05:20), Max: 99.1 (10-27-23 @ 14:06)  HR: 94 (10-28-23 @ 05:20)  BP: 135/74 (10-28-23 @ 05:20)  RR: 16 (10-28-23 @ 05:20)  SpO2: 98% (10-27-23 @ 14:06)  Wt(kg): --    10-26 @ 07:01  -  10-27 @ 07:00  --------------------------------------------------------  IN: 0 mL / OUT: 2000 mL / NET: -2000 mL          PHYSICAL EXAM:  Constitutional: NAD  HEENT: anicteric sclera  Neck: No JVD  Respiratory: CTA  Cardiovascular: S1, S2, RRR  Gastrointestinal: BS+, soft, NT/ND  Extremities: No peripheral edema  Neurological: A/O x 3, no focal deficits  : No CVA tenderness. No way.   Skin: No rashes  Vascular Access: AVF    LABS:  10-27    138  |  96<L>  |  29<H>  ----------------------------<  93  4.3   |  25  |  8.2<HH>    Ca    9.3      27 Oct 2023 07:25  Phos  4.9     10-27    TPro  7.2  /  Alb  3.4<L>  /  TBili  0.4  /  DBili      /  AST  33  /  ALT  8   /  AlkPhos  85  10-27                          9.1    5.02  )-----------( 182      ( 27 Oct 2023 07:25 )             27.8       Urine Studies:  Urinalysis Basic - ( 27 Oct 2023 07:25 )    Color:  / Appearance:  / SG:  / pH:   Gluc: 93 mg/dL / Ketone:   / Bili:  / Urobili:    Blood:  / Protein:  / Nitrite:    Leuk Esterase:  / RBC:  / WBC    Sq Epi:  / Non Sq Epi:  / Bacteria:         RADIOLOGY & ADDITIONAL STUDIES:  < from: Xray Chest 1 View- PORTABLE-Routine (Xray Chest 1 View- PORTABLE-Routine in AM.) (10.21.23 @ 07:17) >  Interval increase in bilateral lung volumes, previously low.  Interval   improvement of pulmonary vascular congestion, otherwise no radiographic   evidence of acute cardiopulmonary disease.    < end of copied text >

## 2023-10-28 NOTE — PROGRESS NOTE ADULT - ASSESSMENT
# Stage 4 CKD secondary to DM, HTN, incomplete recovery from previous severe VAMSI  # Severe VAMSI on CKD d/t ?sepsis - started HD on 10/18/23 via LUE AVF   # Hyponatremia likely due to VAMSI  # Alcohol abuse    Recommendations:  - HD today via LUE AVF, 3hrs, optiflux dialyzer, 3K+ bath, 2-3L UF as tolerated  - monitor Ca/PO4- at goal, on binders  - monitor H/H, cont NAIDA wkly (no venofer on iv abx)  # Groin abscess -  s/p I+D 10/20  # R Leg wound   - OR CLx of groin abscess-  Staph aureus , sensitivity noted   - cont abx per ID recs; dose for iHD  - needs optimal glycemic control   - pending [facility]

## 2023-10-28 NOTE — PROGRESS NOTE ADULT - SUBJECTIVE AND OBJECTIVE BOX
SUBJECTIVE:    Patient is a 58y old Male who presents with a chief complaint of Worsening Renal Failure (28 Oct 2023 07:15)    Currently admitted to medicine with the primary diagnosis of Acute renal failure       Today is hospital day 11d. This morning he is resting comfortably in bed and reports no new issues or overnight events.     ROS:   CONSTITUTIONAL: No weakness, fevers or chills   EYES/ENT: No visual changes; No vertigo or throat pain   NECK: No pain or stiffness   RESPIRATORY: No cough, wheezing, hemoptysis; No shortness of breath   CARDIOVASCULAR: No chest pain or palpitations   GASTROINTESTINAL: No abdominal or epigastric pain. No nausea, vomiting, or hematemesis; No diarrhea or constipation. No melena or hematochezia.  GENITOURINARY: No dysuria, frequency or hematuria  NEUROLOGICAL: No numbness or weakness  SKIN: No itching, rashes      PAST MEDICAL & SURGICAL HISTORY  Diabetes    Hypertension    HLD (hyperlipidemia)    Neuropathy    Glaucoma    Chronic kidney disease, unspecified CKD stage  requiring HD march 2022    ETOH abuse    H/O colonoscopy      SOCIAL HISTORY:    ALLERGIES:  No Known Drug Allergies  Bananas (Nausea; Urticaria)  apple (Urticaria)    MEDICATIONS:  STANDING MEDICATIONS  calcium acetate 667 milliGRAM(s) Oral three times a day with meals  ceFAZolin   IVPB 1000 milliGRAM(s) IV Intermittent every 24 hours  chlorhexidine 2% Cloths 1 Application(s) Topical <User Schedule>  darbepoetin Injectable Syringe 40 MICROGram(s) IV Push <User Schedule>  folic acid 1 milliGRAM(s) Oral daily  heparin   Injectable 5000 Unit(s) SubCutaneous every 8 hours  insulin glargine Injectable (LANTUS) 5 Unit(s) SubCutaneous at bedtime  insulin lispro (ADMELOG) corrective regimen sliding scale   SubCutaneous three times a day before meals  insulin lispro Injectable (ADMELOG) 2 Unit(s) SubCutaneous three times a day before meals  multivitamin 1 Tablet(s) Oral daily  pantoprazole    Tablet 40 milliGRAM(s) Oral two times a day  polyethylene glycol 3350 17 Gram(s) Oral every 12 hours  senna 2 Tablet(s) Oral at bedtime  sevelamer carbonate 800 milliGRAM(s) Oral three times a day with meals    PRN MEDICATIONS  aluminum hydroxide/magnesium hydroxide/simethicone Suspension 30 milliLiter(s) Oral every 4 hours PRN  ondansetron Injectable 4 milliGRAM(s) IV Push every 8 hours PRN  vitamin A &amp; D Ointment 1 Application(s) Topical every 12 hours PRN    VITALS:   T(F): 98.1  HR: 95  BP: 144/74  RR: 16  SpO2: 98%    LABS:  Negative for smoking/alcohol/drug use.                         9.7    5.39  )-----------( 225      ( 28 Oct 2023 08:40 )             29.9     10-28    138  |  95<L>  |  32<H>  ----------------------------<  136<H>  4.1   |  25  |  9.0<HH>    Ca    9.6      28 Oct 2023 08:40  Phos  4.9     10-27    TPro  7.9  /  Alb  3.9  /  TBili  0.4  /  DBili  x   /  AST  41  /  ALT  7   /  AlkPhos  92  10-28      Urinalysis Basic - ( 28 Oct 2023 08:40 )    Color: x / Appearance: x / SG: x / pH: x  Gluc: 136 mg/dL / Ketone: x  / Bili: x / Urobili: x   Blood: x / Protein: x / Nitrite: x   Leuk Esterase: x / RBC: x / WBC x   Sq Epi: x / Non Sq Epi: x / Bacteria: x                RADIOLOGY:    PHYSICAL EXAM:  GEN: No acute distress  HEENT: normocephalic, atraumatic, aniceteric  LUNGS: Clear to auscultation bilaterally, no rales/wheezing/ rhonchi  HEART: S1/S2 present. RRR, no murmurs  ABD: Soft, non-tender, non-distended. Bowel sounds present  EXT: warm , RLE dressing clean dry and intact   NEURO: AAOX3, normal affect      ASSESSMENT AND PLAN:    59 y/o male with hx of ETOH abuse, CKD with left wrist fistula, DM , HTN, HLD, presented to the ED for decreased urinary output and increased thirst    # Abdominal distention / ileus    - resolved    # Groin abscess -  s/p I+D 10/20  # R Leg wound   - OR CLx of groin abscess-  Staph aureus , sensitivity noted   - per sx- no need for wound vac - fu with Dr. Rajput outpatient in 2 weeks   - Spoke to ID - can switch to po keflex 500 mg q24h to complete 14 day course from I&D upon dc (until 11/2)   - IV Ancef 1gm q24hrs (give post-HD on HD days)    # VAMSI on CKD4 , now on NEW HD (since 10/18)  - started HD on 10/18/23 via LUE AVF   - on HD (had AVF previously)  - Follows with Dr. Lee  - Nephro following - [facility]    # Acute on chronic Anemia , stable   - no gross gib  - active type and screen   - s/p PRBC  - keep Hb > 8  - GI op fu  - PPI BID     # DKA , resolved   # H/O DM   - off insulin drip -> sc insulin  - Hba1c 8  - endo eval appreciated - insulin rx for op , need to check price     # H/O  ETOH abuse - thiamine and folic acid    # dvt/gi ppx/diet  # dispo: dc ready to home with PT - pending O/P HD [facility] / needs op rx insulin

## 2023-10-29 LAB
ALBUMIN SERPL ELPH-MCNC: 3.9 G/DL — SIGNIFICANT CHANGE UP (ref 3.5–5.2)
ALBUMIN SERPL ELPH-MCNC: 3.9 G/DL — SIGNIFICANT CHANGE UP (ref 3.5–5.2)
ALP SERPL-CCNC: 90 U/L — SIGNIFICANT CHANGE UP (ref 30–115)
ALP SERPL-CCNC: 90 U/L — SIGNIFICANT CHANGE UP (ref 30–115)
ALT FLD-CCNC: 6 U/L — SIGNIFICANT CHANGE UP (ref 0–41)
ALT FLD-CCNC: 6 U/L — SIGNIFICANT CHANGE UP (ref 0–41)
ANION GAP SERPL CALC-SCNC: 15 MMOL/L — HIGH (ref 7–14)
ANION GAP SERPL CALC-SCNC: 15 MMOL/L — HIGH (ref 7–14)
AST SERPL-CCNC: 41 U/L — SIGNIFICANT CHANGE UP (ref 0–41)
AST SERPL-CCNC: 41 U/L — SIGNIFICANT CHANGE UP (ref 0–41)
BASOPHILS # BLD AUTO: 0.03 K/UL — SIGNIFICANT CHANGE UP (ref 0–0.2)
BASOPHILS # BLD AUTO: 0.03 K/UL — SIGNIFICANT CHANGE UP (ref 0–0.2)
BASOPHILS NFR BLD AUTO: 0.6 % — SIGNIFICANT CHANGE UP (ref 0–1)
BASOPHILS NFR BLD AUTO: 0.6 % — SIGNIFICANT CHANGE UP (ref 0–1)
BILIRUB SERPL-MCNC: 0.5 MG/DL — SIGNIFICANT CHANGE UP (ref 0.2–1.2)
BILIRUB SERPL-MCNC: 0.5 MG/DL — SIGNIFICANT CHANGE UP (ref 0.2–1.2)
BUN SERPL-MCNC: 18 MG/DL — SIGNIFICANT CHANGE UP (ref 10–20)
BUN SERPL-MCNC: 18 MG/DL — SIGNIFICANT CHANGE UP (ref 10–20)
CALCIUM SERPL-MCNC: 9.6 MG/DL — SIGNIFICANT CHANGE UP (ref 8.4–10.5)
CALCIUM SERPL-MCNC: 9.6 MG/DL — SIGNIFICANT CHANGE UP (ref 8.4–10.5)
CHLORIDE SERPL-SCNC: 94 MMOL/L — LOW (ref 98–110)
CHLORIDE SERPL-SCNC: 94 MMOL/L — LOW (ref 98–110)
CO2 SERPL-SCNC: 28 MMOL/L — SIGNIFICANT CHANGE UP (ref 17–32)
CO2 SERPL-SCNC: 28 MMOL/L — SIGNIFICANT CHANGE UP (ref 17–32)
CREAT SERPL-MCNC: 7 MG/DL — CRITICAL HIGH (ref 0.7–1.5)
CREAT SERPL-MCNC: 7 MG/DL — CRITICAL HIGH (ref 0.7–1.5)
EGFR: 8 ML/MIN/1.73M2 — LOW
EGFR: 8 ML/MIN/1.73M2 — LOW
EOSINOPHIL # BLD AUTO: 0.56 K/UL — SIGNIFICANT CHANGE UP (ref 0–0.7)
EOSINOPHIL # BLD AUTO: 0.56 K/UL — SIGNIFICANT CHANGE UP (ref 0–0.7)
EOSINOPHIL NFR BLD AUTO: 10.4 % — HIGH (ref 0–8)
EOSINOPHIL NFR BLD AUTO: 10.4 % — HIGH (ref 0–8)
GLUCOSE BLDC GLUCOMTR-MCNC: 117 MG/DL — HIGH (ref 70–99)
GLUCOSE BLDC GLUCOMTR-MCNC: 117 MG/DL — HIGH (ref 70–99)
GLUCOSE BLDC GLUCOMTR-MCNC: 123 MG/DL — HIGH (ref 70–99)
GLUCOSE BLDC GLUCOMTR-MCNC: 123 MG/DL — HIGH (ref 70–99)
GLUCOSE BLDC GLUCOMTR-MCNC: 227 MG/DL — HIGH (ref 70–99)
GLUCOSE BLDC GLUCOMTR-MCNC: 227 MG/DL — HIGH (ref 70–99)
GLUCOSE BLDC GLUCOMTR-MCNC: 94 MG/DL — SIGNIFICANT CHANGE UP (ref 70–99)
GLUCOSE BLDC GLUCOMTR-MCNC: 94 MG/DL — SIGNIFICANT CHANGE UP (ref 70–99)
GLUCOSE SERPL-MCNC: 143 MG/DL — HIGH (ref 70–99)
GLUCOSE SERPL-MCNC: 143 MG/DL — HIGH (ref 70–99)
HCT VFR BLD CALC: 28.9 % — LOW (ref 42–52)
HCT VFR BLD CALC: 28.9 % — LOW (ref 42–52)
HGB BLD-MCNC: 9.4 G/DL — LOW (ref 14–18)
HGB BLD-MCNC: 9.4 G/DL — LOW (ref 14–18)
IMM GRANULOCYTES NFR BLD AUTO: 0.7 % — HIGH (ref 0.1–0.3)
IMM GRANULOCYTES NFR BLD AUTO: 0.7 % — HIGH (ref 0.1–0.3)
LYMPHOCYTES # BLD AUTO: 1.18 K/UL — LOW (ref 1.2–3.4)
LYMPHOCYTES # BLD AUTO: 1.18 K/UL — LOW (ref 1.2–3.4)
LYMPHOCYTES # BLD AUTO: 21.9 % — SIGNIFICANT CHANGE UP (ref 20.5–51.1)
LYMPHOCYTES # BLD AUTO: 21.9 % — SIGNIFICANT CHANGE UP (ref 20.5–51.1)
MCHC RBC-ENTMCNC: 30.3 PG — SIGNIFICANT CHANGE UP (ref 27–31)
MCHC RBC-ENTMCNC: 30.3 PG — SIGNIFICANT CHANGE UP (ref 27–31)
MCHC RBC-ENTMCNC: 32.5 G/DL — SIGNIFICANT CHANGE UP (ref 32–37)
MCHC RBC-ENTMCNC: 32.5 G/DL — SIGNIFICANT CHANGE UP (ref 32–37)
MCV RBC AUTO: 93.2 FL — SIGNIFICANT CHANGE UP (ref 80–94)
MCV RBC AUTO: 93.2 FL — SIGNIFICANT CHANGE UP (ref 80–94)
MONOCYTES # BLD AUTO: 0.59 K/UL — SIGNIFICANT CHANGE UP (ref 0.1–0.6)
MONOCYTES # BLD AUTO: 0.59 K/UL — SIGNIFICANT CHANGE UP (ref 0.1–0.6)
MONOCYTES NFR BLD AUTO: 10.9 % — HIGH (ref 1.7–9.3)
MONOCYTES NFR BLD AUTO: 10.9 % — HIGH (ref 1.7–9.3)
NEUTROPHILS # BLD AUTO: 2.99 K/UL — SIGNIFICANT CHANGE UP (ref 1.4–6.5)
NEUTROPHILS # BLD AUTO: 2.99 K/UL — SIGNIFICANT CHANGE UP (ref 1.4–6.5)
NEUTROPHILS NFR BLD AUTO: 55.5 % — SIGNIFICANT CHANGE UP (ref 42.2–75.2)
NEUTROPHILS NFR BLD AUTO: 55.5 % — SIGNIFICANT CHANGE UP (ref 42.2–75.2)
NRBC # BLD: 0 /100 WBCS — SIGNIFICANT CHANGE UP (ref 0–0)
NRBC # BLD: 0 /100 WBCS — SIGNIFICANT CHANGE UP (ref 0–0)
PLATELET # BLD AUTO: 249 K/UL — SIGNIFICANT CHANGE UP (ref 130–400)
PLATELET # BLD AUTO: 249 K/UL — SIGNIFICANT CHANGE UP (ref 130–400)
PMV BLD: 9.8 FL — SIGNIFICANT CHANGE UP (ref 7.4–10.4)
PMV BLD: 9.8 FL — SIGNIFICANT CHANGE UP (ref 7.4–10.4)
POTASSIUM SERPL-MCNC: 3.9 MMOL/L — SIGNIFICANT CHANGE UP (ref 3.5–5)
POTASSIUM SERPL-MCNC: 3.9 MMOL/L — SIGNIFICANT CHANGE UP (ref 3.5–5)
POTASSIUM SERPL-SCNC: 3.9 MMOL/L — SIGNIFICANT CHANGE UP (ref 3.5–5)
POTASSIUM SERPL-SCNC: 3.9 MMOL/L — SIGNIFICANT CHANGE UP (ref 3.5–5)
PROT SERPL-MCNC: 8 G/DL — SIGNIFICANT CHANGE UP (ref 6–8)
PROT SERPL-MCNC: 8 G/DL — SIGNIFICANT CHANGE UP (ref 6–8)
RBC # BLD: 3.1 M/UL — LOW (ref 4.7–6.1)
RBC # BLD: 3.1 M/UL — LOW (ref 4.7–6.1)
RBC # FLD: 13.8 % — SIGNIFICANT CHANGE UP (ref 11.5–14.5)
RBC # FLD: 13.8 % — SIGNIFICANT CHANGE UP (ref 11.5–14.5)
SODIUM SERPL-SCNC: 137 MMOL/L — SIGNIFICANT CHANGE UP (ref 135–146)
SODIUM SERPL-SCNC: 137 MMOL/L — SIGNIFICANT CHANGE UP (ref 135–146)
WBC # BLD: 5.39 K/UL — SIGNIFICANT CHANGE UP (ref 4.8–10.8)
WBC # BLD: 5.39 K/UL — SIGNIFICANT CHANGE UP (ref 4.8–10.8)
WBC # FLD AUTO: 5.39 K/UL — SIGNIFICANT CHANGE UP (ref 4.8–10.8)
WBC # FLD AUTO: 5.39 K/UL — SIGNIFICANT CHANGE UP (ref 4.8–10.8)

## 2023-10-29 PROCEDURE — 99232 SBSQ HOSP IP/OBS MODERATE 35: CPT

## 2023-10-29 RX ORDER — ATORVASTATIN CALCIUM 80 MG/1
40 TABLET, FILM COATED ORAL AT BEDTIME
Refills: 0 | Status: DISCONTINUED | OUTPATIENT
Start: 2023-10-29 | End: 2023-10-30

## 2023-10-29 RX ORDER — CARVEDILOL PHOSPHATE 80 MG/1
12.5 CAPSULE, EXTENDED RELEASE ORAL EVERY 12 HOURS
Refills: 0 | Status: DISCONTINUED | OUTPATIENT
Start: 2023-10-29 | End: 2023-10-30

## 2023-10-29 RX ADMIN — POLYETHYLENE GLYCOL 3350 17 GRAM(S): 17 POWDER, FOR SOLUTION ORAL at 17:07

## 2023-10-29 RX ADMIN — CARVEDILOL PHOSPHATE 12.5 MILLIGRAM(S): 80 CAPSULE, EXTENDED RELEASE ORAL at 17:08

## 2023-10-29 RX ADMIN — Medication 667 MILLIGRAM(S): at 17:08

## 2023-10-29 RX ADMIN — HEPARIN SODIUM 5000 UNIT(S): 5000 INJECTION INTRAVENOUS; SUBCUTANEOUS at 06:10

## 2023-10-29 RX ADMIN — CHLORHEXIDINE GLUCONATE 1 APPLICATION(S): 213 SOLUTION TOPICAL at 06:10

## 2023-10-29 RX ADMIN — Medication 2: at 17:05

## 2023-10-29 RX ADMIN — Medication 1 MILLIGRAM(S): at 11:45

## 2023-10-29 RX ADMIN — Medication 667 MILLIGRAM(S): at 08:21

## 2023-10-29 RX ADMIN — Medication 2 UNIT(S): at 11:46

## 2023-10-29 RX ADMIN — POLYETHYLENE GLYCOL 3350 17 GRAM(S): 17 POWDER, FOR SOLUTION ORAL at 06:10

## 2023-10-29 RX ADMIN — SEVELAMER CARBONATE 800 MILLIGRAM(S): 2400 POWDER, FOR SUSPENSION ORAL at 08:19

## 2023-10-29 RX ADMIN — PANTOPRAZOLE SODIUM 40 MILLIGRAM(S): 20 TABLET, DELAYED RELEASE ORAL at 17:07

## 2023-10-29 RX ADMIN — Medication 2 UNIT(S): at 08:18

## 2023-10-29 RX ADMIN — PANTOPRAZOLE SODIUM 40 MILLIGRAM(S): 20 TABLET, DELAYED RELEASE ORAL at 06:10

## 2023-10-29 RX ADMIN — SENNA PLUS 2 TABLET(S): 8.6 TABLET ORAL at 21:33

## 2023-10-29 RX ADMIN — HEPARIN SODIUM 5000 UNIT(S): 5000 INJECTION INTRAVENOUS; SUBCUTANEOUS at 13:23

## 2023-10-29 RX ADMIN — ATORVASTATIN CALCIUM 40 MILLIGRAM(S): 80 TABLET, FILM COATED ORAL at 21:32

## 2023-10-29 RX ADMIN — Medication 100 MILLIGRAM(S): at 17:13

## 2023-10-29 RX ADMIN — Medication 1 TABLET(S): at 11:45

## 2023-10-29 RX ADMIN — Medication 2 UNIT(S): at 17:05

## 2023-10-29 RX ADMIN — HEPARIN SODIUM 5000 UNIT(S): 5000 INJECTION INTRAVENOUS; SUBCUTANEOUS at 21:32

## 2023-10-29 RX ADMIN — SEVELAMER CARBONATE 800 MILLIGRAM(S): 2400 POWDER, FOR SUSPENSION ORAL at 11:45

## 2023-10-29 RX ADMIN — SEVELAMER CARBONATE 800 MILLIGRAM(S): 2400 POWDER, FOR SUSPENSION ORAL at 17:07

## 2023-10-29 RX ADMIN — Medication 667 MILLIGRAM(S): at 11:45

## 2023-10-29 NOTE — PROGRESS NOTE ADULT - REASON FOR ADMISSION
Worsening Renal Failure

## 2023-10-29 NOTE — PROGRESS NOTE ADULT - ASSESSMENT
# Stage 4 CKD secondary to DM, HTN, incomplete recovery from previous severe VAMSI  # Severe VAMSI on CKD d/t ?sepsis - started HD on 10/18/23 via LUE AVF   # Hyponatremia likely due to VAMSI  # Alcohol abuse    Recommendations:  - HD yesterday via LUE AVF, 3hrs, optiflux dialyzer, 3K+ bath, 2-3L UF as tolerated  - monitor Ca/PO4- at goal, on binders  - monitor H/H, cont NAIDA wkly (no venofer on iv abx)  # Groin abscess -  s/p I+D 10/20  # R Leg wound   - OR CLx of groin abscess-  Staph aureus , sensitivity noted   - cont abx per ID recs; dose for iHD  - needs optimal glycemic control   - pending outpatient HD set up at [facility]

## 2023-10-29 NOTE — PROGRESS NOTE ADULT - SUBJECTIVE AND OBJECTIVE BOX
Nephrology progress note    Patient was seen and examined, events over the last 24 h noted .  HD yesterday    Allergies:  No Known Drug Allergies  Bananas (Nausea; Urticaria)  apple (Urticaria)    Hospital Medications:   MEDICATIONS  (STANDING):  atorvastatin 40 milliGRAM(s) Oral at bedtime  calcium acetate 667 milliGRAM(s) Oral three times a day with meals  carvedilol 12.5 milliGRAM(s) Oral every 12 hours  ceFAZolin   IVPB 1000 milliGRAM(s) IV Intermittent every 24 hours  chlorhexidine 2% Cloths 1 Application(s) Topical <User Schedule>  darbepoetin Injectable Syringe 40 MICROGram(s) IV Push <User Schedule>  folic acid 1 milliGRAM(s) Oral daily  heparin   Injectable 5000 Unit(s) SubCutaneous every 8 hours  insulin glargine Injectable (LANTUS) 5 Unit(s) SubCutaneous at bedtime  insulin lispro (ADMELOG) corrective regimen sliding scale   SubCutaneous three times a day before meals  insulin lispro Injectable (ADMELOG) 2 Unit(s) SubCutaneous three times a day before meals  multivitamin 1 Tablet(s) Oral daily  pantoprazole    Tablet 40 milliGRAM(s) Oral two times a day  polyethylene glycol 3350 17 Gram(s) Oral every 12 hours  senna 2 Tablet(s) Oral at bedtime  sevelamer carbonate 800 milliGRAM(s) Oral three times a day with meals        VITALS:  T(F): 96.8 (10-29-23 @ 13:50), Max: 96.8 (10-29-23 @ 13:50)  HR: 95 (10-29-23 @ 13:50)  BP: 107/63 (10-29-23 @ 13:50)  RR: 16 (10-29-23 @ 13:50)  SpO2: 99% (10-29-23 @ 13:50)  Wt(kg): --    10-28 @ 07:01  -  10-29 @ 07:00  --------------------------------------------------------  IN: 0 mL / OUT: 2500 mL / NET: -2500 mL          PHYSICAL EXAM:  Constitutional: NAD  HEENT: anicteric sclera, oropharynx clear, MMM  Neck: No JVD  Respiratory: CTAB, no wheezes, rales or rhonchi  Cardiovascular: S1, S2, RRR  Gastrointestinal: BS+, soft, NT/ND  Extremities: No cyanosis or clubbing. No peripheral edema  :  No way.   Skin: No rashes    LABS:  10-29    137  |  94<L>  |  18  ----------------------------<  143<H>  3.9   |  28  |  7.0<HH>    Ca    9.6      29 Oct 2023 08:38    TPro  8.0  /  Alb  3.9  /  TBili  0.5  /  DBili      /  AST  41  /  ALT  6   /  AlkPhos  90  10-29                          9.4    5.39  )-----------( 249      ( 29 Oct 2023 08:38 )             28.9       Urine Studies:  Urinalysis Basic - ( 29 Oct 2023 08:38 )    Color:  / Appearance:  / SG:  / pH:   Gluc: 143 mg/dL / Ketone:   / Bili:  / Urobili:    Blood:  / Protein:  / Nitrite:    Leuk Esterase:  / RBC:  / WBC    Sq Epi:  / Non Sq Epi:  / Bacteria:         RADIOLOGY & ADDITIONAL STUDIES:

## 2023-10-29 NOTE — PROGRESS NOTE ADULT - PROVIDER SPECIALTY LIST ADULT
Critical Care
Critical Care
Hospitalist
Nephrology
Nephrology
Surgery
Gastroenterology
Hospitalist
Infectious Disease
Nephrology
Pulmonology
Surgery
Hospitalist
Infectious Disease
Internal Medicine
Nephrology
Nephrology
Pulmonology
Surgery
Critical Care
Gastroenterology
Hospitalist
Nephrology
Surgery

## 2023-10-29 NOTE — PROGRESS NOTE ADULT - SUBJECTIVE AND OBJECTIVE BOX
SUBJECTIVE:    Patient is a 58y old Male who presents with a chief complaint of Worsening Renal Failure (28 Oct 2023 12:52)    Currently admitted to medicine with the primary diagnosis of Acute renal failure       Today is hospital day 12d. This morning he is resting comfortably in bed and reports no new issues or overnight events.     ROS:   CONSTITUTIONAL: No weakness, fevers or chills   EYES/ENT: No visual changes; No vertigo or throat pain   NECK: No pain or stiffness   RESPIRATORY: No cough, wheezing, hemoptysis; No shortness of breath   CARDIOVASCULAR: No chest pain or palpitations   GASTROINTESTINAL: No abdominal or epigastric pain. No nausea, vomiting, or hematemesis; No diarrhea or constipation. No melena or hematochezia.  GENITOURINARY: No dysuria, frequency or hematuria  NEUROLOGICAL: No numbness or weakness        PAST MEDICAL & SURGICAL HISTORY  Diabetes    Hypertension    HLD (hyperlipidemia)    Neuropathy    Glaucoma    Chronic kidney disease, unspecified CKD stage  requiring HD march 2022    ETOH abuse    H/O colonoscopy      SOCIAL HISTORY:    ALLERGIES:  No Known Drug Allergies  Bananas (Nausea; Urticaria)  apple (Urticaria)    MEDICATIONS:  STANDING MEDICATIONS  calcium acetate 667 milliGRAM(s) Oral three times a day with meals  ceFAZolin   IVPB 1000 milliGRAM(s) IV Intermittent every 24 hours  chlorhexidine 2% Cloths 1 Application(s) Topical <User Schedule>  darbepoetin Injectable Syringe 40 MICROGram(s) IV Push <User Schedule>  folic acid 1 milliGRAM(s) Oral daily  heparin   Injectable 5000 Unit(s) SubCutaneous every 8 hours  insulin glargine Injectable (LANTUS) 5 Unit(s) SubCutaneous at bedtime  insulin lispro (ADMELOG) corrective regimen sliding scale   SubCutaneous three times a day before meals  insulin lispro Injectable (ADMELOG) 2 Unit(s) SubCutaneous three times a day before meals  multivitamin 1 Tablet(s) Oral daily  pantoprazole    Tablet 40 milliGRAM(s) Oral two times a day  polyethylene glycol 3350 17 Gram(s) Oral every 12 hours  senna 2 Tablet(s) Oral at bedtime  sevelamer carbonate 800 milliGRAM(s) Oral three times a day with meals    PRN MEDICATIONS  aluminum hydroxide/magnesium hydroxide/simethicone Suspension 30 milliLiter(s) Oral every 4 hours PRN  ondansetron Injectable 4 milliGRAM(s) IV Push every 8 hours PRN  vitamin A &amp; D Ointment 1 Application(s) Topical every 12 hours PRN    VITALS:   T(F): 96.5  HR: 91  BP: 111/58  RR: 18  SpO2: 100%    LABS:  Negative for smoking/alcohol/drug use.                         9.4    5.39  )-----------( 249      ( 29 Oct 2023 08:38 )             28.9     10-29    137  |  94<L>  |  18  ----------------------------<  143<H>  3.9   |  28  |  7.0<HH>    Ca    9.6      29 Oct 2023 08:38    TPro  8.0  /  Alb  3.9  /  TBili  0.5  /  DBili  x   /  AST  41  /  ALT  6   /  AlkPhos  90  10-29      Urinalysis Basic - ( 29 Oct 2023 08:38 )    Color: x / Appearance: x / SG: x / pH: x  Gluc: 143 mg/dL / Ketone: x  / Bili: x / Urobili: x   Blood: x / Protein: x / Nitrite: x   Leuk Esterase: x / RBC: x / WBC x   Sq Epi: x / Non Sq Epi: x / Bacteria: x                RADIOLOGY:    PHYSICAL EXAM:  GEN: No acute distress  HEENT: normocephalic, atraumatic, aniceteric  LUNGS: bl breath sounds   HEART: S1/S2 present. RRR, no murmurs  ABD: Soft, non-tender, non-distended. Bowel sounds present  EXT: RLE dressing clean dry and intact   NEURO: AAOX3, normal affect      ASSESSMENT AND PLAN:    59 y/o male with hx of ETOH abuse, CKD with left wrist fistula, DM , HTN, HLD, presented to the ED for decreased urinary output and increased thirst    # Abdominal distention / ileus    - resolved    # Groin abscess -  s/p I+D 10/20  # R Leg wound   - OR CLx of groin abscess-  Staph aureus , sensitivity noted   - per sx- no need for wound vac - fu with Dr. Rajput outpatient in 2 weeks   - Spoke to ID - can switch to po keflex 500 mg q24h to complete 14 day course from I&D upon dc (until 11/2)   - IV Ancef 1gm q24hrs (give post-HD on HD days)    # VAMSI on CKD4 , now on NEW HD (since 10/18)  - started HD on 10/18/23 via LUE AVF   - on HD (had AVF previously)  - Follows with Dr. Lee  - Nephro following - needs op HD -  [address]    # Acute on chronic Anemia , stable   - no gross gib  - active type and screen   - s/p PRBC  - keep Hb > 8  - GI op fu  - PPI BID     # DKA , resolved   # H/O DM   - off insulin drip -> sc insulin  - Hba1c 8  - endo eval appreciated - insulin rx for op- humalog/ levemir covered and called into the pharmacy     # H/O HTN/ HLD  - resumed atorvastatin, carvedilol     # H/O  ETOH abuse - thiamine and folic acid    # dvt/gi ppx/diet  # dispo: dc ready to home with PT - pending O/P HD [facility] / needs op rx insulin

## 2023-10-30 ENCOUNTER — TRANSCRIPTION ENCOUNTER (OUTPATIENT)
Age: 58
End: 2023-10-30

## 2023-10-30 VITALS
DIASTOLIC BLOOD PRESSURE: 76 MMHG | HEART RATE: 90 BPM | OXYGEN SATURATION: 100 % | RESPIRATION RATE: 16 BRPM | TEMPERATURE: 96 F | SYSTOLIC BLOOD PRESSURE: 130 MMHG

## 2023-10-30 DIAGNOSIS — D64.9 ANEMIA, UNSPECIFIED: ICD-10-CM

## 2023-10-30 DIAGNOSIS — E11.10 TYPE 2 DIABETES MELLITUS WITH KETOACIDOSIS WITHOUT COMA: ICD-10-CM

## 2023-10-30 LAB
ALBUMIN SERPL ELPH-MCNC: 3.6 G/DL — SIGNIFICANT CHANGE UP (ref 3.5–5.2)
ALBUMIN SERPL ELPH-MCNC: 3.6 G/DL — SIGNIFICANT CHANGE UP (ref 3.5–5.2)
ALP SERPL-CCNC: 84 U/L — SIGNIFICANT CHANGE UP (ref 30–115)
ALP SERPL-CCNC: 84 U/L — SIGNIFICANT CHANGE UP (ref 30–115)
ALT FLD-CCNC: <5 U/L — SIGNIFICANT CHANGE UP (ref 0–41)
ALT FLD-CCNC: <5 U/L — SIGNIFICANT CHANGE UP (ref 0–41)
ANION GAP SERPL CALC-SCNC: 16 MMOL/L — HIGH (ref 7–14)
ANION GAP SERPL CALC-SCNC: 16 MMOL/L — HIGH (ref 7–14)
AST SERPL-CCNC: 36 U/L — SIGNIFICANT CHANGE UP (ref 0–41)
AST SERPL-CCNC: 36 U/L — SIGNIFICANT CHANGE UP (ref 0–41)
BASOPHILS # BLD AUTO: 0.04 K/UL — SIGNIFICANT CHANGE UP (ref 0–0.2)
BASOPHILS # BLD AUTO: 0.04 K/UL — SIGNIFICANT CHANGE UP (ref 0–0.2)
BASOPHILS NFR BLD AUTO: 0.7 % — SIGNIFICANT CHANGE UP (ref 0–1)
BASOPHILS NFR BLD AUTO: 0.7 % — SIGNIFICANT CHANGE UP (ref 0–1)
BILIRUB SERPL-MCNC: 0.4 MG/DL — SIGNIFICANT CHANGE UP (ref 0.2–1.2)
BILIRUB SERPL-MCNC: 0.4 MG/DL — SIGNIFICANT CHANGE UP (ref 0.2–1.2)
BUN SERPL-MCNC: 24 MG/DL — HIGH (ref 10–20)
BUN SERPL-MCNC: 24 MG/DL — HIGH (ref 10–20)
CALCIUM SERPL-MCNC: 9.3 MG/DL — SIGNIFICANT CHANGE UP (ref 8.4–10.5)
CALCIUM SERPL-MCNC: 9.3 MG/DL — SIGNIFICANT CHANGE UP (ref 8.4–10.5)
CHLORIDE SERPL-SCNC: 97 MMOL/L — LOW (ref 98–110)
CHLORIDE SERPL-SCNC: 97 MMOL/L — LOW (ref 98–110)
CO2 SERPL-SCNC: 24 MMOL/L — SIGNIFICANT CHANGE UP (ref 17–32)
CO2 SERPL-SCNC: 24 MMOL/L — SIGNIFICANT CHANGE UP (ref 17–32)
CREAT SERPL-MCNC: 8.4 MG/DL — CRITICAL HIGH (ref 0.7–1.5)
CREAT SERPL-MCNC: 8.4 MG/DL — CRITICAL HIGH (ref 0.7–1.5)
EGFR: 7 ML/MIN/1.73M2 — LOW
EGFR: 7 ML/MIN/1.73M2 — LOW
EOSINOPHIL # BLD AUTO: 0.58 K/UL — SIGNIFICANT CHANGE UP (ref 0–0.7)
EOSINOPHIL # BLD AUTO: 0.58 K/UL — SIGNIFICANT CHANGE UP (ref 0–0.7)
EOSINOPHIL NFR BLD AUTO: 10.8 % — HIGH (ref 0–8)
EOSINOPHIL NFR BLD AUTO: 10.8 % — HIGH (ref 0–8)
GLUCOSE BLDC GLUCOMTR-MCNC: 131 MG/DL — HIGH (ref 70–99)
GLUCOSE BLDC GLUCOMTR-MCNC: 131 MG/DL — HIGH (ref 70–99)
GLUCOSE BLDC GLUCOMTR-MCNC: 149 MG/DL — HIGH (ref 70–99)
GLUCOSE BLDC GLUCOMTR-MCNC: 149 MG/DL — HIGH (ref 70–99)
GLUCOSE SERPL-MCNC: 157 MG/DL — HIGH (ref 70–99)
GLUCOSE SERPL-MCNC: 157 MG/DL — HIGH (ref 70–99)
HCT VFR BLD CALC: 27.7 % — LOW (ref 42–52)
HCT VFR BLD CALC: 27.7 % — LOW (ref 42–52)
HGB BLD-MCNC: 9 G/DL — LOW (ref 14–18)
HGB BLD-MCNC: 9 G/DL — LOW (ref 14–18)
IMM GRANULOCYTES NFR BLD AUTO: 0.7 % — HIGH (ref 0.1–0.3)
IMM GRANULOCYTES NFR BLD AUTO: 0.7 % — HIGH (ref 0.1–0.3)
LYMPHOCYTES # BLD AUTO: 1.06 K/UL — LOW (ref 1.2–3.4)
LYMPHOCYTES # BLD AUTO: 1.06 K/UL — LOW (ref 1.2–3.4)
LYMPHOCYTES # BLD AUTO: 19.8 % — LOW (ref 20.5–51.1)
LYMPHOCYTES # BLD AUTO: 19.8 % — LOW (ref 20.5–51.1)
MCHC RBC-ENTMCNC: 30.2 PG — SIGNIFICANT CHANGE UP (ref 27–31)
MCHC RBC-ENTMCNC: 30.2 PG — SIGNIFICANT CHANGE UP (ref 27–31)
MCHC RBC-ENTMCNC: 32.5 G/DL — SIGNIFICANT CHANGE UP (ref 32–37)
MCHC RBC-ENTMCNC: 32.5 G/DL — SIGNIFICANT CHANGE UP (ref 32–37)
MCV RBC AUTO: 93 FL — SIGNIFICANT CHANGE UP (ref 80–94)
MCV RBC AUTO: 93 FL — SIGNIFICANT CHANGE UP (ref 80–94)
MONOCYTES # BLD AUTO: 0.56 K/UL — SIGNIFICANT CHANGE UP (ref 0.1–0.6)
MONOCYTES # BLD AUTO: 0.56 K/UL — SIGNIFICANT CHANGE UP (ref 0.1–0.6)
MONOCYTES NFR BLD AUTO: 10.4 % — HIGH (ref 1.7–9.3)
MONOCYTES NFR BLD AUTO: 10.4 % — HIGH (ref 1.7–9.3)
NEUTROPHILS # BLD AUTO: 3.08 K/UL — SIGNIFICANT CHANGE UP (ref 1.4–6.5)
NEUTROPHILS # BLD AUTO: 3.08 K/UL — SIGNIFICANT CHANGE UP (ref 1.4–6.5)
NEUTROPHILS NFR BLD AUTO: 57.6 % — SIGNIFICANT CHANGE UP (ref 42.2–75.2)
NEUTROPHILS NFR BLD AUTO: 57.6 % — SIGNIFICANT CHANGE UP (ref 42.2–75.2)
NRBC # BLD: 0 /100 WBCS — SIGNIFICANT CHANGE UP (ref 0–0)
NRBC # BLD: 0 /100 WBCS — SIGNIFICANT CHANGE UP (ref 0–0)
PLATELET # BLD AUTO: 251 K/UL — SIGNIFICANT CHANGE UP (ref 130–400)
PLATELET # BLD AUTO: 251 K/UL — SIGNIFICANT CHANGE UP (ref 130–400)
PMV BLD: 9.9 FL — SIGNIFICANT CHANGE UP (ref 7.4–10.4)
PMV BLD: 9.9 FL — SIGNIFICANT CHANGE UP (ref 7.4–10.4)
POTASSIUM SERPL-MCNC: 4.2 MMOL/L — SIGNIFICANT CHANGE UP (ref 3.5–5)
POTASSIUM SERPL-MCNC: 4.2 MMOL/L — SIGNIFICANT CHANGE UP (ref 3.5–5)
POTASSIUM SERPL-SCNC: 4.2 MMOL/L — SIGNIFICANT CHANGE UP (ref 3.5–5)
POTASSIUM SERPL-SCNC: 4.2 MMOL/L — SIGNIFICANT CHANGE UP (ref 3.5–5)
PROT SERPL-MCNC: 7.4 G/DL — SIGNIFICANT CHANGE UP (ref 6–8)
PROT SERPL-MCNC: 7.4 G/DL — SIGNIFICANT CHANGE UP (ref 6–8)
RBC # BLD: 2.98 M/UL — LOW (ref 4.7–6.1)
RBC # BLD: 2.98 M/UL — LOW (ref 4.7–6.1)
RBC # FLD: 13.7 % — SIGNIFICANT CHANGE UP (ref 11.5–14.5)
RBC # FLD: 13.7 % — SIGNIFICANT CHANGE UP (ref 11.5–14.5)
SODIUM SERPL-SCNC: 137 MMOL/L — SIGNIFICANT CHANGE UP (ref 135–146)
SODIUM SERPL-SCNC: 137 MMOL/L — SIGNIFICANT CHANGE UP (ref 135–146)
WBC # BLD: 5.36 K/UL — SIGNIFICANT CHANGE UP (ref 4.8–10.8)
WBC # BLD: 5.36 K/UL — SIGNIFICANT CHANGE UP (ref 4.8–10.8)
WBC # FLD AUTO: 5.36 K/UL — SIGNIFICANT CHANGE UP (ref 4.8–10.8)
WBC # FLD AUTO: 5.36 K/UL — SIGNIFICANT CHANGE UP (ref 4.8–10.8)

## 2023-10-30 PROCEDURE — 99239 HOSP IP/OBS DSCHRG MGMT >30: CPT

## 2023-10-30 RX ORDER — CEPHALEXIN 500 MG
1 CAPSULE ORAL
Qty: 3 | Refills: 0
Start: 2023-10-30 | End: 2023-11-01

## 2023-10-30 RX ORDER — CALCIUM ACETATE 667 MG
1 TABLET ORAL
Qty: 90 | Refills: 0
Start: 2023-10-30 | End: 2023-11-28

## 2023-10-30 RX ORDER — SITAGLIPTIN 50 MG/1
1 TABLET, FILM COATED ORAL
Refills: 0 | DISCHARGE

## 2023-10-30 RX ORDER — INSULIN GLARGINE 100 [IU]/ML
5 INJECTION, SOLUTION SUBCUTANEOUS
Qty: 0 | Refills: 0 | DISCHARGE
Start: 2023-10-30

## 2023-10-30 RX ORDER — SEVELAMER CARBONATE 2400 MG/1
1 POWDER, FOR SUSPENSION ORAL
Qty: 42 | Refills: 0
Start: 2023-10-30 | End: 2023-11-12

## 2023-10-30 RX ORDER — GABAPENTIN 400 MG/1
1 CAPSULE ORAL
Refills: 0 | DISCHARGE

## 2023-10-30 RX ADMIN — CHLORHEXIDINE GLUCONATE 1 APPLICATION(S): 213 SOLUTION TOPICAL at 05:37

## 2023-10-30 RX ADMIN — HEPARIN SODIUM 5000 UNIT(S): 5000 INJECTION INTRAVENOUS; SUBCUTANEOUS at 13:52

## 2023-10-30 RX ADMIN — Medication 667 MILLIGRAM(S): at 11:35

## 2023-10-30 RX ADMIN — Medication 1 TABLET(S): at 11:35

## 2023-10-30 RX ADMIN — CARVEDILOL PHOSPHATE 12.5 MILLIGRAM(S): 80 CAPSULE, EXTENDED RELEASE ORAL at 05:37

## 2023-10-30 RX ADMIN — Medication 667 MILLIGRAM(S): at 07:57

## 2023-10-30 RX ADMIN — SEVELAMER CARBONATE 800 MILLIGRAM(S): 2400 POWDER, FOR SUSPENSION ORAL at 07:56

## 2023-10-30 RX ADMIN — SEVELAMER CARBONATE 800 MILLIGRAM(S): 2400 POWDER, FOR SUSPENSION ORAL at 11:35

## 2023-10-30 RX ADMIN — POLYETHYLENE GLYCOL 3350 17 GRAM(S): 17 POWDER, FOR SOLUTION ORAL at 05:37

## 2023-10-30 RX ADMIN — PANTOPRAZOLE SODIUM 40 MILLIGRAM(S): 20 TABLET, DELAYED RELEASE ORAL at 05:37

## 2023-10-30 RX ADMIN — Medication 2 UNIT(S): at 07:55

## 2023-10-30 RX ADMIN — Medication 2 UNIT(S): at 12:34

## 2023-10-30 RX ADMIN — HEPARIN SODIUM 5000 UNIT(S): 5000 INJECTION INTRAVENOUS; SUBCUTANEOUS at 05:38

## 2023-10-30 RX ADMIN — Medication 1 MILLIGRAM(S): at 11:35

## 2023-10-30 NOTE — DISCHARGE NOTE PROVIDER - HOSPITAL COURSE
59 y/o male with hx of ETOH abuse, CKD with left wrist fistula, DM , HTN, HLD, presented to the ED for decreased urinary output and increased thirst    # Abdominal distention / ileus    - resolved    # Groin abscess -  s/p I+D 10/20  # R Leg wound   - OR CLx of groin abscess-  Staph aureus , sensitivity noted   - per sx- no need for wound vac - fu with Dr. Rajput outpatient in 2 weeks   - Spoke to ID - can switch to po keflex 500 mg q24h to complete 14 day course from I&D upon dc (until 11/2)   - wound care by VNS    # VAMSI on CKD4 , now on NEW HD (since 10/18)  - started HD on 10/18/23 via LUE AVF   - on HD (had AVF previously)  - Follows with Dr. Lee  - Nephro following - needs op HD -  [address]    # Acute on chronic Anemia , stable   - no gross gib  - active type and screen   - s/p PRBC  - keep Hb > 8  - GI op fu  - PPI BID     # DKA , resolved   # H/O DM   - off insulin drip -> sc insulin  - Hba1c 8  - endo eval appreciated - insulin rx for op- humalog/ levemir covered and called into the pharmacy   - fup Endo as OP    # H/O HTN/ HLD  - resumed atorvastatin, carvedilol     # H/O  ETOH abuse - thiamine and folic acid    Pt stable for DC  FUP Surgery, GI, Endocrine, Nephrology and PMD  FUP OP HD   57 y/o male with hx of ETOH abuse, CKD with left wrist fistula, DM , HTN, HLD, presented to the ED for decreased urinary output and increased thirst    # Abdominal distention / ileus    - resolved    # Groin abscess -  s/p I+D 10/20  # R Leg wound   - OR CLx of groin abscess-  Staph aureus , sensitivity noted   - per sx- no need for wound vac - fu with Dr. Rajput outpatient in 2 weeks   - Spoke to ID - can switch to po keflex 500 mg q24h to complete 14 day course from I&D upon dc (until 11/2)   - wound care by VNS    # VAMSI on CKD4 , now on NEW HD (since 10/18)  - started HD on 10/18/23 via LUE AVF   - on HD (had AVF previously)  - Follows with Dr. Lee  - Nephro following - needs op HD -  [address]    # Acute on chronic Anemia , stable   - no gross gib  - active type and screen   - s/p PRBC  - keep Hb > 8  - GI op fu  - PPI BID     # DKA , resolved   # H/O DM   - off insulin drip -> sc insulin  - Hba1c 8  - endo eval appreciated - insulin rx for op- humalog/ levemir covered and called into the pharmacy   - fup Endo as OP    # H/O HTN/ HLD  - resumed atorvastatin, carvedilol     # H/O  ETOH abuse - thiamine and folic acid    Pt stable for DC  FUP Surgery, GI, Endocrine, Nephrology and PMD  FUP OP HD    attending attestation:  patient seen and examined on day of discharge  labs and vitals reviewed  patient has no complaints  plan of care discussed with patient/family in agreement and understanding

## 2023-10-30 NOTE — DISCHARGE NOTE NURSING/CASE MANAGEMENT/SOCIAL WORK - PATIENT PORTAL LINK FT
You can access the FollowMyHealth Patient Portal offered by [facility] by registering at the following website: [email or web address]. By joining [employer]’s FollowMyHealth portal, you will also be able to view your health information using other applications (apps) compatible with our system.

## 2023-10-30 NOTE — DISCHARGE NOTE PROVIDER - NSDCHHNEEDSERVICE_GEN_ALL_CORE
Medication teaching and assessment/Observation and assessment/Rehabilitation services/Teaching and training/Wound care and assessment Medication teaching and assessment/Observation and assessment/Rehabilitation services/Teaching and training/Wound care and assessment/Other, specify...

## 2023-10-30 NOTE — DISCHARGE NOTE PROVIDER - DISCHARGE DIET
DASH Diet/Low Sodium Diet/Soft and Bite-Sized Diet/Consistent Carbohydrate Diabetic Diets/Renal Diets (for dialysis)

## 2023-10-30 NOTE — DISCHARGE NOTE PROVIDER - NSDCMRMEDTOKEN_GEN_ALL_CORE_FT
carvedilol 12.5 mg oral tablet: 1 orally once a day  Centrum oral tablet: 1 tab(s) orally once a day   folic acid 1 mg oral tablet: 1 tab(s) orally once a day   gabapentin 600 mg oral tablet: 1 orally 3 times a day  Januvia 50 mg oral tablet: 1 orally once a day  Lantus 100 units/mL subcutaneous solution: 5 unit(s) subcutaneous once a day (at bedtime)  rosuvastatin 10 mg oral capsule: 1 orally once a day  Vitamin B-100 oral tablet: 1 orally once a day   carvedilol 12.5 mg oral tablet: 1 orally once a day  Centrum oral tablet: 1 tab(s) orally once a day   cephalexin 500 mg oral tablet: 1 tab(s) orally once a day  folic acid 1 mg oral tablet: 1 tab(s) orally once a day   glucometer: use as directed  rosuvastatin 10 mg oral capsule: 1 orally once a day  sevelamer carbonate 800 mg oral tablet: 1 tab(s) orally 3 times a day (with meals)  Vitamin B-100 oral tablet: 1 orally once a day

## 2023-10-30 NOTE — DISCHARGE NOTE PROVIDER - ATTENDING DISCHARGE PHYSICAL EXAMINATION:
PHYSICAL EXAM:  GENERAL: NAD, lying in bed comfortably  HEAD:  Atraumatic, Normocephalic  EYES: EOMI, PERRLA, conjunctiva and sclera clear  ENT: Moist mucous membranes  NECK: Supple, No JVD  CHEST/LUNG: Clear to auscultation bilaterally; No rales, rhonchi, wheezing, or rubs. Unlabored respirations  HEART: Regular rate and rhythm; No murmurs, rubs, or gallops  ABDOMEN: Bowel sounds present; Soft, Nontender, Nondistended. No hepatomegally  EXTREMITIES:  2+ Peripheral Pulses, brisk capillary refill. No clubbing, cyanosis, or edema  NERVOUS SYSTEM:  Alert & Oriented X3, speech clear. No deficits   MSK: FROM all 4 extremities, full and equal strength  SKIN: skin dressing RLE and groin clean dry and intact

## 2023-10-30 NOTE — DISCHARGE NOTE NURSING/CASE MANAGEMENT/SOCIAL WORK - NSDCPEFALRISK_GEN_ALL_CORE
For information on Fall & Injury Prevention, visit: [email or web address] OR  [email or web address] OR  https://www.cdc.gov/steadi/patient.html

## 2023-10-30 NOTE — DISCHARGE NOTE PROVIDER - CARE PROVIDER_API CALL
Aime Jones  Internal Medicine  [address], [address]  [place] [address]  Phone: [phone]  Fax: [phone]  Follow Up Time: 1 week    gi clinic [facility],   Phone: (   )    -  Fax: (   )    -  Follow Up Time: 1 week    Christie Lee  Nephrology  [address] [place] [address]  Phone: [phone]  Fax: [phone]  Follow Up Time: 1 week   Aime Jones  Internal Medicine  [address], [address]  [place] [address]  Phone: [phone]  Fax: [phone]  Follow Up Time: 1 week    Christie Lee  Nephrology  [address] [place] [address]  Phone: [phone]  Fax: [phone]  Follow Up Time: 1 week    Fabiano Carlos  Gastroenterology  [address] [place] [address]  Phone: [phone]  Fax: [phone]  Follow Up Time:     gi clinic [facility],   Phone: (   )    -  Fax: (   )    -  Follow Up Time: 1 week    Waqas Alvarez  Endocrinology/Metab/Diabetes  [address] [place] [address]  Phone: [phone]  Fax: [phone]  Follow Up Time:

## 2023-10-30 NOTE — DISCHARGE NOTE PROVIDER - PROVIDER TOKENS
PROVIDER:[TOKEN:[[id number]],FOLLOWUP:[1 week]],FREE:[LAST:[gi clinic [facility]],PHONE:[(   )    -],FAX:[(   )    -],FOLLOWUP:[1 week]],PROVIDER:[TOKEN:[[id number]],FOLLOWUP:[1 week]] PROVIDER:[TOKEN:[[id number]],FOLLOWUP:[1 week]],PROVIDER:[TOKEN:[[id number]],FOLLOWUP:[1 week]],PROVIDER:[TOKEN:[[id number]]],FREE:[LAST:[gi clinic [facility]],PHONE:[(   )    -],FAX:[(   )    -],FOLLOWUP:[1 week]],PROVIDER:[TOKEN:[[id number]]]

## 2023-10-30 NOTE — DISCHARGE NOTE PROVIDER - CARE PROVIDERS DIRECT ADDRESSES
,[email or web address],DirectAddress_Unknown,DirectAddress_Unknown ,[email or web address],DirectAddress_Unknown,[email or web address],DirectAddress_Unknown,DirectAddress_Unknown

## 2023-10-30 NOTE — DISCHARGE NOTE PROVIDER - NSDCCPCAREPLAN_GEN_ALL_CORE_FT
PRINCIPAL DISCHARGE DIAGNOSIS  Diagnosis: Acute renal failure  Assessment and Plan of Treatment: pt on HD  fup Nephrology  FUP for HD OP      SECONDARY DISCHARGE DIAGNOSES  Diagnosis: Hyponatremia  Assessment and Plan of Treatment:     Diagnosis: Leg abscess  Assessment and Plan of Treatment: complete Keflex course  daily wound care by VNS  clean with saline and apply moist to dry dressing with ABD and kerlix and ACE wrap     PRINCIPAL DISCHARGE DIAGNOSIS  Diagnosis: Acute renal failure  Assessment and Plan of Treatment: Hemodialysis was started inpatient  renal failure likely multifactorial in setting of secondary to DM, HTN, incomplete recovery from previous severe VAMSI  - adhere to renal diet   - take calcium acetate and sevelamer as prescribed  - you had hemodialysis slot set up at [facility] - your dialysis days will be Monday / Wednesday/ Friday         SECONDARY DISCHARGE DIAGNOSES  Diagnosis: Hyponatremia  Assessment and Plan of Treatment: resolved    Diagnosis: Leg abscess  Assessment and Plan of Treatment: complete Keflex course until 11/2  daily wound care by VNS  clean with saline and apply moist to dry dressing with ABD and kerlix and ACE wrap

## 2023-11-01 PROBLEM — F10.10 ALCOHOL ABUSE, UNCOMPLICATED: Chronic | Status: ACTIVE | Noted: 2023-10-17

## 2023-11-10 DIAGNOSIS — I12.9 HYPERTENSIVE CHRONIC KIDNEY DISEASE WITH STAGE 1 THROUGH STAGE 4 CHRONIC KIDNEY DISEASE, OR UNSPECIFIED CHRONIC KIDNEY DISEASE: ICD-10-CM

## 2023-11-10 DIAGNOSIS — Z95.828 PRESENCE OF OTHER VASCULAR IMPLANTS AND GRAFTS: ICD-10-CM

## 2023-11-10 DIAGNOSIS — Z91.018 ALLERGY TO OTHER FOODS: ICD-10-CM

## 2023-11-10 DIAGNOSIS — N18.4 CHRONIC KIDNEY DISEASE, STAGE 4 (SEVERE): ICD-10-CM

## 2023-11-10 DIAGNOSIS — G93.41 METABOLIC ENCEPHALOPATHY: ICD-10-CM

## 2023-11-10 DIAGNOSIS — K56.41 FECAL IMPACTION: ICD-10-CM

## 2023-11-10 DIAGNOSIS — K56.7 ILEUS, UNSPECIFIED: ICD-10-CM

## 2023-11-10 DIAGNOSIS — B95.61 METHICILLIN SUSCEPTIBLE STAPHYLOCOCCUS AUREUS INFECTION AS THE CAUSE OF DISEASES CLASSIFIED ELSEWHERE: ICD-10-CM

## 2023-11-10 DIAGNOSIS — L04.1 ACUTE LYMPHADENITIS OF TRUNK: ICD-10-CM

## 2023-11-10 DIAGNOSIS — R34 ANURIA AND OLIGURIA: ICD-10-CM

## 2023-11-10 DIAGNOSIS — E11.22 TYPE 2 DIABETES MELLITUS WITH DIABETIC CHRONIC KIDNEY DISEASE: ICD-10-CM

## 2023-11-10 DIAGNOSIS — L02.214 CUTANEOUS ABSCESS OF GROIN: ICD-10-CM

## 2023-11-10 DIAGNOSIS — E11.622 TYPE 2 DIABETES MELLITUS WITH OTHER SKIN ULCER: ICD-10-CM

## 2023-11-10 DIAGNOSIS — Z79.84 LONG TERM (CURRENT) USE OF ORAL HYPOGLYCEMIC DRUGS: ICD-10-CM

## 2023-11-10 DIAGNOSIS — E11.40 TYPE 2 DIABETES MELLITUS WITH DIABETIC NEUROPATHY, UNSPECIFIED: ICD-10-CM

## 2023-11-10 DIAGNOSIS — E83.39 OTHER DISORDERS OF PHOSPHORUS METABOLISM: ICD-10-CM

## 2023-11-10 DIAGNOSIS — D63.1 ANEMIA IN CHRONIC KIDNEY DISEASE: ICD-10-CM

## 2023-11-10 DIAGNOSIS — I95.9 HYPOTENSION, UNSPECIFIED: ICD-10-CM

## 2023-11-10 DIAGNOSIS — N17.9 ACUTE KIDNEY FAILURE, UNSPECIFIED: ICD-10-CM

## 2023-11-10 DIAGNOSIS — F10.231 ALCOHOL DEPENDENCE WITH WITHDRAWAL DELIRIUM: ICD-10-CM

## 2023-11-10 DIAGNOSIS — E87.1 HYPO-OSMOLALITY AND HYPONATREMIA: ICD-10-CM

## 2023-11-10 DIAGNOSIS — E78.5 HYPERLIPIDEMIA, UNSPECIFIED: ICD-10-CM

## 2023-11-10 DIAGNOSIS — E11.10 TYPE 2 DIABETES MELLITUS WITH KETOACIDOSIS WITHOUT COMA: ICD-10-CM

## 2023-11-10 DIAGNOSIS — Z99.2 DEPENDENCE ON RENAL DIALYSIS: ICD-10-CM

## 2023-11-10 DIAGNOSIS — H40.9 UNSPECIFIED GLAUCOMA: ICD-10-CM

## 2023-12-08 NOTE — OCCUPATIONAL THERAPY INITIAL EVALUATION ADULT - PERSONAL SAFETY AND JUDGMENT, REHAB EVAL
Patient : Hoang Brady Age: 37 year old Sex: female   MRN: [id number] Encounter Date: 12/8/2023      History     Chief Complaint   Patient presents with    Abdominal Pain     HPI  This is a 37-year-old female with a significant past medical history of depression, migraines, diabetes use presenting to the emergency department for further evaluation of right upper quadrant epigastric pain.  The patient reports intermittent symptoms over the last several months.  The patient states pain began in July of 2023.  The patient states intermittently once or twice a week she will experience upper abdominal pain.  The patient states that she was seen by a primary care provider in July for this and then was seen at the urgent care in November of 2023.  The patient states that she was told that she was having pain due to her ribs.  Review of the EMR indicates that the patient was diagnosed with costochondritis.  The patient states that she was using diclofenac without any improvement of symptoms.  The patient states that her pain began last night around midnight and then worsened around 3:00 a.m..  The patient states that the pain has been ongoing the patient describes the pain as a deep ache.  The pain does not radiate or migrate to the jaw neck arm or back or shoulders.  The patient denies any fever, chills, shortness of breath, chest pain or pressure, exertional onset of symptoms, relief with rest, vomiting, black or bloody stools or black or bloody emesis.  The patient denies any history of DVT or PE, lower extremity pain or swelling.  The patient does not believe she is pregnant and does not use birth control.  Last menstrual period December 1, 2023.  Patient denies any lower urinary tract symptoms or pelvic pain or vaginal symptoms such as bleeding or discharge.    No Known Allergies    Current Discharge Medication List        Discontinued Medications       diclofenac (VOLTAREN) 75 MG EC tablet              Past Medical  History:   Diagnosis Date    Anemia     Depressive disorder     Diabetes mellitus (CMD)     Migraine        No past surgical history on file.    Family History   Problem Relation Age of Onset    Hypertension Mother     Hypertension Father     Diabetes Father     Other Other         denies family hx of breast and colon cancer       Social History     Tobacco Use    Smoking status: Never    Smokeless tobacco: Never   Vaping Use    Vaping Use: never used   Substance Use Topics    Alcohol use: Not Currently     Alcohol/week: 0.0 standard drinks of alcohol    Drug use: No       E-cigarette/Vaping    E-Cigarette/Vaping Use Never Used      E-Cigarette/Vaping Substances & Devices       Physical Exam     ED Triage Vitals [12/08/23 1051]   ED Triage Vitals Group      Temp 97.5 °F (36.4 °C)      Heart Rate 82      Resp 18      /70      SpO2 98 %      EtCO2 mmHg       Height 4' 8\" (1.422 m)      Weight 135 lb 12.8 oz (61.6 kg)      Weight Scale Used Scale in bed      BMI (Calculated) 30.45      IBW/kg (Calculated) 36.3       Physical Exam  Vitals and nursing note reviewed.   Constitutional:       General: She is not in acute distress.     Appearance: She is well-developed. She is not ill-appearing, toxic-appearing or diaphoretic.   HENT:      Head: Normocephalic and atraumatic.      Right Ear: External ear normal.      Left Ear: External ear normal.      Nose: Nose normal. No congestion.      Mouth/Throat:      Mouth: Mucous membranes are moist.   Eyes:      General: No scleral icterus.     Conjunctiva/sclera: Conjunctivae normal.      Pupils: Pupils are equal, round, and reactive to light.   Neck:      Vascular: No carotid bruit.   Cardiovascular:      Rate and Rhythm: Normal rate and regular rhythm.      Heart sounds: No murmur heard.     No friction rub.   Pulmonary:      Effort: Pulmonary effort is normal. No respiratory distress.      Breath sounds: Normal breath sounds. No stridor. No wheezing or rales.   Chest:       Chest wall: No tenderness.   Abdominal:      General: Bowel sounds are normal. There is no distension.      Palpations: Abdomen is soft. There is no shifting dullness or mass.      Tenderness: There is abdominal tenderness in the right upper quadrant and epigastric area. There is no right CVA tenderness, left CVA tenderness, guarding or rebound. Negative signs include Carlin's sign, Rovsing's sign, McBurney's sign and psoas sign.      Hernia: No hernia is present.   Musculoskeletal:         General: Normal range of motion.      Cervical back: Normal range of motion and neck supple. No rigidity.      Comments: No pitting edema to the bilateral lower extremities, no calf tenderness bilaterally   Skin:     General: Skin is warm and dry.      Capillary Refill: Capillary refill takes less than 2 seconds.      Findings: No bruising, erythema or rash.   Neurological:      Mental Status: She is alert.      Comments: MS: alert and oriented to person, place, time and events  II: Vision Grossly Intact B/L  III, IV, VI: EOMI / PERRL  V: Symmetric Facial Sensation  VII: Symmetric Facial Muscle Strength  VIII: Hearing Grossly Intact B/L  IX, X: Midline Uvula; Symmetric Palate Elevation  XI: 5/5 Strength to the trapezius muscles bilaterally tested by shoulder shrug  XII: Tongue with Non-lateralizing Protrusion  Sensory: Sensation intact to light touch to bilateral upper extremities, torso, trunk and lower extremities  Speech: Normal  Gait: Not assessed   Psychiatric:         Behavior: Behavior normal.         Thought Content: Thought content normal.         ED Course     Procedures    Lab Results     Results for orders placed or performed during the hospital encounter of 12/08/23   Comprehensive Metabolic Panel   Result Value Ref Range    Fasting Status      Sodium 139 135 - 145 mmol/L    Potassium 3.6 3.4 - 5.1 mmol/L    Chloride 101 97 - 110 mmol/L    Carbon Dioxide 25 21 - 32 mmol/L    Anion Gap 17 7 - 19 mmol/L    Glucose 106  (H) 70 - 99 mg/dL    BUN 14 6 - 20 mg/dL    Creatinine 0.67 0.51 - 0.95 mg/dL    Glomerular Filtration Rate >90 >=60    BUN/Cr 21 7 - 25    Calcium 9.4 8.4 - 10.2 mg/dL    Bilirubin, Total 0.7 0.2 - 1.0 mg/dL    GOT/AST 16 <=37 Units/L    GPT/ALT 27 <64 Units/L    Alkaline Phosphatase 100 45 - 117 Units/L    Albumin 4.0 3.6 - 5.1 g/dL    Protein, Total 8.5 (H) 6.4 - 8.2 g/dL    Globulin 4.5 (H) 2.0 - 4.0 g/dL    A/G Ratio 0.9 (L) 1.0 - 2.4   Urinalysis With Microscopy Exam W/O C/S   Result Value Ref Range    COLOR, URINALYSIS Yellow     APPEARANCE, URINALYSIS Clear     GLUCOSE, URINALYSIS Negative Negative mg/dL    BILIRUBIN, URINALYSIS Negative Negative    KETONES, URINALYSIS Negative Negative mg/dL    SPECIFIC GRAVITY, URINALYSIS 1.027 1.005 - 1.030    OCCULT BLOOD, URINALYSIS Negative Negative    PH, URINALYSIS 6.0 5.0 - 7.0    PROTEIN, URINALYSIS Trace (A) Negative mg/dL    UROBILINOGEN, URINALYSIS 2.0 (A) 0.2, 1.0 mg/dL    NITRITE, URINALYSIS Negative Negative    LEUKOCYTE ESTERASE, URINALYSIS Small (A) Negative    SQUAMOUS EPITHELIAL, URINALYSIS 6 to 10 (A) None Seen, 1 to 5 /hpf    ERYTHROCYTES, URINALYSIS 6 to 10 (A) None Seen, 1 to 2 /hpf    LEUKOCYTES, URINALYSIS 6 to 10 (A) None Seen, 1 to 5 /hpf    BACTERIA, URINALYSIS Few (A) None Seen /hpf    HYALINE CASTS, URINALYSIS None Seen None Seen, 1 to 5 /lpf    MUCUS Present    TROPONIN I, HIGH SENSITIVITY   Result Value Ref Range    Troponin I, High Sensitivity <4 <52 ng/L   Prothrombin Time (INR/PT)   Result Value Ref Range    Protime- PT 10.9 9.7 - 11.8 sec    INR 1.0     Partial Thromboplastin Time (PTT)   Result Value Ref Range    PTT 33 (H) 22 - 32 sec   Lipase   Result Value Ref Range    Lipase 19 15 - 77 Units/L   Magnesium   Result Value Ref Range    Magnesium 2.1 1.7 - 2.4 mg/dL   COVID/Flu/RSV panel   Result Value Ref Range    Rapid SARS-COV-2 by PCR Not Detected Not Detected / Detected / Presumptive Positive / Inhibitors present    Influenza A by  PCR Not Detected Not Detected    Influenza B by PCR Not Detected Not Detected    RSV BY PCR Not Detected Not Detected    Isolation Guidelines      Procedural Comment     CBC with Automated Differential (performable only)   Result Value Ref Range    WBC 8.6 4.2 - 11.0 K/mcL    RBC 4.91 4.00 - 5.20 mil/mcL    HGB 12.8 12.0 - 15.5 g/dL    HCT 39.5 36.0 - 46.5 %    MCV 80.4 78.0 - 100.0 fl    MCH 26.1 26.0 - 34.0 pg    MCHC 32.4 32.0 - 36.5 g/dL    RDW-CV 13.5 11.0 - 15.0 %    RDW-SD 39.3 39.0 - 50.0 fL     140 - 450 K/mcL    NRBC 0 <=0 /100 WBC    Neutrophil, Percent 73 %    Lymphocytes, Percent 18 %    Mono, Percent 6 %    Eosinophils, Percent 1 %    Basophils, Percent 1 %    Immature Granulocytes 1 %    Absolute Neutrophils 6.3 1.8 - 7.7 K/mcL    Absolute Lymphocytes 1.6 1.0 - 4.8 K/mcL    Absolute Monocytes 0.6 0.3 - 0.9 K/mcL    Absolute Eosinophils  0.1 0.0 - 0.5 K/mcL    Absolute Basophils 0.1 0.0 - 0.3 K/mcL    Absolute Immature Granulocytes 0.0 0.0 - 0.2 K/mcL   ISTAT8 VENOUS  POINT OF CARE   Result Value Ref Range    BUN - POINT OF CARE 14 6 - 20 mg/dL    SODIUM - POINT OF CARE 142 135 - 145 mmol/L    POTASSIUM - POINT OF CARE 3.7 3.4 - 5.1 mmol/L    CHLORIDE - POINT OF CARE 105 97 - 110 mmol/L    TCO2 - POINT OF CARE 27 (H) 19 - 24 mmol/L    ANION GAP - POINT OF CARE 15 7 - 19 mmol/L    HEMATOCRIT - POINT OF CARE 40.0 36.0 - 46.5 %    HEMOGLOBIN - POINT OF CARE 13.6 12.0 - 15.5 g/dL    GLUCOSE - POINT OF CARE 100 (H) 70 - 99 mg/dL    CALCIUM, IONIZED - POINT OF CARE 1.18 1.15 - 1.29 mmol/L    Creatinine 0.60 0.51 - 0.95 mg/dL    Glomerular Filtration Rate >90 >=60   ISTAT BETA HCG - POINT OF CARE   Result Value Ref Range    ISTAT BETA HCG - POINT OF CARE <5.0 <5.0 IU/L       EKG Results     EKG Interpretation 10:51  Rate: 84  Rhythm: normal sinus rhythm   Abnormality: no; no ST elevation, no ST depressions, no acute injury pattern, no dysrhythmia, no comparison    EKG tracing interpreted by ED  physician    Radiology Results     Imaging Results              XR CHEST AP OR PA (Final result)  Result time 12/08/23 13:05:33      Final result                   Impression:    IMPRESSION: No acute cardiopulmonary disease.    Electronically Signed by: Anupam Peterson MD  Signed on: 12/8/2023 1:05 PM  Created on Workstation ID: [id number]  Signed on Workstation ID: [id number]               Narrative:    XR CHEST AP OR PA    INDICATION: upper abdominal pain    COMPARISON: Chest radiograph from November 25, 2023    FINDINGS:    Cardiac leads overlie the chest and abdomen.The lungs are mildly  hypoexpanded and are otherwise clear. Heart size is normal. There is no  pneumothorax or pleural effusion. Pulmonary vasculature is distinct. Bones  are grossly unremarkable.                                       US LIVER GALLBLADDER PANCREAS (RUQ) (Final result)  Result time 12/08/23 12:07:55      Final result                   Impression:    IMPRESSION:    Cholelithiasis with wall thickening. No apparent hyperemia. Early  cholecystitis is not entirely excluded.        Electronically Signed by: Tripp Clifton DO  Signed on: 12/8/2023 12:07 PM  Created on Workstation ID: [id number]  Signed on Workstation ID: [id number]               Narrative:    EXAM:  US LIVER GALLBLADDER PANCREAS (RUQ)    CLINICAL INFORMATION: Right upper abdominal quadrant pain.    COMPARISON: None.    TECHNIQUE: Real-time ultrasound of the abdominal right upper quadrant was  performed by the sonographic technologist.  Static images are reviewed.    FINDINGS:     LIVER:  The liver is normal in size, contour, and echotexture. No focal  hepatic lesions identified.  Normal hepatopetal flow in the main portal  vein.    BILE DUCTS:  The common bile duct measures 4 mm. No intrahepatic biliary  dilatation.      GALLBLADDER: Cholelithiasis with wall thickening. No apparent hyperemia.  Sonographic Carlin's sign is negative.    PANCREAS:  The pancreatic head and body are  unremarkable, however the  distal body and tail are obscured by bowel gas.    IVC: Patent.                                      ED Medication Orders (From admission, onward)      Ordered Start     Status Ordering Provider    12/08/23 1348 12/08/23 1400  cefTRIAXone (ROCEPHIN) syringe 1,000 mg  ONCE         Last MAR action: Given MOOK SIEGEL    12/08/23 1348 12/08/23 1400  metroNIDAZOLE (FLAGYL) premix IVPB 500 mg  ONCE         Last MAR action: New Bag MOOK SIEGEL    12/08/23 1123 12/08/23 1130  sodium chloride (NORMAL SALINE) 0.9 % bolus 1,000 mL  ONCE         Last MAR action: Completed MOOK SIEGEL    12/08/23 1123 12/08/23 1130  ondansetron (ZOFRAN) injection 4 mg  ONCE         Last MAR action: Given MOOK SIEGEL    12/08/23 1123 12/08/23 1130  famotidine (PEPCID) injection 20 mg  ONCE         Last MAR action: Given MOOK SIEGEL    12/08/23 1123 12/08/23 1130  ketorolac (TORADOL) injection 15 mg  ONCE         Last MAR action: Given MOOK ISEGEL    12/08/23 1123 12/08/23 1130  alum-mag hydroxide+simethicone/lidocaine viscous (2:1) (MAGIC MOUTHWASH/GI COCKTAIL) (compounded) oral suspension 15 mL  ONCE         Last MAR action: Given MOOK SIEGEL                 Medical Decision Making  Differential diagnosis considered includes but is not limited to dyspepsia versus biliary colic versus early pancreatitis versus early cholecystitis versus atypical ACS.  The patient's pain has been intermittent ongoing several months though the patient did develop upper respiratory symptoms over the last 12 hours will additionally consider pneumonia versus viral syndrome such as COVID or influenza.  History examination is less suspicious for ureteral colic, pyelonephritis or UTI.  A plan was made to obtain an EKG that demonstrates no evidence of ischemic changes or dysrhythmia, obtain labs ordered, administer Pepcid, viscous lidocaine/Maalox solution, Toradol, Zofran, normal saline bolus,  obtain chest x-ray and right upper quadrant ultrasound.       On reassessment the patient reports her pain is significantly improved.  US RUQ demonstrates suggesting early cholecystitis with wall thickening however negative sonographic Carlin's sign.  The patient's pregnancy test is negative.  Chemistry demonstrates normal renal function, liver function tests including bilirubin and renal function.  CBC unremarkable without bandemia or leukocytosis.  Lipase negative.  Magnesium unremarkable.  Troponin negative.  Chest x-ray demonstrates no acute cardiopulmonary process.  COVID flu RSV negative.  Urinalysis demonstrates contaminated specimen with 6-10 epithelial cells 6-10 WBCs and few bacteria will await urine culture.  Coagulation studies with nonspecific changes.  Given the patient's abnormal ultrasound the case was discussed with the on-call general surgeon who also evaluated the patient's imaging and raises concern for early cholecystitis and extensive gallbladder wall thickening.  Case discussed with the admitting hospitalist Dr. Guillory with plan for observation placement.  As discussed with Dr. Montes the patient was ordered clear liquid diet until midnight at which point the patient needs to be NPO.  The patient was ordered ceftriaxone and Flagyl IV type and screen added to evaluation.  Patient informed of the above and patient agrees with plan for observation placement.    Clinical Impression     ED Diagnosis   1. Acute cholecystitis            Disposition        Admit 12/8/2023  1:48 PM  Telemetry Bed?: No  Admitting Physician: OUMOU GUILLORY [[id number]]  Is this a telephone or verbal order?: This is a telephone order from the admitting physician         Mohamud Machado MD  12/08/23 2885     mildly impulsive/at risk behaviors demonstrated

## 2024-01-11 ENCOUNTER — APPOINTMENT (OUTPATIENT)
Dept: NEUROLOGY | Facility: CLINIC | Age: 59
End: 2024-01-11

## 2024-01-18 ENCOUNTER — APPOINTMENT (OUTPATIENT)
Dept: GASTROENTEROLOGY | Facility: CLINIC | Age: 59
End: 2024-01-18

## 2024-02-26 ENCOUNTER — APPOINTMENT (OUTPATIENT)
Dept: NEUROLOGY | Facility: CLINIC | Age: 59
End: 2024-02-26

## 2024-03-01 ENCOUNTER — EMERGENCY (EMERGENCY)
Facility: HOSPITAL | Age: 59
LOS: 0 days | Discharge: HOME | End: 2024-03-01
Attending: STUDENT IN AN ORGANIZED HEALTH CARE EDUCATION/TRAINING PROGRAM
Payer: COMMERCIAL

## 2024-03-01 VITALS
HEART RATE: 68 BPM | RESPIRATION RATE: 18 BRPM | HEIGHT: 69 IN | DIASTOLIC BLOOD PRESSURE: 67 MMHG | WEIGHT: 125.66 LBS | TEMPERATURE: 98 F | SYSTOLIC BLOOD PRESSURE: 116 MMHG | OXYGEN SATURATION: 98 %

## 2024-03-01 DIAGNOSIS — Z91.018 ALLERGY TO OTHER FOODS: ICD-10-CM

## 2024-03-01 DIAGNOSIS — Z01.89 ENCOUNTER FOR OTHER SPECIFIED SPECIAL EXAMINATIONS: ICD-10-CM

## 2024-03-01 DIAGNOSIS — N18.6 END STAGE RENAL DISEASE: ICD-10-CM

## 2024-03-01 DIAGNOSIS — E78.5 HYPERLIPIDEMIA, UNSPECIFIED: ICD-10-CM

## 2024-03-01 DIAGNOSIS — E11.22 TYPE 2 DIABETES MELLITUS WITH DIABETIC CHRONIC KIDNEY DISEASE: ICD-10-CM

## 2024-03-01 DIAGNOSIS — Z86.2 PERSONAL HISTORY OF DISEASES OF THE BLOOD AND BLOOD-FORMING ORGANS AND CERTAIN DISORDERS INVOLVING THE IMMUNE MECHANISM: ICD-10-CM

## 2024-03-01 DIAGNOSIS — Z99.2 DEPENDENCE ON RENAL DIALYSIS: ICD-10-CM

## 2024-03-01 DIAGNOSIS — Z98.890 OTHER SPECIFIED POSTPROCEDURAL STATES: Chronic | ICD-10-CM

## 2024-03-01 DIAGNOSIS — I12.0 HYPERTENSIVE CHRONIC KIDNEY DISEASE WITH STAGE 5 CHRONIC KIDNEY DISEASE OR END STAGE RENAL DISEASE: ICD-10-CM

## 2024-03-01 LAB
ALBUMIN SERPL ELPH-MCNC: 4.9 G/DL — SIGNIFICANT CHANGE UP (ref 3.5–5.2)
ALP SERPL-CCNC: 137 U/L — HIGH (ref 30–115)
ALT FLD-CCNC: 14 U/L — SIGNIFICANT CHANGE UP (ref 0–41)
ANION GAP SERPL CALC-SCNC: 15 MMOL/L — HIGH (ref 7–14)
AST SERPL-CCNC: 25 U/L — SIGNIFICANT CHANGE UP (ref 0–41)
BASOPHILS # BLD AUTO: 0.03 K/UL — SIGNIFICANT CHANGE UP (ref 0–0.2)
BASOPHILS NFR BLD AUTO: 0.7 % — SIGNIFICANT CHANGE UP (ref 0–1)
BILIRUB SERPL-MCNC: 0.8 MG/DL — SIGNIFICANT CHANGE UP (ref 0.2–1.2)
BUN SERPL-MCNC: 48 MG/DL — HIGH (ref 10–20)
CALCIUM SERPL-MCNC: 9.7 MG/DL — SIGNIFICANT CHANGE UP (ref 8.4–10.5)
CHLORIDE SERPL-SCNC: 99 MMOL/L — SIGNIFICANT CHANGE UP (ref 98–110)
CO2 SERPL-SCNC: 26 MMOL/L — SIGNIFICANT CHANGE UP (ref 17–32)
CREAT SERPL-MCNC: 4.3 MG/DL — CRITICAL HIGH (ref 0.7–1.5)
EGFR: 15 ML/MIN/1.73M2 — LOW
EOSINOPHIL # BLD AUTO: 0.36 K/UL — SIGNIFICANT CHANGE UP (ref 0–0.7)
EOSINOPHIL NFR BLD AUTO: 8 % — SIGNIFICANT CHANGE UP (ref 0–8)
GLUCOSE SERPL-MCNC: 103 MG/DL — HIGH (ref 70–99)
HCT VFR BLD CALC: 34.1 % — LOW (ref 42–52)
HGB BLD-MCNC: 11.4 G/DL — LOW (ref 14–18)
IMM GRANULOCYTES NFR BLD AUTO: 0.2 % — SIGNIFICANT CHANGE UP (ref 0.1–0.3)
LYMPHOCYTES # BLD AUTO: 1.52 K/UL — SIGNIFICANT CHANGE UP (ref 1.2–3.4)
LYMPHOCYTES # BLD AUTO: 33.8 % — SIGNIFICANT CHANGE UP (ref 20.5–51.1)
MCHC RBC-ENTMCNC: 30.6 PG — SIGNIFICANT CHANGE UP (ref 27–31)
MCHC RBC-ENTMCNC: 33.4 G/DL — SIGNIFICANT CHANGE UP (ref 32–37)
MCV RBC AUTO: 91.7 FL — SIGNIFICANT CHANGE UP (ref 80–94)
MONOCYTES # BLD AUTO: 0.48 K/UL — SIGNIFICANT CHANGE UP (ref 0.1–0.6)
MONOCYTES NFR BLD AUTO: 10.7 % — HIGH (ref 1.7–9.3)
NEUTROPHILS # BLD AUTO: 2.1 K/UL — SIGNIFICANT CHANGE UP (ref 1.4–6.5)
NEUTROPHILS NFR BLD AUTO: 46.6 % — SIGNIFICANT CHANGE UP (ref 42.2–75.2)
NRBC # BLD: 0 /100 WBCS — SIGNIFICANT CHANGE UP (ref 0–0)
PLATELET # BLD AUTO: 134 K/UL — SIGNIFICANT CHANGE UP (ref 130–400)
PMV BLD: 9.2 FL — SIGNIFICANT CHANGE UP (ref 7.4–10.4)
POTASSIUM SERPL-MCNC: 4.6 MMOL/L — SIGNIFICANT CHANGE UP (ref 3.5–5)
POTASSIUM SERPL-SCNC: 4.6 MMOL/L — SIGNIFICANT CHANGE UP (ref 3.5–5)
PROT SERPL-MCNC: 8.2 G/DL — HIGH (ref 6–8)
RBC # BLD: 3.72 M/UL — LOW (ref 4.7–6.1)
RBC # FLD: 13 % — SIGNIFICANT CHANGE UP (ref 11.5–14.5)
SODIUM SERPL-SCNC: 140 MMOL/L — SIGNIFICANT CHANGE UP (ref 135–146)
WBC # BLD: 4.5 K/UL — LOW (ref 4.8–10.8)
WBC # FLD AUTO: 4.5 K/UL — LOW (ref 4.8–10.8)

## 2024-03-01 PROCEDURE — 36415 COLL VENOUS BLD VENIPUNCTURE: CPT

## 2024-03-01 PROCEDURE — 85025 COMPLETE CBC W/AUTO DIFF WBC: CPT

## 2024-03-01 PROCEDURE — 86704 HEP B CORE ANTIBODY TOTAL: CPT

## 2024-03-01 PROCEDURE — 93010 ELECTROCARDIOGRAM REPORT: CPT

## 2024-03-01 PROCEDURE — 86706 HEP B SURFACE ANTIBODY: CPT

## 2024-03-01 PROCEDURE — 93005 ELECTROCARDIOGRAM TRACING: CPT

## 2024-03-01 PROCEDURE — 99285 EMERGENCY DEPT VISIT HI MDM: CPT

## 2024-03-01 PROCEDURE — 80053 COMPREHEN METABOLIC PANEL: CPT

## 2024-03-01 PROCEDURE — 71046 X-RAY EXAM CHEST 2 VIEWS: CPT

## 2024-03-01 PROCEDURE — 71046 X-RAY EXAM CHEST 2 VIEWS: CPT | Mod: 26

## 2024-03-01 PROCEDURE — 86803 HEPATITIS C AB TEST: CPT

## 2024-03-01 PROCEDURE — 87340 HEPATITIS B SURFACE AG IA: CPT

## 2024-03-01 PROCEDURE — 99285 EMERGENCY DEPT VISIT HI MDM: CPT | Mod: 25

## 2024-03-01 NOTE — ED PROVIDER NOTE - CLINICAL SUMMARY MEDICAL DECISION MAKING FREE TEXT BOX
59 yo M presented to ED for med eval for labs / EKG / CXR to continue his HD. No acute complaints. Labs and EKG were ordered and reviewed.  Imaging was ordered and reviewed by me.  Unremarkable CXR.  Patient's records (prior hospital, ED visit, and/or nursing home notes if available) were reviewed.  Additional history was obtained from EMS, family, and/or PCP (where available).  Escalation to admission/observation was considered. However patient feels much better and is comfortable with discharge.  Appropriate follow-up was arranged. Return precautions discussed in detail.

## 2024-03-01 NOTE — ED PROVIDER NOTE - CARE PROVIDER_API CALL
Mikhail Bernard  Nephrology  [address] [place] [id number]  Phone: [phone]  Fax: [phone]  Follow Up Time: 1-3 Days

## 2024-03-01 NOTE — ED ADULT NURSE NOTE - CAS EDN DISCHARGE ASSESSMENT
no loss of consciousness, no gait abnormality, no headache, no sensory deficits, and no weakness.
Alert and oriented to person, place and time

## 2024-03-01 NOTE — ED PROVIDER NOTE - ATTENDING APP SHARED VISIT CONTRIBUTION OF CARE
58-year-old male past medical history ESRD on HD Monday Wednesday Friday, hypertension, hyperlipidemia, diabetes presents the emergency department sent in by his nephrologist requesting blood work, EKG and chest x-ray to continue his dialysis.  Patient last had dialysis on Wednesday.  Patient follows with Dr. Pallambini (nephrology).  Patient accompanied with his daughter, denies any complaints at this time.  No chest pain, shortness of breath, nausea, vomiting, numbness, weakness.    CONSTITUTIONAL: NAD.   SKIN: warm, dry  HEAD: Normocephalic; atraumatic.  EYES: no conjunctival injection.    ENT: MMM.   NECK: Supple.  CARD: RRR.   RESP: No wheezes, rales or rhonchi.  ABD: soft ntnd.   EXT: Normal ROM.  No lower extremity edema.   NEURO: Alert, oriented, grossly unremarkable.  PSYCH: Cooperative, appropriate.

## 2024-03-01 NOTE — ED PROVIDER NOTE - OBJECTIVE STATEMENT
58-year-old male with past medical history of HTN, HLD, DM, alcohol abuse, CKD 4 now on HD (M/W/F), and anemia presents to the ED requesting blood work/EKG/Xray needed to continue his dialysis.  Patient was last dialyzed on Wednesday.  He denies any complaints at this time and states he feels well.

## 2024-03-01 NOTE — ED PROVIDER NOTE - WET READ LAUNCH FT
Discharge Planner OT   Patient plan for discharge: Home with assist from spouse and OP PT  Current status: pt SBA with leg  to complete supine to sit EOB, amb with FWW CGA to/from bathroom toilet transfer with RTS SBA, SBA standing at sink ADL task, educated on safety and use of AE for tub transfer, pt verbalized good understanding. Pt declined AE for LE dressing stating she feels she will be able to do and spouse available to assist as needed    Barriers to return to prior living situation: Stairs (defer to PT)  Recommendations for discharge: Home with assist for ADLs/IADLs per plan established by the Occupational Therapist  Rationale for recommendations: pt spouse to assist as needed for ADLS at home           Entered by: Naye Vallejo 01/07/2019 1:09 PM      Pt discharging to home today, GOALS PARTIALLY MET, see discharge summary   There are no Wet Read(s) to document.

## 2024-03-01 NOTE — ED PROVIDER NOTE - PATIENT PORTAL LINK FT
You can access the FollowMyHealth Patient Portal offered by [facility] by registering at the following website: [email or web address]. By joining [employer]’s FollowMyHealth portal, you will also be able to view your health information using other applications (apps) compatible with our system.

## 2024-03-01 NOTE — ED PROVIDER NOTE - PHYSICAL EXAMINATION
VITAL SIGNS: I have reviewed nursing notes and confirm.  CONSTITUTIONAL: Well-developed; well-nourished; in no acute distress.  SKIN: Skin exam is warm and dry, no acute rash.  HEAD: Normocephalic; atraumatic.  EYES: PERRL, EOM intact; conjunctiva and sclera clear.  ENT: No nasal discharge; airway clear.   CARD: S1, S2 normal; no murmurs, gallops, or rubs. Regular rate and rhythm.  RESP: No wheezes, rales or rhonchi. Speaking in full sentences.   ABD: Normal bowel sounds; soft; non-distended; non-tender; No rebound or guarding. No CVA tenderness.  EXT: Normal ROM. No clubbing, cyanosis or edema.   NEURO: Alert, oriented. Grossly unremarkable. No focal deficits.

## 2024-03-01 NOTE — ED PROVIDER NOTE - CARE PLAN
1 Principal Discharge DX:	Evaluation by medical service required   Principal Discharge DX:	Evaluation by medical service required  Assessment and plan of treatment:	Plan - labs ekg cxr

## 2024-03-01 NOTE — ED ADULT NURSE NOTE - NSFALLUNIVINTERV_ED_ALL_ED
Bed/Stretcher in lowest position, wheels locked, appropriate side rails in place/Call bell, personal items and telephone in reach/Instruct patient to call for assistance before getting out of bed/chair/stretcher/Non-slip footwear applied when patient is off stretcher/[place] to call system/Physically safe environment - no spills, clutter or unnecessary equipment/Purposeful proactive rounding/Room/bathroom lighting operational, light cord in reach

## 2024-03-02 LAB
HBV CORE AB SER-ACNC: SIGNIFICANT CHANGE UP
HBV SURFACE AB SER-ACNC: REACTIVE
HBV SURFACE AG SER-ACNC: SIGNIFICANT CHANGE UP
HCV AB S/CO SERPL IA: 0.04 COI — SIGNIFICANT CHANGE UP
HCV AB SERPL-IMP: SIGNIFICANT CHANGE UP

## 2024-03-25 ENCOUNTER — RX RENEWAL (OUTPATIENT)
Age: 59
End: 2024-03-25

## 2024-03-25 RX ORDER — GABAPENTIN 600 MG/1
600 TABLET, COATED ORAL 3 TIMES DAILY
Qty: 270 | Refills: 2 | Status: ACTIVE | COMMUNITY
Start: 2021-11-11 | End: 1900-01-01

## 2024-05-13 ENCOUNTER — RX RENEWAL (OUTPATIENT)
Age: 59
End: 2024-05-13

## 2024-05-13 RX ORDER — FOLIC ACID 1 MG/1
1 TABLET ORAL
Qty: 90 | Refills: 3 | Status: ACTIVE | COMMUNITY
Start: 2020-03-31 | End: 1900-01-01

## 2024-05-17 NOTE — PHYSICAL THERAPY INITIAL EVALUATION ADULT - IMPAIRED TRANSFERS: SIT/STAND, REHAB EVAL
Addended by: JEROME BIRMINGHAM on: 5/17/2024 02:14 PM     Modules accepted: Level of Service    
impaired balance/impaired postural control/decreased strength

## 2024-06-05 NOTE — DISCHARGE NOTE NURSING/CASE MANAGEMENT/SOCIAL WORK - NSDCPETBCESMAN_GEN_ALL_CORE
Call regarding: Patient returned your call, please try again at this number:  Caller: self  Number to be reached at:  [phone]  Timeframe for callback: today       If you are a smoker, it is important for your health to stop smoking. Please be aware that second hand smoke is also harmful.

## 2024-12-11 NOTE — PATIENT PROFILE ADULT - FALL HARM RISK - HARM RISK INTERVENTIONS
Pt meets criteria for discharge. Vital signs stable. Pt without falls. Discharge teaching complete. Questions answered.     Assistance with ambulation/Assistance OOB with selected safe patient handling equipment/Communicate Risk of Fall with Harm to all staff/Reinforce activity limits and safety measures with patient and family/Tailored Fall Risk Interventions/Visual Cue: Yellow wristband and red socks/Bed in lowest position, wheels locked, appropriate side rails in place/Call bell, personal items and telephone in reach/Instruct patient to call for assistance before getting out of bed or chair/Non-slip footwear when patient is out of bed/[place] to call system/Physically safe environment - no spills, clutter or unnecessary equipment/Purposeful Proactive Rounding/Room/bathroom lighting operational, light cord in reach

## 2025-01-22 NOTE — ASU PATIENT PROFILE, ADULT - NS PRO AD INFO GIVEN Y
Cardiovascular Clinic Note  [facility] [address]  [address]  [facility] [address]  Dept Phone: [phone]      Linh Smith  : 1945  PCP: Tiffanie Garcia MD  Reason for Office Visit:   Chief Complaint   Patient presents with    Follow-up     EDEMA         History of Present Illness:  Dear Tiffanie Rucker MD, We had the pleasure of seeing Linh Smith in the [facility] [place]. Thank you for allowing me to see this patient in the office. As you know, this is a 79 year old female who presents with the chief complaint of   Chief Complaint   Patient presents with    Follow-up     EDEMA   .  Patient presents for 6-month follow-up.  Previously a patient Dr. Duran.  New to myself.  Also sees Dr. Burrell for PAD.  Carries a diagnosis of metastatic adenocarcinoma of the lung with brain metastases status post radiation.  History of pulmonary embolism.  Sees Dr. Burrell for PAD.  Recently had a mechanical fall and underwent right hip hemiarthroplasty at [facility].  Echo  LVEF 54%  Also last fall she was hospitalized with metabolic acidosis and acute kidney injury felt to be secondary to her immunotherapy at the time.  She is no longer on immunotherapy.  Her lab abnormalities improved.  She used to be on furosemide for a period of time but then during that hospitalization it was stopped.  Currently she is not taking it.  She is complaining of increasing lower extremity edema since her hip surgery.  No PND orthopnea.  No chest pain or palpitations or dyspnea.  She only uses oxygen as needed but currently is not on it.  She is off her Eliquis for a few days while she had a dental implant and is resuming it tomorrow  Labs reviewed.  Her lipids are very low.  Renal function is stable.  Electrolytes stable.  Blood pressure readings from home are well-controlled    Review of Systems:  A medically appropriate Review of Systems was  performed for this visit and is negative except for HPI.     Physical Exam:  Visit Vitals  BP (!) 161/74 (BP Location: LUE - Left upper extremity, Patient Position: Sitting, Cuff Size: Regular)   Pulse 74   Ht 5' 1\" (1.549 m)   Wt 58.7 kg (129 lb 8.3 oz)   SpO2 96%   BMI 24.47 kg/m²     Wt Readings from Last 4 Encounters:   01/22/25 58.7 kg (129 lb 8.3 oz)   10/21/24 54.8 kg (120 lb 11.2 oz)   07/17/24 57.7 kg (127 lb 3.3 oz)   04/04/24 61 kg (134 lb 7.7 oz)     Vital signs and previous weights were reviewed during today's visit.    Gen: no acute distress, AOx3  Neck: Supple, no JVP, no carotid bruit  CV: RRR, S1, S2, No G/R/M  Chest: Lungs BCTA, non-labored respirations   Ext: warm, well perfused, 1+ LE edema    ALLERGIES:  No Known Allergies    Current Medications    APIXABAN (ELIQUIS) 5 MG TAB    Take by mouth every 12 hours.    ASPIRIN 81 MG TABLET    Take 81 mg by mouth daily.     CHOLECALCIFEROL (VITAMIN D3) 50 MCG (2,000 UNITS) CAPSULE    Take 50 mcg by mouth daily. On hold    FUROSEMIDE (LASIX) 20 MG TABLET    Take 20 mg by mouth 3 days a week.    LISINOPRIL (ZESTRIL) 20 MG TABLET    Take 20 mg by mouth daily.    METOPROLOL SUCCINATE (TOPROL-XL) 50 MG 24 HR TABLET    Take 50 mg by mouth daily.    ROSUVASTATIN (CRESTOR) 20 MG TABLET    TAKE 1 TABLET EVERY DAY       Linh's Allergies and Medications were reviewed with the patient and updated during today's visit. In addition, I discussed medication dosage, usage, goals of therapy, and side effects with her.    Cardiovascular Diagnostic Studies:    LAST EKG:  No results found for this or any previous visit (from the past 4464 hour(s)).    LAST ECHO/ECHO STRESS:  Results for orders placed in visit on 09/07/23    TRANSTHORACIC ECHO (TTE) COMPLETE W/ W/O IMAGING AGENT    Impression  *[facility]*  [address] [place] [address], [address]  [place] [address] [phone]  Transthoracic Echocardiogram (TTE)    Patient: King  Linh COLEY     Study Date/Time:       Sep 7 2023 2:09PM  MRN:     [id number]              FIN#:                  [id number]  :     1945           Ht/Wt:                 154.9cm 63.1kg  Age:     77                   BSA/BMI:               1.66m^2 26.3kg/m^2  Gender:  F                    Baseline BP:           158 / 82  Ordering Physician:   John Paul Duran MD    Referring Physician:  John Paul Duran MD    Primary Physician:    Jose Moreno MD    Performing Physician: Chris Davidson MD  Diagnostic Physician: Chris Davidson MD  Sonographer:          CHANDRIKA Neri    ------------------------------------------------------------------------------  INDICATIONS:   Dyspnea.  Congestive Heart Failure, CAD.    ------------------------------------------------------------------------------  STUDY CONCLUSIONS  SUMMARY:    1. Left ventricle: The cavity size is normal. Wall thickness is normal.  Systolic function is normal. The ejection fraction was measured by biplane  method of disks. The ejection fraction is 54%.  2. Right ventricle: The cavity size is normal. Wall thickness is normal.  Systolic function is normal.  3. Tricuspid valve: There is mild regurgitation.  4. Pulmonary arteries: Systolic pressure is within the normal range.  IMPRESSIONS:   The study has changed since the previous study. Tricuspid  regurgitation has decreased.    ------------------------------------------------------------------------------  STUDY DATA:  [place] Comparison is made to the study of May 2021.  Procedure:  A transthoracic echocardiogram was performed. Image quality was  good.  M-mode, complete 2D, 3D, complete spectral Doppler, and color Doppler.  Study status:  Routine.  Study completion:  There were no complications.    FINDINGS    BASELINE ECG:   Normal sinus rhythm.  LEFT VENTRICLE:  The cavity size is normal. Wall thickness is normal. Systolic  function is normal. Wall motion is normal; there are no regional  wall motion  abnormalities.    The ejection fraction was measured by biplane method of  disks. Ejection fraction by 3D 55% The ejection fraction is 54%. Left  ventricular diastolic function is indeterminate.    AORTIC VALVE:  The annulus is normal. The valve is structurally normal. The  valve is trileaflet. The leaflets are normal thickness. Cusp separation is  normal. Velocity is within the normal range. There is no stenosis. There is no  regurgitation. The peak systolic gradient is 10mm Hg. The ratio of LVOT to  aortic valve peak velocity is 0.63. The valve area is 2.0cm^2. The valve area  index is 1.19cm^2/m^2.    AORTA:  Aortic root: The root is normal-sized.  Ascending aorta: The vessel is normal-sized.    MITRAL VALVE:  The valve is structurally normal. The annulus is normal. The  leaflets are normal thickness. Leaflet separation is normal. Inflow velocity  is within the normal range. There is no evidence for stenosis. There is  trivial regurgitation. The valve area by pressure half-time is 2.3cm^2. The  valve area index by pressure half-time is 1.41cm^2/m^2.    LEFT ATRIUM:  The atrium is normal in size.    RIGHT VENTRICLE:  The cavity size is normal. Wall thickness is normal.  Systolic function is normal.  Systolic pressure is within the normal range.  The estimated peak pressure is 19mm Hg.       The RV pressure during systole  is 19mm Hg.    VENTRICULAR SEPTUM:   Thickness is normal.    PULMONIC VALVE:   The valve is structurally normal. The leaflets are normal  thickness. Cusp separation is normal. Velocity is within the normal range.  There is trivial regurgitation.    TRICUSPID VALVE:  The valve is structurally normal. Leaflet separation is  normal. Inflow velocity is within the normal range. There is mild  regurgitation.    PULMONARY ARTERIES:  Systolic pressure is within the normal range.    RIGHT ATRIUM:  The atrium is normal in size.       The estimated central  venous pressure is 3mm  Hg.    PERICARDIUM:  The pericardium is normal in appearance.    SYSTEMIC VEINS:  Inferior vena cava: The IVC is normal-sized.  Respirophasic diameter changes  are in the normal range (>= 50%).    ------------------------------------------------------------------------------  Measurements    Left ventricle            Value        Ref        05/18/2021  Left atrium continued      Value          Ref       05/18/2021  BHASKAR, LAX chord        (N) 4.1   cm     3.8 - 5.2  4.4         Area/bsa ES, A4C           10.52 cm^2/m^2 --------- ----------  ESD, LAX chord        (L) 2.0   cm     2.2 - 3.5  3.3         Area ES, A2C               20    cm^2     --------- ----------  BHASKAR/bsa, LAX chord    (N) 2.5   cm/m^2 2.3 - 3.1  2.7         Area/bsa ES, A2C           12.26 cm^2/m^2 --------- ----------  ESD/bsa, LAX chord    (L) 1.2   cm/m^2 1.3 - 2.1  2.0         Vol, S                 (N) 52    ml       22 - 52   62  PW, ED, LAX           (H) 1.1   cm     0.6 - 0.9  0.9         Vol/bsa, S             (N) 32    ml/m^2   16 - 34   38  BHASKAR major ax, A4C         6.9   cm     ---------- ----------  Vol, ES, 1-p A4C       (N) 43    ml       22 - 52   44  ESD major ax, A4C         5.5   cm     ---------- ----------  Vol/bsa, ES, 1-p A4C   (N) 26    ml/m^2   11 - 40   27  FS major axis, A4C        21    %      ---------- ----------  Vol, ES, 1-p A2C       (H) 60    ml       22 - 52   70  BHASKAR/bsa major ax, A4C     4.2   cm/m^2 ---------- ----------  Vol/bsa, ES, 1-p A2C   (N) 36    ml/m^2   13 - 40   42  ESD/bsa major ax, A4C     3.3   cm/m^2 ---------- ----------  Vol, ES, 2-p               52    ml       --------- ----------  JEREMY, A4C                  23.9  cm^2   ---------- ----------  Vol/bsa, ES, 2-p       (N) 31    ml/m^2   16 - 34   ----------  FEI, A4C                  14.1  cm^2   ---------- ----------  FAC, A4C                  41    %      ---------- ----------  Aortic valve               Value          Ref       05/18/2021  IVS,  ED               (H) 1.1   cm     0.6 - 0.9  0.9         Peak v, S                  1.6   m/sec    --------- 1.4  PW, ED                (H) 1.1   cm     0.6 - 0.9  0.9         Peak grad, S               10    mm Hg    --------- 7  IVS/PW, ED                1            ---------- 1           LVOT/AV, Vpeak ratio       0.63           --------- 0.69  EDV                   (N) 69    ml     46 - 106   90          HARPAL, Vmax                  2.0   cm^2     --------- 2.1  ESV                   (L) 8     ml     14 - 42    44          HARPAL/bsa, Vmax              1.19  cm^2/m^2 --------- 1.26  EF                    (N) 54    %      54 - 74    55  SV                        62    ml     ---------- 46          Mitral valve               Value          Ref       05/18/2021  EDV/bsa               (N) 41    ml/m^2 29 - 61    54          Peak E                     0.67  m/sec    --------- 0.81  ESV/bsa               (L) 5     ml/m^2 8 - 24     27          Peak A                     1.18  m/sec    --------- 1.06  SV/bsa                    37    ml/m^2 ---------- 28          Decel time                 322   ms       --------- 269  SV, 1-p A4C               37    ml     ---------- 43          PHT                        94    ms       --------- 78  SV/bsa, 1-p A4C           22    ml/m^2 ---------- 26          Peak E/A ratio             0.6            --------- ----------  EDV, 2-p              (N) 56    ml     46 - 106   64          MVA, PHT                   2.3   cm^2     --------- 2.8  ESV, 2-p              (N) 26    ml     14 - 42    27          MVA/bsa, PHT               1.41  cm^2/m^2 --------- 1.7  SV, 2-p                   30    ml     ---------- ----------  EDV/bsa, 2-p          (N) 34    ml/m^2 29 - 61    39          Pulmonic valve             Value          Ref       05/18/2021  ESV/bsa, 2-p          (N) 16    ml/m^2 8 - 24     16          MO v, ED                   1.45  m/sec    --------- ----------  SV/bsa, 2-p                18.2  ml/m^2 ---------- ----------  MO grad, ED                8     mm Hg    --------- ----------  E', lat efren, TDI      (L) 6.7   cm/sec >=10.0     5.0  E/e', lat efren, TDI    (N) 10           <=13       ----------  Tricuspid valve            Value          Ref       05/18/2021  E', med efren, TDI      (L) 4.8   cm/sec >=7.0      4.0         TR peak v              (N) 2     m/sec    <=2.8     2.8  E/e', med efren, TDI        14           ---------- ----------  Peak RV-RA grad, S         16    mm Hg    --------- 32  E', avg, TDI              5.74  cm/sec ---------- ----------  E/e', avg, TDI        (N) 12           <=14       ----------  Aortic root                Value          Ref       05/18/2021  Root diam              (N) 2.8   cm       2.4 - 3.9 ----------  LVOT                      Value        Ref        05/18/2021  Root diam/bsa              1.7   cm/m^2   --------- ----------  Diam, S                   2.0   cm     ---------- ----------  Root diam, ED          (N) 2.8   cm       2.4 - 3.9 ----------  Area                      3.1   cm^2   ---------- 3.0         S-T junct diam, ED     (N) 2.1   cm       2.0 - 3.2 ----------  Peak dante, S               1.02  m/sec  ---------- 0.94        S-T junct diam/bsa, ED (N) 1.3   cm/m^2   1.1 - 1.9 ----------  Peak grad, S              4     mm Hg  ---------- 4  Ascending aorta            Value          Ref       05/18/2021  Right ventricle           Value        Ref        05/18/2021  AAo AP diam, ED        (N) 2.9   cm       1.9 - 3.5 ----------  BHASKAR, LAX                  2.6   cm     ---------- ----------  AAo AP diam/bsa, ED    (N) 1.7   cm/m^2   1.0 - 2.2 ----------  TAPSE, MM             (N) 2.8   cm     >=1.7      ----------  Pressure, S               19    mm Hg  ---------- 36          Pulmonary artery           Value          Ref       05/18/2021  S' lateral            (H) 13.9  cm/sec 6.0 - 13.4 ----------  Pressure, S                19    mm Hg    ---------  ----------  Pressure ED                11    mm Hg    --------- ----------  Left atrium               Value        Ref        05/18/2021  AP dim, ES            (N) 3.6   cm     2.7 - 3.8  3.8         Systemic veins             Value          Ref       05/18/2021  AP dim index, ES      (N) 2.2   cm/m^2 1.5 - 2.3  ----------  Estimated CVP              3     mm Hg    --------- ----------  Area ES, A4C          (N) 18    cm^2   <=20       18  Legend:  (L)  and  (H)  tramaine values outside specified reference range.    (N)  marks values inside specified reference range.    Prepared and electronically signed by  Chris Davidson MD  09/07/2023 15:33      CATH REPORT:  Results for orders placed or performed during the hospital encounter of 10/06/20   Cath/PV Case    Narrative    Findings:  95% right heavily calcified common iliac artery stenosis  ->Shockwave IVL (7 x 60 mm ) followed by Stent ( 9 x 37 mm )  Moderate disease left iliac artery  Mild - moderate disease bilateral SFA disease    Plan:  Dual antiplatelet therapy for 3-6 months (ASA and plavix)  Aggressive medical management for PAD         STRESS TEST:   No results found for this or any previous visit.    NUCLEAR STRESS TEST:   Results for orders placed or performed in visit on 12/07/20   Stress Test with Myocardial Perfusion   Result Value Ref Range    Predicted HR Max 144 BPM    Resting HR Achieved 58 BPM    Baseline /72 mmHg    O2 sat rest 98 %    Peak HR Achieved 122 BPM    HEART RATE RESERVE PREDICTED 15.28 BPM    Percent HR 85 %    Post peak /70 mmHg    O2 sat peak 97 %    Exercise duration (min) 4 min    Exercise duration (sec) 2 sec    Estimated workload 5.2 METS    Ejection Fraction 66 >/=50%    Narrative    NUCLEAR CARDIOPULMONARY STRESS TEST - HIGH RAMP -80 PROTOCOL      Impression    1.  Normal SPECT myocardial perfusion imaging.  2.  Abnormal ECG response to exercise.  3.  Normal left ventricular systolic function. The calculated left    ejection fraction is 66%.  4.  Below average functional capacity, with an estimated workload of 4   mets.  5.  Normal blood pressure response to exercise.  6.  CPX reported separately      REASON FOR TEST: Cardiomyopathy, Pulmonary Hypertension, Peripheral   Arterial disease    Comparison Study:      FINDINGS:  The overall quality of the study is good. The post-stress SPECT images   demonstrate homogenous tracer distribution throughout the myocardium. The   rest images reveal similar findings.    Gated SPECT imaging reveals normal myocardial thickening and wall motion.   The calculated left ventricular ejection fraction is 66%.    Medications:   Current Outpatient Medications   Medication Sig    rosuvastatin (CRESTOR) 20 MG tablet Take 1 tablet by mouth daily.   Indications: 1 daily    acetaminophen (TYLENOL) 500 MG tablet Take 1 tablet by mouth every 6   hours as needed (for groin discomfort as needed).    clopidogrel (PLAVIX) 75 MG tablet Take 1 tablet by mouth daily. Do not   start before October 8, 2020.    Magnesium 400 MG Cap Take 400 mg by mouth daily.    Cholecalciferol (Vitamin D3) 50 mcg (2,000 units) capsule Take 50 mcg by   mouth daily.    Calcium Polycarbophil (FIBER-CAPS PO) Take 6 capsules by mouth daily.    RESVERATROL PO Take 75 mg by mouth daily.    ezetimibe (ZETIA) 10 MG tablet Take 1 tablet by mouth daily.    Garlic 580 MG Cap Take 1,160 mg by mouth daily.     Calcium Carb-Cholecalciferol (CALTRATE 600+D3 PO) Take 600 mg by mouth   daily.    Omega 3-6-9 Fatty Acids (OMEGA 3-6-9 COMPLEX PO) Take 2 capsules by   mouth daily.     B Complex-C (SUPER B COMPLEX PO) Take 1 capsule by mouth daily.    aspirin 81 MG tablet Take 81 mg by mouth daily.     furosemide (LASIX) 20 MG tablet Take 20 mg by mouth. ONCE DAILY    lisinopril (ZESTRIL) 20 MG tablet Take 20 mg by mouth daily.     omeprazole (PRILOSEC) 20 MG capsule Take 1 capsule by mouth daily as   needed.     metoPROLOL succinate (TOPROL-XL) 50 MG  24 hr tablet 50 mg daily.      No current facility-administered medications for this visit.        Resting EKG: Sinus Bradycardia, HR-58, Cannot exclued Anterior wall MI,   age undetermined,          Total Test Time: 4:02  Reason for Stopping Test:   Dyspnea, Claustrophobic      Exercise Capacity:  Low METS:  5.2 Peak BP:  160/70   Target HR:  122  HR MAX Predicted:144 Peak HR:  123 % of Maximal HR:  88           ##########################  Stress ECmm ST Depression  Symptoms:  Dyspnea, Claustrophobic  Significant Arrhythmias:  Isolated PACs  Blood Pressure Response:  Normal, with resting elevated blood pressure       No orders to display        Labs:        Invalid input(s): \"BMP\", \"AGAP\", \"FST1\"          Patient Active Problem List   Diagnosis    Abnormal cardiovascular stress test    CAD in native artery    Chest pain, precordial    Dyspnea    Family history of coronary arteriosclerosis    Chronic congestive heart failure with left ventricular diastolic dysfunction  (CMD)    Hyperlipidemia, mixed    Essential hypertension    Palpitations    Claudication (CMD)    PAD (peripheral artery disease) (CMS/[facility]) s/p shockwave and stent to R common Iliac 10/6/20    Aortic ectasia, abdominal (CMD)    History of pulmonary embolism    Current use of long term anticoagulation       Assessment/Plan:    1.  History of pulmonary embolism: On Eliquis per oncology  2.  PAD: Follows Dr. Burrell.  Has testing this fall  3.  Hypertension: [place] component.  Blood pressure readings from home at goal  4.  Dyslipidemia: Readings are very very low therefore decrease rosuvastatin to 10 mg daily  5.  Echo  with preserved LV function  6.  metasStatic adenocarcinoma of the lung to brain and lung: Under surveillance.  Off immunotherapy  7.  Lower extremity edema: Resume Lasix 20 mg daily.  Follow-up with APN to discuss further recommendations regarding diuretics    Otherwise follow-up with myself in 6 months    The patient's previous  laboratory and cardiac diagnostic testing listed below has been reviewed and was utilized to establish my current plan of care.  Previous outside notes were reviewed and taken into consideration when deciding further treatment.    Orders listed below    I personally reviewed: oncology/cardiology notes/echo/labs  Discussed with: patient    Orders Placed During This Encounter:  No orders of the defined types were placed in this encounter.       Return to Clinic: Return for APN IN ONE WEEK. Patient instructed to have all ordered testing prior to follow-up visit.     Thank you Tiffanie Garcia MD for allowing me to see this patient in our office. Please call our office with any questions. Correspondence will be sent to your office.    Chris Davidson MD  [facility]  [facility]   yes

## 2025-02-27 NOTE — ASU PATIENT PROFILE, ADULT - AS SC BRADEN NUTRITION
Problem: Skin Integrity Alteration  Goal: Participates in wound care activities  Outcome: Monitoring/Evaluating progress  Note: Patient presents to wound clinic today for a follow up regarding buttock wounds. Pt previously seen inpatient by wound care team. Pt wounds are improving and pt is following with Dr. Calderon. Todd COTE saw pt. Per NP, pt does not need to f/u with wound care clinic unless there are new concerns.     Tx:   Aquaphor to healed wound  Cover dressing to small open area on R buttock       
(4) excellent